# Patient Record
Sex: MALE | Race: WHITE | Employment: OTHER | URBAN - METROPOLITAN AREA
[De-identification: names, ages, dates, MRNs, and addresses within clinical notes are randomized per-mention and may not be internally consistent; named-entity substitution may affect disease eponyms.]

---

## 2017-06-21 ENCOUNTER — HOSPITAL ENCOUNTER (OUTPATIENT)
Age: 57
Discharge: HOME | End: 2017-06-21
Payer: COMMERCIAL

## 2017-06-21 ENCOUNTER — HOSPITAL ENCOUNTER (OUTPATIENT)
Dept: GENERAL RADIOLOGY | Age: 57
Discharge: HOME | End: 2017-06-21
Payer: COMMERCIAL

## 2017-06-21 DIAGNOSIS — R52 PAIN: ICD-10-CM

## 2017-06-21 PROCEDURE — 73562 X-RAY EXAM OF KNEE 3: CPT

## 2018-04-02 ENCOUNTER — HOSPITAL ENCOUNTER (EMERGENCY)
Age: 58
Discharge: HOME | End: 2018-04-02
Attending: EMERGENCY MEDICINE

## 2018-04-02 VITALS
OXYGEN SATURATION: 100 % | HEART RATE: 105 BPM | TEMPERATURE: 97.8 F | BODY MASS INDEX: 40.18 KG/M2 | RESPIRATION RATE: 16 BRPM | HEIGHT: 66 IN | DIASTOLIC BLOOD PRESSURE: 88 MMHG | WEIGHT: 250 LBS | SYSTOLIC BLOOD PRESSURE: 164 MMHG
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
## 2018-04-02 DIAGNOSIS — S39.012A STRAIN OF LUMBAR REGION, INITIAL ENCOUNTER: ICD-10-CM

## 2018-04-02 PROCEDURE — 6370000000 HC RX 637 (ALT 250 FOR IP): Performed by: EMERGENCY MEDICINE

## 2018-04-02 PROCEDURE — 99283 EMERGENCY DEPT VISIT LOW MDM: CPT

## 2018-04-02 RX ORDER — NAPROXEN 250 MG/1
500 TABLET ORAL ONCE
Status: COMPLETED | OUTPATIENT
Start: 2018-04-02 | End: 2018-04-02

## 2018-04-02 RX ORDER — CYCLOBENZAPRINE HCL 10 MG
10 TABLET ORAL 3 TIMES DAILY PRN
Qty: 15 TABLET | Refills: 0 | Status: SHIPPED | OUTPATIENT
Start: 2018-04-02 | End: 2018-04-12

## 2018-04-02 RX ORDER — NAPROXEN 500 MG/1
500 TABLET ORAL 2 TIMES DAILY WITH MEALS
Qty: 30 TABLET | Refills: 0 | Status: SHIPPED | OUTPATIENT
Start: 2018-04-02 | End: 2018-04-30 | Stop reason: SDUPTHER

## 2018-04-02 RX ADMIN — NAPROXEN 500 MG: 250 TABLET ORAL at 21:15

## 2018-04-02 ASSESSMENT — PAIN DESCRIPTION - LOCATION: LOCATION: BACK

## 2018-04-02 ASSESSMENT — ENCOUNTER SYMPTOMS
DIARRHEA: 0
COUGH: 0
EYE PAIN: 0
VOMITING: 0
ABDOMINAL PAIN: 1
BACK PAIN: 1
SHORTNESS OF BREATH: 0
SORE THROAT: 0
NAUSEA: 0

## 2018-04-02 ASSESSMENT — PAIN SCALES - GENERAL
PAINLEVEL_OUTOF10: 6
PAINLEVEL_OUTOF10: 6

## 2018-04-02 ASSESSMENT — PAIN DESCRIPTION - ORIENTATION: ORIENTATION: LOWER

## 2018-04-04 ENCOUNTER — OFFICE VISIT (OUTPATIENT)
Dept: FAMILY MEDICINE CLINIC | Age: 58
End: 2018-04-04
Payer: OTHER MISCELLANEOUS

## 2018-04-04 VITALS
TEMPERATURE: 99.1 F | HEIGHT: 66 IN | SYSTOLIC BLOOD PRESSURE: 177 MMHG | BODY MASS INDEX: 41.24 KG/M2 | HEART RATE: 101 BPM | WEIGHT: 256.6 LBS | DIASTOLIC BLOOD PRESSURE: 92 MMHG

## 2018-04-04 DIAGNOSIS — M54.50 ACUTE MIDLINE LOW BACK PAIN WITHOUT SCIATICA: Primary | ICD-10-CM

## 2018-04-04 DIAGNOSIS — I10 ESSENTIAL HYPERTENSION: ICD-10-CM

## 2018-04-04 PROCEDURE — 99213 OFFICE O/P EST LOW 20 MIN: CPT | Performed by: HOSPITALIST

## 2018-04-04 PROCEDURE — 99213 OFFICE O/P EST LOW 20 MIN: CPT

## 2018-04-04 RX ORDER — FUROSEMIDE 40 MG/1
40 TABLET ORAL DAILY
Qty: 60 TABLET | Refills: 1 | Status: ON HOLD | OUTPATIENT
Start: 2018-04-04 | End: 2018-09-06

## 2018-04-04 RX ORDER — POTASSIUM CHLORIDE 1.5 G/1.77G
20 POWDER, FOR SOLUTION ORAL 2 TIMES DAILY
Qty: 30 EACH | Refills: 1 | Status: ON HOLD | OUTPATIENT
Start: 2018-04-04 | End: 2018-09-06

## 2018-04-04 ASSESSMENT — ENCOUNTER SYMPTOMS
BLOOD IN STOOL: 0
WHEEZING: 0
SHORTNESS OF BREATH: 0
BACK PAIN: 1
CONSTIPATION: 0
VOMITING: 0
COUGH: 0
NAUSEA: 0
ANAL BLEEDING: 0
ABDOMINAL PAIN: 0

## 2018-04-30 ENCOUNTER — OFFICE VISIT (OUTPATIENT)
Dept: FAMILY MEDICINE CLINIC | Age: 58
End: 2018-04-30
Payer: OTHER MISCELLANEOUS

## 2018-04-30 VITALS
BODY MASS INDEX: 41.08 KG/M2 | SYSTOLIC BLOOD PRESSURE: 142 MMHG | TEMPERATURE: 98.5 F | HEIGHT: 66 IN | WEIGHT: 255.6 LBS | HEART RATE: 100 BPM | DIASTOLIC BLOOD PRESSURE: 90 MMHG

## 2018-04-30 DIAGNOSIS — M54.50 ACUTE MIDLINE LOW BACK PAIN WITHOUT SCIATICA: Primary | ICD-10-CM

## 2018-04-30 PROCEDURE — 99213 OFFICE O/P EST LOW 20 MIN: CPT

## 2018-04-30 PROCEDURE — 99213 OFFICE O/P EST LOW 20 MIN: CPT | Performed by: HOSPITALIST

## 2018-04-30 RX ORDER — NAPROXEN 500 MG/1
500 TABLET ORAL 2 TIMES DAILY WITH MEALS
Qty: 30 TABLET | Refills: 0 | Status: SHIPPED | OUTPATIENT
Start: 2018-04-30 | End: 2018-06-29 | Stop reason: SDUPTHER

## 2018-04-30 ASSESSMENT — ENCOUNTER SYMPTOMS: BACK PAIN: 1

## 2018-04-30 ASSESSMENT — PATIENT HEALTH QUESTIONNAIRE - PHQ9
SUM OF ALL RESPONSES TO PHQ QUESTIONS 1-9: 0
SUM OF ALL RESPONSES TO PHQ9 QUESTIONS 1 & 2: 0
2. FEELING DOWN, DEPRESSED OR HOPELESS: 0
1. LITTLE INTEREST OR PLEASURE IN DOING THINGS: 0

## 2018-05-07 ENCOUNTER — HOSPITAL ENCOUNTER (OUTPATIENT)
Dept: PHYSICAL THERAPY | Age: 58
Setting detail: THERAPIES SERIES
Discharge: HOME | End: 2018-05-07

## 2018-05-07 ENCOUNTER — HOSPITAL ENCOUNTER (OUTPATIENT)
Dept: MRI IMAGING | Age: 58
Discharge: HOME | End: 2018-05-09

## 2018-05-07 DIAGNOSIS — M54.50 ACUTE MIDLINE LOW BACK PAIN WITHOUT SCIATICA: ICD-10-CM

## 2018-05-07 PROCEDURE — 97162 PT EVAL MOD COMPLEX 30 MIN: CPT

## 2018-05-07 PROCEDURE — 97110 THERAPEUTIC EXERCISES: CPT

## 2018-05-07 PROCEDURE — 72148 MRI LUMBAR SPINE W/O DYE: CPT

## 2018-05-14 ENCOUNTER — HOSPITAL ENCOUNTER (OUTPATIENT)
Dept: PHYSICAL THERAPY | Age: 58
Setting detail: THERAPIES SERIES
Discharge: HOME | End: 2018-05-14

## 2018-05-14 PROCEDURE — 97110 THERAPEUTIC EXERCISES: CPT

## 2018-05-17 ENCOUNTER — HOSPITAL ENCOUNTER (OUTPATIENT)
Dept: PHYSICAL THERAPY | Age: 58
Setting detail: THERAPIES SERIES
Discharge: HOME | End: 2018-05-17

## 2018-05-21 ENCOUNTER — HOSPITAL ENCOUNTER (OUTPATIENT)
Dept: PHYSICAL THERAPY | Age: 58
Setting detail: THERAPIES SERIES
Discharge: HOME | End: 2018-05-21

## 2018-05-21 PROCEDURE — 97110 THERAPEUTIC EXERCISES: CPT

## 2018-05-24 ENCOUNTER — HOSPITAL ENCOUNTER (OUTPATIENT)
Dept: PHYSICAL THERAPY | Age: 58
Setting detail: THERAPIES SERIES
Discharge: HOME | End: 2018-05-24

## 2018-05-24 PROCEDURE — 97113 AQUATIC THERAPY/EXERCISES: CPT

## 2018-05-29 ENCOUNTER — HOSPITAL ENCOUNTER (OUTPATIENT)
Dept: PHYSICAL THERAPY | Age: 58
Setting detail: THERAPIES SERIES
Discharge: HOME | End: 2018-05-29

## 2018-05-29 PROCEDURE — 97110 THERAPEUTIC EXERCISES: CPT

## 2018-05-31 ENCOUNTER — HOSPITAL ENCOUNTER (OUTPATIENT)
Dept: PHYSICAL THERAPY | Age: 58
Setting detail: THERAPIES SERIES
Discharge: HOME | End: 2018-05-31

## 2018-05-31 PROCEDURE — 97113 AQUATIC THERAPY/EXERCISES: CPT

## 2018-06-01 ENCOUNTER — OFFICE VISIT (OUTPATIENT)
Dept: FAMILY MEDICINE CLINIC | Age: 58
End: 2018-06-01
Payer: OTHER MISCELLANEOUS

## 2018-06-01 VITALS
BODY MASS INDEX: 40.6 KG/M2 | WEIGHT: 252.6 LBS | HEIGHT: 66 IN | SYSTOLIC BLOOD PRESSURE: 145 MMHG | TEMPERATURE: 99.8 F | HEART RATE: 106 BPM | DIASTOLIC BLOOD PRESSURE: 80 MMHG

## 2018-06-01 DIAGNOSIS — G89.29 CHRONIC BILATERAL LOW BACK PAIN WITHOUT SCIATICA: ICD-10-CM

## 2018-06-01 DIAGNOSIS — M54.50 CHRONIC BILATERAL LOW BACK PAIN WITHOUT SCIATICA: ICD-10-CM

## 2018-06-01 DIAGNOSIS — Z11.4 ENCOUNTER FOR SCREENING FOR HIV: ICD-10-CM

## 2018-06-01 DIAGNOSIS — Z11.59 ENCOUNTER FOR HEPATITIS C SCREENING TEST FOR LOW RISK PATIENT: ICD-10-CM

## 2018-06-01 DIAGNOSIS — I10 ESSENTIAL HYPERTENSION: Primary | ICD-10-CM

## 2018-06-01 PROCEDURE — 99213 OFFICE O/P EST LOW 20 MIN: CPT | Performed by: HOSPITALIST

## 2018-06-01 ASSESSMENT — ENCOUNTER SYMPTOMS
WHEEZING: 0
ABDOMINAL PAIN: 0
BLOOD IN STOOL: 0
COUGH: 0
VOMITING: 0
SHORTNESS OF BREATH: 0
ANAL BLEEDING: 0
CONSTIPATION: 0
BACK PAIN: 1
NAUSEA: 0

## 2018-06-04 ENCOUNTER — HOSPITAL ENCOUNTER (OUTPATIENT)
Dept: PHYSICAL THERAPY | Age: 58
Setting detail: THERAPIES SERIES
Discharge: HOME | End: 2018-06-04

## 2018-06-04 PROCEDURE — 97110 THERAPEUTIC EXERCISES: CPT

## 2018-06-07 ENCOUNTER — HOSPITAL ENCOUNTER (OUTPATIENT)
Dept: PHYSICAL THERAPY | Age: 58
Setting detail: THERAPIES SERIES
Discharge: HOME | End: 2018-06-07

## 2018-06-07 PROCEDURE — 97113 AQUATIC THERAPY/EXERCISES: CPT

## 2018-06-11 ENCOUNTER — HOSPITAL ENCOUNTER (OUTPATIENT)
Dept: PHYSICAL THERAPY | Age: 58
Setting detail: THERAPIES SERIES
Discharge: HOME | End: 2018-06-11

## 2018-06-11 PROCEDURE — 97110 THERAPEUTIC EXERCISES: CPT

## 2018-06-14 ENCOUNTER — HOSPITAL ENCOUNTER (OUTPATIENT)
Dept: PHYSICAL THERAPY | Age: 58
Setting detail: THERAPIES SERIES
Discharge: HOME | End: 2018-06-14

## 2018-06-14 PROCEDURE — 97113 AQUATIC THERAPY/EXERCISES: CPT

## 2018-06-18 ENCOUNTER — HOSPITAL ENCOUNTER (OUTPATIENT)
Dept: PHYSICAL THERAPY | Age: 58
Setting detail: THERAPIES SERIES
Discharge: HOME | End: 2018-06-18

## 2018-06-18 PROCEDURE — 97110 THERAPEUTIC EXERCISES: CPT

## 2018-06-21 ENCOUNTER — HOSPITAL ENCOUNTER (OUTPATIENT)
Dept: PHYSICAL THERAPY | Age: 58
Setting detail: THERAPIES SERIES
Discharge: HOME | End: 2018-06-21

## 2018-06-21 PROCEDURE — 97113 AQUATIC THERAPY/EXERCISES: CPT

## 2018-06-25 ENCOUNTER — HOSPITAL ENCOUNTER (OUTPATIENT)
Dept: PHYSICAL THERAPY | Age: 58
Setting detail: THERAPIES SERIES
Discharge: HOME | End: 2018-06-25

## 2018-06-25 PROCEDURE — 97110 THERAPEUTIC EXERCISES: CPT

## 2018-06-29 ENCOUNTER — OFFICE VISIT (OUTPATIENT)
Dept: FAMILY MEDICINE CLINIC | Age: 58
End: 2018-06-29
Payer: OTHER MISCELLANEOUS

## 2018-06-29 VITALS
TEMPERATURE: 99 F | HEIGHT: 66 IN | BODY MASS INDEX: 40.85 KG/M2 | DIASTOLIC BLOOD PRESSURE: 86 MMHG | SYSTOLIC BLOOD PRESSURE: 137 MMHG | HEART RATE: 110 BPM | WEIGHT: 254.2 LBS

## 2018-06-29 DIAGNOSIS — G89.29 CHRONIC BILATERAL LOW BACK PAIN WITHOUT SCIATICA: ICD-10-CM

## 2018-06-29 DIAGNOSIS — M54.50 CHRONIC BILATERAL LOW BACK PAIN WITHOUT SCIATICA: ICD-10-CM

## 2018-06-29 DIAGNOSIS — Z11.59 NEED FOR HEPATITIS C SCREENING TEST: ICD-10-CM

## 2018-06-29 DIAGNOSIS — G89.29 CHRONIC PAIN OF RIGHT KNEE: ICD-10-CM

## 2018-06-29 DIAGNOSIS — I10 ESSENTIAL HYPERTENSION: Primary | ICD-10-CM

## 2018-06-29 DIAGNOSIS — M25.561 CHRONIC PAIN OF RIGHT KNEE: ICD-10-CM

## 2018-06-29 DIAGNOSIS — Z11.4 ENCOUNTER FOR SCREENING FOR HIV: ICD-10-CM

## 2018-06-29 PROCEDURE — 99214 OFFICE O/P EST MOD 30 MIN: CPT | Performed by: HOSPITALIST

## 2018-06-29 RX ORDER — NAPROXEN 500 MG/1
500 TABLET ORAL 2 TIMES DAILY WITH MEALS
Qty: 30 TABLET | Refills: 0 | Status: ON HOLD | OUTPATIENT
Start: 2018-06-29 | End: 2018-09-06 | Stop reason: HOSPADM

## 2018-06-29 ASSESSMENT — ENCOUNTER SYMPTOMS
SHORTNESS OF BREATH: 0
COUGH: 0
BACK PAIN: 1
WHEEZING: 0

## 2018-07-02 ENCOUNTER — HOSPITAL ENCOUNTER (OUTPATIENT)
Dept: PHYSICAL THERAPY | Age: 58
Setting detail: THERAPIES SERIES
Discharge: HOME | End: 2018-07-02

## 2018-07-02 PROCEDURE — 97110 THERAPEUTIC EXERCISES: CPT

## 2018-07-02 NOTE — FLOWSHEET NOTE
appointment, review HEP and DC if no flare ups. Back pain post tx:  0/10      [] No change. [] Other:    STG/LTG  STG: (to be met in 10 treatments)  1. ? Pain:  Back pain reduced 5/10  2. ? ROM:  Lumbar ROM 75% all planes  3. ? Strength:  LE and core strength 5-  4. ? Function:  Oswestry 15%  5. Independent with Home Exercise Programs  6. Demonstrate Knowledge of fall prevention  LTG: (to be met in 16 treatments)  1. Back pain 0-2/10  2. Oswetry - 10% or less        Patient goals: Move better without pain    Pt. Education:  [x] Yes  [] No  [x] Reviewed Prior HEP/Ed  Method of Education: [x] Verbal  [x] Demo  [] Written  Comprehension of Education:  [x] Verbalizes understanding. [x] Demonstrates understanding. [] Needs review. [] Demonstrates/verbalizes HEP/Ed previously given. Plan: [x] Continue per plan of care.    [x] Other:       Treatment Plan:  [x] Therapeutic Exercise             [x] Modalities:  [] Therapeutic Activity               [] Ultrasound                  [] Electrical Stimulation  [] Gait Training                          []Massage         [x] Lumbar/Cervical Traction  [] Neuromuscular Re-education            [x] Cold/hotpack  [] Iontophoresis: 4 mg/mL  [x] Instruction in HEP                                                                       Dexamethasone Sodium  [x] Manual Therapy                                                                          Phosphate 40-80 mAmin  [x] Aquatic Therapy                                 [x] Other:   DRY NEEDLING       Time In: 1600          Time Out: 6044    Electronically signed by:  Jyoti Johnson PT

## 2018-07-05 ENCOUNTER — HOSPITAL ENCOUNTER (OUTPATIENT)
Dept: PHYSICAL THERAPY | Age: 58
Setting detail: THERAPIES SERIES
Discharge: HOME | End: 2018-07-05

## 2018-07-05 PROCEDURE — 97113 AQUATIC THERAPY/EXERCISES: CPT

## 2018-07-05 NOTE — PROGRESS NOTES
Physical Therapy    [address] [place] [address] Outpatient Physical Therapy   [address] [facility] [address]   Phone: [phone]   Fax: [phone]    Physical Therapy Daily Treatment Note    Date:  2018  Patient Name:  Author Necessary    :  1960  MRN: [id number]  Physician: CHARY Leonard                                      Insurance: Generic [employer] Insurance   Eligibility Status: Fabian Lua   DOS: 18   # of visits allowed/remaining:    Medical Diagnosis: Acute midline low back pain without sciatica                 Rehab Codes: M54.5, M62.81,      Onset Date: 18                 Next 's appt. : tbd  Visit# / total visits: 15/16   (CORRECTED COUNT)                 Cancels/No Shows: 1/0    Subjective:  No LB/LE pain this date- Notes still with neck pain but minimal.  Notes just slept wrong on it. Pain:  [x] Yes  [] No Location: Posterior neck, (B) shoulders; HA  Pain Rating: (0-10 scale): 2/10  Pain altered Tx:  [x] No  [] Yes  Action:    Comments: Continue to focus on posture and technique with all exercises per log. Objective:  [address]  [employer] Exercise Log  Aquatic, Hip & DLS Program- Phase 1  Date of Eval:    18           Primary PT:  Rachel Lezama PT  Diagnosis:  Acute midline low back pain without sciatica         Date 18   Visit # 10/16 12/16 15/16    No Hands No Hands No Hands   Walk F/L/R 2 Laps 2 laps ea 2 Laps   Marching 2 Laps 2 laps 2 Laps   Squats 15x5\" 15x5\" 15x5\"   Step-Ups F/L Low 15x  Low 15x ea Low 15x   Heel-toe raises 15x 15x 15x   SLR F/L/R 15x 15x ea 15x   Knee/Flex/Ext 15x 15x 15x   F/L Lunges      Kickboard Ex. Med Medium Medium   Iso Abd. 10x5\" 10x5\" 15x5\"   Push-pull 15x 15x 15x   Paddling            UE Ex:      Horiz. Abd/Add  10x 10x   Alt.  Flex/Ext  10x 10x   Abd/Add  10x 10x   IR/ER  10x 10x         Deep water  1 Noodle 1 Noodle 1 Noodle   Cycling 3' 3 minutes 3'   Jacks  2 minutes 2'   X-Country 2 minutes 2'         Stretches      Achllies 2x20\" 2x20\" 2x20\"   Hamstring 2x20\" 2x20\" 2x20\"         Cool down 2 Laps 2 laps 2 Laps   Pain Rating 2-4 7  Neck/shlds 2 shoulder/neck     Specific Instructions for next treatment: Assess patient response and progress as able    Treatment Charges: Mins Units   []  Modalities     []  Ther Exercise     []  Manual Therapy     []  Ther Activities     [x]  Aquatics 40 3   []  Neuromuscular     []  Other     Total Treatment time 30 3       Assessment: [x] Progressing toward goals. [] No change. [] Other:    STG: (to be met in 10 treatments)  1. ? Pain:  Back pain reduced 5/10  2. ? ROM:  Lumbar ROM 75% all planes  3. ? Strength:  LE and core strength 5-  4. ? Function:  Oswestry 15%  5. Independent with Home Exercise Programs  6. Demonstrate Knowledge of fall prevention  LTG: (to be met in 16 treatments)  1. Back pain 0-2/10  2. Oswetry - 10% or less    Patient goals:  Move better without pain    Pt. Education:  [x] Yes  [] No  [] Reviewed Prior HEP/Ed  Method of Education: [x] Verbal  [x] Demo  [] Written  Comprehension of Education:  [x] Verbalizes understanding. [x] Demonstrates understanding. [x] Needs review. [] Demonstrates/verbalizes HEP/Ed previously given. Plan: [x] Continue per plan of care.    [] Other:      Time In: 1600        Time Out:  5998    Electronically signed by Shea Hamlin PTA on 7/5/2018 at 3:57 PM

## 2018-07-09 ENCOUNTER — HOSPITAL ENCOUNTER (OUTPATIENT)
Dept: PHYSICAL THERAPY | Age: 58
Setting detail: THERAPIES SERIES
Discharge: HOME | End: 2018-07-09

## 2018-07-09 PROCEDURE — 97110 THERAPEUTIC EXERCISES: CPT

## 2018-07-09 NOTE — DISCHARGE SUMMARY
Other:   DC summary:  Patient seen for initial PT evaluation 5/7/18. Patient seen for 16 visits to include aqua and land based PT. Patient has met all goals and is discharged to independent HEP. STG/LTG  STG: (to be met in 10 treatments)  1. ? Pain:  Back pain reduced 5/10. Goal met  2. ? ROM:  Lumbar ROM 75% all planes. Goal Met  3. ? Strength:  LE and core strength 5-. Goal Met  4. ? Function:  Oswestry 15%. Goal Met  5. Independent with Home Exercise Programs. Goal Met  6. Demonstrate Knowledge of fall prevention  LTG: (to be met in 16 treatments)  1. Back pain 0-2/10. Goal Met  2. Oswetry - 10% or less. Goal Met        Patient goals: Move better without pain    Pt. Education:  [x] Yes  [] No  [x] Reviewed Prior HEP/Ed. HEP reviewed. Issued Red and Purple theraband to progress resisted therex as tolerated. Patient has no questions re HEP or otherwise. Method of Education: [x] Verbal  [x] Demo  [] Written  Comprehension of Education:  [x] Verbalizes understanding. [x] Demonstrates understanding. [] Needs review. [] Demonstrates/verbalizes HEP/Ed previously given. Plan: [] Continue per plan of care.    [x] Other:  DC to independent [facility]     Treatment Plan:  [x] Therapeutic Exercise             [x] Modalities:  [] Therapeutic Activity               [] Ultrasound                  [] Electrical Stimulation  [] Gait Training                          []Massage         [x] Lumbar/Cervical Traction  [] Neuromuscular Re-education            [x] Cold/hotpack  [] Iontophoresis: 4 mg/mL  [x] Instruction in HEP                                                                       Dexamethasone Sodium  [x] Manual Therapy                                                                          Phosphate 40-80 mAmin  [x] Aquatic Therapy                                 [x] Other:   DRY NEEDLING       Time In: 1600          Time Out: 6561    Electronically signed by:  Layne Spine, PT

## 2018-07-20 ENCOUNTER — HOSPITAL ENCOUNTER (OUTPATIENT)
Age: 58
Discharge: HOME | End: 2018-07-20

## 2018-07-20 ENCOUNTER — HOSPITAL ENCOUNTER (OUTPATIENT)
Age: 58
Discharge: HOME | End: 2018-07-22

## 2018-07-20 ENCOUNTER — HOSPITAL ENCOUNTER (OUTPATIENT)
Dept: GENERAL RADIOLOGY | Age: 58
Discharge: HOME | End: 2018-07-22

## 2018-07-20 DIAGNOSIS — Z11.59 ENCOUNTER FOR HEPATITIS C SCREENING TEST FOR LOW RISK PATIENT: ICD-10-CM

## 2018-07-20 DIAGNOSIS — I10 ESSENTIAL HYPERTENSION: ICD-10-CM

## 2018-07-20 DIAGNOSIS — Z11.4 ENCOUNTER FOR SCREENING FOR HIV: ICD-10-CM

## 2018-07-20 DIAGNOSIS — M25.561 CHRONIC PAIN OF RIGHT KNEE: ICD-10-CM

## 2018-07-20 DIAGNOSIS — G89.29 CHRONIC PAIN OF RIGHT KNEE: ICD-10-CM

## 2018-07-20 LAB
ANION GAP SERPL CALCULATED.3IONS-SCNC: 13 MMOL/L (ref 9–17)
BUN BLDV-MCNC: 18 MG/DL (ref 6–20)
BUN/CREAT BLD: ABNORMAL (ref 9–20)
CALCIUM SERPL-MCNC: 9 MG/DL (ref 8.6–10.4)
CHLORIDE BLD-SCNC: 102 MMOL/L (ref 98–107)
CO2: 27 MMOL/L (ref 20–31)
CREAT SERPL-MCNC: 0.9 MG/DL (ref 0.7–1.2)
GFR AFRICAN AMERICAN: >60 ML/MIN
GFR NON-AFRICAN AMERICAN: >60 ML/MIN
GFR SERPL CREATININE-BSD FRML MDRD: ABNORMAL ML/MIN/{1.73_M2}
GFR SERPL CREATININE-BSD FRML MDRD: ABNORMAL ML/MIN/{1.73_M2}
GLUCOSE BLD-MCNC: 102 MG/DL (ref 70–99)
HEPATITIS C ANTIBODY: NONREACTIVE
HIV AG/AB: NONREACTIVE
POTASSIUM SERPL-SCNC: 4.5 MMOL/L (ref 3.7–5.3)
SODIUM BLD-SCNC: 142 MMOL/L (ref 135–144)

## 2018-07-20 PROCEDURE — 86803 HEPATITIS C AB TEST: CPT

## 2018-07-20 PROCEDURE — 87389 HIV-1 AG W/HIV-1&-2 AB AG IA: CPT

## 2018-07-20 PROCEDURE — 36415 COLL VENOUS BLD VENIPUNCTURE: CPT

## 2018-07-20 PROCEDURE — 73562 X-RAY EXAM OF KNEE 3: CPT

## 2018-07-20 PROCEDURE — 80048 BASIC METABOLIC PNL TOTAL CA: CPT

## 2018-09-05 ENCOUNTER — HOSPITAL ENCOUNTER (OUTPATIENT)
Age: 58
Setting detail: OBSERVATION
Discharge: HOME | End: 2018-09-07
Attending: EMERGENCY MEDICINE | Admitting: EMERGENCY MEDICINE

## 2018-09-05 ENCOUNTER — APPOINTMENT (OUTPATIENT)
Dept: CT IMAGING | Age: 58
End: 2018-09-05

## 2018-09-05 ENCOUNTER — APPOINTMENT (OUTPATIENT)
Dept: GENERAL RADIOLOGY | Age: 58
End: 2018-09-05

## 2018-09-05 DIAGNOSIS — J42 CHRONIC BRONCHITIS, UNSPECIFIED CHRONIC BRONCHITIS TYPE (HCC): ICD-10-CM

## 2018-09-05 DIAGNOSIS — R07.9 CHEST PAIN, UNSPECIFIED TYPE: Primary | ICD-10-CM

## 2018-09-05 DIAGNOSIS — M54.50 MIDLINE LOW BACK PAIN WITHOUT SCIATICA: ICD-10-CM

## 2018-09-05 LAB
-: NORMAL
-: NORMAL
ABSOLUTE EOS #: 0.18 K/UL (ref 0–0.44)
ABSOLUTE IMMATURE GRANULOCYTE: <0.03 K/UL (ref 0–0.3)
ABSOLUTE LYMPH #: 1.73 K/UL (ref 1.1–3.7)
ABSOLUTE MONO #: 0.86 K/UL (ref 0.1–1.2)
ANION GAP SERPL CALCULATED.3IONS-SCNC: 12 MMOL/L (ref 9–17)
BASOPHILS # BLD: 0 % (ref 0–2)
BASOPHILS ABSOLUTE: 0.03 K/UL (ref 0–0.2)
BUN BLDV-MCNC: 12 MG/DL (ref 6–20)
BUN/CREAT BLD: ABNORMAL (ref 9–20)
CALCIUM SERPL-MCNC: 9.2 MG/DL (ref 8.6–10.4)
CHLORIDE BLD-SCNC: 95 MMOL/L (ref 98–107)
CO2: 30 MMOL/L (ref 20–31)
CREAT SERPL-MCNC: 0.9 MG/DL (ref 0.7–1.2)
D-DIMER QUANTITATIVE: 1.85 MG/L FEU
DIFFERENTIAL TYPE: ABNORMAL
EOSINOPHILS RELATIVE PERCENT: 2 % (ref 1–4)
GFR AFRICAN AMERICAN: >60 ML/MIN
GFR NON-AFRICAN AMERICAN: >60 ML/MIN
GFR SERPL CREATININE-BSD FRML MDRD: ABNORMAL ML/MIN/{1.73_M2}
GFR SERPL CREATININE-BSD FRML MDRD: ABNORMAL ML/MIN/{1.73_M2}
GLUCOSE BLD-MCNC: 119 MG/DL (ref 70–99)
HCT VFR BLD CALC: 38.8 % (ref 40.7–50.3)
HEMOGLOBIN: 12.8 G/DL (ref 13–17)
IMMATURE GRANULOCYTES: 0 %
LYMPHOCYTES # BLD: 19 % (ref 24–43)
MCH RBC QN AUTO: 28.8 PG (ref 25.2–33.5)
MCHC RBC AUTO-ENTMCNC: 33 G/DL (ref 28.4–34.8)
MCV RBC AUTO: 87.4 FL (ref 82.6–102.9)
MONOCYTES # BLD: 9 % (ref 3–12)
NRBC AUTOMATED: 0 PER 100 WBC
PDW BLD-RTO: 12.8 % (ref 11.8–14.4)
PLATELET # BLD: 200 K/UL (ref 138–453)
PLATELET ESTIMATE: ABNORMAL
PMV BLD AUTO: 11.9 FL (ref 8.1–13.5)
POC TROPONIN I: 0 NG/ML (ref 0–0.1)
POC TROPONIN I: 0.01 NG/ML (ref 0–0.1)
POC TROPONIN INTERP: NORMAL
POC TROPONIN INTERP: NORMAL
POTASSIUM SERPL-SCNC: 3.8 MMOL/L (ref 3.7–5.3)
RBC # BLD: 4.44 M/UL (ref 4.21–5.77)
RBC # BLD: ABNORMAL 10*6/UL
REASON FOR REJECTION: NORMAL
REASON FOR REJECTION: NORMAL
SEG NEUTROPHILS: 70 % (ref 36–65)
SEGMENTED NEUTROPHILS ABSOLUTE COUNT: 6.31 K/UL (ref 1.5–8.1)
SODIUM BLD-SCNC: 137 MMOL/L (ref 135–144)
WBC # BLD: 9.1 K/UL (ref 3.5–11.3)
WBC # BLD: ABNORMAL 10*3/UL
ZZ NTE CLEAN UP: ORDERED TEST: NORMAL
ZZ NTE CLEAN UP: ORDERED TEST: NORMAL
ZZ NTE WITH NAME CLEAN UP: SPECIMEN SOURCE: NORMAL
ZZ NTE WITH NAME CLEAN UP: SPECIMEN SOURCE: NORMAL

## 2018-09-05 PROCEDURE — 85025 COMPLETE CBC W/AUTO DIFF WBC: CPT

## 2018-09-05 PROCEDURE — 84484 ASSAY OF TROPONIN QUANT: CPT

## 2018-09-05 PROCEDURE — 6360000004 HC RX CONTRAST MEDICATION: Performed by: EMERGENCY MEDICINE

## 2018-09-05 PROCEDURE — 71260 CT THORAX DX C+: CPT

## 2018-09-05 PROCEDURE — 99285 EMERGENCY DEPT VISIT HI MDM: CPT

## 2018-09-05 PROCEDURE — G0378 HOSPITAL OBSERVATION PER HR: HCPCS

## 2018-09-05 PROCEDURE — 71046 X-RAY EXAM CHEST 2 VIEWS: CPT

## 2018-09-05 PROCEDURE — 85379 FIBRIN DEGRADATION QUANT: CPT

## 2018-09-05 PROCEDURE — 80048 BASIC METABOLIC PNL TOTAL CA: CPT

## 2018-09-05 PROCEDURE — 93005 ELECTROCARDIOGRAM TRACING: CPT

## 2018-09-05 RX ADMIN — IOPAMIDOL 75 ML: 755 INJECTION, SOLUTION INTRAVENOUS at 23:50

## 2018-09-05 ASSESSMENT — PAIN SCALES - GENERAL: PAINLEVEL_OUTOF10: 10

## 2018-09-05 ASSESSMENT — PAIN DESCRIPTION - FREQUENCY: FREQUENCY: CONTINUOUS

## 2018-09-05 ASSESSMENT — HEART SCORE: ECG: 1

## 2018-09-05 ASSESSMENT — ENCOUNTER SYMPTOMS
COUGH: 0
VOMITING: 0
NAUSEA: 0
SHORTNESS OF BREATH: 1
BLOOD IN STOOL: 0
CHEST TIGHTNESS: 1
WHEEZING: 0
ABDOMINAL PAIN: 0
BACK PAIN: 0

## 2018-09-05 ASSESSMENT — PAIN DESCRIPTION - ONSET: ONSET: ON-GOING

## 2018-09-05 ASSESSMENT — PAIN DESCRIPTION - LOCATION: LOCATION: CHEST

## 2018-09-05 ASSESSMENT — PAIN DESCRIPTION - PROGRESSION: CLINICAL_PROGRESSION: NOT CHANGED

## 2018-09-05 ASSESSMENT — PAIN DESCRIPTION - PAIN TYPE: TYPE: ACUTE PAIN

## 2018-09-05 ASSESSMENT — PAIN DESCRIPTION - ORIENTATION: ORIENTATION: MID

## 2018-09-05 ASSESSMENT — PAIN DESCRIPTION - DESCRIPTORS: DESCRIPTORS: ACHING

## 2018-09-05 NOTE — ED PROVIDER NOTES
[address]  ED  Emergency Department Encounter  Emergency Medicine Resident     Pt Name: Leo Quiroz  MRN: [id number]  [place] 1960  Date of evaluation: 9/5/18  PCP:  Bee Lamar MD    CHIEF COMPLAINT       Chief Complaint   Patient presents with    Shortness of Breath     pt states he has continued CHF pt states he feels like \"acting up\" pt states took his meds at home. pt states his sister is here with him for the same thing. HISTORY OF PRESENT [email or web address]  (Location/Symptom, Timing/Onset, Context/Setting, Quality, Duration, Modifying Factors, Severity.)      Leo Quiroz is a 61 y.o. male With a history of hypertension, hyperlipidemia, heart valve disorder, CHF who presents with Chest pain and shortness of breath. 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 Patient denies any, recent surgery, history of blood clots, history of cancer, estrogen use, unilateral leg swelling.        has a past medical history of CHF (congestive heart failure) ([address] Chronic back pain; COPD (chronic obstructive pulmonary disease) ([facility] [address] [facility] Headache(784.0); Hypertension; Low back pain with sciatica; Osteoarthritis; and Sleep apnea. has a past surgical history that includes hernia repair and Tonsillectomy (1963   exact date unknown). Social History     Social History    Marital status: Single     Spouse name: N/A    Number of children: N/A    Years of education: N/A     Occupational History    Not on file.      Social History Main Topics    Smoking status: Former Smoker     Types: Cigarettes    Smokeless tobacco: Current User     Types: Chew    Alcohol use No    Drug use: No    Sexual activity: Not on file     Other Topics Concern    Not on file     Social History Narrative    No narrative on file Family History   Problem Relation Age of Onset    Kidney Disease Mother     Diabetes Mother     Heart Disease Mother     Arthritis Mother     Heart Disease Father     Arthritis Sister     Diabetes Sister     Heart Disease Sister         Allergies:  Penicillins; Sulfa antibiotics; Pseudoeph-doxylamine-dm-apap; and Rabbit protein    Home Medications:  Prior to Admission medications    Medication Sig Start Date End Date Taking? Authorizing Provider   Handicap Placard MISC by Does not apply route Duration : 5 years 6/29/18   Kip Nunez MD   naproxen (NAPROSYN) 500 MG tablet Take 1 tablet by mouth 2 times daily (with meals) for 30 doses 6/29/18 7/14/18  Kip Nunez MD   furosemide (LASIX) 40 MG tablet Take 1 tablet by mouth daily 4/4/18   Kip Nunez MD   potassium chloride (KLOR-CON) 20 MEQ packet Take 20 mEq by mouth 2 times daily 4/4/18   Mauricio Jesus MD   PROAIR  (90 BASE) MCG/ACT inhaler INHALE 2 PUFFS INTO THE LUNGS EVERY 6 HOURS AS NEEDED FOR WHEEZING 5/18/16   Efren Amador MD   albuterol-ipratropium (COMBIVENT RESPIMAT)  MCG/ACT AERS inhaler Inhale 1 puff into the lungs every 6 hours 4/15/16   Efren Amador MD   gabapentin (NEURONTIN) 300 MG capsule Take 1 capsule by mouth nightly as needed 4/15/16   Efren Amador MD   lisinopril (PRINIVIL;ZESTRIL) 30 MG tablet TAKE 1 TABLET BY MOUTH DAILY 2/19/16   Efren Amador MD   simvastatin (ZOCOR) 20 MG tablet Take 20 mg by mouth nightly    Historical Provider, MD   metoprolol (LOPRESSOR) 50 MG tablet Take 50 mg by mouth 2 times daily    Historical Provider, MD   ranolazine (RANEXA) 500 MG SR tablet Take 500 mg by mouth 2 times daily. Historical Provider, MD   nitroGLYCERIN (NITROSTAT) 0.4 MG SL tablet Place 0.4 mg under the tongue every 5 minutes as needed for Chest pain. Historical Provider, MD   testosterone cypionate (DEPOTESTOTERONE CYPIONATE) 200 MG/ML injection Inject 50 mg into the muscle once.     Historical Provider, MD   Blood Pressure KIT DX: HTN 8/1/14   Ali Schaumann, MD   aspirin 81 MG tablet Take 81 mg by mouth daily. Historical Provider, MD   spironolactone (ALDACTONE) 25 MG tablet Take 25 mg by mouth daily. Historical Provider, MD       REVIEW OF SYSTEMS    (2-9 systems for level 4, 10 or more for level 5)      Review of Systems   Constitutional: Negative for chills, diaphoresis and fever. Eyes: Negative for visual disturbance. Respiratory: Positive for chest tightness and shortness of breath. Negative for cough and wheezing. Cardiovascular: Positive for chest pain. Negative for palpitations and leg swelling. Gastrointestinal: Negative for abdominal pain, blood in stool, nausea and vomiting. Endocrine: Negative for polydipsia. Notes polyuria after taking diuretics. Musculoskeletal: Negative for back pain. Skin: Negative for rash and wound. Neurological: Negative for syncope, weakness, light-headedness and numbness. PHYSICAL EXAM   (up to 7 for level 4, 8 or more for level 5)      INITIAL VITALS:   ED Triage Vitals   BP Temp Temp src Pulse Resp SpO2 Height Weight   -- -- -- -- -- -- -- --       Physical Exam   Constitutional: He is oriented to person, place, and time. He appears well-developed and well-nourished. No distress. HENT:   Head: Normocephalic and atraumatic. Eyes: Pupils are equal, round, and reactive to light. EOM are normal.   Neck: No JVD present. Cardiovascular:   Tachycardic rate with normal rhythm. No murmurs, rubs, gallops. Pulmonary/Chest: Effort normal and breath sounds normal. No stridor. No respiratory distress. He has no wheezes. He has no rales. Saturating well on room air   Abdominal: Soft. He exhibits no distension and no mass. There is no tenderness. There is no guarding. Musculoskeletal: He exhibits no edema or tenderness. Neurological: He is alert and oriented to person, place, and time. Skin: No rash noted. He is not diaphoretic. No erythema. Nursing note and vitals reviewed. DIFFERENTIAL  DIAGNOSIS     PLAN (LABS / IMAGING / EKG):  Orders Placed This Encounter   Procedures    XR CHEST STANDARD (2 VW)    CT CHEST PULMONARY EMBOLISM W CONTRAST    CBC Auto Differential    D-DIMER, QUANTITATIVE    SPECIMEN REJECTION    PREVIOUS SPECIMEN    SPECIMEN REJECTION    Basic Metabolic Panel    PREVIOUS SPECIMEN    Troponin    LAB SCANNED REPORT    DIET CARDIAC;    Vital signs per unit routine    Notify physician    Notify physician    Up as tolerated    Vital signs per unit routine    Notify physician    Notify physician    Place intermittent pneumatic compression device    Telemetry Monitoring    Telemetry monitoring    Full Code    Inpatient consult to Cardiology    Inpatient consult to Cardiology    POCT troponin    POCT troponin    POCT troponin    EKG 12 Lead    EKG 12 Lead    EKG REPORT    EKG REPORT    PATIENT STATUS (FROM ED OR OR/PROCEDURAL) Observation       MEDICATIONS ORDERED:  Orders Placed This Encounter   Medications    spironolactone (ALDACTONE) tablet 25 mg    aspirin chewable tablet 81 mg    nitroGLYCERIN (NITROSTAT) SL tablet 0.4 mg    DISCONTD: testosterone cypionate (DEPOTESTOTERONE CYPIONATE) injection 50 mg    ranolazine (RANEXA) extended release tablet 500 mg    simvastatin (ZOCOR) tablet 20 mg    metoprolol tartrate (LOPRESSOR) tablet 50 mg    lisinopril (PRINIVIL;ZESTRIL) tablet 5 mg    DISCONTD: albuterol-ipratropium (COMBIVENT RESPIMAT)  MCG/ACT inhaler 1 puff     Order Specific Question:   Please select a reason the therapeutic interchange was not accepted:      Answer:   Sophie Steve for Pharmacy to Substitute    gabapentin (NEURONTIN) capsule 300 mg    furosemide (LASIX) tablet 40 mg    potassium chloride (KLOR-CON) packet 20 mEq    naproxen (NAPROSYN) tablet 500 mg    sodium chloride flush 0.9 % injection 10 mL    sodium chloride flush 0.9 % injection 10 mL    Suspicious  ECG: Non-Specifc repolarization disturbance/LBTB/PM  Patient Age: > 39 and < 65 years  *Risk factors for Atherosclerotic disease: Hypertension, Hypercholesterolemia, Positive family History  Risk Factors: > 3 Risk factors or history of atherosclerotic disease*  Troponin: < 1X normal limit  Heart Score Total: 5    Score 0  3 = 2.5%  MACE over next 6 wks = Discharge home  Score 4  6 = 20.3%  MACE over next 6 wks = Obs admit  Score 7 - 10 = 72.7%  MACE over next 6 wks = Early invasive Rx    Chest Pain in the Emergency Room: A Multicenter Validation of the [address] Carson BE, Six AJ, Amrik KAROLINA, Mast TP, [employer], Mast EG, Venkatesh SH, [employer] [facility]. Crit Pathw Cardiol. 2010 Sep; 9(3): 164-169. \"A prospective validation of the HEART score for chest pain patients at the emergency department. \" Int J Cardiol. 2013 Oct 3;168(3):2153-8. doi: 10.1016/j.ijcard. 2013.01.255. Epub 2013 Mar 7. Signed out to Dr. Boone Godinez discussed history, physical, work up and plan. Please see Dr. Bakari Raymond note for further information.        DIAGNOSTIC RESULTS / EMERGENCY DEPARTMENT COURSE / MDM     LABS:  Labs Reviewed   CBC WITH AUTO DIFFERENTIAL - Abnormal; Notable for the following:        Result Value    Hemoglobin 12.8 (*)     Hematocrit 38.8 (*)     Seg Neutrophils 70 (*)     Lymphocytes 19 (*)     All other components within normal limits   BASIC METABOLIC PANEL - Abnormal; Notable for the following:     Glucose 119 (*)     Chloride 95 (*)     All other components within normal limits   D-DIMER, QUANTITATIVE   SPECIMEN REJECTION   SPECIMEN REJECTION   TROPONIN   PREVIOUS SPECIMEN   PREVIOUS SPECIMEN   POCT TROPONIN   POCT TROPONIN   POCT TROPONIN   POCT TROPONIN         RADIOLOGY:  Xr Chest Standard (2 Vw)    Result Date: 9/5/2018  EXAMINATION: TWO VIEWS OF THE CHEST 9/5/2018 7:57 pm COMPARISON: 09/27/2015 HISTORY: ORDERING SYSTEM PROVIDED HISTORY: chest pain TECHNOLOGIST PROVIDED HISTORY:

## 2018-09-05 NOTE — Clinical Note
-stroke risk factor   Patient Class: Observation [104]   REQUIRED: Diagnosis: Chest pain, unspecified [786.50. ICD-9-CM]   Estimated Length of Stay: Estimated stay of less than 2 midnights   Future Attending Provider: Trev Grier [[id number]]   Admitting Provider: Trev Grier [[id number]]   Preferred Department: 3c   Telemetry Bed Required?: No

## 2018-09-06 PROBLEM — R07.9 CHEST PAIN: Status: ACTIVE | Noted: 2018-09-06

## 2018-09-06 LAB
EKG ATRIAL RATE: 106 BPM
EKG ATRIAL RATE: 95 BPM
EKG P AXIS: 45 DEGREES
EKG P AXIS: 54 DEGREES
EKG P-R INTERVAL: 158 MS
EKG P-R INTERVAL: 160 MS
EKG Q-T INTERVAL: 354 MS
EKG Q-T INTERVAL: 372 MS
EKG QRS DURATION: 100 MS
EKG QRS DURATION: 94 MS
EKG QTC CALCULATION (BAZETT): 467 MS
EKG QTC CALCULATION (BAZETT): 470 MS
EKG R AXIS: 21 DEGREES
EKG R AXIS: 33 DEGREES
EKG T AXIS: 61 DEGREES
EKG T AXIS: 69 DEGREES
EKG VENTRICULAR RATE: 106 BPM
EKG VENTRICULAR RATE: 95 BPM
LV EF: 18 %
LVEF MODALITY: NORMAL
TROPONIN INTERP: NORMAL
TROPONIN T: <0.03 NG/ML

## 2018-09-06 PROCEDURE — G0378 HOSPITAL OBSERVATION PER HR: HCPCS

## 2018-09-06 PROCEDURE — 84484 ASSAY OF TROPONIN QUANT: CPT

## 2018-09-06 PROCEDURE — 93306 TTE W/DOPPLER COMPLETE: CPT

## 2018-09-06 PROCEDURE — 2580000003 HC RX 258: Performed by: EMERGENCY MEDICINE

## 2018-09-06 PROCEDURE — 36415 COLL VENOUS BLD VENIPUNCTURE: CPT

## 2018-09-06 PROCEDURE — 93005 ELECTROCARDIOGRAM TRACING: CPT

## 2018-09-06 PROCEDURE — 6370000000 HC RX 637 (ALT 250 FOR IP): Performed by: STUDENT IN AN ORGANIZED HEALTH CARE EDUCATION/TRAINING PROGRAM

## 2018-09-06 RX ORDER — GABAPENTIN 300 MG/1
300 CAPSULE ORAL NIGHTLY PRN
Status: DISCONTINUED | OUTPATIENT
Start: 2018-09-06 | End: 2018-09-07 | Stop reason: HOSPADM

## 2018-09-06 RX ORDER — POTASSIUM CHLORIDE 1.5 G/1.77G
20 POWDER, FOR SOLUTION ORAL 2 TIMES DAILY
Status: DISCONTINUED | OUTPATIENT
Start: 2018-09-06 | End: 2018-09-07 | Stop reason: HOSPADM

## 2018-09-06 RX ORDER — NITROGLYCERIN 0.4 MG/1
0.4 TABLET SUBLINGUAL EVERY 5 MIN PRN
Qty: 25 TABLET | Refills: 0 | Status: SHIPPED | OUTPATIENT
Start: 2018-09-06 | End: 2019-08-13 | Stop reason: SDUPTHER

## 2018-09-06 RX ORDER — ALBUTEROL SULFATE 90 UG/1
2 AEROSOL, METERED RESPIRATORY (INHALATION) EVERY 6 HOURS PRN
Qty: 1 INHALER | Refills: 5 | Status: SHIPPED | OUTPATIENT
Start: 2018-09-06 | End: 2019-08-13 | Stop reason: SDUPTHER

## 2018-09-06 RX ORDER — POTASSIUM CHLORIDE 1.5 G/1.77G
20 POWDER, FOR SOLUTION ORAL 2 TIMES DAILY
Qty: 30 EACH | Refills: 1 | Status: SHIPPED | OUTPATIENT
Start: 2018-09-06 | End: 2019-04-21 | Stop reason: SDUPTHER

## 2018-09-06 RX ORDER — SPIRONOLACTONE 25 MG/1
25 TABLET ORAL DAILY
Status: DISCONTINUED | OUTPATIENT
Start: 2018-09-06 | End: 2018-09-07 | Stop reason: HOSPADM

## 2018-09-06 RX ORDER — LISINOPRIL 30 MG/1
30 TABLET ORAL DAILY
Qty: 30 TABLET | Refills: 2 | Status: SHIPPED | OUTPATIENT
Start: 2018-09-06 | End: 2019-08-13 | Stop reason: SDUPTHER

## 2018-09-06 RX ORDER — SODIUM CHLORIDE 0.9 % (FLUSH) 0.9 %
10 SYRINGE (ML) INJECTION PRN
Status: DISCONTINUED | OUTPATIENT
Start: 2018-09-06 | End: 2018-09-07 | Stop reason: HOSPADM

## 2018-09-06 RX ORDER — FUROSEMIDE 40 MG/1
40 TABLET ORAL DAILY
Status: DISCONTINUED | OUTPATIENT
Start: 2018-09-06 | End: 2018-09-07 | Stop reason: HOSPADM

## 2018-09-06 RX ORDER — SODIUM CHLORIDE 0.9 % (FLUSH) 0.9 %
10 SYRINGE (ML) INJECTION EVERY 12 HOURS SCHEDULED
Status: DISCONTINUED | OUTPATIENT
Start: 2018-09-06 | End: 2018-09-07 | Stop reason: HOSPADM

## 2018-09-06 RX ORDER — RANOLAZINE 500 MG/1
500 TABLET, EXTENDED RELEASE ORAL 2 TIMES DAILY
Status: DISCONTINUED | OUTPATIENT
Start: 2018-09-06 | End: 2018-09-07 | Stop reason: HOSPADM

## 2018-09-06 RX ORDER — ALBUTEROL SULFATE 90 UG/1
2 AEROSOL, METERED RESPIRATORY (INHALATION) EVERY 6 HOURS
Status: DISCONTINUED | OUTPATIENT
Start: 2018-09-06 | End: 2018-09-06

## 2018-09-06 RX ORDER — ALBUTEROL SULFATE 90 UG/1
2 AEROSOL, METERED RESPIRATORY (INHALATION) EVERY 6 HOURS
Status: DISCONTINUED | OUTPATIENT
Start: 2018-09-06 | End: 2018-09-07 | Stop reason: HOSPADM

## 2018-09-06 RX ORDER — LISINOPRIL 5 MG/1
5 TABLET ORAL DAILY
Status: DISCONTINUED | OUTPATIENT
Start: 2018-09-06 | End: 2018-09-07 | Stop reason: HOSPADM

## 2018-09-06 RX ORDER — RANOLAZINE 500 MG/1
500 TABLET, EXTENDED RELEASE ORAL 2 TIMES DAILY
Qty: 60 TABLET | Refills: 0 | Status: SHIPPED | OUTPATIENT
Start: 2018-09-06 | End: 2019-08-13 | Stop reason: SDUPTHER

## 2018-09-06 RX ORDER — METOPROLOL TARTRATE 50 MG/1
50 TABLET, FILM COATED ORAL 2 TIMES DAILY
Qty: 60 TABLET | Refills: 3 | Status: SHIPPED | OUTPATIENT
Start: 2018-09-06 | End: 2019-08-13 | Stop reason: SDUPTHER

## 2018-09-06 RX ORDER — SIMVASTATIN 20 MG
20 TABLET ORAL NIGHTLY
Status: DISCONTINUED | OUTPATIENT
Start: 2018-09-06 | End: 2018-09-07 | Stop reason: HOSPADM

## 2018-09-06 RX ORDER — ACETAMINOPHEN 325 MG/1
650 TABLET ORAL EVERY 4 HOURS PRN
Status: DISCONTINUED | OUTPATIENT
Start: 2018-09-06 | End: 2018-09-07 | Stop reason: HOSPADM

## 2018-09-06 RX ORDER — ASPIRIN 81 MG/1
81 TABLET, CHEWABLE ORAL DAILY
Status: DISCONTINUED | OUTPATIENT
Start: 2018-09-06 | End: 2018-09-07 | Stop reason: HOSPADM

## 2018-09-06 RX ORDER — FUROSEMIDE 40 MG/1
40 TABLET ORAL DAILY
Qty: 60 TABLET | Refills: 1 | Status: SHIPPED | OUTPATIENT
Start: 2018-09-06 | End: 2019-04-21 | Stop reason: SDUPTHER

## 2018-09-06 RX ORDER — GABAPENTIN 300 MG/1
300 CAPSULE ORAL NIGHTLY PRN
Qty: 30 CAPSULE | Refills: 0 | Status: SHIPPED | OUTPATIENT
Start: 2018-09-06 | End: 2019-08-13 | Stop reason: SDUPTHER

## 2018-09-06 RX ORDER — NAPROXEN 250 MG/1
500 TABLET ORAL 2 TIMES DAILY WITH MEALS
Status: DISCONTINUED | OUTPATIENT
Start: 2018-09-06 | End: 2018-09-07

## 2018-09-06 RX ORDER — METOPROLOL TARTRATE 50 MG/1
50 TABLET, FILM COATED ORAL 2 TIMES DAILY
Status: DISCONTINUED | OUTPATIENT
Start: 2018-09-06 | End: 2018-09-07 | Stop reason: HOSPADM

## 2018-09-06 RX ORDER — SIMVASTATIN 20 MG
20 TABLET ORAL NIGHTLY
Qty: 30 TABLET | Refills: 3 | Status: SHIPPED | OUTPATIENT
Start: 2018-09-06 | End: 2019-08-13 | Stop reason: SDUPTHER

## 2018-09-06 RX ORDER — SPIRONOLACTONE 25 MG/1
25 TABLET ORAL DAILY
Qty: 30 TABLET | Refills: 3 | Status: SHIPPED | OUTPATIENT
Start: 2018-09-06 | End: 2020-06-18 | Stop reason: SDUPTHER

## 2018-09-06 RX ORDER — NITROGLYCERIN 0.4 MG/1
0.4 TABLET SUBLINGUAL EVERY 5 MIN PRN
Status: DISCONTINUED | OUTPATIENT
Start: 2018-09-06 | End: 2018-09-07 | Stop reason: HOSPADM

## 2018-09-06 RX ADMIN — METOPROLOL TARTRATE 50 MG: 50 TABLET, FILM COATED ORAL at 02:05

## 2018-09-06 RX ADMIN — SIMVASTATIN 20 MG: 20 TABLET, FILM COATED ORAL at 21:49

## 2018-09-06 RX ADMIN — METOPROLOL TARTRATE 50 MG: 50 TABLET, FILM COATED ORAL at 15:06

## 2018-09-06 RX ADMIN — Medication 10 ML: at 21:50

## 2018-09-06 RX ADMIN — METOPROLOL TARTRATE 50 MG: 50 TABLET, FILM COATED ORAL at 21:49

## 2018-09-06 RX ADMIN — FUROSEMIDE 40 MG: 40 TABLET ORAL at 09:03

## 2018-09-06 RX ADMIN — LISINOPRIL 5 MG: 5 TABLET ORAL at 09:03

## 2018-09-06 RX ADMIN — POTASSIUM CHLORIDE 20 MEQ: 1.5 POWDER, FOR SOLUTION ORAL at 21:49

## 2018-09-06 RX ADMIN — ASPIRIN 81 MG: 81 TABLET, CHEWABLE ORAL at 09:03

## 2018-09-06 RX ADMIN — POTASSIUM CHLORIDE 20 MEQ: 1.5 POWDER, FOR SOLUTION ORAL at 08:31

## 2018-09-06 RX ADMIN — RANOLAZINE 500 MG: 500 TABLET, FILM COATED, EXTENDED RELEASE ORAL at 21:49

## 2018-09-06 RX ADMIN — SIMVASTATIN 20 MG: 20 TABLET, FILM COATED ORAL at 02:05

## 2018-09-06 ASSESSMENT — PAIN DESCRIPTION - PROGRESSION

## 2018-09-06 ASSESSMENT — PAIN DESCRIPTION - FREQUENCY: FREQUENCY: CONTINUOUS

## 2018-09-06 ASSESSMENT — PAIN DESCRIPTION - PAIN TYPE: TYPE: ACUTE PAIN

## 2018-09-06 ASSESSMENT — PAIN DESCRIPTION - LOCATION: LOCATION: CHEST

## 2018-09-06 ASSESSMENT — PAIN SCALES - GENERAL: PAINLEVEL_OUTOF10: 2

## 2018-09-06 ASSESSMENT — PAIN DESCRIPTION - ORIENTATION: ORIENTATION: MID

## 2018-09-06 ASSESSMENT — PAIN DESCRIPTION - ONSET: ONSET: ON-GOING

## 2018-09-06 ASSESSMENT — PAIN DESCRIPTION - DESCRIPTORS: DESCRIPTORS: ACHING

## 2018-09-06 NOTE — ED PROVIDER NOTES
[address] ED  Emergency Department  Emergency Medicine Resident Sign-out     Care of Camila Smith was assumed from Dr. Clarence Mari and is being seen for Shortness of Breath (pt states he has continued CHF pt states he feels like \"acting up\" pt states took his meds at home. pt states his sister is here with him for the same thing. )  . The patient's initial evaluation and plan have been discussed with the prior provider who initially evaluated the patient. EMERGENCY DEPARTMENT COURSE / MEDICAL DECISION MAKING:       MEDICATIONS GIVEN:  No orders of the defined types were placed in this encounter. LABS / RADIOLOGY:     Labs Reviewed   CBC WITH AUTO DIFFERENTIAL - Abnormal; Notable for the following:        Result Value    Hemoglobin 12.8 (*)     Hematocrit 38.8 (*)     Seg Neutrophils 70 (*)     Lymphocytes 19 (*)     All other components within normal limits   BASIC METABOLIC PANEL - Abnormal; Notable for the following:     Glucose 119 (*)     Chloride 95 (*)     All other components within normal limits   D-DIMER, QUANTITATIVE   SPECIMEN REJECTION   SPECIMEN REJECTION   PREVIOUS SPECIMEN   PREVIOUS SPECIMEN   POCT TROPONIN   POCT TROPONIN   POCT TROPONIN   POCT TROPONIN       Xr Chest Standard (2 Vw)    Result Date: 9/5/2018  EXAMINATION: TWO VIEWS OF THE CHEST 9/5/2018 7:57 pm COMPARISON: 09/27/2015 HISTORY: ORDERING SYSTEM PROVIDED HISTORY: chest pain TECHNOLOGIST PROVIDED HISTORY: chest pain Shortness of breath FINDINGS: Poor inspiration. Allowing for this the heart size is stable. Pulmonary vascular congestion. No focal airspace disease. No pleural effusion. Poor inspiration, otherwise, stable chest       RECENT VITALS:      ,   ,  ,  ,      This patient is a 61 y.o. Male with substernal CP pressure-like non radiating. Also with SOB. Also tachycardia. Well's score 0 but poor health follow up dt being out of insurance. Cardiac work up negative. D-dimer elevated.   Awaiting CT

## 2018-09-06 NOTE — H&P
[facility]  CDU / OBSERVATION eNCOUnter  Resident Note     Pt Name: Leticia Mejia  MRN: [id number]  Micheletgfmagdalena 1960  Date of evaluation: 9/6/18  Patient's PCP is :  Amee Forrester MD    CHIEF COMPLAINT       Chief Complaint   Patient presents with    Shortness of Breath     pt states he has continued CHF pt states he feels like \"acting up\" pt states took his meds at home. pt states his sister is here with him for the same thing. HISTORY OF PRESENT ILLNESS    Leticia Mejia is a 61 y.o. 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 He reports that he has a history of CHF and a valve disorder, and has chest pain regularly. He states that he has this chest pain when he has not had his Lasix in a while, and states that he has been spreading out his medications longer due to cost. His chest pain yesterday was typical for him, and lasted several hours, but resolved when he took his lasix. Increased shortness of breath. Denies any new activities or stressors at work. He follows with a cardiologist, Dr. Barby Vee, and last saw him about a year ago. Unsure of last Echo, but states last stress was 6-7 years ago, and cath done arrpoximately 4 years ago. No stents or open heart surgery. He describes his chest pain as a 4/10 pressure that is worse with exertion, and associated with SOB. He denies any recent fevers, chills, new cough, leg swelling. He last weighed himself around 255, but states that he was weighed at 242 last night. Location/Symptom: lower midsternal chest  Timing/Onset: 1400 yesterday. Provocation: if does not take water pill  Quality: pressure  Radiation: none  Severity: 4/10  Timing/Duration: couple hours. Modifying Factors: lasix helped.     REVIEW OF SYSTEMS     General ROS - No fevers, No chills  Ophthalmic ROS - No eye pain or redness  ENT ROS -  No sore throat, 10 mL PRN   acetaminophen (TYLENOL) tablet 650 mg Q4H PRN   sodium chloride flush 0.9 % injection 10 mL 2 times per day   sodium chloride flush 0.9 % injection 10 mL PRN   albuterol sulfate  (90 Base) MCG/ACT inhaler 2 puff Q6H   And    ipratropium (ATROVENT HFA) 17 MCG/ACT inhaler 2 puff Q6H     All medication charted and reviewed. ALLERGIES     is allergic to penicillins; sulfa antibiotics; pseudoeph-doxylamine-dm-apap; and rabbit protein. Nyquil. FAMILY HISTORY     indicated that the status of his mother is unknown. He indicated that the status of his father is unknown. He indicated that the status of his sister is unknown. Mother had heart attacks. Father  of heart attack at age 62s [de-identified]76s. family history includes Arthritis in his mother and sister; Diabetes in his mother and sister; Heart Disease in his father, mother, and sister; Kidney Disease in his mother. The patient denies any pertinent family history. I have reviewed and agree with the family history entered. I have reviewed the Family History and it is not significant to the case    SOCIAL HISTORY      reports that he has quit smoking. His smoking use included Cigarettes. His smokeless tobacco use includes Chew. 0.75 tins of chew per day. He reports that he does not drink alcohol or use drugs. I have reviewed and agree with all Social.  There are no concerns for substance abuse/use. PHYSICAL EXAM     INITIAL VITALS:  height is 5' 6\" (1.676 m) and weight is 242 lb (109.8 kg). His temperature is 98.4 °F (36.9 °C). His blood pressure is 127/89 and his pulse is 99. His respiration is 19 and oxygen saturation is 98%. CONSTITUTIONAL: AOx4, no apparent distress, appears stated age   HEAD: normocephalic, atraumatic   EYES: No conjunctival injection. No scleral icterus.    ENT: moist mucous membranes, uvula midline   NECK: supple, symmetric   BACK: symmetric   LUNGS: clear to auscultation bilaterally   CARDIOVASCULAR: regular rate and rhythm, no murmurs, rubs or gallops   ABDOMEN: soft, non-tender, non-distended with normal active bowel sounds   NEUROLOGIC:  MAEx4, no focal sensory or motor deficits   MUSCULOSKELETAL: Minimal lower extremity edema. No calf tenderness to palpation. SKIN: no rash or wounds over exposed skin. DIFFERENTIAL DIAGNOSIS/MDM:   Chest Pain:  DDX: Emergent: ACS/NSTEMI/STEMI/angina, arrhythmia, trauma, aortic dissection,  PE, PNA, pneumothroax, esophageal rupture, tamponade, Cocaine use  Nonemergent: pneumonia, pericarditis, GERD, MSK, Endocarditis, anxiety  Evaluated for: diaphoresis, present chest pain, tachypnea, BP both arms, heart sounds, JVD, tender chest wall, wheezing    DIAGNOSTIC RESULTS     EKG: All EKG's are interpreted by the Observation Physician who either signs or Co-signs this chart in the absence of a cardiologist.    EKG Interpretation    Interpreted by observation physician    Rhythm: normal sinus   Rate: normal  Axis: normal  Ectopy: premature ventricular contractions (multifocal)  Conduction: normal Prolonged QTc 467  ST Segments: no acute change  T Waves: no acute change  Q Waves: none    Clinical Impression: LVH. When compared to EKG dated 9/27/18, no acute changes    Khadra Jung DO    RADIOLOGY:   I directly visualized the following  images and reviewed the radiologist interpretations:    Xr Chest Standard (2 Vw)    Result Date: 9/5/2018  EXAMINATION: TWO VIEWS OF THE CHEST 9/5/2018 7:57 pm COMPARISON: 09/27/2015 HISTORY: ORDERING SYSTEM PROVIDED HISTORY: chest pain TECHNOLOGIST PROVIDED HISTORY: chest pain Shortness of breath FINDINGS: Poor inspiration. Allowing for this the heart size is stable. Pulmonary vascular congestion. No focal airspace disease. No pleural effusion.      Poor inspiration, otherwise, stable chest     Ct Chest Pulmonary Embolism W Contrast    Result Date: 9/6/2018  EXAMINATION: CTA OF THE CHEST 9/5/2018 11:53 pm TECHNIQUE: CTA of the chest was performed after the administration of intravenous contrast.  Multiplanar reformatted images are provided for review. MIP images are provided for review. Dose modulation, iterative reconstruction, and/or weight based adjustment of the mA/kV was utilized to reduce the radiation dose to as low as reasonably achievable. COMPARISON: None. HISTORY: ORDERING SYSTEM PROVIDED HISTORY: CHEST PAIN, ACUTE CORONARY SYNDROME SUSPECT FINDINGS: Pulmonary Arteries: Pulmonary arteries are adequately opacified for evaluation. No evidence of intraluminal filling defect to suggest pulmonary embolism. Main pulmonary artery is normal in caliber. Mediastinum: No evidence of mediastinal lymphadenopathy. The heart and pericardium demonstrate no acute abnormality. The heart is mildly enlarged with left ventricular prominence. There is no acute abnormality of the thoracic aorta. Lungs/pleura: The lungs are without acute process. No focal consolidation or pulmonary edema. No evidence of pleural effusion or pneumothorax. Upper Abdomen: Limited images of the upper abdomen are unremarkable. Soft Tissues/Bones: No acute bone or soft tissue abnormality. No evidence of pulmonary embolism or acute pulmonary abnormality. The heart is mildly enlarged with left ventricular prominence. LABS:  I have reviewed and interpreted all available lab results.   Labs Reviewed   CBC WITH AUTO DIFFERENTIAL - Abnormal; Notable for the following:        Result Value    Hemoglobin 12.8 (*)     Hematocrit 38.8 (*)     Seg Neutrophils 70 (*)     Lymphocytes 19 (*)     All other components within normal limits   BASIC METABOLIC PANEL - Abnormal; Notable for the following:     Glucose 119 (*)     Chloride 95 (*)     All other components within normal limits   D-DIMER, QUANTITATIVE   SPECIMEN REJECTION   SPECIMEN REJECTION   PREVIOUS SPECIMEN   PREVIOUS SPECIMEN   POCT TROPONIN   POCT TROPONIN   POCT TROPONIN   POCT TROPONIN       SCREENING TOOLS:    HEART Risk Score

## 2018-09-06 NOTE — CONSULTS
6 HOURS AS NEEDED FOR WHEEZING 5/18/16   Neal Shelley MD   albuterol-ipratropium (COMBIVENT RESPIMAT)  MCG/ACT AERS inhaler Inhale 1 puff into the lungs every 6 hours 4/15/16   Neal Shelley MD   gabapentin (NEURONTIN) 300 MG capsule Take 1 capsule by mouth nightly as needed 4/15/16   Neal Shelley MD   lisinopril (PRINIVIL;ZESTRIL) 30 MG tablet TAKE 1 TABLET BY MOUTH DAILY 2/19/16   Neal Shelley MD   simvastatin (ZOCOR) 20 MG tablet Take 20 mg by mouth nightly    Historical Provider, MD   metoprolol (LOPRESSOR) 50 MG tablet Take 50 mg by mouth 2 times daily    Historical Provider, MD   ranolazine (RANEXA) 500 MG SR tablet Take 500 mg by mouth 2 times daily. Historical Provider, MD   nitroGLYCERIN (NITROSTAT) 0.4 MG SL tablet Place 0.4 mg under the tongue every 5 minutes as needed for Chest pain. Historical Provider, MD   testosterone cypionate (DEPOTESTOTERONE CYPIONATE) 200 MG/ML injection Inject 50 mg into the muscle once. Historical Provider, MD   Blood Pressure KIT DX: HTN 8/1/14   Irina Hendricks MD   aspirin 81 MG tablet Take 81 mg by mouth daily. Historical Provider, MD   spironolactone (ALDACTONE) 25 MG tablet Take 25 mg by mouth daily.     Historical Provider, MD        Hospital Meds:    Current Facility-Administered Medications   Medication Dose Route Frequency Provider Last Rate Last Dose    spironolactone (ALDACTONE) tablet 25 mg  25 mg Oral Daily Harinder Janiefucci, DO        aspirin chewable tablet 81 mg  81 mg Oral Daily Harinder Maffucci, DO   81 mg at 09/06/18 0398    nitroGLYCERIN (NITROSTAT) SL tablet 0.4 mg  0.4 mg Sublingual Q5 Min PRN Harinder Maffucci, DO        ranolazine [facility]) extended release tablet 500 mg  500 mg Oral BID Harinder Maffucci, DO        simvastatin (ZOCOR) tablet 20 mg  20 mg Oral Nightly Harinder Maffucci, DO   20 mg at 09/06/18 0205    metoprolol tartrate (LOPRESSOR) tablet 50 mg  50 mg Oral BID Harinder Maffucci, DO   50 mg at 09/06/18 1506    lisinopril (PRINIVIL;ZESTRIL) tablet 5 mg Labs      09/05/18 2015   WBC  9.1   HGB  12.8*   HCT  38.8*   MCV  87.4   PLT  200     BMP:   Recent Labs      09/05/18   2234   NA  137   K  3.8   CL  95*   CO2  30   BUN  12   CREATININE  0.90     PT/INR: No results for input(s): PROTIME, INR in the last 72 hours. APTT: No results for input(s): APTT in the last 72 hours. MAG: No results for input(s): MG in the last 72 hours. D Dimer:   Recent Labs      09/05/18 2015   DDIMER  1.85     Troponin T   Recent Labs      09/06/18   0946   TROPONINT  <0.03     ProBNP Invalid input(s): PRO-BNP      12 Lead, LVH    Diagnosis:  Principal Problem:    Chronic diastolic CHF (congestive heart failure) (HCC)  Active Problems:    HTN (hypertension)    Chest pain, unspecified  Resolved Problems:    * No resolved hospital problems.  *          Plan:    Echocardiogram  Restart chronic meds      Electronically signed by Sal Agustin MD on 9/6/2018 at 3:44 PM

## 2018-09-06 NOTE — PROGRESS NOTES
[address] [facility]  CDU / OBSERVATION eNCOUnter  Attending NOte       I performed a history and physical examination of the patient and discussed management with the resident. I reviewed the residents note and agree with the documented findings and plan of care. Any areas of disagreement are noted on the chart. I was personally present for the key portions of any procedures. I have documented in the chart those procedures where I was not present during the key portions. I have reviewed the nurses notes. I agree with the chief complaint, past medical history, past surgical history, allergies, medications, social and family history as documented unless otherwise noted below. The Family history, social history, and ROS are effectively unchanged since admission unless noted elsewhere in the chart. 77-year-old male admitted for chest pain. Known history of congestive heart failure. There were some nonspecific changes on his EKG. He had workup for a pulmonary embolism in the emergency department, and A CT angiogram of the chest was negative. On physical exam, he is alert and afebrile. Breath sounds are clear and equal bilaterally. Cardiac exam normal rate, regular rhythm. Abdomen soft and nontender. Shoney's with no edema or calf tenderness. Cardiologist been consulted. We will follow up the recommendations.         Late note 9/6/18    Hunter Kohler M.D,  Attending Emergency  Physician

## 2018-09-06 NOTE — PROGRESS NOTES
Spoke with Dr. Didi Peters from [facility]. Discussed provisional ECHO results. Ejection fraction is estimated 15%-20%. Physician said no addition interventions for now. Will follow up with patient in a.m. For plan.

## 2018-09-06 NOTE — SIGNIFICANT EVENT
Progress Notes  Encounter Date: 8/29/2016  Austin Martin MD   Cardiology            Jaleesa Florian     Date of visit:  08/29/2016         YOB: 1960  Age:  63years old            MRN:  [id number]  ____________________________  CURRENT DIAGNOSES  1. Atherosclerotic heart disease of native coronary artery without angina pectoris  2. Chronic diastolic (congestive) heart failure  3. Nonrheumatic mitral (valve) insufficiency  4. Hyperlipidemia, unspecified  5. Essential (primary) hypertension  ____________________________  ALLERGIES   Dextromethorphan, Intolerance-unknown   Doxylamine, Intolerance-unknown   Penicillins, Intolerance-unknown   Pseudoephedrine, Intolerance-unknown   Sulfa (Sulfonamides), Intolerance-unknown  ____________________________  MEDICATIONS  1. Aspirin Low Dose 81 mg tablet,delayed release (DR/EC), 1 by mouth daily  2. atorvastatin 40 mg tablet, 1 by mouth daily  3. Combivent Respimat 20 mcg-100 mcg/actuation solution for inhalation, Take as Directed  4. Coricidin 2-325 mg tablet, as needed  5. furosemide 20 mg tablet, 1 by mouth daily  6. lisinopril 20 mg tablet, 1 by mouth daily LUNCH  7. metoprolol tartrate 50 mg tablet, 1 by mouth twice daily  8. nitroglycerin 0.4 mg sublingual tablet, 1 tab under tongue q 5 min. x 3 as directed CHEST PAIN AS NEEDED  9. potassium chloride ER 20 mEq tablet,extended release, 1 by mouth daily  10. ProAir HFA 90 mcg/actuation aerosol inhaler, Take as Directed  11. Ranexa 500 mg tablet,extended release, 1 every 12 hours  12.  spironolactone 25 mg tablet, 1 by mouth daily  ____________________________  CHIEF COMPLAINTS  Followup of Chronic diastolic (congestive) heart failure  ____________________________  HISTORY OF PRESENT ILLNESS  Helga Mondragon presented to our office on 8/29/16 for a follow-up visit regarding CAD and hypertension.       He is feeling better and denied any anginal chest pain, palpitations, pedal edema, PND, orthopnea,

## 2018-09-07 VITALS
WEIGHT: 242 LBS | TEMPERATURE: 98.2 F | OXYGEN SATURATION: 100 % | SYSTOLIC BLOOD PRESSURE: 112 MMHG | DIASTOLIC BLOOD PRESSURE: 77 MMHG | RESPIRATION RATE: 18 BRPM | HEART RATE: 80 BPM | BODY MASS INDEX: 38.89 KG/M2 | HEIGHT: 66 IN

## 2018-09-07 PROCEDURE — 6370000000 HC RX 637 (ALT 250 FOR IP): Performed by: STUDENT IN AN ORGANIZED HEALTH CARE EDUCATION/TRAINING PROGRAM

## 2018-09-07 PROCEDURE — 2580000003 HC RX 258: Performed by: EMERGENCY MEDICINE

## 2018-09-07 PROCEDURE — G0378 HOSPITAL OBSERVATION PER HR: HCPCS

## 2018-09-07 RX ADMIN — Medication 10 ML: at 10:12

## 2018-09-07 RX ADMIN — POTASSIUM CHLORIDE 20 MEQ: 1.5 POWDER, FOR SOLUTION ORAL at 10:11

## 2018-09-07 RX ADMIN — LISINOPRIL 5 MG: 5 TABLET ORAL at 10:11

## 2018-09-07 RX ADMIN — RANOLAZINE 500 MG: 500 TABLET, FILM COATED, EXTENDED RELEASE ORAL at 10:11

## 2018-09-07 RX ADMIN — FUROSEMIDE 40 MG: 40 TABLET ORAL at 10:11

## 2018-09-07 RX ADMIN — ASPIRIN 81 MG: 81 TABLET, CHEWABLE ORAL at 10:11

## 2018-09-07 NOTE — PLAN OF CARE
Problem: Cardiac:  Goal: Ability to maintain vital signs within normal range will improve  Ability to maintain vital signs within normal range will improve   Outcome: Ongoing    Goal: Cardiovascular alteration will improve  Cardiovascular alteration will improve   Outcome: Ongoing

## 2018-09-07 NOTE — PROGRESS NOTES
Discussed plan with patient. Dr. Jarred Noel ok with patient being discharged home. Obs physician ok with discharging patient. Paper prescriptions for medications given to patient. Patient discharged.

## 2018-09-07 NOTE — FLOWSHEET NOTE
· Facts: Brought sister into the hospital and was having pressure in the chest.  Was checked out and then admitted. Found out that he will have to have a pacemaker put in. · Feelings: Anxious, stressed, and somewhat depressed. 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 · Family: Sister is in the hospital as well for health issues and is quite concerned about her brother's condition. Patient is having his landlord and family member come up to visit today. · Marielle: Patient did not talk about marielle in relation to his condition. · Follow-up: Chaplains will continue to provide spiritual support and care as needed.

## 2018-09-07 NOTE — PROGRESS NOTES
Progress Note    Patient Name:  Miguel Angel Harrison    :  1960 3:50 PM      SUBJECTIVE       Mr. Dion Brady  has no chest pain, shortness of breath, palpitations, nausea or vomiting      OBJECTIVE     Vital signs:    /77   Pulse 80   Temp 98.2 °F (36.8 °C) (Oral)   Resp 18   Ht 5' 6\" (1.676 m)   Wt 242 lb (109.8 kg)   SpO2 100%   BMI 39.06 kg/m²         Admit Weight:  242 lb (109.8 kg)    Last 3 weights: Wt Readings from Last 3 Encounters:   18 242 lb (109.8 kg)   18 254 lb 3.2 oz (115.3 kg)   18 252 lb 9.6 oz (114.6 kg)       BMI: Body mass index is 39.06 kg/m². Input/Output:     No intake or output data in the 24 hours ending 18 1550       Exam:     General appearance: awake and alert moves all ext   Lungs: no rhonchi, no wheezes, no rales  Heart: S1 and S2 no murmur  Abdomen: positive bowel sounds, no bruits, no masses  Extremities: warm and dry, no cyanosis, no clubbing        Laboratory Studies:     CBC:   Recent Labs      18   WBC  9.1   HGB  12.8*   HCT  38.8*   MCV  87.4   PLT  200     BMP:   Recent Labs      18   2234   NA  137   K  3.8   CL  95*   CO2  30   BUN  12   CREATININE  0.90     PT/INR: No results for input(s): PROTIME, INR in the last 72 hours. APTT: No results for input(s): APTT in the last 72 hours. MAG: No results for input(s): MG in the last 72 hours. D Dimer:   Recent Labs      18   DDIMER  1.85     Troponin    Recent Labs      18   1932  18   2205   TROPONINI  0.00  0.01           Recent Labs      18   0946   TROPONINT  <0.03      BNP No results for input(s): BNP in the last 72 hours. No results for input(s): PROBNP in the last 72 hours. Pulse Ox:  SpO2  Av %  Min: 98 %  Max: 100 %  Supplemental O2:       Current Meds:    spironolactone  25 mg Oral Daily    aspirin  81 mg Oral Daily    ranolazine  500 mg Oral BID    simvastatin  20 mg Oral Nightly    metoprolol tartrate  50 mg Oral BID    lisinopril  5 mg Oral Daily    furosemide  40 mg Oral Daily    potassium chloride  20 mEq Oral BID    naproxen  500 mg Oral BID WC    sodium chloride flush  10 mL Intravenous 2 times per day    sodium chloride flush  10 mL Intravenous 2 times per day    albuterol sulfate HFA  2 puff Inhalation Q6H    And    ipratropium  2 puff Inhalation Q6H     Continuous Infusions:       Echo  Summary  Technically difficult echo. Left ventricle is normal in size with severely decreased systolic function. Estimated ejection fraction is 15-20%. Increased left ventricular wall thickness. Left atrium is moderately dilated. Mildly dilated right atrium. Thickening of mitral valve leaflets without stenosis. Moderate mitral regurgitation. Trace tricuspid regurgitation. Estimated right ventricular systolic pressure is 19 mmHg. Mildly dilated IVC with normal inspiratory collapse         ASSESSMENT     Principal Problem:    Chronic diastolic CHF (congestive heart failure) (HCC)  Active Problems:    HTN (hypertension)    Chest pain, unspecified    Chest pain  Resolved Problems:    * No resolved hospital problems.  *      PLAN     Maximize medical Rx reassess lv function in 3 months if compliant      Electronically signed by David Mohr MD on 9/7/2018 at 3:50 PM

## 2018-09-12 NOTE — DISCHARGE SUMMARY
CDU Discharge Summary        Patient:  Reesa Hatchet  YOB: 1960    MRN: [id number]   Acct: [de-identified]    Primary Care Physician: Abdelrahman Chang MD    Admit date:  9/5/2018  6:52 PM  Discharge date: 9/7/2018  5:08 PM     Discharge Diagnoses:     Acute sternal chest pressure due to uncertain etiology  Treated with PO lasix, PO ASA    Follow-up:  Call today/tomorrow for a follow up appointment with Abdelrahman Chang MD , or return to the Emergency Room with worsening symptoms    Stressed to patient the importance of following up with primary care doctor for further workup/management of symptoms. Pt verbalizes understanding and agrees with plan. Discharge Medications:  Changes to medications          [facility] Medication Instructions ERQ:[id number]    Printed on:09/11/18 2120   Medication Information                      albuterol sulfate HFA (PROAIR HFA) 108 (90 Base) MCG/ACT inhaler  Inhale 2 puffs into the lungs every 6 hours as needed for Wheezing             albuterol-ipratropium (COMBIVENT RESPIMAT)  MCG/ACT AERS inhaler  Inhale 1 puff into the lungs every 6 hours             aspirin 81 MG tablet  Take 1 tablet by mouth daily             Blood Pressure KIT  DX: HTN             furosemide (LASIX) 40 MG tablet  Take 1 tablet by mouth daily             gabapentin (NEURONTIN) 300 MG capsule  Take 1 capsule by mouth nightly as needed (pain) for up to 30 days. .             Handicap Placard MISC  by Does not apply route Duration : 5 years             lisinopril (PRINIVIL;ZESTRIL) 30 MG tablet  Take 1 tablet by mouth daily             metoprolol tartrate (LOPRESSOR) 50 MG tablet  Take 1 tablet by mouth 2 times daily             nitroGLYCERIN (NITROSTAT) 0.4 MG SL tablet  Place 1 tablet under the tongue every 5 minutes as needed for Chest pain             potassium chloride (KLOR-CON) 20 MEQ packet  Take 20 mEq by mouth 2 times daily             ranolazine (RANEXA) 500 MG CREATININE 0.90 0.70 - 1.20 mg/dL    Bun/Cre Ratio NOT REPORTED 9 - 20    Calcium 9.2 8.6 - 10.4 mg/dL    Sodium 137 135 - 144 mmol/L    Potassium 3.8 3.7 - 5.3 mmol/L    Chloride 95 (L) 98 - 107 mmol/L    CO2 30 20 - 31 mmol/L    Anion Gap 12 9 - 17 mmol/L    GFR Non-African American >60 >60 mL/min    GFR African American >60 >60 mL/min    GFR Comment          GFR Staging NOT REPORTED    Troponin   Result Value Ref Range    Troponin T <0.03 <0.03 ng/mL    Troponin Interp         POCT troponin   Result Value Ref Range    POC Troponin I 0.00 0.00 - 0.10 ng/mL    POC Troponin Interp       The Troponin-I (POC) results cannot be compared to the Troponin-T results. POCT troponin   Result Value Ref Range    POC Troponin I 0.01 0.00 - 0.10 ng/mL    POC Troponin Interp       The Troponin-I (POC) results cannot be compared to the Troponin-T results. EKG 12 Lead   Result Value Ref Range    Ventricular Rate 106 BPM    Atrial Rate 106 BPM    P-R Interval 160 ms    QRS Duration 94 ms    Q-T Interval 354 ms    QTc Calculation (Bazett) 470 ms    P Axis 45 degrees    R Axis 21 degrees    T Axis 69 degrees   EKG 12 Lead   Result Value Ref Range    Ventricular Rate 95 BPM    Atrial Rate 95 BPM    P-R Interval 158 ms    QRS Duration 100 ms    Q-T Interval 372 ms    QTc Calculation (Bazett) 467 ms    P Axis 54 degrees    R Axis 33 degrees    T Axis 61 degrees     Xr Chest Standard (2 Vw)    Result Date: 9/5/2018  EXAMINATION: TWO VIEWS OF THE CHEST 9/5/2018 7:57 pm COMPARISON: 09/27/2015 HISTORY: ORDERING SYSTEM PROVIDED HISTORY: chest pain TECHNOLOGIST PROVIDED HISTORY: chest pain Shortness of breath FINDINGS: Poor inspiration. Allowing for this the heart size is stable. Pulmonary vascular congestion. No focal airspace disease. No pleural effusion.      Poor inspiration, otherwise, stable chest     Ct Chest Pulmonary Embolism W Contrast    Result Date: 9/6/2018  EXAMINATION: CTA OF THE CHEST 9/5/2018 11:53 pm TECHNIQUE: CTA of the chest was performed after the administration of intravenous contrast.  Multiplanar reformatted images are provided for review. MIP images are provided for review. Dose modulation, iterative reconstruction, and/or weight based adjustment of the mA/kV was utilized to reduce the radiation dose to as low as reasonably achievable. COMPARISON: None. HISTORY: ORDERING SYSTEM PROVIDED HISTORY: CHEST PAIN, ACUTE CORONARY SYNDROME SUSPECT FINDINGS: Pulmonary Arteries: Pulmonary arteries are adequately opacified for evaluation. No evidence of intraluminal filling defect to suggest pulmonary embolism. Main pulmonary artery is normal in caliber. Mediastinum: No evidence of mediastinal lymphadenopathy. The heart and pericardium demonstrate no acute abnormality. The heart is mildly enlarged with left ventricular prominence. There is no acute abnormality of the thoracic aorta. Lungs/pleura: The lungs are without acute process. No focal consolidation or pulmonary edema. No evidence of pleural effusion or pneumothorax. Upper Abdomen: Limited images of the upper abdomen are unremarkable. Soft Tissues/Bones: No acute bone or soft tissue abnormality. No evidence of pulmonary embolism or acute pulmonary abnormality. The heart is mildly enlarged with left ventricular prominence. Physical Exam:    General appearance - NAD, AOx 3   Lungs -CTAB, no R/R/R  Heart - RRR, no M/R/G  Abdomen - Soft, NT/ND  Neurological:  MAEx4, No focal motor deficit, sensory loss  Extremities - No lower extremity edema or calf tenderness to palpation. Skin -warm, dry over exposed skin      Hospital Course:  Clinical course has improved, labs and imaging reviewed. Francesca Macario originally presented to the hospital on 9/5/2018  6:52 PM. with sternal chest pressure that began while cooking several days PTA. He had been noncompliant with his medication due to affordability issues, spacing his doses out longer than directed.  Cardiac aworkup in the ED was unremarkable, but given patient's risk factors and history of heart failure, it was determined that He required further observation and cardiology consultation. He was admitted and labs and imaging were followed daily. Imaging results as above. Cardiology evaluated patient and the echo ordered showed new decreased EF to 15-20%, significantly down from the prior done several years earlier at 55%. Cards recommended maximizing his medical therapy and reevaluating in 3 months. He is medically stable to be discharged. Disposition: Home    Patient stated that they will not drive themselves home from the hospital if they have gotten pain killers/ narcotics earlier that day and that they will arrange for transportation on their own or work with the  for a ride. Patient counseled NOT to drive while under the influence of narcotics/ pain killers. Condition: Good    Patient stable and ready for discharge home. I have discussed plan of care with patient and they are in understanding. They were instructed to read discharge paperwork. All of their questions and concerns were addressed. Time Spent: 0 day      --  DO Sophie Aguilar  Emergency Medicine Resident Physician    This dictation was generated by voice recognition computer software. Although all attempts are made to edit the dictation for accuracy, there may be errors in the transcription that are not intended.

## 2018-11-24 ENCOUNTER — HOSPITAL ENCOUNTER (EMERGENCY)
Age: 58
Discharge: HOME | End: 2018-11-24
Attending: EMERGENCY MEDICINE

## 2018-11-24 ENCOUNTER — APPOINTMENT (OUTPATIENT)
Dept: GENERAL RADIOLOGY | Age: 58
End: 2018-11-24

## 2018-11-24 VITALS
DIASTOLIC BLOOD PRESSURE: 79 MMHG | HEART RATE: 101 BPM | BODY MASS INDEX: 39.37 KG/M2 | OXYGEN SATURATION: 97 % | HEIGHT: 66 IN | TEMPERATURE: 98.9 F | RESPIRATION RATE: 19 BRPM | WEIGHT: 245 LBS | SYSTOLIC BLOOD PRESSURE: 147 MMHG

## 2018-11-24 DIAGNOSIS — J40 BRONCHITIS: Primary | ICD-10-CM

## 2018-11-24 LAB
ABSOLUTE EOS #: 0.21 K/UL (ref 0–0.4)
ABSOLUTE IMMATURE GRANULOCYTE: 0 K/UL (ref 0–0.3)
ABSOLUTE LYMPH #: 0.64 K/UL (ref 1–4.8)
ABSOLUTE MONO #: 1.28 K/UL (ref 0.1–0.8)
ANION GAP SERPL CALCULATED.3IONS-SCNC: 11 MMOL/L (ref 9–17)
BASOPHILS # BLD: 0 % (ref 0–2)
BASOPHILS ABSOLUTE: 0 K/UL (ref 0–0.2)
BNP INTERPRETATION: NORMAL
BUN BLDV-MCNC: 11 MG/DL (ref 6–20)
BUN/CREAT BLD: ABNORMAL (ref 9–20)
CALCIUM SERPL-MCNC: 9.2 MG/DL (ref 8.6–10.4)
CHLORIDE BLD-SCNC: 99 MMOL/L (ref 98–107)
CO2: 28 MMOL/L (ref 20–31)
CREAT SERPL-MCNC: 1.36 MG/DL (ref 0.7–1.2)
DIFFERENTIAL TYPE: ABNORMAL
EKG ATRIAL RATE: 118 BPM
EKG P AXIS: 61 DEGREES
EKG P-R INTERVAL: 144 MS
EKG Q-T INTERVAL: 318 MS
EKG QRS DURATION: 92 MS
EKG QTC CALCULATION (BAZETT): 445 MS
EKG R AXIS: 46 DEGREES
EKG T AXIS: 85 DEGREES
EKG VENTRICULAR RATE: 118 BPM
EOSINOPHILS RELATIVE PERCENT: 2 % (ref 1–4)
GFR AFRICAN AMERICAN: >60 ML/MIN
GFR NON-AFRICAN AMERICAN: 54 ML/MIN
GFR SERPL CREATININE-BSD FRML MDRD: ABNORMAL ML/MIN/{1.73_M2}
GFR SERPL CREATININE-BSD FRML MDRD: ABNORMAL ML/MIN/{1.73_M2}
GLUCOSE BLD-MCNC: 120 MG/DL (ref 70–99)
HCT VFR BLD CALC: 39.4 % (ref 40.7–50.3)
HEMOGLOBIN: 13 G/DL (ref 13–17)
IMMATURE GRANULOCYTES: 0 %
LYMPHOCYTES # BLD: 6 % (ref 24–44)
MCH RBC QN AUTO: 28.7 PG (ref 25.2–33.5)
MCHC RBC AUTO-ENTMCNC: 33 G/DL (ref 28.4–34.8)
MCV RBC AUTO: 87 FL (ref 82.6–102.9)
MONOCYTES # BLD: 12 % (ref 1–7)
MORPHOLOGY: NORMAL
NRBC AUTOMATED: 0 PER 100 WBC
PDW BLD-RTO: 13 % (ref 11.8–14.4)
PLATELET # BLD: 190 K/UL (ref 138–453)
PLATELET ESTIMATE: ABNORMAL
PMV BLD AUTO: 10.2 FL (ref 8.1–13.5)
POC TROPONIN I: 0 NG/ML (ref 0–0.1)
POC TROPONIN I: 0.01 NG/ML (ref 0–0.1)
POC TROPONIN INTERP: NORMAL
POC TROPONIN INTERP: NORMAL
POTASSIUM SERPL-SCNC: 4 MMOL/L (ref 3.7–5.3)
PRO-BNP: 239 PG/ML
RBC # BLD: 4.53 M/UL (ref 4.21–5.77)
RBC # BLD: ABNORMAL 10*6/UL
SEG NEUTROPHILS: 80 % (ref 36–66)
SEGMENTED NEUTROPHILS ABSOLUTE COUNT: 8.57 K/UL (ref 1.8–7.7)
SODIUM BLD-SCNC: 138 MMOL/L (ref 135–144)
WBC # BLD: 10.7 K/UL (ref 3.5–11.3)
WBC # BLD: ABNORMAL 10*3/UL

## 2018-11-24 PROCEDURE — 93005 ELECTROCARDIOGRAM TRACING: CPT

## 2018-11-24 PROCEDURE — 6370000000 HC RX 637 (ALT 250 FOR IP): Performed by: STUDENT IN AN ORGANIZED HEALTH CARE EDUCATION/TRAINING PROGRAM

## 2018-11-24 PROCEDURE — 80048 BASIC METABOLIC PNL TOTAL CA: CPT

## 2018-11-24 PROCEDURE — 94640 AIRWAY INHALATION TREATMENT: CPT

## 2018-11-24 PROCEDURE — 2580000003 HC RX 258: Performed by: STUDENT IN AN ORGANIZED HEALTH CARE EDUCATION/TRAINING PROGRAM

## 2018-11-24 PROCEDURE — 6360000002 HC RX W HCPCS: Performed by: STUDENT IN AN ORGANIZED HEALTH CARE EDUCATION/TRAINING PROGRAM

## 2018-11-24 PROCEDURE — 71046 X-RAY EXAM CHEST 2 VIEWS: CPT

## 2018-11-24 PROCEDURE — 99284 EMERGENCY DEPT VISIT MOD MDM: CPT

## 2018-11-24 PROCEDURE — 84484 ASSAY OF TROPONIN QUANT: CPT

## 2018-11-24 PROCEDURE — 83880 ASSAY OF NATRIURETIC PEPTIDE: CPT

## 2018-11-24 PROCEDURE — 85025 COMPLETE CBC W/AUTO DIFF WBC: CPT

## 2018-11-24 RX ORDER — DOXYCYCLINE HYCLATE 100 MG
100 TABLET ORAL ONCE
Status: COMPLETED | OUTPATIENT
Start: 2018-11-24 | End: 2018-11-24

## 2018-11-24 RX ORDER — ALBUTEROL SULFATE 2.5 MG/3ML
5 SOLUTION RESPIRATORY (INHALATION)
Status: DISCONTINUED | OUTPATIENT
Start: 2018-11-24 | End: 2018-11-25 | Stop reason: HOSPADM

## 2018-11-24 RX ORDER — BENZONATATE 100 MG/1
100 CAPSULE ORAL 3 TIMES DAILY PRN
Qty: 21 CAPSULE | Refills: 0 | Status: SHIPPED | OUTPATIENT
Start: 2018-11-24 | End: 2018-12-01

## 2018-11-24 RX ORDER — DOXYCYCLINE 100 MG/1
100 TABLET ORAL 2 TIMES DAILY
Qty: 14 TABLET | Refills: 0 | Status: SHIPPED | OUTPATIENT
Start: 2018-11-24 | End: 2018-12-01

## 2018-11-24 RX ORDER — ALBUTEROL SULFATE 90 UG/1
2 AEROSOL, METERED RESPIRATORY (INHALATION)
Status: DISCONTINUED | OUTPATIENT
Start: 2018-11-24 | End: 2018-11-25 | Stop reason: HOSPADM

## 2018-11-24 RX ORDER — IPRATROPIUM BROMIDE AND ALBUTEROL SULFATE 2.5; .5 MG/3ML; MG/3ML
1 SOLUTION RESPIRATORY (INHALATION)
Status: DISCONTINUED | OUTPATIENT
Start: 2018-11-24 | End: 2018-11-25 | Stop reason: HOSPADM

## 2018-11-24 RX ORDER — ACETAMINOPHEN 325 MG/1
650 TABLET ORAL ONCE
Status: COMPLETED | OUTPATIENT
Start: 2018-11-24 | End: 2018-11-24

## 2018-11-24 RX ORDER — 0.9 % SODIUM CHLORIDE 0.9 %
500 INTRAVENOUS SOLUTION INTRAVENOUS ONCE
Status: COMPLETED | OUTPATIENT
Start: 2018-11-24 | End: 2018-11-24

## 2018-11-24 RX ADMIN — ALBUTEROL SULFATE 5 MG: 2.5 SOLUTION RESPIRATORY (INHALATION) at 22:23

## 2018-11-24 RX ADMIN — ACETAMINOPHEN 650 MG: 325 TABLET ORAL at 20:57

## 2018-11-24 RX ADMIN — SODIUM CHLORIDE 500 ML: 9 INJECTION, SOLUTION INTRAVENOUS at 20:57

## 2018-11-24 RX ADMIN — DOXYCYCLINE HYCLATE 100 MG: 100 TABLET, COATED ORAL at 20:57

## 2018-11-24 RX ADMIN — IPRATROPIUM BROMIDE 0.5 MG: 0.5 SOLUTION RESPIRATORY (INHALATION) at 22:23

## 2018-11-24 RX ADMIN — BENZOCAINE AND MENTHOL 1 LOZENGE: 15; 3.6 LOZENGE ORAL at 21:37

## 2018-11-24 ASSESSMENT — ENCOUNTER SYMPTOMS
RHINORRHEA: 0
SORE THROAT: 1
DIARRHEA: 0
NAUSEA: 0
VOMITING: 0
COUGH: 1
SHORTNESS OF BREATH: 1
EYE REDNESS: 0

## 2018-11-24 ASSESSMENT — PAIN SCALES - GENERAL: PAINLEVEL_OUTOF10: 10

## 2018-11-25 NOTE — ED PROVIDER NOTES
13.0 11.8 - 14.4 %    Platelets 748 640 - 390 k/uL    MPV 10.2 8.1 - 13.5 fL    NRBC Automated 0.0 0.0 per 100 WBC    Differential Type NOT REPORTED     WBC Morphology NOT REPORTED     RBC Morphology NOT REPORTED     Platelet Estimate NOT REPORTED     Immature Granulocytes 0 0 %    Seg Neutrophils 80 (H) 36 - 66 %    Lymphocytes 6 (L) 24 - 44 %    Monocytes 12 (H) 1 - 7 %    Eosinophils % 2 1 - 4 %    Basophils 0 0 - 2 %    Absolute Immature Granulocyte 0.00 0.00 - 0.30 k/uL    Segs Absolute 8.57 (H) 1.8 - 7.7 k/uL    Absolute Lymph # 0.64 (L) 1.0 - 4.8 k/uL    Absolute Mono # 1.28 (H) 0.1 - 0.8 k/uL    Absolute Eos # 0.21 0.0 - 0.4 k/uL    Basophils # 0.00 0.0 - 0.2 k/uL    Morphology Normal    Basic Metabolic Panel   Result Value Ref Range    Glucose 120 (H) 70 - 99 mg/dL    BUN 11 6 - 20 mg/dL    CREATININE 1.36 (H) 0.70 - 1.20 mg/dL    Bun/Cre Ratio NOT REPORTED 9 - 20    Calcium 9.2 8.6 - 10.4 mg/dL    Sodium 138 135 - 144 mmol/L    Potassium 4.0 3.7 - 5.3 mmol/L    Chloride 99 98 - 107 mmol/L    CO2 28 20 - 31 mmol/L    Anion Gap 11 9 - 17 mmol/L    GFR Non-African American 54 (L) >60 mL/min    GFR African American >60 >60 mL/min    GFR Comment          GFR Staging NOT REPORTED    Brain Natriuretic Peptide   Result Value Ref Range    Pro- <300 pg/mL    BNP Interpretation         POCT troponin   Result Value Ref Range    POC Troponin I 0.01 0.00 - 0.10 ng/mL    POC Troponin Interp       The Troponin-I (POC) results cannot be compared to the Troponin-T results. POCT troponin   Result Value Ref Range    POC Troponin I 0.00 0.00 - 0.10 ng/mL    POC Troponin Interp       The Troponin-I (POC) results cannot be compared to the Troponin-T results.      RADIOLOGY:  Narrative   EXAMINATION:   TWO VIEWS OF THE CHEST       11/24/2018 7:36 pm       COMPARISON:   September 5, 2018       HISTORY:   ORDERING SYSTEM PROVIDED HISTORY: cough   TECHNOLOGIST PROVIDED HISTORY:   cough       FINDINGS:   The cardiomediastinal

## 2019-01-01 ENCOUNTER — HOSPITAL ENCOUNTER (OUTPATIENT)
Age: 59
Setting detail: OBSERVATION
Discharge: HOME | End: 2019-01-02
Attending: EMERGENCY MEDICINE | Admitting: EMERGENCY MEDICINE

## 2019-01-01 ENCOUNTER — APPOINTMENT (OUTPATIENT)
Dept: GENERAL RADIOLOGY | Age: 59
End: 2019-01-01

## 2019-01-01 DIAGNOSIS — R05.9 COUGH: Primary | ICD-10-CM

## 2019-01-01 DIAGNOSIS — J40 BRONCHITIS: ICD-10-CM

## 2019-01-01 DIAGNOSIS — R06.02 SHORTNESS OF BREATH: ICD-10-CM

## 2019-01-01 LAB
ABSOLUTE EOS #: 0.21 K/UL (ref 0–0.44)
ABSOLUTE IMMATURE GRANULOCYTE: 0.06 K/UL (ref 0–0.3)
ABSOLUTE LYMPH #: 2.31 K/UL (ref 1.1–3.7)
ABSOLUTE MONO #: 0.85 K/UL (ref 0.1–1.2)
ANION GAP SERPL CALCULATED.3IONS-SCNC: 16 MMOL/L (ref 9–17)
BASOPHILS # BLD: 0 % (ref 0–2)
BASOPHILS ABSOLUTE: 0.04 K/UL (ref 0–0.2)
BNP INTERPRETATION: NORMAL
BUN BLDV-MCNC: 13 MG/DL (ref 6–20)
BUN/CREAT BLD: ABNORMAL (ref 9–20)
CALCIUM SERPL-MCNC: 9.1 MG/DL (ref 8.6–10.4)
CHLORIDE BLD-SCNC: 100 MMOL/L (ref 98–107)
CO2: 26 MMOL/L (ref 20–31)
CREAT SERPL-MCNC: 1.11 MG/DL (ref 0.7–1.2)
DIFFERENTIAL TYPE: ABNORMAL
EKG ATRIAL RATE: 103 BPM
EKG P AXIS: 68 DEGREES
EKG P-R INTERVAL: 148 MS
EKG Q-T INTERVAL: 366 MS
EKG QRS DURATION: 96 MS
EKG QTC CALCULATION (BAZETT): 479 MS
EKG R AXIS: 58 DEGREES
EKG T AXIS: 68 DEGREES
EKG VENTRICULAR RATE: 103 BPM
EOSINOPHILS RELATIVE PERCENT: 1 % (ref 1–4)
GFR AFRICAN AMERICAN: >60 ML/MIN
GFR NON-AFRICAN AMERICAN: >60 ML/MIN
GFR SERPL CREATININE-BSD FRML MDRD: ABNORMAL ML/MIN/{1.73_M2}
GFR SERPL CREATININE-BSD FRML MDRD: ABNORMAL ML/MIN/{1.73_M2}
GLUCOSE BLD-MCNC: 115 MG/DL (ref 70–99)
HCT VFR BLD CALC: 36.8 % (ref 40.7–50.3)
HEMOGLOBIN: 12 G/DL (ref 13–17)
IMMATURE GRANULOCYTES: 0 %
LYMPHOCYTES # BLD: 16 % (ref 24–43)
MCH RBC QN AUTO: 29.1 PG (ref 25.2–33.5)
MCHC RBC AUTO-ENTMCNC: 32.6 G/DL (ref 28.4–34.8)
MCV RBC AUTO: 89.3 FL (ref 82.6–102.9)
MONOCYTES # BLD: 6 % (ref 3–12)
NRBC AUTOMATED: 0 PER 100 WBC
PDW BLD-RTO: 12.5 % (ref 11.8–14.4)
PLATELET # BLD: 248 K/UL (ref 138–453)
PLATELET ESTIMATE: ABNORMAL
PMV BLD AUTO: 10 FL (ref 8.1–13.5)
POTASSIUM SERPL-SCNC: 3.9 MMOL/L (ref 3.7–5.3)
PRO-BNP: 284 PG/ML
RBC # BLD: 4.12 M/UL (ref 4.21–5.77)
RBC # BLD: ABNORMAL 10*6/UL
SEG NEUTROPHILS: 77 % (ref 36–65)
SEGMENTED NEUTROPHILS ABSOLUTE COUNT: 11.2 K/UL (ref 1.5–8.1)
SODIUM BLD-SCNC: 142 MMOL/L (ref 135–144)
TROPONIN INTERP: NORMAL
TROPONIN T: NORMAL NG/ML
TROPONIN, HIGH SENSITIVITY: 10 NG/L (ref 0–22)
WBC # BLD: 14.7 K/UL (ref 3.5–11.3)
WBC # BLD: ABNORMAL 10*3/UL

## 2019-01-01 PROCEDURE — 6370000000 HC RX 637 (ALT 250 FOR IP): Performed by: STUDENT IN AN ORGANIZED HEALTH CARE EDUCATION/TRAINING PROGRAM

## 2019-01-01 PROCEDURE — 71046 X-RAY EXAM CHEST 2 VIEWS: CPT

## 2019-01-01 PROCEDURE — 94640 AIRWAY INHALATION TREATMENT: CPT

## 2019-01-01 PROCEDURE — 96365 THER/PROPH/DIAG IV INF INIT: CPT

## 2019-01-01 PROCEDURE — G0378 HOSPITAL OBSERVATION PER HR: HCPCS

## 2019-01-01 PROCEDURE — 83880 ASSAY OF NATRIURETIC PEPTIDE: CPT

## 2019-01-01 PROCEDURE — 6360000002 HC RX W HCPCS: Performed by: STUDENT IN AN ORGANIZED HEALTH CARE EDUCATION/TRAINING PROGRAM

## 2019-01-01 PROCEDURE — 87801 DETECT AGNT MULT DNA AMPLI: CPT

## 2019-01-01 PROCEDURE — 99285 EMERGENCY DEPT VISIT HI MDM: CPT

## 2019-01-01 PROCEDURE — 86615 BORDETELLA ANTIBODY: CPT

## 2019-01-01 PROCEDURE — 93005 ELECTROCARDIOGRAM TRACING: CPT

## 2019-01-01 PROCEDURE — 80048 BASIC METABOLIC PNL TOTAL CA: CPT

## 2019-01-01 PROCEDURE — 85025 COMPLETE CBC W/AUTO DIFF WBC: CPT

## 2019-01-01 PROCEDURE — 84484 ASSAY OF TROPONIN QUANT: CPT

## 2019-01-01 RX ORDER — POTASSIUM CHLORIDE 1.5 G/1.77G
20 POWDER, FOR SOLUTION ORAL 2 TIMES DAILY
Status: DISCONTINUED | OUTPATIENT
Start: 2019-01-01 | End: 2019-01-02 | Stop reason: HOSPADM

## 2019-01-01 RX ORDER — SIMVASTATIN 20 MG
20 TABLET ORAL NIGHTLY
Status: DISCONTINUED | OUTPATIENT
Start: 2019-01-01 | End: 2019-01-02 | Stop reason: HOSPADM

## 2019-01-01 RX ORDER — SODIUM CHLORIDE 0.9 % (FLUSH) 0.9 %
10 SYRINGE (ML) INJECTION EVERY 12 HOURS SCHEDULED
Status: DISCONTINUED | OUTPATIENT
Start: 2019-01-01 | End: 2019-01-02 | Stop reason: HOSPADM

## 2019-01-01 RX ORDER — SPIRONOLACTONE 25 MG/1
25 TABLET ORAL DAILY
Status: DISCONTINUED | OUTPATIENT
Start: 2019-01-01 | End: 2019-01-02 | Stop reason: HOSPADM

## 2019-01-01 RX ORDER — ALBUTEROL SULFATE 90 UG/1
2 AEROSOL, METERED RESPIRATORY (INHALATION) EVERY 6 HOURS PRN
Status: DISCONTINUED | OUTPATIENT
Start: 2019-01-01 | End: 2019-01-02 | Stop reason: HOSPADM

## 2019-01-01 RX ORDER — ASPIRIN 81 MG/1
81 TABLET, CHEWABLE ORAL DAILY
Status: DISCONTINUED | OUTPATIENT
Start: 2019-01-01 | End: 2019-01-02 | Stop reason: HOSPADM

## 2019-01-01 RX ORDER — METOPROLOL TARTRATE 50 MG/1
50 TABLET, FILM COATED ORAL 2 TIMES DAILY
Status: DISCONTINUED | OUTPATIENT
Start: 2019-01-01 | End: 2019-01-02 | Stop reason: HOSPADM

## 2019-01-01 RX ORDER — ALBUTEROL SULFATE 90 UG/1
2 AEROSOL, METERED RESPIRATORY (INHALATION)
Status: DISCONTINUED | OUTPATIENT
Start: 2019-01-01 | End: 2019-01-01

## 2019-01-01 RX ORDER — BENZONATATE 100 MG/1
100 CAPSULE ORAL ONCE
Status: COMPLETED | OUTPATIENT
Start: 2019-01-01 | End: 2019-01-01

## 2019-01-01 RX ORDER — ALBUTEROL SULFATE 2.5 MG/3ML
5 SOLUTION RESPIRATORY (INHALATION)
Status: DISCONTINUED | OUTPATIENT
Start: 2019-01-01 | End: 2019-01-02 | Stop reason: HOSPADM

## 2019-01-01 RX ORDER — LEVOFLOXACIN 5 MG/ML
500 INJECTION, SOLUTION INTRAVENOUS EVERY 24 HOURS
Status: DISCONTINUED | OUTPATIENT
Start: 2019-01-01 | End: 2019-01-02

## 2019-01-01 RX ORDER — CODEINE PHOSPHATE AND GUAIFENESIN 10; 100 MG/5ML; MG/5ML
5 SOLUTION ORAL EVERY 4 HOURS PRN
Status: DISCONTINUED | OUTPATIENT
Start: 2019-01-01 | End: 2019-01-02 | Stop reason: HOSPADM

## 2019-01-01 RX ORDER — IPRATROPIUM BROMIDE AND ALBUTEROL SULFATE 2.5; .5 MG/3ML; MG/3ML
1 SOLUTION RESPIRATORY (INHALATION)
Status: DISCONTINUED | OUTPATIENT
Start: 2019-01-01 | End: 2019-01-02 | Stop reason: HOSPADM

## 2019-01-01 RX ORDER — SODIUM CHLORIDE 0.9 % (FLUSH) 0.9 %
10 SYRINGE (ML) INJECTION PRN
Status: DISCONTINUED | OUTPATIENT
Start: 2019-01-01 | End: 2019-01-02 | Stop reason: HOSPADM

## 2019-01-01 RX ORDER — RANOLAZINE 500 MG/1
500 TABLET, EXTENDED RELEASE ORAL 2 TIMES DAILY
Status: DISCONTINUED | OUTPATIENT
Start: 2019-01-01 | End: 2019-01-02 | Stop reason: HOSPADM

## 2019-01-01 RX ORDER — ACETAMINOPHEN 325 MG/1
650 TABLET ORAL EVERY 4 HOURS PRN
Status: DISCONTINUED | OUTPATIENT
Start: 2019-01-01 | End: 2019-01-02 | Stop reason: HOSPADM

## 2019-01-01 RX ORDER — GABAPENTIN 300 MG/1
300 CAPSULE ORAL NIGHTLY PRN
Status: DISCONTINUED | OUTPATIENT
Start: 2019-01-01 | End: 2019-01-02 | Stop reason: HOSPADM

## 2019-01-01 RX ORDER — ALBUTEROL SULFATE 90 UG/1
1 AEROSOL, METERED RESPIRATORY (INHALATION) EVERY 6 HOURS
Status: DISCONTINUED | OUTPATIENT
Start: 2019-01-01 | End: 2019-01-02 | Stop reason: HOSPADM

## 2019-01-01 RX ORDER — FUROSEMIDE 40 MG/1
40 TABLET ORAL DAILY
Status: DISCONTINUED | OUTPATIENT
Start: 2019-01-02 | End: 2019-01-02 | Stop reason: HOSPADM

## 2019-01-01 RX ADMIN — IPRATROPIUM BROMIDE 1 PUFF: 17 AEROSOL, METERED RESPIRATORY (INHALATION) at 20:35

## 2019-01-01 RX ADMIN — BENZONATATE 100 MG: 100 CAPSULE ORAL at 12:41

## 2019-01-01 RX ADMIN — GUAIFENESIN AND CODEINE PHOSPHATE 5 ML: 100; 10 SOLUTION ORAL at 21:40

## 2019-01-01 RX ADMIN — LEVOFLOXACIN 500 MG: 5 INJECTION, SOLUTION INTRAVENOUS at 13:23

## 2019-01-01 RX ADMIN — RANOLAZINE 500 MG: 500 TABLET, FILM COATED, EXTENDED RELEASE ORAL at 21:29

## 2019-01-01 RX ADMIN — POTASSIUM CHLORIDE 20 MEQ: 1.5 POWDER, FOR SOLUTION ORAL at 21:30

## 2019-01-01 RX ADMIN — ALBUTEROL SULFATE 2.5 MG: 2.5 SOLUTION RESPIRATORY (INHALATION) at 20:34

## 2019-01-01 RX ADMIN — ALBUTEROL SULFATE 5 MG: 2.5 SOLUTION RESPIRATORY (INHALATION) at 11:30

## 2019-01-01 RX ADMIN — IPRATROPIUM BROMIDE 0.5 MG: 0.5 SOLUTION RESPIRATORY (INHALATION) at 11:19

## 2019-01-01 RX ADMIN — SPIRONOLACTONE 25 MG: 25 TABLET ORAL at 21:29

## 2019-01-01 RX ADMIN — ALBUTEROL SULFATE 5 MG: 5 SOLUTION RESPIRATORY (INHALATION) at 11:19

## 2019-01-01 RX ADMIN — METOPROLOL TARTRATE 50 MG: 50 TABLET, FILM COATED ORAL at 21:29

## 2019-01-01 RX ADMIN — GUAIFENESIN AND CODEINE PHOSPHATE 5 ML: 100; 10 SOLUTION ORAL at 15:07

## 2019-01-01 RX ADMIN — SIMVASTATIN 20 MG: 20 TABLET, FILM COATED ORAL at 21:29

## 2019-01-01 RX ADMIN — GABAPENTIN 300 MG: 300 CAPSULE ORAL at 21:29

## 2019-01-01 ASSESSMENT — ENCOUNTER SYMPTOMS
NAUSEA: 0
VOMITING: 0
ABDOMINAL PAIN: 0
RHINORRHEA: 0
COUGH: 1
DIARRHEA: 0
SHORTNESS OF BREATH: 1

## 2019-01-01 ASSESSMENT — PAIN SCALES - GENERAL: PAINLEVEL_OUTOF10: 0

## 2019-01-02 VITALS
DIASTOLIC BLOOD PRESSURE: 88 MMHG | RESPIRATION RATE: 16 BRPM | OXYGEN SATURATION: 98 % | HEART RATE: 89 BPM | BODY MASS INDEX: 38.89 KG/M2 | HEIGHT: 66 IN | WEIGHT: 242 LBS | TEMPERATURE: 98.9 F | SYSTOLIC BLOOD PRESSURE: 122 MMHG

## 2019-01-02 PROBLEM — I50.22 CHRONIC SYSTOLIC CONGESTIVE HEART FAILURE (HCC): Status: ACTIVE | Noted: 2019-01-02

## 2019-01-02 LAB
DIRECT EXAM: NORMAL
Lab: NORMAL
SPECIMEN DESCRIPTION: NORMAL
STATUS: NORMAL

## 2019-01-02 PROCEDURE — G0378 HOSPITAL OBSERVATION PER HR: HCPCS

## 2019-01-02 PROCEDURE — 6370000000 HC RX 637 (ALT 250 FOR IP): Performed by: STUDENT IN AN ORGANIZED HEALTH CARE EDUCATION/TRAINING PROGRAM

## 2019-01-02 PROCEDURE — 2580000003 HC RX 258: Performed by: STUDENT IN AN ORGANIZED HEALTH CARE EDUCATION/TRAINING PROGRAM

## 2019-01-02 PROCEDURE — 94640 AIRWAY INHALATION TREATMENT: CPT

## 2019-01-02 RX ORDER — FUROSEMIDE 40 MG/1
40 TABLET ORAL DAILY
Qty: 60 TABLET | Refills: 3 | Status: SHIPPED | OUTPATIENT
Start: 2019-01-03 | End: 2019-08-13 | Stop reason: SDUPTHER

## 2019-01-02 RX ORDER — BENZONATATE 100 MG/1
100 CAPSULE ORAL 3 TIMES DAILY PRN
Status: DISCONTINUED | OUTPATIENT
Start: 2019-01-02 | End: 2019-01-02 | Stop reason: HOSPADM

## 2019-01-02 RX ORDER — LEVOFLOXACIN 750 MG/1
750 TABLET ORAL DAILY
Qty: 10 TABLET | Refills: 0 | Status: SHIPPED | OUTPATIENT
Start: 2019-01-02 | End: 2019-01-02

## 2019-01-02 RX ORDER — LEVOFLOXACIN 750 MG/1
750 TABLET ORAL DAILY
Qty: 5 TABLET | Refills: 0 | Status: SHIPPED | OUTPATIENT
Start: 2019-01-02 | End: 2019-01-07

## 2019-01-02 RX ORDER — SPIRONOLACTONE 25 MG/1
25 TABLET ORAL DAILY
Qty: 30 TABLET | Refills: 3 | Status: ON HOLD | OUTPATIENT
Start: 2019-01-03 | End: 2019-10-28

## 2019-01-02 RX ORDER — GABAPENTIN 300 MG/1
300 CAPSULE ORAL NIGHTLY PRN
Qty: 30 CAPSULE | Refills: 0 | Status: SHIPPED | OUTPATIENT
Start: 2019-01-02 | End: 2020-06-18 | Stop reason: SDUPTHER

## 2019-01-02 RX ORDER — CODEINE PHOSPHATE AND GUAIFENESIN 10; 100 MG/5ML; MG/5ML
5 SOLUTION ORAL EVERY 4 HOURS PRN
Qty: 118 ML | Refills: 0 | Status: SHIPPED | OUTPATIENT
Start: 2019-01-02 | End: 2019-01-07

## 2019-01-02 RX ORDER — LEVOFLOXACIN 5 MG/ML
750 INJECTION, SOLUTION INTRAVENOUS EVERY 24 HOURS
Status: DISCONTINUED | OUTPATIENT
Start: 2019-01-03 | End: 2019-01-02 | Stop reason: HOSPADM

## 2019-01-02 RX ORDER — BENZONATATE 100 MG/1
100 CAPSULE ORAL 3 TIMES DAILY PRN
Qty: 30 CAPSULE | Refills: 0 | Status: SHIPPED | OUTPATIENT
Start: 2019-01-02 | End: 2019-01-09

## 2019-01-02 RX ADMIN — FUROSEMIDE 40 MG: 40 TABLET ORAL at 09:35

## 2019-01-02 RX ADMIN — ASPIRIN 81 MG: 81 TABLET, CHEWABLE ORAL at 09:34

## 2019-01-02 RX ADMIN — ALBUTEROL SULFATE 1 PUFF: 90 AEROSOL, METERED RESPIRATORY (INHALATION) at 08:40

## 2019-01-02 RX ADMIN — SPIRONOLACTONE 25 MG: 25 TABLET ORAL at 09:34

## 2019-01-02 RX ADMIN — IPRATROPIUM BROMIDE 1 PUFF: 17 AEROSOL, METERED RESPIRATORY (INHALATION) at 01:45

## 2019-01-02 RX ADMIN — IPRATROPIUM BROMIDE 1 PUFF: 17 AEROSOL, METERED RESPIRATORY (INHALATION) at 08:41

## 2019-01-02 RX ADMIN — IPRATROPIUM BROMIDE 1 PUFF: 17 AEROSOL, METERED RESPIRATORY (INHALATION) at 16:16

## 2019-01-02 RX ADMIN — POTASSIUM CHLORIDE 20 MEQ: 1.5 POWDER, FOR SOLUTION ORAL at 09:35

## 2019-01-02 RX ADMIN — IPRATROPIUM BROMIDE AND ALBUTEROL SULFATE 1 AMPULE: .5; 3 SOLUTION RESPIRATORY (INHALATION) at 10:29

## 2019-01-02 RX ADMIN — RANOLAZINE 500 MG: 500 TABLET, FILM COATED, EXTENDED RELEASE ORAL at 09:35

## 2019-01-02 RX ADMIN — SODIUM CHLORIDE, PRESERVATIVE FREE 10 ML: 5 INJECTION INTRAVENOUS at 09:36

## 2019-01-02 RX ADMIN — GUAIFENESIN AND CODEINE PHOSPHATE 5 ML: 100; 10 SOLUTION ORAL at 09:43

## 2019-01-02 RX ADMIN — ALBUTEROL SULFATE 1 PUFF: 90 AEROSOL, METERED RESPIRATORY (INHALATION) at 01:46

## 2019-01-02 RX ADMIN — METOPROLOL TARTRATE 50 MG: 50 TABLET, FILM COATED ORAL at 09:34

## 2019-01-02 RX ADMIN — LISINOPRIL 30 MG: 20 TABLET ORAL at 09:35

## 2019-01-02 RX ADMIN — ALBUTEROL SULFATE 1 PUFF: 90 AEROSOL, METERED RESPIRATORY (INHALATION) at 16:15

## 2019-01-02 RX ADMIN — BENZONATATE 100 MG: 100 CAPSULE ORAL at 09:43

## 2019-01-02 ASSESSMENT — PAIN SCALES - GENERAL: PAINLEVEL_OUTOF10: 0

## 2019-01-02 ASSESSMENT — ENCOUNTER SYMPTOMS
SINUS PAIN: 0
EYE REDNESS: 0
EYE PAIN: 0
VOMITING: 0
SHORTNESS OF BREATH: 1
DIARRHEA: 0
RHINORRHEA: 0
APNEA: 0
ABDOMINAL PAIN: 0
BLOOD IN STOOL: 0
NAUSEA: 0
COUGH: 1
WHEEZING: 1

## 2019-01-03 ENCOUNTER — TELEPHONE (OUTPATIENT)
Dept: FAMILY MEDICINE CLINIC | Age: 59
End: 2019-01-03

## 2019-01-03 LAB
BORDETELLA PERTUSSIS IGA: 1.4 IV
BORDETELLA PERTUSSIS IGG: 3.96 IV

## 2019-01-04 ENCOUNTER — TELEPHONE (OUTPATIENT)
Dept: FAMILY MEDICINE CLINIC | Age: 59
End: 2019-01-04

## 2019-01-04 LAB
B PERTUS IGG,PT100: NEGATIVE
B PERTUSSIS IGG AB, QUANT: POSITIVE
B. PERTUSSIS, IGG, IMMUNOBLOT: NORMAL
BORDETELLA PERTUSSIS IGG: POSITIVE

## 2019-01-05 LAB
B PERTUSSIS IGA AB, QUANT: NEGATIVE
B. PERTUSSIS, IGA, IMMUNOBLOT: NORMAL
BORDETELLA PERTUSSIS IGA: POSITIVE

## 2019-03-10 ENCOUNTER — HOSPITAL ENCOUNTER (EMERGENCY)
Age: 59
Discharge: HOME | End: 2019-03-10
Attending: EMERGENCY MEDICINE

## 2019-03-10 ENCOUNTER — APPOINTMENT (OUTPATIENT)
Dept: GENERAL RADIOLOGY | Age: 59
End: 2019-03-10

## 2019-03-10 VITALS
WEIGHT: 242 LBS | HEART RATE: 98 BPM | TEMPERATURE: 98.2 F | HEIGHT: 66 IN | SYSTOLIC BLOOD PRESSURE: 144 MMHG | BODY MASS INDEX: 38.89 KG/M2 | RESPIRATION RATE: 16 BRPM | DIASTOLIC BLOOD PRESSURE: 78 MMHG | OXYGEN SATURATION: 100 %

## 2019-03-10 DIAGNOSIS — R06.00 DYSPNEA, UNSPECIFIED TYPE: Primary | ICD-10-CM

## 2019-03-10 LAB
ABSOLUTE EOS #: 0.22 K/UL (ref 0–0.44)
ABSOLUTE IMMATURE GRANULOCYTE: 0.03 K/UL (ref 0–0.3)
ABSOLUTE LYMPH #: 1.63 K/UL (ref 1.1–3.7)
ABSOLUTE MONO #: 0.8 K/UL (ref 0.1–1.2)
ANION GAP SERPL CALCULATED.3IONS-SCNC: 12 MMOL/L (ref 9–17)
BASOPHILS # BLD: 0 % (ref 0–2)
BASOPHILS ABSOLUTE: <0.03 K/UL (ref 0–0.2)
BNP INTERPRETATION: NORMAL
BUN BLDV-MCNC: 16 MG/DL (ref 6–20)
BUN/CREAT BLD: NORMAL (ref 9–20)
CALCIUM SERPL-MCNC: 8.8 MG/DL (ref 8.6–10.4)
CHLORIDE BLD-SCNC: 105 MMOL/L (ref 98–107)
CO2: 25 MMOL/L (ref 20–31)
CREAT SERPL-MCNC: 0.81 MG/DL (ref 0.7–1.2)
DIFFERENTIAL TYPE: ABNORMAL
EOSINOPHILS RELATIVE PERCENT: 3 % (ref 1–4)
GFR AFRICAN AMERICAN: >60 ML/MIN
GFR NON-AFRICAN AMERICAN: >60 ML/MIN
GFR SERPL CREATININE-BSD FRML MDRD: NORMAL ML/MIN/{1.73_M2}
GFR SERPL CREATININE-BSD FRML MDRD: NORMAL ML/MIN/{1.73_M2}
GLUCOSE BLD-MCNC: 89 MG/DL (ref 70–99)
HCT VFR BLD CALC: 38.2 % (ref 40.7–50.3)
HEMOGLOBIN: 12.7 G/DL (ref 13–17)
IMMATURE GRANULOCYTES: 0 %
LYMPHOCYTES # BLD: 18 % (ref 24–43)
MCH RBC QN AUTO: 28.5 PG (ref 25.2–33.5)
MCHC RBC AUTO-ENTMCNC: 33.2 G/DL (ref 28.4–34.8)
MCV RBC AUTO: 85.8 FL (ref 82.6–102.9)
MONOCYTES # BLD: 9 % (ref 3–12)
NRBC AUTOMATED: 0 PER 100 WBC
PDW BLD-RTO: 13.1 % (ref 11.8–14.4)
PLATELET # BLD: 224 K/UL (ref 138–453)
PLATELET ESTIMATE: ABNORMAL
PMV BLD AUTO: 10.6 FL (ref 8.1–13.5)
POTASSIUM SERPL-SCNC: 3.8 MMOL/L (ref 3.7–5.3)
PRO-BNP: 137 PG/ML
RBC # BLD: 4.45 M/UL (ref 4.21–5.77)
RBC # BLD: ABNORMAL 10*6/UL
SEG NEUTROPHILS: 70 % (ref 36–65)
SEGMENTED NEUTROPHILS ABSOLUTE COUNT: 6.25 K/UL (ref 1.5–8.1)
SODIUM BLD-SCNC: 142 MMOL/L (ref 135–144)
TROPONIN INTERP: NORMAL
TROPONIN T: NORMAL NG/ML
TROPONIN, HIGH SENSITIVITY: 7 NG/L (ref 0–22)
WBC # BLD: 9 K/UL (ref 3.5–11.3)
WBC # BLD: ABNORMAL 10*3/UL

## 2019-03-10 PROCEDURE — 93005 ELECTROCARDIOGRAM TRACING: CPT

## 2019-03-10 PROCEDURE — 71046 X-RAY EXAM CHEST 2 VIEWS: CPT

## 2019-03-10 PROCEDURE — 96374 THER/PROPH/DIAG INJ IV PUSH: CPT

## 2019-03-10 PROCEDURE — 6360000002 HC RX W HCPCS: Performed by: STUDENT IN AN ORGANIZED HEALTH CARE EDUCATION/TRAINING PROGRAM

## 2019-03-10 PROCEDURE — 80048 BASIC METABOLIC PNL TOTAL CA: CPT

## 2019-03-10 PROCEDURE — 84484 ASSAY OF TROPONIN QUANT: CPT

## 2019-03-10 PROCEDURE — G0384 LEV 5 HOSP TYPE B ED VISIT: HCPCS

## 2019-03-10 PROCEDURE — 85025 COMPLETE CBC W/AUTO DIFF WBC: CPT

## 2019-03-10 PROCEDURE — 83880 ASSAY OF NATRIURETIC PEPTIDE: CPT

## 2019-03-10 RX ORDER — FUROSEMIDE 10 MG/ML
80 INJECTION INTRAMUSCULAR; INTRAVENOUS ONCE
Status: COMPLETED | OUTPATIENT
Start: 2019-03-10 | End: 2019-03-10

## 2019-03-10 RX ADMIN — FUROSEMIDE 80 MG: 10 INJECTION, SOLUTION INTRAMUSCULAR; INTRAVENOUS at 18:41

## 2019-03-10 ASSESSMENT — PAIN DESCRIPTION - PAIN TYPE: TYPE: ACUTE PAIN

## 2019-03-10 ASSESSMENT — PAIN DESCRIPTION - LOCATION: LOCATION: CHEST

## 2019-03-10 ASSESSMENT — PAIN DESCRIPTION - FREQUENCY: FREQUENCY: CONTINUOUS

## 2019-03-10 ASSESSMENT — PAIN SCALES - GENERAL: PAINLEVEL_OUTOF10: 4

## 2019-03-10 ASSESSMENT — PAIN DESCRIPTION - DESCRIPTORS: DESCRIPTORS: ACHING;DISCOMFORT

## 2019-03-10 ASSESSMENT — PAIN DESCRIPTION - ONSET: ONSET: SUDDEN

## 2019-03-10 ASSESSMENT — PAIN DESCRIPTION - ORIENTATION: ORIENTATION: RIGHT

## 2019-03-11 ENCOUNTER — TELEPHONE (OUTPATIENT)
Dept: FAMILY MEDICINE CLINIC | Age: 59
End: 2019-03-11

## 2019-03-11 LAB
EKG ATRIAL RATE: 102 BPM
EKG P AXIS: 42 DEGREES
EKG P-R INTERVAL: 152 MS
EKG Q-T INTERVAL: 352 MS
EKG QRS DURATION: 90 MS
EKG QTC CALCULATION (BAZETT): 458 MS
EKG R AXIS: 18 DEGREES
EKG T AXIS: 25 DEGREES
EKG VENTRICULAR RATE: 102 BPM

## 2019-04-08 ASSESSMENT — ENCOUNTER SYMPTOMS
COLOR CHANGE: 0
BLOOD IN STOOL: 0
DIARRHEA: 0
VOMITING: 0
NAUSEA: 0
RHINORRHEA: 0
CHEST TIGHTNESS: 0
ABDOMINAL PAIN: 0
SORE THROAT: 0
COUGH: 0
EYE PAIN: 0
CONSTIPATION: 0
SHORTNESS OF BREATH: 1
EYE DISCHARGE: 0
BACK PAIN: 0

## 2019-04-15 ENCOUNTER — HOSPITAL ENCOUNTER (OUTPATIENT)
Dept: NUCLEAR MEDICINE | Age: 59
Discharge: HOME | End: 2019-04-17

## 2019-04-15 DIAGNOSIS — I50.22 CHRONIC SYSTOLIC (CONGESTIVE) HEART FAILURE (HCC): ICD-10-CM

## 2019-04-15 DIAGNOSIS — I42.9 IDIOPATHIC CARDIOMYOPATHY (HCC): ICD-10-CM

## 2019-04-15 LAB
LV EF: 52 %
LVEF MODALITY: NORMAL

## 2019-04-15 PROCEDURE — 6360000002 HC RX W HCPCS: Performed by: INTERNAL MEDICINE

## 2019-04-15 PROCEDURE — 2580000003 HC RX 258: Performed by: INTERNAL MEDICINE

## 2019-04-15 PROCEDURE — 6360000002 HC RX W HCPCS

## 2019-04-15 PROCEDURE — A9560 TC99M LABELED RBC: HCPCS | Performed by: INTERNAL MEDICINE

## 2019-04-15 PROCEDURE — 78472 GATED HEART PLANAR SINGLE: CPT

## 2019-04-15 PROCEDURE — 3430000000 HC RX DIAGNOSTIC RADIOPHARMACEUTICAL: Performed by: INTERNAL MEDICINE

## 2019-04-15 RX ORDER — HEPARIN SODIUM (PORCINE) LOCK FLUSH IV SOLN 100 UNIT/ML 100 UNIT/ML
100 SOLUTION INTRAVENOUS PRN
Status: DISCONTINUED | OUTPATIENT
Start: 2019-04-15 | End: 2019-04-18 | Stop reason: HOSPADM

## 2019-04-15 RX ORDER — SODIUM CHLORIDE 0.9 % (FLUSH) 0.9 %
10 SYRINGE (ML) INJECTION PRN
Status: DISCONTINUED | OUTPATIENT
Start: 2019-04-15 | End: 2019-04-18 | Stop reason: HOSPADM

## 2019-04-15 RX ADMIN — Medication 17.8 MILLICURIE: at 11:10

## 2019-04-15 RX ADMIN — SODIUM CHLORIDE, PRESERVATIVE FREE 100 UNITS: 5 INJECTION INTRAVENOUS at 11:32

## 2019-04-15 RX ADMIN — SODIUM CHLORIDE, PRESERVATIVE FREE 10 ML: 5 INJECTION INTRAVENOUS at 11:10

## 2019-04-21 ENCOUNTER — HOSPITAL ENCOUNTER (EMERGENCY)
Age: 59
Discharge: HOME | End: 2019-04-21
Attending: EMERGENCY MEDICINE

## 2019-04-21 ENCOUNTER — APPOINTMENT (OUTPATIENT)
Dept: GENERAL RADIOLOGY | Age: 59
End: 2019-04-21

## 2019-04-21 VITALS
BODY MASS INDEX: 40.82 KG/M2 | SYSTOLIC BLOOD PRESSURE: 141 MMHG | RESPIRATION RATE: 16 BRPM | HEIGHT: 66 IN | WEIGHT: 254 LBS | DIASTOLIC BLOOD PRESSURE: 82 MMHG | TEMPERATURE: 98.3 F | HEART RATE: 93 BPM | OXYGEN SATURATION: 98 %

## 2019-04-21 DIAGNOSIS — I50.9 CONGESTIVE HEART FAILURE, UNSPECIFIED HF CHRONICITY, UNSPECIFIED HEART FAILURE TYPE (HCC): Primary | ICD-10-CM

## 2019-04-21 LAB
ABSOLUTE EOS #: 0.18 K/UL (ref 0–0.44)
ABSOLUTE IMMATURE GRANULOCYTE: 0.03 K/UL (ref 0–0.3)
ABSOLUTE LYMPH #: 1.33 K/UL (ref 1.1–3.7)
ABSOLUTE MONO #: 0.72 K/UL (ref 0.1–1.2)
ANION GAP SERPL CALCULATED.3IONS-SCNC: 12 MMOL/L (ref 9–17)
BASOPHILS # BLD: 0 % (ref 0–2)
BASOPHILS ABSOLUTE: 0.03 K/UL (ref 0–0.2)
BNP INTERPRETATION: NORMAL
BUN BLDV-MCNC: 11 MG/DL (ref 6–20)
BUN/CREAT BLD: ABNORMAL (ref 9–20)
CALCIUM SERPL-MCNC: 8.9 MG/DL (ref 8.6–10.4)
CHLORIDE BLD-SCNC: 105 MMOL/L (ref 98–107)
CO2: 25 MMOL/L (ref 20–31)
CREAT SERPL-MCNC: 0.93 MG/DL (ref 0.7–1.2)
D-DIMER QUANTITATIVE: 0.29 MG/L FEU
DIFFERENTIAL TYPE: ABNORMAL
EOSINOPHILS RELATIVE PERCENT: 2 % (ref 1–4)
GFR AFRICAN AMERICAN: >60 ML/MIN
GFR NON-AFRICAN AMERICAN: >60 ML/MIN
GFR SERPL CREATININE-BSD FRML MDRD: ABNORMAL ML/MIN/{1.73_M2}
GFR SERPL CREATININE-BSD FRML MDRD: ABNORMAL ML/MIN/{1.73_M2}
GLUCOSE BLD-MCNC: 114 MG/DL (ref 70–99)
HCT VFR BLD CALC: 39.7 % (ref 40.7–50.3)
HEMOGLOBIN: 13.2 G/DL (ref 13–17)
IMMATURE GRANULOCYTES: 0 %
LYMPHOCYTES # BLD: 16 % (ref 24–43)
MCH RBC QN AUTO: 28.4 PG (ref 25.2–33.5)
MCHC RBC AUTO-ENTMCNC: 33.2 G/DL (ref 28.4–34.8)
MCV RBC AUTO: 85.6 FL (ref 82.6–102.9)
MONOCYTES # BLD: 9 % (ref 3–12)
NRBC AUTOMATED: 0 PER 100 WBC
PDW BLD-RTO: 13.2 % (ref 11.8–14.4)
PLATELET # BLD: 222 K/UL (ref 138–453)
PLATELET ESTIMATE: ABNORMAL
PMV BLD AUTO: 10.4 FL (ref 8.1–13.5)
POTASSIUM SERPL-SCNC: 4 MMOL/L (ref 3.7–5.3)
PRO-BNP: 128 PG/ML
RBC # BLD: 4.64 M/UL (ref 4.21–5.77)
RBC # BLD: ABNORMAL 10*6/UL
SEG NEUTROPHILS: 73 % (ref 36–65)
SEGMENTED NEUTROPHILS ABSOLUTE COUNT: 5.97 K/UL (ref 1.5–8.1)
SODIUM BLD-SCNC: 142 MMOL/L (ref 135–144)
TROPONIN INTERP: NORMAL
TROPONIN INTERP: NORMAL
TROPONIN T: NORMAL NG/ML
TROPONIN T: NORMAL NG/ML
TROPONIN, HIGH SENSITIVITY: 8 NG/L (ref 0–22)
TROPONIN, HIGH SENSITIVITY: 9 NG/L (ref 0–22)
WBC # BLD: 8.3 K/UL (ref 3.5–11.3)
WBC # BLD: ABNORMAL 10*3/UL

## 2019-04-21 PROCEDURE — 93005 ELECTROCARDIOGRAM TRACING: CPT

## 2019-04-21 PROCEDURE — 6370000000 HC RX 637 (ALT 250 FOR IP): Performed by: STUDENT IN AN ORGANIZED HEALTH CARE EDUCATION/TRAINING PROGRAM

## 2019-04-21 PROCEDURE — 84484 ASSAY OF TROPONIN QUANT: CPT

## 2019-04-21 PROCEDURE — G0384 LEV 5 HOSP TYPE B ED VISIT: HCPCS

## 2019-04-21 PROCEDURE — 85025 COMPLETE CBC W/AUTO DIFF WBC: CPT

## 2019-04-21 PROCEDURE — 71046 X-RAY EXAM CHEST 2 VIEWS: CPT

## 2019-04-21 PROCEDURE — 80048 BASIC METABOLIC PNL TOTAL CA: CPT

## 2019-04-21 PROCEDURE — 83880 ASSAY OF NATRIURETIC PEPTIDE: CPT

## 2019-04-21 PROCEDURE — 85379 FIBRIN DEGRADATION QUANT: CPT

## 2019-04-21 RX ORDER — ASPIRIN 81 MG/1
324 TABLET, CHEWABLE ORAL ONCE
Status: COMPLETED | OUTPATIENT
Start: 2019-04-21 | End: 2019-04-21

## 2019-04-21 RX ORDER — POTASSIUM CHLORIDE 1.5 G/1.77G
20 POWDER, FOR SOLUTION ORAL 2 TIMES DAILY
Qty: 30 EACH | Refills: 0 | Status: SHIPPED | OUTPATIENT
Start: 2019-04-21 | End: 2019-08-13 | Stop reason: SDUPTHER

## 2019-04-21 RX ORDER — FUROSEMIDE 40 MG/1
40 TABLET ORAL DAILY
Qty: 30 TABLET | Refills: 0 | Status: ON HOLD | OUTPATIENT
Start: 2019-04-21 | End: 2019-10-28

## 2019-04-21 RX ADMIN — ASPIRIN 81 MG 324 MG: 81 TABLET ORAL at 09:56

## 2019-04-21 ASSESSMENT — ENCOUNTER SYMPTOMS
CONSTIPATION: 0
SHORTNESS OF BREATH: 1
ABDOMINAL PAIN: 0
NAUSEA: 0
STRIDOR: 0
VOMITING: 0
COUGH: 0
DIARRHEA: 0
WHEEZING: 0
BACK PAIN: 0

## 2019-04-21 ASSESSMENT — HEART SCORE: ECG: 0

## 2019-04-21 ASSESSMENT — PAIN DESCRIPTION - DESCRIPTORS: DESCRIPTORS: DISCOMFORT

## 2019-04-21 ASSESSMENT — PAIN DESCRIPTION - LOCATION: LOCATION: CHEST

## 2019-04-21 NOTE — ED PROVIDER NOTES
[facility]     Emergency Department     Faculty Attestation    I performed a history and physical examination of the patient and discussed management with the resident. I reviewed the residents note and agree with the documented findings and plan of care. Any areas of disagreement are noted on the chart. I was personally present for the key portions of any procedures. I have documented in the chart those procedures where I was not present during the key portions. I have reviewed the emergency nurses triage note. I agree with the chief complaint, past medical history, past surgical history, allergies, medications, social and family history as documented unless otherwise noted below. For Physician Assistant/ Nurse Practitioner cases/documentation I have personally evaluated this patient and have completed at least one if not all key elements of the E/M (history, physical exam, and MDM). Additional findings are as noted. I have personally seen and evaluated the patient. I find the patient's history and physical exam are consistent with the NP/PA documentation. I agree with the care provided, treatment rendered, disposition and follow-up plan. HPI:history of CHF and COPD, woke with SOB. Mid-sternal chest pressure. No history of PCI, DVT/PE. No wheezing. 1+ edema bilaterally    Exam:  Bilateral LE edema  No hypoxia, tachycardia or tachypnea  Lungs CTAB    MDM/ED course:  Cardiac workup  Patient took home lasix dose  D-dimer - negative  Troponin 9, repeat 8  Discharged home.  Continue lasix, follow up with PCP and cardiologist (cardiology appointment already scheduled for 4/25)          Tiago Lua MD   Attending Emergency  Physician              Chelsey Aguilar MD  04/21/19 2772

## 2019-04-21 NOTE — ED PROVIDER NOTES
[facility]  ED  Emergency Department Encounter  Emergency Medicine Resident     Pt Name: Donovan Díaz  MRN: [id number]  Radhatrongfmagdalena 1960  Date of evaluation: 4/21/19  PCP:  Camilo Nam MD    [address] [place] [address]       Chief Complaint   Patient presents with    Shortness of Breath     Pt  to ED room 34 with c/o chest discomfort, sob and bilateral leg swelling. HISTORY OFPRESENT ILLNESS  (Location/Symptom, Timing/Onset, Context/Setting, Quality, Duration, Modifying Factors,Severity.)      Donovan Díaz is a 60 y.o. male who presents with complaints of shortness of breath. Patient states that the shortness of breath woke him from his sleep this morning. Patient doesn't past medical history of CHF, COPD. Patient does take Lasix, states that he occasionally misses a few doses a week, didn't take his Lasix this morning. Patient notes that the shortness of breath is worse when laying flat Patient is also complaining of some midsternal chest discomfort, he describes it as nonradiating, worse with sitting up, no associated symptoms such as nausea, vomiting, diaphoresis. Patient does note that he has increased bilateral lower extremity swelling. Patient denies any erythema, calf tenderness, history of DVT/PE, recent surgery, recent immobilization, hemoptysis, hormone replacement. Patient denies cough, fever, headache, vision changes, numbness, weakness, tingling. PAST MEDICAL / SURGICAL / SOCIAL / FAMILY HISTORY      has a past medical history of CHF (congestive heart failure) (HCC), Chronic back pain, COPD (chronic obstructive pulmonary disease) ([address] Headache(784.0), Hypertension, Low back pain with sciatica, Osteoarthritis, and Sleep apnea. has a past surgical history that includes hernia repair and Tonsillectomy (1963   exact date unknown).      Social History     Socioeconomic History    Marital status: Single     Spouse name: Not on file    Number of children: Not on file    Years of education: Not on file    Highest education level: Not on file   Occupational History    Not on file   Social Needs    Financial resource strain: Not on file    Food insecurity:     Worry: Not on file     Inability: Not on file    Transportation needs:     Medical: Not on file     Non-medical: Not on file   Tobacco Use    Smoking status: Former Smoker     Types: Cigarettes    Smokeless tobacco: Current User     Types: Chew   Substance and Sexual Activity    Alcohol use: No    Drug use: No    Sexual activity: Not on file   Lifestyle    Physical activity:     Days per week: Not on file     Minutes per session: Not on file    Stress: Not on file   Relationships    Social connections:     Talks on phone: Not on file     Gets together: Not on file     Attends Hindu service: Not on file     Active member of club or organization: Not on file     Attends meetings of clubs or organizations: Not on file     Relationship status: Not on file    Intimate partner violence:     Fear of current or ex partner: Not on file     Emotionally abused: Not on file     Physically abused: Not on file     Forced sexual activity: Not on file   Other Topics Concern    Not on file   Social History Narrative    Not on file       Family History   Problem Relation Age of Onset    Kidney Disease Mother     Diabetes Mother     Heart Disease Mother     Arthritis Mother     Heart Disease Father     Arthritis Sister     Diabetes Sister     Heart Disease Sister         Allergies:  Penicillins; Sulfa antibiotics; Sulfasalazine; Pseudoeph-doxylamine-dm-apap; and Rabbit protein    Home Medications:  Prior to Admission medications    Medication Sig Start Date End Date Taking?  Authorizing Provider   furosemide (LASIX) 40 MG tablet Take 1 tablet by mouth daily 4/21/19  Yes Beryl Fitzgerald DO   potassium chloride (KLOR-CON) 20 MEQ packet Take 20 mEq by mouth 2 times daily 4/21/19  Yes Beryl Fitzgerald DO   gabapentin Positive for shortness of breath. Negative for cough, wheezing and stridor. Cardiovascular: Positive for chest pain and leg swelling. Negative for palpitations. Gastrointestinal: Negative for abdominal pain, constipation, diarrhea, nausea and vomiting. Genitourinary: Negative for dysuria, frequency, hematuria and urgency. Musculoskeletal: Negative for back pain, joint swelling, myalgias and neck pain. Skin: Negative for rash and wound. Neurological: Negative for dizziness, syncope, weakness, light-headedness, numbness and headaches. PHYSICAL EXAM   (up to 7 for level 4, 8 or more forlevel 5)      INITIAL VITALS:   ED Triage Vitals   BP Temp Temp src Pulse Resp SpO2 Height Weight   -- -- -- -- -- -- -- --     Vitals:    04/21/19 0947 04/21/19 0948 04/21/19 1045 04/21/19 1137   BP: (!) 156/84  (!) 141/82    Pulse: 107  100 93   Resp: 20  16    Temp: 98.3 °F (36.8 °C)      SpO2: 98%  98%    Weight:  254 lb (115.2 kg)     Height:  5' 6\" (1.676 m)           Physical Exam   Constitutional: He is oriented to person, place, and time. He appears well-developed and well-nourished. No distress. HENT:   Head: Normocephalic and atraumatic. Nose: Nose normal.   Eyes: Conjunctivae are normal.   Neck: Normal range of motion. Neck supple. No JVD present. No tracheal deviation present. Cardiovascular: Regular rhythm, normal heart sounds and intact distal pulses. Tachycardia present. Exam reveals no gallop and no friction rub. No murmur heard. Pulmonary/Chest: No stridor. Tachypnea noted. No respiratory distress. He has decreased breath sounds. He has no wheezes. He has no rales. Abdominal: Soft. Bowel sounds are normal. He exhibits no distension and no mass. There is no tenderness. There is no rebound and no guarding. Musculoskeletal: Normal range of motion. He exhibits edema (b/l 1+ pitting edema). He exhibits no tenderness or deformity.    Neurological: He is alert and oriented to person, place, and time. No sensory deficit. He exhibits normal muscle tone. Skin: Skin is warm and dry. Capillary refill takes less than 2 seconds. No rash noted. He is not diaphoretic. No erythema. No pallor. Psychiatric: He has a normal mood and affect. His behavior is normal. Thought content normal.   Nursing note and vitals reviewed. DIFFERENTIAL  DIAGNOSIS     PLAN (LABS / IMAGING / EKG):  Orders Placed This Encounter   Procedures    XR CHEST STANDARD (2 VW)    Basic Metabolic Panel    Brain Natriuretic Peptide    CBC Auto Differential    Troponin    D-Dimer, Quantitative    EKG 12 Lead       MEDICATIONS ORDERED:  Orders Placed This Encounter   Medications    aspirin chewable tablet 324 mg    furosemide (LASIX) 40 MG tablet     Sig: Take 1 tablet by mouth daily     Dispense:  30 tablet     Refill:  0    potassium chloride (KLOR-CON) 20 MEQ packet     Sig: Take 20 mEq by mouth 2 times daily     Dispense:  30 each     Refill:  0       DDX: COPD exacerbation, asthma, pneumothorax, anaphylaxis, anxiety, PE , pericardial effusion, CHF, ACS/MI, atelectasis, lower airway obstruction, aspiration      Initial MDM/Plan/ED COURSE:    60 y.o. male who presents with complaints of shortness of breath, chest discomfort, and bilateral lower extremity edema. On exam patient is well-appearing, talking in full sentences. Patient is slightly tachypnea can tachycardic. On physical exam abdomen is soft, nontender, nondistended. Lungs are diminished bilaterally, although with no crackles or wheezing is appreciated. Patient does have bilateral lower extremity 1+ pitting edema, no erythema, calf tenderness or unilateral swelling. Cardiac tachycardic but regular. Will plan for cardiac labs, BNP, aspirin, d-dimer. As patient did take his home dose of Lasix just prior to arrival Will hold off on repeating IV dose pending lab workup and reassessment of patient.      ED Course as of Apr 21 1143   Sun Apr 21, 2019   1024 BNP at patient's baseline. Initial troponin 9. We'll repeat at 2 hours. [LW]   1039 D-dimer 0.29. Rest of his labs unremarkable. Did review [facility] and the patient states cardiology wanted to see him as soon as possible. Per the note from cardiology they recommended patient keep his outpatient follow-up appointment on 4/25 in that his ejection fraction had improved from 40% to 52%. [LW]   1124 Delta troponin negative. [LW]      ED Course User Index  [LW] Lauren Aranda DO     Will plan for discharge home at this time is patient's labs are unremarkable, E and heart score is a 3. Patient struck that he is to continue to take his Lasix as prescribed and not miss any doses. Patient also given strict return precautions including any worsening or new symptoms such as worsening shortness of breath, chest pain, light headedness, dizziness, weakness, fevers. Patient provided with a prescription for Lasix and potassium as he states he is not sure he has any refills. Patient was instructed to call cardiologist's office as soon as possible to get in appointment or to keep his already scheduled appointment. Patient also started to call PCP and schedule a follow-up appointment in next 3-4 days for reevaluation. Patient agreeable for discharge at this time, all questions answered. Patient discharged home in stable condition.     DIAGNOSTIC RESULTS / EMERGENCYDEPARTMENT COURSE / MDM     LABS:  Labs Reviewed   BASIC METABOLIC PANEL - Abnormal; Notable for the following components:       Result Value    Glucose 114 (*)     All other components within normal limits   CBC WITH AUTO DIFFERENTIAL - Abnormal; Notable for the following components:    Hematocrit 39.7 (*)     Seg Neutrophils 73 (*)     Lymphocytes 16 (*)     All other components within normal limits   BRAIN NATRIURETIC PEPTIDE   TROPONIN   TROPONIN   D-DIMER, QUANTITATIVE         :  Xr Chest Standard (2 Vw)    Result Date: 4/21/2019  EXAMINATION: TWO VIEWS OF THE CHEST 4/21/2019 10:15 am COMPARISON: March 10, 2019 HISTORY: ORDERING SYSTEM PROVIDED HISTORY: SOB TECHNOLOGIST PROVIDED HISTORY: SOB FINDINGS: Lungs are clear. Cardiomegaly. No pulmonary edema. Mild thoracic spine degenerative changes. No acute findings. EKG  EKG Interpretation    Interpreted by me    Rhythm: normal sinus   Rate: tachycardia  Axis: normal  Ectopy: none  Conduction: normal  ST Segments: no acute change  T Waves: no acute change  Q Waves: none    Clinical Impression: sinus tachycardia, otherwise no acute changes    All EKG's are interpreted by the Emergency Department Physicianwho either signs or Co-signs this chart in the absence of a cardiologist.      PROCEDURES:  None    CONSULTS:  None    CRITICAL CARE:  Please see attending note    FINAL IMPRESSION      1.  Congestive heart failure, unspecified HF chronicity, unspecified heart failure type [facility])          DISPOSITION / PLAN     DISPOSITION : Decision to discharge       PATIENT REFERRED TO:  [facility]  [address] [address]  [phone]  Go to   If symptoms worsen    Xiomara Caruso MD  [address] [place] [id number]    In 3 days        DISCHARGE MEDICATIONS:  Current Discharge Medication List          Radha Hernandez DO  Emergency Medicine Resident    (Please note that portions of this note were completed with a voice recognition program.Efforts were made to edit the dictations but occasionally words are mis-transcribed.)        Radha Hernandez DO  Resident  04/21/19 [id number]

## 2019-04-21 NOTE — ED NOTES
Bed: 34  Expected date:   Expected time:   Means of arrival:   Comments:     Jolena Gitelman, SARA  04/21/19 [id number]

## 2019-04-22 LAB
EKG ATRIAL RATE: 105 BPM
EKG P AXIS: 55 DEGREES
EKG P-R INTERVAL: 152 MS
EKG Q-T INTERVAL: 342 MS
EKG QRS DURATION: 94 MS
EKG QTC CALCULATION (BAZETT): 452 MS
EKG R AXIS: 22 DEGREES
EKG T AXIS: 36 DEGREES
EKG VENTRICULAR RATE: 105 BPM

## 2019-08-13 ENCOUNTER — HOSPITAL ENCOUNTER (OUTPATIENT)
Age: 59
Setting detail: SPECIMEN
Discharge: HOME | End: 2019-08-13

## 2019-08-13 ENCOUNTER — OFFICE VISIT (OUTPATIENT)
Dept: FAMILY MEDICINE CLINIC | Age: 59
End: 2019-08-13

## 2019-08-13 VITALS
SYSTOLIC BLOOD PRESSURE: 148 MMHG | WEIGHT: 257 LBS | OXYGEN SATURATION: 98 % | BODY MASS INDEX: 41.3 KG/M2 | DIASTOLIC BLOOD PRESSURE: 86 MMHG | HEIGHT: 66 IN | HEART RATE: 113 BPM

## 2019-08-13 DIAGNOSIS — M54.50 CHRONIC MIDLINE LOW BACK PAIN WITHOUT SCIATICA: ICD-10-CM

## 2019-08-13 DIAGNOSIS — Z13.1 ENCOUNTER FOR SCREENING FOR DIABETES MELLITUS: ICD-10-CM

## 2019-08-13 DIAGNOSIS — Z13.220 SCREENING FOR HYPERLIPIDEMIA: ICD-10-CM

## 2019-08-13 DIAGNOSIS — I50.22 CHRONIC SYSTOLIC CONGESTIVE HEART FAILURE (HCC): ICD-10-CM

## 2019-08-13 DIAGNOSIS — I10 HYPERTENSION, UNSPECIFIED TYPE: Primary | ICD-10-CM

## 2019-08-13 DIAGNOSIS — G89.29 CHRONIC MIDLINE LOW BACK PAIN WITHOUT SCIATICA: ICD-10-CM

## 2019-08-13 DIAGNOSIS — J42 CHRONIC BRONCHITIS, UNSPECIFIED CHRONIC BRONCHITIS TYPE (HCC): ICD-10-CM

## 2019-08-13 LAB
CHOLESTEROL/HDL RATIO: 4.2
CHOLESTEROL: 143 MG/DL
HBA1C MFR BLD: 5.9 %
HDLC SERPL-MCNC: 34 MG/DL
LDL CHOLESTEROL: 70 MG/DL (ref 0–130)
TRIGL SERPL-MCNC: 197 MG/DL
VLDLC SERPL CALC-MCNC: ABNORMAL MG/DL (ref 1–30)

## 2019-08-13 PROCEDURE — 83036 HEMOGLOBIN GLYCOSYLATED A1C: CPT | Performed by: STUDENT IN AN ORGANIZED HEALTH CARE EDUCATION/TRAINING PROGRAM

## 2019-08-13 PROCEDURE — 99213 OFFICE O/P EST LOW 20 MIN: CPT | Performed by: STUDENT IN AN ORGANIZED HEALTH CARE EDUCATION/TRAINING PROGRAM

## 2019-08-13 PROCEDURE — 99211 OFF/OP EST MAY X REQ PHY/QHP: CPT | Performed by: FAMILY MEDICINE

## 2019-08-13 RX ORDER — FUROSEMIDE 40 MG/1
40 TABLET ORAL DAILY
Qty: 60 TABLET | Refills: 3 | Status: SHIPPED | OUTPATIENT
Start: 2019-08-13 | End: 2020-06-18 | Stop reason: SDUPTHER

## 2019-08-13 RX ORDER — GABAPENTIN 300 MG/1
300 CAPSULE ORAL NIGHTLY PRN
Qty: 30 CAPSULE | Refills: 0 | Status: SHIPPED | OUTPATIENT
Start: 2019-08-13 | End: 2019-09-11 | Stop reason: SDUPTHER

## 2019-08-13 RX ORDER — SIMVASTATIN 20 MG
20 TABLET ORAL NIGHTLY
Qty: 30 TABLET | Refills: 3 | Status: ON HOLD | OUTPATIENT
Start: 2019-08-13 | End: 2019-10-28

## 2019-08-13 RX ORDER — POTASSIUM CHLORIDE 1.5 G/1.77G
20 POWDER, FOR SOLUTION ORAL 2 TIMES DAILY
Qty: 30 EACH | Refills: 0 | Status: SHIPPED | OUTPATIENT
Start: 2019-08-13 | End: 2019-09-11 | Stop reason: SDUPTHER

## 2019-08-13 RX ORDER — NITROGLYCERIN 0.4 MG/1
0.4 TABLET SUBLINGUAL EVERY 5 MIN PRN
Qty: 25 TABLET | Refills: 0 | Status: SHIPPED | OUTPATIENT
Start: 2019-08-13 | End: 2019-09-11 | Stop reason: SDUPTHER

## 2019-08-13 RX ORDER — RANOLAZINE 500 MG/1
500 TABLET, EXTENDED RELEASE ORAL 2 TIMES DAILY
Qty: 60 TABLET | Refills: 0 | Status: SHIPPED | OUTPATIENT
Start: 2019-08-13 | End: 2019-09-11 | Stop reason: SDUPTHER

## 2019-08-13 RX ORDER — LISINOPRIL 30 MG/1
30 TABLET ORAL DAILY
Qty: 30 TABLET | Refills: 2 | Status: SHIPPED | OUTPATIENT
Start: 2019-08-13 | End: 2020-06-18 | Stop reason: SDUPTHER

## 2019-08-13 RX ORDER — METOPROLOL TARTRATE 50 MG/1
50 TABLET, FILM COATED ORAL 2 TIMES DAILY
Qty: 60 TABLET | Refills: 3 | Status: SHIPPED | OUTPATIENT
Start: 2019-08-13 | End: 2020-06-18 | Stop reason: SDUPTHER

## 2019-08-13 RX ORDER — ALBUTEROL SULFATE 90 UG/1
2 AEROSOL, METERED RESPIRATORY (INHALATION) EVERY 6 HOURS PRN
Qty: 1 INHALER | Refills: 5 | Status: SHIPPED | OUTPATIENT
Start: 2019-08-13 | End: 2020-06-18 | Stop reason: SDUPTHER

## 2019-08-13 ASSESSMENT — ENCOUNTER SYMPTOMS
CHEST TIGHTNESS: 1
SHORTNESS OF BREATH: 1
GASTROINTESTINAL NEGATIVE: 1
BACK PAIN: 1
COUGH: 1

## 2019-08-13 ASSESSMENT — PATIENT HEALTH QUESTIONNAIRE - PHQ9
SUM OF ALL RESPONSES TO PHQ QUESTIONS 1-9: 0
2. FEELING DOWN, DEPRESSED OR HOPELESS: 0
SUM OF ALL RESPONSES TO PHQ9 QUESTIONS 1 & 2: 0
1. LITTLE INTEREST OR PLEASURE IN DOING THINGS: 0
SUM OF ALL RESPONSES TO PHQ QUESTIONS 1-9: 0

## 2019-08-15 NOTE — PROGRESS NOTES
Attending Physician Statement    Wt Readings from Last 3 Encounters:   08/13/19 257 lb (116.6 kg)   04/21/19 254 lb (115.2 kg)   03/10/19 242 lb (109.8 kg)     Temp Readings from Last 3 Encounters:   04/21/19 98.3 °F (36.8 °C)   03/10/19 98.2 °F (36.8 °C)   01/02/19 98.9 °F (37.2 °C) (Oral)     BP Readings from Last 3 Encounters:   08/13/19 (!) 148/86   04/21/19 (!) 141/82   03/10/19 (!) 144/78     Pulse Readings from Last 3 Encounters:   08/13/19 113   04/21/19 93   03/10/19 98       I have discussed the care of Claudia Ham including pertinent history and exam findings,  with the resident. I have seen and examined the patient and the key elements of all parts of the encounter have been performed by me. I agree with the assessment, plan and orders as documented by the resident. (GC Modifier)  Pt seen. He describes an epigastric hernia but not apparent on physical exam. Discussed if reducible then not an emergency so he wishes to wait before making a surgery appointment. 2013 had an adenomatous cecal polyp removed during colonoscopy, no follow up since. Discussed with patient.
Mother     Heart Disease Mother     Arthritis Mother     Heart Disease Father     Arthritis Sister     Diabetes Sister     Heart Disease Sister        Objective:    BP (!) 148/86 (Site: Right Upper Arm, Position: Sitting, Cuff Size: Medium Adult)   Pulse 113   Ht 5' 6\" (1.676 m)   Wt 257 lb (116.6 kg)   SpO2 98%   BMI 41.48 kg/m²    BP Readings from Last 3 Encounters:   08/13/19 (!) 148/86   04/21/19 (!) 141/82   03/10/19 (!) 144/78     Physical Exam   Constitutional: He is oriented to person, place, and time. He appears well-developed and well-nourished. Cardiovascular: Normal rate, regular rhythm and normal heart sounds. 2+ pitting edema    Pulmonary/Chest: Effort normal and breath sounds normal.   Musculoskeletal: Normal range of motion. Neurological: He is alert and oriented to person, place, and time. Psychiatric: He has a normal mood and affect. His behavior is normal.     Lab Results   Component Value Date    WBC 8.3 04/21/2019    HGB 13.2 04/21/2019    HCT 39.7 (L) 04/21/2019     04/21/2019    CHOL 145 11/06/2013    TRIG 138 11/06/2013    HDL 42 11/06/2013    ALT 30 11/06/2013    AST 23 11/06/2013     04/21/2019    K 4.0 04/21/2019     04/21/2019    CREATININE 0.93 04/21/2019    BUN 11 04/21/2019    CO2 25 04/21/2019    TSH 1.69 05/26/2014    PSA 1.06 07/01/2014    INR 1.0 04/04/2013    LABA1C 5.9 08/13/2019     Lab Results   Component Value Date    CALCIUM 8.9 04/21/2019     Lab Results   Component Value Date    LDLCHOLESTEROL 75 11/06/2013       Assessment and Plan:    1. Screening for hyperlipidemia  -Last cholesterol check in 2013-cholesterol was 145. - Lipid Panel; Future    2. Encounter for screening for diabetes mellitus  - POCT glycosylated hemoglobin (Hb A1C)  - A1c today is 5.9.    3. Chronic bronchitis, unspecified chronic bronchitis type (HCC)  - albuterol-ipratropium (COMBIVENT RESPIMAT)  MCG/ACT AERS inhaler;  Inhale 1 puff into the lungs every 6

## 2019-08-30 ENCOUNTER — HOSPITAL ENCOUNTER (EMERGENCY)
Age: 59
Discharge: HOME | End: 2019-08-30
Attending: EMERGENCY MEDICINE

## 2019-08-30 ENCOUNTER — APPOINTMENT (OUTPATIENT)
Dept: GENERAL RADIOLOGY | Age: 59
End: 2019-08-30

## 2019-08-30 VITALS
WEIGHT: 257 LBS | BODY MASS INDEX: 41.3 KG/M2 | RESPIRATION RATE: 18 BRPM | HEIGHT: 66 IN | OXYGEN SATURATION: 100 % | SYSTOLIC BLOOD PRESSURE: 178 MMHG | HEART RATE: 122 BPM | DIASTOLIC BLOOD PRESSURE: 96 MMHG | TEMPERATURE: 99 F

## 2019-08-30 DIAGNOSIS — S42.255A CLOSED NONDISPLACED FRACTURE OF GREATER TUBEROSITY OF LEFT HUMERUS, INITIAL ENCOUNTER: Primary | ICD-10-CM

## 2019-08-30 PROCEDURE — 99283 EMERGENCY DEPT VISIT LOW MDM: CPT

## 2019-08-30 PROCEDURE — 6370000000 HC RX 637 (ALT 250 FOR IP): Performed by: STUDENT IN AN ORGANIZED HEALTH CARE EDUCATION/TRAINING PROGRAM

## 2019-08-30 PROCEDURE — 73030 X-RAY EXAM OF SHOULDER: CPT

## 2019-08-30 RX ORDER — ACETAMINOPHEN 325 MG/1
650 TABLET ORAL ONCE
Status: COMPLETED | OUTPATIENT
Start: 2019-08-30 | End: 2019-08-30

## 2019-08-30 RX ORDER — IBUPROFEN 600 MG/1
600 TABLET ORAL EVERY 6 HOURS PRN
Qty: 120 TABLET | Refills: 0 | Status: SHIPPED | OUTPATIENT
Start: 2019-08-30 | End: 2020-01-08 | Stop reason: ALTCHOICE

## 2019-08-30 RX ORDER — OXYCODONE HYDROCHLORIDE 5 MG/1
5 TABLET ORAL EVERY 6 HOURS PRN
Qty: 10 TABLET | Refills: 0 | Status: SHIPPED | OUTPATIENT
Start: 2019-08-30 | End: 2019-09-02

## 2019-08-30 RX ORDER — IBUPROFEN 800 MG/1
800 TABLET ORAL ONCE
Status: COMPLETED | OUTPATIENT
Start: 2019-08-30 | End: 2019-08-30

## 2019-08-30 RX ORDER — ACETAMINOPHEN 325 MG/1
325 TABLET ORAL EVERY 6 HOURS PRN
Qty: 60 TABLET | Refills: 0 | Status: SHIPPED | OUTPATIENT
Start: 2019-08-30 | End: 2020-01-08 | Stop reason: ALTCHOICE

## 2019-08-30 RX ADMIN — ACETAMINOPHEN 650 MG: 325 TABLET ORAL at 20:29

## 2019-08-30 RX ADMIN — IBUPROFEN 800 MG: 800 TABLET, FILM COATED ORAL at 20:29

## 2019-08-30 ASSESSMENT — PAIN DESCRIPTION - PAIN TYPE: TYPE: ACUTE PAIN

## 2019-08-30 ASSESSMENT — PAIN SCALES - GENERAL: PAINLEVEL_OUTOF10: 10

## 2019-08-30 ASSESSMENT — PAIN DESCRIPTION - LOCATION: LOCATION: SHOULDER

## 2019-08-30 ASSESSMENT — PAIN DESCRIPTION - ORIENTATION: ORIENTATION: LEFT

## 2019-08-30 ASSESSMENT — PAIN DESCRIPTION - FREQUENCY: FREQUENCY: INTERMITTENT

## 2019-08-31 NOTE — ED PROVIDER NOTES
[facility]  ED  Emergency Department Encounter  EmergencyMedicine Resident     Pt Alba Ahmadi  MRN: [id number]  [place] 1960  Date of evaluation: 8/30/19  PCP:  MD Luis Alberto Valenzuela       Chief Complaint   Patient presents with    Shoulder Pain     Pt tripped and fell onto left shoulder. \"Can't move it\"     HISTORY OF PRESENT ILLNESS  (Location/Symptom, Timing/Onset, Context/Setting, Quality, Duration, Modifying Factors, Severity.)      Oskar Corley is a 60 y.o. male who presents with severe left shoulder pain. Patient had a mechanical fall prior to arrival with arm outstretched, follow-up on left shoulder heard a snap. No previous injury to the shoulder. Patient is left-handed. Patient has full sensation in the hand but has severely limited passive and active range of motion. History of CHF, COPD. PAST MEDICAL / SURGICAL / SOCIAL / FAMILY HISTORY      has a past medical history of CHF (congestive heart failure) (HCC), Chronic back pain, COPD (chronic obstructive pulmonary disease) ([address] Headache(784.0), Hypertension, Low back pain with sciatica, Osteoarthritis, and Sleep apnea. has a past surgical history that includes hernia repair and Tonsillectomy (1963   exact date unknown).     Social History     Socioeconomic History    Marital status: Single     Spouse name: Not on file    Number of children: Not on file    Years of education: Not on file    Highest education level: Not on file   Occupational History    Not on file   Social Needs    Financial resource strain: Not on file    Food insecurity:     Worry: Not on file     Inability: Not on file    Transportation needs:     Medical: Not on file     Non-medical: Not on file   Tobacco Use    Smoking status: Former Smoker     Types: Cigarettes    Smokeless tobacco: Current User     Types: Chew   Substance and Sexual Activity    Alcohol use: No    Drug use: No    Sexual activity: Not on file Lifestyle    Physical activity:     Days per week: Not on file     Minutes per session: Not on file    Stress: Not on file   Relationships    Social connections:     Talks on phone: Not on file     Gets together: Not on file     Attends Alevism service: Not on file     Active member of club or organization: Not on file     Attends meetings of clubs or organizations: Not on file     Relationship status: Not on file    Intimate partner violence:     Fear of current or ex partner: Not on file     Emotionally abused: Not on file     Physically abused: Not on file     Forced sexual activity: Not on file   Other Topics Concern    Not on file   Social History Narrative    Not on file       Family History   Problem Relation Age of Onset    Kidney Disease Mother     Diabetes Mother     Heart Disease Mother     Arthritis Mother     Heart Disease Father     Arthritis Sister     Diabetes Sister     Heart Disease Sister        Allergies:  Penicillins; Sulfa antibiotics; Sulfasalazine; Pseudoeph-doxylamine-dm-apap; and Rabbit protein    Home Medications:  Prior to Admission medications    Medication Sig Start Date End Date Taking? Authorizing Provider   acetaminophen (TYLENOL) 325 MG tablet Take 1 tablet by mouth every 6 hours as needed for Pain 8/30/19  Yes Scarlet Duran MD   ibuprofen (IBU) 600 MG tablet Take 1 tablet by mouth every 6 hours as needed for Pain 8/30/19  Yes Scarlet Duran MD   oxyCODONE (ROXICODONE) 5 MG immediate release tablet Take 1 tablet by mouth every 6 hours as needed for Pain for up to 3 days.  8/30/19 9/2/19 Yes Scarlet Duran MD   potassium chloride (KLOR-CON) 20 MEQ packet Take 20 mEq by mouth 2 times daily 8/13/19   Chelsy Lang MD   furosemide (LASIX) 40 MG tablet Take 1 tablet by mouth daily 8/13/19   Chelsy Lang MD   aspirin 81 MG tablet Take 1 tablet by mouth daily 8/13/19   Chelsy Lang MD   nitroGLYCERIN (NITROSTAT) 0.4 MG SL tablet Place 1 tablet under the tongue every 5 Dispense:  60 tablet     Refill:  0    ibuprofen (IBU) 600 MG tablet     Sig: Take 1 tablet by mouth every 6 hours as needed for Pain     Dispense:  120 tablet     Refill:  0    oxyCODONE (ROXICODONE) 5 MG immediate release tablet     Sig: Take 1 tablet by mouth every 6 hours as needed for Pain for up to 3 days. Dispense:  10 tablet     Refill:  0     DIAGNOSTIC RESULTS / EMERGENCY DEPARTMENT COURSE / MDM     LABS:  No results found for this visit on 08/30/19. RADIOLOGY:  Xr Shoulder Left (min 2 Views)    Result Date: 8/30/2019  EXAMINATION: THREE XRAY VIEWS OF THE LEFT SHOULDER 8/30/2019 8:49 pm COMPARISON: None. HISTORY: ORDERING SYSTEM PROVIDED HISTORY: fall, shoulder pain TECHNOLOGIST PROVIDED HISTORY: fall, shoulder pain Reason for Exam: c/o left shoulder pain Mechanism of Injury: pt tripped and fell 26-year-old male with left shoulder pain after a fall FINDINGS: Mild degenerative changes of the left acromioclavicular and glenohumeral joints. Visualized left-sided ribs appear grossly intact. Slight lucency is seen at the lateral aspect of the surgical neck of the proximal left humerus with irregularity along the left greater tuberosity. 1. Findings which are suspicious for a possible nondisplaced fracture of the proximal left humerus surgical neck and left greater tuberosity. Further evaluation with CT recommended. 2. Mild degenerative changes of the left AC and glenohumeral joints. EKG  None    All EKG's are interpreted by the Emergency Department Physician who either signs or Co-signs this chart in the absence of a cardiologist.    EMERGENCY DEPARTMENT COURSE/IMPRESSION:   Follow-up on outstretched hand audible crack severe left shoulder pain. X-ray shows  Analgesia with Tylenol Motrin and ice initially. X-ray shows nondisplaced closed fracture proximal left humerus surgical neck and left greater tuberosity. Patient is neurovascularly intact.   Orthopedics was consulted and asked

## 2019-09-09 ENCOUNTER — OFFICE VISIT (OUTPATIENT)
Dept: ORTHOPEDIC SURGERY | Age: 59
End: 2019-09-09

## 2019-09-09 VITALS — BODY MASS INDEX: 41.78 KG/M2 | WEIGHT: 260 LBS | HEIGHT: 66 IN

## 2019-09-09 DIAGNOSIS — M25.512 LEFT SHOULDER PAIN, UNSPECIFIED CHRONICITY: Primary | ICD-10-CM

## 2019-09-09 PROCEDURE — 99214 OFFICE O/P EST MOD 30 MIN: CPT | Performed by: ORTHOPAEDIC SURGERY

## 2019-09-09 RX ORDER — TRAMADOL HYDROCHLORIDE 50 MG/1
50 TABLET ORAL EVERY 4 HOURS PRN
Qty: 42 TABLET | Refills: 0 | Status: SHIPPED | OUTPATIENT
Start: 2019-09-09 | End: 2019-09-16

## 2019-09-09 RX ORDER — CYCLOBENZAPRINE HCL 5 MG
10 TABLET ORAL NIGHTLY PRN
Qty: 10 TABLET | Refills: 0 | Status: SHIPPED | OUTPATIENT
Start: 2019-09-09 | End: 2019-09-19

## 2019-09-09 RX ORDER — OXYCODONE HYDROCHLORIDE 5 MG/1
5 CAPSULE ORAL EVERY 4 HOURS PRN
COMMUNITY
End: 2019-10-14 | Stop reason: ALTCHOICE

## 2019-09-09 NOTE — PROGRESS NOTES
Past Surgical History:    Past Surgical History:   Procedure Laterality Date   [address]   exact date unknown       Current Medications:   Current Outpatient Medications   Medication Sig Dispense Refill    oxyCODONE 5 MG capsule Take 5 mg by mouth every 4 hours as needed for Pain.  traMADol (ULTRAM) 50 MG tablet Take 1 tablet by mouth every 4 hours as needed for Pain for up to 7 days. Intended supply: 7 days. Take lowest dose possible to manage pain 42 tablet 0    cyclobenzaprine (FLEXERIL) 5 MG tablet Take 2 tablets by mouth nightly as needed for Muscle spasms 10 tablet 0    acetaminophen (TYLENOL) 325 MG tablet Take 1 tablet by mouth every 6 hours as needed for Pain 60 tablet 0    ibuprofen (IBU) 600 MG tablet Take 1 tablet by mouth every 6 hours as needed for Pain 120 tablet 0    potassium chloride (KLOR-CON) 20 MEQ packet Take 20 mEq by mouth 2 times daily 30 each 0    furosemide (LASIX) 40 MG tablet Take 1 tablet by mouth daily 60 tablet 3    aspirin 81 MG tablet Take 1 tablet by mouth daily 30 tablet 3    nitroGLYCERIN (NITROSTAT) 0.4 MG SL tablet Place 1 tablet under the tongue every 5 minutes as needed for Chest pain 25 tablet 0    ranolazine (RANEXA) 500 MG extended release tablet Take 1 tablet by mouth 2 times daily 60 tablet 0    albuterol-ipratropium (COMBIVENT RESPIMAT)  MCG/ACT AERS inhaler Inhale 1 puff into the lungs every 6 hours 1 Inhaler 0    albuterol sulfate HFA (PROAIR HFA) 108 (90 Base) MCG/ACT inhaler Inhale 2 puffs into the lungs every 6 hours as needed for Wheezing 1 Inhaler 5    gabapentin (NEURONTIN) 300 MG capsule Take 1 capsule by mouth nightly as needed (pain) for up to 341 days.  30 capsule 0    simvastatin (ZOCOR) 20 MG tablet Take 1 tablet by mouth nightly 30 tablet 3    lisinopril (PRINIVIL;ZESTRIL) 30 MG tablet Take 1 tablet by mouth daily 30 tablet 2    metoprolol tartrate (LOPRESSOR) 50 MG tablet Take 1 tablet by

## 2019-09-11 DIAGNOSIS — I10 HYPERTENSION, UNSPECIFIED TYPE: ICD-10-CM

## 2019-09-11 DIAGNOSIS — G89.29 CHRONIC MIDLINE LOW BACK PAIN WITHOUT SCIATICA: ICD-10-CM

## 2019-09-11 DIAGNOSIS — M54.50 CHRONIC MIDLINE LOW BACK PAIN WITHOUT SCIATICA: ICD-10-CM

## 2019-09-11 DIAGNOSIS — I50.22 CHRONIC SYSTOLIC CONGESTIVE HEART FAILURE (HCC): ICD-10-CM

## 2019-09-11 RX ORDER — NITROGLYCERIN 0.4 MG/1
0.4 TABLET SUBLINGUAL EVERY 5 MIN PRN
Qty: 25 TABLET | Refills: 0 | Status: SHIPPED | OUTPATIENT
Start: 2019-09-11 | End: 2020-06-18 | Stop reason: SDUPTHER

## 2019-09-11 RX ORDER — RANOLAZINE 500 MG/1
500 TABLET, EXTENDED RELEASE ORAL 2 TIMES DAILY
Qty: 60 TABLET | Refills: 0 | Status: SHIPPED | OUTPATIENT
Start: 2019-09-11 | End: 2020-06-18 | Stop reason: SDUPTHER

## 2019-09-11 RX ORDER — GABAPENTIN 300 MG/1
300 CAPSULE ORAL NIGHTLY PRN
Qty: 30 CAPSULE | Refills: 0 | Status: SHIPPED | OUTPATIENT
Start: 2019-09-11 | End: 2020-06-18 | Stop reason: SDUPTHER

## 2019-09-11 RX ORDER — POTASSIUM CHLORIDE 20 MEQ/1
TABLET, EXTENDED RELEASE ORAL
Qty: 60 TABLET | Refills: 3 | Status: SHIPPED | OUTPATIENT
Start: 2019-09-11 | End: 2020-06-18 | Stop reason: SDUPTHER

## 2019-10-14 ENCOUNTER — OFFICE VISIT (OUTPATIENT)
Dept: ORTHOPEDIC SURGERY | Age: 59
End: 2019-10-14

## 2019-10-14 VITALS — WEIGHT: 260 LBS | HEIGHT: 66 IN | BODY MASS INDEX: 41.78 KG/M2

## 2019-10-14 DIAGNOSIS — M25.512 LEFT SHOULDER PAIN, UNSPECIFIED CHRONICITY: Primary | ICD-10-CM

## 2019-10-14 PROCEDURE — 99213 OFFICE O/P EST LOW 20 MIN: CPT | Performed by: STUDENT IN AN ORGANIZED HEALTH CARE EDUCATION/TRAINING PROGRAM

## 2019-10-14 ASSESSMENT — ENCOUNTER SYMPTOMS
VOMITING: 0
BACK PAIN: 0
NAUSEA: 0
SHORTNESS OF BREATH: 0

## 2019-10-28 ENCOUNTER — HOSPITAL ENCOUNTER (OUTPATIENT)
Age: 59
Setting detail: OBSERVATION
Discharge: HOME | End: 2019-10-29
Attending: EMERGENCY MEDICINE | Admitting: FAMILY MEDICINE

## 2019-10-28 ENCOUNTER — APPOINTMENT (OUTPATIENT)
Dept: GENERAL RADIOLOGY | Age: 59
End: 2019-10-28

## 2019-10-28 DIAGNOSIS — R06.02 SHORTNESS OF BREATH: Primary | ICD-10-CM

## 2019-10-28 DIAGNOSIS — R07.9 CHEST PAIN, UNSPECIFIED TYPE: ICD-10-CM

## 2019-10-28 DIAGNOSIS — R10.13 EPIGASTRIC ABDOMINAL PAIN: ICD-10-CM

## 2019-10-28 PROBLEM — R79.89 ELEVATED LFTS: Status: ACTIVE | Noted: 2019-10-28

## 2019-10-28 PROBLEM — K43.9 VENTRAL HERNIA: Status: ACTIVE | Noted: 2019-10-28

## 2019-10-28 LAB
ABSOLUTE EOS #: 0.23 K/UL (ref 0–0.44)
ABSOLUTE IMMATURE GRANULOCYTE: <0.03 K/UL (ref 0–0.3)
ABSOLUTE LYMPH #: 1.51 K/UL (ref 1.1–3.7)
ABSOLUTE MONO #: 0.79 K/UL (ref 0.1–1.2)
ALBUMIN SERPL-MCNC: 4.1 G/DL (ref 3.5–5.2)
ALBUMIN/GLOBULIN RATIO: 1.3 (ref 1–2.5)
ALP BLD-CCNC: 73 U/L (ref 40–129)
ALT SERPL-CCNC: 79 U/L (ref 5–41)
ANION GAP SERPL CALCULATED.3IONS-SCNC: 13 MMOL/L (ref 9–17)
AST SERPL-CCNC: 61 U/L
BASOPHILS # BLD: 0 % (ref 0–2)
BASOPHILS ABSOLUTE: 0.03 K/UL (ref 0–0.2)
BILIRUB SERPL-MCNC: 0.21 MG/DL (ref 0.3–1.2)
BNP INTERPRETATION: ABNORMAL
BUN BLDV-MCNC: 16 MG/DL (ref 6–20)
BUN/CREAT BLD: ABNORMAL (ref 9–20)
CALCIUM SERPL-MCNC: 9.4 MG/DL (ref 8.6–10.4)
CHLORIDE BLD-SCNC: 103 MMOL/L (ref 98–107)
CO2: 26 MMOL/L (ref 20–31)
CREAT SERPL-MCNC: 0.94 MG/DL (ref 0.7–1.2)
DIFFERENTIAL TYPE: ABNORMAL
EOSINOPHILS RELATIVE PERCENT: 3 % (ref 1–4)
GFR AFRICAN AMERICAN: >60 ML/MIN
GFR NON-AFRICAN AMERICAN: >60 ML/MIN
GFR SERPL CREATININE-BSD FRML MDRD: ABNORMAL ML/MIN/{1.73_M2}
GFR SERPL CREATININE-BSD FRML MDRD: ABNORMAL ML/MIN/{1.73_M2}
GLUCOSE BLD-MCNC: 114 MG/DL (ref 70–99)
HAV IGM SER IA-ACNC: NONREACTIVE
HCT VFR BLD CALC: 37.2 % (ref 40.7–50.3)
HEMOGLOBIN: 12.4 G/DL (ref 13–17)
HEPATITIS B CORE IGM ANTIBODY: NONREACTIVE
HEPATITIS B SURFACE ANTIGEN: NONREACTIVE
HEPATITIS C ANTIBODY: NONREACTIVE
IMMATURE GRANULOCYTES: 0 %
LIPASE: 18 U/L (ref 13–60)
LYMPHOCYTES # BLD: 19 % (ref 24–43)
MCH RBC QN AUTO: 29 PG (ref 25.2–33.5)
MCHC RBC AUTO-ENTMCNC: 33.3 G/DL (ref 28.4–34.8)
MCV RBC AUTO: 86.9 FL (ref 82.6–102.9)
MONOCYTES # BLD: 10 % (ref 3–12)
NRBC AUTOMATED: 0 PER 100 WBC
PDW BLD-RTO: 13 % (ref 11.8–14.4)
PLATELET # BLD: 237 K/UL (ref 138–453)
PLATELET ESTIMATE: ABNORMAL
PMV BLD AUTO: 10.5 FL (ref 8.1–13.5)
POTASSIUM SERPL-SCNC: 4.4 MMOL/L (ref 3.7–5.3)
PRO-BNP: 345 PG/ML
RBC # BLD: 4.28 M/UL (ref 4.21–5.77)
RBC # BLD: ABNORMAL 10*6/UL
SEG NEUTROPHILS: 68 % (ref 36–65)
SEGMENTED NEUTROPHILS ABSOLUTE COUNT: 5.42 K/UL (ref 1.5–8.1)
SODIUM BLD-SCNC: 142 MMOL/L (ref 135–144)
TOTAL PROTEIN: 7.2 G/DL (ref 6.4–8.3)
TROPONIN INTERP: NORMAL
TROPONIN INTERP: NORMAL
TROPONIN T: NORMAL NG/ML
TROPONIN T: NORMAL NG/ML
TROPONIN, HIGH SENSITIVITY: 10 NG/L (ref 0–22)
TROPONIN, HIGH SENSITIVITY: 10 NG/L (ref 0–22)
WBC # BLD: 8 K/UL (ref 3.5–11.3)
WBC # BLD: ABNORMAL 10*3/UL

## 2019-10-28 PROCEDURE — 96374 THER/PROPH/DIAG INJ IV PUSH: CPT

## 2019-10-28 PROCEDURE — 6370000000 HC RX 637 (ALT 250 FOR IP): Performed by: STUDENT IN AN ORGANIZED HEALTH CARE EDUCATION/TRAINING PROGRAM

## 2019-10-28 PROCEDURE — 71046 X-RAY EXAM CHEST 2 VIEWS: CPT

## 2019-10-28 PROCEDURE — G0378 HOSPITAL OBSERVATION PER HR: HCPCS

## 2019-10-28 PROCEDURE — 2580000003 HC RX 258: Performed by: STUDENT IN AN ORGANIZED HEALTH CARE EDUCATION/TRAINING PROGRAM

## 2019-10-28 PROCEDURE — 93005 ELECTROCARDIOGRAM TRACING: CPT | Performed by: STUDENT IN AN ORGANIZED HEALTH CARE EDUCATION/TRAINING PROGRAM

## 2019-10-28 PROCEDURE — 99285 EMERGENCY DEPT VISIT HI MDM: CPT

## 2019-10-28 PROCEDURE — 83880 ASSAY OF NATRIURETIC PEPTIDE: CPT

## 2019-10-28 PROCEDURE — 83690 ASSAY OF LIPASE: CPT

## 2019-10-28 PROCEDURE — 80074 ACUTE HEPATITIS PANEL: CPT

## 2019-10-28 PROCEDURE — 84484 ASSAY OF TROPONIN QUANT: CPT

## 2019-10-28 PROCEDURE — 96372 THER/PROPH/DIAG INJ SC/IM: CPT

## 2019-10-28 PROCEDURE — 99222 1ST HOSP IP/OBS MODERATE 55: CPT | Performed by: FAMILY MEDICINE

## 2019-10-28 PROCEDURE — 36415 COLL VENOUS BLD VENIPUNCTURE: CPT

## 2019-10-28 PROCEDURE — 2500000003 HC RX 250 WO HCPCS: Performed by: STUDENT IN AN ORGANIZED HEALTH CARE EDUCATION/TRAINING PROGRAM

## 2019-10-28 PROCEDURE — 6360000002 HC RX W HCPCS: Performed by: STUDENT IN AN ORGANIZED HEALTH CARE EDUCATION/TRAINING PROGRAM

## 2019-10-28 PROCEDURE — 85025 COMPLETE CBC W/AUTO DIFF WBC: CPT

## 2019-10-28 PROCEDURE — 80053 COMPREHEN METABOLIC PANEL: CPT

## 2019-10-28 RX ORDER — SODIUM CHLORIDE 0.9 % (FLUSH) 0.9 %
10 SYRINGE (ML) INJECTION EVERY 12 HOURS SCHEDULED
Status: DISCONTINUED | OUTPATIENT
Start: 2019-10-28 | End: 2019-10-29 | Stop reason: HOSPADM

## 2019-10-28 RX ORDER — POTASSIUM CHLORIDE 20 MEQ/1
20 TABLET, EXTENDED RELEASE ORAL
Status: DISCONTINUED | OUTPATIENT
Start: 2019-10-29 | End: 2019-10-29 | Stop reason: HOSPADM

## 2019-10-28 RX ORDER — MAGNESIUM HYDROXIDE/ALUMINUM HYDROXICE/SIMETHICONE 120; 1200; 1200 MG/30ML; MG/30ML; MG/30ML
30 SUSPENSION ORAL ONCE
Status: COMPLETED | OUTPATIENT
Start: 2019-10-28 | End: 2019-10-28

## 2019-10-28 RX ORDER — SPIRONOLACTONE 25 MG/1
25 TABLET ORAL DAILY
Status: DISCONTINUED | OUTPATIENT
Start: 2019-10-28 | End: 2019-10-29 | Stop reason: HOSPADM

## 2019-10-28 RX ORDER — ACETAMINOPHEN 325 MG/1
650 TABLET ORAL EVERY 4 HOURS PRN
Status: DISCONTINUED | OUTPATIENT
Start: 2019-10-28 | End: 2019-10-29 | Stop reason: HOSPADM

## 2019-10-28 RX ORDER — SODIUM CHLORIDE 0.9 % (FLUSH) 0.9 %
10 SYRINGE (ML) INJECTION PRN
Status: DISCONTINUED | OUTPATIENT
Start: 2019-10-28 | End: 2019-10-29 | Stop reason: HOSPADM

## 2019-10-28 RX ORDER — METOPROLOL TARTRATE 50 MG/1
50 TABLET, FILM COATED ORAL 2 TIMES DAILY
Status: DISCONTINUED | OUTPATIENT
Start: 2019-10-28 | End: 2019-10-29 | Stop reason: HOSPADM

## 2019-10-28 RX ORDER — NITROGLYCERIN 0.4 MG/1
0.4 TABLET SUBLINGUAL EVERY 5 MIN PRN
Status: DISCONTINUED | OUTPATIENT
Start: 2019-10-28 | End: 2019-10-29 | Stop reason: HOSPADM

## 2019-10-28 RX ORDER — ASPIRIN 81 MG/1
81 TABLET ORAL DAILY
Status: DISCONTINUED | OUTPATIENT
Start: 2019-10-28 | End: 2019-10-29 | Stop reason: HOSPADM

## 2019-10-28 RX ORDER — RANOLAZINE 500 MG/1
500 TABLET, EXTENDED RELEASE ORAL 2 TIMES DAILY
Status: DISCONTINUED | OUTPATIENT
Start: 2019-10-28 | End: 2019-10-29 | Stop reason: HOSPADM

## 2019-10-28 RX ORDER — SIMVASTATIN 20 MG
20 TABLET ORAL NIGHTLY
Status: DISCONTINUED | OUTPATIENT
Start: 2019-10-28 | End: 2019-10-29 | Stop reason: HOSPADM

## 2019-10-28 RX ORDER — ALBUTEROL SULFATE 90 UG/1
2 AEROSOL, METERED RESPIRATORY (INHALATION) EVERY 6 HOURS PRN
Status: DISCONTINUED | OUTPATIENT
Start: 2019-10-28 | End: 2019-10-29 | Stop reason: HOSPADM

## 2019-10-28 RX ORDER — FUROSEMIDE 40 MG/1
40 TABLET ORAL DAILY
Status: DISCONTINUED | OUTPATIENT
Start: 2019-10-28 | End: 2019-10-29 | Stop reason: HOSPADM

## 2019-10-28 RX ORDER — GABAPENTIN 300 MG/1
300 CAPSULE ORAL NIGHTLY PRN
Status: DISCONTINUED | OUTPATIENT
Start: 2019-10-28 | End: 2019-10-29 | Stop reason: HOSPADM

## 2019-10-28 RX ADMIN — Medication 81 MG: at 14:27

## 2019-10-28 RX ADMIN — SIMVASTATIN 20 MG: 20 TABLET, FILM COATED ORAL at 21:16

## 2019-10-28 RX ADMIN — LISINOPRIL 30 MG: 20 TABLET ORAL at 14:26

## 2019-10-28 RX ADMIN — METOPROLOL TARTRATE 50 MG: 50 TABLET, FILM COATED ORAL at 14:27

## 2019-10-28 RX ADMIN — METOPROLOL TARTRATE 50 MG: 50 TABLET, FILM COATED ORAL at 21:16

## 2019-10-28 RX ADMIN — FAMOTIDINE 20 MG: 10 INJECTION, SOLUTION INTRAVENOUS at 09:10

## 2019-10-28 RX ADMIN — RANOLAZINE 500 MG: 500 TABLET, FILM COATED, EXTENDED RELEASE ORAL at 14:26

## 2019-10-28 RX ADMIN — Medication 10 ML: at 21:17

## 2019-10-28 RX ADMIN — SPIRONOLACTONE 25 MG: 25 TABLET ORAL at 21:16

## 2019-10-28 RX ADMIN — RANOLAZINE 500 MG: 500 TABLET, FILM COATED, EXTENDED RELEASE ORAL at 21:16

## 2019-10-28 RX ADMIN — FUROSEMIDE 40 MG: 40 TABLET ORAL at 14:27

## 2019-10-28 RX ADMIN — ENOXAPARIN SODIUM 40 MG: 40 INJECTION SUBCUTANEOUS at 14:27

## 2019-10-28 RX ADMIN — ALUMINUM HYDROXIDE, MAGNESIUM HYDROXIDE, AND SIMETHICONE 30 ML: 200; 200; 20 SUSPENSION ORAL at 09:09

## 2019-10-28 SDOH — HEALTH STABILITY: MENTAL HEALTH: HOW MANY STANDARD DRINKS CONTAINING ALCOHOL DO YOU HAVE ON A TYPICAL DAY?: 1 OR 2

## 2019-10-28 SDOH — HEALTH STABILITY: MENTAL HEALTH: HOW OFTEN DO YOU HAVE A DRINK CONTAINING ALCOHOL?: 2-3 TIMES A WEEK

## 2019-10-28 ASSESSMENT — ENCOUNTER SYMPTOMS
DIARRHEA: 0
BACK PAIN: 0
SORE THROAT: 0
ABDOMINAL PAIN: 1
CONSTIPATION: 0
NAUSEA: 0
COUGH: 1
SHORTNESS OF BREATH: 1
VOMITING: 0

## 2019-10-28 ASSESSMENT — PAIN DESCRIPTION - ORIENTATION: ORIENTATION: MID

## 2019-10-28 ASSESSMENT — PAIN SCALES - GENERAL
PAINLEVEL_OUTOF10: 4
PAINLEVEL_OUTOF10: 0

## 2019-10-28 ASSESSMENT — PAIN DESCRIPTION - LOCATION: LOCATION: CHEST

## 2019-10-28 ASSESSMENT — PAIN DESCRIPTION - PAIN TYPE: TYPE: ACUTE PAIN

## 2019-10-28 ASSESSMENT — PAIN DESCRIPTION - DESCRIPTORS: DESCRIPTORS: DISCOMFORT

## 2019-10-29 ENCOUNTER — APPOINTMENT (OUTPATIENT)
Dept: CT IMAGING | Age: 59
End: 2019-10-29

## 2019-10-29 VITALS
OXYGEN SATURATION: 100 % | DIASTOLIC BLOOD PRESSURE: 71 MMHG | HEART RATE: 76 BPM | RESPIRATION RATE: 17 BRPM | WEIGHT: 259.1 LBS | TEMPERATURE: 97 F | BODY MASS INDEX: 40.67 KG/M2 | HEIGHT: 67 IN | SYSTOLIC BLOOD PRESSURE: 104 MMHG

## 2019-10-29 LAB
ABSOLUTE EOS #: 0.28 K/UL (ref 0–0.44)
ABSOLUTE IMMATURE GRANULOCYTE: 0.03 K/UL (ref 0–0.3)
ABSOLUTE LYMPH #: 1.68 K/UL (ref 1.1–3.7)
ABSOLUTE MONO #: 0.93 K/UL (ref 0.1–1.2)
ALBUMIN SERPL-MCNC: 3.7 G/DL (ref 3.5–5.2)
ALBUMIN/GLOBULIN RATIO: 1.3 (ref 1–2.5)
ALP BLD-CCNC: 60 U/L (ref 40–129)
ALT SERPL-CCNC: 53 U/L (ref 5–41)
ANION GAP SERPL CALCULATED.3IONS-SCNC: 16 MMOL/L (ref 9–17)
AST SERPL-CCNC: 26 U/L
BASOPHILS # BLD: 0 % (ref 0–2)
BASOPHILS ABSOLUTE: 0.03 K/UL (ref 0–0.2)
BILIRUB SERPL-MCNC: 0.36 MG/DL (ref 0.3–1.2)
BUN BLDV-MCNC: 19 MG/DL (ref 6–20)
BUN/CREAT BLD: ABNORMAL (ref 9–20)
CALCIUM SERPL-MCNC: 8.8 MG/DL (ref 8.6–10.4)
CHLORIDE BLD-SCNC: 100 MMOL/L (ref 98–107)
CO2: 21 MMOL/L (ref 20–31)
CREAT SERPL-MCNC: 0.93 MG/DL (ref 0.7–1.2)
DIFFERENTIAL TYPE: ABNORMAL
EOSINOPHILS RELATIVE PERCENT: 3 % (ref 1–4)
GFR AFRICAN AMERICAN: >60 ML/MIN
GFR NON-AFRICAN AMERICAN: >60 ML/MIN
GFR SERPL CREATININE-BSD FRML MDRD: ABNORMAL ML/MIN/{1.73_M2}
GFR SERPL CREATININE-BSD FRML MDRD: ABNORMAL ML/MIN/{1.73_M2}
GLUCOSE BLD-MCNC: 109 MG/DL (ref 70–99)
HCT VFR BLD CALC: 36.9 % (ref 40.7–50.3)
HEMOGLOBIN: 11.6 G/DL (ref 13–17)
IMMATURE GRANULOCYTES: 0 %
LYMPHOCYTES # BLD: 19 % (ref 24–43)
MCH RBC QN AUTO: 28.8 PG (ref 25.2–33.5)
MCHC RBC AUTO-ENTMCNC: 31.4 G/DL (ref 28.4–34.8)
MCV RBC AUTO: 91.6 FL (ref 82.6–102.9)
MONOCYTES # BLD: 11 % (ref 3–12)
NRBC AUTOMATED: 0 PER 100 WBC
PDW BLD-RTO: 13 % (ref 11.8–14.4)
PLATELET # BLD: 220 K/UL (ref 138–453)
PLATELET ESTIMATE: ABNORMAL
PMV BLD AUTO: 10.8 FL (ref 8.1–13.5)
POTASSIUM SERPL-SCNC: 4.2 MMOL/L (ref 3.7–5.3)
RBC # BLD: 4.03 M/UL (ref 4.21–5.77)
RBC # BLD: ABNORMAL 10*6/UL
SEG NEUTROPHILS: 67 % (ref 36–65)
SEGMENTED NEUTROPHILS ABSOLUTE COUNT: 5.93 K/UL (ref 1.5–8.1)
SODIUM BLD-SCNC: 137 MMOL/L (ref 135–144)
TOTAL PROTEIN: 6.5 G/DL (ref 6.4–8.3)
TROPONIN INTERP: NORMAL
TROPONIN T: NORMAL NG/ML
TROPONIN, HIGH SENSITIVITY: 10 NG/L (ref 0–22)
WBC # BLD: 8.9 K/UL (ref 3.5–11.3)
WBC # BLD: ABNORMAL 10*3/UL

## 2019-10-29 PROCEDURE — 80053 COMPREHEN METABOLIC PANEL: CPT

## 2019-10-29 PROCEDURE — 99238 HOSP IP/OBS DSCHRG MGMT 30/<: CPT | Performed by: FAMILY MEDICINE

## 2019-10-29 PROCEDURE — 84484 ASSAY OF TROPONIN QUANT: CPT

## 2019-10-29 PROCEDURE — 85025 COMPLETE CBC W/AUTO DIFF WBC: CPT

## 2019-10-29 PROCEDURE — G0378 HOSPITAL OBSERVATION PER HR: HCPCS

## 2019-10-29 PROCEDURE — 6370000000 HC RX 637 (ALT 250 FOR IP): Performed by: STUDENT IN AN ORGANIZED HEALTH CARE EDUCATION/TRAINING PROGRAM

## 2019-10-29 PROCEDURE — 74176 CT ABD & PELVIS W/O CONTRAST: CPT

## 2019-10-29 PROCEDURE — 36415 COLL VENOUS BLD VENIPUNCTURE: CPT

## 2019-10-29 RX ADMIN — FUROSEMIDE 40 MG: 40 TABLET ORAL at 12:46

## 2019-10-29 RX ADMIN — POTASSIUM CHLORIDE 20 MEQ: 1500 TABLET, EXTENDED RELEASE ORAL at 12:45

## 2019-10-29 RX ADMIN — LISINOPRIL 30 MG: 20 TABLET ORAL at 12:44

## 2019-10-29 RX ADMIN — METOPROLOL TARTRATE 50 MG: 50 TABLET, FILM COATED ORAL at 12:45

## 2019-10-29 RX ADMIN — RANOLAZINE 500 MG: 500 TABLET, FILM COATED, EXTENDED RELEASE ORAL at 12:46

## 2019-10-29 RX ADMIN — Medication 81 MG: at 12:45

## 2019-10-30 LAB
EKG ATRIAL RATE: 74 BPM
EKG ATRIAL RATE: 97 BPM
EKG P AXIS: 48 DEGREES
EKG P AXIS: 57 DEGREES
EKG P-R INTERVAL: 150 MS
EKG P-R INTERVAL: 170 MS
EKG Q-T INTERVAL: 374 MS
EKG Q-T INTERVAL: 444 MS
EKG QRS DURATION: 108 MS
EKG QRS DURATION: 112 MS
EKG QTC CALCULATION (BAZETT): 474 MS
EKG QTC CALCULATION (BAZETT): 492 MS
EKG R AXIS: 19 DEGREES
EKG R AXIS: 30 DEGREES
EKG T AXIS: 31 DEGREES
EKG T AXIS: 31 DEGREES
EKG VENTRICULAR RATE: 74 BPM
EKG VENTRICULAR RATE: 97 BPM

## 2019-10-30 PROCEDURE — 93010 ELECTROCARDIOGRAM REPORT: CPT | Performed by: INTERNAL MEDICINE

## 2020-01-08 ENCOUNTER — APPOINTMENT (OUTPATIENT)
Dept: GENERAL RADIOLOGY | Age: 60
End: 2020-01-08

## 2020-01-08 ENCOUNTER — HOSPITAL ENCOUNTER (EMERGENCY)
Age: 60
Discharge: HOME | End: 2020-01-08
Attending: EMERGENCY MEDICINE

## 2020-01-08 VITALS
OXYGEN SATURATION: 100 % | BODY MASS INDEX: 33.86 KG/M2 | HEIGHT: 72 IN | WEIGHT: 250 LBS | TEMPERATURE: 98.1 F | SYSTOLIC BLOOD PRESSURE: 149 MMHG | RESPIRATION RATE: 16 BRPM | DIASTOLIC BLOOD PRESSURE: 91 MMHG | HEART RATE: 100 BPM

## 2020-01-08 PROCEDURE — 6360000002 HC RX W HCPCS: Performed by: NURSE PRACTITIONER

## 2020-01-08 PROCEDURE — 93005 ELECTROCARDIOGRAM TRACING: CPT | Performed by: NURSE PRACTITIONER

## 2020-01-08 PROCEDURE — 73030 X-RAY EXAM OF SHOULDER: CPT

## 2020-01-08 PROCEDURE — 71046 X-RAY EXAM CHEST 2 VIEWS: CPT

## 2020-01-08 PROCEDURE — 72100 X-RAY EXAM L-S SPINE 2/3 VWS: CPT

## 2020-01-08 PROCEDURE — 96372 THER/PROPH/DIAG INJ SC/IM: CPT

## 2020-01-08 PROCEDURE — 99285 EMERGENCY DEPT VISIT HI MDM: CPT

## 2020-01-08 RX ORDER — KETOROLAC TROMETHAMINE 30 MG/ML
30 INJECTION, SOLUTION INTRAMUSCULAR; INTRAVENOUS ONCE
Status: DISCONTINUED | OUTPATIENT
Start: 2020-01-08 | End: 2020-01-08

## 2020-01-08 RX ORDER — NAPROXEN 500 MG/1
500 TABLET ORAL 2 TIMES DAILY
Qty: 40 TABLET | Refills: 0 | Status: SHIPPED | OUTPATIENT
Start: 2020-01-08 | End: 2020-06-18 | Stop reason: SDUPTHER

## 2020-01-08 RX ORDER — KETOROLAC TROMETHAMINE 30 MG/ML
30 INJECTION, SOLUTION INTRAMUSCULAR; INTRAVENOUS ONCE
Status: COMPLETED | OUTPATIENT
Start: 2020-01-08 | End: 2020-01-08

## 2020-01-08 RX ORDER — ORPHENADRINE CITRATE 30 MG/ML
60 INJECTION INTRAMUSCULAR; INTRAVENOUS ONCE
Status: COMPLETED | OUTPATIENT
Start: 2020-01-08 | End: 2020-01-08

## 2020-01-08 RX ADMIN — ORPHENADRINE CITRATE 60 MG: 30 INJECTION INTRAMUSCULAR; INTRAVENOUS at 11:23

## 2020-01-08 RX ADMIN — KETOROLAC TROMETHAMINE 30 MG: 30 INJECTION, SOLUTION INTRAMUSCULAR at 09:49

## 2020-01-08 ASSESSMENT — ENCOUNTER SYMPTOMS
DIARRHEA: 0
ABDOMINAL DISTENTION: 0
TROUBLE SWALLOWING: 0
ABDOMINAL PAIN: 0
FACIAL SWELLING: 0
BACK PAIN: 1
PHOTOPHOBIA: 0
CHEST TIGHTNESS: 0
VOMITING: 0
BLOOD IN STOOL: 0
SHORTNESS OF BREATH: 0
SINUS PAIN: 0
NAUSEA: 0
CONSTIPATION: 0
EYE PAIN: 0
COUGH: 0

## 2020-01-08 ASSESSMENT — PAIN DESCRIPTION - PAIN TYPE: TYPE: ACUTE PAIN

## 2020-01-08 ASSESSMENT — PAIN DESCRIPTION - LOCATION: LOCATION: BACK

## 2020-01-08 ASSESSMENT — PAIN SCALES - GENERAL
PAINLEVEL_OUTOF10: 10
PAINLEVEL_OUTOF10: 10

## 2020-01-08 ASSESSMENT — PAIN DESCRIPTION - ORIENTATION: ORIENTATION: LOWER

## 2020-01-08 ASSESSMENT — PAIN DESCRIPTION - DESCRIPTORS: DESCRIPTORS: BURNING

## 2020-01-08 NOTE — ED NOTES
Pt medicated for pain as ordered, provided with warm blanket and updated on poc. Call light within reach, will continue to monitor.       Ann Wayne RN  01/08/20 [id number]

## 2020-01-08 NOTE — ED PROVIDER NOTES
History    Marital status: Single     Spouse name: Not on file    Number of children: Not on file    Years of education: Not on file    Highest education level: Not on file   Occupational History    Not on file   Social Needs    Financial resource strain: Not on file    Food insecurity:     Worry: Not on file     Inability: Not on file    Transportation needs:     Medical: Not on file     Non-medical: Not on file   Tobacco Use    Smoking status: Former Smoker     Types: Cigarettes    Smokeless tobacco: Current User     Types: Chew   Substance and Sexual Activity    Alcohol use:  Yes     Alcohol/week: 1.0 standard drinks     Types: 1 Glasses of wine per week     Frequency: 2-3 times a week     Drinks per session: 1 or 2     Binge frequency: Never    Drug use: No    Sexual activity: Not on file   Lifestyle    Physical activity:     Days per week: Not on file     Minutes per session: Not on file    Stress: Not on file   Relationships    Social connections:     Talks on phone: Not on file     Gets together: Not on file     Attends Spiritism service: Not on file     Active member of club or organization: Not on file     Attends meetings of clubs or organizations: Not on file     Relationship status: Not on file    Intimate partner violence:     Fear of current or ex partner: Not on file     Emotionally abused: Not on file     Physically abused: Not on file     Forced sexual activity: Not on file   Other Topics Concern    Not on file   Social History Narrative    Not on file       Family History   Problem Relation Age of Onset    Kidney Disease Mother     Diabetes Mother     Heart Disease Mother     Arthritis Mother     Heart Disease Father     Arthritis Sister     Diabetes Sister     Heart Disease Sister        Allergies:  Penicillins; Sulfa antibiotics; Sulfasalazine; Pseudoeph-doxylamine-dm-apap; and Rabbit protein    Home Medications:  Prior to Admission medications    Medication Sig Start disc space abnormality. Xr Shoulder Left (min 2 Views)    Result Date: 1/8/2020  EXAMINATION: THREE XRAY VIEWS OF THE LEFT SHOULDER 1/8/2020 10:13 am COMPARISON: Left shoulder radiographs performed 10/14/2019. HISTORY: ORDERING SYSTEM PROVIDED HISTORY: pain TECHNOLOGIST PROVIDED HISTORY: pain Reason for Exam: recent fracture 6 months ago FINDINGS: There is no acute osseous abnormality. There is remote greater tuberosity injury. There is degenerative change of the left AC joint. The surrounding soft tissues are unremarkable. Visualized left lung is without acute process. Remote left greater tuberosity fracture without acute abnormality. Degenerative change of the left AC joint. XR LUMBAR SPINE (2-3 VIEWS)   Final Result   Mild degenerative facet hypertrophy without acute vertebral body or disc   space abnormality. XR CHEST STANDARD (2 VW)   Final Result   No acute cardiopulmonary pathology. XR SHOULDER LEFT (MIN 2 VIEWS)   Final Result   Remote left greater tuberosity fracture without acute abnormality. Degenerative change of the left AC joint. LABS:  No results found for this visit on 01/08/20. CONSULTS:  None    PROCEDURES:  None    FINAL IMPRESSION      1. Chronic left shoulder pain    2. Lumbar pain          DISPOSITION / PLAN     DISPOSITION Decision To Discharge    PATIENT REFERRED TO:  Lv Jennings MD  [address]   [address]  [address]  [phone]    Schedule an appointment as soon as possible for a visit       [facility]  [address] [facility] [address] [phone]  Go to   As needed, If symptoms worsen      DISCHARGE MEDICATIONS:  Discharge Medication List as of 1/8/2020 11:29 AM      START taking these medications    Details   naproxen (NAPROSYN) 500 MG tablet Take 1 tablet by mouth 2 times daily for 20 days, Disp-40 tablet, R-0Print             Anthony Quijano, APRN - CNP   Emergency Medicine Physician Assistant    (Please note that portions of this note were completed with a voice recognition program.  Efforts were made to edit thedictations but occasionally words are mis-transcribed.)       PINKY Casey - DENITA  01/08/20 [id number]

## 2020-01-10 LAB
EKG ATRIAL RATE: 103 BPM
EKG P AXIS: 50 DEGREES
EKG P-R INTERVAL: 152 MS
EKG Q-T INTERVAL: 366 MS
EKG QRS DURATION: 98 MS
EKG QTC CALCULATION (BAZETT): 479 MS
EKG R AXIS: 19 DEGREES
EKG T AXIS: 43 DEGREES
EKG VENTRICULAR RATE: 103 BPM

## 2020-06-18 ENCOUNTER — OFFICE VISIT (OUTPATIENT)
Dept: FAMILY MEDICINE CLINIC | Age: 60
End: 2020-06-18
Payer: COMMERCIAL

## 2020-06-18 VITALS
SYSTOLIC BLOOD PRESSURE: 126 MMHG | DIASTOLIC BLOOD PRESSURE: 84 MMHG | HEIGHT: 65 IN | TEMPERATURE: 98.2 F | HEART RATE: 88 BPM | BODY MASS INDEX: 44.15 KG/M2 | WEIGHT: 265 LBS

## 2020-06-18 PROCEDURE — 3023F SPIROM DOC REV: CPT | Performed by: STUDENT IN AN ORGANIZED HEALTH CARE EDUCATION/TRAINING PROGRAM

## 2020-06-18 PROCEDURE — 4004F PT TOBACCO SCREEN RCVD TLK: CPT | Performed by: STUDENT IN AN ORGANIZED HEALTH CARE EDUCATION/TRAINING PROGRAM

## 2020-06-18 PROCEDURE — 3017F COLORECTAL CA SCREEN DOC REV: CPT | Performed by: STUDENT IN AN ORGANIZED HEALTH CARE EDUCATION/TRAINING PROGRAM

## 2020-06-18 PROCEDURE — 99213 OFFICE O/P EST LOW 20 MIN: CPT | Performed by: STUDENT IN AN ORGANIZED HEALTH CARE EDUCATION/TRAINING PROGRAM

## 2020-06-18 PROCEDURE — G8427 DOCREV CUR MEDS BY ELIG CLIN: HCPCS | Performed by: STUDENT IN AN ORGANIZED HEALTH CARE EDUCATION/TRAINING PROGRAM

## 2020-06-18 PROCEDURE — G8417 CALC BMI ABV UP PARAM F/U: HCPCS | Performed by: STUDENT IN AN ORGANIZED HEALTH CARE EDUCATION/TRAINING PROGRAM

## 2020-06-18 PROCEDURE — G8926 SPIRO NO PERF OR DOC: HCPCS | Performed by: STUDENT IN AN ORGANIZED HEALTH CARE EDUCATION/TRAINING PROGRAM

## 2020-06-18 RX ORDER — FUROSEMIDE 40 MG/1
40 TABLET ORAL DAILY
Qty: 60 TABLET | Refills: 3 | Status: SHIPPED | OUTPATIENT
Start: 2020-06-18 | End: 2020-12-08 | Stop reason: SDUPTHER

## 2020-06-18 RX ORDER — POTASSIUM CHLORIDE 20 MEQ/1
TABLET, EXTENDED RELEASE ORAL
Qty: 60 TABLET | Refills: 3 | Status: SHIPPED | OUTPATIENT
Start: 2020-06-18 | End: 2020-12-09

## 2020-06-18 RX ORDER — GABAPENTIN 300 MG/1
300 CAPSULE ORAL NIGHTLY PRN
Qty: 30 CAPSULE | Refills: 0 | Status: SHIPPED | OUTPATIENT
Start: 2020-06-18 | End: 2020-08-14

## 2020-06-18 RX ORDER — LISINOPRIL 30 MG/1
30 TABLET ORAL DAILY
Qty: 30 TABLET | Refills: 2 | Status: SHIPPED | OUTPATIENT
Start: 2020-06-18 | End: 2020-12-01

## 2020-06-18 RX ORDER — NAPROXEN 500 MG/1
500 TABLET ORAL 2 TIMES DAILY
Qty: 40 TABLET | Refills: 0 | Status: SHIPPED | OUTPATIENT
Start: 2020-06-18 | End: 2020-07-07

## 2020-06-18 RX ORDER — GABAPENTIN 300 MG/1
300 CAPSULE ORAL NIGHTLY PRN
Qty: 30 CAPSULE | Refills: 0 | Status: SHIPPED | OUTPATIENT
Start: 2020-06-18 | End: 2021-01-21 | Stop reason: SDUPTHER

## 2020-06-18 RX ORDER — ALBUTEROL SULFATE 90 UG/1
2 AEROSOL, METERED RESPIRATORY (INHALATION) EVERY 6 HOURS PRN
Qty: 1 INHALER | Refills: 5 | Status: SHIPPED | OUTPATIENT
Start: 2020-06-18 | End: 2020-12-01 | Stop reason: SDUPTHER

## 2020-06-18 RX ORDER — RANOLAZINE 500 MG/1
500 TABLET, EXTENDED RELEASE ORAL 2 TIMES DAILY
Qty: 60 TABLET | Refills: 0 | Status: SHIPPED | OUTPATIENT
Start: 2020-06-18 | End: 2020-09-07

## 2020-06-18 RX ORDER — METOPROLOL TARTRATE 50 MG/1
50 TABLET, FILM COATED ORAL 2 TIMES DAILY
Qty: 60 TABLET | Refills: 3 | Status: SHIPPED | OUTPATIENT
Start: 2020-06-18 | End: 2020-12-01

## 2020-06-18 RX ORDER — NITROGLYCERIN 0.4 MG/1
0.4 TABLET SUBLINGUAL EVERY 5 MIN PRN
Qty: 25 TABLET | Refills: 0 | Status: SHIPPED | OUTPATIENT
Start: 2020-06-18 | End: 2020-12-08 | Stop reason: SDUPTHER

## 2020-06-18 RX ORDER — SPIRONOLACTONE 25 MG/1
25 TABLET ORAL DAILY
Qty: 30 TABLET | Refills: 3 | Status: SHIPPED | OUTPATIENT
Start: 2020-06-18 | End: 2020-12-08 | Stop reason: SDUPTHER

## 2020-06-18 ASSESSMENT — ENCOUNTER SYMPTOMS
EYE PAIN: 0
NAUSEA: 0
SORE THROAT: 0
SHORTNESS OF BREATH: 0
WHEEZING: 0
VOMITING: 0
ABDOMINAL PAIN: 0
RHINORRHEA: 0
COUGH: 0
PHOTOPHOBIA: 0

## 2020-06-18 ASSESSMENT — PATIENT HEALTH QUESTIONNAIRE - PHQ9
SUM OF ALL RESPONSES TO PHQ QUESTIONS 1-9: 0
SUM OF ALL RESPONSES TO PHQ QUESTIONS 1-9: 0
1. LITTLE INTEREST OR PLEASURE IN DOING THINGS: 0

## 2020-06-18 NOTE — PROGRESS NOTES
Subjective:    Caitlyn Rolon is a 60 y.o. male with  has a past medical history of CHF (congestive heart failure) ([facility] [address] Chronic back pain, COPD (chronic obstructive pulmonary disease) ([facility] [address] Headache(784.0), Hypertension, Low back pain with sciatica, Osteoarthritis, and Sleep apnea. Family History   Problem Relation Age of Onset    Kidney Disease Mother     Diabetes Mother     Heart Disease Mother     Arthritis Mother     Heart Disease Father     Arthritis Sister     Diabetes Sister     Heart Disease Sister        Presented tot office today for:  Chief Complaint   Patient presents with    Medication Refill    Other     disability papers       HPI   Patient is a 60year old male here for medication refills and disability forms    Work at [employer] for the past 7 years  Working 4 days per week 5-8 hours per shift    PMHx:  CHF- LVEF 15-20%, follows with cardiology  HTN  Low back pain  Bilateral knee pain    Functional limitations- cant stand for long periods of time, catch breath  Blocks he can walk- 3  Hours can sit- 4-5  Hours can stand/walk- 1.5   unschedule breaks needed- yes  Pounds he can lift  - never- 50  - occasionall- 20  - freq- 10    Limited doing repetitive reaching or handling- no  Absent from work: Once a month    Review of Systems   Constitutional: Negative for chills and fever. HENT: Negative for congestion, rhinorrhea and sore throat. Eyes: Negative for photophobia and pain. Respiratory: Negative for cough, shortness of breath and wheezing. Cardiovascular: Negative for chest pain and palpitations. Gastrointestinal: Negative for abdominal pain, nausea and vomiting. Genitourinary: Negative for frequency and urgency. Musculoskeletal: Negative for arthralgias and myalgias. Neurological: Negative for dizziness, light-headedness and headaches.        Objective:    /84 (Site: Right Upper Arm, Position: Sitting, Cuff Size: Medium Adult)   Pulse 88   Temp 98.2 °F (36.8 out  - ranolazine (RANEXA) 500 MG extended release tablet; Take 1 tablet by mouth 2 times daily  Dispense: 60 tablet; Refill: 0    2. Hypertension, unspecified type  - potassium chloride (KLOR-CON M) 20 MEQ extended release tablet; USE 1 PACKET BY MOUTH 2 TIMES DAILY  Dispense: 60 tablet; Refill: 3  - nitroGLYCERIN (NITROSTAT) 0.4 MG SL tablet; Place 1 tablet under the tongue every 5 minutes as needed for Chest pain  Dispense: 25 tablet; Refill: 0  - metoprolol tartrate (LOPRESSOR) 50 MG tablet; Take 1 tablet by mouth 2 times daily  Dispense: 60 tablet; Refill: 3  - lisinopril (PRINIVIL;ZESTRIL) 30 MG tablet; Take 1 tablet by mouth daily  Dispense: 30 tablet; Refill: 2  - furosemide (LASIX) 40 MG tablet; Take 1 tablet by mouth daily  Dispense: 60 tablet; Refill: 3    3. Lumbar pain  - naproxen (NAPROSYN) 500 MG tablet; Take 1 tablet by mouth 2 times daily for 20 days  Dispense: 40 tablet; Refill: 0    4. Chronic left shoulder pain  - naproxen (NAPROSYN) 500 MG tablet; Take 1 tablet by mouth 2 times daily for 20 days  Dispense: 40 tablet; Refill: 0    5. Chronic midline low back pain without sciatica  - gabapentin (NEURONTIN) 300 MG capsule; Take 1 capsule by mouth nightly as needed (pain) for up to 341 days. Dispense: 30 capsule; Refill: 0    6. Chronic bronchitis, unspecified chronic bronchitis type (HCC)  - albuterol-ipratropium (COMBIVENT RESPIMAT)  MCG/ACT AERS inhaler; Inhale 1 puff into the lungs every 6 hours  Dispense: 1 Inhaler; Refill: 0  - albuterol sulfate HFA (PROAIR HFA) 108 (90 Base) MCG/ACT inhaler; Inhale 2 puffs into the lungs every 6 hours as needed for Wheezing  Dispense: 1 Inhaler;  Refill: 5      Requested Prescriptions     Signed Prescriptions Disp Refills    spironolactone (ALDACTONE) 25 MG tablet 30 tablet 3     Sig: Take 1 tablet by mouth daily    ranolazine (RANEXA) 500 MG extended release tablet 60 tablet 0     Sig: Take 1 tablet by mouth 2 times daily    potassium chloride

## 2020-06-20 ENCOUNTER — HOSPITAL ENCOUNTER (OUTPATIENT)
Age: 60
Discharge: HOME | End: 2020-06-20
Payer: COMMERCIAL

## 2020-06-20 LAB
ALBUMIN SERPL-MCNC: 4.2 G/DL (ref 3.5–5.2)
ALBUMIN/GLOBULIN RATIO: ABNORMAL (ref 1–2.5)
ALP BLD-CCNC: 65 U/L (ref 40–129)
ALT SERPL-CCNC: 50 U/L (ref 5–41)
ANION GAP SERPL CALCULATED.3IONS-SCNC: 11 MMOL/L (ref 9–17)
AST SERPL-CCNC: 20 U/L
BILIRUB SERPL-MCNC: 0.31 MG/DL (ref 0.3–1.2)
BUN BLDV-MCNC: 18 MG/DL (ref 8–23)
BUN/CREAT BLD: ABNORMAL (ref 9–20)
CALCIUM SERPL-MCNC: 9.2 MG/DL (ref 8.6–10.4)
CHLORIDE BLD-SCNC: 101 MMOL/L (ref 98–107)
CHOLESTEROL/HDL RATIO: 3.2
CHOLESTEROL: 125 MG/DL
CO2: 27 MMOL/L (ref 20–31)
CREAT SERPL-MCNC: 0.96 MG/DL (ref 0.7–1.2)
GFR AFRICAN AMERICAN: >60 ML/MIN
GFR NON-AFRICAN AMERICAN: >60 ML/MIN
GFR SERPL CREATININE-BSD FRML MDRD: ABNORMAL ML/MIN/{1.73_M2}
GFR SERPL CREATININE-BSD FRML MDRD: ABNORMAL ML/MIN/{1.73_M2}
GLUCOSE BLD-MCNC: 114 MG/DL (ref 70–99)
HCT VFR BLD CALC: 39.1 % (ref 41–53)
HDLC SERPL-MCNC: 39 MG/DL
HEMOGLOBIN: 13.1 G/DL (ref 13.5–17.5)
LDL CHOLESTEROL: 57 MG/DL (ref 0–130)
MAGNESIUM: 2.1 MG/DL (ref 1.6–2.6)
MCH RBC QN AUTO: 28.7 PG (ref 26–34)
MCHC RBC AUTO-ENTMCNC: 33.5 G/DL (ref 31–37)
MCV RBC AUTO: 85.6 FL (ref 80–100)
NRBC AUTOMATED: ABNORMAL PER 100 WBC
PDW BLD-RTO: 13.6 % (ref 11.5–14.9)
PLATELET # BLD: 232 K/UL (ref 150–450)
PMV BLD AUTO: 7.9 FL (ref 6–12)
POTASSIUM SERPL-SCNC: 4.6 MMOL/L (ref 3.7–5.3)
RBC # BLD: 4.57 M/UL (ref 4.5–5.9)
SODIUM BLD-SCNC: 139 MMOL/L (ref 135–144)
TOTAL PROTEIN: 7 G/DL (ref 6.4–8.3)
TRIGL SERPL-MCNC: 145 MG/DL
TSH SERPL DL<=0.05 MIU/L-ACNC: 1.58 MIU/L (ref 0.3–5)
VLDLC SERPL CALC-MCNC: ABNORMAL MG/DL (ref 1–30)
WBC # BLD: 8 K/UL (ref 3.5–11)

## 2020-06-20 PROCEDURE — 83735 ASSAY OF MAGNESIUM: CPT

## 2020-06-20 PROCEDURE — 84443 ASSAY THYROID STIM HORMONE: CPT

## 2020-06-20 PROCEDURE — 36415 COLL VENOUS BLD VENIPUNCTURE: CPT

## 2020-06-20 PROCEDURE — 80061 LIPID PANEL: CPT

## 2020-06-20 PROCEDURE — 85027 COMPLETE CBC AUTOMATED: CPT

## 2020-06-20 PROCEDURE — 80053 COMPREHEN METABOLIC PANEL: CPT

## 2020-07-01 ENCOUNTER — HOSPITAL ENCOUNTER (EMERGENCY)
Age: 60
Discharge: HOME | End: 2020-07-01
Attending: EMERGENCY MEDICINE
Payer: COMMERCIAL

## 2020-07-01 ENCOUNTER — APPOINTMENT (OUTPATIENT)
Dept: GENERAL RADIOLOGY | Age: 60
End: 2020-07-01
Payer: COMMERCIAL

## 2020-07-01 VITALS
OXYGEN SATURATION: 97 % | RESPIRATION RATE: 16 BRPM | TEMPERATURE: 97.8 F | SYSTOLIC BLOOD PRESSURE: 154 MMHG | HEIGHT: 66 IN | DIASTOLIC BLOOD PRESSURE: 66 MMHG | WEIGHT: 264 LBS | HEART RATE: 112 BPM | BODY MASS INDEX: 42.43 KG/M2

## 2020-07-01 PROCEDURE — 73110 X-RAY EXAM OF WRIST: CPT

## 2020-07-01 PROCEDURE — 99283 EMERGENCY DEPT VISIT LOW MDM: CPT

## 2020-07-01 PROCEDURE — 73090 X-RAY EXAM OF FOREARM: CPT

## 2020-07-01 RX ORDER — TRAMADOL HYDROCHLORIDE 50 MG/1
50 TABLET ORAL EVERY 4 HOURS PRN
Qty: 12 TABLET | Refills: 0 | Status: SHIPPED | OUTPATIENT
Start: 2020-07-01 | End: 2020-07-04

## 2020-07-01 ASSESSMENT — PAIN SCALES - GENERAL: PAINLEVEL_OUTOF10: 8

## 2020-07-01 NOTE — ED PROVIDER NOTES
[address] ED  eMERGENCY dEPARTMENT eNCOUnter      Pt Name: Celestina Felipe  MRN: [id number]  [place] 1960  Date of evaluation: 7/1/2020  Provider: Abisai Calvert PA-C    CHIEF COMPLAINT       Chief Complaint   Patient presents with    Hand Injury     Occured three days ago after pulling scrap metal out of the bed of his truck.  Wrist Injury    Swelling     Right hand           HISTORY OF PRESENT ILLNESS  (Location/Symptom, Timing/Onset, Context/Setting, Quality, Duration, Modifying Factors, Severity.)   Celestina Felipe is a 60 y.o. male who presents to the emergency department complaints of wrist injury. Patient states on Monday he was at work moving around [employer] and a refrigerator. States when he was trying to get the refrigerator out of his truck he injured his right wrist.  Patient states he had gradual increase of pain and swelling since the injury. States pain is 8/10 over the ulnar aspect of the wrist.  He does admit to some mild pain in his forearm. Patient denies any pain in his hand or elbow. He did not remember hearing a pop or click. Patient denies any numbness or tingling. He denies any other complaints      Nursing Notes were reviewed. REVIEW OF SYSTEMS    (2-9 systems for level 4, 10 or more for level 5)     Review of Systems   Right wrist pain   Swelling       Except as noted above the remainder of the review of systems was reviewed and negative.        PAST MEDICAL HISTORY     Past Medical History:   Diagnosis Date    [facility]est[facility]) [facility]     Chronic back pain     COPD (chronic obstructive pulmonary disease) ([facility])     Headache(784.0)     Hypertension     Low back pain with sciatica     Osteoarthritis     Sleep apnea     with cpap     None otherwise stated in nurses notes    SURGICAL HISTORY       Past Surgical History:   Procedure Laterality Date   [address]   exact date unknown     None otherwise aphasia  Psych: normal mood and affect, cooperative, normal thought content              DIAGNOSTIC RESULTS     EKG: All EKG's are interpreted by the Emergency Department Physician who either signs or Co-signs this chart in the absence of a cardiologist.        RADIOLOGY:   All plain film, CT, MRI, and formal ultrasound images (except ED bedside ultrasound) are read by the radiologist, see reports below, unless otherwise noted in MDM or here. XR WRIST RIGHT (MIN 3 VIEWS)   Preliminary Result   No acute osseous abnormality in the right wrist or right forearm. XR RADIUS ULNA RIGHT (2 VIEWS)   Preliminary Result   No acute osseous abnormality in the right wrist or right forearm. No results found. LABS:  Labs Reviewed - No data to display    All other labs were within normal range or not returned as of this dictation. EMERGENCY DEPARTMENT COURSE and DIFFERENTIAL DIAGNOSIS/MDM:   Vitals:    Vitals:    07/01/20 1621   BP: (!) 154/66   Pulse: 112   Resp: 16   Temp: 97.8 °F (36.6 °C)   TempSrc: Temporal   SpO2: 97%   Weight: 264 lb (119.7 kg)   Height: 5' 6\" (1.676 m)         Patient instructed to return to the emergency room if symptoms worsen, return, or any other concern right away which is agreed by the patient    ED MEDS:  Orders Placed This Encounter   Medications    traMADol (ULTRAM) 50 MG tablet     Sig: Take 1 tablet by mouth every 4 hours as needed for Pain for up to 3 days. Intended supply: 3 days. Take lowest dose possible to manage pain     Dispense:  12 tablet     Refill:  0         CONSULTS:  None    PROCEDURES:  None      FINAL IMPRESSION      1. Sprain of right wrist, initial encounter          DISPOSITION/PLAN   DISPOSITION Decision To Discharge    PATIENT REFERRED TO:  Mariusz Ledesma MD  [address]   [place] [address]  [phone]          DISCHARGE MEDICATIONS:  Discharge Medication

## 2020-07-02 ENCOUNTER — CARE COORDINATION (OUTPATIENT)
Dept: CARE COORDINATION | Age: 60
End: 2020-07-02

## 2020-07-02 NOTE — CARE COORDINATION
2nd and final outreach for ED follow up / COVID risk monitoring.  Unable to contact, LVM with return contact information

## 2020-07-02 NOTE — CARE COORDINATION
1st outreach for ED follow up / COVID risk monitoring.  Unable to contact, LVM with return contact information

## 2020-07-07 RX ORDER — NAPROXEN 500 MG/1
500 TABLET ORAL 2 TIMES DAILY
Qty: 40 TABLET | Refills: 0 | Status: SHIPPED | OUTPATIENT
Start: 2020-07-07 | End: 2020-07-29

## 2020-07-16 RX ORDER — IPRATROPIUM/ALBUTEROL SULFATE 20-100 MCG
MIST INHALER (GRAM) INHALATION
Qty: 4 G | Refills: 5 | Status: SHIPPED | OUTPATIENT
Start: 2020-07-16 | End: 2020-12-01

## 2020-07-16 NOTE — TELEPHONE ENCOUNTER
E-scribe request for Combivent. Please review and e-scribe if applicable. Next Visit Date:  Visit date not found    Health Maintenance   Topic Date Due    Colon cancer screen colonoscopy  11/21/2018    DTaP/Tdap/Td vaccine (1 - Tdap) 08/13/2020 (Originally 12/18/1979)    Shingles Vaccine (1 of 2) 08/13/2020 (Originally 12/18/2009)    A1C test (Diabetic or Prediabetic)  08/13/2020    Flu vaccine (1) 09/01/2020    Potassium monitoring  06/20/2021    Creatinine monitoring  06/20/2021    Lipid screen  06/20/2025    Pneumococcal 0-64 years Vaccine  Completed    Hepatitis C screen  Completed    HIV screen  Completed    Hepatitis A vaccine  Aged Out    Hepatitis B vaccine  Aged Out    Hib vaccine  Aged Out    Meningococcal (ACWY) vaccine  Aged Out             (applicable per patient's age: Cancer Screenings, Depression Screening, Fall Risk Screening, Immunizations)    Hemoglobin A1C (%)   Date Value   08/13/2019 5.9   04/15/2016 5.4   08/12/2013 5.6     LDL Cholesterol (mg/dL)   Date Value   06/20/2020 57     AST (U/L)   Date Value   06/20/2020 20     ALT (U/L)   Date Value   06/20/2020 50 (H)     BUN (mg/dL)   Date Value   06/20/2020 18      (goal A1C is < 7)   (goal LDL is <100) need 30-50% reduction from baseline     BP Readings from Last 3 Encounters:   07/01/20 (!) 154/66   06/18/20 126/84   01/08/20 (!) 149/91    (goal /80)      All Future Testing planned in [facility]:  Lab Frequency Next Occurrence       Next Visit Date:  No future appointments.          Patient Active Problem List:     HTN (hypertension)     Skin tag     Low back pain with sciatica     Hyperglycemia     Infected sebaceous cyst     Chronic diastolic CHF (congestive heart failure) (HCC)     Chronic bilateral low back pain without sciatica     Chronic pain of right knee     Chest pain, unspecified     Bronchitis     Chronic systolic congestive heart failure (HCC)     Ventral hernia     Epigastric pain     Elevated LFTs Shortness of breath     Rectus diastasis

## 2020-07-29 RX ORDER — NAPROXEN 500 MG/1
500 TABLET ORAL 2 TIMES DAILY
Qty: 40 TABLET | Refills: 0 | Status: SHIPPED | OUTPATIENT
Start: 2020-07-29 | End: 2020-08-20

## 2020-07-29 NOTE — TELEPHONE ENCOUNTER
E-scribe request for Naproxen 500 MG. Please review and e-scribe if applicable. Next Visit Date:  Visit date not found    Health Maintenance   Topic Date Due    Colon cancer screen colonoscopy  11/21/2018    DTaP/Tdap/Td vaccine (1 - Tdap) 08/13/2020 (Originally 12/18/1978)    Shingles Vaccine (1 of 2) 08/13/2020 (Originally 12/18/2009)    A1C test (Diabetic or Prediabetic)  08/13/2020    Flu vaccine (1) 09/01/2020    Potassium monitoring  06/20/2021    Creatinine monitoring  06/20/2021    Lipid screen  06/20/2025    Pneumococcal 0-64 years Vaccine  Completed    Hepatitis C screen  Completed    HIV screen  Completed    Hepatitis A vaccine  Aged Out    Hepatitis B vaccine  Aged Out    Hib vaccine  Aged Out    Meningococcal (ACWY) vaccine  Aged Out             (applicable per patient's age: Cancer Screenings, Depression Screening, Fall Risk Screening, Immunizations)    Hemoglobin A1C (%)   Date Value   08/13/2019 5.9   04/15/2016 5.4   08/12/2013 5.6     LDL Cholesterol (mg/dL)   Date Value   06/20/2020 57     AST (U/L)   Date Value   06/20/2020 20     ALT (U/L)   Date Value   06/20/2020 50 (H)     BUN (mg/dL)   Date Value   06/20/2020 18      (goal A1C is < 7)   (goal LDL is <100) need 30-50% reduction from baseline     BP Readings from Last 3 Encounters:   07/01/20 (!) 154/66   06/18/20 126/84   01/08/20 (!) 149/91    (goal /80)      All Future Testing planned in [facility]:  Lab Frequency Next Occurrence       Next Visit Date:  No future appointments.          Patient Active Problem List:     HTN (hypertension)     Skin tag     Low back pain with sciatica     Hyperglycemia     Infected sebaceous cyst     Chronic diastolic CHF (congestive heart failure) (HCC)     Chronic bilateral low back pain without sciatica     Chronic pain of right knee     Chest pain, unspecified     Bronchitis     Chronic systolic congestive heart failure (HCC)     Ventral hernia     Epigastric pain     Elevated LFTs Shortness of breath     Rectus diastasis

## 2020-08-14 RX ORDER — GABAPENTIN 300 MG/1
CAPSULE ORAL
Qty: 30 CAPSULE | Refills: 1 | Status: SHIPPED | OUTPATIENT
Start: 2020-08-14 | End: 2020-12-02 | Stop reason: SDUPTHER

## 2020-08-14 NOTE — TELEPHONE ENCOUNTER
Last visit: 06/18/2020  Last Med refill: 06/18/2020      Next Visit Date:  No future appointments.     Health Maintenance   Topic Date Due    DTaP/Tdap/Td vaccine (1 - Tdap) 12/18/1978    Shingles Vaccine (1 of 2) 12/18/2009    Colon cancer screen colonoscopy  11/21/2018    A1C test (Diabetic or Prediabetic)  08/13/2020    Flu vaccine (1) 09/01/2020    Potassium monitoring  06/20/2021    Creatinine monitoring  06/20/2021    Lipid screen  06/20/2025    Pneumococcal 0-64 years Vaccine  Completed    Hepatitis C screen  Completed    HIV screen  Completed    Hepatitis A vaccine  Aged Out    Hepatitis B vaccine  Aged Out    Hib vaccine  Aged Out    Meningococcal (ACWY) vaccine  Aged Out       Hemoglobin A1C (%)   Date Value   08/13/2019 5.9   04/15/2016 5.4   08/12/2013 5.6             ( goal A1C is < 7)   No results found for: LABMICR  LDL Cholesterol (mg/dL)   Date Value   06/20/2020 57   08/13/2019 70       (goal LDL is <100)   AST (U/L)   Date Value   06/20/2020 20     ALT (U/L)   Date Value   06/20/2020 50 (H)     BUN (mg/dL)   Date Value   06/20/2020 18     BP Readings from Last 3 Encounters:   07/01/20 (!) 154/66   06/18/20 126/84   01/08/20 (!) 149/91          (goal 120/80)    All Future Testing planned in [facility]  Lab Frequency Next Occurrence               Patient Active Problem List:     HTN (hypertension)     Skin tag     Low back pain with sciatica     Hyperglycemia     Infected sebaceous cyst     Chronic diastolic CHF (congestive heart failure) (HCC)     Chronic bilateral low back pain without sciatica     Chronic pain of right knee     Chest pain, unspecified     Bronchitis     Chronic systolic congestive heart failure (HCC)     Ventral hernia     Epigastric pain     Elevated LFTs     Shortness of breath     Rectus diastasis

## 2020-08-19 NOTE — TELEPHONE ENCOUNTER
Next Visit Date:  No future appointments.     Health Maintenance   Topic Date Due    DTaP/Tdap/Td vaccine (1 - Tdap) 12/18/1978    Shingles Vaccine (1 of 2) 12/18/2009    Colon cancer screen colonoscopy  11/21/2018    A1C test (Diabetic or Prediabetic)  08/13/2020    Flu vaccine (1) 09/01/2020    Potassium monitoring  06/20/2021    Creatinine monitoring  06/20/2021    Lipid screen  06/20/2025    Pneumococcal 0-64 years Vaccine  Completed    Hepatitis C screen  Completed    HIV screen  Completed    Hepatitis A vaccine  Aged Out    Hepatitis B vaccine  Aged Out    Hib vaccine  Aged Out    Meningococcal (ACWY) vaccine  Aged Out       Hemoglobin A1C (%)   Date Value   08/13/2019 5.9   04/15/2016 5.4   08/12/2013 5.6             ( goal A1C is < 7)   No results found for: LABMICR  LDL Cholesterol (mg/dL)   Date Value   06/20/2020 57   08/13/2019 70       (goal LDL is <100)   AST (U/L)   Date Value   06/20/2020 20     ALT (U/L)   Date Value   06/20/2020 50 (H)     BUN (mg/dL)   Date Value   06/20/2020 18     BP Readings from Last 3 Encounters:   07/01/20 (!) 154/66   06/18/20 126/84   01/08/20 (!) 149/91          (goal 120/80)    All Future Testing planned in [facility]  Lab Frequency Next Occurrence               Patient Active Problem List:     HTN (hypertension)     Skin tag     Low back pain with sciatica     Hyperglycemia     Infected sebaceous cyst     Chronic diastolic CHF (congestive heart failure) (HCC)     Chronic bilateral low back pain without sciatica     Chronic pain of right knee     Chest pain, unspecified     Bronchitis     Chronic systolic congestive heart failure (HCC)     Ventral hernia     Epigastric pain     Elevated LFTs     Shortness of breath     Rectus diastasis

## 2020-08-20 RX ORDER — NAPROXEN 500 MG/1
500 TABLET ORAL 2 TIMES DAILY
Qty: 40 TABLET | Refills: 0 | Status: SHIPPED | OUTPATIENT
Start: 2020-08-20 | End: 2021-08-30 | Stop reason: SDUPTHER

## 2020-09-01 NOTE — TELEPHONE ENCOUNTER
Last Visit Date:  6-18-20  Next Visit Date:  9/1/2020    Hemoglobin A1C (%)   Date Value   08/13/2019 5.9   04/15/2016 5.4   08/12/2013 5.6             ( goal A1C is < 7)   No results found for: LABMICR  LDL Cholesterol (mg/dL)   Date Value   06/20/2020 57       (goal LDL is <100)   AST (U/L)   Date Value   06/20/2020 20     ALT (U/L)   Date Value   06/20/2020 50 (H)     BUN (mg/dL)   Date Value   06/20/2020 18     BP Readings from Last 3 Encounters:   07/01/20 (!) 154/66   06/18/20 126/84   01/08/20 (!) 149/91          (goal 120/80)        Patient Active Problem List:     HTN (hypertension)     Skin tag     Low back pain with sciatica     Hyperglycemia     Infected sebaceous cyst     Chronic diastolic CHF (congestive heart failure) (HCC)     Chronic bilateral low back pain without sciatica     Chronic pain of right knee     Chest pain, unspecified     Bronchitis     Chronic systolic congestive heart failure (HCC)     Ventral hernia     Epigastric pain     Elevated LFTs     Shortness of breath     Rectus diastasis      ----Chantel Alvarez

## 2020-09-01 NOTE — TELEPHONE ENCOUNTER
Last Visit Date:  6-18-20  Next Visit Date:  Visit date not found    Hemoglobin A1C (%)   Date Value   08/13/2019 5.9   04/15/2016 5.4   08/12/2013 5.6             ( goal A1C is < 7)   No results found for: LABMICR  LDL Cholesterol (mg/dL)   Date Value   06/20/2020 57       (goal LDL is <100)   AST (U/L)   Date Value   06/20/2020 20     ALT (U/L)   Date Value   06/20/2020 50 (H)     BUN (mg/dL)   Date Value   06/20/2020 18     BP Readings from Last 3 Encounters:   07/01/20 (!) 154/66   06/18/20 126/84   01/08/20 (!) 149/91          (goal 120/80)        Patient Active Problem List:     HTN (hypertension)     Skin tag     Low back pain with sciatica     Hyperglycemia     Infected sebaceous cyst     Chronic diastolic CHF (congestive heart failure) (HCC)     Chronic bilateral low back pain without sciatica     Chronic pain of right knee     Chest pain, unspecified     Bronchitis     Chronic systolic congestive heart failure (HCC)     Ventral hernia     Epigastric pain     Elevated LFTs     Shortness of breath     Rectus diastasis      ----Levorn Marus

## 2020-09-07 RX ORDER — SIMVASTATIN 20 MG
20 TABLET ORAL NIGHTLY
Qty: 30 TABLET | Refills: 3 | Status: SHIPPED | OUTPATIENT
Start: 2020-09-07 | End: 2020-12-01

## 2020-09-07 RX ORDER — RANOLAZINE 500 MG/1
500 TABLET, EXTENDED RELEASE ORAL 2 TIMES DAILY
Qty: 60 TABLET | Refills: 0 | Status: SHIPPED | OUTPATIENT
Start: 2020-09-07 | End: 2020-12-08 | Stop reason: SDUPTHER

## 2020-09-22 ENCOUNTER — NURSE TRIAGE (OUTPATIENT)
Dept: OTHER | Facility: CLINIC | Age: 60
End: 2020-09-22

## 2020-09-22 ENCOUNTER — TELEPHONE (OUTPATIENT)
Dept: FAMILY MEDICINE CLINIC | Age: 60
End: 2020-09-22

## 2020-09-22 NOTE — TELEPHONE ENCOUNTER
Received call from [facility]. Reason for Disposition   MILD difficulty breathing (e.g., minimal/no SOB at rest, SOB with walking, pulse < 100) of new onset or worse than normal    Answer Assessment - Initial Assessment Questions  1. RESPIRATORY STATUS: \"Describe your breathing? \" (e.g., wheezing, shortness of breath, unable to speak, severe coughing)       Pt reports chest tightness, SOB with activity, productive cough    2. ONSET: \"When did this breathing problem begin? \"       Last night    3. PATTERN \"Does the difficult breathing come and go, or has it been constant since it started?\"         4. SEVERITY: \"How bad is your breathing? \" (e.g., mild, moderate, severe)     - MILD: No SOB at rest, mild SOB with walking, speaks normally in sentences, can lay down, no retractions, pulse < 100.     - MODERATE: SOB at rest, SOB with minimal exertion and prefers to sit, cannot lie down flat, speaks in phrases, mild retractions, audible wheezing, pulse 100-120.     - SEVERE: Very SOB at rest, speaks in single words, struggling to breathe, sitting hunched forward, retractions, pulse > 120       Mild     5. RECURRENT SYMPTOM: \"Have you had difficulty breathing before? \" If so, ask: \"When was the last time? \" and \"What happened that time? \"       Yes, pt states that he has COPD and CHF. Pt takes a water pill and uses an inhaler when he feels this way. 6. CARDIAC HISTORY: \"Do you have any history of heart disease? \" (e.g., heart attack, angina, bypass surgery, angioplasty)      High blood pressure, CHF    7. LUNG HISTORY: \"Do you have any history of lung disease? \"  (e.g., pulmonary embolus, asthma, emphysema)      COPD     8. CAUSE: \"What do you think is causing the breathing problem? \"       Related to his COPD and/or CHF    9. OTHER SYMPTOMS: \"Do you have any other symptoms? (e.g., dizziness, runny nose, cough, chest pain, fever)      Productive cough    10. PREGNANCY: \"Is there any chance you are pregnant? \" \"When was your last menstrual period? \"        N/a     11. TRAVEL: \"Have you traveled out of the country in the last month? \" (e.g., travel history, exposures)        Denies    Protocols used: BREATHING DIFFICULTY-ADULT-[place]    Pt is calling with c/o SOB with activity and a productive cough. Symptoms started last night. Pt has a h/o COPD and CHF. Recommended that pt be seen today. If an appointment is not available for today, recommended that pt be seen in an urgent care or ED. Provided pt with care advice. Call soft transferred to [facility] to schedule appointment. Please do not reply to the triage nurse through this encounter. Any subsequent communication should be directly with the patient.

## 2020-09-25 ENCOUNTER — OFFICE VISIT (OUTPATIENT)
Dept: FAMILY MEDICINE CLINIC | Age: 60
End: 2020-09-25
Payer: COMMERCIAL

## 2020-09-25 VITALS
HEART RATE: 71 BPM | DIASTOLIC BLOOD PRESSURE: 85 MMHG | HEIGHT: 66 IN | BODY MASS INDEX: 41.95 KG/M2 | SYSTOLIC BLOOD PRESSURE: 136 MMHG | WEIGHT: 261 LBS | TEMPERATURE: 97.3 F

## 2020-09-25 PROCEDURE — G8926 SPIRO NO PERF OR DOC: HCPCS | Performed by: STUDENT IN AN ORGANIZED HEALTH CARE EDUCATION/TRAINING PROGRAM

## 2020-09-25 PROCEDURE — 3023F SPIROM DOC REV: CPT | Performed by: STUDENT IN AN ORGANIZED HEALTH CARE EDUCATION/TRAINING PROGRAM

## 2020-09-25 PROCEDURE — 3017F COLORECTAL CA SCREEN DOC REV: CPT | Performed by: STUDENT IN AN ORGANIZED HEALTH CARE EDUCATION/TRAINING PROGRAM

## 2020-09-25 PROCEDURE — 4004F PT TOBACCO SCREEN RCVD TLK: CPT | Performed by: STUDENT IN AN ORGANIZED HEALTH CARE EDUCATION/TRAINING PROGRAM

## 2020-09-25 PROCEDURE — G8427 DOCREV CUR MEDS BY ELIG CLIN: HCPCS | Performed by: STUDENT IN AN ORGANIZED HEALTH CARE EDUCATION/TRAINING PROGRAM

## 2020-09-25 PROCEDURE — G8417 CALC BMI ABV UP PARAM F/U: HCPCS | Performed by: STUDENT IN AN ORGANIZED HEALTH CARE EDUCATION/TRAINING PROGRAM

## 2020-09-25 PROCEDURE — 99213 OFFICE O/P EST LOW 20 MIN: CPT | Performed by: STUDENT IN AN ORGANIZED HEALTH CARE EDUCATION/TRAINING PROGRAM

## 2020-09-25 ASSESSMENT — PATIENT HEALTH QUESTIONNAIRE - PHQ9
1. LITTLE INTEREST OR PLEASURE IN DOING THINGS: 0
SUM OF ALL RESPONSES TO PHQ QUESTIONS 1-9: 0
SUM OF ALL RESPONSES TO PHQ QUESTIONS 1-9: 0
2. FEELING DOWN, DEPRESSED OR HOPELESS: 0
SUM OF ALL RESPONSES TO PHQ9 QUESTIONS 1 & 2: 0

## 2020-09-25 ASSESSMENT — ENCOUNTER SYMPTOMS
DIARRHEA: 0
NAUSEA: 0
VOMITING: 0
ABDOMINAL PAIN: 0
CONSTIPATION: 0
SHORTNESS OF BREATH: 0
CHEST TIGHTNESS: 0

## 2020-09-25 NOTE — PROGRESS NOTES
I have reviewed and discussed key elements of [address] [place] [address] with the resident including plan of care and follow up and agree with the care galilea plan.
Gastrointestinal: Negative for abdominal pain, constipation, diarrhea, nausea and vomiting. Genitourinary: Negative for difficulty urinating. Neurological: Negative for headaches. The patient has a   Family History   Problem Relation Age of Onset    Kidney Disease Mother     Diabetes Mother     Heart Disease Mother     Arthritis Mother     Heart Disease Father     Arthritis Sister     Diabetes Sister     Heart Disease Sister        Objective:    /85 (Site: Right Upper Arm, Position: Sitting, Cuff Size: Medium Adult)   Pulse 71   Temp 97.3 °F (36.3 °C) (Temporal)   Ht 5' 6\" (1.676 m)   Wt 261 lb (118.4 kg)   BMI 42.13 kg/m²    BP Readings from Last 3 Encounters:   09/25/20 136/85   07/01/20 (!) 154/66   06/18/20 126/84       Physical Exam  Vitals signs reviewed. Constitutional:       Appearance: He is well-developed. HENT:      Head: Normocephalic and atraumatic. Eyes:      Pupils: Pupils are equal, round, and reactive to light. Cardiovascular:      Rate and Rhythm: Normal rate and regular rhythm. Heart sounds: Normal heart sounds. Pulmonary:      Effort: Pulmonary effort is normal. No respiratory distress. Breath sounds: Normal breath sounds. No wheezing or rales. Abdominal:      General: Bowel sounds are normal.      Palpations: Abdomen is soft. Tenderness: There is no abdominal tenderness. Musculoskeletal:      Right lower leg: Edema (2+ pitting edema) present. Left lower leg: Edema (2+ pitting edema) present. Skin:     General: Skin is warm and dry. Neurological:      Mental Status: He is alert and oriented to person, place, and time.          Lab Results   Component Value Date    WBC 8.0 06/20/2020    HGB 13.1 (L) 06/20/2020    HCT 39.1 (L) 06/20/2020     06/20/2020    CHOL 125 06/20/2020    TRIG 145 06/20/2020    HDL 39 (L) 06/20/2020    ALT 50 (H) 06/20/2020    AST 20 06/20/2020     06/20/2020    K 4.6 06/20/2020

## 2020-09-29 ENCOUNTER — TELEPHONE (OUTPATIENT)
Dept: PAIN MANAGEMENT | Age: 60
End: 2020-09-29

## 2020-10-02 ENCOUNTER — TELEPHONE (OUTPATIENT)
Dept: FAMILY MEDICINE CLINIC | Age: 60
End: 2020-10-02

## 2020-10-22 ENCOUNTER — HOSPITAL ENCOUNTER (OUTPATIENT)
Dept: PAIN MANAGEMENT | Age: 60
Discharge: HOME | End: 2020-10-22
Payer: COMMERCIAL

## 2020-10-22 PROCEDURE — 99203 OFFICE O/P NEW LOW 30 MIN: CPT

## 2020-10-22 PROCEDURE — 99244 OFF/OP CNSLTJ NEW/EST MOD 40: CPT | Performed by: PAIN MEDICINE

## 2020-10-22 ASSESSMENT — ENCOUNTER SYMPTOMS
SORE THROAT: 0
COUGH: 0
VOMITING: 0
ALLERGIC/IMMUNOLOGIC NEGATIVE: 1
ANAL BLEEDING: 0
SHORTNESS OF BREATH: 1
ABDOMINAL DISTENTION: 0
NAUSEA: 0
ABDOMINAL PAIN: 0
BACK PAIN: 1
PHOTOPHOBIA: 0
EYES NEGATIVE: 1
EYE PAIN: 0
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
## 2020-10-27 ENCOUNTER — HOSPITAL ENCOUNTER (OUTPATIENT)
Age: 60
Discharge: HOME | End: 2020-10-29
Payer: COMMERCIAL

## 2020-10-27 ENCOUNTER — HOSPITAL ENCOUNTER (OUTPATIENT)
Dept: GENERAL RADIOLOGY | Age: 60
Discharge: HOME | End: 2020-10-29
Payer: COMMERCIAL

## 2020-10-27 PROCEDURE — 71046 X-RAY EXAM CHEST 2 VIEWS: CPT

## 2020-11-19 ENCOUNTER — OFFICE VISIT (OUTPATIENT)
Dept: FAMILY MEDICINE CLINIC | Age: 60
End: 2020-11-19
Payer: COMMERCIAL

## 2020-11-19 ENCOUNTER — TELEPHONE (OUTPATIENT)
Dept: FAMILY MEDICINE CLINIC | Age: 60
End: 2020-11-19

## 2020-11-19 VITALS
HEART RATE: 102 BPM | SYSTOLIC BLOOD PRESSURE: 164 MMHG | WEIGHT: 268 LBS | TEMPERATURE: 97.9 F | DIASTOLIC BLOOD PRESSURE: 92 MMHG | HEIGHT: 66 IN | BODY MASS INDEX: 43.07 KG/M2

## 2020-11-19 PROCEDURE — 99213 OFFICE O/P EST LOW 20 MIN: CPT | Performed by: STUDENT IN AN ORGANIZED HEALTH CARE EDUCATION/TRAINING PROGRAM

## 2020-11-19 ASSESSMENT — ENCOUNTER SYMPTOMS
VOMITING: 0
BACK PAIN: 1
NAUSEA: 0
ABDOMINAL PAIN: 0
CHEST TIGHTNESS: 0
SHORTNESS OF BREATH: 0
CONSTIPATION: 0
DIARRHEA: 0

## 2020-11-19 NOTE — TELEPHONE ENCOUNTER
Medication Management Service    Patient's Name: Pavan Degroot YOB: 1960            ______________________________________________________________________    Date of patient's visit: 11/19/2020    Subjective/Objective: Pavan Degroot spoke with pharmacy regarding the targeted intervention(s) listed below as identified by [facility]. Targeted Interventions:  [x] Medication adherence (Lisinopril)   [] Patient education  [] Suboptimal drug   [] Medication assessment  [] Cost-effective alternative  [] Needs drug therapy  [] Needs laboratory monitoring  [] Drug interaction  [] Unnecessary therapy  [] Adverse drug reaction  [] Inappropriate administration    Along with medication adherence to Lisinopril, provide inhaler counseling.   ________________________________________________________    Assessment/Plan:  Patient is still taking Lisinopril 30 mg as prescribed and is adherent to medication. Inhaler Technique Education Face-To-Face  Patient is currently using the following inhaler(s)  1. Combivent Respimat   · Frequency of Use: every 6 Hours   · Patient indicated using it as needed. 2. Albuterol PRN   · Frequency of Use: Every 6 hours as needed     Spacer? NO   If yes, was a spacer used during education session? N/A     Recommendations/Interventions: Patient was instructed on the use of Comvibent Respimat amd Albuterol PRN . Patient  does appear to have a good understanding of proper inhaler technique prior to this visit. Patient verbalized understanding. All questions were answered. [facility]  Medication Management Service  [phone]     I have discussed the care of this patient with the student. I agree with the assessment and plan as documented by the student.     Daryl Samaniego, PharmD  PGY-2 Ambulatory Care Resident Pharmacist  Medication Management Service  [phone]  11/20/2020  3:41 PM    =======================================================  CLINICAL PHARMACY CONSULT: MED RECONCILIATION/REVIEW ADDENDUM    For Pharmacy Admin Tracking Only    PHSO: No  Total # of Interventions Recommended: 0  Patient education  Total Interventions Accepted: 0  Time Spent (min): Lucille 161, PharmD  [address]

## 2020-11-19 NOTE — PROGRESS NOTES
Visit Information    Have you changed or started any medications since your last visit including any over-the-counter medicines, vitamins, or herbal medicines? no   Have you stopped taking any of your medications? Is so, why? -  no  Are you having any side effects from any of your medications? - no    Have you seen any other physician or provider since your last visit?  no   Have you had any other diagnostic tests since your last visit?  no   Have you been seen in the emergency room and/or had an admission in a hospital since we last saw you?  no   Have you had your routine dental cleaning in the past 6 months?  no     Do you have an active MyChart account? If no, what is the barrier?   Yes    Patient Care Team:  Audrey Barney MD as PCP - General (Family Medicine)  Danis Kelsey MD as Consulting Physician (Cardiology)  Roz Zabala MD as Consulting Physician  Rhett Aparicio DO as Consulting Physician (General Surgery)    Medical History Review  Past Medical, Family, and Social History reviewed and does not contribute to the patient presenting condition    Health Maintenance   Topic Date Due    DTaP/Tdap/Td vaccine (1 - Tdap) 12/18/1978    Shingles Vaccine (1 of 2) 12/18/2009    Colon cancer screen colonoscopy  11/21/2018    A1C test (Diabetic or Prediabetic)  08/13/2020    Flu vaccine (1) 09/01/2020    Lipid screen  06/20/2021    Potassium monitoring  06/20/2021    Creatinine monitoring  06/20/2021    Pneumococcal 0-64 years Vaccine  Completed    Hepatitis C screen  Completed    HIV screen  Completed    Hepatitis A vaccine  Aged Out    Hepatitis B vaccine  Aged Out    Hib vaccine  Aged Out    Meningococcal (ACWY) vaccine  Aged Out

## 2020-11-19 NOTE — PROGRESS NOTES
Subjective:    Veena Caputo is a 60 y.o. male with  has a past medical history of CHF (congestive heart failure) ([facility] [address] Chronic back pain, COPD (chronic obstructive pulmonary disease) ([address] Headache(784.0), Hypertension, Low back pain with sciatica, Osteoarthritis, and Sleep apnea. Presented to the office today for:  Chief Complaint   Patient presents with    Hypertension     follow up   [facility] Maintenance     vaccines declined       HPI    60year old male here to follow up for HTN. No acute concerns today. HTN - did not take his medications this morning, At home BP is 140-150's/80's. Asymptomatic, denied headache, chest pain. Will recheck BP in a few weeks. Counseled on medication compliance    Back pain - follows with pain management. Referred to PT however has not been able to go to PT yet as he does not know where to go. Will give patient instructions on where to go. Continue naproxen PRN. Hx of sessile polyp on colonoscopy - patient was supposed to have a repeat colonoscopy in 2018 however did not have it done. Will provide referral to GI for colonoscopy. Review of Systems   Constitutional: Negative for chills and fever. Respiratory: Negative for chest tightness and shortness of breath. Cardiovascular: Negative for chest pain and leg swelling. Gastrointestinal: Negative for abdominal pain, constipation, diarrhea, nausea and vomiting. Musculoskeletal: Positive for back pain. Neurological: Negative for headaches.          The patient has a   Family History   Problem Relation Age of Onset    Kidney Disease Mother     Diabetes Mother     Heart Disease Mother     Arthritis Mother     Heart Disease Father     Arthritis Sister     Diabetes Sister     Heart Disease Sister        Objective:    BP (!) 164/92 (Site: Left Upper Arm, Position: Sitting, Cuff Size: Large Adult)   Pulse 102   Temp 97.9 °F (36.6 °C) (Temporal)   Ht 5' 6\" (1.676 m)   Wt 268 lb (121.6 kg)   BMI 43.26 kg/m²    BP Readings from Last 3 Encounters:   11/19/20 (!) 164/92   09/25/20 136/85   07/01/20 (!) 154/66       Physical Exam  Vitals signs reviewed. Constitutional:       Appearance: He is well-developed. He is obese. HENT:      Head: Normocephalic and atraumatic. Eyes:      Pupils: Pupils are equal, round, and reactive to light. Cardiovascular:      Rate and Rhythm: Normal rate and regular rhythm. Heart sounds: Normal heart sounds. Pulmonary:      Effort: Pulmonary effort is normal. No respiratory distress. Breath sounds: Normal breath sounds. No wheezing or rales. Abdominal:      General: Bowel sounds are normal.      Palpations: Abdomen is soft. Tenderness: There is no abdominal tenderness. Skin:     General: Skin is warm and dry. Neurological:      Mental Status: He is alert and oriented to person, place, and time. Lab Results   Component Value Date    WBC 8.0 06/20/2020    HGB 13.1 (L) 06/20/2020    HCT 39.1 (L) 06/20/2020     06/20/2020    CHOL 125 06/20/2020    TRIG 145 06/20/2020    HDL 39 (L) 06/20/2020    ALT 50 (H) 06/20/2020    AST 20 06/20/2020     06/20/2020    K 4.6 06/20/2020     06/20/2020    CREATININE 0.96 06/20/2020    BUN 18 06/20/2020    CO2 27 06/20/2020    TSH 1.58 06/20/2020    PSA 1.06 07/01/2014    INR 1.0 04/04/2013    LABA1C 5.9 08/13/2019     Lab Results   Component Value Date    CALCIUM 9.2 06/20/2020     Lab Results   Component Value Date    LDLCHOLESTEROL 57 06/20/2020       Assessment and Plan:    1. Essential hypertension  - BP elevated 164/92  - did not take his BP meds today  - will recheck at next visit  - counseled on complaince    2. Chronic bilateral low back pain without sciatica  - follows pain management  - continue PT  - continue naproxen    3. Morbid obesity ([address]  - counseled on weight loss  - dietary changes, lifestyle changes    4.  Screen for colon cancer  - previous hx of sessile polyp, missed colonoscopy

## 2020-11-19 NOTE — PROGRESS NOTES
I have reviewed and discussed key elements of [address] [place] [address] with the resident including plan of care and follow up and agree with the care galilea plan.

## 2020-11-19 NOTE — PATIENT INSTRUCTIONS
Thank you for letting us take care of you today. We hope all your questions were addressed. If a question was overlooked or something else comes to mind after you return home, please contact a member of your Care Team listed below. Your Care Team at [place] [address] is Team #5  Hanna Lomeli MD (Faculty)  Pawan Marcos MD (Resident)  Sweta Abdullahi MD (Resident)  Brooklynn Rojas MD (Resident)  CHAZ Baxter. ,DIXIE EDWARDS, DIXIE Read ([facility] office)  [facility] office)  Rigoberto Coffman, [address] (Clinical Practice Manager)  Neftali Sutton [facility] (Clinical Pharmacist)       Office phone number: [phone]    If you need to get in right away due to illness, please be advised we have \"Same Day\" appointments available Monday-Friday. Please call us at [phone] option #3 to schedule your \"Same Day\" appointment.

## 2020-11-20 ENCOUNTER — TELEPHONE (OUTPATIENT)
Dept: GASTROENTEROLOGY | Age: 60
End: 2020-11-20

## 2020-12-01 RX ORDER — LISINOPRIL 30 MG/1
30 TABLET ORAL DAILY
Qty: 30 TABLET | Refills: 2 | Status: SHIPPED | OUTPATIENT
Start: 2020-12-01 | End: 2021-02-17

## 2020-12-01 RX ORDER — IPRATROPIUM/ALBUTEROL SULFATE 20-100 MCG
MIST INHALER (GRAM) INHALATION
Qty: 4 G | Refills: 5 | Status: SHIPPED | OUTPATIENT
Start: 2020-12-01 | End: 2021-08-30 | Stop reason: SDUPTHER

## 2020-12-01 RX ORDER — ALBUTEROL SULFATE 90 UG/1
2 AEROSOL, METERED RESPIRATORY (INHALATION) EVERY 6 HOURS PRN
Qty: 1 INHALER | Refills: 5 | Status: SHIPPED | OUTPATIENT
Start: 2020-12-01 | End: 2021-06-11

## 2020-12-01 RX ORDER — METOPROLOL TARTRATE 50 MG/1
50 TABLET, FILM COATED ORAL 2 TIMES DAILY
Qty: 60 TABLET | Refills: 3 | Status: SHIPPED | OUTPATIENT
Start: 2020-12-01 | End: 2020-12-08 | Stop reason: SDUPTHER

## 2020-12-01 RX ORDER — SIMVASTATIN 20 MG
20 TABLET ORAL NIGHTLY
Qty: 30 TABLET | Refills: 3 | Status: SHIPPED | OUTPATIENT
Start: 2020-12-01 | End: 2020-12-08 | Stop reason: SDUPTHER

## 2020-12-01 NOTE — TELEPHONE ENCOUNTER
Kenney Request for pending medications.     Last Visit Date: 11/19/2020  Next Visit Date:  Future Appointments   Date Time Provider Sondra Hogue   12/10/2020  3:20 PM Francesca [facility] COVID19 PAT SCREENING TANIA Azar   12/14/2020  9:30 AM STV PFT RM 1 [facility]VZ PFT [facility]       Health Maintenance   Topic Date Due    DTaP/Tdap/Td vaccine (1 - Tdap) 12/18/1978    Shingles Vaccine (1 of 2) 12/18/2009    Colon cancer screen colonoscopy  11/21/2018    A1C test (Diabetic or Prediabetic)  08/13/2020    Flu vaccine (1) 09/01/2020    Lipid screen  06/20/2021    Potassium monitoring  06/20/2021    Creatinine monitoring  06/20/2021    Hepatitis C screen  Completed    HIV screen  Completed    Hepatitis A vaccine  Aged Out    Hepatitis B vaccine  Aged Out    Hib vaccine  Aged Out    Meningococcal (ACWY) vaccine  Aged Out    Pneumococcal 0-64 years Vaccine  Aged Out       Hemoglobin A1C (%)   Date Value   08/13/2019 5.9   04/15/2016 5.4   08/12/2013 5.6             ( goal A1C is < 7)   No results found for: LABMICR  LDL Cholesterol (mg/dL)   Date Value   06/20/2020 57       (goal LDL is <100)   AST (U/L)   Date Value   06/20/2020 20     ALT (U/L)   Date Value   06/20/2020 50 (H)     BUN (mg/dL)   Date Value   06/20/2020 18     BP Readings from Last 3 Encounters:   11/19/20 (!) 164/92   09/25/20 136/85   07/01/20 (!) 154/66          (goal 120/80)    All Future Testing planned in [facility]  Lab Frequency Next Occurrence   Full PFT Study With Bronchodilator Once 09/25/2020   PT eval and treat Once 10/22/2020   PT aquatic therapy Once 10/22/2020       Next Visit Date:  Future Appointments   Date Time Provider Sondra Hogue   12/10/2020  3:20 PM SCHEDULE, [facility] COVID19 PAT SCREENING TANIA Azar   12/14/2020  9:30 AM STV PFT RM 1 [facility]VZ PFT [facility]         Patient Active Problem List:     HTN (hypertension)     Skin tag     Low back pain with sciatica     Hyperglycemia     Infected sebaceous cyst Chronic diastolic CHF (congestive heart failure) (HCC)     Chronic bilateral low back pain without sciatica     Chronic pain of right knee     Chest pain, unspecified     Bronchitis     Chronic systolic congestive heart failure (HCC)     Ventral hernia     Epigastric pain     Elevated LFTs     Shortness of breath     Rectus diastasis     Lumbosacral spondylosis without myelopathy

## 2020-12-02 ENCOUNTER — TELEPHONE (OUTPATIENT)
Dept: FAMILY MEDICINE CLINIC | Age: 60
End: 2020-12-02

## 2020-12-02 NOTE — TELEPHONE ENCOUNTER
Patient called - went to [facility] on [address],wants-gabapentin (NEURONTIN) 300 MG capsule RX refilled. Medication on Med List, please review and advise.         Next Visit Date:  Future Appointments   Date Time Provider Sondra Hogue   12/8/2020  4:00 PM Mely Hernandez MD [facility]   12/10/2020  3:20 PM SCHEDULE, [facility] COVID19 PAT SCREENING [facility]CZ PAT [facility]   12/14/2020  9:30 AM STV PFT RM 1 [facility]VZ PFT [facility]       Health Maintenance   Topic Date Due    DTaP/Tdap/Td vaccine (1 - Tdap) 12/18/1978    Shingles Vaccine (1 of 2) 12/18/2009    Colon cancer screen colonoscopy  11/21/2018    A1C test (Diabetic or Prediabetic)  08/13/2020    Flu vaccine (1) 09/01/2020    Lipid screen  06/20/2021    Potassium monitoring  06/20/2021    Creatinine monitoring  06/20/2021    Hepatitis C screen  Completed    HIV screen  Completed    Hepatitis A vaccine  Aged Out    Hepatitis B vaccine  Aged Out    Hib vaccine  Aged Out    Meningococcal (ACWY) vaccine  Aged Out    Pneumococcal 0-64 years Vaccine  Aged Out       Hemoglobin A1C (%)   Date Value   08/13/2019 5.9   04/15/2016 5.4   08/12/2013 5.6             ( goal A1C is < 7)   No results found for: LABMICR  LDL Cholesterol (mg/dL)   Date Value   06/20/2020 57   08/13/2019 70       (goal LDL is <100)   AST (U/L)   Date Value   06/20/2020 20     ALT (U/L)   Date Value   06/20/2020 50 (H)     BUN (mg/dL)   Date Value   06/20/2020 18     BP Readings from Last 3 Encounters:   11/19/20 (!) 164/92   09/25/20 136/85   07/01/20 (!) 154/66          (goal 120/80)    All Future Testing planned in [facility]  Lab Frequency Next Occurrence   Full PFT Study With Bronchodilator Once 09/25/2020   PT eval and treat Once 10/22/2020   PT aquatic therapy Once 10/22/2020               Patient Active Problem List:     HTN (hypertension)     Skin tag     Low back pain with sciatica     Hyperglycemia     Infected sebaceous cyst     Chronic diastolic CHF (congestive heart failure) (HCC)     Chronic bilateral low back pain without sciatica     Chronic pain of right knee     Chest pain, unspecified     Bronchitis     Chronic systolic congestive heart failure (HCC)     Ventral hernia     Epigastric pain     Elevated LFTs     Shortness of breath     Rectus diastasis     Lumbosacral spondylosis without myelopathy

## 2020-12-02 NOTE — TELEPHONE ENCOUNTER
Last visit Oct 2020  Last Med refill: nov 2020  Does patient have enough medication for 72 hours: No     Next Visit Date:  Future Appointments   Date Time Provider Sondra Hogue   12/8/2020  4:00 PM Shamika Kaye MD [address]  [facility]   12/10/2020  3:20 PM SCHEDULE, [facility] COVID19 PAT SCREENING [facility]CZ PAT [facility]   12/14/2020  9:30 AM STV PFT RM 1 STVZ PFT [place] Maintenance   Topic Date Due    DTaP/Tdap/Td vaccine (1 - Tdap) 12/18/1978    Shingles Vaccine (1 of 2) 12/18/2009    Colon cancer screen colonoscopy  11/21/2018    A1C test (Diabetic or Prediabetic)  08/13/2020    Flu vaccine (1) 09/01/2020    Lipid screen  06/20/2021    Potassium monitoring  06/20/2021    Creatinine monitoring  06/20/2021    Hepatitis C screen  Completed    HIV screen  Completed    Hepatitis A vaccine  Aged Out    Hepatitis B vaccine  Aged Out    Hib vaccine  Aged Out    Meningococcal (ACWY) vaccine  Aged Out    Pneumococcal 0-64 years Vaccine  Aged Out       Hemoglobin A1C (%)   Date Value   08/13/2019 5.9   04/15/2016 5.4   08/12/2013 5.6             ( goal A1C is < 7)   No results found for: LABMICR  LDL Cholesterol (mg/dL)   Date Value   06/20/2020 57   08/13/2019 70       (goal LDL is <100)   AST (U/L)   Date Value   06/20/2020 20     ALT (U/L)   Date Value   06/20/2020 50 (H)     BUN (mg/dL)   Date Value   06/20/2020 18     BP Readings from Last 3 Encounters:   11/19/20 (!) 164/92   09/25/20 136/85   07/01/20 (!) 154/66          (goal 120/80)    All Future Testing planned in [facility]  Lab Frequency Next Occurrence   Full PFT Study With Bronchodilator Once 09/25/2020   PT eval and treat Once 10/22/2020   PT aquatic therapy Once 10/22/2020               Patient Active Problem List:     HTN (hypertension)     Skin tag     Low back pain with sciatica     Hyperglycemia     Infected sebaceous cyst     Chronic diastolic CHF (congestive heart failure) (HCC)     Chronic bilateral low back pain without

## 2020-12-03 NOTE — TELEPHONE ENCOUNTER
pain of right knee     Chest pain, unspecified     Bronchitis     Chronic systolic congestive heart failure (HCC)     Ventral hernia     Epigastric pain     Elevated LFTs     Shortness of breath     Rectus diastasis     Lumbosacral spondylosis without myelopathy

## 2020-12-04 ENCOUNTER — TELEPHONE (OUTPATIENT)
Dept: FAMILY MEDICINE CLINIC | Age: 60
End: 2020-12-04

## 2020-12-04 NOTE — TELEPHONE ENCOUNTER
PA request forCombivent Resp      PA processed and submitted to pt insurance, waiting for response in regards to coverage

## 2020-12-08 ENCOUNTER — OFFICE VISIT (OUTPATIENT)
Dept: FAMILY MEDICINE CLINIC | Age: 60
End: 2020-12-08
Payer: COMMERCIAL

## 2020-12-08 VITALS
TEMPERATURE: 97.5 F | HEART RATE: 86 BPM | SYSTOLIC BLOOD PRESSURE: 116 MMHG | WEIGHT: 272 LBS | HEIGHT: 66 IN | BODY MASS INDEX: 43.71 KG/M2 | DIASTOLIC BLOOD PRESSURE: 70 MMHG

## 2020-12-08 LAB — HBA1C MFR BLD: 5.9 %

## 2020-12-08 PROCEDURE — G8427 DOCREV CUR MEDS BY ELIG CLIN: HCPCS | Performed by: STUDENT IN AN ORGANIZED HEALTH CARE EDUCATION/TRAINING PROGRAM

## 2020-12-08 PROCEDURE — 99213 OFFICE O/P EST LOW 20 MIN: CPT | Performed by: STUDENT IN AN ORGANIZED HEALTH CARE EDUCATION/TRAINING PROGRAM

## 2020-12-08 PROCEDURE — G8417 CALC BMI ABV UP PARAM F/U: HCPCS | Performed by: STUDENT IN AN ORGANIZED HEALTH CARE EDUCATION/TRAINING PROGRAM

## 2020-12-08 PROCEDURE — 4004F PT TOBACCO SCREEN RCVD TLK: CPT | Performed by: STUDENT IN AN ORGANIZED HEALTH CARE EDUCATION/TRAINING PROGRAM

## 2020-12-08 PROCEDURE — 90715 TDAP VACCINE 7 YRS/> IM: CPT | Performed by: FAMILY MEDICINE

## 2020-12-08 PROCEDURE — 3017F COLORECTAL CA SCREEN DOC REV: CPT | Performed by: STUDENT IN AN ORGANIZED HEALTH CARE EDUCATION/TRAINING PROGRAM

## 2020-12-08 PROCEDURE — G8484 FLU IMMUNIZE NO ADMIN: HCPCS | Performed by: STUDENT IN AN ORGANIZED HEALTH CARE EDUCATION/TRAINING PROGRAM

## 2020-12-08 PROCEDURE — 99211 OFF/OP EST MAY X REQ PHY/QHP: CPT | Performed by: STUDENT IN AN ORGANIZED HEALTH CARE EDUCATION/TRAINING PROGRAM

## 2020-12-08 PROCEDURE — 83036 HEMOGLOBIN GLYCOSYLATED A1C: CPT | Performed by: STUDENT IN AN ORGANIZED HEALTH CARE EDUCATION/TRAINING PROGRAM

## 2020-12-08 RX ORDER — NITROGLYCERIN 0.4 MG/1
0.4 TABLET SUBLINGUAL EVERY 5 MIN PRN
Qty: 25 TABLET | Refills: 0 | Status: SHIPPED | OUTPATIENT
Start: 2020-12-08 | End: 2021-08-30 | Stop reason: SDUPTHER

## 2020-12-08 RX ORDER — SPIRONOLACTONE 25 MG/1
25 TABLET ORAL DAILY
Qty: 30 TABLET | Refills: 3 | Status: SHIPPED | OUTPATIENT
Start: 2020-12-08 | End: 2021-02-17

## 2020-12-08 RX ORDER — SIMVASTATIN 20 MG
20 TABLET ORAL NIGHTLY
Qty: 30 TABLET | Refills: 3 | Status: SHIPPED | OUTPATIENT
Start: 2020-12-08 | End: 2021-08-30 | Stop reason: SDUPTHER

## 2020-12-08 RX ORDER — FUROSEMIDE 40 MG/1
40 TABLET ORAL DAILY
Qty: 60 TABLET | Refills: 3 | Status: SHIPPED | OUTPATIENT
Start: 2020-12-08 | End: 2021-08-30

## 2020-12-08 RX ORDER — RANOLAZINE 500 MG/1
500 TABLET, EXTENDED RELEASE ORAL 2 TIMES DAILY
Qty: 60 TABLET | Refills: 0 | Status: SHIPPED | OUTPATIENT
Start: 2020-12-08 | End: 2021-02-02 | Stop reason: SDUPTHER

## 2020-12-08 RX ORDER — METOPROLOL TARTRATE 50 MG/1
50 TABLET, FILM COATED ORAL 2 TIMES DAILY
Qty: 60 TABLET | Refills: 3 | Status: SHIPPED | OUTPATIENT
Start: 2020-12-08 | End: 2021-03-22

## 2020-12-08 ASSESSMENT — ENCOUNTER SYMPTOMS
COUGH: 0
BACK PAIN: 1
SHORTNESS OF BREATH: 0
APNEA: 0
CHEST TIGHTNESS: 0
COLOR CHANGE: 0

## 2020-12-08 NOTE — PROGRESS NOTES
Visit Information    Have you changed or started any medications since your last visit including any over-the-counter medicines, vitamins, or herbal medicines? no   Have you stopped taking any of your medications? Is so, why? -  no  Are you having any side effects from any of your medications? - no    Have you seen any other physician or provider since your last visit?  no   Have you had any other diagnostic tests since your last visit?  no   Have you been seen in the emergency room and/or had an admission in a hospital since we last saw you?  no   Have you had your routine dental cleaning in the past 6 months?  no     Do you have an active MyChart account? If no, what is the barrier?   Yes    Patient Care Team:  Holly Farmer MD as PCP - General (Family Medicine)  Maude Nunez MD as Consulting Physician (Cardiology)  Jayla Wells MD as Consulting Physician  Nolan Ernandez DO as Consulting Physician (General Surgery)    Medical History Review  Past Medical, Family, and Social History reviewed and does not contribute to the patient presenting condition    Health Maintenance   Topic Date Due    DTaP/Tdap/Td vaccine (1 - Tdap) 12/18/1978    Shingles Vaccine (1 of 2) 12/18/2009    Colon cancer screen colonoscopy  11/21/2016    A1C test (Diabetic or Prediabetic)  08/13/2020    Flu vaccine (1) 09/01/2020    Lipid screen  06/20/2021    Potassium monitoring  06/20/2021    Creatinine monitoring  06/20/2021    Hepatitis C screen  Completed    HIV screen  Completed    Hepatitis A vaccine  Aged Out    Hepatitis B vaccine  Aged Out    Hib vaccine  Aged Out    Meningococcal (ACWY) vaccine  Aged Out    Pneumococcal 0-64 years Vaccine  Aged Out

## 2020-12-08 NOTE — PROGRESS NOTES
.  Subjective:    Lino Toro is a 60 y.o. male with  has a past medical history of CHF (congestive heart failure) ([address] Chronic back pain, COPD (chronic obstructive pulmonary disease) ([address] Headache(784.0), Hypertension, Low back pain with sciatica, Osteoarthritis, and Sleep apnea. Presented to the office today for:  Chief Complaint   Patient presents with    Follow-up     Back Pain - 7/10 , Knee Pain - 7/10       HPI  Patient is a 26-year-old  male with significant past medical history of chronic systolic CHF, hypertension, low back pain with sciatica. He states his back pain was getting worse during his last visit, was given a referral to orthopedic surgery who told her to do physical therapy which he will start on Friday. Otherwise states he is doing well, his blood pressure has dramatically improved with his current combination of medication. No acute complaints today. Hemoglobin A1c today performed. Does need refills for multiple medications. Review of Systems   Constitutional: Negative for activity change, appetite change, chills, fatigue and fever. Respiratory: Negative for apnea, cough, chest tightness and shortness of breath. Musculoskeletal: Positive for arthralgias, back pain and neck pain. Negative for joint swelling. Skin: Negative for color change, pallor, rash and wound. Neurological: Negative for dizziness, tremors, facial asymmetry, light-headedness and headaches. Psychiatric/Behavioral: Negative for agitation, behavioral problems and confusion. The patient is not nervous/anxious.       \  Family History   Problem Relation Age of Onset    Kidney Disease Mother     Diabetes Mother     Heart Disease Mother     Arthritis Mother     Heart Disease Father     Arthritis Sister     Diabetes Sister     Heart Disease Sister        Objective: /70 (Site: Left Upper Arm, Position: Sitting, Cuff Size: Medium Adult) Comment: machine  Pulse 86   Temp 97.5 °F (36.4 °C) (Temporal)   Ht 5' 5.98\" (1.676 m)   Wt 272 lb (123.4 kg)   BMI 43.92 kg/m²    BP Readings from Last 3 Encounters:   12/08/20 116/70   11/19/20 (!) 164/92   09/25/20 136/85       Physical Exam  Vitals signs and nursing note reviewed. Constitutional:       Appearance: Normal appearance. He is obese. Cardiovascular:      Rate and Rhythm: Normal rate and regular rhythm. Pulses: Normal pulses. Heart sounds: Normal heart sounds. Pulmonary:      Effort: Pulmonary effort is normal.      Breath sounds: Normal breath sounds. Abdominal:      General: Abdomen is flat. Bowel sounds are normal.      Palpations: Abdomen is soft. Neurological:      General: No focal deficit present. Mental Status: He is alert and oriented to person, place, and time. Mental status is at baseline. Psychiatric:         Mood and Affect: Mood normal.         Behavior: Behavior normal.         Thought Content: Thought content normal.         Judgment: Judgment normal.       Lab Results   Component Value Date    WBC 8.0 06/20/2020    HGB 13.1 (L) 06/20/2020    HCT 39.1 (L) 06/20/2020     06/20/2020    CHOL 125 06/20/2020    TRIG 145 06/20/2020    HDL 39 (L) 06/20/2020    ALT 50 (H) 06/20/2020    AST 20 06/20/2020     06/20/2020    K 4.6 06/20/2020     06/20/2020    CREATININE 0.96 06/20/2020    BUN 18 06/20/2020    CO2 27 06/20/2020    TSH 1.58 06/20/2020    PSA 1.06 07/01/2014    INR 1.0 04/04/2013    LABA1C 5.9 08/13/2019     Lab Results   Component Value Date    CALCIUM 9.2 06/20/2020     Lab Results   Component Value Date    LDLCHOLESTEROL 57 06/20/2020       Assessment and Plan:    1. Need for diphtheria-tetanus-pertussis (Tdap) vaccine  - Tdap (age 6y and older) IM (Boostrix)    2.  Diabetes mellitus screening  - POCT glycosylated hemoglobin (Hb A1C) 4. Hypertension, unspecified type  --Patient compliant with medication, blood pressure stable today  - metoprolol tartrate (LOPRESSOR) 50 MG tablet; Take 1 tablet by mouth 2 times daily  Dispense: 60 tablet; Refill: 3  - nitroGLYCERIN (NITROSTAT) 0.4 MG SL tablet; Place 1 tablet under the tongue every 5 minutes as needed for Chest pain  Dispense: 25 tablet; Refill: 0  - furosemide (LASIX) 40 MG tablet; Take 1 tablet by mouth daily  Dispense: 60 tablet; Refill: 3    5. Chronic systolic congestive heart failure (HCC)  - ranolazine (RANEXA) 500 MG extended release tablet; Take 1 tablet by mouth 2 times daily  Dispense: 60 tablet; Refill: 0  - simvastatin (ZOCOR) 20 MG tablet; Take 1 tablet by mouth nightly  Dispense: 30 tablet; Refill: 3  - spironolactone (ALDACTONE) 25 MG tablet; Take 1 tablet by mouth daily  Dispense: 30 tablet;  Refill: 3      Requested Prescriptions     Signed Prescriptions Disp Refills    metoprolol tartrate (LOPRESSOR) 50 MG tablet 60 tablet 3     Sig: Take 1 tablet by mouth 2 times daily    nitroGLYCERIN (NITROSTAT) 0.4 MG SL tablet 25 tablet 0     Sig: Place 1 tablet under the tongue every 5 minutes as needed for Chest pain    ranolazine (RANEXA) 500 MG extended release tablet 60 tablet 0     Sig: Take 1 tablet by mouth 2 times daily    simvastatin (ZOCOR) 20 MG tablet 30 tablet 3     Sig: Take 1 tablet by mouth nightly    spironolactone (ALDACTONE) 25 MG tablet 30 tablet 3     Sig: Take 1 tablet by mouth daily    furosemide (LASIX) 40 MG tablet 60 tablet 3     Sig: Take 1 tablet by mouth daily       Medications Discontinued During This Encounter   Medication Reason    metoprolol tartrate (LOPRESSOR) 50 MG tablet REORDER    nitroGLYCERIN (NITROSTAT) 0.4 MG SL tablet REORDER    ranolazine (RANEXA) 500 MG extended release tablet REORDER    simvastatin (ZOCOR) 20 MG tablet REORDER    spironolactone (ALDACTONE) 25 MG tablet REORDER    furosemide (LASIX) 40 MG tablet REORDER Sheng Rai received counseling on the following healthy behaviors: nutrition, exercise and medication adherence    Discussed use,benefit, and side effects of prescribed medications. Barriers to medication compliance addressed. All patient questions answered. Pt voiced understanding. Return in about 2 months (around 2/8/2021). Disclaimer: Some orall of this note was transcribed using voice-recognition software. This may cause typographical errors occasionally. Although all effort is made to fix these errors, please do not hesitate to contact our office if there Nabil Bueno concern with the understanding of this note.

## 2020-12-08 NOTE — PATIENT INSTRUCTIONS
Thank you for letting us take care of you today. We hope all your questions were addressed. If a question was overlooked or something else comes to mind after you return home, please contact a member of your Care Team listed below. Your Care Team at [place] [address] is Team #5  Crista Najera MD (Faculty)  Holly Farmer MD (Resident)  Harmeet Perdomo MD (Resident)  Patti Barfield MD (Resident)  CHAZ Aiken ,DIXIE EDWARDS, DIXIE Hawthorne ([address] office)  [facility] office)  Kanchan President, [address] [facility] (Clinical Practice Manager)  Jazmin Lopez, [address] [facility] (Clinical Pharmacist)       Office phone number: [phone]    If you need to get in right away due to illness, please be advised we have \"Same Day\" appointments available Monday-Friday. Please call us at [phone] option #3 to schedule your \"Same Day\" appointment. Patient Education        Tdap (Tetanus, Diphtheria, Pertussis) Vaccine: What You Need to Know  Why get vaccinated? Tdap vaccine can prevent tetanus, diphtheria, and pertussis. Diphtheria and pertussis spread from person to person. Tetanus enters the body through cuts or wounds. · TETANUS (T) causes painful stiffening of the muscles. Tetanus can lead to serious health problems, including being unable to open the mouth, having trouble swallowing and breathing, or death. · DIPHTHERIA (D) can lead to difficulty breathing, heart failure, paralysis, or death. · PERTUSSIS (aP), also known as \"whooping cough,\" can cause uncontrollable, violent coughing which makes it hard to breathe, eat, or drink. Pertussis can be extremely serious in babies and young children, causing pneumonia, convulsions, brain damage, or death. In teens and adults, it can cause weight loss, loss of bladder control, passing out, and rib fractures from severe coughing. Tdap vaccine  Tdap is only for children 7 years and older, adolescents, and adults.   Adolescents should receive a single dose of Tdap, preferably at age 6 or 15 years. Pregnant women should get a dose of Tdap during every pregnancy, to protect the  from pertussis. Infants are most at risk for severe, life threatening complications from pertussis. Adults who have never received Tdap should get a dose of Tdap. Also, adults should receive a booster dose every 10 years, or earlier in the case of a severe and dirty wound or burn. Booster doses can be either Tdap or Td (a different vaccine that protects against tetanus and diphtheria but not pertussis). Tdap may be given at the same time as other vaccines. Talk with your health care provider  Tell your vaccine provider if the person getting the vaccine:  · Has had an allergic reaction after a previous dose of any vaccine that protects against tetanus, diphtheria, or pertussis, or has any severe, life threatening allergies. · Has had a coma, decreased level of consciousness, or prolonged seizures within 7 days after a previous dose of any pertussis vaccine (DTP, DTaP, or Tdap). · Has seizures or another nervous system problem. · Has ever had Guillain-Barré Syndrome (also called GBS). · Has had severe pain or swelling after a previous dose of any vaccine that protects against tetanus or diphtheria. In some cases, your health care provider may decide to postpone Tdap vaccination to a future visit. People with minor illnesses, such as a cold, may be vaccinated. People who are moderately or severely ill should usually wait until they recover before getting Tdap vaccine. Your health care provider can give you more information. Risks of a vaccine reaction  · Pain, redness, or swelling where the shot was given, mild fever, headache, feeling tired, and nausea, vomiting, diarrhea, or stomachache sometimes happen after Tdap vaccine. People sometimes faint after medical procedures, including vaccination. Tell your provider if you feel dizzy or have vision changes or ringing in the ears.   As with any medicine, there is a very remote chance of a vaccine causing a severe allergic reaction, other serious injury, or death. What if there is a serious problem? An allergic reaction could occur after the vaccinated person leaves the clinic. If you see signs of a severe allergic reaction (hives, swelling of the face and throat, difficulty breathing, a fast heartbeat, dizziness, or weakness), call 9-1-1 and get the person to the nearest hospital.  For other signs that concern you, call your health care provider. Adverse reactions should be reported to the Vaccine Adverse Event Reporting System (VAERS). Your health care provider will usually file this report, or you can do it yourself. Visit the VAERS website at www.vaers. hhs.gov or call 4-962.506.5332. VAERS is only for reporting reactions, and VAERS staff do not give medical advice. The National Vaccine Injury Compensation Program  The National Vaccine Injury Compensation Program (VICP) is a federal program that was created to compensate people who may have been injured by certain vaccines. Visit the VICP website at www.hrsa.gov/vaccinecompensation or call 9-988.176.6920 to learn about the program and about filing a claim. There is a time limit to file a claim for compensation. How can I learn more? · Ask your health care provider. · Call your local or state health department. · Contact the Centers for Disease Control and Prevention (CDC):  ? Call 2-198.584.3349 (1-800-CDC-INFO) or  ? Visit CDC's website at www.cdc.gov/vaccines  Vaccine Information Statement (Interim)  Tdap (Tetanus, Diphtheria, Pertussis) Vaccine  04/01/2020  [address] 543FU-09  Department of Health and Human Services  Centers for Disease Control and Prevention  Many Vaccine Information Statements are available in [place] and other languages. See www.immunize.org/vis. Muchas hojas de información sobre vacunas están disponibles en español y en otros idiomas. Visite www.immunize.org/vis.   Care

## 2020-12-09 RX ORDER — FUROSEMIDE 40 MG/1
40 TABLET ORAL DAILY
Qty: 60 TABLET | Refills: 3 | Status: ON HOLD | OUTPATIENT
Start: 2020-12-09 | End: 2021-06-02 | Stop reason: HOSPADM

## 2020-12-09 RX ORDER — POTASSIUM CHLORIDE 20 MEQ/1
TABLET, EXTENDED RELEASE ORAL
Qty: 60 TABLET | Refills: 3 | Status: ON HOLD | OUTPATIENT
Start: 2020-12-09 | End: 2021-06-02 | Stop reason: HOSPADM

## 2020-12-10 ENCOUNTER — HOSPITAL ENCOUNTER (OUTPATIENT)
Dept: LAB | Age: 60
Setting detail: SPECIMEN
Discharge: HOME | End: 2020-12-10
Payer: COMMERCIAL

## 2020-12-10 PROCEDURE — U0003 INFECTIOUS AGENT DETECTION BY NUCLEIC ACID (DNA OR RNA); SEVERE ACUTE RESPIRATORY SYNDROME CORONAVIRUS 2 (SARS-COV-2) (CORONAVIRUS DISEASE [COVID-19]), AMPLIFIED PROBE TECHNIQUE, MAKING USE OF HIGH THROUGHPUT TECHNOLOGIES AS DESCRIBED BY CMS-2020-01-R: HCPCS

## 2020-12-10 RX ORDER — GABAPENTIN 300 MG/1
CAPSULE ORAL
Qty: 30 CAPSULE | Refills: 1 | Status: SHIPPED | OUTPATIENT
Start: 2020-12-10 | End: 2021-08-30 | Stop reason: SDUPTHER

## 2020-12-11 ENCOUNTER — HOSPITAL ENCOUNTER (OUTPATIENT)
Dept: PHYSICAL THERAPY | Age: 60
Setting detail: THERAPIES SERIES
Discharge: HOME | End: 2020-12-11
Payer: COMMERCIAL

## 2020-12-11 LAB
SARS-COV-2, RAPID: NORMAL
SARS-COV-2: NORMAL
SARS-COV-2: NOT DETECTED
SOURCE: NORMAL

## 2020-12-11 PROCEDURE — 97110 THERAPEUTIC EXERCISES: CPT

## 2020-12-11 PROCEDURE — G0283 ELEC STIM OTHER THAN WOUND: HCPCS

## 2020-12-11 PROCEDURE — 97161 PT EVAL LOW COMPLEX 20 MIN: CPT

## 2020-12-11 ASSESSMENT — PAIN DESCRIPTION - FREQUENCY: FREQUENCY: CONTINUOUS

## 2020-12-11 ASSESSMENT — PAIN SCALES - GENERAL: PAINLEVEL_OUTOF10: 7

## 2020-12-11 ASSESSMENT — PAIN DESCRIPTION - LOCATION: LOCATION: BACK;KNEE

## 2020-12-11 ASSESSMENT — PAIN DESCRIPTION - DESCRIPTORS: DESCRIPTORS: CONSTANT

## 2020-12-11 ASSESSMENT — PAIN DESCRIPTION - ORIENTATION: ORIENTATION: RIGHT;LEFT;LOWER

## 2020-12-11 NOTE — PROGRESS NOTES
Physical Therapy  Initial Assessment  Date: 2020  Patient Name: Ramiro Bernal  MRN: [id number]  : 1960     Treatment Diagnosis: difficulty walking R26.2 NEC    Subjective   General  Chart Reviewed: Yes  Patient assessed for rehabilitation services?: Yes  Additional Pertinent Hx: low back pain since he was 15years old  Family / Caregiver Present: No  Referring Practitioner: Anita Snow  Referral Date : 20  Diagnosis: lumbosacral spondylosis without myelopathy M47.817 DDD lumbar M51.36 arthropathy lumbar facet joints M47.816  Follows Commands: Within Functional Limits  PT Visit Information  Onset Date: 10/15/20  PT Insurance Information: amandeep  Total # of Visits Approved: 12  Total # of Visits to Date: 1  Subjective  Subjective: complains of pain on low back and both knees aggravated by standing and bending activities  Pain Screening  Patient Currently in Pain: Yes  Pain Assessment  Pain Assessment: 0-10  Pain Level: 7  Pain Location: Back;Knee  Pain Orientation: Right;Left;Lower  Pain Descriptors: Constant  Pain Frequency: Continuous  Vital Signs  Patient Currently in Pain: Yes    Vision/Hearing  Vision  Vision: Impaired(wear glasses)  Hearing  Hearing: Within functional limits    Orientation  Orientation  Overall Orientation Status: Within Normal Limits    Social/Functional History  Social/Functional History  Type of Home: House  Home Layout: Two level; Able to Live on Main level with bedroom/bathroom  Home Access: Stairs to enter without rails  Entrance Stairs - Number of Steps: 2  ADL Assistance: Independent  Homemaking Assistance: Independent  Ambulation Assistance: Independent  Transfer Assistance: Independent  Active : Yes  Mode of Transportation: Car  Occupation: Part time employment  Type of occupation: [employer]    Objective  Observation/Palpation  Observation: L sided scoliosis  AROM RLE (degrees)  RLE AROM: WFL  AROM LLE (degrees)  LLE AROM : WFL  Spine  Lumbar: AROM TRUNK FLEX 50 EXT 20 ROTB FREE SBB 30  Strength RLE  Strength RLE: WNL  Strength LLE  Strength LLE: WNL  Bed mobility  Supine to Sit: Independent  Sit to Supine: Independent  Transfers  Sit to Stand: Independent  Stand to sit: Independent  Bed to Chair: Independent  Stand Pivot Transfers: Independent  Ambulation  Ambulation?: Yes  Ambulation 1  Surface: level tile  Device: No Device  Assistance: Independent  Gait Deviations: None  Stairs/Curb  Stairs?: No     Exercises  Exercise 1: B SKTC 10X10\"  Exercise 2: LTR 10X10'  Exercise 3: B hamstring stretch 3x30\"  Exercise 4: seated FB/SB/Rot stretch 10x  Exercise 5: HP and EStim to lumbar are 20 min sitting    Assessment   Conditions Requiring Skilled Therapeutic Intervention  Body structures, Functions, Activity limitations: Decreased functional mobility ; Decreased ADL status; Decreased ROM; Increased pain  Assessment: low back/B knee pain limiting function  Treatment Diagnosis: difficulty walking R26.2 NEC  Prognosis: Good  Decision Making: Low Complexity  History: low back pain since he was 15years old,got worst 10/15/20 after his vacation  Exam: trunk ROM limited  Clinical Presentation: oswestry score 32%  Barriers to Learning: none  REQUIRES PT FOLLOW UP: Yes  Treatment Initiated : therapeutic ex HP and EStim to lumbar area  Discharge Recommendations: Home independently  Activity Tolerance  Activity Tolerance: Patient Tolerated treatment well     Plan   Plan  Times per week: 2x/week  Plan weeks: 6 weeks  Specific instructions for Next Treatment: Aquatic PT DLSP level 1  Current Treatment Recommendations: Home Exercise Program, Aquatics, ROM  Plan Comment: given written HEP    OutComes Score  Oswestry CMS Modifier: CJ (12/11/20 0900)  Oswestry Disability Scores %: 32 (12/11/20 0900)    Goals  Short term goals  Time Frame for Short term goals: 6 visits  Short term goal 1: decrease pain on low back and B knees by 50% so patient can tolerate standing better  Short term goal 2: increase AROM trunk to full  Short term goal 3: indep with HEP  Long term goals  Time Frame for Long term goals : 12 visits  Long term goal 1: improve oswestry score from 32 to 22% or better  Patient Goals   Patient goals : to stop the pain     Treatment Charges: Minutes Units   []  Ultrasound     [x]  Electrical-Stim 20 1   []  Iontophoresis     []  Traction     []  Massage       [x]  Eval 20 1   []  Gait     [x]  Ther Exercise 20   1   []  Manual Therapy       []  Ther Activities       []  Aquatics     []  Vasopneumatic Device     []  Neuro Re-Ed       []  Other       Total Treatment Time: 60 3        Therapy Time   Individual Concurrent Group Co-treatment   Time In 0900         Time Out 1000         Minutes 60         Timed Code Treatment Minutes: 20 Minutes    Patient Goals:to stop the pain    Comments/Assessment:    Rehab Potential:  [x] Good  [] Fair  [] Poor   Suggested Professional Referral:  [x] No  [] Yes:  Barriers to Goal Achievement:  [] No  [x] Yes: chronic  Domestic Concerns:  [x] No  [] Yes:    Treatment Plan:  [x] Therapeutic Exercise   36194  [] Iontophoresis: 4 mg/mL Dexamethasone Sodium Phosphate  mAmin  11250   [] Therapeutic Activity  56957 [] Vasopneumatic cold with compression  20264    [] Gait Training   91897 [] Ultrasound   [id number]   [] Neuromuscular Re-education  20774 [x] Electrical Stimulation Unattended  44590   [] Manual Therapy  55153 [] Electrical Stimulation Attended  22624   [x] Instruction in HEP  [] Lumbar/Cervical Traction  59567   [x] Aquatic Therapy   69759 [] Cold/hotpack    [] Massage   15886      [] Dry Needling, 1 or 2 muscles  06822   [] Biofeedback, first 15 minutes   73844  [] Biofeedback, additional 15 minutes   54977 [] Dry Needling, 3 or more muscles  60886      Frequency:          2 X/wk x         6 wk's      [x] Plans/Goals, Risk/Benefits discussed with pt/family  Comprehension of Education [x] yes  [] Needs Review  Pt/Family Education: [x] Verbal  [x] Demo  [x] Written    More objective information is available upon request.  Thank you for this referral.        Medicare/Regulatory Requirements:  I have reviewed this plan of care and certify a need for   Medically necessary rehabilitation services.   [] Physician Signature    Date:     Electronically signed by: Kaleigh Faye, [address] @ [address], [address] [facility]  Phone [phone]  Fax [phone]

## 2020-12-11 NOTE — PROGRESS NOTES
[facility] Exercise Log  Aquatic, Hip & DLS Program- Phase 1    Date of Eval:     12/11/20                           Primary PT:Sea  Diagnosis:lumbo sacral spondylosis,DDD lumbar,arthropathy lumbar facet joint  Things to Focus On (goals): to stop the pain  Surgical Precautions:  Medical Precautions:COPD-use an inhaler  [] C-9 dates  [] Occ Med   [] Medicare   Try hanging on deep end    Date        Visit #        Walk F/L/R        Marching        Squats        Step-Ups F/L        Heel-toe raises        SLR F/L/R        Knee/Flex/Ext        F/L Lunges        Kickboard Ex. Iso Abd. Push-pull        Paddling                Balance                        Stretches        Achllies        Hamstring                                .                 Pain Rating

## 2020-12-14 ENCOUNTER — HOSPITAL ENCOUNTER (OUTPATIENT)
Dept: PULMONOLOGY | Age: 60
Discharge: HOME | End: 2020-12-14
Payer: COMMERCIAL

## 2020-12-14 PROCEDURE — 94664 DEMO&/EVAL PT USE INHALER: CPT

## 2020-12-14 PROCEDURE — 94729 DIFFUSING CAPACITY: CPT

## 2020-12-14 PROCEDURE — 94640 AIRWAY INHALATION TREATMENT: CPT

## 2020-12-14 PROCEDURE — 94060 EVALUATION OF WHEEZING: CPT

## 2020-12-14 PROCEDURE — 94726 PLETHYSMOGRAPHY LUNG VOLUMES: CPT

## 2020-12-15 NOTE — TELEPHONE ENCOUNTER
Message from Covermymeds: This is a Preferred Drug List (PDL) medication and does not need a prior authorization. Additionally, there was a paid claim on 11/26/2020 and the next available fill is 12/20/2020. Thank you.

## 2020-12-15 NOTE — PROGRESS NOTES
Attending Physician Statement    Wt Readings from Last 3 Encounters:   12/08/20 272 lb (123.4 kg)   11/19/20 268 lb (121.6 kg)   09/25/20 261 lb (118.4 kg)     Temp Readings from Last 3 Encounters:   12/08/20 97.5 °F (36.4 °C) (Temporal)   11/19/20 97.9 °F (36.6 °C) (Temporal)   09/25/20 97.3 °F (36.3 °C) (Temporal)     BP Readings from Last 3 Encounters:   12/08/20 116/70   11/19/20 (!) 164/92   09/25/20 136/85     Pulse Readings from Last 3 Encounters:   12/08/20 86   11/19/20 102   09/25/20 71         I have discussed the care of Pavan Degroot, including pertinent history and exam findings with the resident. I have reviewed the key elements of all parts of the encounter with the resident. I agree with the assessment, plan and orders as documented by the resident.   (GE Modifier)

## 2020-12-16 ENCOUNTER — HOSPITAL ENCOUNTER (OUTPATIENT)
Dept: PHYSICAL THERAPY | Age: 60
Setting detail: THERAPIES SERIES
Discharge: HOME | End: 2020-12-16
Payer: COMMERCIAL

## 2020-12-16 PROCEDURE — 97113 AQUATIC THERAPY/EXERCISES: CPT

## 2020-12-16 ASSESSMENT — PAIN DESCRIPTION - DESCRIPTORS: DESCRIPTORS: CONSTANT;BURNING

## 2020-12-16 ASSESSMENT — PAIN DESCRIPTION - ORIENTATION: ORIENTATION: MID;LOWER

## 2020-12-16 ASSESSMENT — PAIN DESCRIPTION - PAIN TYPE: TYPE: CHRONIC PAIN

## 2020-12-16 ASSESSMENT — PAIN SCALES - GENERAL: PAINLEVEL_OUTOF10: 3

## 2020-12-16 ASSESSMENT — PAIN DESCRIPTION - LOCATION: LOCATION: BACK

## 2020-12-16 NOTE — PROGRESS NOTES
[address]   Outpatient Physical Therapy  [address] [facility]. [address]  Phone: [phone]  Fax: [phone]  Daily Progress Note    Date: 20    Patient Name: Clark Marcus        MRN: [id number]  Account: [de-identified] : 1960      General Information:  Additional Pertinent Hx: low back pain since he was 15years old  Referring Practitioner: Vibha Tamez  Referral Date : 20  Diagnosis: lumbosacral spondylosis without myelopathy M47.817 DDD lumbar M51.36 arthropathy lumbar facet joints M47.816  Onset Date: 10/15/20  PT Insurance Information: [employer]  Total # of Visits Approved: 12  Total # of Visits to Date: 2    Subjective:  Subjective: Reports pain is minimal today- states generally pain is a burning in back >10/10     Pain:  Patient Currently in Pain: Yes  Pain Assessment: 0-10  Pain Level: 3  Pain Type: Chronic pain  Pain Location: Back  Pain Orientation: Mid;Lower  Pain Descriptors: Constant;Burning       Objective:  [facility] Exercise Log  Aquatic, Hip & DLS Program- Phase 1    Date of Eval:     20                           Primary PT:Sea  Diagnosis:lumbo sacral spondylosis,DDD lumbar,arthropathy lumbar facet joint  Things to Focus On (goals): to stop the pain  Surgical Precautions:  Medical Precautions:COPD-use an inhaler  [] C-9 dates  [] Occ Med   [] Medicare   Try hanging on deep end    Date 20       Visit # 2/12       Walk F/L/R 2 Laps       Marching 10x       Squats 10x5\"       Step-Ups F/L        Heel-toe raises 10x       SLR F/L/R 10x       Knee/Flex/Ext 10x       F/L Lunges        Kickboard Ex.  Med       Iso Abd. 10x5\"       Push-pull 10x       Paddling                Deep Water 1 Noodle       Cycling 2'       Jacks        X-Country                Stretches  Add      Achllies        Hamstring                Cool Down  Add      Pain Rating 3         Comment:  Comments: Initiated aquatic therapy with emphasis on core stability and postural awarenss    Assessment: Body structures, Functions, Activity limitations: Decreased functional mobility ; Decreased ADL status; Decreased ROM; Increased pain  Treatment Diagnosis: difficulty walking R26.2 NEC  Prognosis: Good  REQUIRES PT FOLLOW UP: Yes  Activity Tolerance: Patient Tolerated treatment well  Comments: Emphasis on technique throughout program- encouraged avoidance of increased pain    Plan:  Plan: Continue with current plan(Assess response and progress as able)    Therapy Time:  Time In: 1443  Time Out: 1512  Minutes: 29  Timed Code Treatment Minutes: 27 Minutes    Treatment Charges: Minutes Units   []  Ultrasound     []  Electrical-Stim     []  Iontophoresis     []  Traction     []  Massage       []  Eval     []  Gait     []  Vasopneumatic Device     []  Ther Exercise       []  Manual Therapy       []  Ther Activities       [x]  Aquatics 27 2   []  Neuro Re-Ed       []  Other       Total Treatment Time: 32 2       Naseem Rather, PTA

## 2020-12-18 ENCOUNTER — HOSPITAL ENCOUNTER (OUTPATIENT)
Dept: PHYSICAL THERAPY | Age: 60
Setting detail: THERAPIES SERIES
Discharge: HOME | End: 2020-12-18
Payer: COMMERCIAL

## 2020-12-18 PROCEDURE — 97113 AQUATIC THERAPY/EXERCISES: CPT

## 2020-12-18 ASSESSMENT — PAIN DESCRIPTION - PAIN TYPE: TYPE: CHRONIC PAIN

## 2020-12-18 ASSESSMENT — PAIN SCALES - GENERAL: PAINLEVEL_OUTOF10: 6

## 2020-12-18 ASSESSMENT — PAIN DESCRIPTION - ORIENTATION: ORIENTATION: MID;LOWER

## 2020-12-18 ASSESSMENT — PAIN DESCRIPTION - FREQUENCY: FREQUENCY: CONTINUOUS

## 2020-12-18 ASSESSMENT — PAIN DESCRIPTION - DESCRIPTORS: DESCRIPTORS: CONSTANT;BURNING

## 2020-12-18 ASSESSMENT — PAIN DESCRIPTION - LOCATION: LOCATION: BACK

## 2020-12-18 NOTE — FLOWSHEET NOTE
[address]   Outpatient Physical Therapy  [address] [facility]. [address]  Phone: [phone]  Fax: [phone]  Daily Progress Note    Date: 20    Patient Name: Leonardo Donovan        MRN: [id number]  Account: [de-identified] : 1960      General Information:  Chart Reviewed: Yes  Patient assessed for rehabilitation services?: Yes  Additional Pertinent Hx: low back pain since he was 15years old  Referring Practitioner: Griffin Houston  Referral Date : 20  Diagnosis: lumbosacral spondylosis without myelopathy M47.817 DDD lumbar M51.36 arthropathy lumbar facet joints M47.816  Follows Commands: Within Functional Limits  Onset Date: 10/15/20  PT Insurance Information: amandeep  Total # of Visits Approved: 12  Total # of Visits to Date: 3  No Show: 0  Canceled Appointment: 0    Subjective:  Subjective: Pt reports being sore all over after last visit.  states pain lower then normal.  felt better but had to get groceries before therapy today. Pain:  Patient Currently in Pain: Yes  Pain Assessment: 0-10  Pain Level: 6  Pain Type: Chronic pain  Pain Location: Back  Pain Orientation: Mid;Lower  Pain Descriptors: Constant;Burning  Pain Frequency: Continuous       Objective:    [address] [facility] Exercise Log  Aquatic, Hip & DLS Program- Phase 1     Date of Eval:     20                           Primary PT:Sea  Diagnosis:lumbo sacral spondylosis,DDD lumbar,arthropathy lumbar facet joint  Things to Focus On (goals): to stop the pain  Surgical Precautions:  Medical Precautions:COPD-use an inhaler  []? C-9 dates  []? Occ Med   []?  Medicare   Try hanging on deep end     Date 20         Visit # 2/12  3/12         Walk F/L/R 2 Laps  2 Laps         Marching 10x 10x         Squats 10x5\" 10x5\"         Step-Ups F/L     Add        Heel-toe raises 10x 10x         SLR F/L/R 10x 10x         Knee/Flex/Ext 10x 10x         F/L Lunges             Kickboard Ex. Med Lg         Iso Abd. 10x5\" 10x5\"         Push-pull 10x  10x         Paddling    10x                 UE format   Add                   Deep Water 1 Noodle 1 Noodle         Cycling 2' 2'         Hernandez Prerna   1'         X-Country             Hang    5'         Stretches            Achllies    2x20\"         Hamstring    2x20\"                       Cool Down   2 Laps breast stroke         Pain Rating 3  6              Comment:  Comments: reviewed postural awareness, core stability and working in pain free ranges. Assessment: Body structures, Functions, Activity limitations: Decreased functional mobility ; Decreased ADL status; Decreased ROM; Increased pain  Treatment Diagnosis: difficulty walking R26.2 NEC  Prognosis: Good  REQUIRES PT FOLLOW UP: Yes  Activity Tolerance: Patient Tolerated treatment well  Comments: added jacks in joe water, cool down laps, and stretches today to patient's tolerance. continued emphasis of postural awareness and core stability.     Plan:  Plan: Continue with current plan    Therapy Time:  Time In: 1050  Time Out: 1125  Minutes: 35  Timed Code Treatment Minutes: 30 Minutes    Treatment Charges: Minutes Units   []  Ultrasound     []  Electrical-Stim     []  Iontophoresis     []  Traction     []  Massage       []  Eval     []  Gait     []  Vasopneumatic Device     []  Ther Exercise       []  Manual Therapy       []  Ther Activities       [x]  Aquatics 30 2   []  Neuro Re-Ed       []  Other       Total Treatment Time: 30 2     Jessie Leslie PTA

## 2020-12-18 NOTE — PROCEDURES
[address] [facility] [address]                               PULMONARY FUNCTION    PATIENT NAME: Giovanny Muñiz                    :        1960  MED REC NO:   [id number]                             ROOM:  ACCOUNT NO:   [de-identified]                           ADMIT DATE: 2020  PROVIDER:     Augusto Gavin    DATE OF PROCEDURE:  2020    Spirometry shows FVC is 2.63, 68% predicted; FEV1 is 1.60, 53%  predicted, consistent with moderate obstructive ventilatory impairment. FEV1/FVC ratio is consistent with obstruction. Post bronchodilator,  there is significant improvement in FEV1 and FVC suggestive of  bronchospasticity and airway reversibility. Post-bronchodilator FEV1  was 2.10, 69% predicted with 30% improvement in FEV1 post  bronchodilator. Lung volume shows residual volume is 3.08, 154%  predicted, consistent with airway trapping or hyperinflation. Total  lung capacity 6.37, 106% predicted. Diffusion capacity is 24.33, 95%  predicted, which is within normal limits. IMPRESSION:  This pulmonary function test is consistent with moderate  obstructive ventilatory impairment. Post-bronchodilator FEV1 improved  to 69% predicted, which is mild obstruction. This is consistent with  bronchospasticity and airway reversibility. Lung volume is suggestive  of airway trapping and hyperinflation. Diffusion capacity is normal.   Clinical correlation is recommended.         Abraham Dempsey    D: 2020 23:09:49       T: 2020 23:17:41     [email or web address]  Job#: [id number]     Doc#: [id number]    CC: normal...

## 2020-12-21 ENCOUNTER — HOSPITAL ENCOUNTER (OUTPATIENT)
Dept: PHYSICAL THERAPY | Age: 60
Setting detail: THERAPIES SERIES
Discharge: HOME | End: 2020-12-21
Payer: COMMERCIAL

## 2020-12-21 PROCEDURE — 97113 AQUATIC THERAPY/EXERCISES: CPT

## 2020-12-21 ASSESSMENT — PAIN DESCRIPTION - DESCRIPTORS: DESCRIPTORS: CONSTANT;BURNING

## 2020-12-21 ASSESSMENT — PAIN DESCRIPTION - ORIENTATION: ORIENTATION: MID;LOWER

## 2020-12-21 ASSESSMENT — PAIN DESCRIPTION - FREQUENCY: FREQUENCY: CONTINUOUS

## 2020-12-21 ASSESSMENT — PAIN DESCRIPTION - PAIN TYPE: TYPE: CHRONIC PAIN

## 2020-12-21 ASSESSMENT — PAIN DESCRIPTION - LOCATION: LOCATION: BACK

## 2020-12-21 ASSESSMENT — PAIN SCALES - GENERAL: PAINLEVEL_OUTOF10: 6

## 2020-12-21 NOTE — FLOWSHEET NOTE
[address]   Outpatient Physical Therapy  [address] [facility]. [address]  Phone: [phone]  Fax: [phone]  Daily Progress Note    Date: 20    Patient Name: Mattie Holliday        MRN: [id number]  Account: [de-identified] : 1960      General Information:  Chart Reviewed: Yes  Patient assessed for rehabilitation services?: Yes  Additional Pertinent Hx: low back pain since he was 15years old  Referring Practitioner: Ashlyn Escobedo  Referral Date : 20  Diagnosis: lumbosacral spondylosis without myelopathy M47.817 DDD lumbar M51.36 arthropathy lumbar facet joints M47.816  Follows Commands: Within Functional Limits  Onset Date: 10/15/20  PT Insurance Information: [email or web address]  Total # of Visits Approved: 12  Total # of Visits to Date: 4  No Show: 0  Canceled Appointment: 0    Subjective:  Subjective: Pt reports having a rough morning due to his COPD. States he worked last night and back pain was not as high as normal.       Pain:  Patient Currently in Pain: Yes  Pain Assessment: 0-10  Pain Level: 6  Pain Type: Chronic pain  Pain Location: Back  Pain Orientation: Mid;Lower  Pain Descriptors: Constant;Burning  Pain Frequency: Continuous       Objective:    [facility] J Exercise Log  Aquatic, Hip & DLS Program- Phase 1     Date of Eval:     20                           Primary PT:Sea  Diagnosis:lumbo sacral spondylosis,DDD lumbar,arthropathy lumbar facet joint  Things to Focus On (goals): to stop the pain  Surgical Precautions:  Medical Precautions:COPD-use an inhaler  []? ? C-9 dates  []? ? Occ Med   []? ? Medicare   Try hanging on deep end     Date 20       Visit # 2/12  3/12  4/12       Walk F/L/R 2 Laps  2 Laps 2 Laps       Marching 10x 10x 10x       Squats 10x5\" 10x5\" 10x5\"       Step-Ups F/L     Low 10x       Heel-toe raises 10x 10x  10x       SLR F/L/R 10x 10x  10x       Knee/Flex/Ext 10x 10x  10x       F/L Lunges             Kickboard Ex. Med Lg Lg       Iso Abd. 10x5\" 10x5\" 10x5\"       Push-pull 10x  10x 10x       Paddling    10x 10x                     UE format      Add                    Deep Water 1 Noodle 1 Noodle  1 Noodle       Cycling 2' 2' 2'       Víctor Alejandro   1' 2'       X-Country        Add      Hang    5' 5'       Stretches             Achllies    2x20\"  2x20\"       Hamstring    2x20\"  2x20\"                     Cool Down   2 Laps breast stroke  2 Laps breast stroke       Pain Rating 3  6 6           Assessment: Body structures, Functions, Activity limitations: Decreased functional mobility ; Decreased ADL status; Decreased ROM; Increased pain  Treatment Diagnosis: difficulty walking R26.2 NEC  Prognosis: Good  REQUIRES PT FOLLOW UP: Yes  Activity Tolerance: Patient Tolerated treatment well  Comments: added Step ups increased deep water aerobic exercises today to patient's tolerance. Did not add UE format due to patient feeling sort of breath this morning.     Plan:  Plan: Continue with current plan(added UE format & x-country next visit)    Therapy Time:  Time In: 0900  Time Out: 0945  Minutes: 45  Timed Code Treatment Minutes: 38 Minutes    Treatment Charges: Minutes Units   []  Ultrasound     []  Electrical-Stim     []  Iontophoresis     []  Traction     []  Massage       []  Eval     []  Gait     []  Vasopneumatic Device     []  Ther Exercise       []  Manual Therapy       []  Ther Activities       [x]  Aquatics 38 3   []  Neuro Re-Ed       []  Other       Total Treatment Time: 45 3     Maury Rachel, CYNTHIA

## 2020-12-23 ENCOUNTER — HOSPITAL ENCOUNTER (OUTPATIENT)
Dept: PHYSICAL THERAPY | Age: 60
Setting detail: THERAPIES SERIES
Discharge: HOME | End: 2020-12-23
Payer: COMMERCIAL

## 2020-12-23 PROCEDURE — 97113 AQUATIC THERAPY/EXERCISES: CPT

## 2020-12-23 ASSESSMENT — PAIN DESCRIPTION - LOCATION: LOCATION: BACK

## 2020-12-23 ASSESSMENT — PAIN DESCRIPTION - PAIN TYPE: TYPE: CHRONIC PAIN

## 2020-12-23 ASSESSMENT — PAIN DESCRIPTION - DESCRIPTORS: DESCRIPTORS: BURNING;CONSTANT

## 2020-12-23 ASSESSMENT — PAIN DESCRIPTION - ORIENTATION: ORIENTATION: MID;LOWER

## 2020-12-23 ASSESSMENT — PAIN SCALES - GENERAL: PAINLEVEL_OUTOF10: 7

## 2020-12-23 NOTE — FLOWSHEET NOTE
[address] [facility]   Outpatient Physical Therapy  [address] [place] [address]. [address]  Phone: [phone]  Fax: [phone]  Daily Progress Note    Date: 20    Patient Name: Tomas Magana        MRN: [id number]  Account: [de-identified] : 1960      General Information:  Additional Pertinent Hx: low back pain since he was 15years old  Referring Practitioner: Irving Downey  Referral Date : 20  Diagnosis: lumbosacral spondylosis without myelopathy M47.817 DDD lumbar M51.36 arthropathy lumbar facet joints M47.816  Onset Date: 10/15/20  PT Insurance Information: [email or web address]  Total # of Visits Approved: 12  Total # of Visits to Date: 5  No Show: 0  Canceled Appointment: 0    Subjective:  Subjective: States he is sore for about an hours after PT/Aqua but then feels like symptoms improve the next day     Pain:  Patient Currently in Pain: Yes  Pain Assessment: 0-10  Pain Level: 7  Pain Type: Chronic pain  Pain Location: Back  Pain Orientation: Mid;Lower  Pain Descriptors: Burning;Constant       Objective:    [facility] J Exercise Log  Aquatic, Hip & DLS Program- Phase 1     Date of Eval:     20                           Primary PT:Sea  Diagnosis:lumbo sacral spondylosis,DDD lumbar,arthropathy lumbar facet joint  Things to Focus On (goals): to stop the pain  Surgical Precautions:  Medical Precautions:COPD-use an inhaler  []? ? C-9 dates  []? ? Occ Med   []? ? Medicare   Try hanging on deep end     Date 20      Visit # 2/12  3/12  4/12  5/12     Walk F/L/R 2 Laps  2 Laps 2 Laps  2 Laps     Marching 10x 10x 10x  2 Laps     Squats 10x5\" 10x5\" 10x5\" 10x5\"      Step-Ups F/L     Low 10x Low 10x      Heel-toe raises 10x 10x  10x  10x     SLR F/L/R 10x 10x  10x  10x     Knee/Flex/Ext 10x 10x  10x  10x     F/L Lunges             Kickboard Ex.  Med Lg Lg Lg      Iso Abd. 10x5\" 10x5\" 10x5\"  10x5\"     Push-pull 10x  10x 10x  10x     Paddling    10x 10x  10x                   UE format        Add                  Deep Water 1 Noodle 1 Noodle  1 Noodle  1 Noodle     Cycling 2' 2' 2'  2'     Jorge Duff   1' 2'  2'     X-Country        1'      Hang    5' 5'  5'     Stretches             Achllies    2x20\"  2x20\"  2x20\"     Hamstring    2x20\"  2x20\"  2x20\"                   Cool Down   2 Laps breast stroke  2 Laps breast stroke  2 Laps Breast Stroke     Pain Rating 3  6 6  7         Assessment: Body structures, Functions, Activity limitations: Decreased functional mobility ; Decreased ADL status; Decreased ROM; Increased pain  Treatment Diagnosis: difficulty walking R26.2 NEC  Prognosis: Good  REQUIRES PT FOLLOW UP: Yes  Activity Tolerance: Patient Tolerated treatment well  Comments: Added marching lap and reviewed technique with all exercise- emphasis on posture. Patient continues with SOB/Chest heaviness due to COPD- held UE Format. Patient requires more than reasonable time to complete exercise.  Patient also complains of R knee pain today while performing LE exercise    Plan:  Plan: Continue with current plan(Add UE Format as time allows)    Therapy Time:  Time In: 1536  Time Out: 1630  Minutes: 54  Timed Code Treatment Minutes: 45 Minutes    Treatment Charges: Minutes Units   []  Ultrasound     []  Electrical-Stim     []  Iontophoresis     []  Traction     []  Massage       []  Eval     []  Gait     []  Vasopneumatic Device     []  Ther Exercise       []  Manual Therapy       []  Ther Activities       [x]  Aquatics 45 3   []  Neuro Re-Ed       []  Other       Total Treatment Time: 39 68[address] [facility]

## 2020-12-28 ENCOUNTER — HOSPITAL ENCOUNTER (OUTPATIENT)
Dept: PHYSICAL THERAPY | Age: 60
Setting detail: THERAPIES SERIES
Discharge: HOME | End: 2020-12-28
Payer: COMMERCIAL

## 2020-12-28 PROCEDURE — 97113 AQUATIC THERAPY/EXERCISES: CPT

## 2020-12-28 ASSESSMENT — PAIN DESCRIPTION - LOCATION: LOCATION: BACK

## 2020-12-28 ASSESSMENT — PAIN DESCRIPTION - ORIENTATION: ORIENTATION: MID;LOWER

## 2020-12-28 ASSESSMENT — PAIN SCALES - GENERAL: PAINLEVEL_OUTOF10: 7

## 2020-12-28 ASSESSMENT — PAIN DESCRIPTION - PAIN TYPE: TYPE: CHRONIC PAIN

## 2020-12-28 NOTE — FLOWSHEET NOTE
509 [facility]   Outpatient Physical Therapy  [address] [place] [address]. [address]  Phone: [phone]  Fax: [phone]  Daily Progress Note    Date: 20    Patient Name: Veena Caputo        MRN: [id number]  Account: [de-identified] : 1960      General Information:  Additional Pertinent Hx: low back pain since he was 15years old  Referring Practitioner: Baltazar Todd  Referral Date : 20  Diagnosis: lumbosacral spondylosis without myelopathy M47.817 DDD lumbar M51.36 arthropathy lumbar facet joints M47.816  Onset Date: 10/15/20  PT Insurance Information: adrieye  Total # of Visits Approved: 12  Total # of Visits to Date: 6  No Show: 0  Canceled Appointment: 0    Subjective:  Subjective: Notes relief while in the water- no long lasting relief. Notes most pain with bending at worked. Denies issues after last visit just stiffness after exercising     Pain:  Patient Currently in Pain: Yes  Pain Assessment: 0-10  Pain Level: 7  Pain Type: Chronic pain  Pain Location: Back  Pain Orientation: Mid;Lower(R Knee)       Objective:    [address] [facility] J Exercise Log  Aquatic, Hip & DLS Program- Phase 1     Date of Eval:     20                           Primary PT:Sea  Diagnosis:lumbo sacral spondylosis,DDD lumbar,arthropathy lumbar facet joint  Things to Focus On (goals): to stop the pain  Surgical Precautions:  Medical Precautions:COPD-use an inhaler  []? ? C-9 dates  []? ? Occ Med   []? ? Medicare   Try hanging on deep end     Date 20    Visit #      Walk F/L/R 2 Laps  2 Laps  2 Laps    Marching 10x  2 Laps  2 Laps    Squats 10x5\" 10x5\"   15x5\"    Step-Ups F/L Low 10x Low 10x   Low 15x    Heel-toe raises  10x  10x  15x    SLR F/L/R  10x  10x  15x    Knee/Flex/Ext  10x  10x  15x    F/L Lunges          Kickboard Ex.  Lg Lg  Lg     Iso Abd. 10x5\"  10x5\"  10x5\"    Push-pull 10x  10x  10x    Paddling 10x  10x  10x            UE format      Add              Deep Water  1 Noodle  1 Noodle 1 Noodle    Cycling 2'  2'  2'    Jacks 2'  2'  2'    X-Country    1'  2'     Hang 5'  5'  5'    Stretches          Achllies  2x20\"  2x20\"  2x20\"    Hamstring  2x20\"  2x20\"  2x20\"               Cool Down  2 Laps breast stroke  2 Laps Breast Stroke  2 Laps Breast Stroke    Pain Rating 6  7 7         Assessment: Body structures, Functions, Activity limitations: Decreased functional mobility ; Decreased ADL status; Decreased ROM; Increased pain  Treatment Diagnosis: difficulty walking R26.2 NEC  Prognosis: Good  REQUIRES PT FOLLOW UP: Yes  Activity Tolerance: Patient Tolerated treatment well  Comments: Increased reps to tolerance- emphasis on posture and core stability Requires extra time to complete program- unable to add UE format this date due to time    Plan:  Plan: Continue with current plan(Add UE Format as time allows)    Therapy Time:  Time In: 1448  Time Out: 1551  Minutes: 63  Timed Code Treatment Minutes: 52 Minutes    Treatment Charges: Minutes Units   []  Ultrasound     []  Electrical-Stim     []  Iontophoresis     []  Traction     []  Massage       []  Eval     []  Gait     []  Vasopneumatic Device     []  Ther Exercise       []  Manual Therapy       []  Ther Activities       [x]  Aquatics 52 3   []  Neuro Re-Ed       []  Other       Total Treatment Time: 46 3     Dottie Maradiaga PTA

## 2020-12-30 ENCOUNTER — HOSPITAL ENCOUNTER (OUTPATIENT)
Dept: PHYSICAL THERAPY | Age: 60
Setting detail: THERAPIES SERIES
Discharge: HOME | End: 2020-12-30
Payer: COMMERCIAL

## 2020-12-30 PROCEDURE — 97113 AQUATIC THERAPY/EXERCISES: CPT

## 2020-12-30 ASSESSMENT — PAIN DESCRIPTION - PAIN TYPE: TYPE: CHRONIC PAIN

## 2020-12-30 ASSESSMENT — PAIN SCALES - GENERAL: PAINLEVEL_OUTOF10: 5

## 2020-12-30 ASSESSMENT — PAIN DESCRIPTION - ORIENTATION: ORIENTATION: MID;LOWER

## 2020-12-30 ASSESSMENT — PAIN DESCRIPTION - LOCATION: LOCATION: BACK

## 2020-12-30 NOTE — FLOWSHEET NOTE
Lg  Lg    Iso Abd. 10x5\"  10x5\"  10x5\" 10x5\"    Push-pull 10x  10x  10x 15x    Paddling 10x  10x  10x 15x                UE format       Add               Deep Water  1 Noodle  1 Noodle 1 Noodle 1 Noodle    Cycling 2'  2'  2' 2'    Jacks 2'  2'  2' 2'    X-Country    1'  2' 2'     Hang 5'  5'  5' 5'    Stretches           Achllies  2x20\"  2x20\"  2x20\" 2x20\"    Hamstring  2x20\"  2x20\"  2x20\" 2x20\"                Cool Down  2 Laps breast stroke  2 Laps Breast Stroke  2 Laps Breast Stroke 2 Laps Breast Stroke    Pain Rating 6  7 7  7        Assessment: Body structures, Functions, Activity limitations: Decreased functional mobility ; Decreased ADL status; Decreased ROM; Increased pain  Treatment Diagnosis: difficulty walking R26.2 NEC  Prognosis: Good  REQUIRES PT FOLLOW UP: Yes  Activity Tolerance: Patient Tolerated treatment well  Comments: Completed program as tolerated encouraging increased speed. Progressed WB with LE exercise     Plan:  Plan: Continue with current plan    Therapy Time:  Time In: 3754  Time Out: 1542  Minutes: 60  Timed Code Treatment Minutes: 50 Minutes    Treatment Charges: Minutes Units   []  Ultrasound     []  Electrical-Stim     []  Iontophoresis     []  Traction     []  Massage       []  Eval     []  Gait     []  Vasopneumatic Device     []  Ther Exercise       []  Manual Therapy       []  Ther Activities       [x]  Aquatics 50 3   []  Neuro Re-Ed       []  Other       Total Treatment Time: 39 5687 [facility]

## 2021-01-04 ENCOUNTER — APPOINTMENT (OUTPATIENT)
Dept: PHYSICAL THERAPY | Age: 61
End: 2021-01-04
Payer: COMMERCIAL

## 2021-01-06 ENCOUNTER — APPOINTMENT (OUTPATIENT)
Dept: PHYSICAL THERAPY | Age: 61
End: 2021-01-06
Payer: COMMERCIAL

## 2021-01-07 ENCOUNTER — HOSPITAL ENCOUNTER (OUTPATIENT)
Dept: PHYSICAL THERAPY | Age: 61
Setting detail: THERAPIES SERIES
Discharge: HOME | End: 2021-01-07
Payer: COMMERCIAL

## 2021-01-07 PROCEDURE — 97113 AQUATIC THERAPY/EXERCISES: CPT

## 2021-01-07 ASSESSMENT — PAIN DESCRIPTION - PAIN TYPE: TYPE: CHRONIC PAIN

## 2021-01-07 ASSESSMENT — PAIN DESCRIPTION - LOCATION: LOCATION: BACK

## 2021-01-07 NOTE — PROGRESS NOTES
[address]   Outpatient Physical Therapy  [address] [facility]. [address]  Phone: [phone]  Fax: [phone]  Daily Progress Note    Date: 21    Patient Name: Brian Sneed        MRN: [id number]  Account: [de-identified] : 1960      General Information:  Additional Pertinent Hx: low back pain since he was 15years old  Referring Practitioner: Charlie Proctor  Referral Date : 20  Diagnosis: lumbosacral spondylosis without myelopathy M47.817 DDD lumbar M51.36 arthropathy lumbar facet joints M47.816  Onset Date: 10/15/20  PT Insurance Information: [email or web address]  Total # of Visits Approved: 12  Total # of Visits to Date: 8  No Show: 1  Canceled Appointment: 0    Subjective:  Subjective: Patient reports unable to make last tretament due to working overtime      Pain:  Patient Currently in Pain: Yes  Pain Assessment: 0-10  Pain Level: 6  Pain Type: Chronic pain  Pain Location: Back  Pain Orientation: Mid;Lower  Pain Radiating Towards: R knee   Pain Descriptors: Constant; Throbbing; Memory Melinda       Objective:    [facility] [employer] Exercise Log  Aquatic, Hip & DLS Program- Phase 1     Date of Eval:     20                           Primary PT:Sea  Diagnosis:lumbo sacral spondylosis,DDD lumbar,arthropathy lumbar facet joint  Things to Focus On (goals): to stop the pain  Surgical Precautions:  Medical Precautions:COPD-use an inhaler  []? ?? C-9 dates  []??? Occ Med   []? ?? Medicare   Try hanging on deep end     Date 20    Visit #       Walk F/L/R  2 Laps  2 Laps 2 Laps 2 Laps    Marching  2 Laps  2 Laps 2 Laps 2 Laps           Low Box Low Box     Squats 10x5\"   15x5\" 10x5\" 10x5\"     Step-Ups F/L Low 10x   Low 15x Tall 10x Tall 10x     Heel-toe raises  10x  15x 10x 10x     SLR F/L/R  10x  15x 10x 10x     Knee/Flex/Ext  10x  15x 10x 10x     F/L Lunges            Kickboard Ex.  Lg  Lg  Lg Lg     Iso Abd.  10x5\"  10x5\" 10x5\" 10x5\"     Push-pull  10x  10x 15x 15x     Paddling  10x  10x 15x 15x                  UE format        Add                   Deep Water  1 Noodle 1 Noodle 1 Noodle 1 Noodle     Cycling  2'  2' 2' 2'     Lynette Camron  2'  2' 2' 2'     X-Country  1'  2' 2' 2'      Hang  5'  5' 5' 5'     Stretches            Achllies  2x20\"  2x20\" 2x20\" 2x20\"     Hamstring  2x20\"  2x20\" 2x20\" 2x20\"                  Cool Down  2 Laps Breast Stroke  2 Laps Breast Stroke 2 Laps Breast Stroke 2 Laps Breast Stroke     Pain Rating  7 7  7 7          Assessment: Body structures, Functions, Activity limitations: Decreased functional mobility ; Decreased ADL status; Decreased ROM; Increased pain  Assessment: cueing required for postural awareness and to remain on task during session. Patient easily distracted by pain and describing pain areas/descriptors of pain.    Treatment Diagnosis: difficulty walking R26.2 NEC  Prognosis: Good  REQUIRES PT FOLLOW UP: Yes  Activity Tolerance: Patient Tolerated treatment well    Plan:  Plan: Continue with current plan    Therapy Time:  Time in: 300pm  Time Out:345m    Treatment Charges: Minutes Units   []  Ultrasound     []  Electrical-Stim     []  Iontophoresis     []  Traction     []  Massage       []  Eval     []  Gait     []  Vasopneumatic Device     []  Ther Exercise       []  Manual Therapy       []  Ther Activities       []  Aquatics 45 3   []  Neuro Re-Ed       []  Other       Total Treatment Time: 39 3       Eunice , CYNTHIA

## 2021-01-12 ENCOUNTER — APPOINTMENT (OUTPATIENT)
Dept: PHYSICAL THERAPY | Age: 61
End: 2021-01-12
Payer: COMMERCIAL

## 2021-01-14 ENCOUNTER — HOSPITAL ENCOUNTER (OUTPATIENT)
Dept: PHYSICAL THERAPY | Age: 61
Setting detail: THERAPIES SERIES
Discharge: HOME | End: 2021-01-14
Payer: COMMERCIAL

## 2021-01-14 ENCOUNTER — HOSPITAL ENCOUNTER (OUTPATIENT)
Dept: PHYSICAL THERAPY | Age: 61
Setting detail: THERAPIES SERIES
End: 2021-01-14
Payer: COMMERCIAL

## 2021-01-14 NOTE — FLOWSHEET NOTE
[] [facility]) [facility] &  Therapy  [address]    [phone]  F: [phone]   [] [address]   [address]  P: [phone]  F: [phone]  [] [place]  [address]  P: [phone]  F: [phone] [] [address]  P: [phone]  F: [phone]  [] [address]  [facility]   [address]   [place]: [phone]  F: [phone]   [] [facility]  [address] [facility] [address]  [place]: [phone]   F: [phone]     Physical Therapy Cancel/No Show note    Date: 2021  Patient: Jennifer Ly  : 1960  MRN: [id number]    Cancels/No Shows to date:     For today's appointment patient:    [x]  Cancelled    [] Rescheduled appointment    [] No-show     Reason given by patient:    [x]  Patient ill    []  Conflicting appointment    [] No transportation      [] Conflict with work    [] No reason given    [] Weather related    [] COVID-19    [x] Other:      Comments:  Pt needs to be scheduled for re-evaluation    [] Next appointment was confirmed    Electronically signed by: Maribel Art, PT

## 2021-01-14 NOTE — PROGRESS NOTES
[address] [facility]   Outpatient Physical Therapy  Cancel/No Show Note    Date: 21    Patient Name: Sharmila Villalta        MRN: [id number]  Account: [de-identified] : 1960      General Information:  Additional Pertinent Hx: low back pain since he was 15years old  Referring Practitioner: Waldemar Santiago  Referral Date : 20  Diagnosis: lumbosacral spondylosis without myelopathy M47.817 DDD lumbar M51.36 arthropathy lumbar facet joints M47.816  Onset Date: 10/15/20  PT Insurance Information: [email or web address]  Total # of Visits Approved: 12  Total # of Visits to Date: 8  No Show: 1  Canceled Appointment: 1        Comments: CANCELLED [address] Mehdi Harding, CYNTHIA

## 2021-01-18 ENCOUNTER — HOSPITAL ENCOUNTER (OUTPATIENT)
Dept: PHYSICAL THERAPY | Age: 61
Setting detail: THERAPIES SERIES
Discharge: HOME | End: 2021-01-18
Payer: COMMERCIAL

## 2021-01-18 PROCEDURE — 97113 AQUATIC THERAPY/EXERCISES: CPT

## 2021-01-18 NOTE — PROGRESS NOTES
[facility]   Outpatient Physical Therapy  [address] [facility]. 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 States he missed last week due to health issues. Also notes flare up of COPD with increased SOB and coughing     Pain:  Patient Currently in Pain: Yes  Pain Assessment: 0-10  Pain Level: 8  Pain Type: Chronic pain  Pain Location: Back; Abdomen  Pain Orientation: Mid       Objective:  [address]  [employer] Exercise Log  Aquatic, Hip & DLS Program- Phase 1     Date of Eval:     20                           Primary PT:Sea  Diagnosis:lumbo sacral spondylosis,DDD lumbar,arthropathy lumbar facet joint  Things to Focus On (goals): to stop the pain  Surgical Precautions:  Medical Precautions:COPD-use an inhaler  []? ?? C-9 dates  []??? Occ Med   []? ?? Medicare   Try hanging on deep end     Date 20    Visit #     Walk F/L/R 2 Laps 2 Laps 2 Laps    Marching 2 Laps 2 Laps  2 Laps      Low Box Low Box  Low Box    Squats 10x5\" 10x5\"  10x5\"    Step-Ups F/L Tall 10x Tall 10x  Low 10x    Heel-toe raises 10x 10x  10x    SLR F/L/R 10x 10x  10x    Knee/Flex/Ext 10x 10x  10x    F/L Lunges         Kickboard Ex.  Lg Lg  Lg    Iso Abd. 10x5\" 10x5\"  10x5\"    Push-pull 15x 15x  15x Paddling 15x 15x  15x              UE format    NT    Add             Deep Water 1 Noodle 1 Noodle  1 Noodle    Cycling 2' 2'  2'    Jacks 2' 2'  2'    X-Country 2' 2'  2'     Hang 5' 5'  5'    Stretches         Achllies 2x20\" 2x20\"  2x20\"    Hamstring 2x20\" 2x20\"  2x20\"              Cool Down 2 Laps Breast Stroke 2 Laps Breast Stroke  2 Laps Breast Stroke    Pain Rating 7 7  8         Assessment: Body structures, Functions, Activity limitations: Decreased functional mobility ; Decreased ADL status; Decreased ROM; Increased pain  Treatment Diagnosis: difficulty walking R26.2 NEC  Prognosis: Good  REQUIRES PT FOLLOW UP: Yes  Activity Tolerance: Patient limited by fatigue  Comments: Held adding UE format this date due to SOB- patient presented with inhaler to aquatic session.  Noted less congestion as he increased pool activity- completed program to tolerance advising patient to stop program if any increased SOB/chest pain     Plan:  Plan: Continue with current plan(UE format)    Therapy Time:  Time In: 1259  Time Out: 1355  Minutes: 56  Timed Code Treatment Minutes: 50 Minutes    Treatment Charges: Minutes Units   []  Ultrasound     []  Electrical-Stim     []  Iontophoresis     []  Traction     []  Massage       []  Eval     []  Gait     []  Vasopneumatic Device     []  Ther Exercise       []  Manual Therapy       []  Ther Activities       [x]  Aquatics 50 3   []  Neuro Re-Ed       []  Other       Total Treatment Time: 05 1110 [facility]

## 2021-01-21 ENCOUNTER — APPOINTMENT (OUTPATIENT)
Dept: CT IMAGING | Age: 61
DRG: 140 | End: 2021-01-21
Payer: COMMERCIAL

## 2021-01-21 ENCOUNTER — HOSPITAL ENCOUNTER (OUTPATIENT)
Dept: PHYSICAL THERAPY | Age: 61
Setting detail: THERAPIES SERIES
Discharge: HOME | End: 2021-01-21
Payer: COMMERCIAL

## 2021-01-21 ENCOUNTER — HOSPITAL ENCOUNTER (INPATIENT)
Age: 61
LOS: 4 days | Discharge: HOME | DRG: 140 | End: 2021-01-25
Attending: EMERGENCY MEDICINE | Admitting: INTERNAL MEDICINE
Payer: COMMERCIAL

## 2021-01-21 DIAGNOSIS — R07.9 CHEST PAIN, UNSPECIFIED TYPE: Primary | ICD-10-CM

## 2021-01-21 DIAGNOSIS — J44.1 COPD EXACERBATION (HCC): ICD-10-CM

## 2021-01-21 LAB
ABSOLUTE EOS #: 0.3 K/UL (ref 0–0.4)
ABSOLUTE IMMATURE GRANULOCYTE: ABNORMAL K/UL (ref 0–0.3)
ABSOLUTE LYMPH #: 1.6 K/UL (ref 1–4.8)
ABSOLUTE MONO #: 0.8 K/UL (ref 0.1–1.3)
ALBUMIN SERPL-MCNC: 3.9 G/DL (ref 3.5–5.2)
ALBUMIN/GLOBULIN RATIO: ABNORMAL (ref 1–2.5)
ALP BLD-CCNC: 66 U/L (ref 40–129)
ALT SERPL-CCNC: 27 U/L (ref 5–41)
ANION GAP SERPL CALCULATED.3IONS-SCNC: 13 MMOL/L (ref 9–17)
AST SERPL-CCNC: 19 U/L
BASOPHILS # BLD: 0 % (ref 0–2)
BASOPHILS ABSOLUTE: 0 K/UL (ref 0–0.2)
BILIRUB SERPL-MCNC: <0.15 MG/DL (ref 0.3–1.2)
BILIRUBIN DIRECT: <0.08 MG/DL
BILIRUBIN, INDIRECT: ABNORMAL MG/DL (ref 0–1)
BNP INTERPRETATION: NORMAL
BUN BLDV-MCNC: 21 MG/DL (ref 8–23)
BUN/CREAT BLD: ABNORMAL (ref 9–20)
CALCIUM SERPL-MCNC: 9.1 MG/DL (ref 8.6–10.4)
CHLORIDE BLD-SCNC: 102 MMOL/L (ref 98–107)
CO2: 24 MMOL/L (ref 20–31)
CREAT SERPL-MCNC: 0.87 MG/DL (ref 0.7–1.2)
CULTURE: NORMAL
DIFFERENTIAL TYPE: ABNORMAL
DIRECT EXAM: NORMAL
DIRECT EXAM: NORMAL
EKG ATRIAL RATE: 108 BPM
EKG P AXIS: 23 DEGREES
EKG P-R INTERVAL: 150 MS
EKG Q-T INTERVAL: 356 MS
EKG QRS DURATION: 100 MS
EKG QTC CALCULATION (BAZETT): 477 MS
EKG R AXIS: 29 DEGREES
EKG T AXIS: -56 DEGREES
EKG VENTRICULAR RATE: 108 BPM
EOSINOPHILS RELATIVE PERCENT: 4 % (ref 0–4)
GFR AFRICAN AMERICAN: >60 ML/MIN
GFR NON-AFRICAN AMERICAN: >60 ML/MIN
GFR SERPL CREATININE-BSD FRML MDRD: ABNORMAL ML/MIN/{1.73_M2}
GFR SERPL CREATININE-BSD FRML MDRD: ABNORMAL ML/MIN/{1.73_M2}
GLOBULIN: ABNORMAL G/DL (ref 1.5–3.8)
GLUCOSE BLD-MCNC: 198 MG/DL (ref 70–99)
HCT VFR BLD CALC: 37.5 % (ref 41–53)
HEMOGLOBIN: 12.7 G/DL (ref 13.5–17.5)
IMMATURE GRANULOCYTES: ABNORMAL %
LIPASE: 29 U/L (ref 13–60)
LYMPHOCYTES # BLD: 21 % (ref 24–44)
Lab: NORMAL
MAGNESIUM: 2.1 MG/DL (ref 1.6–2.6)
MCH RBC QN AUTO: 29.3 PG (ref 26–34)
MCHC RBC AUTO-ENTMCNC: 33.9 G/DL (ref 31–37)
MCV RBC AUTO: 86.5 FL (ref 80–100)
MONOCYTES # BLD: 10 % (ref 1–7)
NRBC AUTOMATED: ABNORMAL PER 100 WBC
PDW BLD-RTO: 13.8 % (ref 11.5–14.9)
PLATELET # BLD: 232 K/UL (ref 150–450)
PLATELET ESTIMATE: ABNORMAL
PMV BLD AUTO: 8.3 FL (ref 6–12)
POTASSIUM SERPL-SCNC: 4.1 MMOL/L (ref 3.7–5.3)
PRO-BNP: 228 PG/ML
RBC # BLD: 4.34 M/UL (ref 4.5–5.9)
RBC # BLD: ABNORMAL 10*6/UL
SEG NEUTROPHILS: 65 % (ref 36–66)
SEGMENTED NEUTROPHILS ABSOLUTE COUNT: 4.7 K/UL (ref 1.3–9.1)
SODIUM BLD-SCNC: 139 MMOL/L (ref 135–144)
SPECIMEN DESCRIPTION: NORMAL
TOTAL PROTEIN: 6.9 G/DL (ref 6.4–8.3)
TROPONIN INTERP: NORMAL
TROPONIN INTERP: NORMAL
TROPONIN T: NORMAL NG/ML
TROPONIN T: NORMAL NG/ML
TROPONIN, HIGH SENSITIVITY: 10 NG/L (ref 0–22)
TROPONIN, HIGH SENSITIVITY: 10 NG/L (ref 0–22)
WBC # BLD: 7.4 K/UL (ref 3.5–11)
WBC # BLD: ABNORMAL 10*3/UL

## 2021-01-21 PROCEDURE — 83735 ASSAY OF MAGNESIUM: CPT

## 2021-01-21 PROCEDURE — 85025 COMPLETE CBC W/AUTO DIFF WBC: CPT

## 2021-01-21 PROCEDURE — 87205 SMEAR GRAM STAIN: CPT

## 2021-01-21 PROCEDURE — 99285 EMERGENCY DEPT VISIT HI MDM: CPT

## 2021-01-21 PROCEDURE — 36415 COLL VENOUS BLD VENIPUNCTURE: CPT

## 2021-01-21 PROCEDURE — 93010 ELECTROCARDIOGRAM REPORT: CPT | Performed by: INTERNAL MEDICINE

## 2021-01-21 PROCEDURE — 6370000000 HC RX 637 (ALT 250 FOR IP): Performed by: EMERGENCY MEDICINE

## 2021-01-21 PROCEDURE — 2580000003 HC RX 258: Performed by: INTERNAL MEDICINE

## 2021-01-21 PROCEDURE — 87070 CULTURE OTHR SPECIMN AEROBIC: CPT

## 2021-01-21 PROCEDURE — 6360000002 HC RX W HCPCS: Performed by: INTERNAL MEDICINE

## 2021-01-21 PROCEDURE — 2060000000 HC ICU INTERMEDIATE R&B

## 2021-01-21 PROCEDURE — 80048 BASIC METABOLIC PNL TOTAL CA: CPT

## 2021-01-21 PROCEDURE — 74174 CTA ABD&PLVS W/CONTRAST: CPT

## 2021-01-21 PROCEDURE — 80076 HEPATIC FUNCTION PANEL: CPT

## 2021-01-21 PROCEDURE — 96374 THER/PROPH/DIAG INJ IV PUSH: CPT

## 2021-01-21 PROCEDURE — 96375 TX/PRO/DX INJ NEW DRUG ADDON: CPT

## 2021-01-21 PROCEDURE — 84484 ASSAY OF TROPONIN QUANT: CPT

## 2021-01-21 PROCEDURE — 6370000000 HC RX 637 (ALT 250 FOR IP): Performed by: INTERNAL MEDICINE

## 2021-01-21 PROCEDURE — 83880 ASSAY OF NATRIURETIC PEPTIDE: CPT

## 2021-01-21 PROCEDURE — 2580000003 HC RX 258: Performed by: EMERGENCY MEDICINE

## 2021-01-21 PROCEDURE — 93005 ELECTROCARDIOGRAM TRACING: CPT | Performed by: EMERGENCY MEDICINE

## 2021-01-21 PROCEDURE — 99223 1ST HOSP IP/OBS HIGH 75: CPT | Performed by: INTERNAL MEDICINE

## 2021-01-21 PROCEDURE — 94640 AIRWAY INHALATION TREATMENT: CPT

## 2021-01-21 PROCEDURE — 6360000002 HC RX W HCPCS: Performed by: EMERGENCY MEDICINE

## 2021-01-21 PROCEDURE — 83690 ASSAY OF LIPASE: CPT

## 2021-01-21 PROCEDURE — 6360000004 HC RX CONTRAST MEDICATION: Performed by: EMERGENCY MEDICINE

## 2021-01-21 RX ORDER — METHYLPREDNISOLONE SODIUM SUCCINATE 125 MG/2ML
40 INJECTION, POWDER, LYOPHILIZED, FOR SOLUTION INTRAMUSCULAR; INTRAVENOUS EVERY 6 HOURS
Status: COMPLETED | OUTPATIENT
Start: 2021-01-21 | End: 2021-01-23

## 2021-01-21 RX ORDER — IPRATROPIUM BROMIDE AND ALBUTEROL SULFATE 2.5; .5 MG/3ML; MG/3ML
1 SOLUTION RESPIRATORY (INHALATION)
Status: DISCONTINUED | OUTPATIENT
Start: 2021-01-21 | End: 2021-01-25 | Stop reason: HOSPADM

## 2021-01-21 RX ORDER — GABAPENTIN 300 MG/1
300 CAPSULE ORAL NIGHTLY PRN
Status: DISCONTINUED | OUTPATIENT
Start: 2021-01-21 | End: 2021-01-25 | Stop reason: HOSPADM

## 2021-01-21 RX ORDER — ASPIRIN 81 MG/1
81 TABLET ORAL DAILY
Status: DISCONTINUED | OUTPATIENT
Start: 2021-01-21 | End: 2021-01-25 | Stop reason: HOSPADM

## 2021-01-21 RX ORDER — SODIUM CHLORIDE 0.9 % (FLUSH) 0.9 %
10 SYRINGE (ML) INJECTION PRN
Status: CANCELLED | OUTPATIENT
Start: 2021-01-21

## 2021-01-21 RX ORDER — POTASSIUM CHLORIDE 20 MEQ/1
20 TABLET, EXTENDED RELEASE ORAL 2 TIMES DAILY
Status: DISCONTINUED | OUTPATIENT
Start: 2021-01-21 | End: 2021-01-22

## 2021-01-21 RX ORDER — SPIRONOLACTONE 25 MG/1
25 TABLET ORAL NIGHTLY
Status: DISCONTINUED | OUTPATIENT
Start: 2021-01-21 | End: 2021-01-25 | Stop reason: HOSPADM

## 2021-01-21 RX ORDER — PREDNISONE 20 MG/1
40 TABLET ORAL DAILY
Status: DISCONTINUED | OUTPATIENT
Start: 2021-01-23 | End: 2021-01-25 | Stop reason: HOSPADM

## 2021-01-21 RX ORDER — 0.9 % SODIUM CHLORIDE 0.9 %
80 INTRAVENOUS SOLUTION INTRAVENOUS ONCE
Status: COMPLETED | OUTPATIENT
Start: 2021-01-21 | End: 2021-01-21

## 2021-01-21 RX ORDER — POLYETHYLENE GLYCOL 3350 17 G/17G
17 POWDER, FOR SOLUTION ORAL DAILY PRN
Status: DISCONTINUED | OUTPATIENT
Start: 2021-01-21 | End: 2021-01-25 | Stop reason: HOSPADM

## 2021-01-21 RX ORDER — SODIUM CHLORIDE 9 MG/ML
INJECTION, SOLUTION INTRAVENOUS CONTINUOUS
Status: DISCONTINUED | OUTPATIENT
Start: 2021-01-21 | End: 2021-01-21

## 2021-01-21 RX ORDER — ACETAMINOPHEN 325 MG/1
650 TABLET ORAL EVERY 4 HOURS PRN
Status: CANCELLED | OUTPATIENT
Start: 2021-01-21

## 2021-01-21 RX ORDER — SODIUM CHLORIDE 0.9 % (FLUSH) 0.9 %
10 SYRINGE (ML) INJECTION EVERY 12 HOURS SCHEDULED
Status: CANCELLED | OUTPATIENT
Start: 2021-01-21

## 2021-01-21 RX ORDER — METHYLPREDNISOLONE SODIUM SUCCINATE 125 MG/2ML
125 INJECTION, POWDER, LYOPHILIZED, FOR SOLUTION INTRAMUSCULAR; INTRAVENOUS ONCE
Status: COMPLETED | OUTPATIENT
Start: 2021-01-21 | End: 2021-01-21

## 2021-01-21 RX ORDER — FUROSEMIDE 40 MG/1
40 TABLET ORAL DAILY
Status: DISCONTINUED | OUTPATIENT
Start: 2021-01-21 | End: 2021-01-25 | Stop reason: HOSPADM

## 2021-01-21 RX ORDER — SODIUM CHLORIDE 0.9 % (FLUSH) 0.9 %
10 SYRINGE (ML) INJECTION PRN
Status: DISCONTINUED | OUTPATIENT
Start: 2021-01-21 | End: 2021-01-25 | Stop reason: HOSPADM

## 2021-01-21 RX ORDER — FUROSEMIDE 10 MG/ML
80 INJECTION INTRAMUSCULAR; INTRAVENOUS ONCE
Status: COMPLETED | OUTPATIENT
Start: 2021-01-21 | End: 2021-01-21

## 2021-01-21 RX ORDER — SODIUM CHLORIDE 0.9 % (FLUSH) 0.9 %
10 SYRINGE (ML) INJECTION EVERY 12 HOURS SCHEDULED
Status: DISCONTINUED | OUTPATIENT
Start: 2021-01-21 | End: 2021-01-25 | Stop reason: HOSPADM

## 2021-01-21 RX ORDER — METOPROLOL TARTRATE 50 MG/1
50 TABLET, FILM COATED ORAL 2 TIMES DAILY
Status: DISCONTINUED | OUTPATIENT
Start: 2021-01-21 | End: 2021-01-25 | Stop reason: HOSPADM

## 2021-01-21 RX ORDER — ACETAMINOPHEN 650 MG/1
650 SUPPOSITORY RECTAL EVERY 6 HOURS PRN
Status: DISCONTINUED | OUTPATIENT
Start: 2021-01-21 | End: 2021-01-25 | Stop reason: HOSPADM

## 2021-01-21 RX ORDER — ASPIRIN 81 MG/1
324 TABLET, CHEWABLE ORAL ONCE
Status: COMPLETED | OUTPATIENT
Start: 2021-01-21 | End: 2021-01-21

## 2021-01-21 RX ORDER — NICOTINE 21 MG/24HR
1 PATCH, TRANSDERMAL 24 HOURS TRANSDERMAL DAILY PRN
Status: DISCONTINUED | OUTPATIENT
Start: 2021-01-21 | End: 2021-01-25 | Stop reason: HOSPADM

## 2021-01-21 RX ORDER — NITROGLYCERIN 0.4 MG/1
0.4 TABLET SUBLINGUAL EVERY 5 MIN PRN
Status: DISCONTINUED | OUTPATIENT
Start: 2021-01-21 | End: 2021-01-25 | Stop reason: HOSPADM

## 2021-01-21 RX ORDER — ACETAMINOPHEN 325 MG/1
650 TABLET ORAL EVERY 6 HOURS PRN
Status: DISCONTINUED | OUTPATIENT
Start: 2021-01-21 | End: 2021-01-25 | Stop reason: HOSPADM

## 2021-01-21 RX ORDER — FUROSEMIDE 40 MG/1
1 TABLET ORAL DAILY
Status: DISCONTINUED | OUTPATIENT
Start: 2021-01-21 | End: 2021-01-21 | Stop reason: SDUPTHER

## 2021-01-21 RX ORDER — ALBUTEROL SULFATE 2.5 MG/3ML
2.5 SOLUTION RESPIRATORY (INHALATION)
Status: DISCONTINUED | OUTPATIENT
Start: 2021-01-21 | End: 2021-01-25 | Stop reason: HOSPADM

## 2021-01-21 RX ORDER — MORPHINE SULFATE 4 MG/ML
4 INJECTION, SOLUTION INTRAMUSCULAR; INTRAVENOUS ONCE
Status: COMPLETED | OUTPATIENT
Start: 2021-01-21 | End: 2021-01-21

## 2021-01-21 RX ORDER — ONDANSETRON 2 MG/ML
4 INJECTION INTRAMUSCULAR; INTRAVENOUS EVERY 6 HOURS PRN
Status: DISCONTINUED | OUTPATIENT
Start: 2021-01-21 | End: 2021-01-25 | Stop reason: HOSPADM

## 2021-01-21 RX ORDER — ATORVASTATIN CALCIUM 20 MG/1
20 TABLET, FILM COATED ORAL NIGHTLY
Refills: 3 | Status: DISCONTINUED | OUTPATIENT
Start: 2021-01-21 | End: 2021-01-25 | Stop reason: HOSPADM

## 2021-01-21 RX ORDER — IPRATROPIUM BROMIDE AND ALBUTEROL SULFATE 2.5; .5 MG/3ML; MG/3ML
1 SOLUTION RESPIRATORY (INHALATION) ONCE
Status: COMPLETED | OUTPATIENT
Start: 2021-01-21 | End: 2021-01-21

## 2021-01-21 RX ORDER — PROMETHAZINE HYDROCHLORIDE 25 MG/1
12.5 TABLET ORAL EVERY 6 HOURS PRN
Status: DISCONTINUED | OUTPATIENT
Start: 2021-01-21 | End: 2021-01-25 | Stop reason: HOSPADM

## 2021-01-21 RX ORDER — FAMOTIDINE 20 MG/1
20 TABLET, FILM COATED ORAL 2 TIMES DAILY
Status: DISCONTINUED | OUTPATIENT
Start: 2021-01-21 | End: 2021-01-25 | Stop reason: HOSPADM

## 2021-01-21 RX ORDER — NAPROXEN 250 MG/1
500 TABLET ORAL 2 TIMES DAILY
Status: DISCONTINUED | OUTPATIENT
Start: 2021-01-21 | End: 2021-01-25 | Stop reason: HOSPADM

## 2021-01-21 RX ORDER — RANOLAZINE 500 MG/1
500 TABLET, EXTENDED RELEASE ORAL 2 TIMES DAILY
Status: DISCONTINUED | OUTPATIENT
Start: 2021-01-21 | End: 2021-01-25 | Stop reason: HOSPADM

## 2021-01-21 RX ORDER — LISINOPRIL 30 MG/1
1 TABLET ORAL DAILY
Status: DISCONTINUED | OUTPATIENT
Start: 2021-01-21 | End: 2021-01-21

## 2021-01-21 RX ADMIN — ENOXAPARIN SODIUM 30 MG: 30 INJECTION SUBCUTANEOUS at 09:42

## 2021-01-21 RX ADMIN — AZITHROMYCIN MONOHYDRATE 500 MG: 500 INJECTION, POWDER, LYOPHILIZED, FOR SOLUTION INTRAVENOUS at 09:48

## 2021-01-21 RX ADMIN — METOPROLOL TARTRATE 50 MG: 50 TABLET, FILM COATED ORAL at 09:39

## 2021-01-21 RX ADMIN — IPRATROPIUM BROMIDE AND ALBUTEROL SULFATE 1 AMPULE: .5; 3 SOLUTION RESPIRATORY (INHALATION) at 11:58

## 2021-01-21 RX ADMIN — FAMOTIDINE 20 MG: 20 TABLET ORAL at 09:41

## 2021-01-21 RX ADMIN — FUROSEMIDE 80 MG: 10 INJECTION, SOLUTION INTRAMUSCULAR; INTRAVENOUS at 21:56

## 2021-01-21 RX ADMIN — POTASSIUM CHLORIDE 20 MEQ: 1500 TABLET, EXTENDED RELEASE ORAL at 21:53

## 2021-01-21 RX ADMIN — IOPAMIDOL 100 ML: 755 INJECTION, SOLUTION INTRAVENOUS at 05:06

## 2021-01-21 RX ADMIN — METHYLPREDNISOLONE SODIUM SUCCINATE 40 MG: 125 INJECTION, POWDER, FOR SOLUTION INTRAMUSCULAR; INTRAVENOUS at 18:22

## 2021-01-21 RX ADMIN — SPIRONOLACTONE 25 MG: 25 TABLET, FILM COATED ORAL at 21:53

## 2021-01-21 RX ADMIN — LISINOPRIL 30 MG: 20 TABLET ORAL at 13:15

## 2021-01-21 RX ADMIN — METHYLPREDNISOLONE SODIUM SUCCINATE 40 MG: 125 INJECTION, POWDER, FOR SOLUTION INTRAMUSCULAR; INTRAVENOUS at 10:49

## 2021-01-21 RX ADMIN — NAPROXEN 500 MG: 250 TABLET ORAL at 09:55

## 2021-01-21 RX ADMIN — MORPHINE SULFATE 4 MG: 4 INJECTION, SOLUTION INTRAMUSCULAR; INTRAVENOUS at 04:17

## 2021-01-21 RX ADMIN — ASPIRIN 81 MG: 81 TABLET, COATED ORAL at 09:40

## 2021-01-21 RX ADMIN — SODIUM CHLORIDE: 9 INJECTION, SOLUTION INTRAVENOUS at 09:47

## 2021-01-21 RX ADMIN — METOPROLOL TARTRATE 50 MG: 50 TABLET, FILM COATED ORAL at 21:53

## 2021-01-21 RX ADMIN — METHYLPREDNISOLONE SODIUM SUCCINATE 40 MG: 125 INJECTION, POWDER, FOR SOLUTION INTRAMUSCULAR; INTRAVENOUS at 21:56

## 2021-01-21 RX ADMIN — NAPROXEN 500 MG: 250 TABLET ORAL at 21:54

## 2021-01-21 RX ADMIN — FUROSEMIDE 40 MG: 40 TABLET ORAL at 09:39

## 2021-01-21 RX ADMIN — ATORVASTATIN CALCIUM 20 MG: 20 TABLET, FILM COATED ORAL at 21:53

## 2021-01-21 RX ADMIN — ENOXAPARIN SODIUM 30 MG: 30 INJECTION SUBCUTANEOUS at 21:52

## 2021-01-21 RX ADMIN — RANOLAZINE 500 MG: 500 TABLET, FILM COATED, EXTENDED RELEASE ORAL at 21:54

## 2021-01-21 RX ADMIN — Medication 10 ML: at 05:06

## 2021-01-21 RX ADMIN — IPRATROPIUM BROMIDE AND ALBUTEROL SULFATE 1 AMPULE: .5; 3 SOLUTION RESPIRATORY (INHALATION) at 16:08

## 2021-01-21 RX ADMIN — ASPIRIN 324 MG: 81 TABLET, CHEWABLE ORAL at 04:17

## 2021-01-21 RX ADMIN — RANOLAZINE 500 MG: 500 TABLET, FILM COATED, EXTENDED RELEASE ORAL at 09:40

## 2021-01-21 RX ADMIN — POTASSIUM CHLORIDE 20 MEQ: 1500 TABLET, EXTENDED RELEASE ORAL at 09:41

## 2021-01-21 RX ADMIN — IPRATROPIUM BROMIDE AND ALBUTEROL SULFATE 1 AMPULE: .5; 3 SOLUTION RESPIRATORY (INHALATION) at 04:03

## 2021-01-21 RX ADMIN — SODIUM CHLORIDE 80 ML: 9 INJECTION, SOLUTION INTRAVENOUS at 05:06

## 2021-01-21 RX ADMIN — IPRATROPIUM BROMIDE AND ALBUTEROL SULFATE 1 AMPULE: .5; 3 SOLUTION RESPIRATORY (INHALATION) at 18:57

## 2021-01-21 RX ADMIN — FAMOTIDINE 20 MG: 20 TABLET ORAL at 21:53

## 2021-01-21 RX ADMIN — METHYLPREDNISOLONE SODIUM SUCCINATE 125 MG: 125 INJECTION, POWDER, FOR SOLUTION INTRAMUSCULAR; INTRAVENOUS at 04:17

## 2021-01-21 ASSESSMENT — ENCOUNTER SYMPTOMS
BACK PAIN: 1
SHORTNESS OF BREATH: 1
VOMITING: 0
COUGH: 1
DIARRHEA: 0
ABDOMINAL PAIN: 1

## 2021-01-21 ASSESSMENT — PAIN DESCRIPTION - LOCATION
LOCATION: BACK
LOCATION: CHEST;BACK

## 2021-01-21 ASSESSMENT — PAIN SCALES - GENERAL
PAINLEVEL_OUTOF10: 8
PAINLEVEL_OUTOF10: 0

## 2021-01-21 ASSESSMENT — PAIN DESCRIPTION - DESCRIPTORS: DESCRIPTORS: PRESSURE;SHOOTING

## 2021-01-21 ASSESSMENT — PAIN DESCRIPTION - PROGRESSION: CLINICAL_PROGRESSION: RESOLVED

## 2021-01-21 NOTE — ED PROVIDER NOTES
EMERGENCY DEPARTMENT ENCOUNTER    Pt Name: Adán Quiros  MRN: [id number]  [place] 1960  Date of evaluation: 1/21/21  CHIEF COMPLAINT       Chief Complaint   Patient presents with    Shortness of Breath    Chest Pain    Back Pain     HISTORY OF PRESENT ILLNESS   HPI     This is a 58-year-old male with a history of heart failure ejection fraction 15 to 20%, COPD, hypertension, chronic back pain who comes in today. The patient states that for the last few days he has had chest and back pain as well as epigastric abdominal pain. It is not associated with nausea or vomiting but he states that he has chronic his COPD but he feels more short of breath. He has been taking his inhalers which has improved his shortness of breath. He denies any fevers he states that he is a chronic cough secondary to his COPD but he does not have any new or worse sputum production. He states that he did not take his water pill today and noticed that his legs are swollen. REVIEW OF SYSTEMS     Review of Systems   Constitutional: Negative for fever. HENT: Negative for congestion. Respiratory: Positive for cough and shortness of breath. Cardiovascular: Positive for chest pain. Gastrointestinal: Positive for abdominal pain. Negative for diarrhea and vomiting. Genitourinary: Negative for dysuria. Musculoskeletal: Positive for back pain. Skin: Negative for rash. Neurological: Negative for headaches. All other systems reviewed and are negative.     PASTMEDICAL HISTORY     Past Medical History:   Diagnosis Date    [facility])     Chronic back pain     COPD [facility]ch[facility] obstructive pulmonary disease) ([facility])     Headache(784.0)     Hypertension     Low back pain with sciatica     Osteoarthritis     Sleep apnea     with cpap     SURGICAL HISTORY       Past Surgical History:   Procedure Laterality Date   [address]   exact date unknown     CURRENT MEDICATIONS Previous Medications    ALBUTEROL (PROVENTIL) (5 MG/ML) 0.5% NEBULIZER SOLUTION    Take 0.5 mLs by nebulization every 6 hours as needed for Wheezing or Shortness of Breath    ALBUTEROL SULFATE HFA (PROAIR HFA) 108 (90 BASE) MCG/ACT INHALER    Inhale 2 puffs into the lungs every 6 hours as needed for Wheezing    ASPIRIN 81 MG TABLET    Take 1 tablet by mouth daily    COMBIVENT RESPIMAT  MCG/ACT AERS INHALER    INHALE 1 PUFF INTO THE LUNGS EVERY 6 HOURS    FUROSEMIDE (LASIX) 40 MG TABLET    TAKE 1 TABLET BY MOUTH DAILY    FUROSEMIDE (LASIX) 40 MG TABLET    Take 1 tablet by mouth daily    GABAPENTIN (NEURONTIN) 300 MG CAPSULE    Take 1 capsule by mouth nightly as needed (pain) for up to 30 days. GABAPENTIN (NEURONTIN) 300 MG CAPSULE    TAKE 1 CAPSULE BY MOUTH NIGHTLY AS NEEDED (PAIN)    LISINOPRIL (PRINIVIL;ZESTRIL) 30 MG TABLET    TAKE 1 TABLET BY MOUTH DAILY    MASKS (FACE MASK) MISC    1 each by Does not apply route as needed (as needed)    METOPROLOL TARTRATE (LOPRESSOR) 50 MG TABLET    Take 1 tablet by mouth 2 times daily    NAPROXEN (NAPROSYN) 500 MG TABLET    TAKE 1 TABLET BY MOUTH 2 TIMES DAILY FOR 20 DAYS    NITROGLYCERIN (NITROSTAT) 0.4 MG SL TABLET    Place 1 tablet under the tongue every 5 minutes as needed for Chest pain    POTASSIUM CHLORIDE (KLOR-CON M) 20 MEQ EXTENDED RELEASE TABLET    TAKE ONE TABLET BY MOUTH TWICE A DAY    RANOLAZINE (RANEXA) 500 MG EXTENDED RELEASE TABLET    Take 1 tablet by mouth 2 times daily    SIMVASTATIN (ZOCOR) 20 MG TABLET    Take 1 tablet by mouth nightly    SPIRONOLACTONE (ALDACTONE) 25 MG TABLET    Take 1 tablet by mouth daily     ALLERGIES     is allergic to penicillins; sulfa antibiotics; sulfasalazine; pseudoeph-doxylamine-dm-apap; and rabbit protein. FAMILY HISTORY     He indicated that the status of his mother is unknown. He indicated that the status of his father is unknown. He indicated that the status of his sister is unknown.      SOCIAL HISTORY WITH AUTO DIFFERENTIAL - Abnormal; Notable for the following components:       Result Value    RBC 4.34 (*)     Hemoglobin 12.7 (*)     Hematocrit 37.5 (*)     Lymphocytes 21 (*)     Monocytes 10 (*)     All other components within normal limits   BASIC METABOLIC PANEL - Abnormal; Notable for the following components:    Glucose 198 (*)     All other components within normal limits   HEPATIC FUNCTION PANEL - Abnormal; Notable for the following components: Total Bilirubin <0.15 (*)     All other components within normal limits   MAGNESIUM   BRAIN NATRIURETIC PEPTIDE   TROPONIN   LIPASE   TROPONIN       EMERGENCY DEPARTMENTCOURSE:     Differential diagnosis includes ACS pneumonia reactive airway disease dissection heart failure exacerbation. The patient does have conversational dyspnea he is audibly wheezing we will give him steroids as well as breathing treatments. This could also be a cardiac wheeze will obtain a cardiac work-up.    5:52 AM EST  There is no elevation in creatinine no electrolyte abnormality BNP is not elevated troponin is 10 there is no elevation in LFTs or lipase there is no leukocytosis mild anemia with a hemoglobin of 12.7. CTA is negative for dissection suspect the patient symptoms are secondary to COPD exacerbation he will be admitted for COPD exacerbation and cardiac risk stratification. I did speak to Dr. Tamika Montoya who accepts admission. EKG: All EKG's are interpreted by the Emergency Department Physician who either signs or Co-signs this chart in the absence of a cardiologist.  Sinus tachycardia with a heart rate of 108 bpm prolonged QTC at 477 ms normal axis no acute ST or T wave changes    Vitals:    Vitals:    01/21/21 0344 01/21/21 0405   BP: (!) 160/80    Pulse: 108    Resp: 17    Temp: 97.8 °F (36.6 °C)    TempSrc: Oral    SpO2: 99% 98%   Weight: 275 lb (124.7 kg)    Height: 5' 5\" (1.651 m)        The patient was given the following medications while in the emergency department:  Orders Placed This Encounter   Medications    methylPREDNISolone sodium (SOLU-MEDROL) injection 125 mg    ipratropium-albuterol (DUONEB) nebulizer solution 1 ampule    morphine sulfate (PF) injection 4 mg    aspirin chewable tablet 324 mg    0.9 % sodium chloride bolus    iopamidol (ISOVUE-370) 76 % injection 100 mL    sodium chloride flush 0.9 % injection 10 mL         FINAL IMPRESSION      1. Chest pain, unspecified type    2.  COPD exacerbation [facility])         DISPOSITION/PLAN   DISPOSITION Admitted 01/21/2021 05:51:56 Cisco Raphael MD  Attending Emergency Physician    This charting supersedes any ED resident or staff charting and was written using speech recognition software        Gala Hawthorne MD  01/21/21 7935

## 2021-01-21 NOTE — H&P
History and Physical Service  [facility] Internal Medicine    HISTORY AND PHYSICAL EXAMINATION            Date:   1/21/2021  Patient name:  Yudelka Chaparro  MRN:   [id number]  Account:  [de-identified]  YOB: 1960  PCP:    Danis Oliver MD  Code Status:    Full Code    Chief Complaint:     Cough wheezing and dyspnea    History Obtained From:     Patient ,medical record ,nursing staff    History of Present Illnes       The patient is a 63 y.o. Non-/non  male who presents   COPD: Patient complains of dyspnea. Symptoms began 7 days ago. Symptoms acute dyspnea does worsen with exertion. Sputum is clear in small amounts. Fever has been low grade fevers. Patient uses 0 pillows at night. Patient can walk 10 feet before resting. Patient currently is not on home oxygen therapy. .       Past Medical History:   Diagnosis Date    [facility])     Chronic back pain     COPD [facility]ch[facility] obstructive pulmonary disease) ([facility])     Headache(784.0)     Hypertension     Low back pain with sciatica     Osteoarthritis     Sleep apnea     with cpap        Past Surgical History:     Past Surgical History:   Procedure Laterality Date   [address]   exact date unknown        Medications Prior to Admission:     Prior to Admission medications    Medication Sig Start Date End Date Taking?  Authorizing Provider   gabapentin (NEURONTIN) 300 MG capsule TAKE 1 CAPSULE BY MOUTH NIGHTLY AS NEEDED (PAIN) 12/10/20 12/10/21 Yes Danis Oliver MD   potassium chloride (KLOR-CON M) 20 MEQ extended release tablet TAKE ONE TABLET BY MOUTH TWICE A DAY 12/9/20  Yes Danis Oliver MD   metoprolol tartrate (LOPRESSOR) 50 MG tablet Take 1 tablet by mouth 2 times daily 12/8/20  Yes Danis Oliver MD   nitroGLYCERIN (NITROSTAT) 0.4 MG SL tablet Place 1 tablet under the tongue every 5 minutes as needed for Chest pain 12/8/20  Yes Danis Oliver MD   ranolazine (RANEXA) 500 MG extended release tablet Take 1 tablet by mouth 2 times daily 12/8/20  Yes Reyes Everett MD   simvastatin (ZOCOR) 20 MG tablet Take 1 tablet by mouth nightly 12/8/20  Yes Reyes Everett MD   spironolactone (ALDACTONE) 25 MG tablet Take 1 tablet by mouth daily  Patient taking differently: Take 25 mg by mouth nightly  12/8/20  Yes Reyes Everett MD   furosemide (LASIX) 40 MG tablet Take 1 tablet by mouth daily 12/8/20  Yes Reyes Everett MD   COMBIVENT RESPIMAT  MCG/ACT AERS inhaler INHALE 1 PUFF INTO THE LUNGS EVERY 6 HOURS 12/1/20  Yes Reyes Everett MD   lisinopril (PRINIVIL;ZESTRIL) 30 MG tablet TAKE 1 TABLET BY MOUTH DAILY 12/1/20  Yes Reyes Everett MD   albuterol sulfate HFA (PROAIR HFA) 108 (90 Base) MCG/ACT inhaler Inhale 2 puffs into the lungs every 6 hours as needed for Wheezing 12/1/20  Yes Reyes Everett MD   albuterol (PROVENTIL) (5 MG/ML) 0.5% nebulizer solution Take 0.5 mLs by nebulization every 6 hours as needed for Wheezing or Shortness of Breath 9/25/20  Yes Donato Patiño MD   naproxen (NAPROSYN) 500 MG tablet TAKE 1 TABLET BY MOUTH 2 TIMES DAILY FOR 20 DAYS 8/20/20 1/21/21 Yes Reyes Everett MD   aspirin 81 MG tablet Take 1 tablet by mouth daily 8/13/19  Yes Reyes Everett MD   furosemide (LASIX) 40 MG tablet TAKE 1 TABLET BY MOUTH DAILY 12/9/20   Reyes Everett MD   Masks ([facility] [address] 1 each by Does not apply route as needed (as needed) 10/5/20   Reyes Everett MD        Allergies:     Penicillins, Sulfa antibiotics, Sulfasalazine, Pseudoeph-doxylamine-dm-apap, and Rabbit protein    Social History:     Tobacco:    reports that he has quit smoking. He smoked 0.00 packs per day for 40.00 years. His smokeless tobacco use includes chew. Alcohol:      reports current alcohol use of about 1.0 standard drinks of alcohol per week. Drug Use:  reports no history of drug use.     Family History:     Family History   Problem Relation Age of Onset    Kidney Disease Mother     Diabetes Mother     Heart Disease Mother     Arthritis Mother     Heart Disease Father     Arthritis Sister     Diabetes Sister     Heart Disease Sister        Review of Systems:     Positive and Negative as described in HPI. CONSTITUTIONAL:  negative for fevers, chills, sweats, fatigue, weight loss  HEENT:  negative for vision, hearing changes, runny nose, throat pain  RESPIRATORY: Positive for cough wheezing and dyspnea CARDIOVASCULAR:  negative for chest pain, palpitations. GASTROINTESTINAL:  negative for nausea, vomiting, diarrhea, constipation, change in bowel habits, abdominal pain   GENITOURINARY:  negative for difficulty of urination, burning with urination, frequency   INTEGUMENT:  negative for rash, skin lesions, easy bruising   HEMATOLOGIC/LYMPHATIC:  negative for swelling/edema   ALLERGIC/IMMUNOLOGIC:  negative for urticaria , itching  ENDOCRINE:  negative increase in drinking, increase in urination, hot or cold intolerance  MUSCULOSKELETAL:  negative joint pains, muscle aches, swelling of joints  NEUROLOGICAL:  negative for headaches, dizziness, lightheadedness, numbness, pain, tingling extremities  BEHAVIOR/PSYCH:  negative for depression, anxiety    Physical Exam:   /85   Pulse 99   Temp 97.9 °F (36.6 °C) (Oral)   Resp 18   Ht 5' 5\" (1.651 m)   Wt 275 lb (124.7 kg)   SpO2 96%   BMI 45.76 kg/m²   No results for input(s): POCGLU in the last 72 hours. Morbid obesity BMI 45 weighs 275 pounds  General Appearance:  alert, well appearing, and in no acute distress  Mental status: oriented to person, place, and time with normal affect  Head:  normocephalic, atraumatic.   Eye: no icterus, redness, pupils equal and reactive, extraocular eye movements intact, conjunctiva clear  Ear: normal external ear, no discharge, hearing intact  Nose:  no drainage noted  Mouth: mucous membranes moist  Neck: supple, no carotid bruits, thyroid not palpable  Lungs: Good air entry mild wheezing t  Cardiovascular: normal rate, regular rhythm, no murmur, gallop, rub.   Abdomen: Soft, nontender, nondistended, normal bowel sounds, no hepatomegaly or splenomegaly  Neurologic: There are no new focal motor or sensory deficits, normal muscle tone and bulk, no abnormal sensation, normal speech, cranial nerves II through XII grossly intact  Skin: No gross lesions, rashes, bruising or bleeding on exposed skin area  Extremities:  peripheral pulses palpable, no pedal edema or calf pain with palpation  Psych: normal affect     Investigations:      Laboratory Testing:  Recent Results (from the past 24 hour(s))   CBC Auto Differential    Collection Time: 01/21/21  3:50 AM   Result Value Ref Range    WBC 7.4 3.5 - 11.0 k/uL    RBC 4.34 (L) 4.5 - 5.9 m/uL    Hemoglobin 12.7 (L) 13.5 - 17.5 g/dL    Hematocrit 37.5 (L) 41 - 53 %    MCV 86.5 80 - 100 fL    MCH 29.3 26 - 34 pg    MCHC 33.9 31 - 37 g/dL    RDW 13.8 11.5 - 14.9 %    Platelets 189 809 - 449 k/uL    MPV 8.3 6.0 - 12.0 fL    NRBC Automated NOT REPORTED per 100 WBC    Differential Type NOT REPORTED     Seg Neutrophils 65 36 - 66 %    Lymphocytes 21 (L) 24 - 44 %    Monocytes 10 (H) 1 - 7 %    Eosinophils % 4 0 - 4 %    Basophils 0 0 - 2 %    Immature Granulocytes NOT REPORTED 0 %    Segs Absolute 4.70 1.3 - 9.1 k/uL    Absolute Lymph # 1.60 1.0 - 4.8 k/uL    Absolute Mono # 0.80 0.1 - 1.3 k/uL    Absolute Eos # 0.30 0.0 - 0.4 k/uL    Basophils Absolute 0.00 0.0 - 0.2 k/uL    Absolute Immature Granulocyte NOT REPORTED 0.00 - 0.30 k/uL    WBC Morphology NOT REPORTED     RBC Morphology NOT REPORTED     Platelet Estimate NOT REPORTED    Basic Metabolic Panel    Collection Time: 01/21/21  3:50 AM   Result Value Ref Range    Glucose 198 (H) 70 - 99 mg/dL    BUN 21 8 - 23 mg/dL    CREATININE 0.87 0.70 - 1.20 mg/dL    Bun/Cre Ratio NOT REPORTED 9 - 20    Calcium 9.1 8.6 - 10.4 mg/dL    Sodium 139 135 - 144 mmol/L    Potassium 4.1 3.7 - 5.3 mmol/L    Chloride 102 98 - 107 mmol/L    CO2 24 20 - 31 mmol/L    Anion Gap 13 9 - 17 mmol/L    GFR Non-African American >60 >60 mL/min    GFR African American >60 >60 mL/min    GFR Comment          GFR Staging NOT REPORTED    Magnesium    Collection Time: 01/21/21  3:50 AM   Result Value Ref Range    Magnesium 2.1 1.6 - 2.6 mg/dL   Brain Natriuretic Peptide    Collection Time: 01/21/21  3:50 AM   Result Value Ref Range    Pro- <300 pg/mL    BNP Interpretation Pro-BNP Reference Range:    Troponin    Collection Time: 01/21/21  3:50 AM   Result Value Ref Range    Troponin, High Sensitivity 10 0 - 22 ng/L    Troponin T NOT REPORTED <0.03 ng/mL    Troponin Interp NOT REPORTED    Lipase    Collection Time: 01/21/21  3:50 AM   Result Value Ref Range    Lipase 29 13 - 60 U/L   Hepatic Function Panel    Collection Time: 01/21/21  3:50 AM   Result Value Ref Range    Alb 3.9 3.5 - 5.2 g/dL    Alkaline Phosphatase 66 40 - 129 U/L    ALT 27 5 - 41 U/L    AST 19 <40 U/L    Total Bilirubin <0.15 (L) 0.3 - 1.2 mg/dL    Bilirubin, Direct <0.08 <0.31 mg/dL    Bilirubin, Indirect CANNOT BE CALCULATED 0.00 - 1.00 mg/dL    Total Protein 6.9 6.4 - 8.3 g/dL    Globulin NOT REPORTED 1.5 - 3.8 g/dL    Albumin/Globulin Ratio NOT REPORTED 1.0 - 2.5   EKG 12 Lead    Collection Time: 01/21/21  3:50 AM   Result Value Ref Range    Ventricular Rate 108 BPM    Atrial Rate 108 BPM    P-R Interval 150 ms    QRS Duration 100 ms    Q-T Interval 356 ms    QTc Calculation (Bazett) 477 ms    P Axis 23 degrees    R Axis 29 degrees    T Axis -56 degrees   Troponin    Collection Time: 01/21/21  5:50 AM   Result Value Ref Range    Troponin, High Sensitivity 10 0 - 22 ng/L    Troponin T NOT REPORTED <0.03 ng/mL    Troponin Interp NOT REPORTED        Recent Labs     01/21/21  0350   HGB 12.7*   HCT 37.5*   WBC 7.4   MCV 86.5      K 4.1      CO2 24   BUN 21   CREATININE 0.87   GLUCOSE 198*   AST 19   ALT 27   LABALBU 3.9       Hematology:  Recent Labs     01/21/21  0350   WBC 7.4   RBC 4.34*   HGB 12.7*   HCT 37.5* MCV 86.5   MCH 29.3   MCHC 33.9   RDW 13.8      MPV 8.3     Chemistry:  Recent Labs     01/21/21  0350 01/21/21  0550     --    K 4.1  --      --    CO2 24  --    GLUCOSE 198*  --    BUN 21  --    CREATININE 0.87  --    MG 2.1  --    ANIONGAP 13  --    LABGLOM >60  --    GFRAA >60  --    CALCIUM 9.1  --    PROBNP 228  --    TROPONINT NOT REPORTED NOT REPORTED     Recent Labs     01/21/21  0350   PROT 6.9   LABALBU 3.9   AST 19   ALT 27   ALKPHOS 66   BILITOT <0.15*   BILIDIR <0.08   LIPASE 29       Imaging/Diagnostics:       Cta Chest Abdomen Pelvis W Contrast    Result Date: 1/21/2021  EXAMINATION: CTA OF THE CHEST, ABDOMEN AND PELVIS WITH CONTRAST 1/21/2021 5:14 am TECHNIQUE: CTA of the chest, abdomen and pelvis was performed after the administration of intravenous contrast.  Multiplanar reformatted images are provided for review. MIP images are provided for review. Dose modulation, iterative reconstruction, and/or weight based adjustment of the mA/kV was utilized to reduce the radiation dose to as low as reasonably achievable. COMPARISON: None HISTORY: ORDERING SYSTEM PROVIDED HISTORY: chest radiating to back pain TECHNOLOGIST PROVIDED HISTORY: chest radiating to back pain Decision Support Exception->Emergency Medical Condition (MA) Reason for Exam: chest pain, back pain, SOB, pt has a hx of hypertension Acuity: Acute Type of Exam: Initial Relevant Medical/Surgical History: no known surgeries to area of interest FINDINGS: Chest: Mediastinum: Pre and postcontrast evaluation with thoracic aorta demonstrates normal caliber, attenuation, and enhancement. No evidence of dissection no mediastinal lymphadenopathy. Heart size normal limits. No pericardial effusion. Trachea and esophagus are unremarkable. Visualized pulmonary vasculature is unremarkable. Lungs/pleura: No pleural effusion or pneumothorax. No focal consolidation or pulmonary edema. Airways are patent.  Soft Tissues/Bones: No acute findings. Abdomen/Pelvis: Organs: The visualized portions of the liver, gallbladder, spleen, pancreas and adrenal glands demonstrate no acute abnormality. No biliary ductal dilatation. The kidneys appear normal in size and demonstrate symmetric enhancement. No focal renal mass. No hydronephrosis. No perinephric stranding. GI/Bowel: No bowel dilatation to suggest obstruction. No evidence of acute appendicitis. Pelvis: The urinary bladder appears unremarkable. The pelvic organs demonstrate no acute abnormality. Peritoneum/Retroperitoneum: Pre and post-contrast evaluation of the abdominal aorta demonstrates normal caliber, attenuation, and enhancement pattern. No evidence of dissection. Celiac axis, SMA, renal arteries, iliac vessels and proximal femoral vessels are patent. Note is made of variant celiac axis anatomy with aortic origin of splenic and right hepatic arteries. Bones/Soft Tissues: No acute findings     No vascular abnormality. Otherwise, no acute findings in the chest, abdomen, or pelvis.           Current Facility-Administered Medications   Medication Dose Route Frequency Provider Last Rate Last Admin    sodium chloride flush 0.9 % injection 10 mL  10 mL Intravenous PRN Durga Jennings MD   10 mL at 01/21/21 0506    0.9 % sodium chloride infusion   Intravenous Continuous Rachelle Carvajal MD 75 mL/hr at 01/21/21 0947 New Bag at 01/21/21 0947    sodium chloride flush 0.9 % injection 10 mL  10 mL Intravenous 2 times per day Rachelle Carvajal MD        sodium chloride flush 0.9 % injection 10 mL  10 mL Intravenous PRN Rachelle Carvajal MD        famotidine (PEPCID) tablet 20 mg  20 mg Oral BID Rachelle Carvajal MD   20 mg at 01/21/21 0941    promethazine (PHENERGAN) tablet 12.5 mg  12.5 mg Oral Q6H PRN Rachelle Carvajal MD        Or    ondansetron (ZOFRAN) injection 4 mg  4 mg Intravenous Q6H PRN Arnaldo Donna Jack MD        nicotine (NICODERM CQ) 21 MG/24HR 1 patch  1 patch Transdermal Daily PRN Justin Gupta MD        polyethylene glycol (GLYCOLAX) packet 17 g  17 g Oral Daily PRN Justin Gupta MD        acetaminophen (TYLENOL) tablet 650 mg  650 mg Oral Q6H PRN Justin Gupta MD        Or    acetaminophen (TYLENOL) suppository 650 mg  650 mg Rectal Q6H PRN Arnaldo Aden MD        albuterol (PROVENTIL) nebulizer solution 2.5 mg  2.5 mg Nebulization Q2H PRN Justin Gupta MD        ipratropium-albuterol (DUONEB) nebulizer solution 1 ampule  1 ampule Inhalation Q4H WA Justni Gupta MD   1 ampule at 01/21/21 1608    methylPREDNISolone sodium (SOLU-MEDROL) injection 40 mg  40 mg Intravenous Q6H Arnaldo Aden MD   40 mg at 01/21/21 1049    Followed by   Michelle Tolentino ON 1/23/2021] predniSONE (DELTASONE) tablet 40 mg  40 mg Oral Daily Arnaldo Aden MD        azithromycin (ZITHROMAX) 500 mg in D5W 250ml addavial  500 mg Intravenous Q24H Justin Gupta MD   Stopped at 01/21/21 1048    aspirin EC tablet 81 mg  81 mg Oral Daily Justin Gupta MD   81 mg at 01/21/21 0940    naproxen (NAPROSYN) tablet 500 mg  500 mg Oral BID Justin Gupta MD   500 mg at 01/21/21 0955    potassium chloride (KLOR-CON M) extended release tablet 20 mEq  20 mEq Oral BID Justin Gupta MD   20 mEq at 01/21/21 0941    metoprolol tartrate (LOPRESSOR) tablet 50 mg  50 mg Oral BID Justin Gupta MD   50 mg at 01/21/21 0939    nitroGLYCERIN (NITROSTAT) SL tablet 0.4 mg  0.4 mg Sublingual Q5 Min PRN Justin Gupta MD        ranolazine (RANEXA) extended release tablet 500 mg  500 mg Oral BID Justin Gupta MD   500 mg at 01/21/21 0940    atorvastatin (LIPITOR) tablet 20 mg  20 mg Oral Nightly Justin Gupta MD        spironolactone (ALDACTONE) tablet 25 mg  25 mg Oral Nightly Arnaldo Aden MD        furosemide (LASIX) tablet 40 mg  40 mg Oral Daily Justin Gupta MD   40 mg at 01/21/21 0939    gabapentin (NEURONTIN) capsule 300 mg  300 mg Oral Nightly PRN Lazarus Rondon MD        lisinopril (PRINIVIL;ZESTRIL) tablet 30 mg  30 mg Oral Daily Lazarus Rondon MD   30 mg at 01/21/21 1315    enoxaparin (LOVENOX) injection 30 mg  30 mg Subcutaneous BID Lazarus Rondon MD   30 mg at 01/21/21 1509     Impressions :      1. Active Problems:    COPD exacerbation ([facility] [address]  Resolved Problems:    * No resolved hospital problems. *        2.  has a past medical history of CHF (congestive heart failure) (HCC), Chronic back pain, COPD (chronic obstructive pulmonary disease) ([address] Headache(784.0), Hypertension, Low back pain with sciatica, Osteoarthritis, and Sleep apnea.      Plans:     Acute exacerbation of her COPD  Long-term smoker smoked over 20 years  IV steroids DuoNeb  Antibiotics  Case discussed with pulmonologist          Lazarus Rondon MD  1/21/2021  4:41 PM

## 2021-01-21 NOTE — ED NOTES
Mode of arrival (squad #, walk in, police, etc) : walk in        Chief complaint(s): c/o chest pain, sob and back pain        Arrival Note (brief scenario, treatment PTA, etc). : Pt presents to ED from home c/o chest pain, sob and back pain. 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 Pt stated he has hx of CHF and COPD. Pt stated his sob improves w/ inhaler use. Pt has expiratory wheezing noted. Pt is A&Ox4, eupneic, PWD. GCS=15. Call light in reach. C= \"Have you ever felt that you should Cut down on your drinking? \"  No  A= \"Have people Annoyed you by criticizing your drinking? \"  No  G= \"Have you ever felt bad or Guilty about your drinking? \"  No  E= \"Have you ever had a drink as an Eye-opener first thing in the morning to steady your nerves or to help a hangover? \"  No      Deferred []      Reason for deferring: N/A    *If yes to two or more: probable alcohol abuse. Chapis Meadows, RN  01/21/21 Shirley Huitron 44., RN  01/21/21 [id number]

## 2021-01-21 NOTE — CONSULTS
Date:   1/21/2021  Patient name: Cody Shultz  Date of admission:  1/21/2021  3:39 AM  MRN:   [id number]  YOB: 1960  PCP: Yamel Marion MD    Reason for Admission: COPD exacerbation [facility]) [J44.1]    Cardiology consult: Chest pain     Impression     History of systolic and diastolic CHF  Hypertension  Moderate LVH  COPD   Morbid obesity  Obstructive sleep apnea  No previous history of coronary intervention      ECG 1/21/2021  Sinus rhythm heart rate 108, nonspecific ST-T change    2D echo 6/12/2020  Ejection fraction 45 to 50%, moderate LVH, moderate mitral regurgitation    MUGA scan 4/15/2019  Mild to moderate cardiomegaly, ejection fraction 52%    CTA chest abdomen and pelvis 1/21/2021  No vascular abnormality  No acute finding in the chest, abdomen or pelvis    Lab work 1/21/2021  Sodium 139, potassium 4.1, BUN 21, creatinine 0.87, glucose 198,  High-sensitivity troponin 10, proBNP 228  WBC 7.4, hemoglobin 12.7, platelets 682    History of present illness  76-year-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  Chest pain improves after rest.  Not associated with diaphoresis, palpitation. No fever no chills no productive cough. He has been having the symptoms for 1 week. Over the last few weeks he he has gained weight and a he is getting more shortness of breath.       Patient seen and examined  Morbidly obese male resting in upright position  Did not appear in any distress  ECG monitor sinus rhythm  Medications:   Scheduled Meds:   sodium chloride flush  10 mL Intravenous 2 times per day    famotidine  20 mg Oral BID    ipratropium-albuterol  1 ampule Inhalation Q4H WA    methylPREDNISolone  40 mg Intravenous Q6H    Followed by   Winston Louie ON 1/23/2021] predniSONE  40 mg Oral intervention  Systolic and diastolic CHF  Morbid obesity with a BMI 46  COPD secondary to smoking smokes 2 packs a day  Hypertension      Patient Active Problem List:     HTN (hypertension)     Skin tag     Low back pain with sciatica     Hyperglycemia     Infected sebaceous cyst     Chronic diastolic CHF (congestive heart failure) (HCC)     Chronic bilateral low back pain without sciatica     Chronic pain of right knee     Chest pain, unspecified     Bronchitis     Chronic systolic congestive heart failure (HCC)     Ventral hernia     Epigastric pain     Elevated LFTs     Shortness of breath     Rectus diastasis     Lumbosacral spondylosis without myelopathy     COPD exacerbation ([address]      Plan of Treatment:   Acacian checked  Change p.o. diuretics to IV Lasix 40 mg twice a day    Patient has seen [address] cardiology in the past  Please refer this patient to them    Electronically signed by Meagan Loja MD on 1/21/2021 at 4:18 PM

## 2021-01-21 NOTE — PROGRESS NOTES
[address]   Outpatient Physical Therapy  Cancel/No Show Note    Date: 21    Patient Name: Vini Amaya        MRN: [id number]  Account: [de-identified] : 1960      General Information:  Referring Practitioner: Anne Fajardo  Referral Date : 20  Diagnosis: lumbosacral spondylosis without myelopathy M47.817 DDD lumbar M51.36 arthropathy lumbar facet joints M47.816  Onset Date: 10/15/20  PT Insurance Information: amandeep  Total # of Visits Approved: 12  Total # of Visits to Date: 9  No Show: 1  Canceled Appointment: 1        Comments: PATIENT DID NOT SHOW FOR AQUATICS- PER EPIC CHART NOTES- PATIENT IN ER FOR CHEST PAIN/SOB.  HAS RE-ASSESS SCHEDULED NEXT CYNTHIA Sandoval

## 2021-01-21 NOTE — PROGRESS NOTES
Dr. Phillip Angel notified RN that the patient sees Dr. Josh Monk. RN notified Dr. Ranjith Bowen and the consult was changed.

## 2021-01-21 NOTE — ED NOTES
Report given to Aubrey Brito, [facility]. Report method by phone. The following was reviewed with receiving RN:   Current vital signs:  /67   Pulse 98   Temp 98.2 °F (36.8 °C) (Oral)   Resp 16   Ht 5' 5\" (1.651 m)   Wt 275 lb (124.7 kg)   SpO2 94%   BMI 45.76 kg/m²                MEWS Score: 1     Any medication or safety alerts were reviewed. Any pending diagnostics and notifications were also reviewed, as well as any safety concerns or issues, abnormal labs, abnormal imaging, and abnormal assessment findings. Questions were answered.           Davey Louie RN  01/21/21 [id number]

## 2021-01-22 LAB
ANION GAP SERPL CALCULATED.3IONS-SCNC: 12 MMOL/L (ref 9–17)
BUN BLDV-MCNC: 28 MG/DL (ref 8–23)
BUN/CREAT BLD: ABNORMAL (ref 9–20)
CALCIUM SERPL-MCNC: 9 MG/DL (ref 8.6–10.4)
CHLORIDE BLD-SCNC: 100 MMOL/L (ref 98–107)
CHOLESTEROL/HDL RATIO: 4.2
CHOLESTEROL: 191 MG/DL
CO2: 25 MMOL/L (ref 20–31)
CREAT SERPL-MCNC: 1.1 MG/DL (ref 0.7–1.2)
ESTIMATED AVERAGE GLUCOSE: 117 MG/DL
GFR AFRICAN AMERICAN: >60 ML/MIN
GFR NON-AFRICAN AMERICAN: >60 ML/MIN
GFR SERPL CREATININE-BSD FRML MDRD: ABNORMAL ML/MIN/{1.73_M2}
GFR SERPL CREATININE-BSD FRML MDRD: ABNORMAL ML/MIN/{1.73_M2}
GLUCOSE BLD-MCNC: 208 MG/DL (ref 70–99)
GLUCOSE BLD-MCNC: 257 MG/DL (ref 75–110)
GLUCOSE BLD-MCNC: 261 MG/DL (ref 75–110)
GLUCOSE BLD-MCNC: 345 MG/DL (ref 75–110)
HBA1C MFR BLD: 5.7 % (ref 4–6)
HCT VFR BLD CALC: 36.9 % (ref 41–53)
HDLC SERPL-MCNC: 46 MG/DL
HEMOGLOBIN: 12.4 G/DL (ref 13.5–17.5)
LDL CHOLESTEROL: 117 MG/DL (ref 0–130)
LV EF: 48 %
LVEF MODALITY: NORMAL
MCH RBC QN AUTO: 29.3 PG (ref 26–34)
MCHC RBC AUTO-ENTMCNC: 33.6 G/DL (ref 31–37)
MCV RBC AUTO: 87.2 FL (ref 80–100)
NRBC AUTOMATED: ABNORMAL PER 100 WBC
PDW BLD-RTO: 13.4 % (ref 11.5–14.9)
PLATELET # BLD: 251 K/UL (ref 150–450)
PMV BLD AUTO: 8.6 FL (ref 6–12)
POTASSIUM SERPL-SCNC: 4.5 MMOL/L (ref 3.7–5.3)
RBC # BLD: 4.23 M/UL (ref 4.5–5.9)
SODIUM BLD-SCNC: 137 MMOL/L (ref 135–144)
TRIGL SERPL-MCNC: 138 MG/DL
TROPONIN INTERP: NORMAL
TROPONIN INTERP: NORMAL
TROPONIN T: NORMAL NG/ML
TROPONIN T: NORMAL NG/ML
TROPONIN, HIGH SENSITIVITY: 7 NG/L (ref 0–22)
TROPONIN, HIGH SENSITIVITY: 8 NG/L (ref 0–22)
TSH SERPL DL<=0.05 MIU/L-ACNC: 0.41 MIU/L (ref 0.3–5)
VLDLC SERPL CALC-MCNC: NORMAL MG/DL (ref 1–30)
WBC # BLD: 12.6 K/UL (ref 3.5–11)

## 2021-01-22 PROCEDURE — 84443 ASSAY THYROID STIM HORMONE: CPT

## 2021-01-22 PROCEDURE — 6370000000 HC RX 637 (ALT 250 FOR IP): Performed by: INTERNAL MEDICINE

## 2021-01-22 PROCEDURE — C8929 TTE W OR WO FOL WCON,DOPPLER: HCPCS

## 2021-01-22 PROCEDURE — 80061 LIPID PANEL: CPT

## 2021-01-22 PROCEDURE — 36415 COLL VENOUS BLD VENIPUNCTURE: CPT

## 2021-01-22 PROCEDURE — 80048 BASIC METABOLIC PNL TOTAL CA: CPT

## 2021-01-22 PROCEDURE — 94761 N-INVAS EAR/PLS OXIMETRY MLT: CPT

## 2021-01-22 PROCEDURE — 83036 HEMOGLOBIN GLYCOSYLATED A1C: CPT

## 2021-01-22 PROCEDURE — 99232 SBSQ HOSP IP/OBS MODERATE 35: CPT | Performed by: INTERNAL MEDICINE

## 2021-01-22 PROCEDURE — 2060000000 HC ICU INTERMEDIATE R&B

## 2021-01-22 PROCEDURE — 6360000004 HC RX CONTRAST MEDICATION: Performed by: INTERNAL MEDICINE

## 2021-01-22 PROCEDURE — 84484 ASSAY OF TROPONIN QUANT: CPT

## 2021-01-22 PROCEDURE — 6360000002 HC RX W HCPCS: Performed by: INTERNAL MEDICINE

## 2021-01-22 PROCEDURE — 82947 ASSAY GLUCOSE BLOOD QUANT: CPT

## 2021-01-22 PROCEDURE — 85027 COMPLETE CBC AUTOMATED: CPT

## 2021-01-22 PROCEDURE — 94660 CPAP INITIATION&MGMT: CPT

## 2021-01-22 PROCEDURE — 94640 AIRWAY INHALATION TREATMENT: CPT

## 2021-01-22 PROCEDURE — 2580000003 HC RX 258: Performed by: INTERNAL MEDICINE

## 2021-01-22 RX ORDER — DEXTROSE MONOHYDRATE 50 MG/ML
100 INJECTION, SOLUTION INTRAVENOUS PRN
Status: DISCONTINUED | OUTPATIENT
Start: 2021-01-22 | End: 2021-01-25 | Stop reason: HOSPADM

## 2021-01-22 RX ORDER — NICOTINE POLACRILEX 4 MG
15 LOZENGE BUCCAL PRN
Status: DISCONTINUED | OUTPATIENT
Start: 2021-01-22 | End: 2021-01-25 | Stop reason: HOSPADM

## 2021-01-22 RX ORDER — POTASSIUM CHLORIDE 20 MEQ/1
20 TABLET, EXTENDED RELEASE ORAL
Status: DISCONTINUED | OUTPATIENT
Start: 2021-01-23 | End: 2021-01-25 | Stop reason: HOSPADM

## 2021-01-22 RX ORDER — DEXTROSE MONOHYDRATE 25 G/50ML
12.5 INJECTION, SOLUTION INTRAVENOUS PRN
Status: DISCONTINUED | OUTPATIENT
Start: 2021-01-22 | End: 2021-01-25 | Stop reason: HOSPADM

## 2021-01-22 RX ADMIN — NAPROXEN 500 MG: 250 TABLET ORAL at 20:42

## 2021-01-22 RX ADMIN — PERFLUTREN 2.2 MG: 6.52 INJECTION, SUSPENSION INTRAVENOUS at 10:16

## 2021-01-22 RX ADMIN — POTASSIUM CHLORIDE 20 MEQ: 1500 TABLET, EXTENDED RELEASE ORAL at 08:01

## 2021-01-22 RX ADMIN — NAPROXEN 500 MG: 250 TABLET ORAL at 08:01

## 2021-01-22 RX ADMIN — ATORVASTATIN CALCIUM 20 MG: 20 TABLET, FILM COATED ORAL at 20:43

## 2021-01-22 RX ADMIN — INSULIN LISPRO 3 UNITS: 100 INJECTION, SOLUTION INTRAVENOUS; SUBCUTANEOUS at 20:58

## 2021-01-22 RX ADMIN — FAMOTIDINE 20 MG: 20 TABLET ORAL at 20:43

## 2021-01-22 RX ADMIN — FUROSEMIDE 40 MG: 40 TABLET ORAL at 08:01

## 2021-01-22 RX ADMIN — ENOXAPARIN SODIUM 30 MG: 30 INJECTION SUBCUTANEOUS at 08:01

## 2021-01-22 RX ADMIN — METOPROLOL TARTRATE 50 MG: 50 TABLET, FILM COATED ORAL at 08:01

## 2021-01-22 RX ADMIN — FAMOTIDINE 20 MG: 20 TABLET ORAL at 08:01

## 2021-01-22 RX ADMIN — SPIRONOLACTONE 25 MG: 25 TABLET, FILM COATED ORAL at 20:43

## 2021-01-22 RX ADMIN — METHYLPREDNISOLONE SODIUM SUCCINATE 40 MG: 125 INJECTION, POWDER, FOR SOLUTION INTRAMUSCULAR; INTRAVENOUS at 04:29

## 2021-01-22 RX ADMIN — RANOLAZINE 500 MG: 500 TABLET, FILM COATED, EXTENDED RELEASE ORAL at 20:43

## 2021-01-22 RX ADMIN — METOPROLOL TARTRATE 50 MG: 50 TABLET, FILM COATED ORAL at 20:43

## 2021-01-22 RX ADMIN — IPRATROPIUM BROMIDE AND ALBUTEROL SULFATE 1 AMPULE: .5; 3 SOLUTION RESPIRATORY (INHALATION) at 19:17

## 2021-01-22 RX ADMIN — METHYLPREDNISOLONE SODIUM SUCCINATE 40 MG: 125 INJECTION, POWDER, FOR SOLUTION INTRAMUSCULAR; INTRAVENOUS at 22:03

## 2021-01-22 RX ADMIN — AZITHROMYCIN MONOHYDRATE 500 MG: 500 INJECTION, POWDER, LYOPHILIZED, FOR SOLUTION INTRAVENOUS at 08:01

## 2021-01-22 RX ADMIN — ENOXAPARIN SODIUM 30 MG: 30 INJECTION SUBCUTANEOUS at 20:41

## 2021-01-22 RX ADMIN — IPRATROPIUM BROMIDE AND ALBUTEROL SULFATE 1 AMPULE: .5; 3 SOLUTION RESPIRATORY (INHALATION) at 11:17

## 2021-01-22 RX ADMIN — METHYLPREDNISOLONE SODIUM SUCCINATE 40 MG: 125 INJECTION, POWDER, FOR SOLUTION INTRAMUSCULAR; INTRAVENOUS at 17:21

## 2021-01-22 RX ADMIN — INSULIN LISPRO 8 UNITS: 100 INJECTION, SOLUTION INTRAVENOUS; SUBCUTANEOUS at 11:57

## 2021-01-22 RX ADMIN — IPRATROPIUM BROMIDE AND ALBUTEROL SULFATE 1 AMPULE: .5; 3 SOLUTION RESPIRATORY (INHALATION) at 15:15

## 2021-01-22 RX ADMIN — ASPIRIN 81 MG: 81 TABLET, COATED ORAL at 08:01

## 2021-01-22 RX ADMIN — Medication 10 ML: at 08:02

## 2021-01-22 RX ADMIN — RANOLAZINE 500 MG: 500 TABLET, FILM COATED, EXTENDED RELEASE ORAL at 08:01

## 2021-01-22 RX ADMIN — LISINOPRIL 30 MG: 20 TABLET ORAL at 14:35

## 2021-01-22 RX ADMIN — INSULIN LISPRO 6 UNITS: 100 INJECTION, SOLUTION INTRAVENOUS; SUBCUTANEOUS at 17:21

## 2021-01-22 RX ADMIN — METHYLPREDNISOLONE SODIUM SUCCINATE 40 MG: 125 INJECTION, POWDER, FOR SOLUTION INTRAMUSCULAR; INTRAVENOUS at 11:51

## 2021-01-22 RX ADMIN — IPRATROPIUM BROMIDE AND ALBUTEROL SULFATE 1 AMPULE: .5; 3 SOLUTION RESPIRATORY (INHALATION) at 07:08

## 2021-01-22 RX ADMIN — Medication 10 ML: at 20:47

## 2021-01-22 ASSESSMENT — PAIN SCALES - GENERAL: PAINLEVEL_OUTOF10: 4

## 2021-01-22 NOTE — CONSULTS
Swelling    Sulfasalazine Swelling    Pseudoeph-Doxylamine-Dm-Apap Rash    Rabbit Protein Rash     Fur, gerbils       Home Meds:  Medications Prior to Admission: gabapentin (NEURONTIN) 300 MG capsule, TAKE 1 CAPSULE BY MOUTH NIGHTLY AS NEEDED (PAIN)  potassium chloride (KLOR-CON M) 20 MEQ extended release tablet, TAKE ONE TABLET BY MOUTH TWICE A DAY  metoprolol tartrate (LOPRESSOR) 50 MG tablet, Take 1 tablet by mouth 2 times daily  nitroGLYCERIN (NITROSTAT) 0.4 MG SL tablet, Place 1 tablet under the tongue every 5 minutes as needed for Chest pain  ranolazine (RANEXA) 500 MG extended release tablet, Take 1 tablet by mouth 2 times daily  simvastatin (ZOCOR) 20 MG tablet, Take 1 tablet by mouth nightly  spironolactone (ALDACTONE) 25 MG tablet, Take 1 tablet by mouth daily (Patient taking differently: Take 25 mg by mouth nightly )  furosemide (LASIX) 40 MG tablet, Take 1 tablet by mouth daily  COMBIVENT RESPIMAT  MCG/ACT AERS inhaler, INHALE 1 PUFF INTO THE LUNGS EVERY 6 HOURS  lisinopril (PRINIVIL;ZESTRIL) 30 MG tablet, TAKE 1 TABLET BY MOUTH DAILY  albuterol sulfate HFA (PROAIR HFA) 108 (90 Base) MCG/ACT inhaler, Inhale 2 puffs into the lungs every 6 hours as needed for Wheezing  albuterol (PROVENTIL) (5 MG/ML) 0.5% nebulizer solution, Take 0.5 mLs by nebulization every 6 hours as needed for Wheezing or Shortness of Breath  naproxen (NAPROSYN) 500 MG tablet, TAKE 1 TABLET BY MOUTH 2 TIMES DAILY FOR 20 DAYS  aspirin 81 MG tablet, Take 1 tablet by mouth daily  furosemide (LASIX) 40 MG tablet, TAKE 1 TABLET BY MOUTH DAILY  Masks (FACE MASK) MISC, 1 each by Does not apply route as needed (as needed)    Social History:   Social History     Socioeconomic History    Marital status: Single     Spouse name: Not on file    Number of children: Not on file    Years of education: Not on file    Highest education level: Not on file   Occupational History    Not on file   Social Needs    Financial resource strain: Not on file    Food insecurity     Worry: Not on file     Inability: Not on file    Transportation needs     Medical: Not on file     Non-medical: Not on file   Tobacco Use    Smoking status: Former Smoker     Packs/day: 0.00     Years: 40.00     Pack years: 0.00    Smokeless tobacco: Current User     Types: Chew    Tobacco comment: Chews / quit cigarettes   Substance and Sexual Activity    Alcohol use: Yes     Alcohol/week: 1.0 standard drinks     Types: 1 Glasses of wine per week     Frequency: 2-3 times a week     Drinks per session: 1 or 2     Binge frequency: Never    Drug use: No    Sexual activity: Not on file   Lifestyle    Physical activity     Days per week: Not on file     Minutes per session: Not on file    Stress: Not on file   Relationships    Social connections     Talks on phone: Not on file     Gets together: Not on file     Attends [employer] service: Not on file     Active member of club or organization: Not on file     Attends meetings of clubs or organizations: Not on file     Relationship status: Not on file    Intimate partner violence     Fear of current or ex partner: Not on file     Emotionally abused: Not on file     Physically abused: Not on file     Forced sexual activity: Not on file   Other Topics Concern    Not on file   Social History Narrative    Not on file       Family History:   Family History   Problem Relation Age of Onset    Kidney Disease Mother     Diabetes Mother     Heart Disease Mother     Arthritis Mother     Heart Disease Father     Arthritis Sister     Diabetes Sister     Heart Disease Sister        Review of Systems  Negative except that described above. Physical Exam  Vital Signs: Blood pressure 120/66, pulse 99, temperature 98.1 °F (36.7 °C), temperature source Oral, resp. rate 18, height 5' 5\" (1.651 m), weight 274 lb 14.6 oz (124.7 kg), SpO2 96 %. Admission Weight: Weight: 275 lb (124.7 kg)    General:obese, no distress.  More interested in playing game on cell than talking to me. Ears:neg  Nose: neg  Throat: poor dentition, missing several teeth  Neck: supple, thick, no nodes. Chest: nontender, no deformity  Lungs: clear, no wheeze. Cardiac: normal tones. No JVD or S3. Abdomen: obese, nontender. Back:neg  Extremities: 2+ leg edema below the knees. Neurologic:  A and O.   Skin:  Access:   Support Tubes:       Echo Complete 2d W Doppler W Color    Result Date: 1/22/2021  [address] [facility] Transthoracic Echocardiography Report (TTE)  Patient Name Constanza Kingsley      Date of Study                 01/22/2021               Jose Marin   Date of      1960  Gender                        Male  Birth   Age          63 year(s)  Race                             Room Number  2085        Height:                       64.96 inch, 165 cm   Corporate ID [id number]    Weight:                       273 pounds, 124 kg  #   Patient [place] [de-identified]   BSA:           2.26 m^2       BMI:      45.55  #                                                                kg/m^2   MR #         [id number]      Sonographer                   Zuleyma Calvillo   Accession #  [id number]  Interpreting Physician        Yisel Granado   Fellow                   Referring Nurse Practitioner   Interpreting             Referring Physician           Usman Sierra  Fellow  Type of Study   TTE procedure:2D Echocardiogram, M-Mode, Doppler, Color Doppler, Contrast  study. Procedure Date Date: 01/22/2021 Start: 09:57 AM Study Location: [address] [place] [address] Technical Quality: Limited visualization due to body habitus. Indications:Shortness of breath and Cardiomyopathy. History / Tech. Comments: idiopathic cardiomyopathy Patient Status: Inpatient Contrast Medium: Definity.  Amount - 2 ml Height: 64.96 inches Weight: 273.39 pounds BSA: 2.26 m^2 BMI: 45.55 kg/m^2 Rhythm: Sinus tachycardia HR: 100 bpm BP: 142/69 mmHg CONCLUSIONS Summary Echo contrast utilized on this technically difficult study. Left ventricle is normal in size. Estimated LV EF 45-50 %. Moderate left ventricular hypertrophy. Left atrium is at upper limits of normal. Normal right ventricular size and function. Aortic valve is trileaflet. Mild aortic stenosis. Peak instantaneous gradient 23 mmHg and mean gradient 13 mmHg. Thickened mitral valve leaflets. Mild to moderate mitral regurgitation. Normal tricuspid valve structure and function. Mild tricuspid regurgitation. Estimated right ventricular systolic pressure is 43 mmHg. Mildly elevated right ventricular systolic pressure. IVC Increased diameter, but still has inspiratory variation. No significant pericardial effusion is seen. Signature ----------------------------------------------------------------------------  Electronically signed by Zuleyma Calvillo(Sonographer) on 01/22/2021 10:45  AM ---------------------------------------------------------------------------- ----------------------------------------------------------------------------  Electronically signed by Lanny Horn(Interpreting physician) on  01/22/2021 11:56 AM ---------------------------------------------------------------------------- FINDINGS Left Atrium Left atrium is at upper limits of normal. Left Ventricle Left ventricle is normal in size. Estimated LV EF 45-50 %. Moderate left ventricular hypertrophy. Right Atrium Right atrium is normal in size. Right Ventricle Normal right ventricular size and function. Mitral Valve Thickened mitral valve leaflets. Mild to moderate mitral regurgitation. Aortic Valve Aortic valve is trileaflet. Mild aortic stenosis. Peak instantaneous gradient 23 mmHg and mean gradient 13 mmHg. No aortic insufficiency. Tricuspid Valve Normal tricuspid valve structure and function. Mild tricuspid regurgitation. Estimated right ventricular systolic pressure is 43 mmHg. Mildly elevated right ventricular systolic pressure.  Pulmonic Valve Pulmonic valve not well visualized. No pulmonic insufficiency. Pericardial Effusion No significant pericardial effusion is seen. Miscellaneous Normal aortic root dimension. E/e' average 12.8 IVC Increased diameter, but still has inspiratory variation. M-mode / 2D Measurements & Calculations:   LVIDd:4.36 cm(3.7 - 5.6 cm)      Diastolic ODMRFO:63.1 ml  SGGZB:1.61 cm(2.2 - 4.0 cm)      Systolic YHZCTH:92.8 ml  LIDS:5.44 cm(0.6 - 1.1 cm)       Aortic Root:2.8 cm(2.0 - 3.7 cm)  LVPWd:1.34 cm(0.6 - 1.1 cm)      LA Dimension: 4.3 cm(1.9 - 4.0 cm)  Fractional Shortenin.79 %    LA volume/Index: 63.8 ml /28m^2  Calculated LVEF (%): 55.24 %     LVOT:1.9 cm   Mitral:                                 Aortic   Valve Area (P1/2-Time): 5.24 cm^2       Peak Velocity: 2.40 m/s  Peak E-Wave: 1.08 m/s                   Mean Velocity: 1.63 m/s  Peak A-Wave: 1.35 m/s                   Peak Gradient: 23.04 mmHg  E/A Ratio: 0.8                          Mean Gradient: 13 mmHg  Peak Gradient: 4.67 mmHg  Mean Gradient: 5 mmHg  Deceleration Time: 127 msec             Area (continuity): 1.87 cm^2  P1/2t: 42 msec                          AV VTI: 46.1 cm   Area (continuity): 2.45 cm^2  Mean Velocity: 1.09 m/s   Tricuspid:                              Pulmonic:   Estimated RVSP: 43 mmHg  Peak TR Velocity: 2.94 m/s  Peak TR Gradient: 34.5744 mmHg  Estimated RA Pressure: 8 mmHg                                          Estimated PASP: 42.57 mmHg  Septal Wall E' velocity:0.10 m/s Lateral Wall E' velocity:0.10 m/s    Cta Chest Abdomen Pelvis W Contrast    Result Date: 2021  EXAMINATION: CTA OF THE CHEST, ABDOMEN AND PELVIS WITH CONTRAST 2021 5:14 am TECHNIQUE: CTA of the chest, abdomen and pelvis was performed after the administration of intravenous contrast.  Multiplanar reformatted images are provided for review. MIP images are provided for review.  Dose modulation, iterative reconstruction, and/or weight based adjustment of the mA/kV was utilized to reduce the radiation dose to as low as reasonably achievable. COMPARISON: None HISTORY: ORDERING SYSTEM PROVIDED HISTORY: chest radiating to back pain TECHNOLOGIST PROVIDED HISTORY: chest radiating to back pain Decision Support Exception->Emergency Medical Condition (MA) Reason for Exam: chest pain, back pain, SOB, pt has a hx of hypertension Acuity: Acute Type of Exam: Initial Relevant Medical/Surgical History: no known surgeries to area of interest FINDINGS: Chest: Mediastinum: Pre and postcontrast evaluation with thoracic aorta demonstrates normal caliber, attenuation, and enhancement. No evidence of dissection no mediastinal lymphadenopathy. Heart size normal limits. No pericardial effusion. Trachea and esophagus are unremarkable. Visualized pulmonary vasculature is unremarkable. Lungs/pleura: No pleural effusion or pneumothorax. No focal consolidation or pulmonary edema. Airways are patent. Soft Tissues/Bones: No acute findings. Abdomen/Pelvis: Organs: The visualized portions of the liver, gallbladder, spleen, pancreas and adrenal glands demonstrate no acute abnormality. No biliary ductal dilatation. The kidneys appear normal in size and demonstrate symmetric enhancement. No focal renal mass. No hydronephrosis. No perinephric stranding. GI/Bowel: No bowel dilatation to suggest obstruction. No evidence of acute appendicitis. Pelvis: The urinary bladder appears unremarkable. The pelvic organs demonstrate no acute abnormality. Peritoneum/Retroperitoneum: Pre and post-contrast evaluation of the abdominal aorta demonstrates normal caliber, attenuation, and enhancement pattern. No evidence of dissection. Celiac axis, SMA, renal arteries, iliac vessels and proximal femoral vessels are patent. Note is made of variant celiac axis anatomy with aortic origin of splenic and right hepatic arteries. Bones/Soft Tissues: No acute findings     No vascular abnormality.  Otherwise, no acute findings in the chest, abdomen, or pelvis. My Imaging Interpretation:  NO infiltrate or vascular congestion. Mild emphysema    Lab Review  [unfilled]      Assessment:    1. Atypical band like chest pain, resolved. Sounds musculoskeletal like spasm per pt. 2. Heavy smoking history quit 20 years ago. About 50 pack years. 3. COPD with chronic bronchitis  4. VANDAAN - study at [facility] 4 years ago. Does not see a sleep physician. Uses CPAP, equipment old   5. Systolic and diastolic CHF on ECHO  6. Mild PH on ECHO; WHO Group 2 and likely 3 as well. Plan:  1. COPD looks to be stable. OK to continue his albuterol and combivent. 2. Add Breo, but insurance may not cover. 3. Do not see need for antibiotic or steroid in terms of COPD which seems close to baseline.            Dottie VANCE [facility]    Electronically signed by Gavin Alonso on 01/22/21

## 2021-01-22 NOTE — CONSULTS
[facility]      Date of Admission:  1/21/2021    Date of Consultation:  1/22/2021    PCP:  Khushbu Pedraza MD      REASON FOR CONSULT:  CAD. HISTORY OF PRESENT ILLNESS:  Yudelka Chaparro is a 63 y.o. male  With prior history of idiopathic cardiomyopathy with severe left ventricular systolic dysfunction with LVEF 15-20% on 2D echocardiogram September 2018, subsequently improved LVEF to 45-50% on 2D echocardiogram June 3437, chronic systolic heart failure with NYHA class 2-3, chronic shortness of breath on exertion, noncritical coronary artery disease with only 30% lad disease on cardiac catheterization May 2007, recurrent chest pain improved with Ranexa therapy, history of sinus tachycardia, moderate pulmonary hypertension, mild tricuspid regurgitation, moderate mitral regurgitation, essential hypertension, hyperlipidemia, chronic tobacco chewing, suspected COPD, obesity with BMI 45 range who presents with severe muscle spasm involving mid low back protruding across to the friend and felt like somebody \"putting my back in a vice and could not move for 5 minutes\" which shortness of breath. Got better with IV morphine sulfate he received in the emergency room. Initially evaluated by Dr. Monica Vazquez during current hospitalization from cardiology standpoint and we are asked to see in consultation as I normally follow the patient from cardiac standpoint on an outpatient basis. At the time of my evaluation , complains of shortness of breath, heart rate of 100 beats per minute, patient denies any ongoing chest pain or palpitation or lightheadedness or dizziness or leg swelling or PND or orthopnea or bleeding or syncope. I reviewed my last office visit note in the EHR under Care everywhere section dated 12/18/2020 and please refer to that note for further details.       PMH:   has a past medical history of CHF (congestive heart failure) ([facility] [address] Chronic back pain, COPD (chronic obstructive pulmonary disease) ([facility] [address] Headache(784.0), Hypertension, Low back pain with sciatica, Osteoarthritis, and Sleep apnea. PSH:   has a past surgical history that includes hernia repair and Tonsillectomy (1963   exact date unknown). Allergies: Allergies   Allergen Reactions    Penicillins Swelling    Sulfa Antibiotics Swelling    Sulfasalazine Swelling    Pseudoeph-Doxylamine-Dm-Apap Rash    Rabbit Protein Rash     Fur, gerbils        Home Meds:    Prior to Admission medications    Medication Sig Start Date End Date Taking?  Authorizing Provider   gabapentin (NEURONTIN) 300 MG capsule TAKE 1 CAPSULE BY MOUTH NIGHTLY AS NEEDED (PAIN) 12/10/20 12/10/21 Yes Kasey Walsh MD   potassium chloride (KLOR-CON M) 20 MEQ extended release tablet TAKE ONE TABLET BY MOUTH TWICE A DAY 12/9/20  Yes Kasey Walsh MD   metoprolol tartrate (LOPRESSOR) 50 MG tablet Take 1 tablet by mouth 2 times daily 12/8/20  Yes Kasey Walsh MD   nitroGLYCERIN (NITROSTAT) 0.4 MG SL tablet Place 1 tablet under the tongue every 5 minutes as needed for Chest pain 12/8/20  Yes Kasey Walsh MD   ranolazine (RANEXA) 500 MG extended release tablet Take 1 tablet by mouth 2 times daily 12/8/20  Yes Kasey Walsh MD   simvastatin (ZOCOR) 20 MG tablet Take 1 tablet by mouth nightly 12/8/20  Yes Kasey Walsh MD   spironolactone (ALDACTONE) 25 MG tablet Take 1 tablet by mouth daily  Patient taking differently: Take 25 mg by mouth nightly  12/8/20  Yes Kasey Walsh MD   furosemide (LASIX) 40 MG tablet Take 1 tablet by mouth daily 12/8/20  Yes Kasey Walsh MD   COMBIVENT RESPIMAT  MCG/ACT AERS inhaler INHALE 1 PUFF INTO THE LUNGS EVERY 6 HOURS 12/1/20  Yes Kasey Walsh MD   lisinopril (PRINIVIL;ZESTRIL) 30 MG tablet TAKE 1 TABLET BY MOUTH DAILY 12/1/20  Yes Kasey Walsh MD   albuterol sulfate HFA (PROAIR HFA) 108 (90 Base) MCG/ACT inhaler Inhale 2 puffs into the lungs every 6 hours as needed for Wheezing 12/1/20  Yes Eloisa Costello MD   albuterol (PROVENTIL) (5 MG/ML) 0.5% nebulizer solution Take 0.5 mLs by nebulization every 6 hours as needed for Wheezing or Shortness of Breath 9/25/20  Yes Donato Patiño MD   naproxen (NAPROSYN) 500 MG tablet TAKE 1 TABLET BY MOUTH 2 TIMES DAILY FOR 20 DAYS 8/20/20 1/21/21 Yes Eloisa Costello MD   aspirin 81 MG tablet Take 1 tablet by mouth daily 8/13/19  Yes Eloisa Costello MD   furosemide (LASIX) 40 MG tablet TAKE 1 TABLET BY MOUTH DAILY 12/9/20   Eloisa Costello MD   Masks ([facility]) [address] 1 each by Does not apply route as needed (as needed) 10/5/20   Eloisa Costello MD        [facility] Meds:    Current Facility-Administered Medications   Medication Dose Route Frequency Provider Last Rate Last Admin    glucose (GLUTOSE) 40 % oral gel 15 g  15 g Oral PRN Arnaldo Arias MD        dextrose 50 % IV solution  12.5 g Intravenous PRN Arnaldo Arias MD        glucagon (rDNA) injection 1 mg  1 mg Intramuscular PRN Arnaldo Arias MD        dextrose 5 % solution  100 mL/hr Intravenous PRN Efraín Arias MD        insulin lispro (HUMALOG) injection vial 0-12 Units  0-12 Units Subcutaneous TID WC Alize Marcus MD   6 Units at 01/22/21 1721    insulin lispro (HUMALOG) injection vial 0-6 Units  0-6 Units Subcutaneous Nightly MD Fermin Barragann Nicola Greenberg [place] ON 1/23/2021] potassium chloride (KLOR-CON M) extended release tablet 20 mEq  20 mEq Oral Daily with breakfast José Miguel Sierra MD        regadenoson (LEXISCAN) injection 0.4 mg  0.4 mg Intravenous ONCE PRN José Miguel Sierra MD        sodium chloride flush 0.9 % injection 10 mL  10 mL Intravenous PRN Michell Berrios MD   10 mL at 01/21/21 0506    sodium chloride flush 0.9 % injection 10 mL  10 mL Intravenous 2 times per day Alize Marcus MD   10 mL at 01/22/21 0802    sodium chloride flush 0.9 % injection 10 mL  10 mL Intravenous PRN Alize Marcus MD        famotidine (PEPCID) tablet 20 mg  20 mg Oral BID José Miguel Gamboa MD   20 mg at 01/22/21 0801    promethazine (PHENERGAN) tablet 12.5 mg  12.5 mg Oral Q6H PRN José Miguel Gamboa MD        Or    ondansetron (ZOFRAN) injection 4 mg  4 mg Intravenous Q6H PRN José Miguel Gamboa MD        nicotine (NICODERM CQ) 21 MG/24HR 1 patch  1 patch Transdermal Daily PRN Arnaldo Martínez MD        polyethylene glycol (GLYCOLAX) packet 17 g  17 g Oral Daily PRN José Miguel Gamboa MD        acetaminophen (TYLENOL) tablet 650 mg  650 mg Oral Q6H PRN José Miguel Gamboa MD        Or    acetaminophen (TYLENOL) suppository 650 mg  650 mg Rectal Q6H PRN Arnaldo Martínez MD        albuterol (PROVENTIL) nebulizer solution 2.5 mg  2.5 mg Nebulization Q2H PRN José Miguel Gamboa MD        ipratropium-albuterol (DUONEB) nebulizer solution 1 ampule  1 ampule Inhalation Q4H WA José Miguel Gamboa MD   1 ampule at 01/22/21 1515    methylPREDNISolone sodium (SOLU-MEDROL) injection 40 mg  40 mg Intravenous Q6H Arnaldo Martínez MD   40 mg at 01/22/21 1721    Followed by   Sima Villalta ON 1/23/2021] predniSONE (DELTASONE) tablet 40 mg  40 mg Oral Daily rAnaldo Martínez MD        azithromycin (ZITHROMAX) 500 mg in D5W 250ml addavial  500 mg Intravenous Q24H José Miguel Gamboa MD   Stopped at 01/22/21 0945    aspirin EC tablet 81 mg  81 mg Oral Daily José Miguel Gamboa MD   81 mg at 01/22/21 0801    naproxen (NAPROSYN) tablet 500 mg  500 mg Oral BID José Miguel Gamboa MD   500 mg at 01/22/21 0801    metoprolol tartrate (LOPRESSOR) tablet 50 mg  50 mg Oral BID José Miguel Gamboa MD   50 mg at 01/22/21 0801    nitroGLYCERIN (NITROSTAT) SL tablet 0.4 mg  0.4 mg Sublingual Q5 Min PRN José Miguel Gamboa MD        ranolazine [facility]) extended release tablet 500 mg  500 mg Oral BID José Miguel Gamboa MD   500 mg at 01/22/21 0801    atorvastatin (LIPITOR) tablet 20 mg  20 mg Oral Nightly José Miguel Gamboa MD   20 mg at 01/21/21 2153    spironolactone (ALDACTONE) tablet 25 mg  25 mg Oral Nightly Arnaldo S MD Norman   25 mg at 01/21/21 2153    furosemide (LASIX) tablet 40 mg  40 mg Oral Daily Yuniel Capps MD   40 mg at 01/22/21 0801    gabapentin (NEURONTIN) capsule 300 mg  300 mg Oral Nightly PRN Yuniel Capps MD        lisinopril (PRINIVIL;ZESTRIL) tablet 30 mg  30 mg Oral Daily Yuniel Capps MD   30 mg at 01/22/21 1435    enoxaparin (LOVENOX) injection 30 mg  30 mg Subcutaneous BID Yuniel Capps MD   30 mg at 01/22/21 0801       Social History:    Social History     Socioeconomic History    Marital status: Single     Spouse name: None    Number of children: None    Years of education: None    Highest education level: None   Occupational History    None   Social Needs    Financial resource strain: None    Food insecurity     Worry: None     Inability: None    Transportation needs     Medical: None     Non-medical: None   Tobacco Use    Smoking status: Former Smoker     Packs/day: 0.00     Years: 40.00     Pack years: 0.00    Smokeless tobacco: Current User     Types: Chew    Tobacco comment: Chews / quit cigarettes   Substance and Sexual Activity    Alcohol use:  Yes     Alcohol/week: 1.0 standard drinks     Types: 1 Glasses of wine per week     Frequency: 2-3 times a week     Drinks per session: 1 or 2     Binge frequency: Never    Drug use: No    Sexual activity: None   Lifestyle    Physical activity     Days per week: None     Minutes per session: None    Stress: None   Relationships    Social connections     Talks on phone: None     Gets together: None     Attends Sabianism service: None     Active member of club or organization: None     Attends meetings of clubs or organizations: None     Relationship status: None    Intimate partner violence     Fear of current or ex partner: None     Emotionally abused: None     Physically abused: None     Forced sexual activity: None   Other Topics Concern    None   Social History Narrative    None       Family History:    Family History   Problem Relation Age of Onset    Kidney Disease Mother     Diabetes Mother     Heart Disease Mother     Arthritis Mother     Heart Disease Father     Arthritis Sister     Diabetes Sister     Heart Disease Sister        Review of Systems:      · Constitutional: no weight loss or gain, no fever or chills. · Eyes: No new visual changes. · ENT: No new hearing loss. · Cardiovascular: see HPI. · Respiratory: No cough. No hemoptysis. · Gastrointestinal: No abdominal pain, no blood in stools. · Genitourinary: No hematuria or increased frequency. · Musculoskeletal:  No new gait disturbance. · Integumentary: No rash or pruritis. · Neurological: No headache. No seizures or recent CVA/TIA. · Psychiatric: No anxiety or depression. · Hematologic/Lymphatic: No abnormal bruising or bleeding. · Allergic/Immunologic: No new allergies. Physical Exam   Vital Signs: /66   Pulse 99   Temp 98.1 °F (36.7 °C) (Oral)   Resp 18   Ht 5' 5\" (1.651 m)   Wt 274 lb 14.6 oz (124.7 kg)   SpO2 96%   BMI 45.75 kg/m²        Admission Weight: 275 lb (124.7 kg)     General appearance: Awake, Alert, well developed, well nourished, pleasant. Cooperative. Head: Normocephalic, atraumatic. Eyes: PERRL, EOM intact. Neck: no JVD, no carotid bruits, no thyromegaly. Chest: Clear bilaterally. No chest wall tenderness. No wheezing. Cardiac: Regular rate and rhythm, no S3 or S4 gallop, no significant audible murmur or rubs or clicks. Abdomen:  Obese. Soft, non-tender. Extremities: no cyanosis or clubbing or leg edema. No calf tenderness. Pulses:  Bilaterally symmetrical and intact radial pulses. Neurologic:  Able to move all 4 extremities. Skin:  No rashes. Warm and dry.       EKG 1/21/2021:     Sinus tachycardia   Nonspecific ST and T wave abnormality   Abnormal ECG   When compared with ECG of 27-SEP-2015 09:05,   No significant change was found      Labs:      CBC:   Recent Labs     01/21/21 0350 01/22/21  0529   WBC 7.4 12.6*   HGB 12.7* 12.4*   HCT 37.5* 36.9*   MCV 86.5 87.2    251     BMP:   Recent Labs     01/21/21  0350 01/22/21  0529    137   K 4.1 4.5    100   CO2 24 25   BUN 21 28*   CREATININE 0.87 1.10     MAG:   Recent Labs     01/21/21  0350   MG 2.1       Recent Results (from the past 24 hour(s))   Basic Metabolic Panel w/ Reflex to MG    Collection Time: 01/22/21  5:29 AM   Result Value Ref Range    Glucose 208 (H) 70 - 99 mg/dL    BUN 28 (H) 8 - 23 mg/dL    CREATININE 1.10 0.70 - 1.20 mg/dL    Bun/Cre Ratio NOT REPORTED 9 - 20    Calcium 9.0 8.6 - 10.4 mg/dL    Sodium 137 135 - 144 mmol/L    Potassium 4.5 3.7 - 5.3 mmol/L    Chloride 100 98 - 107 mmol/L    CO2 25 20 - 31 mmol/L    Anion Gap 12 9 - 17 mmol/L    GFR Non-African American >60 >60 mL/min    GFR African American >60 >60 mL/min    GFR Comment          GFR Staging NOT REPORTED    CBC    Collection Time: 01/22/21  5:29 AM   Result Value Ref Range    WBC 12.6 (H) 3.5 - 11.0 k/uL    RBC 4.23 (L) 4.5 - 5.9 m/uL    Hemoglobin 12.4 (L) 13.5 - 17.5 g/dL    Hematocrit 36.9 (L) 41 - 53 %    MCV 87.2 80 - 100 fL    MCH 29.3 26 - 34 pg    MCHC 33.6 31 - 37 g/dL    RDW 13.4 11.5 - 14.9 %    Platelets 048 842 - 359 k/uL    MPV 8.6 6.0 - 12.0 fL    NRBC Automated NOT REPORTED per 100 WBC   Lipid Panel    Collection Time: 01/22/21  5:29 AM   Result Value Ref Range    Cholesterol 191 <200 mg/dL    HDL 46 >40 mg/dL    LDL Cholesterol 117 0 - 130 mg/dL    Chol/HDL Ratio 4.2 <5    Triglycerides 138 <150 mg/dL    VLDL NOT REPORTED 1 - 30 mg/dL   TSH with Reflex    Collection Time: 01/22/21  5:29 AM   Result Value Ref Range    TSH 0.41 0.30 - 5.00 mIU/L   Troponin    Collection Time: 01/22/21 11:00 AM   Result Value Ref Range    Troponin, High Sensitivity 7 0 - 22 ng/L    Troponin T NOT REPORTED <0.03 ng/mL    Troponin Interp NOT REPORTED    Hemoglobin A1c    Collection Time: 01/22/21 11:00 AM   Result Value Ref Range    Hemoglobin A1C 5.7 4.0 - 6.0 %    Estimated Avg Glucose 117 mg/dL   POC Glucose Fingerstick    Collection Time: 01/22/21 11:49 AM   Result Value Ref Range    POC Glucose 345 (H) 75 - 110 mg/dL   POC Glucose Fingerstick    Collection Time: 01/22/21  3:58 PM   Result Value Ref Range    POC Glucose 261 (H) 75 - 110 mg/dL         2 D ECHOCARDIOGRAM:      Ordered. IMPRESSION:    Shortness of breath due to underlying lung disease. No fluid overload clinically. Ruled out for myocardial infarction. Noncritical coronary artery disease with only 30% LAD disease on cardiac catheterization in 2007. Serial high sensitivity troponin negative. Chronic systolic heart failure with mild left ventricular systolic dysfunction with LVEF 45-50%. History of idiopathic cardiomyopathy. Valvular heart disease including mild-to-moderate mitral and tricuspid regurgitation, mild to moderate pulmonary hypertension. Abnormal EKG. Severe muscle spasm with the mid low back protruding across to the friend discomfort. Workup as charted. No vascular abnormality with no acute findings on CT of the chest/abdomen /pelvis. Essential hypertension. Hyperlipidemia. Lipid profile revealed total cholesterol 191, , HDL 46, triglyceride 138. Chronic tobacco chewing. Suspected COPD. Morbid obesity with BMI 45 range. Other problems as charted. REC/PLAN:      As ordered. Continue on aspirin 81 mg daily, lisinopril, metoprolol 50 mg b.i.d., atorvastatin 20 mg daily, subcutaneous Lovenox, oral Lasix 40 mg daily, spironolactone 25 mg daily, potassium supplement, other supportive care as you are doing. Ordered a 2D echocardiogram to reassess LV systolic function and reassess valvular heart disease. Will order a Lexiscan nuclear stress test to rule out any stress-induced myocardial ischemia. Continued aggressive cardiac risk factor modification.       Other supportive care as you are doing. Discussed with the Charge nurse. Will follow. Thank you once again for your kind consultation. Electronically signed by Darin Wagner MD, [address]      PLEASE NOTE:  This progress note was completed using a voice transcription system. Every effort was made to ensure accuracy. However, inadvertent computerized transcription errors may be present.

## 2021-01-22 NOTE — CARE COORDINATION
CASE MANAGEMENT NOTE:    Admission Date:  1/21/2021 Eugenia Hagan is a 63 y.o.  male    Admitted for : COPD exacerbation ([facility] [address] [J44.1]    Met with:  Patient    PCP:  Steven Corrigan                                Insurance:  Disha Minangozistephane Mcnulty       Current Residence/ Living Arrangements:  independently at home           Current Services PTA:  No    Is patient agreeable to VNS: No    Freedom of choice provided:  Yes    List of [address] provided: NA    VNS chosen:  NA    DME:  Cane as needed, CPAP and nebulizer    Home Oxygen: No    Nebulizer: Yes    CPAP/BIPAP: Yes    Supplier: Unknown     Potential Assistance Needed: Will follow    SNF needed: No    Freedom of choice and list provided: NA    Pharmacy:  [facility]       Does Patient want to use MEDS to BEDS? No    Is patient currently receiving oral anticoagulation therapy? No    Is the Patient an [facility] with Readmission Risk Score greater than 14%? No  If yes, pt needs a follow up appointment made within 7 days. Family Members/Caregivers that pt would like involved in their care:    Yes    If yes, list name here:  Maria Bee     Transportation Provider:  Patient                 Discharge Plan:  1/22/2021 Fredimatthieu Enriquezngozistephane Pricila; From 2 story home with livable first alone- independent and drives; DME- cane as needed, CPAP and nebulizer; Declines VNS; IV zithromax and 40mg Q6 hours solu-medrol; Cardiology and Pulmonology consulted//AFSHAN                 Electronically signed by:  Tamra Wei RN on 1/22/2021 at 10:41 AM

## 2021-01-22 NOTE — PROGRESS NOTES
Echocardiogram noted small drop in ejection fraction since 2015 from 55 to 60% to 45 to 50% pending cardiology input  Alize Marcus  1/22/2021

## 2021-01-22 NOTE — PROGRESS NOTES
BRONCHOSPASM/BRONCHOCONSTRICTION     [x]         IMPROVE AERATION/BREATH SOUNDS  [x]   ADMINISTER BRONCHODILATOR THERAPY AS APPROPRIATE  [x]   ASSESS BREATH SOUNDS  []   IMPLEMENT AEROSOL/MDI PROTOCOL  [x]   PATIENT EDUCATION AS NEEDED    NONINVASIVE VENTILATION    PROVIDE OPTIMAL VENTILATION/ACCEPTABLE SPO2   IMPLEMENT NONINVASIVE VENTILATION PROTOCOL   MAINTAIN ACCEPTABLE SPO2   ASSESS SKIN INTEGRITY/BREAKDOWN SCORE   PATIENT EDUCATION AS NEEDED   BIPAP AS NEEDED        PROVIDE ADEQUATE OXYGENATION WITH ACCEPTABLE SP02/ABG'S    [x]  IDENTIFY APPROPRIATE OXYGEN THERAPY  [x]   MONITOR SP02/ABG'S AS NEEDED   [x]   PATIENT EDUCATION AS NEEDED    Pt currently on room air, pt did wear bipap overnight. SpO2 on room air 100% . Pt diminished throughout . Pt does have CPAP at home however claims he is unable to afford new tubing, can we help?

## 2021-01-22 NOTE — PLAN OF CARE
Problem: Falls - Risk of:  Goal: Will remain free from falls  Outcome: Met This Shift: Patient remained free from falls and injury per shift; call light within reach, bed kept at lowest position, pathway clear, hourly rounds implemented.

## 2021-01-22 NOTE — PROGRESS NOTES
[facility]. Testing will last about 45 minutes. Any questions, please call [phone].

## 2021-01-22 NOTE — PROGRESS NOTES
History and Physical Service  [facility] Internal Medicine    progres note              Date:   1/22/2021  Patient name:  Cody Shultz  MRN:   [id number]  Account:  [de-identified]  YOB: 1960  PCP:    Yamel Marion MD  Code Status:    Full Code    Chief Complaint:     Cough wheezing and dyspnea    History Obtained From:     Patient ,medical record ,nursing staff    History of Present Illnes       The patient is a 63 y.o. Non-/non  male who presents   COPD: Patient complains of dyspnea. Symptoms began 7 days ago. Symptoms acute dyspnea does worsen with exertion. Sputum is clear in small amounts. Fever has been low grade fevers. Patient uses 0 pillows at night. Patient can walk 10 feet before resting. Patient currently is not on home oxygen therapy. .   Breathing improved denies any chest pain    Past Medical History:   Diagnosis Date    CHF (congestive heart failure) (HCC)     Chronic back pain     COPD (chronic obstructive pulmonary disease) ([address]     Headache(784.0)     Hypertension     Low back pain with sciatica     Osteoarthritis     Sleep apnea     with cpap        Past Surgical History:     Past Surgical History:   Procedure Laterality Date   [address]   exact date unknown        Medications Prior to Admission:     Prior to Admission medications    Medication Sig Start Date End Date Taking?  Authorizing Provider   gabapentin (NEURONTIN) 300 MG capsule TAKE 1 CAPSULE BY MOUTH NIGHTLY AS NEEDED (PAIN) 12/10/20 12/10/21 Yes Yamel Marion MD   potassium chloride (KLOR-CON M) 20 MEQ extended release tablet TAKE ONE TABLET BY MOUTH TWICE A DAY 12/9/20  Yes Yamel Marion MD   metoprolol tartrate (LOPRESSOR) 50 MG tablet Take 1 tablet by mouth 2 times daily 12/8/20  Yes Yamel Marion MD   nitroGLYCERIN (NITROSTAT) 0.4 MG SL tablet Place 1 tablet under the tongue every 5 minutes as needed for Chest pain 12/8/20  Yes Yamel Marion MD ranolazine (RANEXA) 500 MG extended release tablet Take 1 tablet by mouth 2 times daily 12/8/20  Yes Fernandez Castaneda MD   simvastatin (ZOCOR) 20 MG tablet Take 1 tablet by mouth nightly 12/8/20  Yes Fernandez Castaneda MD   spironolactone (ALDACTONE) 25 MG tablet Take 1 tablet by mouth daily  Patient taking differently: Take 25 mg by mouth nightly  12/8/20  Yes Fernandez Castaneda MD   furosemide (LASIX) 40 MG tablet Take 1 tablet by mouth daily 12/8/20  Yes Fernandez Castaneda MD   COMBIVENT RESPIMAT  MCG/ACT AERS inhaler INHALE 1 PUFF INTO THE LUNGS EVERY 6 HOURS 12/1/20  Yes Fernandez Castaneda MD   lisinopril (PRINIVIL;ZESTRIL) 30 MG tablet TAKE 1 TABLET BY MOUTH DAILY 12/1/20  Yes Fernandez Castaneda MD   albuterol sulfate HFA (PROAIR HFA) 108 (90 Base) MCG/ACT inhaler Inhale 2 puffs into the lungs every 6 hours as needed for Wheezing 12/1/20  Yes Fernandez Castaneda MD   albuterol (PROVENTIL) (5 MG/ML) 0.5% nebulizer solution Take 0.5 mLs by nebulization every 6 hours as needed for Wheezing or Shortness of Breath 9/25/20  Yes Donato Powell MD   naproxen (NAPROSYN) 500 MG tablet TAKE 1 TABLET BY MOUTH 2 TIMES DAILY FOR 20 DAYS 8/20/20 1/21/21 Yes Fernandez Castaneda MD   aspirin 81 MG tablet Take 1 tablet by mouth daily 8/13/19  Yes Fernandez Castaneda MD   furosemide (LASIX) 40 MG tablet TAKE 1 TABLET BY MOUTH DAILY 12/9/20   Fernandez Castaneda MD   Masks ([facility]) [address] 1 each by Does not apply route as needed (as needed) 10/5/20   Fernandez Castaneda MD        Allergies:     Penicillins, Sulfa antibiotics, Sulfasalazine, Pseudoeph-doxylamine-dm-apap, and Rabbit protein    Social History:     Tobacco:    reports that he has quit smoking. He smoked 0.00 packs per day for 40.00 years. His smokeless tobacco use includes chew. Alcohol:      reports current alcohol use of about 1.0 standard drinks of alcohol per week. Drug Use:  reports no history of drug use.     Family History:     Family History   Problem Relation Age of Onset    Kidney Disease Mother    Bob Ledezmas Diabetes Mother     Heart Disease Mother     Arthritis Mother     Heart Disease Father     Arthritis Sister     Diabetes Sister     Heart Disease Sister        Review of Systems:     Positive and Negative as described in HPI. CONSTITUTIONAL:  negative for fevers, chills, sweats, fatigue, weight loss  HEENT:  negative for vision, hearing changes, runny nose, throat pain  RESPIRATORY: Positive for cough wheezing and dyspnea improved CARDIOVASCULAR:  negative for chest pain, palpitations. GASTROINTESTINAL:  negative for nausea, vomiting, diarrhea, constipation, change in bowel habits, abdominal pain   GENITOURINARY:  negative for difficulty of urination, burning with urination, frequency   INTEGUMENT:  negative for rash, skin lesions, easy bruising   HEMATOLOGIC/LYMPHATIC:  negative for swelling/edema   ALLERGIC/IMMUNOLOGIC:  negative for urticaria , itching  ENDOCRINE:  negative increase in drinking, increase in urination, hot or cold intolerance  MUSCULOSKELETAL:  negative joint pains, muscle aches, swelling of joints  NEUROLOGICAL:  negative for headaches, dizziness, lightheadedness, numbness, pain, tingling extremities  BEHAVIOR/PSYCH:  negative for depression, anxiety    Physical Exam:   /76   Pulse 102   Temp 97.7 °F (36.5 °C) (Oral)   Resp 20   Ht 5' 5\" (1.651 m)   Wt 274 lb 14.6 oz (124.7 kg)   SpO2 95%   BMI 45.75 kg/m²   No results for input(s): POCGLU in the last 72 hours. Morbid obesity BMI 45 weighs 275 pounds  General Appearance:  alert, well appearing, and in no acute distress  Mental status: oriented to person, place, and time with normal affect  Head:  normocephalic, atraumatic.   Eye: no icterus, redness, pupils equal and reactive, extraocular eye movements intact, conjunctiva clear  Ear: normal external ear, no discharge, hearing intact  Nose:  no drainage noted  Mouth: mucous membranes moist  Neck: supple, no carotid bruits, thyroid not palpable  Lungs: Good air entry no wheezing cardiovascular: normal rate, regular rhythm, no murmur, gallop, rub.   Abdomen: Soft, nontender, nondistended, normal bowel sounds, no hepatomegaly or splenomegaly  Neurologic: There are no new focal motor or sensory deficits, normal muscle tone and bulk, no abnormal sensation, normal speech, cranial nerves II through XII grossly intact  Skin: No gross lesions, rashes, bruising or bleeding on exposed skin area  Extremities:  peripheral pulses palpable, no pedal edema or calf pain with palpation  Psych: normal affect     Investigations:      Laboratory Testing:  Recent Results (from the past 24 hour(s))   Basic Metabolic Panel w/ Reflex to MG    Collection Time: 01/22/21  5:29 AM   Result Value Ref Range    Glucose 208 (H) 70 - 99 mg/dL    BUN 28 (H) 8 - 23 mg/dL    CREATININE 1.10 0.70 - 1.20 mg/dL    Bun/Cre Ratio NOT REPORTED 9 - 20    Calcium 9.0 8.6 - 10.4 mg/dL    Sodium 137 135 - 144 mmol/L    Potassium 4.5 3.7 - 5.3 mmol/L    Chloride 100 98 - 107 mmol/L    CO2 25 20 - 31 mmol/L    Anion Gap 12 9 - 17 mmol/L    GFR Non-African American >60 >60 mL/min    GFR African American >60 >60 mL/min    GFR Comment          GFR Staging NOT REPORTED    CBC    Collection Time: 01/22/21  5:29 AM   Result Value Ref Range    WBC 12.6 (H) 3.5 - 11.0 k/uL    RBC 4.23 (L) 4.5 - 5.9 m/uL    Hemoglobin 12.4 (L) 13.5 - 17.5 g/dL    Hematocrit 36.9 (L) 41 - 53 %    MCV 87.2 80 - 100 fL    MCH 29.3 26 - 34 pg    MCHC 33.6 31 - 37 g/dL    RDW 13.4 11.5 - 14.9 %    Platelets 895 125 - 426 k/uL    MPV 8.6 6.0 - 12.0 fL    NRBC Automated NOT REPORTED per 100 WBC   Lipid Panel    Collection Time: 01/22/21  5:29 AM   Result Value Ref Range    Cholesterol 191 <200 mg/dL    HDL 46 >40 mg/dL    LDL Cholesterol 117 0 - 130 mg/dL    Chol/HDL Ratio 4.2 <5    Triglycerides 138 <150 mg/dL    VLDL NOT REPORTED 1 - 30 mg/dL   TSH with Reflex    Collection Time: 01/22/21  5:29 AM   Result Value Ref Range    TSH 0.41 0.30 - 5.00 mIU/L Recent Labs     01/22/21  0529 01/21/21  0350   HGB 12.4* 12.7*   HCT 36.9* 37.5*   WBC 12.6* 7.4   MCV 87.2 86.5    139   K 4.5 4.1    102   CO2 25 24   BUN 28* 21   CREATININE 1.10 0.87   GLUCOSE 208* 198*   AST  --  19   ALT  --  27   LABALBU  --  3.9       Hematology:  Recent Labs     01/21/21 0350 01/22/21  0529   WBC 7.4 12.6*   RBC 4.34* 4.23*   HGB 12.7* 12.4*   HCT 37.5* 36.9*   MCV 86.5 87.2   MCH 29.3 29.3   MCHC 33.9 33.6   RDW 13.8 13.4    251   MPV 8.3 8.6     Chemistry:  Recent Labs     01/21/21 0350 01/21/21  0550 01/22/21  0529     --  137   K 4.1  --  4.5     --  100   CO2 24  --  25   GLUCOSE 198*  --  208*   BUN 21  --  28*   CREATININE 0.87  --  1.10   MG 2.1  --   --    ANIONGAP 13  --  12   LABGLOM >60  --  >60   GFRAA >60  --  >60   CALCIUM 9.1  --  9.0   PROBNP 228  --   --    TROPONINT NOT REPORTED NOT REPORTED  --      Recent Labs     01/21/21 0350 01/22/21  0529   PROT 6.9  --    LABALBU 3.9  --    TSH  --  0.41   AST 19  --    ALT 27  --    ALKPHOS 66  --    BILITOT <0.15*  --    BILIDIR <0.08  --    LIPASE 29  --    CHOL  --  191   HDL  --  46   LDLCHOLESTEROL  --  117   CHOLHDLRATIO  --  4.2   TRIG  --  138   VLDL  --  NOT REPORTED       Imaging/Diagnostics:       Cta Chest Abdomen Pelvis W Contrast    Result Date: 1/21/2021  EXAMINATION: CTA OF THE CHEST, ABDOMEN AND PELVIS WITH CONTRAST 1/21/2021 5:14 am TECHNIQUE: CTA of the chest, abdomen and pelvis was performed after the administration of intravenous contrast.  Multiplanar reformatted images are provided for review. MIP images are provided for review. Dose modulation, iterative reconstruction, and/or weight based adjustment of the mA/kV was utilized to reduce the radiation dose to as low as reasonably achievable.  COMPARISON: None HISTORY: ORDERING SYSTEM PROVIDED HISTORY: chest radiating to back pain TECHNOLOGIST PROVIDED HISTORY: chest radiating to back pain Decision Support Exception->Emergency Medical Condition (MA) Reason for Exam: chest pain, back pain, SOB, pt has a hx of hypertension Acuity: Acute Type of Exam: Initial Relevant Medical/Surgical History: no known surgeries to area of interest FINDINGS: Chest: Mediastinum: Pre and postcontrast evaluation with thoracic aorta demonstrates normal caliber, attenuation, and enhancement. No evidence of dissection no mediastinal lymphadenopathy. Heart size normal limits. No pericardial effusion. Trachea and esophagus are unremarkable. Visualized pulmonary vasculature is unremarkable. Lungs/pleura: No pleural effusion or pneumothorax. No focal consolidation or pulmonary edema. Airways are patent. Soft Tissues/Bones: No acute findings. Abdomen/Pelvis: Organs: The visualized portions of the liver, gallbladder, spleen, pancreas and adrenal glands demonstrate no acute abnormality. No biliary ductal dilatation. The kidneys appear normal in size and demonstrate symmetric enhancement. No focal renal mass. No hydronephrosis. No perinephric stranding. GI/Bowel: No bowel dilatation to suggest obstruction. No evidence of acute appendicitis. Pelvis: The urinary bladder appears unremarkable. The pelvic organs demonstrate no acute abnormality. Peritoneum/Retroperitoneum: Pre and post-contrast evaluation of the abdominal aorta demonstrates normal caliber, attenuation, and enhancement pattern. No evidence of dissection. Celiac axis, SMA, renal arteries, iliac vessels and proximal femoral vessels are patent. Note is made of variant celiac axis anatomy with aortic origin of splenic and right hepatic arteries. Bones/Soft Tissues: No acute findings     No vascular abnormality. Otherwise, no acute findings in the chest, abdomen, or pelvis.           Current Facility-Administered Medications   Medication Dose Route Frequency Provider Last Rate Last Admin    glucose (GLUTOSE) 40 % oral gel 15 g  15 g Oral PRN Gauri Storey MD        dextrose Daya Marin MD        azithromycin (ZITHROMAX) 500 mg in D5W 250ml addavial  500 mg Intravenous Q24H Rachelle Carvajal MD   Stopped at 01/22/21 0945    aspirin EC tablet 81 mg  81 mg Oral Daily Rachelle Carvajal MD   81 mg at 01/22/21 0801    naproxen (NAPROSYN) tablet 500 mg  500 mg Oral BID Rachelle Carvajal MD   500 mg at 01/22/21 0801    potassium chloride (KLOR-CON M) extended release tablet 20 mEq  20 mEq Oral BID Rachelle Carvajal MD   20 mEq at 01/22/21 0801    metoprolol tartrate (LOPRESSOR) tablet 50 mg  50 mg Oral BID Rachelle Carvajal MD   50 mg at 01/22/21 0801    nitroGLYCERIN (NITROSTAT) SL tablet 0.4 mg  0.4 mg Sublingual Q5 Min PRN Rachelle Carvajal MD        ranolazine [facility]) extended release tablet 500 mg  500 mg Oral BID Rachelle Carvajal MD   500 mg at 01/22/21 0801    atorvastatin (LIPITOR) tablet 20 mg  20 mg Oral Nightly Rachelle Carvajal MD   20 mg at 01/21/21 2153    spironolactone (ALDACTONE) tablet 25 mg  25 mg Oral Nightly Rcahelle Carvajal MD   25 mg at 01/21/21 2153    furosemide (LASIX) tablet 40 mg  40 mg Oral Daily Rachelle Carvajal MD   40 mg at 01/22/21 0801    gabapentin (NEURONTIN) capsule 300 mg  300 mg Oral Nightly PRN Rachelle Carvajal MD        lisinopril (PRINIVIL;ZESTRIL) tablet 30 mg  30 mg Oral Daily Rachelle Carvajal MD   30 mg at 01/21/21 1315    enoxaparin (LOVENOX) injection 30 mg  30 mg Subcutaneous BID Rachelle Carvajal MD   30 mg at 01/22/21 8061     Impressions :      1. Active Problems:    COPD exacerbation ([facility] [address]  Resolved Problems:    * No resolved hospital problems. *        2.  has a past medical history of CHF (congestive heart failure) (HCC), Chronic back pain, COPD (chronic obstructive pulmonary disease) ([address] Headache(784.0), Hypertension, Low back pain with sciatica, Osteoarthritis, and Sleep apnea.      Plans:     Acute exacerbation of her COPD  Long-term smoker smoked over 20 years  IV steroids DuoNeb  Antibiotics  Case discussed with pulmonologist  Jan 22  Breathing is improving with IV steroids and DuoNeb denies any chest pain  Echocardiogram done result is pending  Cardio input noted  Dc plan today if ok with cardio dr Jackie Baez MD  1/22/2021  10:41 AM

## 2021-01-23 ENCOUNTER — APPOINTMENT (OUTPATIENT)
Dept: NUCLEAR MEDICINE | Age: 61
DRG: 140 | End: 2021-01-23
Payer: COMMERCIAL

## 2021-01-23 LAB
GLUCOSE BLD-MCNC: 160 MG/DL (ref 75–110)
GLUCOSE BLD-MCNC: 163 MG/DL (ref 75–110)
GLUCOSE BLD-MCNC: 184 MG/DL (ref 75–110)
GLUCOSE BLD-MCNC: 270 MG/DL (ref 75–110)
LV EF: 39 %
LVEF MODALITY: NORMAL
TROPONIN INTERP: NORMAL
TROPONIN T: NORMAL NG/ML
TROPONIN, HIGH SENSITIVITY: 7 NG/L (ref 0–22)

## 2021-01-23 PROCEDURE — 94761 N-INVAS EAR/PLS OXIMETRY MLT: CPT

## 2021-01-23 PROCEDURE — 2580000003 HC RX 258: Performed by: INTERNAL MEDICINE

## 2021-01-23 PROCEDURE — 2060000000 HC ICU INTERMEDIATE R&B

## 2021-01-23 PROCEDURE — 6360000002 HC RX W HCPCS: Performed by: INTERNAL MEDICINE

## 2021-01-23 PROCEDURE — 93017 CV STRESS TEST TRACING ONLY: CPT

## 2021-01-23 PROCEDURE — 94660 CPAP INITIATION&MGMT: CPT

## 2021-01-23 PROCEDURE — 6370000000 HC RX 637 (ALT 250 FOR IP): Performed by: INTERNAL MEDICINE

## 2021-01-23 PROCEDURE — 36415 COLL VENOUS BLD VENIPUNCTURE: CPT

## 2021-01-23 PROCEDURE — 3430000000 HC RX DIAGNOSTIC RADIOPHARMACEUTICAL: Performed by: INTERNAL MEDICINE

## 2021-01-23 PROCEDURE — 82947 ASSAY GLUCOSE BLOOD QUANT: CPT

## 2021-01-23 PROCEDURE — 78452 HT MUSCLE IMAGE SPECT MULT: CPT

## 2021-01-23 PROCEDURE — 94640 AIRWAY INHALATION TREATMENT: CPT

## 2021-01-23 PROCEDURE — 84484 ASSAY OF TROPONIN QUANT: CPT

## 2021-01-23 PROCEDURE — 99232 SBSQ HOSP IP/OBS MODERATE 35: CPT | Performed by: INTERNAL MEDICINE

## 2021-01-23 PROCEDURE — A9500 TC99M SESTAMIBI: HCPCS | Performed by: INTERNAL MEDICINE

## 2021-01-23 RX ORDER — NITROGLYCERIN 0.4 MG/1
0.4 TABLET SUBLINGUAL EVERY 5 MIN PRN
Status: ACTIVE | OUTPATIENT
Start: 2021-01-23 | End: 2021-01-23

## 2021-01-23 RX ORDER — METOPROLOL TARTRATE 5 MG/5ML
5 INJECTION INTRAVENOUS EVERY 5 MIN PRN
Status: ACTIVE | OUTPATIENT
Start: 2021-01-23 | End: 2021-01-23

## 2021-01-23 RX ORDER — SODIUM CHLORIDE 0.9 % (FLUSH) 0.9 %
10 SYRINGE (ML) INJECTION PRN
Status: DISCONTINUED | OUTPATIENT
Start: 2021-01-23 | End: 2021-01-25 | Stop reason: HOSPADM

## 2021-01-23 RX ORDER — ATROPINE SULFATE 0.1 MG/ML
0.5 INJECTION INTRAVENOUS EVERY 5 MIN PRN
Status: ACTIVE | OUTPATIENT
Start: 2021-01-23 | End: 2021-01-23

## 2021-01-23 RX ORDER — SODIUM CHLORIDE 0.9 % (FLUSH) 0.9 %
10 SYRINGE (ML) INJECTION PRN
Status: ACTIVE | OUTPATIENT
Start: 2021-01-23 | End: 2021-01-23

## 2021-01-23 RX ORDER — SODIUM CHLORIDE 9 MG/ML
500 INJECTION, SOLUTION INTRAVENOUS CONTINUOUS PRN
Status: ACTIVE | OUTPATIENT
Start: 2021-01-23 | End: 2021-01-23

## 2021-01-23 RX ORDER — AMINOPHYLLINE DIHYDRATE 25 MG/ML
50 INJECTION, SOLUTION INTRAVENOUS PRN
Status: DISCONTINUED | OUTPATIENT
Start: 2021-01-23 | End: 2021-01-23 | Stop reason: CLARIF

## 2021-01-23 RX ADMIN — FAMOTIDINE 20 MG: 20 TABLET ORAL at 21:11

## 2021-01-23 RX ADMIN — TETRAKIS(2-METHOXYISOBUTYLISOCYANIDE)COPPER(I) TETRAFLUOROBORATE 38.3 MILLICURIE: 1 INJECTION, POWDER, LYOPHILIZED, FOR SOLUTION INTRAVENOUS at 09:09

## 2021-01-23 RX ADMIN — INSULIN LISPRO 3 UNITS: 100 INJECTION, SOLUTION INTRAVENOUS; SUBCUTANEOUS at 21:11

## 2021-01-23 RX ADMIN — NAPROXEN 500 MG: 250 TABLET ORAL at 21:11

## 2021-01-23 RX ADMIN — INSULIN LISPRO 2 UNITS: 100 INJECTION, SOLUTION INTRAVENOUS; SUBCUTANEOUS at 17:35

## 2021-01-23 RX ADMIN — FAMOTIDINE 20 MG: 20 TABLET ORAL at 10:44

## 2021-01-23 RX ADMIN — LISINOPRIL 30 MG: 20 TABLET ORAL at 13:19

## 2021-01-23 RX ADMIN — Medication 10 ML: at 21:09

## 2021-01-23 RX ADMIN — RANOLAZINE 500 MG: 500 TABLET, FILM COATED, EXTENDED RELEASE ORAL at 21:11

## 2021-01-23 RX ADMIN — METOPROLOL TARTRATE 50 MG: 50 TABLET, FILM COATED ORAL at 21:11

## 2021-01-23 RX ADMIN — ENOXAPARIN SODIUM 30 MG: 30 INJECTION SUBCUTANEOUS at 21:11

## 2021-01-23 RX ADMIN — REGADENOSON 0.4 MG: 0.08 INJECTION, SOLUTION INTRAVENOUS at 09:07

## 2021-01-23 RX ADMIN — Medication 10 ML: at 10:45

## 2021-01-23 RX ADMIN — NAPROXEN 500 MG: 250 TABLET ORAL at 10:44

## 2021-01-23 RX ADMIN — ATORVASTATIN CALCIUM 20 MG: 20 TABLET, FILM COATED ORAL at 21:11

## 2021-01-23 RX ADMIN — AZITHROMYCIN MONOHYDRATE 500 MG: 500 INJECTION, POWDER, LYOPHILIZED, FOR SOLUTION INTRAVENOUS at 10:43

## 2021-01-23 RX ADMIN — SPIRONOLACTONE 25 MG: 25 TABLET, FILM COATED ORAL at 21:11

## 2021-01-23 RX ADMIN — PREDNISONE 40 MG: 20 TABLET ORAL at 10:49

## 2021-01-23 RX ADMIN — ENOXAPARIN SODIUM 30 MG: 30 INJECTION SUBCUTANEOUS at 10:44

## 2021-01-23 RX ADMIN — IPRATROPIUM BROMIDE AND ALBUTEROL SULFATE 1 AMPULE: .5; 3 SOLUTION RESPIRATORY (INHALATION) at 10:30

## 2021-01-23 RX ADMIN — IPRATROPIUM BROMIDE AND ALBUTEROL SULFATE 1 AMPULE: .5; 3 SOLUTION RESPIRATORY (INHALATION) at 18:59

## 2021-01-23 RX ADMIN — FUROSEMIDE 40 MG: 40 TABLET ORAL at 10:43

## 2021-01-23 RX ADMIN — TETRAKIS(2-METHOXYISOBUTYLISOCYANIDE)COPPER(I) TETRAFLUOROBORATE 12.5 MILLICURIE: 1 INJECTION, POWDER, LYOPHILIZED, FOR SOLUTION INTRAVENOUS at 07:37

## 2021-01-23 RX ADMIN — Medication 5 ML: at 09:07

## 2021-01-23 RX ADMIN — RANOLAZINE 500 MG: 500 TABLET, FILM COATED, EXTENDED RELEASE ORAL at 10:44

## 2021-01-23 RX ADMIN — ASPIRIN 81 MG: 81 TABLET, COATED ORAL at 10:44

## 2021-01-23 RX ADMIN — Medication 10 ML: at 07:37

## 2021-01-23 RX ADMIN — INSULIN LISPRO 2 UNITS: 100 INJECTION, SOLUTION INTRAVENOUS; SUBCUTANEOUS at 11:23

## 2021-01-23 RX ADMIN — POTASSIUM CHLORIDE 20 MEQ: 20 TABLET, EXTENDED RELEASE ORAL at 10:44

## 2021-01-23 RX ADMIN — METHYLPREDNISOLONE SODIUM SUCCINATE 40 MG: 125 INJECTION, POWDER, FOR SOLUTION INTRAMUSCULAR; INTRAVENOUS at 04:22

## 2021-01-23 RX ADMIN — METOPROLOL TARTRATE 50 MG: 50 TABLET, FILM COATED ORAL at 10:43

## 2021-01-23 RX ADMIN — Medication 10 ML: at 08:42

## 2021-01-23 RX ADMIN — IPRATROPIUM BROMIDE AND ALBUTEROL SULFATE 1 AMPULE: .5; 3 SOLUTION RESPIRATORY (INHALATION) at 14:51

## 2021-01-23 ASSESSMENT — PAIN SCALES - GENERAL
PAINLEVEL_OUTOF10: 2
PAINLEVEL_OUTOF10: 1

## 2021-01-23 NOTE — PROGRESS NOTES
History and Physical Service  [facility] Internal Medicine    progres note              Date:   1/23/2021  Patient name:  Beronica Lovett  MRN:   [id number]  Account:  [de-identified]  YOB: 1960  PCP:    Judy Fry MD  Code Status:    Full Code    Chief Complaint:     Cough wheezing and dyspnea    History Obtained From:     Patient ,medical record ,nursing staff    History of Present Illnes       The patient is a 63 y.o. Non-/non  male who presents   COPD: Patient complains of dyspnea. Symptoms began 7 days ago. Symptoms acute dyspnea does worsen with exertion. Sputum is clear in small amounts. Fever has been low grade fevers. Patient uses 0 pillows at night. Patient can walk 10 feet before resting. Patient currently is not on home oxygen therapy. .   Breathing improved denies any chest pain  COPD at baseline no chest pain patient had a stress test done today  Past Medical History:   Diagnosis Date    [facility]congestive [facility] failure[facility])     Chronic back pain     COPD (chronic obstructive pulmonary disease) ([facility])     Headache(784.0)     Hypertension     Low back pain with sciatica     Osteoarthritis     Sleep apnea     with cpap        Past Surgical History:     Past Surgical History:   Procedure Laterality Date   [address]   exact date unknown        Medications Prior to Admission:     Prior to Admission medications    Medication Sig Start Date End Date Taking?  Authorizing Provider   gabapentin (NEURONTIN) 300 MG capsule TAKE 1 CAPSULE BY MOUTH NIGHTLY AS NEEDED (PAIN) 12/10/20 12/10/21 Yes Judy Fry MD   potassium chloride (KLOR-CON M) 20 MEQ extended release tablet TAKE ONE TABLET BY MOUTH TWICE A DAY 12/9/20  Yes Judy Fry MD   metoprolol tartrate (LOPRESSOR) 50 MG tablet Take 1 tablet by mouth 2 times daily 12/8/20  Yes Judy Fry MD   nitroGLYCERIN (NITROSTAT) 0.4 MG SL tablet Place 1 tablet under the tongue every 5 minutes as needed for Chest pain 12/8/20  Yes Miller Paz MD   ranolazine (RANEXA) 500 MG extended release tablet Take 1 tablet by mouth 2 times daily 12/8/20  Yes Miller Paz MD   simvastatin (ZOCOR) 20 MG tablet Take 1 tablet by mouth nightly 12/8/20  Yes Miller Paz MD   spironolactone (ALDACTONE) 25 MG tablet Take 1 tablet by mouth daily  Patient taking differently: Take 25 mg by mouth nightly  12/8/20  Yes Miller Paz MD   furosemide (LASIX) 40 MG tablet Take 1 tablet by mouth daily 12/8/20  Yes Miller Paz MD   COMBIVENT RESPIMAT  MCG/ACT AERS inhaler INHALE 1 PUFF INTO THE LUNGS EVERY 6 HOURS 12/1/20  Yes Miller Paz MD   lisinopril (PRINIVIL;ZESTRIL) 30 MG tablet TAKE 1 TABLET BY MOUTH DAILY 12/1/20  Yes Miller Paz MD   albuterol sulfate HFA (PROAIR HFA) 108 (90 Base) MCG/ACT inhaler Inhale 2 puffs into the lungs every 6 hours as needed for Wheezing 12/1/20  Yes Miller Paz MD   albuterol (PROVENTIL) (5 MG/ML) 0.5% nebulizer solution Take 0.5 mLs by nebulization every 6 hours as needed for Wheezing or Shortness of Breath 9/25/20  Yes Donato Gamino MD   naproxen (NAPROSYN) 500 MG tablet TAKE 1 TABLET BY MOUTH 2 TIMES DAILY FOR 20 DAYS 8/20/20 1/21/21 Yes Miller Paz MD   aspirin 81 MG tablet Take 1 tablet by mouth daily 8/13/19  Yes Miller Paz MD   furosemide (LASIX) 40 MG tablet TAKE 1 TABLET BY MOUTH DAILY 12/9/20   Miller Paz MD   Masks ([facility]) [address] 1 each by Does not apply route as needed (as needed) 10/5/20   Miller Paz MD        Allergies:     Penicillins, Sulfa antibiotics, Sulfasalazine, Pseudoeph-doxylamine-dm-apap, and Rabbit protein    Social History:     Tobacco:    reports that he has quit smoking. He smoked 0.00 packs per day for 40.00 years. His smokeless tobacco use includes chew. Alcohol:      reports current alcohol use of about 1.0 standard drinks of alcohol per week. Drug Use:  reports no history of drug use.     Family History:     Family History Problem Relation Age of Onset    Kidney Disease Mother     Diabetes Mother     Heart Disease Mother     Arthritis Mother     Heart Disease Father     Arthritis Sister     Diabetes Sister     Heart Disease Sister        Review of Systems:     Positive and Negative as described in HPI. CONSTITUTIONAL:  negative for fevers, chills, sweats, fatigue, weight loss  HEENT:  negative for vision, hearing changes, runny nose, throat pain  RESPIRATORY: Positive for cough wheezing and dyspnea improved CARDIOVASCULAR:  negative for chest pain, palpitations. GASTROINTESTINAL:  negative for nausea, vomiting, diarrhea, constipation, change in bowel habits, abdominal pain   GENITOURINARY:  negative for difficulty of urination, burning with urination, frequency   INTEGUMENT:  negative for rash, skin lesions, easy bruising   HEMATOLOGIC/LYMPHATIC:  negative for swelling/edema   ALLERGIC/IMMUNOLOGIC:  negative for urticaria , itching  ENDOCRINE:  negative increase in drinking, increase in urination, hot or cold intolerance  MUSCULOSKELETAL:  negative joint pains, muscle aches, swelling of joints  NEUROLOGICAL:  negative for headaches, dizziness, lightheadedness, numbness, pain, tingling extremities  BEHAVIOR/PSYCH:  negative for depression, anxiety    Physical Exam:   BP (!) 151/85   Pulse 92   Temp 98.2 °F (36.8 °C) (Oral)   Resp 16   Ht 5' 5\" (1.651 m)   Wt 261 lb 0.4 oz (118.4 kg)   SpO2 98%   BMI 43.44 kg/m²   Recent Labs     01/22/21  1149 01/22/21  1558 01/22/21  2032 01/23/21  0702   POCGLU 345* 261* 257* 160*     Morbid obesity BMI 45 weighs 275 pounds  General Appearance:  alert, well appearing, and in no acute distress  Mental status: oriented to person, place, and time with normal affect  Head:  normocephalic, atraumatic.   Eye: no icterus, redness, pupils equal and reactive, extraocular eye movements intact, conjunctiva clear  Ear: normal external ear, no discharge, hearing intact  Nose:  no drainage noted  Mouth: mucous membranes moist  Neck: supple, no carotid bruits, thyroid not palpable  Lungs: Good air entry no wheezing cardiovascular: normal rate, regular rhythm, no murmur, gallop, rub.   Abdomen: Soft, nontender, nondistended, normal bowel sounds, no hepatomegaly or splenomegaly  Neurologic: There are no new focal motor or sensory deficits, normal muscle tone and bulk, no abnormal sensation, normal speech, cranial nerves II through XII grossly intact  Skin: No gross lesions, rashes, bruising or bleeding on exposed skin area  Extremities:  peripheral pulses palpable, no pedal edema or calf pain with palpation  Psych: normal affect     Investigations:      Laboratory Testing:  Recent Results (from the past 24 hour(s))   Troponin    Collection Time: 01/22/21 11:00 AM   Result Value Ref Range    Troponin, High Sensitivity 7 0 - 22 ng/L    Troponin T NOT REPORTED <0.03 ng/mL    Troponin Interp NOT REPORTED    Hemoglobin A1c    Collection Time: 01/22/21 11:00 AM   Result Value Ref Range    Hemoglobin A1C 5.7 4.0 - 6.0 %    Estimated Avg Glucose 117 mg/dL   POC Glucose Fingerstick    Collection Time: 01/22/21 11:49 AM   Result Value Ref Range    POC Glucose 345 (H) 75 - 110 mg/dL   POC Glucose Fingerstick    Collection Time: 01/22/21  3:58 PM   Result Value Ref Range    POC Glucose 261 (H) 75 - 110 mg/dL   Troponin    Collection Time: 01/22/21  6:05 PM   Result Value Ref Range    Troponin, High Sensitivity 8 0 - 22 ng/L    Troponin T NOT REPORTED <0.03 ng/mL    Troponin Interp NOT REPORTED    POC Glucose Fingerstick    Collection Time: 01/22/21  8:32 PM   Result Value Ref Range    POC Glucose 257 (H) 75 - 110 mg/dL   POC Glucose Fingerstick    Collection Time: 01/23/21  7:02 AM   Result Value Ref Range    POC Glucose 160 (H) 75 - 110 mg/dL   Troponin    Collection Time: 01/23/21  7:03 AM   Result Value Ref Range    Troponin, High Sensitivity 7 0 - 22 ng/L    Troponin T NOT REPORTED <0.03 ng/mL    Troponin Interp NOT REPORTED        Recent Labs     01/22/21 0529 01/21/21 0350   HGB 12.4* 12.7*   HCT 36.9* 37.5*   WBC 12.6* 7.4   MCV 87.2 86.5    139   K 4.5 4.1    102   CO2 25 24   BUN 28* 21   CREATININE 1.10 0.87   GLUCOSE 208* 198*   AST  --  19   ALT  --  27   LABALBU  --  3.9       Hematology:  Recent Labs     01/21/21 0350 01/22/21 0529   WBC 7.4 12.6*   RBC 4.34* 4.23*   HGB 12.7* 12.4*   HCT 37.5* 36.9*   MCV 86.5 87.2   MCH 29.3 29.3   MCHC 33.9 33.6   RDW 13.8 13.4    251   MPV 8.3 8.6     Chemistry:  Recent Labs     01/21/21 0350 01/21/21 0350 01/22/21 0529 01/22/21 1100 01/22/21  1805 01/23/21  0703     --  137  --   --   --    K 4.1  --  4.5  --   --   --      --  100  --   --   --    CO2 24  --  25  --   --   --    GLUCOSE 198*  --  208*  --   --   --    BUN 21  --  28*  --   --   --    CREATININE 0.87  --  1.10  --   --   --    MG 2.1  --   --   --   --   --    ANIONGAP 13  --  12  --   --   --    LABGLOM >60  --  >60  --   --   --    GFRAA >60  --  >60  --   --   --    CALCIUM 9.1  --  9.0  --   --   --    PROBNP 228  --   --   --   --   --    TROPONINT NOT REPORTED   < >  --  NOT REPORTED NOT REPORTED NOT REPORTED    < > = values in this interval not displayed.      Recent Labs     01/21/21 0350 01/22/21 0529 01/22/21 1100 01/22/21  1149 01/22/21  1558 01/22/21 2032 01/23/21  0702   PROT 6.9  --   --   --   --   --   --    LABALBU 3.9  --   --   --   --   --   --    LABA1C  --   --  5.7  --   --   --   --    TSH  --  0.41  --   --   --   --   --    AST 19  --   --   --   --   --   --    ALT 27  --   --   --   --   --   --    ALKPHOS 66  --   --   --   --   --   --    BILITOT <0.15*  --   --   --   --   --   --    BILIDIR <0.08  --   --   --   --   --   --    LIPASE 29  --   --   --   --   --   --    CHOL  --  191  --   --   --   --   --    HDL  --  46  --   --   --   --   --    LDLCHOLESTEROL  --  117  --   --   --   --   --    CHOLHDLRATIO  --  4.2  --   --   -- --   --    TRIG  --  138  --   --   --   --   --    VLDL  --  NOT REPORTED  --   --   --   --   --    POCGLU  --   --   --  345* 261* 257* 160*       Imaging/Diagnostics:       Cta Chest Abdomen Pelvis W Contrast    Result Date: 1/21/2021  EXAMINATION: CTA OF THE CHEST, ABDOMEN AND PELVIS WITH CONTRAST 1/21/2021 5:14 am TECHNIQUE: CTA of the chest, abdomen and pelvis was performed after the administration of intravenous contrast.  Multiplanar reformatted images are provided for review. MIP images are provided for review. Dose modulation, iterative reconstruction, and/or weight based adjustment of the mA/kV was utilized to reduce the radiation dose to as low as reasonably achievable. COMPARISON: None HISTORY: ORDERING SYSTEM PROVIDED HISTORY: chest radiating to back pain TECHNOLOGIST PROVIDED HISTORY: chest radiating to back pain Decision Support Exception->Emergency Medical Condition (MA) Reason for Exam: chest pain, back pain, SOB, pt has a hx of hypertension Acuity: Acute Type of Exam: Initial Relevant Medical/Surgical History: no known surgeries to area of interest FINDINGS: Chest: Mediastinum: Pre and postcontrast evaluation with thoracic aorta demonstrates normal caliber, attenuation, and enhancement. No evidence of dissection no mediastinal lymphadenopathy. Heart size normal limits. No pericardial effusion. Trachea and esophagus are unremarkable. Visualized pulmonary vasculature is unremarkable. Lungs/pleura: No pleural effusion or pneumothorax. No focal consolidation or pulmonary edema. Airways are patent. Soft Tissues/Bones: No acute findings. Abdomen/Pelvis: Organs: The visualized portions of the liver, gallbladder, spleen, pancreas and adrenal glands demonstrate no acute abnormality. No biliary ductal dilatation. The kidneys appear normal in size and demonstrate symmetric enhancement. No focal renal mass. No hydronephrosis. No perinephric stranding.  GI/Bowel: No bowel dilatation to suggest obstruction. No evidence of acute appendicitis. Pelvis: The urinary bladder appears unremarkable. The pelvic organs demonstrate no acute abnormality. Peritoneum/Retroperitoneum: Pre and post-contrast evaluation of the abdominal aorta demonstrates normal caliber, attenuation, and enhancement pattern. No evidence of dissection. Celiac axis, SMA, renal arteries, iliac vessels and proximal femoral vessels are patent. Note is made of variant celiac axis anatomy with aortic origin of splenic and right hepatic arteries. Bones/Soft Tissues: No acute findings     No vascular abnormality. Otherwise, no acute findings in the chest, abdomen, or pelvis.           Current Facility-Administered Medications   Medication Dose Route Frequency Provider Last Rate Last Admin    sodium chloride flush 0.9 % injection 10 mL  10 mL Intravenous PRN Jonatan Sierra MD   5 mL at 01/23/21 0907    0.9 % sodium chloride infusion  500 mL Intravenous Continuous PRN Jonatan Sierra MD        atropine injection 0.5 mg  0.5 mg Intravenous Q5 Min PRN Jonatan Sierra MD        nitroGLYCERIN (NITROSTAT) SL tablet 0.4 mg  0.4 mg Sublingual Q5 Min PRN Jonatan Sierra MD        metoprolol (LOPRESSOR) injection 5 mg  5 mg Intravenous Q5 Min PRN Jonatan Sierra MD        sodium chloride flush 0.9 % injection 10 mL  10 mL Intravenous PRN Jonatan Sierra MD   10 mL at 01/23/21 0737    glucose (GLUTOSE) 40 % oral gel 15 g  15 g Oral PRN Arnaldo Matamoros MD        dextrose 50 % IV solution  12.5 g Intravenous PRN Arnaldo Matamoros MD        glucagon (rDNA) injection 1 mg  1 mg Intramuscular PRN Arnaldo Matamoros MD        dextrose 5 % solution  100 mL/hr Intravenous PRN Lorenso Dakins, MD        insulin lispro (HUMALOG) injection vial 0-12 Units  0-12 Units Subcutaneous TID  Lorenso Dakins, MD   6 Units at 01/22/21 1721    insulin lispro (HUMALOG) injection vial 0-6 Units  0-6 Units Subcutaneous Nightly Arnaldo Francisco Javier Matamoros MD   3 Units at 01/22/21 2058    potassium chloride (KLOR-CON M) extended release tablet 20 mEq  20 mEq Oral Daily with breakfast Alannah Sierra MD        sodium chloride flush 0.9 % injection 10 mL  10 mL Intravenous PRN Deja Alvarez MD   10 mL at 01/21/21 0506    sodium chloride flush 0.9 % injection 10 mL  10 mL Intravenous 2 times per day Topher Hawley MD   10 mL at 01/22/21 2047    sodium chloride flush 0.9 % injection 10 mL  10 mL Intravenous PRN Topher Hawley MD        famotidine (PEPCID) tablet 20 mg  20 mg Oral BID Topher Hawley MD   20 mg at 01/22/21 2043    promethazine (PHENERGAN) tablet 12.5 mg  12.5 mg Oral Q6H PRN Topher Hawley MD        Or    ondansetron (ZOFRAN) injection 4 mg  4 mg Intravenous Q6H PRN Topher Hawley MD        nicotine (NICODERM CQ) 21 MG/24HR 1 patch  1 patch Transdermal Daily PRN Arnaldo Reyes MD        polyethylene glycol (GLYCOLAX) packet 17 g  17 g Oral Daily PRN Topher Hawley MD        acetaminophen (TYLENOL) tablet 650 mg  650 mg Oral Q6H PRN Topher Hawley MD        Or    acetaminophen (TYLENOL) suppository 650 mg  650 mg Rectal Q6H PRN Arnaldo Reyes MD        albuterol (PROVENTIL) nebulizer solution 2.5 mg  2.5 mg Nebulization Q2H PRN Topher Hawley MD        ipratropium-albuterol (DUONEB) nebulizer solution 1 ampule  1 ampule Inhalation Q4H WA Topher Hawley MD   1 ampule at 01/22/21 1917    predniSONE (DELTASONE) tablet 40 mg  40 mg Oral Daily Arnaldo Reyes MD        azithromycin (ZITHROMAX) 500 mg in D5W 250ml addavial  500 mg Intravenous Q24H Topher Hawley MD   Stopped at 01/22/21 0945    aspirin EC tablet 81 mg  81 mg Oral Daily Topher Hawley MD   81 mg at 01/22/21 0801    naproxen (NAPROSYN) tablet 500 mg  500 mg Oral BID Topher Hawley MD   500 mg at 01/22/21 2042    metoprolol tartrate (LOPRESSOR) tablet 50 mg  50 mg Oral BID Topher Hawley MD   50 mg at 01/22/21 2043   

## 2021-01-23 NOTE — PROCEDURES
[address] [facility]                    [address] [facility]. [address]                              CARDIAC STRESS TEST    PATIENT NAME: Royal Riley                    :        1960  MED REC NO:   [id number]                              ROOM:       [id number]  ACCOUNT NO:   [de-identified]                           ADMIT DATE: 2021  PROVIDER:     Bimal Peraza    DATE OF STUDY:  2021    TEST TYPE: LEXISCAN CARDIOLYTE STRESS TEST  INDICATION: SHORTNESS OF BREATH  REFERRING PHYSICIAN: ZO LUND    RESTING HEART RATE: 90 BEATS PER MINUTE  RESTING BLOOD PRESSURE: 120/71    MEDICATION(S) GIVEN: 0.4MG IV LEXISCAN  REASON FOR TERMINATION: MEDICATION INFUSION COMPLETE    RESTING EKG: REPOLARIZATION CHANGES, LEFT VENTRICULAR HYPERTROPHY WITH  REPOLARIZATION ABNORMALITY  STRESS HEART RESPONSE: NORMAL RESPONSE  BLOOD PRESSURE RESPONSE: APPROPRIATE  STRESS EKGs: NO CHANGES SEEN  CHEST DISCOMFORT: PAIN DURING STRESS  ISCHEMIC EKG CHANGES: NONE    EKG IMPRESSION: ELECTROCARDIOGRAPHICALLY UNINTERPRETABLE LEXISCAN STRESS  TEST. RADIOISOTOPE RESULTS TO FOLLOW FROM THE DEPARTMENT OF NUCLEAR  MEDICINE.     Kevyn Brandt    D: 2021 15:24:57       T: 2021 11:06:19     MO/GERONIMO  Job#: [id number]     Doc#: Unknown    CC:    (Retain this field even if not dictated or not decipherable)

## 2021-01-23 NOTE — PROGRESS NOTES
BRONCHOSPASM/BRONCHOCONSTRICTION     [x]         IMPROVE AERATION/BREATH SOUNDS  [x]   ADMINISTER BRONCHODILATOR THERAPY AS APPROPRIATE  [x]   ASSESS BREATH SOUNDS  []   IMPLEMENT AEROSOL/MDI PROTOCOL  [x]   PATIENT EDUCATION AS NEEDED    NONINVASIVE VENTILATION    PROVIDE OPTIMAL VENTILATION/ACCEPTABLE SPO2   IMPLEMENT NONINVASIVE VENTILATION PROTOCOL   MAINTAIN ACCEPTABLE SPO2   ASSESS SKIN INTEGRITY/BREAKDOWN SCORE   PATIENT EDUCATION AS NEEDED   BIPAP AS NEEDED        PROVIDE ADEQUATE OXYGENATION WITH ACCEPTABLE SP02/ABG'S    [x]  IDENTIFY APPROPRIATE OXYGEN THERAPY  [x]   MONITOR SP02/ABG'S AS NEEDED   [x]   PATIENT EDUCATION AS NEEDED

## 2021-01-23 NOTE — PROGRESS NOTES
Patient here for Lexiscan stress test.  Permission to start granted by Dr. Florina Rebolledo after viewing EKG. Pt reports passing SOB, coughing, chest pressure and headache during stress test.  Pt given caffeine as reversal agent during recovery period. Pt reports feeling back to baseline with residual headache during recovery period. Permission to end granted by Dr. Jude Kennedy. Dr. Jude Kennedy marks negative on stress test flow sheet. Pt awaits nuclear medicine imaging and results.

## 2021-01-23 NOTE — PLAN OF CARE
Problem: Falls - Risk of:  Goal: Will remain free from falls  Description: Will remain free from falls  1/23/2021 1634 by Ahsna Sanchez RN  Outcome: Met This Shift  Note: Pt assessed as a fall risk this shift. Remains free from falls and accidental injury at this time. Fall precautions in place, including falling star sign and fall risk band on pt. Floor free from obstacles, and bed is locked and in lowest position. Adequate lighting provided. Pt encouraged to call before getting OOB for any need. Bed alarm activated. Will continue to monitor needs during hourly rounding, and reinforce education on use of call light.   1/23/2021 0313 by Baljit Castro RN  Note: Pt was free of falls during this shift. Call light and bedside table within reach, bed in lowest position, and nonskid socks on feet.   Goal: Absence of physical injury  Description: Absence of physical injury  1/23/2021 0313 by Baljit Castro RN  Note: Pt absent of any physical injury

## 2021-01-23 NOTE — DISCHARGE INSTR - COC
Continuity of Care Form    Patient Name: Christina Guevara   :  1960  MRN:  [id number]    Admit date:  2021  Discharge date:  ***    Code Status Order: Full Code   Advance Directives:   Advance Care Flowsheet Documentation     Date/Time Healthcare Directive Type of Healthcare Directive Copy in [address] Agent's Name Healthcare Agent's Phone Number    21 1503  No, patient does not have an advance directive for healthcare treatment -- -- -- -- --          Admitting Physician:  Eunice Cooley MD  PCP: Juyd Hairston MD    Discharging Nurse: [facility] Unit/Room#: 2085/2085-01  Discharging Unit Phone Number: ***    Emergency Contact:   Extended Emergency Contact Information  Primary Emergency Contact: Dione Iglesias   [place] [address] [address]  Phone: [phone]  Relation: Brother/Sister    Past Surgical History:  Past Surgical History:   Procedure Laterality Date   [address]   exact date unknown       Immunization History:   Immunization History   Administered Date(s) Administered    Influenza Virus Vaccine 2010    PPD Test 10/07/2010    Pneumococcal Polysaccharide (Tynhytdtv23) 2013    Tdap (Boostrix, Adacel) 2020       Active Problems:  Patient Active Problem List   Diagnosis Code    HTN (hypertension) I10    Skin tag L91.8    Low back pain with sciatica M54.40    Hyperglycemia R73.9    Infected sebaceous cyst L72.3, L08.9    Chronic diastolic CHF (congestive heart failure) (HCC) I50.32    Chronic bilateral low back pain without sciatica M54.5, G89.29    Chronic pain of right knee M25.561, G89.29    Chest pain, unspecified R07.9    Bronchitis J40    Chronic systolic congestive heart failure (HCC) I50.22    Ventral hernia K43.9    Epigastric pain R10.13    Elevated LFTs R79.89    Shortness of breath R06.02    Rectus diastasis M62.08    Lumbosacral spondylosis without myelopathy M47.817    COPD exacerbation ([facility]) J44.1       Isolation/Infection:   Isolation          No Isolation        Patient Infection Status     Infection Onset Added Last Indicated Last Indicated By Review Planned Expiration Resolved Resolved By    None active    Resolved    COVID-19 Rule Out 12/10/20 12/11/20 12/10/20 COVID-19 (Ordered)   12/11/20 Rule-Out Test Resulted          Nurse Assessment:  Last Vital Signs: /70   Pulse 95   Temp 98.4 °F (36.9 °C) (Oral)   Resp 16   Ht 5' 5\" (1.651 m)   Wt 261 lb 0.4 oz (118.4 kg)   SpO2 96%   BMI 43.44 kg/m²     Last documented pain score (0-10 scale): Pain Level: 0  Last Weight:   Wt Readings from Last 1 Encounters:   01/23/21 261 lb 0.4 oz (118.4 kg)     Mental Status:  {IP PT MENTAL STATUS:20030}    IV Access:  { LEIGH IV ACCESS:960939561}    Nursing Mobility/ADLs:  Walking   {[facility] DME HBYS:966130420}  Transfer  {P DME XSJP:543097079}  Bathing  {P DME MTBL:439970397}  Dressing  {[facility] DME HUHS:185237147}  Toileting  {P DME MLST:817480687}  Feeding  {[facility] DME SFUV:734642673}  Med Admin  {P DME XUWB:176017086}  Med Delivery   { LEIGH MED Delivery:841707299}    Wound Care Documentation and Therapy:        Elimination:  Continence:   · Bowel: {YES / FW:29719}  · Bladder: {YES / DT:51885}  Urinary Catheter: {Urinary Catheter:286652685}   Colostomy/Ileostomy/Ileal Conduit: {YES / LP:77669}       Date of Last BM: ***    Intake/Output Summary (Last 24 hours) at 1/23/2021 1410  Last data filed at 1/23/2021 1226  Gross per 24 hour   Intake 1341 ml   Output 2200 ml   Net -859 ml     I/O last 3 completed shifts: In: 1931 [P.O.:1670;  I.V.:261]  Out: 7757 [Urine:7945]    Safety Concerns:     [facility] LEIGH Safety Concerns:824523050}    Impairments/Disabilities:      [address] Impairments/Disabilities:437331058}    Nutrition Therapy:  Current Nutrition Therapy:   [address] Diet List:496739164}    Routes of Feeding: {CHP DME Other Feedings:069750459}  Liquids: {Slp liquid thickness:46993}  Daily Fluid Restriction: {CHP DME Yes amt example:465119923}  Last Modified Barium Swallow with Video (Video Swallowing Test): {Done Not Done ESRA:361412878}    Treatments at the Time of Hospital Discharge:   Respiratory Treatments: ***  Oxygen Therapy:  {Therapy; copd oxygen:16587}  Ventilator:    { CC Vent UQID:367923621}    Rehab Therapies: {THERAPEUTIC INTERVENTION:8927984075}  Weight Bearing Status/Restrictions: { CC Weight Bearin}  Other Medical Equipment (for information only, NOT a DME order):  {EQUIPMENT:818172257}  Other Treatments: ***    Patient's personal belongings (please select all that are sent with patient):  {CHP DME Belongings:733258196}    RN SIGNATURE:  {Esignature:007415046}    CASE MANAGEMENT/SOCIAL WORK SECTION    Inpatient Status Date: ***    Readmission Risk Assessment Score:  Readmission Risk              Risk of Unplanned Readmission:        12           Discharging to Facility/ Agency   · Name:   · Address:  · Phone:  · Fax:    Dialysis Facility (if applicable)   · Name:  · Address:  · Dialysis Schedule:  · Phone:  · Fax:    / signature: {Esignature:125345881}    PHYSICIAN SECTION    Prognosis: {Prognosis:5576454041}    Condition at Discharge: [facility] Patient Condition:722635838}    Rehab Potential (if transferring to Rehab): {Prognosis:6020661440}    Recommended Labs or Other Treatments After Discharge: ***    Physician Certification: I certify the above information and transfer of Yudelka Chaparro  is necessary for the continuing treatment of the diagnosis listed and that he requires {Admit to Appropriate Level of Care:81934} for {GREATER/LESS:254623833} 30 days.      Update Admission H&P: {CHP DME Changes in CGLXV:223979074}    PHYSICIAN SIGNATURE:  {Esignature:134135352}

## 2021-01-23 NOTE — PROGRESS NOTES
Pulmonary Progress Note  [facility]      Patient - Beronica Lovett,  Age - 63 y.o.    - 1960      Room Number - 5/5-01   N -  [id number]   [facility]  Date of Admission -  2021  3:39 AM        Consulting China Jamison MD  Primary Care Physician - Rodrigo Jordan MD     SUBJECTIVE   Feels fine, on room air  Chronic cough, no short of breath or wheezing  Denies any fever or chills    OBJECTIVE   VITALS    height is 5' 5\" (1.651 m) and weight is 261 lb 0.4 oz (118.4 kg). His oral temperature is 98.4 °F (36.9 °C). His blood pressure is 116/70 and his pulse is 95. His respiration is 16 and oxygen saturation is 95%. Body mass index is 43.44 kg/m². Temperature Range: Temp: 98.4 °F (36.9 °C) Temp  Av.1 °F (36.7 °C)  Min: 97.9 °F (36.6 °C)  Max: 98.4 °F (36.9 °C)  BP Range:  Systolic (79SWE), LDV:810 , Min:116 , KLA:645     Diastolic (49ZPF), KYO:14, Min:69, Max:85    Pulse Range: Pulse  Av.3  Min: 92  Max: 101  Respiration Range: Resp  Av.4  Min: 13  Max: 18  Current Pulse Ox[de-identified]  SpO2: 95 %  24HR Pulse Ox Range:  SpO2  Av.9 %  Min: 95 %  Max: 99 %  Oxygen Amount and Delivery: Wt Readings from Last 3 Encounters:   21 261 lb 0.4 oz (118.4 kg)   20 272 lb (123.4 kg)   20 268 lb (121.6 kg)       I/O (24 Hours)    Intake/Output Summary (Last 24 hours) at 2021 1857  Last data filed at 2021 1226  Gross per 24 hour   Intake 420 ml   Output 1900 ml   Net -1480 ml       EXAM     General Appearance  Awake, alert, oriented, in no acute distress  HEENT - normocephalic, atraumatic.    Neck -no JVD,  trachea midline   Lungs -coarse, no wheezing or distress  Cardiovascular - Heart sounds are normal.  Regular rate and rhythm   Abdomen - Soft, nontender, nondistended, no guarding  Neurologic -appropriate, following commands, looks comfortable  Extremities - No clubbing, cyanosis, + edema    MEDS      insulin lispro  0-12 Units Subcutaneous TID WC    insulin lispro  0-6 Units Subcutaneous Nightly    potassium chloride  20 mEq Oral Daily with breakfast    sodium chloride flush  10 mL Intravenous 2 times per day    famotidine  20 mg Oral BID    ipratropium-albuterol  1 ampule Inhalation Q4H WA    predniSONE  40 mg Oral Daily    azithromycin  500 mg Intravenous Q24H    aspirin  81 mg Oral Daily    naproxen  500 mg Oral BID    metoprolol tartrate  50 mg Oral BID    ranolazine  500 mg Oral BID    atorvastatin  20 mg Oral Nightly    spironolactone  25 mg Oral Nightly    furosemide  40 mg Oral Daily    lisinopril  30 mg Oral Daily    enoxaparin  30 mg Subcutaneous BID      dextrose       sodium chloride flush, glucose, dextrose, glucagon (rDNA), dextrose, sodium chloride flush, sodium chloride flush, promethazine **OR** ondansetron, nicotine, polyethylene glycol, acetaminophen **OR** acetaminophen, albuterol, nitroGLYCERIN, gabapentin    LABS   CBC   Recent Labs     01/22/21  0529   WBC 12.6*   HGB 12.4*   HCT 36.9*   MCV 87.2        BMP:   Lab Results   Component Value Date     01/22/2021    K 4.5 01/22/2021     01/22/2021    CO2 25 01/22/2021    BUN 28 01/22/2021    LABALBU 3.9 01/21/2021    LABALBU 4.7 02/17/2012    CREATININE 1.10 01/22/2021    CALCIUM 9.0 01/22/2021    GFRAA >60 01/22/2021    LABGLOM >60 01/22/2021     ABGs:  Lab Results   Component Value Date    PO2ART 86.3 04/05/2013      Lab Results   Component Value Date    MODE NOT REPORTED 04/05/2013     Ionized Calcium:  No results found for: IONCA  Magnesium:    Lab Results   Component Value Date    MG 2.1 01/21/2021      Phosphorus:  No results found for: PHOS     LIVER PROFILE   Recent Labs     01/21/21  0350   AST 19   ALT 27   LIPASE 29   BILIDIR <0.08   BILITOT <0.15*   ALKPHOS 66     INR No results for input(s): INR in the last 72 hours. PTT No results for input(s):  APTT in

## 2021-01-23 NOTE — CARE COORDINATION
DISCHARGE PLANNING NOTE:    Plan is for this patient to return to home. He declines any discharge needs. On IV zithromax and PO prednisone. Stress test + - Awaiting cardiology plan after speaking with patient tomorrow. Will continue to follow along.      Electronically signed by Miguel Angel Calix RN on 1/23/2021 at 4:14 PM

## 2021-01-24 LAB
GLUCOSE BLD-MCNC: 135 MG/DL (ref 75–110)
GLUCOSE BLD-MCNC: 152 MG/DL (ref 75–110)
GLUCOSE BLD-MCNC: 176 MG/DL (ref 75–110)
GLUCOSE BLD-MCNC: 213 MG/DL (ref 75–110)
TROPONIN INTERP: NORMAL
TROPONIN T: NORMAL NG/ML
TROPONIN, HIGH SENSITIVITY: 13 NG/L (ref 0–22)
TROPONIN, HIGH SENSITIVITY: 8 NG/L (ref 0–22)
TROPONIN, HIGH SENSITIVITY: 9 NG/L (ref 0–22)
TROPONIN, HIGH SENSITIVITY: 9 NG/L (ref 0–22)

## 2021-01-24 PROCEDURE — 84484 ASSAY OF TROPONIN QUANT: CPT

## 2021-01-24 PROCEDURE — 83036 HEMOGLOBIN GLYCOSYLATED A1C: CPT

## 2021-01-24 PROCEDURE — 36415 COLL VENOUS BLD VENIPUNCTURE: CPT

## 2021-01-24 PROCEDURE — 82947 ASSAY GLUCOSE BLOOD QUANT: CPT

## 2021-01-24 PROCEDURE — 6370000000 HC RX 637 (ALT 250 FOR IP): Performed by: INTERNAL MEDICINE

## 2021-01-24 PROCEDURE — 6360000002 HC RX W HCPCS: Performed by: INTERNAL MEDICINE

## 2021-01-24 PROCEDURE — 2580000003 HC RX 258: Performed by: INTERNAL MEDICINE

## 2021-01-24 PROCEDURE — 2060000000 HC ICU INTERMEDIATE R&B

## 2021-01-24 PROCEDURE — 94761 N-INVAS EAR/PLS OXIMETRY MLT: CPT

## 2021-01-24 PROCEDURE — 94640 AIRWAY INHALATION TREATMENT: CPT

## 2021-01-24 PROCEDURE — 99232 SBSQ HOSP IP/OBS MODERATE 35: CPT | Performed by: INTERNAL MEDICINE

## 2021-01-24 RX ORDER — CODEINE SULFATE 30 MG/1
30 TABLET ORAL EVERY 4 HOURS PRN
Status: DISCONTINUED | OUTPATIENT
Start: 2021-01-24 | End: 2021-01-24

## 2021-01-24 RX ORDER — AZITHROMYCIN 250 MG/1
500 TABLET, FILM COATED ORAL DAILY
Status: DISCONTINUED | OUTPATIENT
Start: 2021-01-25 | End: 2021-01-25 | Stop reason: HOSPADM

## 2021-01-24 RX ORDER — CODEINE PHOSPHATE AND GUAIFENESIN 10; 100 MG/5ML; MG/5ML
5 SOLUTION ORAL EVERY 4 HOURS PRN
Status: DISCONTINUED | OUTPATIENT
Start: 2021-01-24 | End: 2021-01-25 | Stop reason: HOSPADM

## 2021-01-24 RX ORDER — NICOTINE POLACRILEX 4 MG
15 LOZENGE BUCCAL PRN
Status: DISCONTINUED | OUTPATIENT
Start: 2021-01-24 | End: 2021-01-24 | Stop reason: SDUPTHER

## 2021-01-24 RX ORDER — DEXTROSE MONOHYDRATE 25 G/50ML
12.5 INJECTION, SOLUTION INTRAVENOUS PRN
Status: DISCONTINUED | OUTPATIENT
Start: 2021-01-24 | End: 2021-01-24 | Stop reason: SDUPTHER

## 2021-01-24 RX ORDER — DEXTROSE MONOHYDRATE 50 MG/ML
100 INJECTION, SOLUTION INTRAVENOUS PRN
Status: DISCONTINUED | OUTPATIENT
Start: 2021-01-24 | End: 2021-01-24 | Stop reason: SDUPTHER

## 2021-01-24 RX ADMIN — FUROSEMIDE 40 MG: 40 TABLET ORAL at 07:50

## 2021-01-24 RX ADMIN — SPIRONOLACTONE 25 MG: 25 TABLET, FILM COATED ORAL at 21:00

## 2021-01-24 RX ADMIN — Medication 10 ML: at 07:49

## 2021-01-24 RX ADMIN — IPRATROPIUM BROMIDE AND ALBUTEROL SULFATE 1 AMPULE: .5; 3 SOLUTION RESPIRATORY (INHALATION) at 15:29

## 2021-01-24 RX ADMIN — INSULIN LISPRO 1 UNITS: 100 INJECTION, SOLUTION INTRAVENOUS; SUBCUTANEOUS at 20:59

## 2021-01-24 RX ADMIN — ASPIRIN 81 MG: 81 TABLET, COATED ORAL at 07:50

## 2021-01-24 RX ADMIN — RANOLAZINE 500 MG: 500 TABLET, FILM COATED, EXTENDED RELEASE ORAL at 07:50

## 2021-01-24 RX ADMIN — IPRATROPIUM BROMIDE AND ALBUTEROL SULFATE 1 AMPULE: .5; 3 SOLUTION RESPIRATORY (INHALATION) at 19:00

## 2021-01-24 RX ADMIN — PREDNISONE 40 MG: 20 TABLET ORAL at 07:50

## 2021-01-24 RX ADMIN — FAMOTIDINE 20 MG: 20 TABLET ORAL at 21:00

## 2021-01-24 RX ADMIN — INSULIN LISPRO 2 UNITS: 100 INJECTION, SOLUTION INTRAVENOUS; SUBCUTANEOUS at 11:26

## 2021-01-24 RX ADMIN — METOPROLOL TARTRATE 50 MG: 50 TABLET, FILM COATED ORAL at 21:00

## 2021-01-24 RX ADMIN — ENOXAPARIN SODIUM 30 MG: 30 INJECTION SUBCUTANEOUS at 07:49

## 2021-01-24 RX ADMIN — ENOXAPARIN SODIUM 30 MG: 30 INJECTION SUBCUTANEOUS at 20:59

## 2021-01-24 RX ADMIN — INSULIN LISPRO 4 UNITS: 100 INJECTION, SOLUTION INTRAVENOUS; SUBCUTANEOUS at 16:50

## 2021-01-24 RX ADMIN — Medication 5 ML: at 15:21

## 2021-01-24 RX ADMIN — POTASSIUM CHLORIDE 20 MEQ: 20 TABLET, EXTENDED RELEASE ORAL at 07:50

## 2021-01-24 RX ADMIN — NAPROXEN 500 MG: 250 TABLET ORAL at 07:50

## 2021-01-24 RX ADMIN — IPRATROPIUM BROMIDE AND ALBUTEROL SULFATE 1 AMPULE: .5; 3 SOLUTION RESPIRATORY (INHALATION) at 11:09

## 2021-01-24 RX ADMIN — GABAPENTIN 300 MG: 300 CAPSULE ORAL at 21:22

## 2021-01-24 RX ADMIN — ATORVASTATIN CALCIUM 20 MG: 20 TABLET, FILM COATED ORAL at 21:00

## 2021-01-24 RX ADMIN — Medication 10 ML: at 21:23

## 2021-01-24 RX ADMIN — NAPROXEN 500 MG: 250 TABLET ORAL at 21:00

## 2021-01-24 RX ADMIN — AZITHROMYCIN MONOHYDRATE 500 MG: 500 INJECTION, POWDER, LYOPHILIZED, FOR SOLUTION INTRAVENOUS at 07:49

## 2021-01-24 RX ADMIN — RANOLAZINE 500 MG: 500 TABLET, FILM COATED, EXTENDED RELEASE ORAL at 21:00

## 2021-01-24 RX ADMIN — FAMOTIDINE 20 MG: 20 TABLET ORAL at 07:50

## 2021-01-24 RX ADMIN — METOPROLOL TARTRATE 50 MG: 50 TABLET, FILM COATED ORAL at 07:50

## 2021-01-24 RX ADMIN — IPRATROPIUM BROMIDE AND ALBUTEROL SULFATE 1 AMPULE: .5; 3 SOLUTION RESPIRATORY (INHALATION) at 08:18

## 2021-01-24 RX ADMIN — LISINOPRIL 30 MG: 20 TABLET ORAL at 13:27

## 2021-01-24 ASSESSMENT — PAIN SCALES - GENERAL: PAINLEVEL_OUTOF10: 2

## 2021-01-24 NOTE — PROGRESS NOTES
Called and Spoke with Dr. Nela Bowers and updated on pt agreeing to the cardiac cath.  Stated he is able to have sips with meds, NPO at midnight

## 2021-01-24 NOTE — PLAN OF CARE
Problem: Falls - Risk of:  Goal: Will remain free from falls  Description: Will remain free from falls  Outcome: Met This Shift  Note: No falls noted this shift. Patient ambulates with x1 staff assistance without difficulty. Bed kept in low position. Safe environment maintained. Bedside table & call light in reach. Uses call light appropriately when needing assistance.

## 2021-01-24 NOTE — PROGRESS NOTES
History and Physical Service  [facility]    progres note              Date:   1/24/2021  Patient name:  Christina Guevara  MRN:   [id number]  Account:  [de-identified]  YOB: 1960  PCP:    Judy Hairston MD  Code Status:    Full Code    Chief Complaint:     Cough wheezing and dyspnea    History Obtained From:     Patient ,medical record ,nursing staff    History of Present Illnes       The patient is a 63 y.o. Non-/non  male who presents   COPD: Patient complains of dyspnea. Symptoms began 7 days ago. Symptoms acute dyspnea does worsen with exertion. Sputum is clear in small amounts. Fever has been low grade fevers. Patient uses 0 pillows at night. Patient can walk 10 feet before resting. Patient currently is not on home oxygen therapy. .   Breathing improved denies any chest pain  COPD at baseline no chest pain patient had a stress test done today      Overnight no chest pain persistent cough breathing is at baseline abnormal stress test high risk  Past Medical History:   Diagnosis Date    [facility]congest[facility] failure) [facility]     Chronic back pain     COPD [facility]ch[facility] obstructive pulmonary disease) ([facility]     Headache(784.0)     Hypertension     Low back pain with sciatica     Osteoarthritis     Sleep apnea     with cpap        Past Surgical History:     Past Surgical History:   Procedure Laterality Date   [address]   exact date unknown        Medications Prior to Admission:     Prior to Admission medications    Medication Sig Start Date End Date Taking?  Authorizing Provider   gabapentin (NEURONTIN) 300 MG capsule TAKE 1 CAPSULE BY MOUTH NIGHTLY AS NEEDED (PAIN) 12/10/20 12/10/21 Yes Judy Hairston MD   potassium chloride (KLOR-CON M) 20 MEQ extended release tablet TAKE ONE TABLET BY MOUTH TWICE A DAY 12/9/20  Yes Judy Hairston MD   metoprolol tartrate (LOPRESSOR) 50 MG tablet Take 1 tablet by mouth 2 times daily 12/8/20  Yes Garrett Landin MD   nitroGLYCERIN (NITROSTAT) 0.4 MG SL tablet Place 1 tablet under the tongue every 5 minutes as needed for Chest pain 12/8/20  Yes Garrett Landin MD   ranolazine (RANEXA) 500 MG extended release tablet Take 1 tablet by mouth 2 times daily 12/8/20  Yes Garrett Ladnin MD   simvastatin (ZOCOR) 20 MG tablet Take 1 tablet by mouth nightly 12/8/20  Yes Garrett Landin MD   spironolactone (ALDACTONE) 25 MG tablet Take 1 tablet by mouth daily  Patient taking differently: Take 25 mg by mouth nightly  12/8/20  Yes Garrett Landin MD   furosemide (LASIX) 40 MG tablet Take 1 tablet by mouth daily 12/8/20  Yes Garrett Landin MD   COMBIVENT RESPIMAT  MCG/ACT AERS inhaler INHALE 1 PUFF INTO THE LUNGS EVERY 6 HOURS 12/1/20  Yes Garrett Landin MD   lisinopril (PRINIVIL;ZESTRIL) 30 MG tablet TAKE 1 TABLET BY MOUTH DAILY 12/1/20  Yes Garrett Landin MD   albuterol sulfate HFA (PROAIR HFA) 108 (90 Base) MCG/ACT inhaler Inhale 2 puffs into the lungs every 6 hours as needed for Wheezing 12/1/20  Yes Garrett Landin MD   albuterol (PROVENTIL) (5 MG/ML) 0.5% nebulizer solution Take 0.5 mLs by nebulization every 6 hours as needed for Wheezing or Shortness of Breath 9/25/20  Yes Donato Granado MD   naproxen (NAPROSYN) 500 MG tablet TAKE 1 TABLET BY MOUTH 2 TIMES DAILY FOR 20 DAYS 8/20/20 1/21/21 Yes Garrett Landin MD   aspirin 81 MG tablet Take 1 tablet by mouth daily 8/13/19  Yes Garrett Landin MD   furosemide (LASIX) 40 MG tablet TAKE 1 TABLET BY MOUTH DAILY 12/9/20   Garrett Landin MD   Masks ([facility]) [address] 1 each by Does not apply route as needed (as needed) 10/5/20   Garrett Landin MD        Allergies:     Penicillins, Sulfa antibiotics, Sulfasalazine, Pseudoeph-doxylamine-dm-apap, and Rabbit protein    Social History:     Tobacco:    reports that he has quit smoking. He smoked 0.00 packs per day for 40.00 years. His smokeless tobacco use includes chew.   Alcohol:      reports current alcohol use of about 1.0 standard drinks of alcohol per week. Drug Use:  reports no history of drug use. Family History:     Family History   Problem Relation Age of Onset    Kidney Disease Mother     Diabetes Mother     Heart Disease Mother     Arthritis Mother     Heart Disease Father     Arthritis Sister     Diabetes Sister     Heart Disease Sister        Review of Systems:     Positive and Negative as described in HPI. CONSTITUTIONAL:  negative for fevers, chills, sweats, fatigue, weight loss  HEENT:  negative for vision, hearing changes, runny nose, throat pain  RESPIRATORY: Positive for cough wheezing and dyspnea improved CARDIOVASCULAR:  negative for chest pain, palpitations. GASTROINTESTINAL:  negative for nausea, vomiting, diarrhea, constipation, change in bowel habits, abdominal pain   GENITOURINARY:  negative for difficulty of urination, burning with urination, frequency   INTEGUMENT:  negative for rash, skin lesions, easy bruising   HEMATOLOGIC/LYMPHATIC:  negative for swelling/edema   ALLERGIC/IMMUNOLOGIC:  negative for urticaria , itching  ENDOCRINE:  negative increase in drinking, increase in urination, hot or cold intolerance  MUSCULOSKELETAL:  negative joint pains, muscle aches, swelling of joints  NEUROLOGICAL:  negative for headaches, dizziness, lightheadedness, numbness, pain, tingling extremities  BEHAVIOR/PSYCH:  negative for depression, anxiety    Physical Exam:   BP (!) 146/89   Pulse 88   Temp 98.1 °F (36.7 °C) (Oral)   Resp 18   Ht 5' 5\" (1.651 m)   Wt 260 lb 9.3 oz (118.2 kg)   SpO2 96%   BMI 43.36 kg/m²   Recent Labs     01/23/21  1059 01/23/21  1659 01/23/21 2001 01/24/21  0642   POCGLU 184* 163* 270* 135*     Morbid obesity BMI 45 weighs 275 pounds  General Appearance:  alert, well appearing, and in no acute distress  Mental status: oriented to person, place, and time with normal affect  Head:  normocephalic, atraumatic.   Eye: no icterus, redness, pupils equal and reactive, extraocular eye movements 01/22/21  0529 01/21/21  0350   HGB 12.4* 12.7*   HCT 36.9* 37.5*   WBC 12.6* 7.4   MCV 87.2 86.5    139   K 4.5 4.1    102   CO2 25 24   BUN 28* 21   CREATININE 1.10 0.87   GLUCOSE 208* 198*   AST  --  19   ALT  --  27   LABALBU  --  3.9       Hematology:  Recent Labs     01/22/21 0529   WBC 12.6*   RBC 4.23*   HGB 12.4*   HCT 36.9*   MCV 87.2   MCH 29.3   MCHC 33.6   RDW 13.4      MPV 8.6     Chemistry:  Recent Labs     01/22/21  0529 01/22/21  0529 01/23/21  1024 01/23/21  1705 01/24/21  0432     --   --   --   --    K 4.5  --   --   --   --      --   --   --   --    CO2 25  --   --   --   --    GLUCOSE 208*  --   --   --   --    BUN 28*  --   --   --   --    CREATININE 1.10  --   --   --   --    ANIONGAP 12  --   --   --   --    LABGLOM >60  --   --   --   --    GFRAA >60  --   --   --   --    CALCIUM 9.0  --   --   --   --    TROPONINT  --    < > NOT REPORTED NOT REPORTED NOT REPORTED    < > = values in this interval not displayed. Recent Labs     01/22/21  0529 01/22/21  1100 01/22/21  1100 01/22/21 2032 01/23/21  0702 01/23/21  1059 01/23/21  1659 01/23/21 2001 01/24/21  0642   LABA1C  --  5.7  --   --   --   --   --   --   --    TSH 0.41  --   --   --   --   --   --   --   --    CHOL 191  --   --   --   --   --   --   --   --    HDL 46  --   --   --   --   --   --   --   --    LDLCHOLESTEROL 117  --   --   --   --   --   --   --   --    CHOLHDLRATIO 4.2  --   --   --   --   --   --   --   --    TRIG 138  --   --   --   --   --   --   --   --    VLDL NOT REPORTED  --   --   --   --   --   --   --   --    POCGLU  --   --    < > 257* 160* 184* 163* 270* 135*    < > = values in this interval not displayed.        Imaging/Diagnostics:       Cta Chest Abdomen Pelvis W Contrast    Result Date: 1/21/2021  EXAMINATION: CTA OF THE CHEST, ABDOMEN AND PELVIS WITH CONTRAST 1/21/2021 5:14 am TECHNIQUE: CTA of the chest, abdomen and pelvis was performed after the administration of intravenous contrast.  Multiplanar reformatted images are provided for review. MIP images are provided for review. Dose modulation, iterative reconstruction, and/or weight based adjustment of the mA/kV was utilized to reduce the radiation dose to as low as reasonably achievable. COMPARISON: None HISTORY: ORDERING SYSTEM PROVIDED HISTORY: chest radiating to back pain TECHNOLOGIST PROVIDED HISTORY: chest radiating to back pain Decision Support Exception->Emergency Medical Condition (MA) Reason for Exam: chest pain, back pain, SOB, pt has a hx of hypertension Acuity: Acute Type of Exam: Initial Relevant Medical/Surgical History: no known surgeries to area of interest FINDINGS: Chest: Mediastinum: Pre and postcontrast evaluation with thoracic aorta demonstrates normal caliber, attenuation, and enhancement. No evidence of dissection no mediastinal lymphadenopathy. Heart size normal limits. No pericardial effusion. Trachea and esophagus are unremarkable. Visualized pulmonary vasculature is unremarkable. Lungs/pleura: No pleural effusion or pneumothorax. No focal consolidation or pulmonary edema. Airways are patent. Soft Tissues/Bones: No acute findings. Abdomen/Pelvis: Organs: The visualized portions of the liver, gallbladder, spleen, pancreas and adrenal glands demonstrate no acute abnormality. No biliary ductal dilatation. The kidneys appear normal in size and demonstrate symmetric enhancement. No focal renal mass. No hydronephrosis. No perinephric stranding. GI/Bowel: No bowel dilatation to suggest obstruction. No evidence of acute appendicitis. Pelvis: The urinary bladder appears unremarkable. The pelvic organs demonstrate no acute abnormality. Peritoneum/Retroperitoneum: Pre and post-contrast evaluation of the abdominal aorta demonstrates normal caliber, attenuation, and enhancement pattern. No evidence of dissection.   Celiac axis, SMA, renal arteries, iliac vessels and proximal femoral vessels are patent. Note is made of variant celiac axis anatomy with aortic origin of splenic and right hepatic arteries. Bones/Soft Tissues: No acute findings     No vascular abnormality. Otherwise, no acute findings in the chest, abdomen, or pelvis.           Current Facility-Administered Medications   Medication Dose Route Frequency Provider Last Rate Last Admin    codeine tablet 30 mg  30 mg Oral Q4H PRN Arnaldo Whitney MD        glucose (GLUTOSE) 40 % oral gel 15 g  15 g Oral PRN Arnaldo Whitney MD        dextrose 50 % IV solution  12.5 g Intravenous PRN Lanny José MD        glucagon (rDNA) injection 1 mg  1 mg Intramuscular PRN Lanny José MD        dextrose 5 % solution  100 mL/hr Intravenous PRN Lanny José MD        insulin lispro (HUMALOG) injection vial 0-6 Units  0-6 Units Subcutaneous Q4H Arnaldo Whitney MD        sodium chloride flush 0.9 % injection 10 mL  10 mL Intravenous PRN Asuncion Sierra MD   10 mL at 01/23/21 0737    glucose (GLUTOSE) 40 % oral gel 15 g  15 g Oral PRN Arnaldo Whitney MD        dextrose 50 % IV solution  12.5 g Intravenous PRN Arnaldo Whitney MD        glucagon (rDNA) injection 1 mg  1 mg Intramuscular PRN Arnaldo Whitney MD        dextrose 5 % solution  100 mL/hr Intravenous PRN Lanny José MD        insulin lispro (HUMALOG) injection vial 0-12 Units  0-12 Units Subcutaneous TID WC Lanny José MD   2 Units at 01/23/21 1735    insulin lispro (HUMALOG) injection vial 0-6 Units  0-6 Units Subcutaneous Nightly Lanny José MD   3 Units at 01/23/21 2111    potassium chloride (KLOR-CON M) extended release tablet 20 mEq  20 mEq Oral Daily with breakfast Asuncion Sierra MD   20 mEq at 01/24/21 0750    sodium chloride flush 0.9 % injection 10 mL  10 mL Intravenous PRN Joanne Ngo MD   10 mL at 01/21/21 0506    sodium chloride flush 0.9 % injection 10 mL  10 mL Intravenous 2 times per day Lanny José MD   10 mL at 01/24/21 0749    sodium chloride flush 0.9 % injection 10 mL  10 mL Intravenous PRN Gauri Storey MD        famotidine (PEPCID) tablet 20 mg  20 mg Oral BID Gauri Storey MD   20 mg at 01/24/21 0750    promethazine (PHENERGAN) tablet 12.5 mg  12.5 mg Oral Q6H PRN Gauri Storey MD        Or    ondansetron (ZOFRAN) injection 4 mg  4 mg Intravenous Q6H PRN Gauri Storey MD        nicotine (NICODERM CQ) 21 MG/24HR 1 patch  1 patch Transdermal Daily PRN Arnaldo Whiting MD        polyethylene glycol (GLYCOLAX) packet 17 g  17 g Oral Daily PRN Gauri Storey MD        acetaminophen (TYLENOL) tablet 650 mg  650 mg Oral Q6H PRN Gauri Storey MD        Or    acetaminophen (TYLENOL) suppository 650 mg  650 mg Rectal Q6H PRN Arnaldo Whiting MD        albuterol (PROVENTIL) nebulizer solution 2.5 mg  2.5 mg Nebulization Q2H PRN Gauri Storey MD        ipratropium-albuterol (DUONEB) nebulizer solution 1 ampule  1 ampule Inhalation Q4H WA Gauri Storey MD   1 ampule at 01/24/21 0818    predniSONE (DELTASONE) tablet 40 mg  40 mg Oral Daily Gauri Storey MD   40 mg at 01/24/21 0750    azithromycin (ZITHROMAX) 500 mg in D5W 250ml addavial  500 mg Intravenous Q24H Gauri Storey MD   Stopped at 01/24/21 0924    aspirin EC tablet 81 mg  81 mg Oral Daily Gauri Storey MD   81 mg at 01/24/21 0750    naproxen (NAPROSYN) tablet 500 mg  500 mg Oral BID Gauri Storey MD   500 mg at 01/24/21 0750    metoprolol tartrate (LOPRESSOR) tablet 50 mg  50 mg Oral BID Gauri Storey MD   50 mg at 01/24/21 0750    nitroGLYCERIN (NITROSTAT) SL tablet 0.4 mg  0.4 mg Sublingual Q5 Min PRN Gauri Storey MD        ranolazine [facility]) extended release tablet 500 mg  500 mg Oral BID Gauri Storey MD   500 mg at 01/24/21 0750    atorvastatin (LIPITOR) tablet 20 mg  20 mg Oral Nightly Gauri Storey MD   20 mg at 01/23/21 2111    spironolactone (ALDACTONE) tablet 25 mg  25 mg Oral Nightly Rolando Moser MD   25 mg at 01/23/21 2111    furosemide (LASIX) tablet 40 mg  40 mg Oral Daily Rolando Moser MD   40 mg at 01/24/21 0750    gabapentin (NEURONTIN) capsule 300 mg  300 mg Oral Nightly PRN Rolando Moser MD        lisinopril (PRINIVIL;ZESTRIL) tablet 30 mg  30 mg Oral Daily Rolando Moser MD   30 mg at 01/23/21 1319    enoxaparin (LOVENOX) injection 30 mg  30 mg Subcutaneous BID Rolando Moser MD   30 mg at 01/24/21 0749     Impressions :      1. Active Problems:    COPD exacerbation ([facility] [address]  Resolved Problems:    * No resolved hospital problems. *        2.  has a past medical history of CHF (congestive heart failure) ([facility]), Chronic back pain, COPD (chronic obstructive pulmonary disease) ([facility] [address] Headache(784.0), Hypertension, Low back pain with sciatica, Osteoarthritis, and Sleep apnea.      Plans:     Acute exacerbation of her COPD  Long-term smoker smoked over 20 years  IV steroids DuoNeb  Antibiotics  Case discussed with pulmonologist  Jan 22  Breathing is improving with IV steroids and DuoNeb denies any chest pain  Echocardiogram done result is pending  Cardio input noted  Jan 23  COPD at baseline no chest pain stress test done result pending discharge planning post stress test results  January 24  Overnight no chest pain cough excessive  Breathing is at baseline add codeine for cough  Abnormal stress test high risk ejection fraction 39% inferior wall reversible perfusion defect  Needs cardiac input for possible cardiac cath      Rolando Moser MD  1/24/2021  9:26 AM

## 2021-01-24 NOTE — PROGRESS NOTES
[address], [address] [facility] [place]  [phone]          Progress Note    Patient Name:  Beronica Lovett    :  1960 11:35 AM      SUBJECTIVE       Mr. Arminda Richards  has no chest pain, shortness of breath, palpitations. His stress test revealed inferior ischemia as well as infarct. Echocardiogram revealed mild left ventricular dysfunction as previous. I had a discussion with him about most likely progression of his coronary disease, but the possibility of false positives with diaphragmatic attenuation as well. His echo did not show significant change in LV function. OBJECTIVE     Vital signs:    BP (!) 146/89   Pulse 88   Temp 98.1 °F (36.7 °C) (Oral)   Resp 18   Ht 5' 5\" (1.651 m)   Wt 260 lb 9.3 oz (118.2 kg)   SpO2 98%   BMI 43.36 kg/m²     . tro    Admit Weight:  275 lb (124.7 kg)    Last 3 weights: Wt Readings from Last 3 Encounters:   21 260 lb 9.3 oz (118.2 kg)   20 272 lb (123.4 kg)   20 268 lb (121.6 kg)       BMI: Body mass index is 43.36 kg/m².     Input/Output:       Intake/Output Summary (Last 24 hours) at 2021 1135  Last data filed at 2021 0924  Gross per 24 hour   Intake 666 ml   Output 1850 ml   Net -1184 ml       Date 21 0000 - 21 2359   Shift 1760-0831 5798-1256 0996-4292 24 Hour Total   INTAKE   P.O.(mL/kg/hr)  666  666   Shift Total(mL/kg)  666(5.6)  666(5.6)   OUTPUT   Urine(mL/kg/hr)  1150  1150   Shift Total(mL/kg)  1150(9.7)  1150(9.7)   Weight (kg) 118.2 118.2 118.2 118.2     Exam:     General appearance: awake and alert moves all ext   Lungs: no rhonchi, no wheezes, no rales  Heart: S1 and S2 no murmur  Abdomen: positive bowel sounds, no bruits, no masses  Extremities: warm and dry, no cyanosis, no clubbing        Laboratory Studies:     CBC:   Recent Labs     21   WBC 12.6*   HGB 12.4*   HCT 36.9*   MCV 87.2        BMP:   Recent Labs     21    K 4.5      CO2 25   BUN 28*   CREATININE 1.10     PT/INR: No results for input(s): PROTIME, INR in the last 72 hours. APTT: No results for input(s): APTT in the last 72 hours. MAG: No results for input(s): MG in the last 72 hours. D Dimer: No results for input(s): DDIMER in the last 72 hours. Troponin    Recent Labs     21  1024 21  1705 21  0432   TROPHS 7 7 8                BNP No results for input(s): BNP in the last 72 hours. No results for input(s): PROBNP in the last 72 hours. Pulse Ox: SpO2  Av.8 %  Min: 95 %  Max: 99 %  Supplemental O2:       Current Meds:    insulin lispro  0-12 Units Subcutaneous TID WC    insulin lispro  0-6 Units Subcutaneous Nightly    potassium chloride  20 mEq Oral Daily with breakfast    sodium chloride flush  10 mL Intravenous 2 times per day    famotidine  20 mg Oral BID    ipratropium-albuterol  1 ampule Inhalation Q4H WA    predniSONE  40 mg Oral Daily    azithromycin  500 mg Intravenous Q24H    aspirin  81 mg Oral Daily    naproxen  500 mg Oral BID    metoprolol tartrate  50 mg Oral BID    ranolazine  500 mg Oral BID    atorvastatin  20 mg Oral Nightly    spironolactone  25 mg Oral Nightly    furosemide  40 mg Oral Daily    lisinopril  30 mg Oral Daily    enoxaparin  30 mg Subcutaneous BID     Continuous Infusions:    dextrose         Echo:      ASSESSMENT     Active Problems:    COPD exacerbation (HCC)  Resolved Problems:    * No resolved hospital problems. *  Abnormal stress test:  Mild left ventricular dysfunction    PLAN   I I recommend diagnostic cardiac ecommend diagnostic cardiac catheterization. This is based upon his presentation of chest pain syndrome, as well as his abnormal stress test.    The risks, benefits, as well as alternatives were discussed with him. He will consider this.         Electronically signed by Demetria Craft DO on 2021 at 11:35 AM

## 2021-01-24 NOTE — PROGRESS NOTES
Pulmonary Progress Note  [facility]      Patient - Yuli Parsons,  Age - 63 y.o.    - 1960      Room Number - 2085/5-01   N -  [id number]   [facility]  Date of Admission -  2021  3:39 AM        Consulting Marino Zambrano MD  Primary Care Physician - Sukhdeep Ulloa MD     SUBJECTIVE   Feels fine, on room air  Not much cough,  short of breath or wheezing  Denies any fever or chills    OBJECTIVE   VITALS    height is 5' 5\" (1.651 m) and weight is 260 lb 9.3 oz (118.2 kg). His oral temperature is 98.4 °F (36.9 °C). His blood pressure is 131/79 and his pulse is 94. His respiration is 18 and oxygen saturation is 98%. Body mass index is 43.36 kg/m². Temperature Range: Temp: 98.4 °F (36.9 °C) Temp  Av.2 °F (36.8 °C)  Min: 98 °F (36.7 °C)  Max: 98.4 °F (36.9 °C)  BP Range:  Systolic (11KBQ), KYK:731 , Min:116 , NYM:126     Diastolic (67ZPL), CM, Min:70, Max:89    Pulse Range: Pulse  Av.5  Min: 87  Max: 94  Respiration Range: Resp  Av  Min: 17  Max: 19  Current Pulse Ox[de-identified]  SpO2: 98 %  24HR Pulse Ox Range:  SpO2  Av.1 %  Min: 96 %  Max: 99 %  Oxygen Amount and Delivery: Wt Readings from Last 3 Encounters:   21 260 lb 9.3 oz (118.2 kg)   20 272 lb (123.4 kg)   20 268 lb (121.6 kg)       I/O (24 Hours)    Intake/Output Summary (Last 24 hours) at 2021  Last data filed at 2021 1843  Gross per 24 hour   Intake 666 ml   Output 2050 ml   Net -1384 ml       EXAM     General Appearance  Awake, alert, oriented, in no acute distress  HEENT - normocephalic, atraumatic.    Neck -no JVD,  trachea midline   Lungs -coarse, no wheezing or distress  Cardiovascular - Heart sounds are normal.  Regular rate and rhythm   Abdomen - Soft, nontender, nondistended, no guarding  Neurologic -appropriate, following commands, looks comfortable  Extremities - No clubbing, cyanosis, + edema    MEDS      [START ON 1/25/2021] azithromycin  500 mg Oral Daily    insulin lispro  0-12 Units Subcutaneous TID WC    insulin lispro  0-6 Units Subcutaneous Nightly    potassium chloride  20 mEq Oral Daily with breakfast    sodium chloride flush  10 mL Intravenous 2 times per day    famotidine  20 mg Oral BID    ipratropium-albuterol  1 ampule Inhalation Q4H WA    predniSONE  40 mg Oral Daily    aspirin  81 mg Oral Daily    naproxen  500 mg Oral BID    metoprolol tartrate  50 mg Oral BID    ranolazine  500 mg Oral BID    atorvastatin  20 mg Oral Nightly    spironolactone  25 mg Oral Nightly    furosemide  40 mg Oral Daily    lisinopril  30 mg Oral Daily    enoxaparin  30 mg Subcutaneous BID      dextrose       guaiFENesin-codeine, sodium chloride flush, glucose, dextrose, glucagon (rDNA), dextrose, sodium chloride flush, sodium chloride flush, promethazine **OR** ondansetron, nicotine, polyethylene glycol, acetaminophen **OR** acetaminophen, albuterol, nitroGLYCERIN, gabapentin    LABS   CBC   Recent Labs     01/22/21  0529   WBC 12.6*   HGB 12.4*   HCT 36.9*   MCV 87.2        BMP:   Lab Results   Component Value Date     01/22/2021    K 4.5 01/22/2021     01/22/2021    CO2 25 01/22/2021    BUN 28 01/22/2021    LABALBU 3.9 01/21/2021    LABALBU 4.7 02/17/2012    CREATININE 1.10 01/22/2021    CALCIUM 9.0 01/22/2021    GFRAA >60 01/22/2021    LABGLOM >60 01/22/2021     ABGs:  Lab Results   Component Value Date    PO2ART 86.3 04/05/2013      Lab Results   Component Value Date    MODE NOT REPORTED 04/05/2013     Ionized Calcium:  No results found for: IONCA  Magnesium:    Lab Results   Component Value Date    MG 2.1 01/21/2021      Phosphorus:  No results found for: PHOS     LIVER PROFILE   No results for input(s): AST, ALT, LIPASE, BILIDIR, BILITOT, ALKPHOS in the last 72 hours. Invalid input(s):   AMYLASE,  ALB  INR No results for input(s): INR in the last 72 hours.  PTT No results for input(s): APTT in the last 72 hours. BNP No results for input(s): BNP in the last 72 hours.     RADIOLOGY     (See actual reports for details)    ASSESSMENT/PLAN   Active Problems:    COPD with chronic bronchitis  VANDANA  Hypervolemia with combined CHF  History of tobacco abuse quit 20 years ago    Bronchodilators  Diuresis  Lovenox for DVT prophylaxis  Abnormal stress test, for cardiac cath as per cardiology      Electronically signed by Maria Alejandra Walls MD on 1/24/2021 at 6:48 PM

## 2021-01-25 VITALS
SYSTOLIC BLOOD PRESSURE: 104 MMHG | TEMPERATURE: 98.3 F | WEIGHT: 260.8 LBS | HEART RATE: 89 BPM | DIASTOLIC BLOOD PRESSURE: 57 MMHG | RESPIRATION RATE: 18 BRPM | BODY MASS INDEX: 43.45 KG/M2 | HEIGHT: 65 IN | OXYGEN SATURATION: 98 %

## 2021-01-25 LAB
ANION GAP SERPL CALCULATED.3IONS-SCNC: 12 MMOL/L (ref 9–17)
BUN BLDV-MCNC: 29 MG/DL (ref 8–23)
BUN/CREAT BLD: ABNORMAL (ref 9–20)
CALCIUM SERPL-MCNC: 8.9 MG/DL (ref 8.6–10.4)
CHLORIDE BLD-SCNC: 98 MMOL/L (ref 98–107)
CO2: 26 MMOL/L (ref 20–31)
CREAT SERPL-MCNC: 1.23 MG/DL (ref 0.7–1.2)
ESTIMATED AVERAGE GLUCOSE: 128 MG/DL
GFR AFRICAN AMERICAN: >60 ML/MIN
GFR NON-AFRICAN AMERICAN: 60 ML/MIN
GFR SERPL CREATININE-BSD FRML MDRD: ABNORMAL ML/MIN/{1.73_M2}
GFR SERPL CREATININE-BSD FRML MDRD: ABNORMAL ML/MIN/{1.73_M2}
GLUCOSE BLD-MCNC: 103 MG/DL (ref 75–110)
GLUCOSE BLD-MCNC: 146 MG/DL (ref 70–99)
HBA1C MFR BLD: 6.1 % (ref 4–6)
HCT VFR BLD CALC: 40.1 % (ref 41–53)
HEMOGLOBIN: 13.4 G/DL (ref 13.5–17.5)
MCH RBC QN AUTO: 29.4 PG (ref 26–34)
MCHC RBC AUTO-ENTMCNC: 33.6 G/DL (ref 31–37)
MCV RBC AUTO: 87.6 FL (ref 80–100)
NRBC AUTOMATED: ABNORMAL PER 100 WBC
PDW BLD-RTO: 13.6 % (ref 11.5–14.9)
PLATELET # BLD: 225 K/UL (ref 150–450)
PMV BLD AUTO: 8.2 FL (ref 6–12)
POTASSIUM SERPL-SCNC: 4.7 MMOL/L (ref 3.7–5.3)
RBC # BLD: 4.57 M/UL (ref 4.5–5.9)
SODIUM BLD-SCNC: 136 MMOL/L (ref 135–144)
TROPONIN INTERP: NORMAL
TROPONIN T: NORMAL NG/ML
TROPONIN, HIGH SENSITIVITY: 11 NG/L (ref 0–22)
WBC # BLD: 9.9 K/UL (ref 3.5–11)

## 2021-01-25 PROCEDURE — 2580000003 HC RX 258: Performed by: INTERNAL MEDICINE

## 2021-01-25 PROCEDURE — 6370000000 HC RX 637 (ALT 250 FOR IP): Performed by: INTERNAL MEDICINE

## 2021-01-25 PROCEDURE — 80048 BASIC METABOLIC PNL TOTAL CA: CPT

## 2021-01-25 PROCEDURE — 94761 N-INVAS EAR/PLS OXIMETRY MLT: CPT

## 2021-01-25 PROCEDURE — 36415 COLL VENOUS BLD VENIPUNCTURE: CPT

## 2021-01-25 PROCEDURE — 82947 ASSAY GLUCOSE BLOOD QUANT: CPT

## 2021-01-25 PROCEDURE — 84484 ASSAY OF TROPONIN QUANT: CPT

## 2021-01-25 PROCEDURE — 85027 COMPLETE CBC AUTOMATED: CPT

## 2021-01-25 PROCEDURE — 99232 SBSQ HOSP IP/OBS MODERATE 35: CPT | Performed by: INTERNAL MEDICINE

## 2021-01-25 PROCEDURE — 94640 AIRWAY INHALATION TREATMENT: CPT

## 2021-01-25 RX ADMIN — POTASSIUM CHLORIDE 20 MEQ: 20 TABLET, EXTENDED RELEASE ORAL at 07:33

## 2021-01-25 RX ADMIN — Medication 5 ML: at 01:56

## 2021-01-25 RX ADMIN — NAPROXEN 500 MG: 250 TABLET ORAL at 07:31

## 2021-01-25 RX ADMIN — ASPIRIN 81 MG: 81 TABLET, COATED ORAL at 07:32

## 2021-01-25 RX ADMIN — IPRATROPIUM BROMIDE AND ALBUTEROL SULFATE 1 AMPULE: .5; 3 SOLUTION RESPIRATORY (INHALATION) at 10:42

## 2021-01-25 RX ADMIN — IPRATROPIUM BROMIDE AND ALBUTEROL SULFATE 1 AMPULE: .5; 3 SOLUTION RESPIRATORY (INHALATION) at 06:54

## 2021-01-25 RX ADMIN — Medication 10 ML: at 07:32

## 2021-01-25 RX ADMIN — PREDNISONE 40 MG: 20 TABLET ORAL at 07:32

## 2021-01-25 RX ADMIN — FAMOTIDINE 20 MG: 20 TABLET ORAL at 07:31

## 2021-01-25 RX ADMIN — RANOLAZINE 500 MG: 500 TABLET, FILM COATED, EXTENDED RELEASE ORAL at 07:32

## 2021-01-25 RX ADMIN — AZITHROMYCIN 500 MG: 250 TABLET, FILM COATED ORAL at 07:31

## 2021-01-25 RX ADMIN — FUROSEMIDE 40 MG: 40 TABLET ORAL at 07:31

## 2021-01-25 NOTE — PLAN OF CARE
Problem: Falls - Risk of:  Goal: Will remain free from falls  Description: Will remain free from falls  Outcome: Met This Shift  Goal: Absence of physical injury  Description: Absence of physical injury  Outcome: Met This Shift     Problem: Cardiac:  Goal: Ability to maintain an adequate cardiac output will improve  Description: Ability to maintain an adequate cardiac output will improve  Outcome: Ongoing  Goal: Hemodynamic stability will improve  Description: Hemodynamic stability will improve  Outcome: Ongoing     Problem: Fluid Volume:  Goal: Ability to achieve and maintain adequate urine output will improve  Description: Ability to achieve and maintain adequate urine output will improve  Outcome: Ongoing     Problem: Respiratory:  Goal: Respiratory status will improve  Description: Respiratory status will improve  Outcome: Ongoing

## 2021-01-25 NOTE — PROGRESS NOTES
History and Physical Service  [facility]    progres note              Date:   1/25/2021  Patient name:  Javon Navarrete  MRN:   [id number]  Account:  [de-identified]  YOB: 1960  PCP:    Tereso Casas MD  Code Status:    Full Code    Chief Complaint:     Cough wheezing and dyspnea    History Obtained From:     Patient ,medical record ,nursing staff    History of Present Illnes     Active Problems:    HTN (hypertension)    Chronic diastolic CHF (congestive heart failure) ([facility] [address]    Chest pain, unspecified    COPD exacerbation ([facility] [address]  Resolved Problems:    * No resolved hospital problems. *      1/25/21    · Patient is feeling better  · Is going to have cardiac cath at [facility] this afternoon  ·  1. Patient is known to have hypertension chronic diastolic heart failure and presented with chest pain  2. Was seen by Dr. Bree Delgado        The patient is a 63 y.o. Non-/non  male who presents   COPD: Patient complains of dyspnea. Symptoms began 7 days ago. Symptoms acute dyspnea does worsen with exertion. Sputum is clear in small amounts. Fever has been low grade fevers. Patient uses 0 pillows at night. Patient can walk 10 feet before resting. Patient currently is not on home oxygen therapy. .   Breathing improved denies any chest pain  COPD at baseline no chest pain patient had a stress test done today      Overnight no chest pain persistent cough breathing is at baseline abnormal stress test high risk  Past Medical History:   Diagnosis Date    CHF (congestive heart failure) (HCC)     Chronic back pain     COPD (chronic obstructive pulmonary disease) (HCC)     Headache(784.0)     Hypertension     Low back pain with sciatica     Osteoarthritis     Sleep apnea     with cpap        Past Surgical History:     Past Surgical History:   Procedure Laterality Date   [address]   exact date unknown        Medications Prior to Admission:     Prior to Admission medications    Medication Sig Start Date End Date Taking?  Authorizing Provider   gabapentin (NEURONTIN) 300 MG capsule TAKE 1 CAPSULE BY MOUTH NIGHTLY AS NEEDED (PAIN) 12/10/20 12/10/21 Yes Sukhdeep Ulloa MD   potassium chloride (KLOR-CON M) 20 MEQ extended release tablet TAKE ONE TABLET BY MOUTH TWICE A DAY 12/9/20  Yes Sukhdeep Ulloa MD   metoprolol tartrate (LOPRESSOR) 50 MG tablet Take 1 tablet by mouth 2 times daily 12/8/20  Trina Ulloa MD   nitroGLYCERIN (NITROSTAT) 0.4 MG SL tablet Place 1 tablet under the tongue every 5 minutes as needed for Chest pain 12/8/20  Yes Sukhdeep Ulloa MD   ranolazine (RANEXA) 500 MG extended release tablet Take 1 tablet by mouth 2 times daily 12/8/20  Yes Sukhdeep Ulloa MD   simvastatin (ZOCOR) 20 MG tablet Take 1 tablet by mouth nightly 12/8/20  Trina Ulloa MD   spironolactone (ALDACTONE) 25 MG tablet Take 1 tablet by mouth daily  Patient taking differently: Take 25 mg by mouth nightly  12/8/20  Yes Sukhdeep Ulloa MD   furosemide (LASIX) 40 MG tablet Take 1 tablet by mouth daily 12/8/20  Trina Ulloa MD   COMBIVENT RESPIMAT  MCG/ACT AERS inhaler INHALE 1 PUFF INTO THE LUNGS EVERY 6 HOURS 12/1/20  Trina Ulloa MD   lisinopril (PRINIVIL;ZESTRIL) 30 MG tablet TAKE 1 TABLET BY MOUTH DAILY 12/1/20  Trina Ulloa MD   albuterol sulfate HFA (PROAIR HFA) 108 (90 Base) MCG/ACT inhaler Inhale 2 puffs into the lungs every 6 hours as needed for Wheezing 12/1/20  Yes Sukhdeep Ulloa MD   albuterol (PROVENTIL) (5 MG/ML) 0.5% nebulizer solution Take 0.5 mLs by nebulization every 6 hours as needed for Wheezing or Shortness of Breath 9/25/20  Yes Donato Miller MD   naproxen (NAPROSYN) 500 MG tablet TAKE 1 TABLET BY MOUTH 2 TIMES DAILY FOR 20 DAYS 8/20/20 1/21/21 Trina Ulloa MD   aspirin 81 MG tablet Take 1 tablet by mouth daily 8/13/19  Yes Sukhdeep Ulloa MD   furosemide (LASIX) 40 MG tablet TAKE 1 TABLET BY MOUTH DAILY 12/9/20 Mima Kelsey MD   Masks [facility] MASK) MISC 1 each by Does not apply route as needed (as needed) 10/5/20   Mima Kelsey MD        Allergies:     Penicillins, Sulfa antibiotics, Sulfasalazine, Pseudoeph-doxylamine-dm-apap, and Rabbit protein    Social History:     Tobacco:    reports that he has quit smoking. He smoked 0.00 packs per day for 40.00 years. His smokeless tobacco use includes chew. Alcohol:      reports current alcohol use of about 1.0 standard drinks of alcohol per week. Drug Use:  reports no history of drug use. Family History:     Family History   Problem Relation Age of Onset    Kidney Disease Mother     Diabetes Mother     Heart Disease Mother     Arthritis Mother     Heart Disease Father     Arthritis Sister     Diabetes Sister     Heart Disease Sister        Review of Systems:     Positive and Negative as described in HPI. CONSTITUTIONAL:  negative for fevers, chills, sweats, fatigue, weight loss  HEENT:  negative for vision, hearing changes, runny nose, throat pain  RESPIRATORY: Positive for cough wheezing and dyspnea improved CARDIOVASCULAR:  negative for chest pain, palpitations.   GASTROINTESTINAL:  negative for nausea, vomiting, diarrhea, constipation, change in bowel habits, abdominal pain   GENITOURINARY:  negative for difficulty of urination, burning with urination, frequency   INTEGUMENT:  negative for rash, skin lesions, easy bruising   HEMATOLOGIC/LYMPHATIC:  negative for swelling/edema   ALLERGIC/IMMUNOLOGIC:  negative for urticaria , itching  ENDOCRINE:  negative increase in drinking, increase in urination, hot or cold intolerance  MUSCULOSKELETAL:  negative joint pains, muscle aches, swelling of joints  NEUROLOGICAL:  negative for headaches, dizziness, lightheadedness, numbness, pain, tingling extremities  BEHAVIOR/PSYCH:  negative for depression, anxiety    Physical Exam:   BP (!) 104/57   Pulse 89   Temp 98.3 °F (36.8 °C) (Oral)   Resp 18   Ht 5' 5\" (1.651 m) Wt 260 lb 12.9 oz (118.3 kg)   SpO2 98%   BMI 43.40 kg/m²   Recent Labs     01/24/21  1101 01/24/21  1603 01/24/21 2008 01/25/21  0618   POCGLU 152* 213* 176* 103     Morbid obesity BMI 45 weighs 275 pounds  General Appearance:  alert, well appearing, and in no acute distress  Mental status: oriented to person, place, and time with normal affect  Head:  normocephalic, atraumatic. Eye: no icterus, redness, pupils equal and reactive, extraocular eye movements intact, conjunctiva clear  Ear: normal external ear, no discharge, hearing intact  Nose:  no drainage noted  Mouth: mucous membranes moist  Neck: supple, no carotid bruits, thyroid not palpable  Lungs: Good air entry no wheezing cardiovascular: normal rate, regular rhythm, no murmur, gallop, rub.   Abdomen: Soft, nontender, nondistended, normal bowel sounds, no hepatomegaly or splenomegaly  Neurologic: There are no new focal motor or sensory deficits, normal muscle tone and bulk, no abnormal sensation, normal speech, cranial nerves II through XII grossly intact  Skin: No gross lesions, rashes, bruising or bleeding on exposed skin area  Extremities:  peripheral pulses palpable, no pedal edema or calf pain with palpation  Psych: normal affect     Investigations:      Laboratory Testing:  Recent Results (from the past 24 hour(s))   Troponin    Collection Time: 01/24/21  4:01 PM   Result Value Ref Range    Troponin, High Sensitivity 9 0 - 22 ng/L    Troponin T NOT REPORTED <0.03 ng/mL    Troponin Interp NOT REPORTED    POC Glucose Fingerstick    Collection Time: 01/24/21  4:03 PM   Result Value Ref Range    POC Glucose 213 (H) 75 - 110 mg/dL   POC Glucose Fingerstick    Collection Time: 01/24/21  8:08 PM   Result Value Ref Range    POC Glucose 176 (H) 75 - 110 mg/dL   Troponin    Collection Time: 01/24/21 10:38 PM   Result Value Ref Range    Troponin, High Sensitivity 9 0 - 22 ng/L    Troponin T NOT REPORTED <0.03 ng/mL    Troponin Interp NOT REPORTED    Troponin Collection Time: 01/25/21  5:12 AM   Result Value Ref Range    Troponin, High Sensitivity 11 0 - 22 ng/L    Troponin T NOT REPORTED <0.03 ng/mL    Troponin Interp NOT REPORTED    POC Glucose Fingerstick    Collection Time: 01/25/21  6:18 AM   Result Value Ref Range    POC Glucose 103 75 - 110 mg/dL   CBC    Collection Time: 01/25/21 11:14 AM   Result Value Ref Range    WBC 9.9 3.5 - 11.0 k/uL    RBC 4.57 4.5 - 5.9 m/uL    Hemoglobin 13.4 (L) 13.5 - 17.5 g/dL    Hematocrit 40.1 (L) 41 - 53 %    MCV 87.6 80 - 100 fL    MCH 29.4 26 - 34 pg    MCHC 33.6 31 - 37 g/dL    RDW 13.6 11.5 - 14.9 %    Platelets 576 594 - 738 k/uL    MPV 8.2 6.0 - 12.0 fL    NRBC Automated NOT REPORTED per 100 WBC       Recent Labs     01/25/21  1114 01/22/21  0529 01/21/21  0350   HGB 13.4* 12.4* 12.7*   HCT 40.1* 36.9* 37.5*   WBC 9.9 12.6* 7.4   MCV 87.6 87.2 86.5   NA  --  137 139   K  --  4.5 4.1   CL  --  100 102   CO2  --  25 24   BUN  --  28* 21   CREATININE  --  1.10 0.87   GLUCOSE  --  208* 198*   AST  --   --  19   ALT  --   --  27   LABALBU  --   --  3.9       Hematology:  Recent Labs     01/25/21  1114   WBC 9.9   RBC 4.57   HGB 13.4*   HCT 40.1*   MCV 87.6   MCH 29.4   MCHC 33.6   RDW 13.6      MPV 8.2     Chemistry:  Recent Labs     01/24/21  1601 01/24/21  2238 01/25/21  0512   TROPONINT NOT REPORTED NOT REPORTED NOT REPORTED     Recent Labs     01/23/21 2001 01/24/21  0642 01/24/21  1101 01/24/21  1603 01/24/21 2008 01/25/21  0618   POCGLU 270* 135* 152* 213* 176* 103       Imaging/Diagnostics:       Cta Chest Abdomen Pelvis W Contrast    Result Date: 1/21/2021  EXAMINATION: CTA OF THE CHEST, ABDOMEN AND PELVIS WITH CONTRAST 1/21/2021 5:14 am TECHNIQUE: CTA of the chest, abdomen and pelvis was performed after the administration of intravenous contrast.  Multiplanar reformatted images are provided for review. MIP images are provided for review.  Dose modulation, iterative reconstruction, and/or weight based adjustment of the mA/kV was utilized to reduce the radiation dose to as low as reasonably achievable. COMPARISON: None HISTORY: ORDERING SYSTEM PROVIDED HISTORY: chest radiating to back pain TECHNOLOGIST PROVIDED HISTORY: chest radiating to back pain Decision Support Exception->Emergency Medical Condition (MA) Reason for Exam: chest pain, back pain, SOB, pt has a hx of hypertension Acuity: Acute Type of Exam: Initial Relevant Medical/Surgical History: no known surgeries to area of interest FINDINGS: Chest: Mediastinum: Pre and postcontrast evaluation with thoracic aorta demonstrates normal caliber, attenuation, and enhancement. No evidence of dissection no mediastinal lymphadenopathy. Heart size normal limits. No pericardial effusion. Trachea and esophagus are unremarkable. Visualized pulmonary vasculature is unremarkable. Lungs/pleura: No pleural effusion or pneumothorax. No focal consolidation or pulmonary edema. Airways are patent. Soft Tissues/Bones: No acute findings. Abdomen/Pelvis: Organs: The visualized portions of the liver, gallbladder, spleen, pancreas and adrenal glands demonstrate no acute abnormality. No biliary ductal dilatation. The kidneys appear normal in size and demonstrate symmetric enhancement. No focal renal mass. No hydronephrosis. No perinephric stranding. GI/Bowel: No bowel dilatation to suggest obstruction. No evidence of acute appendicitis. Pelvis: The urinary bladder appears unremarkable. The pelvic organs demonstrate no acute abnormality. Peritoneum/Retroperitoneum: Pre and post-contrast evaluation of the abdominal aorta demonstrates normal caliber, attenuation, and enhancement pattern. No evidence of dissection. Celiac axis, SMA, renal arteries, iliac vessels and proximal femoral vessels are patent. Note is made of variant celiac axis anatomy with aortic origin of splenic and right hepatic arteries. Bones/Soft Tissues: No acute findings     No vascular abnormality. Otherwise, no acute findings in the chest, abdomen, or pelvis.           Current Facility-Administered Medications   Medication Dose Route Frequency Provider Last Rate Last Admin    guaiFENesin-codeine (GUAIFENESIN AC) 100-10 MG/5ML liquid 5 mL  5 mL Oral Q4H PRN Rolando Moser MD   5 mL at 01/25/21 0156    azithromycin (ZITHROMAX) tablet 500 mg  500 mg Oral Daily Rolando Moser MD   500 mg at 01/25/21 0731    sodium chloride flush 0.9 % injection 10 mL  10 mL Intravenous PRN Aj Bain MD   10 mL at 01/23/21 0737    glucose (GLUTOSE) 40 % oral gel 15 g  15 g Oral PRN Arnaldo Calvin MD        dextrose 50 % IV solution  12.5 g Intravenous PRN Arnaldo Calvin MD        glucagon (rDNA) injection 1 mg  1 mg Intramuscular PRN Rolando Moser MD        dextrose 5 % solution  100 mL/hr Intravenous PRN Rolando Moser MD        insulin lispro (HUMALOG) injection vial 0-12 Units  0-12 Units Subcutaneous TID WC Rolando Moser MD   4 Units at 01/24/21 1650    insulin lispro (HUMALOG) injection vial 0-6 Units  0-6 Units Subcutaneous Nightly Rolando Moser MD   1 Units at 01/24/21 2059    potassium chloride (KLOR-CON M) extended release tablet 20 mEq  20 mEq Oral Daily with breakfast Fink Fleeting MD Mariela   20 mEq at 01/25/21 0733    sodium chloride flush 0.9 % injection 10 mL  10 mL Intravenous PRN Leonela Tamayo MD   10 mL at 01/21/21 0506    sodium chloride flush 0.9 % injection 10 mL  10 mL Intravenous 2 times per day Rolando Moser MD   10 mL at 01/25/21 0732    sodium chloride flush 0.9 % injection 10 mL  10 mL Intravenous PRN Rolando Moser MD        famotidine (PEPCID) tablet 20 mg  20 mg Oral BID Rolando Moser MD   20 mg at 01/25/21 0731    promethazine (PHENERGAN) tablet 12.5 mg  12.5 mg Oral Q6H PRN Rolando Moser MD        Or    ondansetron (ZOFRAN) injection 4 mg  4 mg Intravenous Q6H PRN Arnaldo Calvin MD        nicotine (NICODERM CQ) 21 MG/24HR 1 patch  1 patch Transdermal Daily PRN Anamaria Alas MD        polyethylene glycol (GLYCOLAX) packet 17 g  17 g Oral Daily PRN Anamaria Alas MD        acetaminophen (TYLENOL) tablet 650 mg  650 mg Oral Q6H PRN Anamaria Alas MD        Or    acetaminophen (TYLENOL) suppository 650 mg  650 mg Rectal Q6H PRN Arnaldo Arnold Fuentes MD        albuterol (PROVENTIL) nebulizer solution 2.5 mg  2.5 mg Nebulization Q2H PRN Anamaria Alas MD        ipratropium-albuterol (DUONEB) nebulizer solution 1 ampule  1 ampule Inhalation Q4H WA Anamaria Alas MD   1 ampule at 01/25/21 1042    predniSONE (DELTASONE) tablet 40 mg  40 mg Oral Daily Anamaria Alas MD   40 mg at 01/25/21 0732    aspirin EC tablet 81 mg  81 mg Oral Daily Anamaria Alas MD   81 mg at 01/25/21 0732    naproxen (NAPROSYN) tablet 500 mg  500 mg Oral BID Anamaria Alas MD   500 mg at 01/25/21 0731    metoprolol tartrate (LOPRESSOR) tablet 50 mg  50 mg Oral BID Anamaria Alas MD   50 mg at 01/24/21 2100    nitroGLYCERIN (NITROSTAT) SL tablet 0.4 mg  0.4 mg Sublingual Q5 Min PRN Anamaria Alas MD        ranolazine (RANEXA) extended release tablet 500 mg  500 mg Oral BID Anamaria Alas MD   500 mg at 01/25/21 0732    atorvastatin (LIPITOR) tablet 20 mg  20 mg Oral Nightly Anamaria Alas MD   20 mg at 01/24/21 2100    spironolactone (ALDACTONE) tablet 25 mg  25 mg Oral Nightly Anamaria Alas MD   25 mg at 01/24/21 2100    furosemide (LASIX) tablet 40 mg  40 mg Oral Daily Anamaria Alas MD   40 mg at 01/25/21 0731    gabapentin (NEURONTIN) capsule 300 mg  300 mg Oral Nightly PRN Anamaria Alas MD   300 mg at 01/24/21 2122    lisinopril (PRINIVIL;ZESTRIL) tablet 30 mg  30 mg Oral Daily Anamaria Alas MD   30 mg at 01/24/21 1327    enoxaparin (LOVENOX) injection 30 mg  30 mg Subcutaneous BID Anamaria Alas MD   30 mg at 01/24/21 2059     Impressions :      1.  Active Problems:    HTN (hypertension)    Chronic diastolic CHF (congestive heart failure) (HCC)    Chest pain, unspecified    COPD exacerbation ([facility] [address]  Resolved Problems:    * No resolved hospital problems. *        2.  has a past medical history of CHF (congestive heart failure) (HCC), Chronic back pain, COPD (chronic obstructive pulmonary disease) ([facility] [address] Headache(784.0), Hypertension, Low back pain with sciatica, Osteoarthritis, and Sleep apnea. Plans:     Acute exacerbation of her COPD  Long-term smoker smoked over 20 years  IV steroids DuoNeb  Antibiotics  Case discussed with pulmonologist  Jan 22  Breathing is improving with IV steroids and DuoNeb denies any chest pain  Echocardiogram done result is pending  Cardio input noted  Jan 23  COPD at baseline no chest pain stress test done result pending discharge planning post stress test results  January 24  Overnight no chest pain cough excessive  Breathing is at baseline add codeine for cough  Abnormal stress test high risk ejection fraction 39% inferior wall reversible perfusion defect  Needs cardiac input for possible cardiac cath    1/25/21    · Going to have cardiac cath at [facility] 3. Stat BMP ordered  4. Stress test was abnormal  5.  COPD exacerbation is improved          Felipe Mccann MD  1/25/2021  11:56 AM

## 2021-01-25 NOTE — PROGRESS NOTES
[address] [place]  [phone]          Progress Note    Patient Name:  Javon Navarrete    :  1960 9:10 AM      SUBJECTIVE       Mr. Irvin Hutchinson  has no chest pain, he has shortness of breath from his COPD. The chest pain he had was different from his COPD and he has abnormal stress test he had discussion with Dr. Citlalli Curiel yesterday about cardiac catheterization. And this is scheduled for today already. OBJECTIVE     Vital signs:    BP (!) 104/57   Pulse 89   Temp 98.3 °F (36.8 °C) (Oral)   Resp 18   Ht 5' 5\" (1.651 m)   Wt 260 lb 12.9 oz (118.3 kg)   SpO2 93%   BMI 43.40 kg/m²     . tro    Admit Weight:  275 lb (124.7 kg)    Last 3 weights: Wt Readings from Last 3 Encounters:   21 260 lb 12.9 oz (118.3 kg)   20 272 lb (123.4 kg)   20 268 lb (121.6 kg)       BMI: Body mass index is 43.4 kg/m². Input/Output:       Intake/Output Summary (Last 24 hours) at 2021 0910  Last data filed at 2021 1843  Gross per 24 hour   Intake 666 ml   Output 2050 ml   Net -1384 ml         Exam:     General appearance: awake and alert moves all ext   Lungs: Scattered wheezing  Heart: S1 and S2 no murmur  Abdomen: positive bowel sounds, no bruits, no masses  Extremities: warm and dry, no cyanosis, no clubbing        Laboratory Studies:     CBC:   No results for input(s): WBC, HGB, HCT, MCV, PLT in the last 72 hours. BMP:   No results for input(s): NA, K, CL, CO2, PHOS, BUN, CREATININE in the last 72 hours. Invalid input(s): CA  PT/INR: No results for input(s): PROTIME, INR in the last 72 hours. APTT: No results for input(s): APTT in the last 72 hours. MAG: No results for input(s): MG in the last 72 hours. D Dimer: No results for input(s): DDIMER in the last 72 hours.   Troponin    Recent Labs     21  1601 21  2238 21  0512   TROPHS 9 9 11                BNP No results for input(s): BNP in the last

## 2021-01-25 NOTE — PROGRESS NOTES
BRONCHOSPASM/BRONCHOCONSTRICTION     [x]         IMPROVE AERATION/BREATH SOUNDS  [x]   ADMINISTER BRONCHODILATOR THERAPY AS APPROPRIATE  [x]   ASSESS BREATH SOUNDS  [x]   IMPLEMENT AEROSOL/MDI PROTOCOL  [x]   PATIENT EDUCATION AS NEEDED    NONINVASIVE VENTILATION    [x] PROVIDE OPTIMAL VENTILATION/ACCEPTABLE SPO2  [x] IMPLEMENT NONINVASIVE VENTILATION PROTOCOL  [x] MAINTAIN ACCEPTABLE SPO2  [x] ASSESS SKIN INTEGRITY/BREAKDOWN SCORE  [x] PATIENT EDUCATION AS NEEDED  [x] BIPAP AS NEEDED

## 2021-01-25 NOTE — PROGRESS NOTES
Called Marisol at [facility] to give report on pt coming over, stated \"can you call a little close to when the pt leaves\" RN stated that she may not be able to do that due to status in the hospital right now. No report given.

## 2021-01-25 NOTE — PROGRESS NOTES
Attempted to Call [facility] AND [facility] Cath lab.  Left Voicemail to have them return the call

## 2021-01-25 NOTE — PROGRESS NOTES
[address] [facility]    OCCUPATIONAL THERAPY TREATMENT NOTE   INPATIENT   Date: 21  Patient Name: Karin Quick       Room: 5611/0398-12  MRN: [id number]   Account #: [de-identified]    : 1960  (61 y.o.)  Gender: male      REASON FOR MISSED TREATMENT:   Patient is performing own care tasks independently. No Skilled OT Needs currently identified.    -   OT being discontinued at this time.  Patient functioning at Premorbid Level  No further needs          Raz Kimble OT

## 2021-01-25 NOTE — PROGRESS NOTES
Spoke with Kelly Carey from [facility], report given. Will send over H&P. Labs.  Possibly discharge from [facility]. They Will keep us updated

## 2021-01-26 ENCOUNTER — TELEPHONE (OUTPATIENT)
Dept: FAMILY MEDICINE CLINIC | Age: 61
End: 2021-01-26

## 2021-01-26 ENCOUNTER — APPOINTMENT (OUTPATIENT)
Dept: PHYSICAL THERAPY | Age: 61
End: 2021-01-26
Payer: COMMERCIAL

## 2021-01-26 NOTE — TELEPHONE ENCOUNTER
[facility] Initial Follow Up Call    Call within 2 business days of discharge: Yes     Patient: González Hester Patient : 1960 MRN: [id number]    [unfilled]    RARS: Readmission Risk Score: 14       Spoke with: First attempt to reach out to pt no answer will try again.     Discharge department/facility:  [facility]    Non-face-to-face services provided:  Obtained and reviewed discharge summary and/or continuity of care documents    Follow Up  Future Appointments   Date Time Provider Sondra Hogue   2021 10:00 AM Bishnu Nunes PTA [facility]   2021  9:30 AM Bishnu Nunes PTA [address] [place] [address]

## 2021-01-27 ENCOUNTER — TELEPHONE (OUTPATIENT)
Dept: FAMILY MEDICINE CLINIC | Age: 61
End: 2021-01-27

## 2021-01-28 ENCOUNTER — APPOINTMENT (OUTPATIENT)
Dept: PHYSICAL THERAPY | Age: 61
End: 2021-01-28
Payer: COMMERCIAL

## 2021-02-01 ENCOUNTER — HOSPITAL ENCOUNTER (OUTPATIENT)
Dept: PHYSICAL THERAPY | Age: 61
Setting detail: THERAPIES SERIES
End: 2021-02-01
Payer: COMMERCIAL

## 2021-02-01 ENCOUNTER — HOSPITAL ENCOUNTER (OUTPATIENT)
Dept: PHYSICAL THERAPY | Age: 61
Setting detail: THERAPIES SERIES
Discharge: HOME | End: 2021-02-01
Payer: COMMERCIAL

## 2021-02-01 ENCOUNTER — OFFICE VISIT (OUTPATIENT)
Dept: FAMILY MEDICINE CLINIC | Age: 61
End: 2021-02-01
Payer: COMMERCIAL

## 2021-02-01 VITALS
BODY MASS INDEX: 44.42 KG/M2 | WEIGHT: 266.6 LBS | SYSTOLIC BLOOD PRESSURE: 130 MMHG | DIASTOLIC BLOOD PRESSURE: 69 MMHG | TEMPERATURE: 97.2 F | HEIGHT: 65 IN | HEART RATE: 89 BPM

## 2021-02-01 DIAGNOSIS — I10 ESSENTIAL HYPERTENSION: ICD-10-CM

## 2021-02-01 DIAGNOSIS — J44.1 COPD EXACERBATION (HCC): ICD-10-CM

## 2021-02-01 DIAGNOSIS — E66.9 DIABETES MELLITUS TYPE 2 IN OBESE (HCC): ICD-10-CM

## 2021-02-01 DIAGNOSIS — E11.69 DIABETES MELLITUS TYPE 2 IN OBESE (HCC): ICD-10-CM

## 2021-02-01 DIAGNOSIS — I50.32 CHRONIC DIASTOLIC CHF (CONGESTIVE HEART FAILURE) (HCC): ICD-10-CM

## 2021-02-01 DIAGNOSIS — Z13.1 DIABETES MELLITUS SCREENING: ICD-10-CM

## 2021-02-01 DIAGNOSIS — J40 BRONCHITIS: ICD-10-CM

## 2021-02-01 DIAGNOSIS — I50.22 CHRONIC SYSTOLIC CONGESTIVE HEART FAILURE (HCC): Primary | ICD-10-CM

## 2021-02-01 LAB — HBA1C MFR BLD: 6.3 %

## 2021-02-01 PROCEDURE — G8926 SPIRO NO PERF OR DOC: HCPCS | Performed by: STUDENT IN AN ORGANIZED HEALTH CARE EDUCATION/TRAINING PROGRAM

## 2021-02-01 PROCEDURE — G8417 CALC BMI ABV UP PARAM F/U: HCPCS | Performed by: STUDENT IN AN ORGANIZED HEALTH CARE EDUCATION/TRAINING PROGRAM

## 2021-02-01 PROCEDURE — 2022F DILAT RTA XM EVC RTNOPTHY: CPT | Performed by: STUDENT IN AN ORGANIZED HEALTH CARE EDUCATION/TRAINING PROGRAM

## 2021-02-01 PROCEDURE — 1111F DSCHRG MED/CURRENT MED MERGE: CPT | Performed by: STUDENT IN AN ORGANIZED HEALTH CARE EDUCATION/TRAINING PROGRAM

## 2021-02-01 PROCEDURE — 3023F SPIROM DOC REV: CPT | Performed by: STUDENT IN AN ORGANIZED HEALTH CARE EDUCATION/TRAINING PROGRAM

## 2021-02-01 PROCEDURE — 3017F COLORECTAL CA SCREEN DOC REV: CPT | Performed by: STUDENT IN AN ORGANIZED HEALTH CARE EDUCATION/TRAINING PROGRAM

## 2021-02-01 PROCEDURE — G8484 FLU IMMUNIZE NO ADMIN: HCPCS | Performed by: STUDENT IN AN ORGANIZED HEALTH CARE EDUCATION/TRAINING PROGRAM

## 2021-02-01 PROCEDURE — 83036 HEMOGLOBIN GLYCOSYLATED A1C: CPT | Performed by: STUDENT IN AN ORGANIZED HEALTH CARE EDUCATION/TRAINING PROGRAM

## 2021-02-01 PROCEDURE — 99213 OFFICE O/P EST LOW 20 MIN: CPT | Performed by: STUDENT IN AN ORGANIZED HEALTH CARE EDUCATION/TRAINING PROGRAM

## 2021-02-01 PROCEDURE — 3044F HG A1C LEVEL LT 7.0%: CPT | Performed by: STUDENT IN AN ORGANIZED HEALTH CARE EDUCATION/TRAINING PROGRAM

## 2021-02-01 PROCEDURE — G8427 DOCREV CUR MEDS BY ELIG CLIN: HCPCS | Performed by: STUDENT IN AN ORGANIZED HEALTH CARE EDUCATION/TRAINING PROGRAM

## 2021-02-01 PROCEDURE — 4004F PT TOBACCO SCREEN RCVD TLK: CPT | Performed by: STUDENT IN AN ORGANIZED HEALTH CARE EDUCATION/TRAINING PROGRAM

## 2021-02-01 ASSESSMENT — ENCOUNTER SYMPTOMS
APNEA: 0
CHEST TIGHTNESS: 1
COLOR CHANGE: 0
COUGH: 0
SHORTNESS OF BREATH: 0

## 2021-02-01 ASSESSMENT — PATIENT HEALTH QUESTIONNAIRE - PHQ9
SUM OF ALL RESPONSES TO PHQ QUESTIONS 1-9: 0
SUM OF ALL RESPONSES TO PHQ9 QUESTIONS 1 & 2: 0

## 2021-02-01 NOTE — FLOWSHEET NOTE
[] [facility]) - [facility] &  Therapy  [address]    [phone]  F: [phone]   [] [address] [facility] [address]   [address]  P: [phone]  F: [phone]  [] [address] [place] &  Therapy  [address]  P: [phone]  F: [phone] [] [address]  P: [phone]  F: [phone]  [] [address]  [facility]   [address]   [place]: [phone]  F: [phone]   [x] [place] [id number] [facility] [address]  [place]: [phone]   F: [phone]     Physical Therapy Cancel/No Show note    Date: 2021  Patient: González Hester  : 1960  MRN: [id number]    Cancels/No Shows to date:  including today's re-eval and aquatic session     For today's appointment patient:    []  Cancelled    [x] Rescheduled appointment    [] No-show     Reason given by patient:    []  Patient ill    []  Conflicting appointment    [] No transportation      [x] Conflict with work    [] No reason given    [] Weather related    [] COVID-19    [] Other:      Comments:        [x] Next appointment was confirmed for 2/3 for re-eval and aquatics     Electronically signed by: Bishnu Nunes, PT

## 2021-02-01 NOTE — PROGRESS NOTES
Visit Information    Have you changed or started any medications since your last visit including any over-the-counter medicines, vitamins, or herbal medicines? no   Have you stopped taking any of your medications? Is so, why? -  no  Are you having any side effects from any of your medications? - no    Have you seen any other physician or provider since your last visit?  no   Have you had any other diagnostic tests since your last visit?  no   Have you been seen in the emergency room and/or had an admission in a hospital since we last saw you?  no   Have you had your routine dental cleaning in the past 6 months?  no     Do you have an active MyChart account? If no, what is the barrier?   Yes    Patient Care Team:  Kavya Dove MD as PCP - General (Family Medicine)  Moraima Manzano MD as Consulting Physician (Cardiology)  Donaldo Patel MD as Consulting Physician  Jenny Liu DO as Consulting Physician (General Surgery)    Medical History Review  Past Medical, Family, and Social History reviewed and does contribute to the patient presenting condition    Health Maintenance   Topic Date Due    Shingles Vaccine (1 of 2) 12/18/2009    Colon cancer screen colonoscopy  11/21/2016    Flu vaccine (1) 09/01/2020    Lipid screen  01/22/2022    A1C test (Diabetic or Prediabetic)  01/24/2022    Potassium monitoring  01/25/2022    Creatinine monitoring  01/25/2022    DTaP/Tdap/Td vaccine (2 - Td) 12/08/2030    Pneumococcal 0-64 years Vaccine  Completed    Hepatitis C screen  Completed    HIV screen  Completed    Hepatitis A vaccine  Aged Out    Hepatitis B vaccine  Aged Out    Hib vaccine  Aged Out    Meningococcal (ACWY) vaccine  Aged Out

## 2021-02-01 NOTE — PROGRESS NOTES
Attending Physician Statement  I have discussed the care of Génesis Tapia, 63 y.o. male,including pertinent history and exam findings,  with the resident Dr. Wellington Marshall MD.  History:  Chief Complaint   Patient presents with   [facility] ED Follow-up     ed followup from [facility] for back and chest pain    Health Maintenance     pt refuse flu shot     Patient is here for follow up on inpatient stay for chest pain. He was evaluated with echocardiogram and diagnostic cardiac cath. He was found to have mild systolic dysfunction with EF 45% and non obstructed CAD. I have reviewed the key elements of the encounter with the resident. Examination was done by resident as documented in residents note. BP Readings from Last 3 Encounters:   02/01/21 130/69   01/25/21 (!) 104/57   12/08/20 116/70     /69 (Site: Right Lower Arm, Position: Sitting, Cuff Size: Medium Adult)   Pulse 89   Temp 97.2 °F (36.2 °C) (Temporal)   Ht 5' 5\" (1.651 m)   Wt 266 lb 9.6 oz (120.9 kg)   BMI 44.36 kg/m²   Lab Results   Component Value Date    WBC 9.9 01/25/2021    HGB 13.4 (L) 01/25/2021    HCT 40.1 (L) 01/25/2021     01/25/2021    CHOL 191 01/22/2021    TRIG 138 01/22/2021    HDL 46 01/22/2021    ALT 27 01/21/2021    AST 19 01/21/2021     01/25/2021    K 4.7 01/25/2021    CL 98 01/25/2021    CREATININE 1.23 (H) 01/25/2021    BUN 29 (H) 01/25/2021    CO2 26 01/25/2021    TSH 0.41 01/22/2021    PSA 1.06 07/01/2014    INR 1.0 04/04/2013    LABA1C 6.3 02/01/2021     Lab Results   Component Value Date    CALCIUM 8.9 01/25/2021     Lab Results   Component Value Date    LDLCHOLESTEROL 117 01/22/2021     I agree with the assessment, plan and diagnosis of    Diagnosis Orders   1. Chronic systolic congestive heart failure ([address]  OR DISCHARGE MEDS RECONCILED W/ CURRENT OUTPATIENT MED LIST   2.  Essential hypertension  OR DISCHARGE MEDS RECONCILED W/ CURRENT OUTPATIENT MED LIST 3. Chronic diastolic CHF (congestive heart failure) ([facility]  AK DISCHARGE MEDS RECONCILED W/ CURRENT OUTPATIENT MED LIST   4. Bronchitis  AK DISCHARGE MEDS RECONCILED W/ CURRENT OUTPATIENT MED LIST   5. COPD exacerbation ([email or web address]  AK DISCHARGE MEDS RECONCILED W/ CURRENT OUTPATIENT MED LIST   6. Diabetes mellitus screening  POCT glycosylated hemoglobin (Hb A1C)   7. Diabetes mellitus type 2 in obese (HCC)  metFORMIN (GLUCOPHAGE) 500 MG tablet     I agree with  orders as documented by the resident. Recommendations:   Patient was counseled, regimen reviewed and Metformin started. Follow up scheduled for recheck. Return in 1 month (on 3/1/2021).    (Vadim Fountain ) Dr. Liane Gorman MD

## 2021-02-01 NOTE — PROGRESS NOTES
Post-Discharge Transitional Care Management Services or Hospital Follow Up      Dee Parker   YOB: 1960    Date of Office Visit:  2/1/2021  Date of Hospital Admission: 1/21/21  Date of Hospital Discharge: 1/25/21  Readmission Risk Score(high >=14%.  Medium >=10%):Readmission Risk Score: 14      Care management risk score Rising risk (score 2-5) and Complex Care (Scores >=6): 3     Non face to face  following discharge, date last encounter closed (first attempt may have been earlier): 1/27/2021 10:06 AM 1/27/2021 10:06 AM    Call initiated 2 business days of discharge: Yes     Patient Active Problem List   Diagnosis    HTN (hypertension)    Skin tag    Low back pain with sciatica    Hyperglycemia    Infected sebaceous cyst    Chronic diastolic CHF (congestive heart failure) (HCC)    Chronic bilateral low back pain without sciatica    Chronic pain of right knee    Chest pain, unspecified    Bronchitis    Chronic systolic congestive heart failure (HCC)    Ventral hernia    Epigastric pain    Elevated LFTs    Shortness of breath    Rectus diastasis    Lumbosacral spondylosis without myelopathy    COPD exacerbation (HCC)       Allergies   Allergen Reactions    Penicillins Swelling    Sulfa Antibiotics Swelling    Sulfasalazine Swelling    Pseudoeph-Doxylamine-Dm-Apap Rash    Rabbit Protein Rash     Fur, gerbils       Medications listed as ordered at the time of discharge from hospital   [facility] Medication Instructions [facility]:[id number]    Printed on:02/01/21 1114   Medication Information                      albuterol (PROVENTIL) (5 MG/ML) 0.5% nebulizer solution  Take 0.5 mLs by nebulization every 6 hours as needed for Wheezing or Shortness of Breath             albuterol sulfate HFA (PROAIR HFA) 108 (90 Base) MCG/ACT inhaler  Inhale 2 puffs into the lungs every 6 hours as needed for Wheezing             aspirin 81 MG tablet  Take 1 tablet by mouth daily COMBIVENT RESPIMAT  MCG/ACT AERS inhaler  INHALE 1 PUFF INTO THE LUNGS EVERY 6 HOURS             furosemide (LASIX) 40 MG tablet  TAKE 1 TABLET BY MOUTH DAILY             furosemide (LASIX) 40 MG tablet  Take 1 tablet by mouth daily             gabapentin (NEURONTIN) 300 MG capsule  TAKE 1 CAPSULE BY MOUTH NIGHTLY AS NEEDED (PAIN)             lisinopril (PRINIVIL;ZESTRIL) 30 MG tablet  TAKE 1 TABLET BY MOUTH DAILY             Masks (FACE MASK) MISC  1 each by Does not apply route as needed (as needed)             metoprolol tartrate (LOPRESSOR) 50 MG tablet  Take 1 tablet by mouth 2 times daily             naproxen (NAPROSYN) 500 MG tablet  TAKE 1 TABLET BY MOUTH 2 TIMES DAILY FOR 20 DAYS             nitroGLYCERIN (NITROSTAT) 0.4 MG SL tablet  Place 1 tablet under the tongue every 5 minutes as needed for Chest pain             potassium chloride (KLOR-CON M) 20 MEQ extended release tablet  TAKE ONE TABLET BY MOUTH TWICE A DAY             ranolazine (RANEXA) 500 MG extended release tablet  Take 1 tablet by mouth 2 times daily             simvastatin (ZOCOR) 20 MG tablet  Take 1 tablet by mouth nightly             spironolactone (ALDACTONE) 25 MG tablet  Take 1 tablet by mouth daily                   Medications marked \"taking\" at this time  No outpatient medications have been marked as taking for the 2/1/21 encounter (Office Visit) with Miller Paz MD.        Medications patient taking as of now reconciled against medications ordered at time of hospital discharge: Yes    Chief Complaint   Patient presents with   [facility] Follow[facility]     ed followup from [facility] for back and chest pain    Health Maintenance     pt refuse flu shot       HPI    Inpatient course: Discharge summary reviewed- see chart.     Interval history/Current status: Patient is a 62 yo M who was admitted to [facility] on January 27 due to chest pain and back pain and stay to the hospital for 5 days, went to [facility] for cardiac cath. No stents were placed and medical management optimized. However, patient stated he would rather see his own cardiologist first before changing his medication. Has an appointment on Wednesday. 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 He states after he sees his cardiologist, he will know whether he is going back to work or applying for disability. Patient states his sugar levels were high in the hospital, asking for A1c checked today as everyone in his family is diabetic. Review of Systems   Constitutional: Negative for activity change, appetite change, chills, fatigue and fever. Respiratory: Positive for chest tightness. Negative for apnea, cough and shortness of breath. Musculoskeletal: Negative for joint swelling. Skin: Negative for color change, pallor, rash and wound. Neurological: Negative for dizziness, tremors, facial asymmetry, light-headedness and headaches. Psychiatric/Behavioral: Negative for agitation, behavioral problems and confusion. The patient is not nervous/anxious. Vitals:    02/01/21 1031   BP: 130/69   Site: Right Lower Arm   Position: Sitting   Cuff Size: Medium Adult   Pulse: 89   Temp: 97.2 °F (36.2 °C)   TempSrc: Temporal   Weight: 266 lb 9.6 oz (120.9 kg)   Height: 5' 5\" (1.651 m)     Body mass index is 44.36 kg/m². Wt Readings from Last 3 Encounters:   02/01/21 266 lb 9.6 oz (120.9 kg)   01/25/21 260 lb 12.9 oz (118.3 kg)   12/08/20 272 lb (123.4 kg)     BP Readings from Last 3 Encounters:   02/01/21 130/69   01/25/21 (!) 104/57   12/08/20 116/70       Physical Exam  Vitals signs and nursing note reviewed.    Constitutional: Appearance: Normal appearance. He is normal weight. Cardiovascular:      Rate and Rhythm: Normal rate and regular rhythm. Pulses: Normal pulses. Heart sounds: Normal heart sounds. Pulmonary:      Effort: Pulmonary effort is normal.      Breath sounds: Normal breath sounds. Abdominal:      General: Abdomen is flat. Bowel sounds are normal.      Palpations: Abdomen is soft. Neurological:      General: No focal deficit present. Mental Status: He is alert and oriented to person, place, and time. Mental status is at baseline. Psychiatric:         Mood and Affect: Mood normal.         Behavior: Behavior normal.         Thought Content: Thought content normal.         Judgment: Judgment normal.         Assessment/Plan:  1. Chronic systolic congestive heart failure (HCC)  - ID DISCHARGE MEDS RECONCILED W/ CURRENT OUTPATIENT MED LIST    2. Essential hypertension  - ID DISCHARGE MEDS RECONCILED W/ CURRENT OUTPATIENT MED LIST    3. Chronic diastolic CHF (congestive heart failure) (HCC)  - ID DISCHARGE MEDS RECONCILED W/ CURRENT OUTPATIENT MED LIST    4. Bronchitis  - ID DISCHARGE MEDS RECONCILED W/ CURRENT OUTPATIENT MED LIST    5. COPD exacerbation ([facility]  - ID DISCHARGE MEDS RECONCILED W/ CURRENT OUTPATIENT MED LIST      6.  Diabetes Mellitus Type 2  -HbA1c 6.3  -Metformin 500 mg once daily with breakfast     Medical Decision Making: low complexity

## 2021-02-02 DIAGNOSIS — I50.22 CHRONIC SYSTOLIC CONGESTIVE HEART FAILURE (HCC): ICD-10-CM

## 2021-02-02 RX ORDER — RANOLAZINE 500 MG/1
500 TABLET, EXTENDED RELEASE ORAL 2 TIMES DAILY
Qty: 60 TABLET | Refills: 0 | Status: SHIPPED | OUTPATIENT
Start: 2021-02-02 | End: 2021-08-30 | Stop reason: SDUPTHER

## 2021-02-02 NOTE — TELEPHONE ENCOUNTER
Last visit:   Last Med refill:   Does patient have enough medication for 72 hours: No:     Next Visit Date:  Future Appointments   Date Time Provider Sondra Lennie   2/3/2021  9:30 AM Leon Willard, PTA [facility] [place]   2/3/2021 10:30 AM Josedinora Suresh, PT [facility] [place]   3/1/2021 10:30 AM Tish Jett MD [address] [place] [address] Maintenance   Topic Date Due    Shingles Vaccine (1 of 2) 12/18/2009    Colon cancer screen colonoscopy  11/21/2016    Flu vaccine (1) 09/01/2020    Lipid screen  01/22/2022    Potassium monitoring  01/25/2022    Creatinine monitoring  01/25/2022    A1C test (Diabetic or Prediabetic)  02/01/2022    DTaP/Tdap/Td vaccine (2 - Td) 12/08/2030    Pneumococcal 0-64 years Vaccine  Completed    Hepatitis C screen  Completed    HIV screen  Completed    Hepatitis A vaccine  Aged Out    Hepatitis B vaccine  Aged Out    Hib vaccine  Aged Out    Meningococcal (ACWY) vaccine  Aged Out       Hemoglobin A1C (%)   Date Value   02/01/2021 6.3   01/24/2021 6.1 (H)   01/22/2021 5.7             ( goal A1C is < 7)   No results found for: LABMICR  LDL Cholesterol (mg/dL)   Date Value   01/22/2021 117   06/20/2020 57       (goal LDL is <100)   AST (U/L)   Date Value   01/21/2021 19     ALT (U/L)   Date Value   01/21/2021 27     BUN (mg/dL)   Date Value   01/25/2021 29 (H)     BP Readings from Last 3 Encounters:   02/01/21 130/69   01/25/21 (!) 104/57   12/08/20 116/70          (goal 120/80)    All Future Testing planned in [facility]  Lab Frequency Next Occurrence   PT eval and treat Once 10/22/2020   PT aquatic therapy Once 10/22/2020               Patient Active Problem List:     HTN (hypertension)     Skin tag     Low back pain with sciatica     Hyperglycemia     Infected sebaceous cyst     Chronic diastolic CHF (congestive heart failure) (HCC)     Chronic bilateral low back pain without sciatica     Chronic pain of right knee     Chest pain, unspecified Bronchitis     Chronic systolic congestive heart failure (HCC)     Ventral hernia     Epigastric pain     Elevated LFTs     Shortness of breath     Rectus diastasis     Lumbosacral spondylosis without myelopathy     COPD exacerbation (HCC)           Please address the medication refill and close the encounter. If I can be of assistance, please route to the applicable pool. Thank you.

## 2021-02-03 ENCOUNTER — HOSPITAL ENCOUNTER (OUTPATIENT)
Dept: PHYSICAL THERAPY | Age: 61
Setting detail: THERAPIES SERIES
Discharge: HOME | End: 2021-02-03
Payer: COMMERCIAL

## 2021-02-03 PROCEDURE — 97110 THERAPEUTIC EXERCISES: CPT

## 2021-02-03 PROCEDURE — 97113 AQUATIC THERAPY/EXERCISES: CPT

## 2021-02-03 ASSESSMENT — PAIN DESCRIPTION - FREQUENCY: FREQUENCY: CONTINUOUS

## 2021-02-03 ASSESSMENT — PAIN DESCRIPTION - PAIN TYPE: TYPE: CHRONIC PAIN

## 2021-02-03 ASSESSMENT — PAIN DESCRIPTION - DESCRIPTORS: DESCRIPTORS: CONSTANT;ACHING

## 2021-02-03 ASSESSMENT — PAIN DESCRIPTION - LOCATION: LOCATION: BACK;ABDOMEN

## 2021-02-03 NOTE — FLOWSHEET NOTE
[facility]   Outpatient Physical Therapy  [address] [place] [address]. [address]  Phone: [phone]  Fax: [phone]  Daily Progress Note    Date: 2/3/21    Patient Name: Chelsi Haque        MRN: [id number]  Account: [de-identified] : 1960      General Information:  Additional Pertinent Hx: low back pain since he was 15years old  Referring Practitioner: Parminder hTompson  Referral Date : 20  Diagnosis: lumbosacral spondylosis without myelopathy M47.817 DDD lumbar M51.36 arthropathy lumbar facet joints M47.816  Onset Date: 10/15/20  PT Insurance Information: [email or web address]  Total # of Visits Approved: 12  Total # of Visits to Date: 10  No Show: 2  Canceled Appointment: 1    Subjective:  Subjective: Pt reports some pain in lumbar back today. notes his back has been feeling good since getting out of the hospital.     Pain:  Patient Currently in Pain: Yes  Pain Assessment: 0-10  Pain Level: 3  Pain Type: Chronic pain  Pain Location: Back; Abdomen  Pain Orientation: Mid  Pain Descriptors: Constant; Aching  Pain Frequency: Continuous       Objective:    [address]  Services Exercise Log  Aquatic, Hip & DLS Program- Phase 1     Date of Eval:     20                           Primary PT:Sea  Diagnosis:lumbo sacral spondylosis,DDD lumbar,arthropathy lumbar facet joint  Things to Focus On (goals): to stop the pain  Surgical Precautions:  Medical Precautions:COPD-use an inhaler  []???? C-9 dates  []???? Occ Med   []???? Medicare   Try hanging on deep end     Date 12/30/20  1/7/21 1/18/21  2/3/21   Visit # 7/12 8/12  9/12  10/12   Walk F/L/R 2 Laps 2 Laps 2 Laps  2 Laps   Marching 2 Laps 2 Laps  2 Laps  2 Laps     Low Box Low Box  Low Box  Low box   Squats 10x5\" 10x5\"  10x5\"  15x5\"   Step-Ups F/L Tall 10x Tall 10x  Low 10x Low 10x   Heel-toe raises 10x 10x  10x  15x   SLR F/L/R 10x 10x  10x  15x   Knee/Flex/Ext 10x 10x  10x  15x   F/L Lunges           Kickboard Ex.  Lg Lg  Lg Lg Iso Abd. 10x5\" 10x5\"  10x5\"  10x5\"   Push-pull 15x 15x  15x  15x   Paddling 15x 15x  15x  15x               UE format     NT                   Deep Water 1 Noodle 1 Noodle  1 Noodle  1 Noodle   Cycling 2' 2'  2' 2'    Melindadonnie Bradleytz 2' 2'  2'  2'   X-Country 2' 2'  2'  2'    Hang 5' 5'  5'  5'   Stretches           Achllies 2x20\" 2x20\"  2x20\"  2x20\"   Hamstring 2x20\" 2x20\"  2x20\"  2x20\"               Cool Down 2 Laps Breast Stroke 2 Laps Breast Stroke  2 Laps Breast Stroke  2 Laps Breast stroke   Pain Rating 7 7  8 3      Assessment: Body structures, Functions, Activity limitations: Decreased functional mobility ; Decreased ADL status; Decreased ROM; Increased pain  Treatment Diagnosis: difficulty walking R26.2 NEC  Prognosis: Good  REQUIRES PT FOLLOW UP: Yes  Activity Tolerance: Patient Tolerated treatment well  Comments: did not add any exercises today due to first visit in 2 weeks. tolerated all exercises well today without increased pain. Plan:  Plan: Continue with current plan Add UE format and increased step height next visit. Therapy Time:  Time In: 0930  Time Out: 1015  Minutes: 45  Timed Code Treatment Minutes: 40 Minutes   Time in for re-evaluation: 10:35  Time out for re-evaluation: 11:00  Minutes: 25  Time code treatment minutes: 15 there ex  Unbilled time: 10 minutes for re-evaluation    Physical therapy treatment completed today in part by physical therapist assistant (PTA). Treatment times reflect total treatment time combined by both PT, Silvia Sánchez 15 min of There ex,  and PTA, Herman Rachel, for today's service.       Treatment Charges: Minutes Units   []  Ultrasound     []  Electrical-Stim     []  Iontophoresis     []  Traction     []  Massage       []  Eval     []  Gait     []  Vasopneumatic Device     [x]  Ther Exercise 15  1   []  Manual Therapy       []  Ther Activities       [x]  Aquatics 40 3   []  Neuro Re-Ed       []  Other       Total Treatment Time: 54 4     Maury Rachel PTA

## 2021-02-03 NOTE — PROGRESS NOTES
[address] Outpatient [facility]  [address] [facility]. [address]         Phone: [phone]       Fax: [phone]    Physical Therapy Re-Evaluation    Date:  2/3/2021  Patient: Jaycee Martin  : 1960  MRN: [id number]  Physician: Suze Haynes     Insurance: Roetta Ashu  Medical Diagnosis: lumbosacral spondylosis without myelopathy M47.817 DDD lumbar M51.36 arthropathy lumbar facet joints M47.816    Rehab Codes: difficulty walking R26.2 NEC  Onset Date:  10/15/20                                 Next 's appt:   Visits: 10/12     Subjective:   CC: Pt comes to clinic reporting low back pain that began when he was 16, falling out of a tree. Pt states the pain intermittently radiates to either hip. Pt reports intermittent N/T in posterior B thighs. Pt reports no pain with sitting or laying down. Pain worsens to 3-4/10 with walking or long durations of standing, limiting walking to 10-15 minutes. 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 Pt comes to clinic after a short break of therapy d/t cardiac issues, requiring 4 night stay in the hospital. Pt reports decreased blood flow to the lower chambers of the heart, the cause has yet to be determined. Due to this issues, pt often experiences dizziness and light headedness with increased activity. Pt reports difficulty remaining asleep, waking 3x/night d/t back discomfort. Pt states he greatly likes aquatic therapy and wishes to continue sessions. Pt reports decreased intensity of pain, improved endurance, and improved lumbar mobility since beginning therapy. Pt reports no change in sleep, and continued difficulty with standing and walking tolerance.       HPI: (onset date) 10/15/2020    Pain:  [x] Yes  [] No Location: low back across to both hips  Pain Rating: (0-10 scale) 0/10 at rest sitting, 3/10 walking     Objective:     objective   Spine  Lumbar: AROM TRUNK FLEX 50 EXT 20 [] Dry Needling, 1 or 2 muscles  15954   [] Biofeedback, first 15 minutes   31520  [] Biofeedback, additional 15 minutes   10749 [] Dry Needling, 3 or more muscles  20561     Frequency:  2 x/week for 20 TOTAL visits (ADDITION OF 8 AQUATIC THERAPY SESSIONS)     Todays Treatment:  Modalities:   Exercises:  Exercise Reps/ Time Weight/ Level Comments   LTR 10x10\"     SKTC 3x30\"  W/ use of towel   Piriformis stretch 3x30\"  W/ use of towel   Figure 4 stretch 3x30\"           Sitting hamstring stretch 3x30\"     Sitting lumbar SB 20x     Sitting rotation  20x     Other: pt received written HEP with the above exercises 2/3     Specific Instructions for next treatment: CONTINUE AQUATIC PT DLSP     Treatment Charges: Mins Units   [] Evaluation     []  Modalities     [x]  Ther Exercise 15 1   []  Manual Therapy     []  Ther Activities     []  Aquatics     []  Neuromuscular     []  Gait Training     []  Dry Needling           1-2 muscles     []  Dry Needling           3 or more muscles     [] Vasocompression     []  Other       TOTAL TREATMENT TIME: 15  10 minutes unbilled time for progress note with PT     Time in: 10:35     Time out: 11:00    Electronically signed by: Karen Whiting PT      By signing above or cosigning this note, I have reviewed this plan of care and certify a need for medically necessary rehabilitation services.

## 2021-02-10 ENCOUNTER — HOSPITAL ENCOUNTER (OUTPATIENT)
Dept: PHYSICAL THERAPY | Age: 61
Setting detail: THERAPIES SERIES
Discharge: HOME | End: 2021-02-10
Payer: COMMERCIAL

## 2021-02-10 PROCEDURE — 97113 AQUATIC THERAPY/EXERCISES: CPT

## 2021-02-10 ASSESSMENT — PAIN DESCRIPTION - LOCATION: LOCATION: BACK

## 2021-02-10 ASSESSMENT — PAIN SCALES - GENERAL: PAINLEVEL_OUTOF10: 1

## 2021-02-10 ASSESSMENT — PAIN DESCRIPTION - DESCRIPTORS: DESCRIPTORS: CONSTANT;ACHING

## 2021-02-10 NOTE — FLOWSHEET NOTE
10x5\"  10x5\" 15x5\"   Push-pull 15x  15x  15x 15x   Paddling 15x  15x  15x 15x              UE format   NT                    Deep Water 1 Noodle  1 Noodle  1 Noodle 1 Noodle   Cycling 2'  2' 2'  2'   Jacks 2'  2'  2' 2'   X-Country 2'  2'  2' 2'    Hang 5'  5'  5' 5'   Stretches          Achllies 2x20\"  2x20\"  2x20\" 2x20\"   Hamstring 2x20\"  2x20\"  2x20\" 2x20\"              Cool Down 2 Laps Breast Stroke  2 Laps Breast Stroke  2 Laps Breast stroke 2 Laps Breast stroke   Pain Rating 7  8 3 1      Assessment: Body structures, Functions, Activity limitations: Decreased functional mobility ; Decreased ADL status; Decreased ROM; Increased pain  Treatment Diagnosis: difficulty walking R26.2 NEC  Prognosis: Good  REQUIRES PT FOLLOW UP: Yes  Activity Tolerance: Patient Tolerated treatment well Increased step height today to patient's tolerance. Turner any increased pain throughout treatment today. Plan:  Plan: Continue with current plan Add UE format and encourage little to no UE support for LE exercises.     Therapy Time:  Time In: 4307  Time Out: 1030  Minutes: 43  Timed Code Treatment Minutes: 40 Minutes    Treatment Charges: Minutes Units   []  Ultrasound     []  Electrical-Stim     []  Iontophoresis     []  Traction     []  Massage       []  Eval     []  Gait     []  Vasopneumatic Device     []  Ther Exercise       []  Manual Therapy       []  Ther Activities       [x]  Aquatics 40 3   []  Neuro Re-Ed       []  Other       Total Treatment Time: 36 3     Maury Rachel, PTA

## 2021-02-16 ENCOUNTER — HOSPITAL ENCOUNTER (OUTPATIENT)
Dept: PHYSICAL THERAPY | Age: 61
Setting detail: THERAPIES SERIES
End: 2021-02-16
Payer: COMMERCIAL

## 2021-02-16 DIAGNOSIS — I50.22 CHRONIC SYSTOLIC CONGESTIVE HEART FAILURE (HCC): ICD-10-CM

## 2021-02-16 DIAGNOSIS — I10 HYPERTENSION, UNSPECIFIED TYPE: ICD-10-CM

## 2021-02-17 RX ORDER — LISINOPRIL 30 MG/1
30 TABLET ORAL DAILY
Qty: 30 TABLET | Refills: 2 | Status: SHIPPED | OUTPATIENT
Start: 2021-02-17 | End: 2021-05-13

## 2021-02-17 RX ORDER — SPIRONOLACTONE 25 MG/1
25 TABLET ORAL NIGHTLY
Qty: 30 TABLET | Refills: 3 | Status: SHIPPED | OUTPATIENT
Start: 2021-02-17 | End: 2021-07-19 | Stop reason: SDUPTHER

## 2021-02-17 NOTE — TELEPHONE ENCOUNTER
Last visit: 12/08/20  Last Med refill:   Does patient have enough medication for 72 hours:     Next Visit Date:  Future Appointments   Date Time Provider Sondra Hogue   2/18/2021 10:30 AM Mariane Fabry, PTA [facility] [place]   2/22/2021 10:30 AM Mariane Fabry, PTA [facility] [place]   2/25/2021 10:30 AM Mariane Fabry, PTA [facility] [place]   3/1/2021 10:30 AM Susan Fermin MD [address] [place] [address] Maintenance   Topic Date Due    Shingles Vaccine (1 of 2) 12/18/2009    Colon cancer screen colonoscopy  11/21/2016    Flu vaccine (1) 09/01/2020    Lipid screen  01/22/2022    Potassium monitoring  01/25/2022    Creatinine monitoring  01/25/2022    A1C test (Diabetic or Prediabetic)  02/01/2022    DTaP/Tdap/Td vaccine (2 - Td) 12/08/2030    Pneumococcal 0-64 years Vaccine  Completed    Hepatitis C screen  Completed    HIV screen  Completed    Hepatitis A vaccine  Aged Out    Hepatitis B vaccine  Aged Out    Hib vaccine  Aged Out    Meningococcal (ACWY) vaccine  Aged Out       Hemoglobin A1C (%)   Date Value   02/01/2021 6.3   01/24/2021 6.1 (H)   01/22/2021 5.7             ( goal A1C is < 7)   No results found for: LABMICR  LDL Cholesterol (mg/dL)   Date Value   01/22/2021 117   06/20/2020 57       (goal LDL is <100)   AST (U/L)   Date Value   01/21/2021 19     ALT (U/L)   Date Value   01/21/2021 27     BUN (mg/dL)   Date Value   01/25/2021 29 (H)     BP Readings from Last 3 Encounters:   02/01/21 130/69   01/25/21 (!) 104/57   12/08/20 116/70          (goal 120/80)    All Future Testing planned in [facility]  Lab Frequency Next Occurrence   PT eval and treat Once 10/22/2020   PT aquatic therapy Once 10/22/2020               Patient Active Problem List:     HTN (hypertension)     Skin tag     Low back pain with sciatica     Hyperglycemia     Infected sebaceous cyst     Chronic diastolic CHF (congestive heart failure) (HCC)     Chronic bilateral low back pain without sciatica Chronic pain of right knee     Chest pain, unspecified     Bronchitis     Chronic systolic congestive heart failure (HCC)     Ventral hernia     Epigastric pain     Elevated LFTs     Shortness of breath     Rectus diastasis     Lumbosacral spondylosis without myelopathy     COPD exacerbation ([address]

## 2021-02-17 NOTE — DISCHARGE SUMMARY
[place] [id number] Internal Medicine    Discharge Summary     Patient ID: Karin Quick  :  1960   MRN: [id number]     ACCOUNT:  [de-identified]   Patient's PCP: Karina Pizarro MD  Admit Date: 2021   Discharge Date: 2021   Length of Stay: 4  Code Status:  Prior  Admitting Physician: Matthew Chandler MD  Discharge Physician: Jenny Mitchell MD     Active Discharge Diagnoses:     Primary Problem  <principal problem not specified>      [place] Problems    Diagnosis Date Noted    COPD exacerbation ([facility] [address] [J44.1] 2021    Chest pain, unspecified [R07.9] 2018    Chronic diastolic CHF (congestive heart failure) ([facility] [address] [I50.32] 2014    HTN (hypertension) [I10] 2013       Admission Condition:  fair     Discharged Condition: fair    Hospital Stay:     Hospital Course:  Karin Quick is a 63 y.o. male who was admitted for the management of   .     , presented with Shortness of Breath, Chest Pain, and Back Pain      ,                         <principal problem not specified>;                               Active Problems:    HTN (hypertension)    Chronic diastolic CHF (congestive heart failure) (HCC)    Chest pain, unspecified    COPD exacerbation ([facility] [address]  Resolved Problems:    * No resolved hospital problems. *       Significant therapeutic interventions:        21     · Patient is feeling better  · Is going to have cardiac cath at [facility] this afternoon  ·   1. Patient is known to have hypertension chronic diastolic heart failure and presented with chest pain  2. Was seen by Dr. Adrien Treviño         The patient is a 63 y.o. Non-/non  male who presents   COPD: Patient complains of dyspnea. Symptoms began 7 days ago. Symptoms acute dyspnea does worsen with exertion. Sputum is clear in small amounts. Fever has been low grade fevers. Patient uses 0 pillows at night.  Patient can walk 10 feet before resting. Patient currently is not on home oxygen therapy. .   Breathing improved denies any chest pain  COPD at baseline no chest pain patient had a stress test done today       Significant Diagnostic Studies:   Labs / Micro:       Results for orders placed or performed during the hospital encounter of 01/21/21   Respiratory Culture    Specimen: Sputum Expectorated   Result Value Ref Range    Specimen Description . EXPECTORATED SPUTUM     Special Requests NOT REPORTED     Direct Exam       >10 EPITHELIAL CELLS/LPF: SPECIMEN IS CONTAMINATED WITH ORAL PHARYNGEAL ROSENDA AND IS UNACCEPTABLE FOR BACTERIAL CULTURE. PLEASE SUBMIT ANOTHER SPECIMEN. Direct Exam INFORMED JAVIER TEJEDA  RN AT 1824 ON 1.21.2021     Culture NOT REPORTED    NM Cardiac Stress Test Nuclear Imaging   Result Value Ref Range    Left Ventricular Ejection Fraction 39     LVEF MODALITY Nuclear    CBC Auto Differential   Result Value Ref Range    WBC 7.4 3.5 - 11.0 k/uL    RBC 4.34 (L) 4.5 - 5.9 m/uL    Hemoglobin 12.7 (L) 13.5 - 17.5 g/dL    Hematocrit 37.5 (L) 41 - 53 %    MCV 86.5 80 - 100 fL    MCH 29.3 26 - 34 pg    MCHC 33.9 31 - 37 g/dL    RDW 13.8 11.5 - 14.9 %    Platelets 460 868 - 547 k/uL    MPV 8.3 6.0 - 12.0 fL    NRBC Automated NOT REPORTED per 100 WBC    Differential Type NOT REPORTED     Seg Neutrophils 65 36 - 66 %    Lymphocytes 21 (L) 24 - 44 %    Monocytes 10 (H) 1 - 7 %    Eosinophils % 4 0 - 4 %    Basophils 0 0 - 2 %    Immature Granulocytes NOT REPORTED 0 %    Segs Absolute 4.70 1.3 - 9.1 k/uL    Absolute Lymph # 1.60 1.0 - 4.8 k/uL    Absolute Mono # 0.80 0.1 - 1.3 k/uL    Absolute Eos # 0.30 0.0 - 0.4 k/uL    Basophils Absolute 0.00 0.0 - 0.2 k/uL    Absolute Immature Granulocyte NOT REPORTED 0.00 - 0.30 k/uL    WBC Morphology NOT REPORTED     RBC Morphology NOT REPORTED     Platelet Estimate NOT REPORTED    Basic Metabolic Panel   Result Value Ref Range    Glucose 198 (H) 70 - 99 mg/dL    BUN 21 8 - 23 mg/dL    CREATININE 0.87 0.70 - 4.5 - 5.9 m/uL    Hemoglobin 12.4 (L) 13.5 - 17.5 g/dL    Hematocrit 36.9 (L) 41 - 53 %    MCV 87.2 80 - 100 fL    MCH 29.3 26 - 34 pg    MCHC 33.6 31 - 37 g/dL    RDW 13.4 11.5 - 14.9 %    Platelets 341 723 - 187 k/uL    MPV 8.6 6.0 - 12.0 fL    NRBC Automated NOT REPORTED per 100 WBC   Lipid Panel   Result Value Ref Range    Cholesterol 191 <200 mg/dL    HDL 46 >40 mg/dL    LDL Cholesterol 117 0 - 130 mg/dL    Chol/HDL Ratio 4.2 <5    Triglycerides 138 <150 mg/dL    VLDL NOT REPORTED 1 - 30 mg/dL   TSH with Reflex   Result Value Ref Range    TSH 0.41 0.30 - 5.00 mIU/L   Troponin   Result Value Ref Range    Troponin, High Sensitivity 7 0 - 22 ng/L    Troponin T NOT REPORTED <0.03 ng/mL    Troponin Interp NOT REPORTED    Troponin   Result Value Ref Range    Troponin, High Sensitivity 8 0 - 22 ng/L    Troponin T NOT REPORTED <0.03 ng/mL    Troponin Interp NOT REPORTED    Hemoglobin A1c   Result Value Ref Range    Hemoglobin A1C 5.7 4.0 - 6.0 %    Estimated Avg Glucose 117 mg/dL   Troponin   Result Value Ref Range    Troponin, High Sensitivity 7 0 - 22 ng/L    Troponin T NOT REPORTED <0.03 ng/mL    Troponin Interp NOT REPORTED    Troponin   Result Value Ref Range    Troponin, High Sensitivity 7 0 - 22 ng/L    Troponin T NOT REPORTED <0.03 ng/mL    Troponin Interp NOT REPORTED    Troponin   Result Value Ref Range    Troponin, High Sensitivity 7 0 - 22 ng/L    Troponin T NOT REPORTED <0.03 ng/mL    Troponin Interp NOT REPORTED    Troponin   Result Value Ref Range    Troponin, High Sensitivity 8 0 - 22 ng/L    Troponin T NOT REPORTED <0.03 ng/mL    Troponin Interp NOT REPORTED    Troponin   Result Value Ref Range    Troponin, High Sensitivity 13 0 - 22 ng/L    Troponin T NOT REPORTED <0.03 ng/mL    Troponin Interp NOT REPORTED    Troponin   Result Value Ref Range    Troponin, High Sensitivity 9 0 - 22 ng/L    Troponin T NOT REPORTED <0.03 ng/mL    Troponin Interp NOT REPORTED    Hemoglobin A1C   Result Value Ref Range Hemoglobin A1C 6.1 (H) 4.0 - 6.0 %    Estimated Avg Glucose 128 mg/dL   Troponin   Result Value Ref Range    Troponin, High Sensitivity 9 0 - 22 ng/L    Troponin T NOT REPORTED <0.03 ng/mL    Troponin Interp NOT REPORTED    Troponin   Result Value Ref Range    Troponin, High Sensitivity 11 0 - 22 ng/L    Troponin T NOT REPORTED <0.03 ng/mL    Troponin Interp NOT REPORTED    CBC   Result Value Ref Range    WBC 9.9 3.5 - 11.0 k/uL    RBC 4.57 4.5 - 5.9 m/uL    Hemoglobin 13.4 (L) 13.5 - 17.5 g/dL    Hematocrit 40.1 (L) 41 - 53 %    MCV 87.6 80 - 100 fL    MCH 29.4 26 - 34 pg    MCHC 33.6 31 - 37 g/dL    RDW 13.6 11.5 - 14.9 %    Platelets 525 844 - 907 k/uL    MPV 8.2 6.0 - 12.0 fL    NRBC Automated NOT REPORTED per 100 WBC   Basic Metabolic Panel w/ Reflex to MG   Result Value Ref Range    Glucose 146 (H) 70 - 99 mg/dL    BUN 29 (H) 8 - 23 mg/dL    CREATININE 1.23 (H) 0.70 - 1.20 mg/dL    Bun/Cre Ratio NOT REPORTED 9 - 20    Calcium 8.9 8.6 - 10.4 mg/dL    Sodium 136 135 - 144 mmol/L    Potassium 4.7 3.7 - 5.3 mmol/L    Chloride 98 98 - 107 mmol/L    CO2 26 20 - 31 mmol/L    Anion Gap 12 9 - 17 mmol/L    GFR Non-African American 60 (L) >60 mL/min    GFR African American >60 >60 mL/min    GFR Comment          GFR Staging NOT REPORTED    POC Glucose Fingerstick   Result Value Ref Range    POC Glucose 345 (H) 75 - 110 mg/dL   POC Glucose Fingerstick   Result Value Ref Range    POC Glucose 261 (H) 75 - 110 mg/dL   POC Glucose Fingerstick   Result Value Ref Range    POC Glucose 257 (H) 75 - 110 mg/dL   POC Glucose Fingerstick   Result Value Ref Range    POC Glucose 160 (H) 75 - 110 mg/dL   POC Glucose Fingerstick   Result Value Ref Range    POC Glucose 184 (H) 75 - 110 mg/dL   POC Glucose Fingerstick   Result Value Ref Range    POC Glucose 163 (H) 75 - 110 mg/dL   POC Glucose Fingerstick   Result Value Ref Range    POC Glucose 270 (H) 75 - 110 mg/dL   POC Glucose Fingerstick   Result Value Ref Range    POC Glucose 135 (H) 75 - 110 mg/dL   POC Glucose Fingerstick   Result Value Ref Range    POC Glucose 152 (H) 75 - 110 mg/dL   POC Glucose Fingerstick   Result Value Ref Range    POC Glucose 213 (H) 75 - 110 mg/dL   POC Glucose Fingerstick   Result Value Ref Range    POC Glucose 176 (H) 75 - 110 mg/dL   POC Glucose Fingerstick   Result Value Ref Range    POC Glucose 103 75 - 110 mg/dL   EKG 12 Lead   Result Value Ref Range    Ventricular Rate 108 BPM    Atrial Rate 108 BPM    P-R Interval 150 ms    QRS Duration 100 ms    Q-T Interval 356 ms    QTc Calculation (Bazett) 477 ms    P Axis 23 degrees    R Axis 29 degrees    T Axis -56 degrees   ECHO Complete 2D W Doppler W Color   Result Value Ref Range    Left Ventricular Ejection Fraction 48     LVEF MODALITY ECHO         {Radiology:    Echo Complete 2d W Doppler W Color    Result Date: 1/22/2021  [facility] [facility] Transthoracic Echocardiography Report (TTE)  Patient Name [facility]      Date of Study                 01/22/2021               Javier Primer   Date of      1960  Gender                        Male  Birth   Age          63 year(s)  Race                             Room Number  2085        Height:                       64.96 inch, 165 cm   Corporate ID [id number]    Weight:                       273 pounds, 124 kg  #   Patient Acct [de-identified]   BSA:           2.26 m^2       BMI:      45.55  #                                                                kg/m^2   MR #         [id number]      Sonographer                   Zuleyma Calvillo   Accession #  [id number]  Interpreting Physician        Sara Linares   Fellow                   Referring Nurse Practitioner   Interpreting             Referring Physician           Usman Sierra  Fellow  Type of Study   TTE procedure:2D Echocardiogram, M-Mode, Doppler, Color Doppler, Contrast  study.   Procedure Date Date: 01/22/2021 Start: 09:57 AM Study Location: [address] [place] [address] Technical Quality: Limited visualization due to body habitus. Indications:Shortness of breath and Cardiomyopathy. History / Tech. Comments: idiopathic cardiomyopathy Patient Status: Inpatient Contrast Medium: Definity. Amount - 2 ml Height: 64.96 inches Weight: 273.39 pounds BSA: 2.26 m^2 BMI: 45.55 kg/m^2 Rhythm: Sinus tachycardia HR: 100 bpm BP: 142/69 mmHg CONCLUSIONS Summary Echo contrast utilized on this technically difficult study. Left ventricle is normal in size. Estimated LV EF 45-50 %. Moderate left ventricular hypertrophy. Left atrium is at upper limits of normal. Normal right ventricular size and function. Aortic valve is trileaflet. Mild aortic stenosis. Peak instantaneous gradient 23 mmHg and mean gradient 13 mmHg. Thickened mitral valve leaflets. Mild to moderate mitral regurgitation. Normal tricuspid valve structure and function. Mild tricuspid regurgitation. Estimated right ventricular systolic pressure is 43 mmHg. Mildly elevated right ventricular systolic pressure. IVC Increased diameter, but still has inspiratory variation. No significant pericardial effusion is seen. Signature ----------------------------------------------------------------------------  Electronically signed by Zuleyma Calvillo(Sonographer) on 01/22/2021 10:45  AM ---------------------------------------------------------------------------- ----------------------------------------------------------------------------  Electronically signed by Lanny Horn(Interpreting physician) on  01/22/2021 11:56 AM ---------------------------------------------------------------------------- FINDINGS Left Atrium Left atrium is at upper limits of normal. Left Ventricle Left ventricle is normal in size. Estimated LV EF 45-50 %. Moderate left ventricular hypertrophy. Right Atrium Right atrium is normal in size. Right Ventricle Normal right ventricular size and function. Mitral Valve Thickened mitral valve leaflets. Mild to moderate mitral regurgitation.  Aortic Valve Aortic valve is trileaflet. Mild aortic stenosis. Peak instantaneous gradient 23 mmHg and mean gradient 13 mmHg. No aortic insufficiency. Tricuspid Valve Normal tricuspid valve structure and function. Mild tricuspid regurgitation. Estimated right ventricular systolic pressure is 43 mmHg. Mildly elevated right ventricular systolic pressure. Pulmonic Valve Pulmonic valve not well visualized. No pulmonic insufficiency. Pericardial Effusion No significant pericardial effusion is seen. Miscellaneous Normal aortic root dimension. E/e' average 12.8 IVC Increased diameter, but still has inspiratory variation.  M-mode / 2D Measurements & Calculations:   LVIDd:4.36 cm(3.7 - 5.6 cm)      Diastolic RIKLBY:73.7 ml  RFMIF:1.17 cm(2.2 - 4.0 cm)      Systolic AKRLBA:44.2 ml  PVEN:2.55 cm(0.6 - 1.1 cm)       Aortic Root:2.8 cm(2.0 - 3.7 cm)  LVPWd:1.34 cm(0.6 - 1.1 cm)      LA Dimension: 4.3 cm(1.9 - 4.0 cm)  Fractional Shortenin.79 %    LA volume/Index: 63.8 ml /28m^2  Calculated LVEF (%): 55.24 %     LVOT:1.9 cm   Mitral:                                 Aortic   Valve Area (P1/2-Time): 5.24 cm^2       Peak Velocity: 2.40 m/s  Peak E-Wave: 1.08 m/s                   Mean Velocity: 1.63 m/s  Peak A-Wave: 1.35 m/s                   Peak Gradient: 23.04 mmHg  E/A Ratio: 0.8                          Mean Gradient: 13 mmHg  Peak Gradient: 4.67 mmHg  Mean Gradient: 5 mmHg  Deceleration Time: 127 msec             Area (continuity): 1.87 cm^2  P1/2t: 42 msec                          AV VTI: 46.1 cm   Area (continuity): 2.45 cm^2  Mean Velocity: 1.09 m/s   Tricuspid:                              Pulmonic:   Estimated RVSP: 43 mmHg  Peak TR Velocity: 2.94 m/s  Peak TR Gradient: 34.5744 mmHg  Estimated RA Pressure: 8 mmHg                                          Estimated PASP: 42.57 mmHg  Septal Wall E' velocity:0.10 m/s Lateral Wall E' velocity:0.10 m/s    Cta Chest Abdomen Pelvis W Contrast    Result Date: 2021  EXAMINATION: CTA OF THE CHEST, compensated triple vessel disease. End diastolic volume:  895NC Scores are visually adjusted to account for potential artifact. Summed stress score:  9 Summed rest score:  6 Summed reversibility score:  3     1. Findings concerning for reversible ischemia of the inferior wall near the apex. 2. Underlying infarct of the mid and basal inferior wall. 3. Left ventricular ejection fraction of 39%. 4. Enlarged left ventricular size with hypokinesis. 5.  Please see report for EKG portion of the examination which will be performed separately by physician from cardiology. Risk stratification:  High risk Note:  Risk stratification incorporates both clinical history and test results. Final risk determination is the responsibility of the ordering provider as history and other test results may increase or decrease the risk stratification reported for this examination. Risk stratification criteria are adapted from \"Noninvasive Risk Stratification\" criteria from [employer]. Al, ACC/AATS/AHA/ASE/ASNC/SCAI/SCCT/STS 2017 Appropriate Use Criteria For Coronary Revascularization in Patients With Stable Ischemic Heart Disease [facility] Volume 69, Issue 17, May 2017 High risk (>3% annual death or MI) 1. Severe resting LV dysfunction (LVEF >35%) not readily explained by non coronary causes 2. Resting perfusion abnormalities greater than 10% of the myocardium in patients without prior history or evidence of MI 3. Stress-induced perfusion abnormalities encumbering greater than or equal to 10% myocardium or stress segmental scores indicating multiple vascular territories with abnormalities 4. Stress-induced LV dilatation (TID ratio greater than 1.19 for exercise and greater than 1.39 for regadenoson) Intermediate risk (1% to 3% annual death or MI) 1. Mild/moderate resting LV dysfunction (LVEF 35% to 49%) not readily explained by non coronary causes.  2.  Resting perfusion abnormalities in 5%-9.9% of the myocardium in patients without a history or prior evidence of MI 3. Stress-induced perfusion abnormality encumbering 5%-9.9% of the myocardium or stress segmental scores indicating 1 vascular territory with abnormalities but without LV dilation 4. Small wall motion abnormality involving 1-2 segments and only 1 coronary bed. Low Risk (Less than 1% annual death or MI) 1. Normal or small myocardial perfusion defect at rest or with stress encumbering less than 5% of the myocardium. The findings were sent to the Radiology Results [address] at 1:13 pm on 1/23/2021to be communicated to a licensed caregiver. Consultations:    Consults:     Final Specialist Recommendations/Findings:   IP CONSULT TO PULMONOLOGY  IP CONSULT TO CARDIOLOGY      The patient was seen and examined on day of discharge and this discharge summary is in conjunction with any daily progress note from day of discharge. Discharge plan:     Disposition: Home    Physician Follow Up:   With PCP and as specified     Requiring Further Evaluation/Follow Up POST HOSPITALIZATION/Incidental Findings:    Diet: regular     Activity: As tolerated    I  Discharge Medications:      Medication List      ASK your doctor about these medications    * albuterol (5 MG/ML) 0.5% nebulizer solution  Commonly known as: PROVENTIL  Take 0.5 mLs by nebulization every 6 hours as needed for Wheezing or Shortness of Breath     * albuterol sulfate  (90 Base) MCG/ACT inhaler  Commonly known as: ProAir HFA  Inhale 2 puffs into the lungs every 6 hours as needed for Wheezing     aspirin 81 MG tablet  Take 1 tablet by mouth daily     Combivent Respimat  MCG/ACT Aers inhaler  Generic drug: albuterol-ipratropium  INHALE 1 PUFF INTO THE LUNGS EVERY 6 HOURS     Face Mask Misc  1 each by Does not apply route as needed (as needed)     * furosemide 40 MG tablet  Commonly known as: LASIX  Take 1 tablet by mouth daily     * furosemide 40 MG tablet  Commonly known as: LASIX  TAKE 1 TABLET BY MOUTH DAILY gabapentin 300 MG capsule  Commonly known as: NEURONTIN  TAKE 1 CAPSULE BY MOUTH NIGHTLY AS NEEDED (PAIN)  Ask about: Which instructions should I use?     lisinopril 30 MG tablet  Commonly known as: PRINIVIL;ZESTRIL  TAKE 1 TABLET BY MOUTH DAILY     metoprolol tartrate 50 MG tablet  Commonly known as: LOPRESSOR  Take 1 tablet by mouth 2 times daily     naproxen 500 MG tablet  Commonly known as: NAPROSYN  TAKE 1 TABLET BY MOUTH 2 TIMES DAILY FOR 20 DAYS     nitroGLYCERIN 0.4 MG SL tablet  Commonly known as: NITROSTAT  Place 1 tablet under the tongue every 5 minutes as needed for Chest pain     potassium chloride 20 MEQ extended release tablet  Commonly known as: KLOR-CON M  TAKE ONE TABLET BY MOUTH TWICE A DAY     simvastatin 20 MG tablet  Commonly known as: ZOCOR  Take 1 tablet by mouth nightly     spironolactone 25 MG tablet  Commonly known as: ALDACTONE  Take 1 tablet by mouth daily         * This list has 4 medication(s) that are the same as other medications prescribed for you. Read the directions carefully, and ask your doctor or other care provider to review them with you. Time spent on discharge planning ;          [] less than 30 minutes . [x]   more  than 30 minutes . Ellectronically signed by   Felipe Mccann MD      Thank you Dr. Jesusita Pond MD for the opportunity to be involved in this patient's care. Please note that this chart was generated using voice recognition Dragon dictation software. Although every effort was made to ensure the accuracy of this automated transcription, some errors in transcription may have occurred.

## 2021-02-18 ENCOUNTER — HOSPITAL ENCOUNTER (OUTPATIENT)
Dept: PHYSICAL THERAPY | Age: 61
Setting detail: THERAPIES SERIES
Discharge: HOME | End: 2021-02-18
Payer: COMMERCIAL

## 2021-02-18 PROCEDURE — 97113 AQUATIC THERAPY/EXERCISES: CPT

## 2021-02-18 ASSESSMENT — PAIN SCALES - GENERAL: PAINLEVEL_OUTOF10: 2

## 2021-02-18 ASSESSMENT — PAIN DESCRIPTION - PAIN TYPE: TYPE: CHRONIC PAIN

## 2021-02-18 ASSESSMENT — PAIN DESCRIPTION - DESCRIPTORS: DESCRIPTORS: ACHING;CONSTANT

## 2021-02-18 ASSESSMENT — PAIN DESCRIPTION - LOCATION: LOCATION: BACK

## 2021-02-18 NOTE — FLOWSHEET NOTE
[address] [facility]   Outpatient Physical Therapy  [facility]. [address]  Phone: [phone]  Fax: [phone]  Daily Progress Note    Date: 21    Patient Name: Jayla Gage        MRN: [id number]  Account: [de-identified] : 1960      General Information:  Chart Reviewed: Yes  Patient assessed for rehabilitation services?: Yes  Additional Pertinent Hx: low back pain since he was 15years old  Referring Practitioner: Alicia Silveira  Referral Date : 20  Diagnosis: lumbosacral spondylosis without myelopathy M47.817 DDD lumbar M51.36 arthropathy lumbar facet joints M47.816  Follows Commands: Within Functional Limits  Onset Date: 10/15/20  PT Insurance Information: [email or web address]  Total # of Visits Approved: 20  Total # of Visits to Date:   No Show: 3  Canceled Appointment: 1    Subjective:  Subjective: Pt reports feeling good this morning. denies any issues after last visit. notes still some discomfort in lumbar back but much improved lately.      Pain:  Patient Currently in Pain: Yes  Pain Assessment: 0-10  Pain Level: 2  Pain Type: Chronic pain  Pain Location: Back  Pain Orientation: Mid  Pain Descriptors: Aching;Constant  Pain Frequency: Continuous       Objective:      [facility]   Rehabilitation Services Exercise Log  Aquatic, Hip & DLS Program- Phase 1     Date of Eval:     20                           Primary PT:Sea  Diagnosis:lumbo sacral spondylosis,DDD lumbar,arthropathy lumbar facet joint  Things to Focus On (goals): to stop the pain  Surgical Precautions:  Medical Precautions:COPD-use an inhaler  []?????? C-9 dates  []?????? Occ Med   []?????? Medicare   Try hanging on deep end     Date 1/18/21  2/3/21 2/10/21 2/17/21   Visit # 9/12  10/12 11/20 12/20   Walk F/L/R 2 Laps  2 Laps 2 Laps 2 Laps   Marching 2 Laps  2 Laps 2 Laps 2 Laps     Low Box  Low box Low box Low box   Squats 10x5\"  15x5\" 15x5\" 15x5\"   Step-Ups F/L Low 10x Low 10x Low 10x Tall  10x Heel-toe raises 10x  15x 15x 15x   SLR F/L/R 10x  15x 15x 15x   Knee/Flex/Ext 10x  15x 15x 15x   F/L Lunges          Kickboard Ex. Lg Lg Lg Lg   Iso Abd. 10x5\"  10x5\" 15x5\" 15x5\"   Push-pull 15x  15x 15x 15x   Paddling 15x  15x 15x 15x              UE format NT                      Deep Water 1 Noodle  1 Noodle 1 Noodle 1 Noodle   Cycling 2' 2'  2' 2'   Jacks 2'  2' 2' 2'   X-Country 2'  2' 2' 2'    Hang 5'  5' 5' 5'   Stretches          Achllies 2x20\"  2x20\" 2x20\" 2x20\"   Hamstring 2x20\"  2x20\" 2x20\" 2x20\"              Cool Down 2 Laps Breast Stroke  2 Laps Breast stroke 2 Laps Breast stroke 2 Laps Breast stroke   Pain Rating 8 3 1 2      Assessment: Body structures, Functions, Activity limitations: Decreased functional mobility ; Decreased ADL status; Decreased ROM; Increased pain  Treatment Diagnosis: difficulty walking R26.2 NEC  Prognosis: Good  REQUIRES PT FOLLOW UP: Yes  Activity Tolerance: Patient Tolerated treatment well  Comments: Increased height of box for step ups and squats on tall box. to patient's tolerance. denies any issues at end of treatment. .    Plan:  Plan: Continue with current plan Add UE format    Therapy Time:  Time In: 1020  Time Out: 1115  Minutes: 55  Timed Code Treatment Minutes: 40 Minutes    Treatment Charges: Minutes Units   []  Ultrasound     []  Electrical-Stim     []  Iontophoresis     []  Traction     []  Massage       []  Eval     []  Gait     []  Vasopneumatic Device     []  Ther Exercise       []  Manual Therapy       []  Ther Activities       [x]  Aquatics 40 3   []  Neuro Re-Ed       []  Other       Total Treatment Time: 40 3     Maury A Revill, PTA

## 2021-02-22 ENCOUNTER — HOSPITAL ENCOUNTER (OUTPATIENT)
Dept: PHYSICAL THERAPY | Age: 61
Setting detail: THERAPIES SERIES
Discharge: HOME | End: 2021-02-22
Payer: COMMERCIAL

## 2021-02-22 PROCEDURE — 97113 AQUATIC THERAPY/EXERCISES: CPT

## 2021-02-22 ASSESSMENT — PAIN DESCRIPTION - PAIN TYPE: TYPE: CHRONIC PAIN

## 2021-02-22 ASSESSMENT — PAIN DESCRIPTION - FREQUENCY: FREQUENCY: CONTINUOUS

## 2021-02-22 ASSESSMENT — PAIN SCALES - GENERAL: PAINLEVEL_OUTOF10: 2

## 2021-02-22 ASSESSMENT — PAIN DESCRIPTION - LOCATION: LOCATION: BACK

## 2021-02-22 NOTE — FLOWSHEET NOTE
[address]   Outpatient Physical Therapy  [address] [facility]. [address]  Phone: [phone]  Fax: [phone]  Daily Progress Note    Date: 21    Patient Name: Beronica Lovett        MRN: [id number]  Account: [de-identified] : 1960      General Information:  Chart Reviewed: Yes  Patient assessed for rehabilitation services?: Yes  Additional Pertinent Hx: low back pain since he was 15years old  Referring Practitioner: Red Freeman  Referral Date : 20  Diagnosis: lumbosacral spondylosis without myelopathy M47.817 DDD lumbar M51.36 arthropathy lumbar facet joints M47.816  Follows Commands: Within Functional Limits  Onset Date: 10/15/20  PT Insurance Information: [email or web address]  Total # of Visits Approved: 20  Total # of Visits to Date:   No Show: 3  Canceled Appointment: 1    Subjective:  Subjective: Pt denies any issues after last visit. Notes some pain arcoss lumbar back upon arrival.     Pain:  Patient Currently in Pain: Yes  Pain Assessment: 0-10  Pain Level: 2  Pain Type: Chronic pain  Pain Location: Back  Pain Descriptors: Constant; Aching  Pain Frequency: Continuous       Objective:      [facility]   Rehabilitation Services Exercise Log  Aquatic, Hip & DLS Program- Phase 1     Date of Eval:     20                           Primary PT:Sea  Diagnosis:lumbo sacral spondylosis,DDD lumbar,arthropathy lumbar facet joint  Things to Focus On (goals): to stop the pain  Surgical Precautions:  Medical Precautions:COPD-use an inhaler  []??????? C-9 dates  []??????? Occ Med   []??????? Medicare   Try hanging on deep end     Date 1/18/21  2/3/21 2/10/21 2/17/21 2/22/21   Visit # 9/12  10/12 11/20 12/20 13/20   Walk F/L/R 2 Laps  2 Laps 2 Laps 2 Laps 2 Laps   Marching 2 Laps  2 Laps 2 Laps 2 Laps 2 Laps     Low Box  Low box Low box Low box Low box   Squats 10x5\"  15x5\" 15x5\" 15x5\" 15x5\"   Step-Ups F/L Low 10x Low 10x Low 10x Tall  10x Tall 15x   Heel-toe raises 10x  15x 15x 15x 15x   SLR F/L/R 10x  15x 15x 15x 15x   Knee/Flex/Ext 10x  15x 15x 15x 15x   F/L Lunges            Kickboard Ex. Lg Lg Lg Lg Lg   Iso Abd. 10x5\"  10x5\" 15x5\" 15x5\" 15x5\"   Push-pull 15x  15x 15x 15x 15x   Paddling 15x  15x 15x 15x 15x                UE format NT          3-way   10x                Deep Water 1 Noodle  1 Noodle 1 Noodle 1 Noodle 1 Noodle   Cycling 2' 2'  2' 2' 2'   Jacks 2'  2' 2' 2' 2'   X-Country 2'  2' 2' 2' 2'    Hang 5'  5' 5' 5' 5'   Stretches            Achllies 2x20\"  2x20\" 2x20\" 2x20\" 2x20\"   Hamstring 2x20\"  2x20\" 2x20\" 2x20\" 2x20\"                Cool Down 2 Laps Breast Stroke  2 Laps Breast stroke 2 Laps Breast stroke 2 Laps Breast stroke 2 Laps Breast stroke   Pain Rating 8 3 1 2 2     Assessment: Body structures, Functions, Activity limitations: Decreased functional mobility ; Decreased ADL status; Decreased ROM; Increased pain  Treatment Diagnosis: difficulty walking R26.2 NEC  Prognosis: Good  REQUIRES PT FOLLOW UP: Yes  Activity Tolerance: Patient Tolerated treatment well  Comments: Added in UE format to challenge core and dorsal stability. notes dificulty with alt flex/ext UE exercise today. denies any increased pain throughout.      Plan:  Plan: Continue with current plan    Therapy Time:  Time In: 1025  Time Out: 1116  Minutes: 51  Timed Code Treatment Minutes: 40 Minutes    Treatment Charges: Minutes Units   []  Ultrasound     []  Electrical-Stim     []  Iontophoresis     []  Traction     []  Massage       []  Eval     []  Gait     []  Vasopneumatic Device     []  Ther Exercise       []  Manual Therapy       []  Ther Activities       [x]  Aquatics 40 3   []  Neuro Re-Ed       []  Other       Total Treatment Time: 40 3      Grant Rachel, PTA

## 2021-02-23 ENCOUNTER — TELEPHONE (OUTPATIENT)
Dept: FAMILY MEDICINE CLINIC | Age: 61
End: 2021-02-23

## 2021-02-23 NOTE — TELEPHONE ENCOUNTER
Pt calling been using his sisters [employer] Blood sugar machine. He states he has used up all her strips. I explained that insurance will not pay for strips that he has been testing his sugars with her supplies. Does the pt need to do this he has no machine of his own Please advise Thank you.

## 2021-02-25 ENCOUNTER — HOSPITAL ENCOUNTER (OUTPATIENT)
Dept: PHYSICAL THERAPY | Age: 61
Setting detail: THERAPIES SERIES
Discharge: HOME | End: 2021-02-25
Payer: COMMERCIAL

## 2021-02-25 PROCEDURE — 97113 AQUATIC THERAPY/EXERCISES: CPT

## 2021-03-02 DIAGNOSIS — E66.9 DIABETES MELLITUS TYPE 2 IN OBESE (HCC): ICD-10-CM

## 2021-03-02 DIAGNOSIS — E11.69 DIABETES MELLITUS TYPE 2 IN OBESE (HCC): ICD-10-CM

## 2021-03-02 NOTE — TELEPHONE ENCOUNTER
Please address the medication refill and close the encounter. If I can be of assistance, please route to the applicable pool. Thank you.         Last visit: 2-1-21  Last Med refill: 2-1-21  Does patient have enough medication for 72 hours: No:     Next Visit Date:  Future Appointments   Date Time Provider Sondra Hogue   3/2/2021  3:00 PM Maddie Boo [facility] [place]   3/4/2021 10:30 AM Arvella Grist, PTA [facility] [place]   3/8/2021  1:30 PM Corin Ortiz MD [email or web address]   3/9/2021 10:30 AM Arvella Grist, PTA [facility] [place]   3/11/2021 10:30 AM Arvella Grist, PTA [facility] Maintenance   Topic Date Due    Shingles Vaccine (1 of 2) Never done    Colon cancer screen colonoscopy  11/21/2016    Flu vaccine (1) 09/01/2020    Lipid screen  01/22/2022    Potassium monitoring  01/25/2022    Creatinine monitoring  01/25/2022    A1C test (Diabetic or Prediabetic)  02/01/2022    DTaP/Tdap/Td vaccine (2 - Td) 12/08/2030    Pneumococcal 0-64 years Vaccine  Completed    Hepatitis C screen  Completed    HIV screen  Completed    Hepatitis A vaccine  Aged Out    Hepatitis B vaccine  Aged Out    Hib vaccine  Aged Out    Meningococcal (ACWY) vaccine  Aged Out       Hemoglobin A1C (%)   Date Value   02/01/2021 6.3   01/24/2021 6.1 (H)   01/22/2021 5.7             ( goal A1C is < 7)   No results found for: LABMICR  LDL Cholesterol (mg/dL)   Date Value   01/22/2021 117   06/20/2020 57       (goal LDL is <100)   AST (U/L)   Date Value   01/21/2021 19     ALT (U/L)   Date Value   01/21/2021 27     BUN (mg/dL)   Date Value   01/25/2021 29 (H)     BP Readings from Last 3 Encounters:   02/01/21 130/69   01/25/21 (!) 104/57   12/08/20 116/70          (goal 120/80)    All Future Testing planned in [facility]  Lab Frequency Next Occurrence   PT eval and treat Once 10/22/2020   PT aquatic therapy Once 10/22/2020               Patient Active Problem List:     HTN (hypertension)     Skin tag     Low back pain with sciatica     Hyperglycemia     Infected sebaceous cyst     Chronic diastolic CHF (congestive heart failure) (HCC)     Chronic bilateral low back pain without sciatica     Chronic pain of right knee     Chest pain, unspecified     Bronchitis     Chronic systolic congestive heart failure (HCC)     Ventral hernia     Epigastric pain     Elevated LFTs     Shortness of breath     Rectus diastasis     Lumbosacral spondylosis without myelopathy     COPD exacerbation ([address]

## 2021-03-04 ENCOUNTER — HOSPITAL ENCOUNTER (OUTPATIENT)
Dept: PHYSICAL THERAPY | Age: 61
Setting detail: THERAPIES SERIES
Discharge: HOME | End: 2021-03-04
Payer: COMMERCIAL

## 2021-03-04 PROCEDURE — 97113 AQUATIC THERAPY/EXERCISES: CPT

## 2021-03-04 ASSESSMENT — PAIN DESCRIPTION - FREQUENCY: FREQUENCY: CONTINUOUS

## 2021-03-04 ASSESSMENT — PAIN DESCRIPTION - DIRECTION: RADIATING_TOWARDS: (B) HIPS

## 2021-03-04 ASSESSMENT — PAIN SCALES - GENERAL: PAINLEVEL_OUTOF10: 1

## 2021-03-04 ASSESSMENT — PAIN DESCRIPTION - DESCRIPTORS: DESCRIPTORS: CONSTANT;ACHING

## 2021-03-04 ASSESSMENT — PAIN DESCRIPTION - LOCATION: LOCATION: BACK

## 2021-03-04 ASSESSMENT — PAIN DESCRIPTION - PAIN TYPE: TYPE: CHRONIC PAIN

## 2021-03-04 NOTE — FLOWSHEET NOTE
[address] [facility]   Outpatient Physical Therapy  [address] [place] [address]. [address]  Phone: [phone]  Fax: [phone]  Daily Progress Note    Date: 3/4/21    Patient Name: Jaycee Martin        MRN: [id number]  Account: [de-identified] : 1960      General Information:  Additional Pertinent Hx: low back pain since he was 15years old  Referring Practitioner: Suze Haynes  Referral Date : 20  Diagnosis: lumbosacral spondylosis without myelopathy M47.817 DDD lumbar M51.36 arthropathy lumbar facet joints M47.816  Follows Commands: Within Functional Limits  Onset Date: 10/15/20  PT Insurance Information: [email or web address]  Total # of Visits Approved: 20  Total # of Visits to Date: 15  No Show: 4  Canceled Appointment: 1    Subjective:  Subjective: Pt reports minimal pain at this time but was higher when working this morning. Notes more breathing issues lately. Pain:  Patient Currently in Pain: Yes  Pain Assessment: 0-10  Pain Level: 1  Pain Type: Chronic pain  Pain Location: Back  Pain Orientation: Right;Left  Pain Radiating Towards: (B) hips  Pain Descriptors: Constant; Aching  Pain Frequency: Continuous       Objective:     [facility]   Rehabilitation Services Exercise Log  Aquatic, Hip & DLS Program- Phase 1     Date of Eval:     20                           Primary PT:Sea  Diagnosis:lumbo sacral spondylosis,DDD lumbar,arthropathy lumbar facet joint  Things to Focus On (goals): to stop the pain  Surgical Precautions:  Medical Precautions:COPD-use an inhaler  []????????? C-9 dates  []????????? Occ Med   []????????? Medicare   Try hanging on deep end     Date 2/17/21 2/22/21 2/25/21 3/4/21   Visit # 12/20 13/20 14/20 15/20   Walk F/L/R 2 Laps 2 Laps 2 Laps 2 Laps    Marching 2 Laps 2 Laps 2 Laps 2 Laps     Low box Low box Tall box Low box   Squats 15x5\" 15x5\" 15x5\" 15x5\"   Step-Ups F/L Tall  10x Tall 15x Tall 15x Sm 15x   Heel-toe raises 15x 15x 15x 15x   SLR F/L/R 15x 15x 15x 15x Knee/Flex/Ext 15x 15x 15x 15x   F/L Lunges          Kickboard Ex. Lg Lg Lg Lg   Iso Abd. 15x5\" 15x5\" 15x5\" 15x5\"   Push-pull 15x 15x 15x 15x   Paddling 15x 15x 15x 15x              UE format   3-way   10x 3-way  15x 3-ways  15x              Deep Water 1 Noodle 1 Noodle 1 Noodle 1 Noodle   Cycling 2' 2' 2' 2'   Jacks 2' 2' 2' 2'   X-Country 2' 2' 2' 2'    Hang 5' 5' 5' 5'   Stretches          Achllies 2x20\" 2x20\" 2x20\" 2x20\"   Hamstring 2x20\" 2x20\" 2x20\" 2x20\"              Cool Down 2 Laps Breast stroke 2 Laps Breast stroke 2 Laps Breast stroke 2 Laps Breast stroke   Pain Rating 2 2 2 1       Assessment: Body structures, Functions, Activity limitations: Decreased functional mobility ; Decreased ADL status; Decreased ROM; Increased pain  Assessment: No new changes due to difficulty   Treatment Diagnosis: difficulty walking R26.2 NEC  Prognosis: Good  REQUIRES PT FOLLOW UP: Yes  Activity Tolerance: Patient Tolerated treatment well    Plan:  Plan: Continue with current plan    Therapy Time:  Time In: 1050  Time Out: 1140  Minutes: 50  Timed Code Treatment Minutes: 40 Minutes    Treatment Charges: Minutes Units   []  Ultrasound     []  Electrical-Stim     []  Iontophoresis     []  Traction     []  Massage       []  Eval     []  Gait     []  Vasopneumatic Device     []  Ther Exercise       []  Manual Therapy       []  Ther Activities       [x]  Aquatics 40 3   []  Neuro Re-Ed       []  Other       Total Treatment Time: 40  3     aMury Rachel, PTA

## 2021-03-08 ENCOUNTER — OFFICE VISIT (OUTPATIENT)
Dept: FAMILY MEDICINE CLINIC | Age: 61
End: 2021-03-08
Payer: COMMERCIAL

## 2021-03-08 VITALS
SYSTOLIC BLOOD PRESSURE: 131 MMHG | HEIGHT: 65 IN | WEIGHT: 271.4 LBS | TEMPERATURE: 97.3 F | BODY MASS INDEX: 45.22 KG/M2 | DIASTOLIC BLOOD PRESSURE: 75 MMHG | HEART RATE: 83 BPM

## 2021-03-08 DIAGNOSIS — Z12.11 COLON CANCER SCREENING: ICD-10-CM

## 2021-03-08 DIAGNOSIS — R73.03 PREDIABETES: Primary | ICD-10-CM

## 2021-03-08 LAB — HBA1C MFR BLD: 5.9 %

## 2021-03-08 PROCEDURE — 3017F COLORECTAL CA SCREEN DOC REV: CPT | Performed by: STUDENT IN AN ORGANIZED HEALTH CARE EDUCATION/TRAINING PROGRAM

## 2021-03-08 PROCEDURE — 99211 OFF/OP EST MAY X REQ PHY/QHP: CPT | Performed by: FAMILY MEDICINE

## 2021-03-08 PROCEDURE — 99213 OFFICE O/P EST LOW 20 MIN: CPT | Performed by: STUDENT IN AN ORGANIZED HEALTH CARE EDUCATION/TRAINING PROGRAM

## 2021-03-08 PROCEDURE — 4004F PT TOBACCO SCREEN RCVD TLK: CPT | Performed by: STUDENT IN AN ORGANIZED HEALTH CARE EDUCATION/TRAINING PROGRAM

## 2021-03-08 PROCEDURE — G8417 CALC BMI ABV UP PARAM F/U: HCPCS | Performed by: STUDENT IN AN ORGANIZED HEALTH CARE EDUCATION/TRAINING PROGRAM

## 2021-03-08 PROCEDURE — G8484 FLU IMMUNIZE NO ADMIN: HCPCS | Performed by: STUDENT IN AN ORGANIZED HEALTH CARE EDUCATION/TRAINING PROGRAM

## 2021-03-08 PROCEDURE — 83036 HEMOGLOBIN GLYCOSYLATED A1C: CPT | Performed by: STUDENT IN AN ORGANIZED HEALTH CARE EDUCATION/TRAINING PROGRAM

## 2021-03-08 PROCEDURE — G8427 DOCREV CUR MEDS BY ELIG CLIN: HCPCS | Performed by: STUDENT IN AN ORGANIZED HEALTH CARE EDUCATION/TRAINING PROGRAM

## 2021-03-08 ASSESSMENT — ENCOUNTER SYMPTOMS
SORE THROAT: 0
SHORTNESS OF BREATH: 0
CHEST TIGHTNESS: 0
NAUSEA: 0
ABDOMINAL PAIN: 0
CONSTIPATION: 0
VOMITING: 0
DIARRHEA: 0
COUGH: 0

## 2021-03-08 ASSESSMENT — PATIENT HEALTH QUESTIONNAIRE - PHQ9
SUM OF ALL RESPONSES TO PHQ QUESTIONS 1-9: 0
SUM OF ALL RESPONSES TO PHQ QUESTIONS 1-9: 0
SUM OF ALL RESPONSES TO PHQ9 QUESTIONS 1 & 2: 0
2. FEELING DOWN, DEPRESSED OR HOPELESS: 0

## 2021-03-08 NOTE — PROGRESS NOTES
Attending Physician Statement  I have discussed the care of DuncanMosherincluding pertinent history and exam findings,  with the resident. I have reviewed the key elements of all parts of the encounter with the resident. I agree with the assessment, plan and orders as documented by the resident.   (GE Modifier)    Prediabetes- A1c 5.8- Continue with metformin  HM- Offered  Colon Polyp per hx- Referral for Colonoscopy

## 2021-03-08 NOTE — PROGRESS NOTES
01/25/21 (!) 104/57     Physical Exam  Vitals signs and nursing note reviewed. Constitutional:       Appearance: He is well-developed. Comments: BMI 45.16   Cardiovascular:      Rate and Rhythm: Normal rate and regular rhythm. Heart sounds: Normal heart sounds. No murmur. No friction rub. No gallop. Pulmonary:      Effort: Pulmonary effort is normal. No respiratory distress. Breath sounds: Normal breath sounds. No wheezing or rales. Chest:      Chest wall: No tenderness. Abdominal:      General: Bowel sounds are normal. There is no distension. Palpations: Abdomen is soft. There is no mass. Tenderness: There is no abdominal tenderness. There is no guarding. Skin:     Capillary Refill: Capillary refill takes less than 2 seconds. Neurological:      Mental Status: He is alert and oriented to person, place, and time. Lab Results   Component Value Date    WBC 9.9 01/25/2021    HGB 13.4 (L) 01/25/2021    HCT 40.1 (L) 01/25/2021     01/25/2021    CHOL 191 01/22/2021    TRIG 138 01/22/2021    HDL 46 01/22/2021    ALT 27 01/21/2021    AST 19 01/21/2021     01/25/2021    K 4.7 01/25/2021    CL 98 01/25/2021    CREATININE 1.23 (H) 01/25/2021    BUN 29 (H) 01/25/2021    CO2 26 01/25/2021    TSH 0.41 01/22/2021    PSA 1.06 07/01/2014    INR 1.0 04/04/2013    LABA1C 5.9 03/08/2021     Lab Results   Component Value Date    CALCIUM 8.9 01/25/2021     Lab Results   Component Value Date    LDLCHOLESTEROL 117 01/22/2021       Assessment and Plan:    1. Prediabetes  -Cont with metformin 500 Daily  - Education on diet and exercise    2. Colon cancer screening  - HX of Polyps 10 years ago  - Will refer for further evaluation over here at [facility] for colonoscopy          Requested Prescriptions      No prescriptions requested or ordered in this encounter       There are no discontinued medications. Return in about 3 months (around 6/8/2021) for hba1c.       Bashir Melvin received counseling on the following healthy behaviors: nutrition and exercise  Reviewed prior labs and health maintenance  Continue current medications, diet and exercise. Discussed use, benefit, and side effects of prescribed medications. Barriers to medication compliance addressed. Patient given educational materials - see patient instructions  Was a self-tracking handout given in paper form or via [employer]? No:     Requested Prescriptions      No prescriptions requested or ordered in this encounter       All patient questions answered. Patient voiced understanding. Quality Measures    Body mass index is 45.16 kg/m². Elevated. Weight control planned discussed Healthy diet and regular exercise. BP: 131/75 Blood pressure is normal. Treatment plan consists of Weight Reduction, DASH Eating Plan, Dietary Sodium Restriction and Increased Physical Activity.     Lab Results   Component Value Date    LDLCHOLESTEROL 117 01/22/2021    (goal LDL reduction with dx if diabetes is 50% LDL reduction)      PHQ Scores 3/8/2021 2/1/2021 9/25/2020 6/18/2020 8/13/2019 4/30/2018   PHQ2 Score 0 0 0 - 0 0   PHQ9 Score 0 0 0 0 0 0     Interpretation of Total Score Depression Severity: 1-4 = Minimal depression, 5-9 = Mild depression, 10-14 = Moderate depression, 15-19 = Moderately severe depression, 20-27 = Severe depression

## 2021-03-08 NOTE — PROGRESS NOTES
Visit Information    Have you changed or started any medications since your last visit including any over-the-counter medicines, vitamins, or herbal medicines? no   Have you stopped taking any of your medications? Is so, why? -  no  Are you having any side effects from any of your medications? - no    Have you seen any other physician or provider since your last visit?  no   Have you had any other diagnostic tests since your last visit?  no   Have you been seen in the emergency room and/or had an admission in a hospital since we last saw you?  no   Have you had your routine dental cleaning in the past 6 months?  no     Do you have an active MyChart account? If no, what is the barrier?   Yes    Patient Care Team:  Kaden Louis MD as PCP - General (Family Medicine)  Mahin Peña MD as Consulting Physician (Cardiology)  Lynne Bueno MD as Consulting Physician  Luiz Goncalves DO as Consulting Physician (General Surgery)    Medical History Review  Past Medical, Family, and Social History reviewed and does contribute to the patient presenting condition    Health Maintenance   Topic Date Due    COVID-19 Vaccine (1 of 2) Never done    Shingles Vaccine (1 of 2) Never done    Colon cancer screen colonoscopy  11/21/2016    Flu vaccine (1) 09/01/2020    Lipid screen  01/22/2022    Potassium monitoring  01/25/2022    Creatinine monitoring  01/25/2022    A1C test (Diabetic or Prediabetic)  02/01/2022    DTaP/Tdap/Td vaccine (2 - Td) 12/08/2030    Pneumococcal 0-64 years Vaccine  Completed    Hepatitis C screen  Completed    HIV screen  Completed    Hepatitis A vaccine  Aged Out    Hepatitis B vaccine  Aged Out    Hib vaccine  Aged Out    Meningococcal (ACWY) vaccine  Aged Out

## 2021-03-08 NOTE — PATIENT INSTRUCTIONS
Thank you for letting us take care of you today. We hope all your questions were addressed. If a question was overlooked or something else comes to mind after you return home, please contact a member of your Care Team listed below. Your Care Team at [place] [address] is Team #5  Jordan Beckett MD (Faculty)  Mima Kelsey MD (Resident)  Nelson Owens MD (Resident)  Richy Blake MD (Resident)  CHAZ Davila ,DIXIE EDWARDS, DIXIE Melton ([address] [facility] office)  [facility] office)  Denver Shu, [address] (Clinical Practice Manager)  Handy Ellison [facility] (Clinical Pharmacist)       Office phone number: [phone]    If you need to get in right away due to illness, please be advised we have \"Same Day\" appointments available Monday-Friday. Please call us at [phone] option #3 to schedule your \"Same Day\" appointment.

## 2021-03-09 ENCOUNTER — HOSPITAL ENCOUNTER (OUTPATIENT)
Dept: PHYSICAL THERAPY | Age: 61
Setting detail: THERAPIES SERIES
Discharge: HOME | End: 2021-03-09
Payer: COMMERCIAL

## 2021-03-09 PROCEDURE — 97113 AQUATIC THERAPY/EXERCISES: CPT

## 2021-03-09 ASSESSMENT — PAIN DESCRIPTION - PAIN TYPE: TYPE: CHRONIC PAIN

## 2021-03-09 ASSESSMENT — PAIN DESCRIPTION - ORIENTATION: ORIENTATION: RIGHT;LEFT

## 2021-03-09 NOTE — FLOWSHEET NOTE
[address]   Outpatient Physical Therapy  [address] [facility]. [address]  Phone: [phone]  Fax: [phone]  Daily Progress Note    Date: 3/9/21    Patient Name: Jero Osuna        MRN: [id number]  Account: [de-identified] : 1960      General Information:  Chart Reviewed: Yes  Patient assessed for rehabilitation services?: Yes  Additional Pertinent Hx: low back pain since he was 15years old  Referring Practitioner: Alessia Rojas  Referral Date : 20  Diagnosis: lumbosacral spondylosis without myelopathy M47.817 DDD lumbar M51.36 arthropathy lumbar facet joints M47.816  Follows Commands: Within Functional Limits  Onset Date: 10/15/20  PT Insurance Information: [email or web address]  Total # of Visits Approved: 20  Total # of Visits to Date:   No Show: 4  Canceled Appointment: 1    Subjective:  Subjective: Pt reports minimal pain upon arrival.  Denies issues after last therapy session. Pain:  Patient Currently in Pain: Yes  Pain Assessment: 0-10  Pain Level: 2  Pain Type: Chronic pain  Pain Location: Back  Pain Orientation: Right;Left  Pain Radiating Towards: (B) hips       Objective:    [facility] Exercise Log  Aquatic, Hip & DLS Program- Phase 1     Date of Eval:     20                           Primary PT:Sea  Diagnosis:lumbo sacral spondylosis,DDD lumbar,arthropathy lumbar facet joint  Things to Focus On (goals): to stop the pain  Surgical Precautions:  Medical Precautions:COPD-use an inhaler  []?????????? C-9 dates  []?????????? Occ Med   []?????????? Medicare   Try hanging on deep end     Date 2/25/21 3/4/21 3/9/21   Visit # 14/20 15/20 16/20   Walk F/L/R 2 Laps 2 Laps  2 Laps   Marching 2 Laps 2 Laps 2 Laps     Tall box Low box Tall box   Squats 15x5\" 15x5\" Low 15x5\"   Step-Ups F/L Tall 15x Sm 15x Tall 15x   Heel-toe raises 15x 15x 15x   SLR F/L/R 15x 15x 15x   Knee/Flex/Ext 15x 15x 15x   F/L Lunges        Kickboard Ex.  Lg Lg Lg   Iso Abd. 15x5\" 15x5\" 15x5\"   Push-pull 15x 15x 15x   Paddling 15x 15x 15x            UE format 3-way  15x 3-ways  15x 3-ways  15x            Deep Water 1 Noodle 1 Noodle 1 Noodle   Cycling 2' 2' 2'   Jacks 2' 2' 2'   X-Country 2' 2' 2'    Hang 5' 5' 5'   Stretches        Achllies 2x20\" 2x20\" 2x20\"   Hamstring 2x20\" 2x20\" 2x20\"            Cool Down 2 Laps Breast stroke 2 Laps Breast stroke 2 Laps breast stroke   Pain Rating 2 1 2        Assessment: Body structures, Functions, Activity limitations: Decreased functional mobility ; Decreased ADL status; Decreased ROM; Increased pain  Treatment Diagnosis: difficulty walking R26.2 NEC  Prognosis: Good  REQUIRES PT FOLLOW UP: Yes  Activity Tolerance: Patient Tolerated treatment well  Comments: Progressed exercises to tall step for all LE exercises except squats today. denies any increased pain or soreness throguhout program today.     Plan:  Plan: Continue with current plan    Therapy Time:  Time In: 1025  Time Out: 1110  Minutes: 45  Timed Code Treatment Minutes: 38 Minutes    Treatment Charges: Minutes Units   []  Ultrasound     []  Electrical-Stim     []  Iontophoresis     []  Traction     []  Massage       []  Eval     []  Gait     []  Vasopneumatic Device     []  Ther Exercise       []  Manual Therapy       []  Ther Activities       [x]  Aquatics 38 3   []  Neuro Re-Ed       []  Other       Total Treatment Time: 38 3      Maury Rcahel PTA

## 2021-03-11 ENCOUNTER — HOSPITAL ENCOUNTER (OUTPATIENT)
Dept: PHYSICAL THERAPY | Age: 61
Setting detail: THERAPIES SERIES
Discharge: HOME | End: 2021-03-11
Payer: COMMERCIAL

## 2021-03-11 PROCEDURE — 97113 AQUATIC THERAPY/EXERCISES: CPT

## 2021-03-11 ASSESSMENT — PAIN DESCRIPTION - ORIENTATION: ORIENTATION: RIGHT;LEFT

## 2021-03-11 ASSESSMENT — PAIN SCALES - GENERAL: PAINLEVEL_OUTOF10: 2

## 2021-03-11 NOTE — FLOWSHEET NOTE
[address]   Outpatient Physical Therapy  [address] [facility]. [address]  Phone: [phone]  Fax: [phone]  Daily Progress Note    Date: 3/11/21    Patient Name: Jann Tamayo        MRN: [id number]  Account: [de-identified] : 1960      General Information:  Chart Reviewed: Yes  Patient assessed for rehabilitation services?: Yes  Additional Pertinent Hx: low back pain since he was 15years old  Referring Practitioner: Ozzie Savagechester  Referral Date : 20  Diagnosis: lumbosacral spondylosis without myelopathy M47.817 DDD lumbar M51.36 arthropathy lumbar facet joints M47.816  Follows Commands: Within Functional Limits  Onset Date: 10/15/20  PT Insurance Information: amandeep  Total # of Visits Approved: 20  Total # of Visits to Date:   No Show: 4  Canceled Appointment: 1    Subjective:  Subjective: Pt reports less pain lately. denies any issues after last visit. Pain:  Patient Currently in Pain: Yes  Pain Assessment: 0-10  Pain Level: 2  Pain Type: Chronic pain  Pain Location: Back  Pain Orientation: Right;Left  Pain Radiating Towards: (B) hips  Pain Descriptors: Constant; Aching  Pain Frequency: Continuous       Objective:    [address] Services Exercise Log  Aquatic, Hip & DLS Program- Phase 1     Date of Eval:     20                           Primary PT:Sea  Diagnosis:lumbo sacral spondylosis,DDD lumbar,arthropathy lumbar facet joint  Things to Focus On (goals): to stop the pain  Surgical Precautions:  Medical Precautions:COPD-use an inhaler  []?????????? C-9 dates  []?????????? Occ Med   []?????????? Medicare   Try hanging on deep end     Date 2/25/21 3/4/21 3/9/21 3/11/21   Visit # 14/20 15/20 16/20 17/20   Walk F/L/R 2 Laps 2 Laps  2 Laps 2 Laps   Marching 2 Laps 2 Laps 2 Laps 2 Laps      Tall box Low box Tall box Tall box   Squats 15x5\" 15x5\" Low 15x5\" Tall 15x5\"   Step-Ups F/L Tall 15x Sm 15x Tall 15x Tall 15x   Heel-toe raises 15x 15x 15x 15x   SLR F/L/R 15x 15x 15x 15x   Knee/Flex/Ext 15x 15x 15x 15x   F/L Lunges         Kickboard Ex. Lg Lg Lg Lg   Iso Abd. 15x5\" 15x5\" 15x5\" 15x5\"   Push-pull 15x 15x 15x 15x   Paddling 15x 15x 15x 15x             UE format 3-way  15x 3-ways  15x 3-ways  15x 3-ways  15x             Deep [place] 1 Noodle 1 Noodle 1 Noodle 1 Noodle   Cycling 2' 2' 2' 2'   Jacks 2' 2' 2' 2'   X-Country 2' 2' 2' 2'    Hang 5' 5' 5' 5'   Stretches         Achllies 2x20\" 2x20\" 2x20\" 2x20\"   Hamstring 2x20\" 2x20\" 2x20\" 2x20\"             Cool Down 2 Laps Breast stroke 2 Laps Breast stroke 2 Laps breast stroke 2 Laps Breast stroke   Pain Rating 2 1 2 2        Assessment: Body structures, Functions, Activity limitations: Decreased functional mobility ; Decreased ADL status; Decreased ROM; Increased pain  Treatment Diagnosis: difficulty walking R26.2 NEC  Prognosis: Good  REQUIRES PT FOLLOW UP: Yes  Activity Tolerance: Patient Tolerated treatment well  Comments: progressed squats to tall box today.   denies increased pain throughout treatment    Plan:  Plan: Continue with current plan(add small buoyancy cuffs.)    Therapy Time:  Time In: 1020  Time Out: 1105  Minutes: 45  Timed Code Treatment Minutes: 38 Minutes    Treatment Charges: Minutes Units   []  Ultrasound     []  Electrical-Stim     []  Iontophoresis     []  Traction     []  Massage       []  Eval     []  Gait     []  Vasopneumatic Device     []  Ther Exercise       []  Manual Therapy       []  Ther Activities       [x]  Aquatics 38 3   []  Neuro Re-Ed       []  Other       Total Treatment Time: 38 3      Edmar Moment A Revill, PTA

## 2021-03-18 DIAGNOSIS — I10 HYPERTENSION, UNSPECIFIED TYPE: ICD-10-CM

## 2021-03-18 NOTE — TELEPHONE ENCOUNTER
Last visit: 03/08/21  Last Med refill:   Does patient have enough medication for 72 hours:     Next Visit Date:  No future appointments.     Health Maintenance   Topic Date Due    COVID-19 Vaccine (1) Never done    Shingles Vaccine (1 of 2) Never done    Colon cancer screen colonoscopy  11/21/2016    Flu vaccine (1) 09/01/2020    Lipid screen  01/22/2022    Potassium monitoring  01/25/2022    Creatinine monitoring  01/25/2022    A1C test (Diabetic or Prediabetic)  03/08/2022    DTaP/Tdap/Td vaccine (2 - Td) 12/08/2030    Pneumococcal 0-64 years Vaccine  Completed    Hepatitis C screen  Completed    HIV screen  Completed    Hepatitis A vaccine  Aged Out    Hepatitis B vaccine  Aged Out    Hib vaccine  Aged Out    Meningococcal (ACWY) vaccine  Aged Out       Hemoglobin A1C (%)   Date Value   03/08/2021 5.9   02/01/2021 6.3   01/24/2021 6.1 (H)             ( goal A1C is < 7)   No results found for: LABMICR  LDL Cholesterol (mg/dL)   Date Value   01/22/2021 117   06/20/2020 57       (goal LDL is <100)   AST (U/L)   Date Value   01/21/2021 19     ALT (U/L)   Date Value   01/21/2021 27     BUN (mg/dL)   Date Value   01/25/2021 29 (H)     BP Readings from Last 3 Encounters:   03/08/21 131/75   02/01/21 130/69   01/25/21 (!) 104/57          (goal 120/80)    All Future Testing planned in [facility]  Lab Frequency Next Occurrence   PT eval and treat Once 10/22/2020   PT aquatic therapy Once 10/22/2020               Patient Active Problem List:     HTN (hypertension)     Skin tag     Low back pain with sciatica     Hyperglycemia     Infected sebaceous cyst     Chronic diastolic CHF (congestive heart failure) (HCC)     Chronic bilateral low back pain without sciatica     Chronic pain of right knee     Chest pain, unspecified     Bronchitis     Chronic systolic congestive heart failure (HCC)     Ventral hernia     Epigastric pain     Elevated LFTs     Shortness of breath     Rectus diastasis     Lumbosacral spondylosis without myelopathy     [facility] ([facility]

## 2021-03-22 RX ORDER — METOPROLOL TARTRATE 50 MG/1
50 TABLET, FILM COATED ORAL 2 TIMES DAILY
Qty: 60 TABLET | Refills: 3 | Status: ON HOLD | OUTPATIENT
Start: 2021-03-22 | End: 2021-06-02 | Stop reason: HOSPADM

## 2021-04-14 ENCOUNTER — OFFICE VISIT (OUTPATIENT)
Dept: SURGERY | Age: 61
End: 2021-04-14
Payer: COMMERCIAL

## 2021-04-14 VITALS
HEART RATE: 94 BPM | TEMPERATURE: 97.2 F | BODY MASS INDEX: 45.08 KG/M2 | HEIGHT: 65 IN | WEIGHT: 270.6 LBS | DIASTOLIC BLOOD PRESSURE: 74 MMHG | SYSTOLIC BLOOD PRESSURE: 130 MMHG

## 2021-04-14 DIAGNOSIS — Z12.11 ENCOUNTER FOR SCREENING COLONOSCOPY: Primary | ICD-10-CM

## 2021-04-14 PROCEDURE — 99999 PR OFFICE/OUTPT VISIT,PROCEDURE ONLY: CPT | Performed by: SURGERY

## 2021-04-14 RX ORDER — POLYETHYLENE GLYCOL 3350 17 G/17G
238 POWDER, FOR SOLUTION ORAL ONCE
Qty: 238 G | Refills: 0 | Status: SHIPPED | OUTPATIENT
Start: 2021-04-14 | End: 2021-04-14

## 2021-04-14 NOTE — PROGRESS NOTES
Visit Information    Have you changed or started any medications since your last visit including any over-the-counter medicines, vitamins, or herbal medicines? no   Have you stopped taking any of your medications? Is so, why? -  no  Are you having any side effects from any of your medications? - no    Have you seen any other physician or provider since your last visit?  cardio  Have you had any other diagnostic tests since your last visit?  no   Have you been seen in the emergency room and/or had an admission in a hospital since we last saw you?  no   Have you had your routine dental cleaning in the past 6 months?  no     Do you have an active MyChart account? If no, what is the barrier?   Yes    Patient Care Team:  Yolette Low MD as PCP - General (Family Medicine)  Steven Zhang MD as Consulting Physician (Cardiology)  Sivan Mcleod MD as Consulting Physician  Mike Palomino DO as Consulting Physician (General Surgery)    Medical History Review  Past Medical, Family, and Social History reviewed and does not contribute to the patient presenting condition    Health Maintenance   Topic Date Due    COVID-19 Vaccine (1) Never done    Shingles Vaccine (1 of 2) Never done    Colon cancer screen colonoscopy  11/21/2016    Flu vaccine (Season Ended) 09/01/2021    Lipid screen  01/22/2022    Potassium monitoring  01/25/2022    Creatinine monitoring  01/25/2022    A1C test (Diabetic or Prediabetic)  03/08/2022    DTaP/Tdap/Td vaccine (2 - Td) 12/08/2030    Pneumococcal 0-64 years Vaccine  Completed    Hepatitis C screen  Completed    HIV screen  Completed    Hepatitis A vaccine  Aged Out    Hepatitis B vaccine  Aged Out    Hib vaccine  Aged Out    Meningococcal (ACWY) vaccine  Aged Out

## 2021-04-14 NOTE — PROGRESS NOTES
[facility]   History and Physical      PATIENT NAME: Romel Rosa OF BIRTH: 1960     TODAY'S DATE: 4/14/2021    CHIEF COMPLAINT:  Colon surveillance       HISTORY OF PRESENT ILLNESS:  This is a 63 y.o. male w/ hx of CAD recent cath no stents 1/2021 presenting to be evaluated for continued colon survelliance. Pt has had h/o colonoscopy in 2013 w/ removal 1 polyp in cecum path adenomatous polyp. Endorses intermitted diarrhea but no hematochezia or melena. No weight loss. Patient denies any abdominal pain, constipation, change in color of stool, or blood in stool. Patient has no family history of colorectal cancer. Denies taking any anticoagulants. No other complaints noted at this time. Pt w/ COPD, cardiac history. Had cardiac cath 01/2021 w/out placement of stents. Noted to have CHF NYHA 3, LVEDP 28mmhg, EF 45-50%. Past Medical History:        Diagnosis Date    CHF (congestive heart failure) (HCC)     Chronic back pain     COPD (chronic obstructive pulmonary disease) (HCC)     Headache(784.0)     Hypertension     Low back pain with sciatica     Osteoarthritis     Sleep apnea     with cpap       Past Surgical History:        Procedure Laterality Date   [address]   exact date unknown       Medications:  Scheduled Meds:  Continuous Infusions:  PRN Meds:.     Allergies:  Penicillins, Sulfa antibiotics, Sulfasalazine, Pseudoeph-doxylamine-dm-apap, and Rabbit protein    Social History:   Social History     Socioeconomic History    Marital status: Single     Spouse name: Not on file    Number of children: Not on file    Years of education: Not on file    Highest education level: Not on file   Occupational History    Not on file   Social Needs    Financial resource strain: Not on file    Food insecurity     Worry: Not on file     Inability: Not on file    Transportation needs     Medical: Not on file     Non-medical: Not on file   Tobacco Use    Smoking status: Former Smoker     Packs/day: 0.00     Years: 40.00     Pack years: 0.00    Smokeless tobacco: Current User     Types: Chew    Tobacco comment: Chews / quit cigarettes   Substance and Sexual Activity    Alcohol use: Yes     Alcohol/week: 1.0 standard drinks     Types: 1 Glasses of wine per week     Frequency: 2-3 times a week     Drinks per session: 1 or 2     Binge frequency: Never    Drug use: No    Sexual activity: Not on file   Lifestyle    Physical activity     Days per week: Not on file     Minutes per session: Not on file    Stress: Not on file   Relationships    Social connections     Talks on phone: Not on file     Gets together: Not on file     Attends Anabaptism service: Not on file     Active member of club or organization: Not on file     Attends meetings of clubs or organizations: Not on file     Relationship status: Not on file    Intimate partner violence     Fear of current or ex partner: Not on file     Emotionally abused: Not on file     Physically abused: Not on file     Forced sexual activity: Not on file   Other Topics Concern    Not on file   Social History Narrative    Not on file       Family History:       Problem Relation Age of Onset    Kidney Disease Mother     Diabetes Mother     Heart Disease Mother     Arthritis Mother     Heart Disease Father     Arthritis Sister     Diabetes Sister     Heart Disease Sister        REVIEW OF SYSTEMS:    CONSTITUTIONAL:  No recent weight gain/loss. Energy level normal for pt. HEENT:  negative  CARDIOVASCULAR:  No chest pain  GASTROINTESTINAL:  See HPI  GENITOURINARY:  No dysuria  HEMATOLOGIC/LYMPHATIC:  No easy bruising. No history of cancer  ENDOCRINE:  negative   Review of systems negative unless above.     PHYSICAL EXAM:    VITALS:  /74 (Site: Left Upper Arm, Position: Sitting, Cuff Size: Medium Adult) Comment: machine  Pulse 94   Temp 97.2 °F (36.2 °C) (Oral)   Ht 5' 5\" (1.651 m)   Wt 270 lb 9.6 oz (122.7 kg)   BMI 45.03 kg/m²     CONSTITUTIONAL:  awake, alert, not distressed  LUNGS:  CTA bilaterally  CARDIOVASCULAR: S1S2  ABDOMEN: Soft, nondistended, No TTP, diastasis recti  NEUROLOGIC:  Mental Status Exam:  Level of Alertness:   alert  Orientation:   oriented to person, place, and time      ASSESSMENT   3.  64 yo M h/o adenomatous polyp of cecum 2013 presents for continued colon surveillance. PLAN    1. Patient will be scheduled for screening colonoscopy. Patient was given a thorough bowel prep instruction. The risks, benefits, suspected outcome and alternatives to the procedure were explained. The risk included but are not limited to bleeding, infection, respiratory distress, hypertension and perforation of the colon. The patient understands and is in agreement. All questions were answered appropriately. Patient's case was discussed with Dr. Chelsey Wilcox. Electronically signed by Magnolia Fothergill, DO  on 4/14/2021 at 11:11 AM   Attending Physician Statement  I have discussed the case with Dr Angela Engel, including pertinent history and exam findings with the resident. I have seen and examined the patient and the key elements of the encounter have been performed by me. I agree with the assessment, plan and orders as documented by the resident.       Electronically signed by Lilly Cervantes IV, DO  on 4/19/2021 at 3:21 PM

## 2021-04-14 NOTE — PATIENT INSTRUCTIONS
Thank you letting us take care of you today. We hope that all your questions were addressed. If a question was overlooked or something else comes to mind after you return home, please call our office at [phone]. If you need to cancel or change an appointment, surgery or procedure, please contact the office at [phone].

## 2021-05-13 DIAGNOSIS — I10 HYPERTENSION, UNSPECIFIED TYPE: ICD-10-CM

## 2021-05-13 RX ORDER — LISINOPRIL 30 MG/1
30 TABLET ORAL DAILY
Qty: 30 TABLET | Refills: 2 | Status: SHIPPED | OUTPATIENT
Start: 2021-05-13 | End: 2021-08-30 | Stop reason: SDUPTHER

## 2021-05-13 NOTE — TELEPHONE ENCOUNTER
Lisinopril pending for refill     Health Maintenance   Topic Date Due    COVID-19 Vaccine (1) Never done    Shingles Vaccine (1 of 2) Never done    Colon cancer screen colonoscopy  11/21/2016    Flu vaccine (Season Ended) 09/01/2021    Lipid screen  01/22/2022    Potassium monitoring  01/25/2022    Creatinine monitoring  01/25/2022    A1C test (Diabetic or Prediabetic)  03/08/2022    DTaP/Tdap/Td vaccine (2 - Td) 12/08/2030    Pneumococcal 0-64 years Vaccine  Completed    Hepatitis C screen  Completed    HIV screen  Completed    Hepatitis A vaccine  Aged Out    Hepatitis B vaccine  Aged Out    Hib vaccine  Aged Out    Meningococcal (ACWY) vaccine  Aged Out             (applicable per patient's age: Cancer Screenings, Depression Screening, Fall Risk Screening, Immunizations)    Hemoglobin A1C (%)   Date Value   03/08/2021 5.9   02/01/2021 6.3   01/24/2021 6.1 (H)     LDL Cholesterol (mg/dL)   Date Value   01/22/2021 117     AST (U/L)   Date Value   01/21/2021 19     ALT (U/L)   Date Value   01/21/2021 27     BUN (mg/dL)   Date Value   01/25/2021 29 (H)      (goal A1C is < 7)   (goal LDL is <100) need 30-50% reduction from baseline     BP Readings from Last 3 Encounters:   04/14/21 130/74   03/08/21 131/75   02/01/21 130/69    (goal /80)      All Future Testing planned in [facility]:  Lab Frequency Next Occurrence   PT eval and treat Once 10/22/2020   PT aquatic therapy Once 10/22/2020       Next Visit Date:  No future appointments.          Patient Active Problem List:     HTN (hypertension)     Skin tag     Low back pain with sciatica     Hyperglycemia     Infected sebaceous cyst     Chronic diastolic CHF (congestive heart failure) (HCC)     Chronic bilateral low back pain without sciatica     Chronic pain of right knee     Chest pain, unspecified     Bronchitis     Chronic systolic congestive heart failure (HCC)     Ventral hernia     Epigastric pain     Elevated LFTs     Shortness of breath

## 2021-05-18 ENCOUNTER — TELEPHONE (OUTPATIENT)
Dept: PREADMISSION TESTING | Age: 61
End: 2021-05-18

## 2021-05-20 ENCOUNTER — HOSPITAL ENCOUNTER (OUTPATIENT)
Dept: PREADMISSION TESTING | Age: 61
Discharge: HOME | End: 2021-05-24
Payer: COMMERCIAL

## 2021-05-20 VITALS
HEART RATE: 112 BPM | WEIGHT: 280 LBS | SYSTOLIC BLOOD PRESSURE: 171 MMHG | BODY MASS INDEX: 46.65 KG/M2 | HEIGHT: 65 IN | TEMPERATURE: 99 F | OXYGEN SATURATION: 96 % | RESPIRATION RATE: 18 BRPM | DIASTOLIC BLOOD PRESSURE: 90 MMHG

## 2021-05-20 LAB
-: NORMAL
REASON FOR REJECTION: NORMAL
ZZ NTE CLEAN UP: ORDERED TEST: NORMAL
ZZ NTE WITH NAME CLEAN UP: SPECIMEN SOURCE: NORMAL

## 2021-05-20 PROCEDURE — 36415 COLL VENOUS BLD VENIPUNCTURE: CPT

## 2021-05-20 PROCEDURE — 93005 ELECTROCARDIOGRAM TRACING: CPT | Performed by: ANESTHESIOLOGY

## 2021-05-20 RX ORDER — SODIUM CHLORIDE, SODIUM LACTATE, POTASSIUM CHLORIDE, CALCIUM CHLORIDE 600; 310; 30; 20 MG/100ML; MG/100ML; MG/100ML; MG/100ML
1000 INJECTION, SOLUTION INTRAVENOUS CONTINUOUS
Status: CANCELLED | OUTPATIENT
Start: 2021-05-20

## 2021-05-20 ASSESSMENT — PAIN DESCRIPTION - DESCRIPTORS: DESCRIPTORS: ACHING

## 2021-05-20 ASSESSMENT — PAIN DESCRIPTION - FREQUENCY: FREQUENCY: CONTINUOUS

## 2021-05-20 ASSESSMENT — PAIN DESCRIPTION - PAIN TYPE: TYPE: CHRONIC PAIN;ACUTE PAIN

## 2021-05-20 NOTE — PROGRESS NOTES
Anesthesia Focused Assessment    STOP-BANG Sleep Apnea Questionnaire    SNORE loudly (heard through closed doors)? No  TIRED, fatigued, sleepy during daytime? No  OBSERVED stopping breathing during sleep? No  High blood PRESSURE being treated? Yes    BMI over 35? Yes  AGE over 48? Yes  NECK circumference over 16\"? No  GENDER (male)? Yes             Total 4  High risk 5-8  Intermediate risk 3-4  Low risk 0-2    Obstructive Sleep Apnea: yes  If YES, machine used: yes     Type 1 DM:   no  T2DM:  yes    Coronary Artery Disease:  yes  Hypertension:  yes    Active smoker:  Quit 20 years ago  Drinks Alcohol:  3 glasses of red wine per week    Dentition: several broken teeth    Defib / AICD / Pacemaker: no      Renal Failure/dialysis:  no    Patient was evaluated in PAT & anesthesia guidelines were applied. NPO guidelines, medication instructions and scheduled arrival time were reviewed with patient.     Hx of anesthesia complications:  no  Family hx of anesthesia complications:  no                                                                                                                     Anesthesia contacted:   no  Medical or cardiac clearance ordered: no    PINKY Bird - CNP FNP-BC, AE-C  5/20/21  1:23 PM

## 2021-05-21 LAB
EKG ATRIAL RATE: 105 BPM
EKG P AXIS: 66 DEGREES
EKG P-R INTERVAL: 150 MS
EKG Q-T INTERVAL: 354 MS
EKG QRS DURATION: 96 MS
EKG QTC CALCULATION (BAZETT): 467 MS
EKG R AXIS: 20 DEGREES
EKG T AXIS: 76 DEGREES
EKG VENTRICULAR RATE: 105 BPM

## 2021-05-24 ENCOUNTER — HOSPITAL ENCOUNTER (OUTPATIENT)
Dept: LAB | Age: 61
Setting detail: SPECIMEN
Discharge: HOME | End: 2021-05-24
Payer: COMMERCIAL

## 2021-05-24 DIAGNOSIS — Z20.822 COVID-19 RULED OUT BY LABORATORY TESTING: Primary | ICD-10-CM

## 2021-05-24 PROCEDURE — U0005 INFEC AGEN DETEC AMPLI PROBE: HCPCS

## 2021-05-24 PROCEDURE — U0003 INFECTIOUS AGENT DETECTION BY NUCLEIC ACID (DNA OR RNA); SEVERE ACUTE RESPIRATORY SYNDROME CORONAVIRUS 2 (SARS-COV-2) (CORONAVIRUS DISEASE [COVID-19]), AMPLIFIED PROBE TECHNIQUE, MAKING USE OF HIGH THROUGHPUT TECHNOLOGIES AS DESCRIBED BY CMS-2020-01-R: HCPCS

## 2021-05-25 LAB
SARS-COV-2: NORMAL
SARS-COV-2: NOT DETECTED
SOURCE: NORMAL

## 2021-05-26 ENCOUNTER — TELEPHONE (OUTPATIENT)
Dept: PRIMARY CARE CLINIC | Age: 61
End: 2021-05-26

## 2021-05-27 ENCOUNTER — ANESTHESIA EVENT (OUTPATIENT)
Dept: OPERATING ROOM | Age: 61
End: 2021-05-27
Payer: COMMERCIAL

## 2021-05-28 ENCOUNTER — HOSPITAL ENCOUNTER (OUTPATIENT)
Age: 61
Setting detail: OUTPATIENT SURGERY
Discharge: HOME | End: 2021-05-28
Attending: SURGERY | Admitting: SURGERY
Payer: COMMERCIAL

## 2021-05-28 ENCOUNTER — ANESTHESIA (OUTPATIENT)
Dept: OPERATING ROOM | Age: 61
End: 2021-05-28
Payer: COMMERCIAL

## 2021-05-28 VITALS
HEART RATE: 103 BPM | DIASTOLIC BLOOD PRESSURE: 74 MMHG | TEMPERATURE: 96.8 F | RESPIRATION RATE: 18 BRPM | BODY MASS INDEX: 46.65 KG/M2 | WEIGHT: 280 LBS | SYSTOLIC BLOOD PRESSURE: 135 MMHG | HEIGHT: 65 IN | OXYGEN SATURATION: 96 %

## 2021-05-28 VITALS
RESPIRATION RATE: 13 BRPM | OXYGEN SATURATION: 98 % | DIASTOLIC BLOOD PRESSURE: 79 MMHG | SYSTOLIC BLOOD PRESSURE: 141 MMHG

## 2021-05-28 LAB
ALLEN TEST: ABNORMAL
ANION GAP: 8 MMOL/L (ref 7–16)
FIO2: ABNORMAL
GFR NON-AFRICAN AMERICAN: >60 ML/MIN
GFR SERPL CREATININE-BSD FRML MDRD: >60 ML/MIN
GFR SERPL CREATININE-BSD FRML MDRD: NORMAL ML/MIN/{1.73_M2}
GLUCOSE BLD-MCNC: 106 MG/DL (ref 75–110)
GLUCOSE BLD-MCNC: 121 MG/DL (ref 74–100)
HCO3 VENOUS: 30.8 MMOL/L (ref 22–29)
MODE: ABNORMAL
NEGATIVE BASE EXCESS, VEN: ABNORMAL (ref 0–2)
O2 DEVICE/FLOW/%: ABNORMAL
O2 SAT, VEN: 49 % (ref 60–85)
PATIENT TEMP: ABNORMAL
PCO2, VEN: 53.4 MM HG (ref 41–51)
PH VENOUS: 7.37 (ref 7.32–7.43)
PO2, VEN: 27.8 MM HG (ref 30–50)
POC BUN: 12 MG/DL (ref 8–26)
POC CHLORIDE: 104 MMOL/L (ref 98–107)
POC CREATININE: 0.87 MG/DL (ref 0.51–1.19)
POC HEMATOCRIT: 39 % (ref 41–53)
POC HEMOGLOBIN: 13.4 G/DL (ref 13.5–17.5)
POC IONIZED CALCIUM: 1.13 MMOL/L (ref 1.15–1.33)
POC LACTIC ACID: 1.25 MMOL/L (ref 0.56–1.39)
POC PCO2 TEMP: ABNORMAL MM HG
POC PH TEMP: ABNORMAL
POC PO2 TEMP: ABNORMAL MM HG
POC POTASSIUM: 4.9 MMOL/L (ref 3.5–4.5)
POC SODIUM: 141 MMOL/L (ref 138–146)
POC TCO2: 31 MMOL/L (ref 22–30)
POSITIVE BASE EXCESS, VEN: 4 (ref 0–3)
SAMPLE SITE: ABNORMAL
TOTAL CO2, VENOUS: ABNORMAL MMOL/L (ref 23–30)

## 2021-05-28 PROCEDURE — 82565 ASSAY OF CREATININE: CPT

## 2021-05-28 PROCEDURE — 2709999900 HC NON-CHARGEABLE SUPPLY: Performed by: SURGERY

## 2021-05-28 PROCEDURE — 83605 ASSAY OF LACTIC ACID: CPT

## 2021-05-28 PROCEDURE — 84520 ASSAY OF UREA NITROGEN: CPT

## 2021-05-28 PROCEDURE — 3700000001 HC ADD 15 MINUTES (ANESTHESIA): Performed by: SURGERY

## 2021-05-28 PROCEDURE — 80051 ELECTROLYTE PANEL: CPT

## 2021-05-28 PROCEDURE — 2580000003 HC RX 258: Performed by: ANESTHESIOLOGY

## 2021-05-28 PROCEDURE — 2500000003 HC RX 250 WO HCPCS: Performed by: NURSE ANESTHETIST, CERTIFIED REGISTERED

## 2021-05-28 PROCEDURE — 3609010400 HC COLONOSCOPY POLYPECTOMY HOT BIOPSY: Performed by: SURGERY

## 2021-05-28 PROCEDURE — 82947 ASSAY GLUCOSE BLOOD QUANT: CPT

## 2021-05-28 PROCEDURE — 88305 TISSUE EXAM BY PATHOLOGIST: CPT

## 2021-05-28 PROCEDURE — 82330 ASSAY OF CALCIUM: CPT

## 2021-05-28 PROCEDURE — 6360000002 HC RX W HCPCS: Performed by: NURSE ANESTHETIST, CERTIFIED REGISTERED

## 2021-05-28 PROCEDURE — 85014 HEMATOCRIT: CPT

## 2021-05-28 PROCEDURE — 3700000000 HC ANESTHESIA ATTENDED CARE: Performed by: SURGERY

## 2021-05-28 PROCEDURE — 6370000000 HC RX 637 (ALT 250 FOR IP)

## 2021-05-28 PROCEDURE — 82803 BLOOD GASES ANY COMBINATION: CPT

## 2021-05-28 PROCEDURE — 7100000041 HC SPAR PHASE II RECOVERY - ADDTL 15 MIN: Performed by: SURGERY

## 2021-05-28 PROCEDURE — 7100000040 HC SPAR PHASE II RECOVERY - FIRST 15 MIN: Performed by: SURGERY

## 2021-05-28 RX ORDER — IPRATROPIUM BROMIDE AND ALBUTEROL SULFATE 2.5; .5 MG/3ML; MG/3ML
1 SOLUTION RESPIRATORY (INHALATION)
Status: DISCONTINUED | OUTPATIENT
Start: 2021-05-28 | End: 2021-05-28 | Stop reason: HOSPADM

## 2021-05-28 RX ORDER — LIDOCAINE HYDROCHLORIDE 10 MG/ML
INJECTION, SOLUTION EPIDURAL; INFILTRATION; INTRACAUDAL; PERINEURAL PRN
Status: DISCONTINUED | OUTPATIENT
Start: 2021-05-28 | End: 2021-05-28 | Stop reason: SDUPTHER

## 2021-05-28 RX ORDER — ACETAMINOPHEN 500 MG
1000 TABLET ORAL EVERY 4 HOURS PRN
Status: DISCONTINUED | OUTPATIENT
Start: 2021-05-28 | End: 2021-05-28 | Stop reason: HOSPADM

## 2021-05-28 RX ORDER — PROPOFOL 10 MG/ML
INJECTION, EMULSION INTRAVENOUS PRN
Status: DISCONTINUED | OUTPATIENT
Start: 2021-05-28 | End: 2021-05-28 | Stop reason: SDUPTHER

## 2021-05-28 RX ORDER — IPRATROPIUM BROMIDE AND ALBUTEROL SULFATE 2.5; .5 MG/3ML; MG/3ML
SOLUTION RESPIRATORY (INHALATION)
Status: COMPLETED
Start: 2021-05-28 | End: 2021-05-28

## 2021-05-28 RX ORDER — ACETAMINOPHEN 500 MG
TABLET ORAL
Status: COMPLETED
Start: 2021-05-28 | End: 2021-05-28

## 2021-05-28 RX ORDER — SODIUM CHLORIDE, SODIUM LACTATE, POTASSIUM CHLORIDE, CALCIUM CHLORIDE 600; 310; 30; 20 MG/100ML; MG/100ML; MG/100ML; MG/100ML
INJECTION, SOLUTION INTRAVENOUS CONTINUOUS
Status: CANCELLED | OUTPATIENT
Start: 2021-05-28

## 2021-05-28 RX ORDER — SODIUM CHLORIDE, SODIUM LACTATE, POTASSIUM CHLORIDE, CALCIUM CHLORIDE 600; 310; 30; 20 MG/100ML; MG/100ML; MG/100ML; MG/100ML
1000 INJECTION, SOLUTION INTRAVENOUS CONTINUOUS
Status: DISCONTINUED | OUTPATIENT
Start: 2021-05-28 | End: 2021-05-28 | Stop reason: HOSPADM

## 2021-05-28 RX ADMIN — IPRATROPIUM BROMIDE AND ALBUTEROL SULFATE 3 ML: .5; 3 SOLUTION RESPIRATORY (INHALATION) at 13:31

## 2021-05-28 RX ADMIN — LIDOCAINE HYDROCHLORIDE 50 MG: 10 INJECTION, SOLUTION EPIDURAL; INFILTRATION; INTRACAUDAL; PERINEURAL at 12:31

## 2021-05-28 RX ADMIN — Medication 1000 MG: at 11:41

## 2021-05-28 RX ADMIN — PROPOFOL 80 MG: 10 INJECTION, EMULSION INTRAVENOUS at 12:34

## 2021-05-28 RX ADMIN — PROPOFOL 150 MCG/KG/MIN: 10 INJECTION, EMULSION INTRAVENOUS at 12:34

## 2021-05-28 RX ADMIN — SODIUM CHLORIDE, POTASSIUM CHLORIDE, SODIUM LACTATE AND CALCIUM CHLORIDE 1000 ML: 600; 310; 30; 20 INJECTION, SOLUTION INTRAVENOUS at 11:42

## 2021-05-28 RX ADMIN — SODIUM CHLORIDE, POTASSIUM CHLORIDE, SODIUM LACTATE AND CALCIUM CHLORIDE: 600; 310; 30; 20 INJECTION, SOLUTION INTRAVENOUS at 13:07

## 2021-05-28 RX ADMIN — ACETAMINOPHEN 1000 MG: 500 TABLET ORAL at 11:41

## 2021-05-28 ASSESSMENT — PULMONARY FUNCTION TESTS
PIF_VALUE: 1
PIF_VALUE: 0
PIF_VALUE: 0
PIF_VALUE: 1
PIF_VALUE: 0
PIF_VALUE: 1

## 2021-05-28 ASSESSMENT — PAIN SCALES - GENERAL: PAINLEVEL_OUTOF10: 10

## 2021-05-28 ASSESSMENT — ENCOUNTER SYMPTOMS
SHORTNESS OF BREATH: 1
DYSPNEA ACTIVITY LEVEL: NO INTERVAL CHANGE

## 2021-05-28 ASSESSMENT — PAIN - FUNCTIONAL ASSESSMENT: PAIN_FUNCTIONAL_ASSESSMENT: 0-10

## 2021-05-28 NOTE — OP NOTE
Operative Note      Patient: Nish Branham  YOB: 1960  MRN: [id number]    Date of Procedure: 5/28/2021    Pre-Op Diagnosis: SCREENING    Post-Op Diagnosis: cecal polyop, screening colonoscopy       Procedure(s):  COLORECTAL CANCER SCREENING, NOT HIGH RISK, POLYPECTOMY - GI Scheduled    Surgeon(s):  Sulma Brantley IV, DO    Assistant:   Resident: Eddie Castellanos DO; Bryn Govea DO    Anesthesia: Monitor Anesthesia Care    Estimated Blood Loss (mL): 1 mL    Complications: None    Specimens:   ID Type Source Tests Collected by Time Destination   A : CECAL POLYP Tissue Cecum SURGICAL PATHOLOGY Turner Sang Canos IV, DO 5/28/2021 1252        Implants:  * No implants in log *      Drains: * No LDAs found *    Findings: cecal polypectomy performed, repeat scope in 3 years, poor colon prep, Type I wound classification    Detailed Description of Procedure:    HISTORY: The patient is a 63y.o. year old male with history of above preop diagnosis. I recommended colonoscopy with possible biopsy or polypectomy and I explained the risk, benefits, expected outcome, and alternatives to the procedure. Risks included but are not limited to bleeding, infection, respiratory distress, hypotension, and perforation of the colon. The patient understands and is in agreement. PROCEDURE: The patient was given IV conscious sedation per anesthesia. The patient was given 4 L of O2 /minute by nasal cannula. The patient's SPO2 remained above 90% throughout the procedure. The colonoscope was inserted per rectum and advanced under direct vision to the cecum without difficulty. Poor colon prep noted. Findings:  Cecum/Ascending colon: cecal polyp noted, cecal polyp biopsy performed using the biopsy forceps, specimen passed off the table for pathology.      Transverse colon: normal    Descending/Sigmoid colon: normal    Rectum/Anus: examined in normal and retroflexed positions and was normal    The colon was decompressed and the scope was removed. The patient tolerated the procedure well. IMPRESSION/PLAN:   1. Complete colonoscopy performed  2. Cecal biopsy taken  3. Repeat scope in 3 years, poor prep noted  4. Recommend double bowel prep at next scope.      Electronically signed by Ritchie Mercado DO on 5/28/2021 at 1:08 PM

## 2021-05-28 NOTE — PROGRESS NOTES
Patient does not have family with him at the hospital so there is no one to update. Informed surgeon.

## 2021-05-28 NOTE — H&P
General Surgery:  H&P        PATIENT NAME: Les Estevez OF BIRTH: 1960    ADMISSION DATE: 5/28/2021 10:13 AM     TODAY'S DATE: 5/28/2021    HISTORY OF PRESENT ILLNESS:  The patient is a 63 y.o. male who presents today to undergo colonoscopy. Patient had colonoscopy completed in 2013 with removal of 1 polyp. Patient reports he has had intermittent diarrhea but no bleeding noted. No family history of colon cancer.     Past Medical History:        Diagnosis Date    Bronchitis     CAD (coronary artery disease)     Cardiomyopathy ([address]     Chest pain     [facility]est[facility] failure[facility]     Chronic back pain     COPD (chronic obstructive pulmonary disease) ([facility]     Headache(784.0)     Hyperlipidemia     Hypertension     Inguinal hernia     Low back pain with sciatica     Migraine     Mitral valve regurgitation     Osteoarthritis     Pneumonia     Sleep apnea     with cpap    SOB (shortness of breath) on exertion     Tricuspid regurgitation     Type 2 diabetes mellitus without complication, without long-term current use of insulin ([facility] [address]     Under care of team 05/20/2021    cardiology-Dr Ty Ngo visit jan 2021    Wellness examination 05/20/2021    [facility]       Past Surgical History:        Procedure Laterality Date    ADENOIDECTOMY      COLONOSCOPY     [address]   exact date unknown       Medications Prior to Admission:   Medications Prior to Admission: lisinopril (PRINIVIL;ZESTRIL) 30 MG tablet, TAKE 1 TABLET BY MOUTH DAILY  metoprolol tartrate (LOPRESSOR) 50 MG tablet, TAKE 1 TABLET BY MOUTH 2 TIMES DAILY  metFORMIN (GLUCOPHAGE) 500 MG tablet, Take 1 tablet by mouth daily (with breakfast)  spironolactone (ALDACTONE) 25 MG tablet, Take 1 tablet by mouth nightly  gabapentin (NEURONTIN) 300 MG capsule, TAKE 1 CAPSULE BY MOUTH NIGHTLY AS NEEDED (PAIN)  potassium chloride (KLOR-CON M) 20 MEQ Social History Narrative    Not on file     Social Determinants of Health     Financial Resource Strain:     Difficulty of Paying Living Expenses:    Food Insecurity:     Worried About Running Out of Food in the Last Year:     [address] in the Last Year:    Transportation Needs:     Lack of Transportation (Medical):  Lack of Transportation (Non-Medical):    Physical Activity:     Days of Exercise per Week:     Minutes of Exercise per Session:    Stress:     Feeling of Stress :    Social Connections:     Frequency of Communication with Friends and Family:     Frequency of Social Gatherings with Friends and Family:     Attends Buddhist Services:     Active Member of Clubs or Organizations:     Attends Club or Organization Meetings:     Marital Status:    Intimate Partner Violence:     Fear of Current or Ex-Partner:     Emotionally Abused:     Physically Abused:     Sexually Abused:        Family History:       Problem Relation Age of Onset    Kidney Disease Mother     Diabetes Mother     Heart Disease Mother     Arthritis Mother     Heart Disease Father     Heart Attack Father     Arthritis Sister     Diabetes Sister     Heart Disease Sister     Diabetes Sister        REVIEW OF SYSTEMS:    CONSTITUTIONAL:  negative for  fevers, chills and fatigue  No recent weight gain/loss. Energy level normal for pt. HEENT:  No nasal congestion or drainage. CARDIOVASCULAR:  No chest pain  GASTROINTESTINAL:  No abdominal pain, nausea, or vomiting. No constipation/diarrhea. No rectal bleeding  GENITOURINARY:  No dysuria  HEMATOLOGIC/LYMPHATIC:  No easy bruising. No history of cancer  ENDOCRINE: + DM  Review of systems negative unless listed above.     PHYSICAL EXAM:    VITALS:  BP (!) 189/96   Pulse 114   Temp 98.4 °F (36.9 °C) (Temporal)   Resp 16   Ht 5' 5\" (1.651 m)   Wt 280 lb (127 kg)   SpO2 98%   BMI 46.59 kg/m²   INTAKE/OUTPUT:   No intake or output data in the 24 hours ending 05/28/21 1214    CONSTITUTIONAL:  awake, alert, not distressed and normal weight  ENT:  normocephalic/atraumatic, without obvious abnormality  NECK:  supple, symmetrical, trachea midline   LUNGS:  CTA bilaterally  CARDIOVASCULAR:  regular rate and rhythm   ABDOMEN: Soft, nontender, nondistended  SKIN: No rashes or skin lesions noted  MUSCULOSKELETAL:  No joint swelling, deformity, or tenderness. , there is not obvious somatic dysfunction  NEUROLOGIC:  Mental Status Exam:  Level of Alertness:   alert  Orientation:   oriented to person, place, and time        CBC with Differential:    Lab Results   Component Value Date    WBC 9.9 01/25/2021    RBC 4.57 01/25/2021    RBC 4.56 02/17/2012    HGB 13.4 01/25/2021    HCT 40.1 01/25/2021     01/25/2021    PLT Platelet clumps present, count appears adequate. 02/17/2012    MCV 87.6 01/25/2021    MCH 29.4 01/25/2021    MCHC 33.6 01/25/2021    RDW 13.6 01/25/2021    LYMPHOPCT 21 01/21/2021    MONOPCT 10 01/21/2021    BASOPCT 0 01/21/2021    MONOSABS 0.80 01/21/2021    LYMPHSABS 1.60 01/21/2021    EOSABS 0.30 01/21/2021    BASOSABS 0.00 01/21/2021    DIFFTYPE NOT REPORTED 01/21/2021     BMP:    Lab Results   Component Value Date     01/25/2021    K 4.7 01/25/2021    CL 98 01/25/2021    CO2 26 01/25/2021    BUN 29 01/25/2021    LABALBU 3.9 01/21/2021    LABALBU 4.7 02/17/2012    CREATININE 0.87 05/28/2021    CREATININE 1.23 01/25/2021    CALCIUM 8.9 01/25/2021    GFRAA >60 01/25/2021    LABGLOM >60 05/28/2021    GLUCOSE 146 01/25/2021    GLUCOSE 90 02/17/2012       Pertinent Radiology:   No results found. ASSESSMENT:    3. 63 y.o. male here today for screening colonoscopy    Plan:  1. Patient seen and examined. 2. Consent in chart. 3. Bowel prep completed. 4. We will proceed with colonoscopy.     Electronically signed by Bryn Govea DO  on 5/28/2021 at 12:14 PM

## 2021-05-28 NOTE — ANESTHESIA PRE PROCEDURE
(chronic obstructive pulmonary disease) ([facility] [facility]     Headache(784.0)     Hyperlipidemia     Hypertension     Inguinal hernia     Low back pain with sciatica     Migraine     Mitral valve regurgitation     Osteoarthritis     Pneumonia     Sleep apnea     with cpap    SOB (shortness of breath) on exertion     Tricuspid regurgitation     Type 2 diabetes mellitus without complication, without long-term current use of insulin ([address]     Under care of team 2021    cardiology-Dr Ty Ngo visit 2021    Wellness examination 2021    [facility]       Past Surgical History:        Procedure Laterality Date    ADENOIDECTOMY      COLONOSCOPY     [address]   exact date unknown       Social History:    Social History     Tobacco Use    Smoking status: Former Smoker     Packs/day: 0.00     Years: 40.00     Pack years: 0.00     Quit date:      Years since quittin.4    Smokeless tobacco: Current User     Types: Chew    Tobacco comment: Chews / quit cigarettes   Substance Use Topics    Alcohol use: Yes     Alcohol/week: 1.0 standard drinks     Types: 1 Glasses of wine per week                                Ready to quit: Not Answered  Counseling given: Not Answered  Comment: Chews / quit cigarettes      Vital Signs (Current): There were no vitals filed for this visit.                                            BP Readings from Last 3 Encounters:   21 (!) 171/90   21 130/74   21 131/75       NPO Status: Time of last liquid consumption:                         Time of last solid consumption:                         Date of last liquid consumption: 21                        Date of last solid food consumption: 21    BMI:   Wt Readings from Last 3 Encounters:   21 280 lb (127 kg)   21 270 lb 9.6 oz (122.7 kg)   21 271 lb 6.4 oz (123.1 kg)     There is no height or weight on file to calculate BMI.    CBC:   Lab Results   Component Value Date    WBC 9.9 01/25/2021    RBC 4.57 01/25/2021    RBC 4.56 02/17/2012    HGB 13.4 01/25/2021    HCT 40.1 01/25/2021    MCV 87.6 01/25/2021    RDW 13.6 01/25/2021     01/25/2021    PLT Platelet clumps present, count appears adequate. 02/17/2012       CMP:   Lab Results   Component Value Date     01/25/2021    K 4.7 01/25/2021    CL 98 01/25/2021    CO2 26 01/25/2021    BUN 29 01/25/2021    CREATININE 1.23 01/25/2021    GFRAA >60 01/25/2021    LABGLOM 60 01/25/2021    GLUCOSE 146 01/25/2021    GLUCOSE 90 02/17/2012    PROT 6.9 01/21/2021    CALCIUM 8.9 01/25/2021    BILITOT <0.15 01/21/2021    ALKPHOS 66 01/21/2021    AST 19 01/21/2021    ALT 27 01/21/2021       POC Tests: No results for input(s): POCGLU, POCNA, POCK, POCCL, POCBUN, POCHEMO, POCHCT in the last 72 hours.     Coags:   Lab Results   Component Value Date    PROTIME 10.6 04/04/2013    INR 1.0 04/04/2013    APTT 25.2 04/04/2013       HCG (If Applicable): No results found for: PREGTESTUR, PREGSERUM, HCG, HCGQUANT     ABGs:   Lab Results   Component Value Date    PO2ART 86.3 04/05/2013    CNC3TOA 27.8 04/05/2013    P6WPSPLY 95.5 04/05/2013        Type & Screen (If Applicable):  No results found for: LABABO, [address]    Drug/Infectious Status (If Applicable):  Lab Results   Component Value Date    HEPCAB NONREACTIVE 10/28/2019       COVID-19 Screening (If Applicable):   Lab Results   Component Value Date    COVID19 Not Detected 05/24/2021           Anesthesia Evaluation  Patient summary reviewed and Nursing notes reviewed no history of anesthetic complications:   Airway: Mallampati: III  TM distance: >3 FB   Neck ROM: full  Mouth opening: > = 3 FB Dental:          Pulmonary:normal exam    (+) COPD:  shortness of breath: chronic and no interval change,  sleep apnea:                             Cardiovascular:    (+) hypertension:, CAD:, CHF:, GUTIERREZ: no interval change, hyperlipidemia        Rhythm: regular  Rate: normal                    Neuro/Psych:   (+) neuromuscular disease:, headaches:,             GI/Hepatic/Renal:   (+) morbid obesity          Endo/Other:    (+) DiabetesType II DM, , .                 Abdominal:           Vascular:                                      Anesthesia Plan      MAC and TIVA     ASA 3       Induction: intravenous. MIPS: Postoperative opioids intended and Prophylactic antiemetics administered. Anesthetic plan and risks discussed with patient.       Plan discussed with CRNA and surgical team.                  Mandy Gifford MD   5/28/2021

## 2021-05-28 NOTE — ANESTHESIA POSTPROCEDURE EVALUATION
Department of Anesthesiology  Postprocedure Note    Patient: Andres Dc  MRN: [id number]  YOB: 1960  Date of evaluation: 5/28/2021  Time:  1:51 PM     Procedure Summary     Date: 05/28/21 Room / Location: [address]    Anesthesia Start: 7890 Anesthesia Stop: 2140    Procedure: COLORECTAL CANCER SCREENING, NOT HIGH RISK, POLYPECTOMY - GI Scheduled (N/A ) Diagnosis: (SCREENING)    Surgeons: Chapin Cervantes IV, DO Responsible Provider: Mitchel Bautista MD    Anesthesia Type: MAC, TIVA ASA Status: 3          Anesthesia Type: MAC, TIVA    Danielito Phase I:      Danielito Phase II: Danielito Score: 5    Last vitals: Reviewed and per EMR flowsheets.        Anesthesia Post Evaluation    Patient location during evaluation: PACU  Patient participation: complete - patient participated  Level of consciousness: awake and alert  Pain score: 0  Airway patency: patent  Nausea & Vomiting: no nausea and no vomiting  Complications: no  Cardiovascular status: hemodynamically stable  Respiratory status: room air  Hydration status: euvolemic

## 2021-05-30 ENCOUNTER — HOSPITAL ENCOUNTER (INPATIENT)
Age: 61
LOS: 3 days | Discharge: HOME | DRG: 194 | End: 2021-06-02
Attending: EMERGENCY MEDICINE | Admitting: INTERNAL MEDICINE
Payer: COMMERCIAL

## 2021-05-30 ENCOUNTER — APPOINTMENT (OUTPATIENT)
Dept: GENERAL RADIOLOGY | Age: 61
DRG: 194 | End: 2021-05-30
Payer: COMMERCIAL

## 2021-05-30 DIAGNOSIS — I50.9 ACUTE CONGESTIVE HEART FAILURE, UNSPECIFIED HEART FAILURE TYPE (HCC): Primary | ICD-10-CM

## 2021-05-30 PROBLEM — I50.33 CHF (CONGESTIVE HEART FAILURE), NYHA CLASS I, ACUTE ON CHRONIC, DIASTOLIC (HCC): Status: ACTIVE | Noted: 2021-05-30

## 2021-05-30 LAB
-: NORMAL
-: NORMAL
ABSOLUTE EOS #: 0.5 K/UL (ref 0–0.4)
ABSOLUTE IMMATURE GRANULOCYTE: ABNORMAL K/UL (ref 0–0.3)
ABSOLUTE LYMPH #: 1 K/UL (ref 1–4.8)
ABSOLUTE MONO #: 0.9 K/UL (ref 0.1–1.3)
ANION GAP SERPL CALCULATED.3IONS-SCNC: 9 MMOL/L (ref 9–17)
ANION GAP SERPL CALCULATED.3IONS-SCNC: NORMAL MMOL/L (ref 9–17)
BASOPHILS # BLD: 1 % (ref 0–2)
BASOPHILS ABSOLUTE: 0.1 K/UL (ref 0–0.2)
BNP INTERPRETATION: ABNORMAL
BNP INTERPRETATION: NORMAL
BUN BLDV-MCNC: 12 MG/DL (ref 8–23)
BUN BLDV-MCNC: NORMAL MG/DL (ref 8–23)
BUN/CREAT BLD: ABNORMAL (ref 9–20)
BUN/CREAT BLD: NORMAL (ref 9–20)
CALCIUM SERPL-MCNC: 9.4 MG/DL (ref 8.6–10.4)
CALCIUM SERPL-MCNC: NORMAL MG/DL (ref 8.6–10.4)
CHLORIDE BLD-SCNC: 101 MMOL/L (ref 98–107)
CHLORIDE BLD-SCNC: NORMAL MMOL/L (ref 98–107)
CO2: 31 MMOL/L (ref 20–31)
CO2: NORMAL MMOL/L (ref 20–31)
CREAT SERPL-MCNC: 1.05 MG/DL (ref 0.7–1.2)
CREAT SERPL-MCNC: NORMAL MG/DL (ref 0.7–1.2)
DIFFERENTIAL TYPE: ABNORMAL
EOSINOPHILS RELATIVE PERCENT: 5 % (ref 0–4)
GFR AFRICAN AMERICAN: >60 ML/MIN
GFR AFRICAN AMERICAN: NORMAL ML/MIN
GFR NON-AFRICAN AMERICAN: >60 ML/MIN
GFR NON-AFRICAN AMERICAN: NORMAL ML/MIN
GFR SERPL CREATININE-BSD FRML MDRD: ABNORMAL ML/MIN/{1.73_M2}
GFR SERPL CREATININE-BSD FRML MDRD: ABNORMAL ML/MIN/{1.73_M2}
GFR SERPL CREATININE-BSD FRML MDRD: NORMAL ML/MIN/{1.73_M2}
GFR SERPL CREATININE-BSD FRML MDRD: NORMAL ML/MIN/{1.73_M2}
GLUCOSE BLD-MCNC: 142 MG/DL (ref 70–99)
GLUCOSE BLD-MCNC: NORMAL MG/DL (ref 70–99)
HCT VFR BLD CALC: 33.3 % (ref 41–53)
HEMOGLOBIN: 11.2 G/DL (ref 13.5–17.5)
IMMATURE GRANULOCYTES: ABNORMAL %
INR BLD: 0.9
LYMPHOCYTES # BLD: 9 % (ref 24–44)
MCH RBC QN AUTO: 29.2 PG (ref 26–34)
MCHC RBC AUTO-ENTMCNC: 33.5 G/DL (ref 31–37)
MCV RBC AUTO: 87.2 FL (ref 80–100)
MONOCYTES # BLD: 8 % (ref 1–7)
NRBC AUTOMATED: ABNORMAL PER 100 WBC
PARTIAL THROMBOPLASTIN TIME: 26.4 SEC (ref 24–36)
PDW BLD-RTO: 13.5 % (ref 11.5–14.9)
PLATELET # BLD: 217 K/UL (ref 150–450)
PLATELET ESTIMATE: ABNORMAL
PMV BLD AUTO: 8.4 FL (ref 6–12)
POTASSIUM SERPL-SCNC: 4 MMOL/L (ref 3.7–5.3)
POTASSIUM SERPL-SCNC: NORMAL MMOL/L (ref 3.7–5.3)
PRO-BNP: 442 PG/ML
PRO-BNP: NORMAL PG/ML
PROTHROMBIN TIME: 12.2 SEC (ref 11.8–14.6)
RBC # BLD: 3.82 M/UL (ref 4.5–5.9)
RBC # BLD: ABNORMAL 10*6/UL
REASON FOR REJECTION: NORMAL
REASON FOR REJECTION: NORMAL
SEG NEUTROPHILS: 77 % (ref 36–66)
SEGMENTED NEUTROPHILS ABSOLUTE COUNT: 8.7 K/UL (ref 1.3–9.1)
SODIUM BLD-SCNC: 141 MMOL/L (ref 135–144)
SODIUM BLD-SCNC: NORMAL MMOL/L (ref 135–144)
TROPONIN INTERP: NORMAL
TROPONIN T: NORMAL NG/ML
TROPONIN, HIGH SENSITIVITY: 14 NG/L (ref 0–22)
TROPONIN, HIGH SENSITIVITY: 15 NG/L (ref 0–22)
TROPONIN, HIGH SENSITIVITY: NORMAL NG/L (ref 0–22)
WBC # BLD: 11.3 K/UL (ref 3.5–11)
WBC # BLD: ABNORMAL 10*3/UL
ZZ NTE CLEAN UP: ORDERED TEST: NORMAL
ZZ NTE CLEAN UP: ORDERED TEST: NORMAL
ZZ NTE WITH NAME CLEAN UP: SPECIMEN SOURCE: NORMAL
ZZ NTE WITH NAME CLEAN UP: SPECIMEN SOURCE: NORMAL

## 2021-05-30 PROCEDURE — 99285 EMERGENCY DEPT VISIT HI MDM: CPT

## 2021-05-30 PROCEDURE — 2060000000 HC ICU INTERMEDIATE R&B

## 2021-05-30 PROCEDURE — 96374 THER/PROPH/DIAG INJ IV PUSH: CPT

## 2021-05-30 PROCEDURE — 83880 ASSAY OF NATRIURETIC PEPTIDE: CPT

## 2021-05-30 PROCEDURE — 80048 BASIC METABOLIC PNL TOTAL CA: CPT

## 2021-05-30 PROCEDURE — 36415 COLL VENOUS BLD VENIPUNCTURE: CPT

## 2021-05-30 PROCEDURE — 85730 THROMBOPLASTIN TIME PARTIAL: CPT

## 2021-05-30 PROCEDURE — 84484 ASSAY OF TROPONIN QUANT: CPT

## 2021-05-30 PROCEDURE — 85025 COMPLETE CBC W/AUTO DIFF WBC: CPT

## 2021-05-30 PROCEDURE — 94640 AIRWAY INHALATION TREATMENT: CPT

## 2021-05-30 PROCEDURE — 6370000000 HC RX 637 (ALT 250 FOR IP)

## 2021-05-30 PROCEDURE — 71045 X-RAY EXAM CHEST 1 VIEW: CPT

## 2021-05-30 PROCEDURE — 94664 DEMO&/EVAL PT USE INHALER: CPT

## 2021-05-30 PROCEDURE — 85610 PROTHROMBIN TIME: CPT

## 2021-05-30 PROCEDURE — 6360000002 HC RX W HCPCS: Performed by: EMERGENCY MEDICINE

## 2021-05-30 RX ORDER — IPRATROPIUM BROMIDE AND ALBUTEROL SULFATE 2.5; .5 MG/3ML; MG/3ML
SOLUTION RESPIRATORY (INHALATION)
Status: COMPLETED
Start: 2021-05-30 | End: 2021-05-30

## 2021-05-30 RX ORDER — FUROSEMIDE 10 MG/ML
40 INJECTION INTRAMUSCULAR; INTRAVENOUS ONCE
Status: COMPLETED | OUTPATIENT
Start: 2021-05-30 | End: 2021-05-30

## 2021-05-30 RX ADMIN — FUROSEMIDE 40 MG: 10 INJECTION, SOLUTION INTRAMUSCULAR; INTRAVENOUS at 17:03

## 2021-05-30 RX ADMIN — IPRATROPIUM BROMIDE AND ALBUTEROL SULFATE 3 ML: .5; 2.5 SOLUTION RESPIRATORY (INHALATION) at 22:09

## 2021-05-30 ASSESSMENT — ENCOUNTER SYMPTOMS
COLOR CHANGE: 0
EYE PAIN: 0
SHORTNESS OF BREATH: 0
ABDOMINAL PAIN: 0
COUGH: 1
BACK PAIN: 0

## 2021-05-30 NOTE — ED NOTES
Lab paged [id number], RN  05/30/21 0394 negative  Hematologic/lymphatic: negative  Musculoskeletal:negative  Neurological: negative  Behavioral/Psych: negative  Endocrine: negative  Allergic/Immunologic: negative    PHYSICAL EXAM:    General appearance:  awake, alert, cooperative, no apparent distress, and appears stated age  Neurologic:  Awake, alert, oriented to name, place and time. Lungs:  No increased work of breathing, good air exchange, clear to auscultation bilaterally, no crackles or wheezing  Heart:  Normal apical impulse, regular rate and rhythm, normal S1 and S2, no S3 or S4, and no murmur noted  Abdomen:  No scars, normal bowel sounds, soft, non-distended, non-tender, no masses palpated, no hepatosplenomegaly. Pregnant uterus at term, cephalic presentation. No contractions palpated. Fetal heart rate:  Baseline Heart Rate 140, accelerations:  present  Pelvis:  External Genitalia: General appearance; normal, Hair distribution; normal, Lesions absent  Cervix: see admitting nurse's exam, VE not repeated. BP (!) 144/86   Pulse 85   Temp 98.4 °F (36.9 °C) (Oral)   Resp 16   Ht 5' 1\" (1.549 m)   Wt 160 lb (72.6 kg)   SpO2 96%   Breastfeeding? Unknown   BMI 30.23 kg/m²     General Labs:      CMP:  No results found for: NA, K, CL, CO2, BUN, PROT, ALB  U/A:  No components found for: Ofea Ran, USPGRAV, UPH, UPROTEIN, UGLUCOSE, UKETONE, UBILI, UBLOOD, Jarrell, UUROBIL, [place], [facility], [place], [facility], [place], [facility], [place]    ASSESSMENT AND PLAN:  Induction of labor at 40 1/7 weeks GA.   Start standard protocol for induction of labor  Epidural on request

## 2021-05-30 NOTE — ED NOTES
Lab called stating patient needs recollect green due to possible contamination. [id number] RN notified.       Andrew Merida, SARA  05/30/21 8747

## 2021-05-30 NOTE — ED TRIAGE NOTES
Mode of arrival (squad #, walk in, police, etc) : walk in        Chief complaint(s): Chest pain with SOB        Arrival Note (brief scenario, treatment PTA, etc). : Pt states he developed some SOB last night and some mid chest pain that occasionally radiates to his shoulders. Pt states he has a hx of COPD and CHF. C= \"Have you ever felt that you should Cut down on your drinking? \"  No  A= \"Have people Annoyed you by criticizing your drinking? \"  No  G= \"Have you ever felt bad or Guilty about your drinking? \"  No  E= \"Have you ever had a drink as an Eye-opener first thing in the morning to steady your nerves or to help a hangover? \"  No      Deferred []      Reason for deferring: N/A    *If yes to two or more: probable alcohol abuse. *

## 2021-05-30 NOTE — ED NOTES
Report given to SARA angel from [address] [place] [address],9D in person   The following was reviewed with receiving RN:   Current vital signs:  BP (!) 159/96   Pulse 102   Temp 98.3 °F (36.8 °C) (Oral)   Resp 19   Ht 5' 5\" (1.651 m)   Wt 280 lb (127 kg)   SpO2 96%   BMI 46.59 kg/m²                MEWS Score: 3     Any medication or safety alerts were reviewed. Any pending diagnostics and notifications were also reviewed, as well as any safety concerns or issues, abnormal labs, abnormal imaging, and abnormal assessment findings. Questions were answered.           Larisa Humphrey RN  05/30/21 5404

## 2021-05-30 NOTE — ED PROVIDER NOTES
EMERGENCY DEPARTMENT ENCOUNTER    Pt Name: Nela Gatica  MRN: [id number]  [place] 1960  Date of evaluation: 5/30/21  CHIEF COMPLAINT       Chief Complaint   Patient presents with    Chest Pain    Shortness of Breath     HISTORY OF PRESENT ILLNESS   56-year-old male presents for complaint of chest pain and shortness of breath. Patient reports that the shortness of breath started yesterday while he was at work, states he has continued this morning, patient states chest pain is worse when he is lying flat and when he is up walking around, states that he was also having some associated chest pain with exertion, describes the chest pain as burning in nature, states it is located in the middle of his chest, denies radiation of pain, states that it improves with rest.  Patient also reports that he has had increased swelling in his legs over the last few days as well, does report that he does take Lasix at home, states he has been urinating normally at home. Denies any fevers, chills, nausea or vomiting, does report has had some increased and a nonproductive cough as well. The history is provided by the patient. REVIEW OF SYSTEMS     Review of Systems   Constitutional: Negative for chills and fever. HENT: Negative for congestion and ear pain. Eyes: Negative for pain. Respiratory: Positive for cough. Negative for shortness of breath. Cardiovascular: Positive for chest pain and leg swelling. Negative for palpitations. Gastrointestinal: Negative for abdominal pain. Genitourinary: Negative for dysuria and flank pain. Musculoskeletal: Negative for back pain. Skin: Negative for color change. Neurological: Negative for numbness and headaches. Psychiatric/Behavioral: Negative for confusion. All other systems reviewed and are negative.     PASTMEDICAL HISTORY     Past Medical History:   Diagnosis Date    Bronchitis     CAD (coronary artery disease)     [facility]     Chest pain     [facility]est[facility] failure) [facility]     Chronic back pain     COPD (chronic obstructive pulmonary disease) ([facility])     Headache(784.0)     Hyperlipidemia     Hypertension     Inguinal hernia     Low back pain with sciatica     Migraine     Mitral valve regurgitation     Osteoarthritis     Pneumonia     Sleep apnea     with cpap    SOB (shortness of breath) on exertion     Tricuspid regurgitation     Type 2 diabetes mellitus without complication, without long-term current use of insulin [facility]     Under care of team 05/20/2021    cardiology-Dr Issac Cleary visit jan 2021    Wellness examination 05/20/2021    [facility]last visit march 2021     Past Problem List  Patient Active Problem List   Diagnosis Code    HTN (hypertension) I10    Skin tag L91.8    Low back pain with sciatica M54.40    Hyperglycemia R73.9    Infected sebaceous cyst L72.3, L08.9    Chronic diastolic CHF (congestive heart failure) ([facility]) I50.32    Chronic bilateral low back pain without sciatica M54.5, G89.29    Chronic pain of right knee M25.561, G89.29    Chest pain, unspecified R07.9    Bronchitis J40    Chronic systolic congestive heart failure ([facility]) I50.22    Ventral hernia K43.9    Epigastric pain R10.13    Elevated LFTs R79.89    Shortness of breath R06.02    Rectus diastasis M62.08    Lumbosacral spondylosis without myelopathy M47.817    COPD exacerbation ([facility]) J44.1    CHF (congestive heart failure), NYHA class I, acute on chronic, diastolic ([facility]) S93.42     SURGICAL HISTORY       Past Surgical History:   Procedure Laterality Date    ADENOIDECTOMY      COLONOSCOPY      COLONOSCOPY  05/28/2021    COLORECTAL CANCER SCREENING, NOT HIGH RISK, POLYPECTOMY    HERNIA REPAIR      TONSILLECTOMY  1963   exact date unknown     CURRENT MEDICATIONS       Previous Medications    ALBUTEROL (PROVENTIL) (5 MG/ML) 0.5% NEBULIZER SOLUTION    Take 0.5 mLs by nebulization every 6 hours as needed for Wheezing or Shortness of Breath    ALBUTEROL SULFATE HFA (PROAIR HFA) 108 (90 BASE) MCG/ACT INHALER    Inhale 2 puffs into the lungs every 6 hours as needed for Wheezing    ASPIRIN 81 MG TABLET    Take 1 tablet by mouth daily    COMBIVENT RESPIMAT  MCG/ACT AERS INHALER    INHALE 1 PUFF INTO THE LUNGS EVERY 6 HOURS    FUROSEMIDE (LASIX) 40 MG TABLET    TAKE 1 TABLET BY MOUTH DAILY    FUROSEMIDE (LASIX) 40 MG TABLET    Take 1 tablet by mouth daily    GABAPENTIN (NEURONTIN) 300 MG CAPSULE    TAKE 1 CAPSULE BY MOUTH NIGHTLY AS NEEDED (PAIN)    LISINOPRIL (PRINIVIL;ZESTRIL) 30 MG TABLET    TAKE 1 TABLET BY MOUTH DAILY    MASKS (FACE MASK) MISC    1 each by Does not apply route as needed (as needed)    METFORMIN (GLUCOPHAGE) 500 MG TABLET    Take 1 tablet by mouth daily (with breakfast)    METOPROLOL TARTRATE (LOPRESSOR) 50 MG TABLET    TAKE 1 TABLET BY MOUTH 2 TIMES DAILY    NAPROXEN (NAPROSYN) 500 MG TABLET    TAKE 1 TABLET BY MOUTH 2 TIMES DAILY FOR 20 DAYS    NITROGLYCERIN (NITROSTAT) 0.4 MG SL TABLET    Place 1 tablet under the tongue every 5 minutes as needed for Chest pain    POTASSIUM CHLORIDE (KLOR-CON M) 20 MEQ EXTENDED RELEASE TABLET    TAKE ONE TABLET BY MOUTH TWICE A DAY    RANOLAZINE (RANEXA) 500 MG EXTENDED RELEASE TABLET    Take 1 tablet by mouth 2 times daily    SIMVASTATIN (ZOCOR) 20 MG TABLET    Take 1 tablet by mouth nightly    SPIRONOLACTONE (ALDACTONE) 25 MG TABLET    Take 1 tablet by mouth nightly     ALLERGIES     is allergic to penicillins, sulfa antibiotics, sulfasalazine, pseudoeph-doxylamine-dm-apap, and rabbit protein. FAMILY HISTORY     He indicated that his mother is . He indicated that his father is . He indicated that only one of his two sisters is alive.      SOCIAL HISTORY       Social History     Tobacco Use    Smoking status: Former Smoker     Packs/day: 0.00     Years: 40.00     Pack years: 0.00     Quit date:      Years since quittin.4    Smokeless tobacco: Current User     Types: Chew    Tobacco comment: Chews / quit cigarettes   Vaping Use    Vaping Use: Never used   Substance Use Topics    Alcohol use: Yes     Alcohol/week: 1.0 standard drinks     Types: 1 Glasses of wine per week    Drug use: No     PHYSICAL EXAM     INITIAL VITALS: BP (!) 159/96   Pulse 102   Temp 98.3 °F (36.8 °C) (Oral)   Resp 19   Ht 5' 5\" (1.651 m)   Wt 280 lb (127 kg)   SpO2 96%   BMI 46.59 kg/m²    Physical Exam  Vitals and nursing note reviewed. Constitutional:       Appearance: Normal appearance. HENT:      Head: Normocephalic and atraumatic. Right Ear: External ear normal.      Left Ear: External ear normal.      Nose: Nose normal.      Mouth/Throat:      Mouth: Mucous membranes are moist.   Eyes:      Pupils: Pupils are equal, round, and reactive to light. Cardiovascular:      Rate and Rhythm: Normal rate and regular rhythm. Pulses: Normal pulses. Heart sounds: Normal heart sounds. Pulmonary:      Effort: Pulmonary effort is normal.      Breath sounds: Rales present. Abdominal:      General: Abdomen is flat. Palpations: Abdomen is soft. Tenderness: There is no abdominal tenderness. Musculoskeletal:         General: No tenderness. Normal range of motion. Cervical back: Neck supple. Right lower leg: 3+ Pitting Edema present. Left lower leg: 3+ Pitting Edema present. Skin:     General: Skin is warm and dry. Capillary Refill: Capillary refill takes less than 2 seconds. Neurological:      General: No focal deficit present. Mental Status: He is alert and oriented to person, place, and time. Psychiatric:         Behavior: Behavior normal.         MEDICAL DECISION MAKIN-year-old male presents for complaint of chest pain and shortness of breath.   Patient is noted to have a history of CHF, did have rales and +3 lower extremity edema bilaterally, suspect likely CHF exacerbation, will check cardiac labs provide dose of IV Lasix    Labs reviewed BNP was notably up at 442, remaining other labs unremarkable, chest x-ray showing bilateral pulmonary opacities, concerning for pulmonary edema given patient's history of CHF    Patient was able to urinate while in the department, given rales and pulmonary edema on chest x-ray will admit for further diuresis    Discussed results with patient, discussed need for admission, patient is agreeable    Spoke with Dr. Ann-Marie Charles who accepts admission. Patient demonstrates understanding and agreement with the plan, was given the opportunity to ask questions, and these questions were answered to the best of the provided information at this time. VS stable for transfer. This dictation was prepared using [employer] voice recognition software. CRITICAL CARE:       PROCEDURES:    Procedures    DIAGNOSTIC RESULTS   EKG:All EKG's are interpreted by the Emergency Department Physician who either signs or Co-signs this chart in the absence of a cardiologist.    Sinus tach rate of 104, normal axis, normal intervals, no significant ST segment elevation or depression, nonspecific T wave changes, when compared to prior relatively unchanged    RADIOLOGY:All plain film, CT, MRI, and formal ultrasound images (except ED bedside ultrasound) are read by the radiologist, see reports below, unless otherwisenoted in MDM or here. XR CHEST PORTABLE   Final Result   Bilateral linear pulmonary opacities right greater than left could represent   atypical pneumonia or pulmonary edema           LABS: All lab results were reviewed by myself, and all abnormals are listed below.   Labs Reviewed   CBC WITH AUTO DIFFERENTIAL - Abnormal; Notable for the following components:       Result Value    WBC 11.3 (*)     RBC 3.82 (*)     Hemoglobin 11.2 (*)     Hematocrit 33.3 (*)     Seg Neutrophils 77 (*)     Lymphocytes 9 (*)     Monocytes 8 (*)     Eosinophils % 5 (*)     Absolute Eos # 0.50 (*) All other components within normal limits   BASIC METABOLIC PANEL - Abnormal; Notable for the following components:    Glucose 142 (*)     All other components within normal limits   BRAIN NATRIURETIC PEPTIDE - Abnormal; Notable for the following components:    Pro- (*)     All other components within normal limits   COVID-19, RAPID   PROTIME-INR   APTT   SPECIMEN REJECTION   BASIC METABOLIC PANEL W/ REFLEX TO MG FOR LOW K   BRAIN NATRIURETIC PEPTIDE   TROPONIN   SPECIMEN REJECTION   TROPONIN   TROPONIN       EMERGENCY DEPARTMENTCOURSE:         Vitals:    Vitals:    05/30/21 1546 05/30/21 1600   BP: (!) 152/99 (!) 159/96   Pulse: 108 102   Resp: 22 19   Temp: 98.3 °F (36.8 °C)    TempSrc: Oral    SpO2: 98% 96%   Weight: 280 lb (127 kg)    Height: 5' 5\" (1.651 m)        The patient was given the following medications while in the emergency department:  Orders Placed This Encounter   Medications    furosemide (LASIX) injection 40 mg     CONSULTS:  IP CONSULT TO INTERNAL MEDICINE    FINAL IMPRESSION      1. Acute congestive heart failure, unspecified heart failure type [facility])          DISPOSITION/PLAN   DISPOSITION Admitted 05/30/2021 08:20:42 PM      PATIENT REFERRED TO:  No follow-up provider specified.   DISCHARGE MEDICATIONS:  New Prescriptions    No medications on file     Celestina Mcpherson DO  Attending Emergency Physician                  Celestina Mcpherson DO  05/30/21 2047

## 2021-05-31 PROBLEM — J41.8 MIXED SIMPLE AND MUCOPURULENT CHRONIC BRONCHITIS (HCC): Status: ACTIVE | Noted: 2021-05-31

## 2021-05-31 PROBLEM — G47.33 OSA (OBSTRUCTIVE SLEEP APNEA): Status: ACTIVE | Noted: 2021-05-31

## 2021-05-31 LAB
ANION GAP SERPL CALCULATED.3IONS-SCNC: 8 MMOL/L (ref 9–17)
BUN BLDV-MCNC: 17 MG/DL (ref 8–23)
BUN/CREAT BLD: ABNORMAL (ref 9–20)
CALCIUM SERPL-MCNC: 8.9 MG/DL (ref 8.6–10.4)
CHLORIDE BLD-SCNC: 103 MMOL/L (ref 98–107)
CO2: 30 MMOL/L (ref 20–31)
CREAT SERPL-MCNC: 1.01 MG/DL (ref 0.7–1.2)
GFR AFRICAN AMERICAN: >60 ML/MIN
GFR NON-AFRICAN AMERICAN: >60 ML/MIN
GFR SERPL CREATININE-BSD FRML MDRD: ABNORMAL ML/MIN/{1.73_M2}
GFR SERPL CREATININE-BSD FRML MDRD: ABNORMAL ML/MIN/{1.73_M2}
GLUCOSE BLD-MCNC: 129 MG/DL (ref 75–110)
GLUCOSE BLD-MCNC: 134 MG/DL (ref 75–110)
GLUCOSE BLD-MCNC: 138 MG/DL (ref 70–99)
GLUCOSE BLD-MCNC: 139 MG/DL (ref 75–110)
HCT VFR BLD CALC: 36.3 % (ref 41–53)
HEMOGLOBIN: 12.1 G/DL (ref 13.5–17.5)
MCH RBC QN AUTO: 28.6 PG (ref 26–34)
MCHC RBC AUTO-ENTMCNC: 33.3 G/DL (ref 31–37)
MCV RBC AUTO: 85.9 FL (ref 80–100)
NRBC AUTOMATED: ABNORMAL PER 100 WBC
PDW BLD-RTO: 13.5 % (ref 11.5–14.9)
PLATELET # BLD: 231 K/UL (ref 150–450)
PMV BLD AUTO: 8.1 FL (ref 6–12)
POTASSIUM SERPL-SCNC: 3.8 MMOL/L (ref 3.7–5.3)
RBC # BLD: 4.22 M/UL (ref 4.5–5.9)
SODIUM BLD-SCNC: 141 MMOL/L (ref 135–144)
WBC # BLD: 9.7 K/UL (ref 3.5–11)

## 2021-05-31 PROCEDURE — 6370000000 HC RX 637 (ALT 250 FOR IP): Performed by: INTERNAL MEDICINE

## 2021-05-31 PROCEDURE — 85027 COMPLETE CBC AUTOMATED: CPT

## 2021-05-31 PROCEDURE — 2580000003 HC RX 258: Performed by: INTERNAL MEDICINE

## 2021-05-31 PROCEDURE — 99223 1ST HOSP IP/OBS HIGH 75: CPT | Performed by: INTERNAL MEDICINE

## 2021-05-31 PROCEDURE — 94660 CPAP INITIATION&MGMT: CPT

## 2021-05-31 PROCEDURE — 2060000000 HC ICU INTERMEDIATE R&B

## 2021-05-31 PROCEDURE — 94640 AIRWAY INHALATION TREATMENT: CPT

## 2021-05-31 PROCEDURE — 36415 COLL VENOUS BLD VENIPUNCTURE: CPT

## 2021-05-31 PROCEDURE — 6360000002 HC RX W HCPCS: Performed by: INTERNAL MEDICINE

## 2021-05-31 PROCEDURE — 80048 BASIC METABOLIC PNL TOTAL CA: CPT

## 2021-05-31 PROCEDURE — 94761 N-INVAS EAR/PLS OXIMETRY MLT: CPT

## 2021-05-31 PROCEDURE — 94664 DEMO&/EVAL PT USE INHALER: CPT

## 2021-05-31 PROCEDURE — 82947 ASSAY GLUCOSE BLOOD QUANT: CPT

## 2021-05-31 RX ORDER — ASPIRIN 81 MG/1
81 TABLET ORAL DAILY
Status: DISCONTINUED | OUTPATIENT
Start: 2021-05-31 | End: 2021-06-02 | Stop reason: HOSPADM

## 2021-05-31 RX ORDER — FUROSEMIDE 10 MG/ML
40 INJECTION INTRAMUSCULAR; INTRAVENOUS DAILY
Status: DISCONTINUED | OUTPATIENT
Start: 2021-05-31 | End: 2021-05-31

## 2021-05-31 RX ORDER — LISINOPRIL 10 MG/1
30 TABLET ORAL DAILY
Status: DISCONTINUED | OUTPATIENT
Start: 2021-05-31 | End: 2021-06-02 | Stop reason: HOSPADM

## 2021-05-31 RX ORDER — POLYETHYLENE GLYCOL 3350 17 G/17G
17 POWDER, FOR SOLUTION ORAL DAILY PRN
Status: DISCONTINUED | OUTPATIENT
Start: 2021-05-31 | End: 2021-06-02 | Stop reason: HOSPADM

## 2021-05-31 RX ORDER — DEXTROSE MONOHYDRATE 50 MG/ML
100 INJECTION, SOLUTION INTRAVENOUS PRN
Status: DISCONTINUED | OUTPATIENT
Start: 2021-05-31 | End: 2021-06-02 | Stop reason: HOSPADM

## 2021-05-31 RX ORDER — SODIUM CHLORIDE 0.9 % (FLUSH) 0.9 %
5-40 SYRINGE (ML) INJECTION PRN
Status: DISCONTINUED | OUTPATIENT
Start: 2021-05-31 | End: 2021-06-02 | Stop reason: HOSPADM

## 2021-05-31 RX ORDER — FUROSEMIDE 10 MG/ML
40 INJECTION INTRAMUSCULAR; INTRAVENOUS 2 TIMES DAILY
Status: DISCONTINUED | OUTPATIENT
Start: 2021-05-31 | End: 2021-06-01

## 2021-05-31 RX ORDER — RANOLAZINE 500 MG/1
500 TABLET, EXTENDED RELEASE ORAL 2 TIMES DAILY
Status: DISCONTINUED | OUTPATIENT
Start: 2021-05-31 | End: 2021-06-02 | Stop reason: HOSPADM

## 2021-05-31 RX ORDER — PROMETHAZINE HYDROCHLORIDE 25 MG/1
12.5 TABLET ORAL EVERY 6 HOURS PRN
Status: DISCONTINUED | OUTPATIENT
Start: 2021-05-31 | End: 2021-06-02 | Stop reason: HOSPADM

## 2021-05-31 RX ORDER — POTASSIUM CHLORIDE 20 MEQ/1
20 TABLET, EXTENDED RELEASE ORAL 2 TIMES DAILY
Status: DISCONTINUED | OUTPATIENT
Start: 2021-05-31 | End: 2021-06-02

## 2021-05-31 RX ORDER — SPIRONOLACTONE 25 MG/1
25 TABLET ORAL NIGHTLY
Status: DISCONTINUED | OUTPATIENT
Start: 2021-05-31 | End: 2021-06-02 | Stop reason: HOSPADM

## 2021-05-31 RX ORDER — NICOTINE POLACRILEX 4 MG
15 LOZENGE BUCCAL PRN
Status: DISCONTINUED | OUTPATIENT
Start: 2021-05-31 | End: 2021-06-02 | Stop reason: HOSPADM

## 2021-05-31 RX ORDER — IPRATROPIUM BROMIDE AND ALBUTEROL SULFATE 2.5; .5 MG/3ML; MG/3ML
1 SOLUTION RESPIRATORY (INHALATION)
Status: DISCONTINUED | OUTPATIENT
Start: 2021-05-31 | End: 2021-06-02 | Stop reason: HOSPADM

## 2021-05-31 RX ORDER — ATORVASTATIN CALCIUM 10 MG/1
10 TABLET, FILM COATED ORAL DAILY
Status: DISCONTINUED | OUTPATIENT
Start: 2021-05-31 | End: 2021-06-02 | Stop reason: HOSPADM

## 2021-05-31 RX ORDER — ALBUTEROL SULFATE 2.5 MG/3ML
2.5 SOLUTION RESPIRATORY (INHALATION) EVERY 6 HOURS PRN
Status: DISCONTINUED | OUTPATIENT
Start: 2021-05-31 | End: 2021-06-02 | Stop reason: HOSPADM

## 2021-05-31 RX ORDER — SODIUM CHLORIDE 0.9 % (FLUSH) 0.9 %
5-40 SYRINGE (ML) INJECTION EVERY 12 HOURS SCHEDULED
Status: DISCONTINUED | OUTPATIENT
Start: 2021-05-31 | End: 2021-06-02 | Stop reason: HOSPADM

## 2021-05-31 RX ORDER — METOPROLOL TARTRATE 50 MG/1
50 TABLET, FILM COATED ORAL 2 TIMES DAILY
Status: DISCONTINUED | OUTPATIENT
Start: 2021-05-31 | End: 2021-06-01

## 2021-05-31 RX ORDER — DEXTROSE MONOHYDRATE 25 G/50ML
12.5 INJECTION, SOLUTION INTRAVENOUS PRN
Status: DISCONTINUED | OUTPATIENT
Start: 2021-05-31 | End: 2021-06-02 | Stop reason: HOSPADM

## 2021-05-31 RX ORDER — ONDANSETRON 2 MG/ML
4 INJECTION INTRAMUSCULAR; INTRAVENOUS EVERY 6 HOURS PRN
Status: DISCONTINUED | OUTPATIENT
Start: 2021-05-31 | End: 2021-06-02 | Stop reason: HOSPADM

## 2021-05-31 RX ORDER — ALBUTEROL SULFATE 2.5 MG/3ML
2.5 SOLUTION RESPIRATORY (INHALATION) EVERY 4 HOURS PRN
Status: DISCONTINUED | OUTPATIENT
Start: 2021-05-31 | End: 2021-05-31

## 2021-05-31 RX ORDER — ACETAMINOPHEN 325 MG/1
650 TABLET ORAL EVERY 4 HOURS PRN
Status: DISCONTINUED | OUTPATIENT
Start: 2021-05-31 | End: 2021-06-02 | Stop reason: HOSPADM

## 2021-05-31 RX ORDER — GABAPENTIN 300 MG/1
300 CAPSULE ORAL NIGHTLY
Status: DISCONTINUED | OUTPATIENT
Start: 2021-05-31 | End: 2021-06-02 | Stop reason: HOSPADM

## 2021-05-31 RX ORDER — SODIUM CHLORIDE 9 MG/ML
25 INJECTION, SOLUTION INTRAVENOUS PRN
Status: DISCONTINUED | OUTPATIENT
Start: 2021-05-31 | End: 2021-06-02 | Stop reason: HOSPADM

## 2021-05-31 RX ORDER — IPRATROPIUM BROMIDE AND ALBUTEROL SULFATE 2.5; .5 MG/3ML; MG/3ML
1 SOLUTION RESPIRATORY (INHALATION) EVERY 4 HOURS
Status: DISCONTINUED | OUTPATIENT
Start: 2021-05-31 | End: 2021-05-31

## 2021-05-31 RX ADMIN — METOPROLOL TARTRATE 50 MG: 50 TABLET, FILM COATED ORAL at 02:11

## 2021-05-31 RX ADMIN — ALBUTEROL SULFATE 2.5 MG: 2.5 SOLUTION RESPIRATORY (INHALATION) at 04:44

## 2021-05-31 RX ADMIN — ACETAMINOPHEN 650 MG: 325 TABLET ORAL at 11:58

## 2021-05-31 RX ADMIN — FUROSEMIDE 40 MG: 10 INJECTION, SOLUTION INTRAMUSCULAR; INTRAVENOUS at 17:25

## 2021-05-31 RX ADMIN — SODIUM CHLORIDE, PRESERVATIVE FREE 10 ML: 5 INJECTION INTRAVENOUS at 08:39

## 2021-05-31 RX ADMIN — FUROSEMIDE 40 MG: 10 INJECTION, SOLUTION INTRAMUSCULAR; INTRAVENOUS at 08:39

## 2021-05-31 RX ADMIN — POTASSIUM CHLORIDE 20 MEQ: 1500 TABLET, EXTENDED RELEASE ORAL at 08:40

## 2021-05-31 RX ADMIN — ENOXAPARIN SODIUM 30 MG: 30 INJECTION SUBCUTANEOUS at 02:12

## 2021-05-31 RX ADMIN — LISINOPRIL 30 MG: 10 TABLET ORAL at 11:32

## 2021-05-31 RX ADMIN — POTASSIUM CHLORIDE 20 MEQ: 1500 TABLET, EXTENDED RELEASE ORAL at 21:05

## 2021-05-31 RX ADMIN — ASPIRIN 81 MG: 81 TABLET, COATED ORAL at 08:40

## 2021-05-31 RX ADMIN — IPRATROPIUM BROMIDE AND ALBUTEROL SULFATE 1 AMPULE: .5; 3 SOLUTION RESPIRATORY (INHALATION) at 15:54

## 2021-05-31 RX ADMIN — POTASSIUM CHLORIDE 20 MEQ: 1500 TABLET, EXTENDED RELEASE ORAL at 02:11

## 2021-05-31 RX ADMIN — METOPROLOL TARTRATE 50 MG: 50 TABLET, FILM COATED ORAL at 21:05

## 2021-05-31 RX ADMIN — ATORVASTATIN CALCIUM 10 MG: 10 TABLET, FILM COATED ORAL at 08:40

## 2021-05-31 RX ADMIN — GABAPENTIN 300 MG: 300 CAPSULE ORAL at 21:05

## 2021-05-31 RX ADMIN — SODIUM CHLORIDE, PRESERVATIVE FREE 10 ML: 5 INJECTION INTRAVENOUS at 21:07

## 2021-05-31 RX ADMIN — ENOXAPARIN SODIUM 30 MG: 30 INJECTION SUBCUTANEOUS at 21:05

## 2021-05-31 RX ADMIN — METOPROLOL TARTRATE 50 MG: 50 TABLET, FILM COATED ORAL at 08:40

## 2021-05-31 RX ADMIN — SPIRONOLACTONE 25 MG: 25 TABLET, FILM COATED ORAL at 21:05

## 2021-05-31 RX ADMIN — ENOXAPARIN SODIUM 30 MG: 30 INJECTION SUBCUTANEOUS at 08:40

## 2021-05-31 RX ADMIN — IPRATROPIUM BROMIDE AND ALBUTEROL SULFATE 1 AMPULE: .5; 3 SOLUTION RESPIRATORY (INHALATION) at 12:08

## 2021-05-31 RX ADMIN — IPRATROPIUM BROMIDE AND ALBUTEROL SULFATE 1 AMPULE: .5; 3 SOLUTION RESPIRATORY (INHALATION) at 20:22

## 2021-05-31 ASSESSMENT — PAIN SCALES - GENERAL
PAINLEVEL_OUTOF10: 3
PAINLEVEL_OUTOF10: 0
PAINLEVEL_OUTOF10: 0

## 2021-05-31 NOTE — PLAN OF CARE
Problem: Falls - Risk of:  Goal: Will remain free from falls  Description: Will remain free from falls  5/31/2021 1648 by Sarahi Casper RN  Outcome: Ongoing     Problem: Falls - Risk of:  Goal: Absence of physical injury  Description: Absence of physical injury  5/31/2021 1648 by Sarahi Casper RN  Outcome: Ongoing  Note: Pt resting comfortable in bed at this time. No distress noted. Assessment remains as charted. Pt remains free from falls this shift. Bed in low position, call light in reach, side rails up x 2, Cont to monior closely      Problem: Infection:  Goal: Will remain free from infection  Description: Will remain free from infection  5/31/2021 1648 by Sarahi Casper RN  Outcome: Ongoing  Note: Cont to monitor vital signs and lab values     Problem: Safety:  Goal: Free from accidental physical injury  Description: Free from accidental physical injury  5/31/2021 1648 by Sarahi Casper RN  Outcome: Ongoing     Problem: Safety:  Goal: Free from intentional harm  Description: Free from intentional harm  5/31/2021 1648 by Sarahi Casper RN  Outcome: Ongoing  Note: Pt remains free from falls this shift. Bed in low position, call light in reach, side rails up x 2, Cont to monior closely      Problem: Daily Care:  Goal: Daily care needs are met  Description: Daily care needs are met  5/31/2021 1648 by Sarahi Casper RN  Outcome: Ongoing  Note: Daily care met this shift     Problem: Pain:  Goal: Patient's pain/discomfort is manageable  Description: Patient's pain/discomfort is manageable  5/31/2021 1648 by Sarahi Casper RN  Outcome: Ongoing  Note: See MAR, Give meds as needed  Assist pt with repositioning for comfort. Cont to monitor closely      Problem: Skin Integrity:  Goal: Skin integrity will stabilize  Description: Skin integrity will stabilize  5/31/2021 1648 by Sarahi Casper RN  Outcome: Ongoing  Note: Keep skin clean and dry.   Assist pt with repositioning q2h and prn      Problem: Discharge Planning:  Goal: Patients continuum of care needs are met  Description: Patients continuum of care needs are met  5/31/2021 1648 by Fay Tripp RN  Outcome: Ongoing

## 2021-05-31 NOTE — CARE COORDINATION
CASE MANAGEMENT NOTE:    Admission Date:  5/30/2021 Aravind Montez is a 63 y.o.  male    Admitted for : CHF (congestive heart failure), NYHA class I, acute on chronic, diastolic [facility] [G97.35]    Met with:  Patient    PCP:  Dr Kathy Garcia:  Manuel Barkley       Is patient alert and oriented at time of discussion:  Yes    Current Residence/ Living Arrangements:  independently at home           Current Services PTA:  No    Does patient go to outpatient dialysis: No  If yes, location and chair time: NA    Is patient agreeable to VNS: No    Freedom of choice provided:  Yes    List of [address] provided: NA    VNS chosen:  NA    DME:  Cane as needed, CPAP, and Nebulizer     Home Oxygen: No    Nebulizer: Yes    CPAP/BIPAP: Yes    Supplier: N/A    Potential Assistance Needed: Will follow     SNF needed: No    Freedom of choice and list provided: NA    Pharmacy:  [facility]        Does Patient want to use MEDS to BEDS? No    Is patient currently receiving oral anticoagulation therapy? No    Is the Patient an [facility]UTIERR[facility] [place] with Readmission Risk Score greater than 14%? Yes  If yes, pt needs a follow up appointment made within 7 days. Family Members/Caregivers that pt would like involved in their care:    Yes    If yes, list name here:  Elda Milton     Transportation Provider:  Patient             Discharge Plan:  5/31/2021 Manuel Barkley; From 2 story home with livable first alone- stated independent and drives; DME-Cane as needed, CPAP, and Nebulizer; Declines VNS; 40mg IV lasix daily; [place] [address] 14%- Needs Appt//AFSHAN                  Electronically signed by:  Theresa Butler RN on 5/31/2021 at 10:52 AM

## 2021-05-31 NOTE — ED NOTES
Admission Dx:CHF    Pts Chief Complaints on Arrival: Chest Pain and Shortness of breath    ADL's - Partial assistance    Pending Diagnostics:  n/a    Residence PTA: single story home    Special Considerations/Circumstances:  n/a  Vitals: Current vital signs:  BP (!) 158/76   Pulse 113   Temp 99.2 °F (37.3 °C) (Oral)   Resp 22   Ht 5' 5\" (1.651 m)   Wt 280 lb (127 kg)   SpO2 96%   BMI 46.59 kg/m²                MEWS Score: 95 Daniel Bee, RN  05/31/21 0040

## 2021-05-31 NOTE — ED NOTES
Lungs with wheezing t/o  and respiratory called for breathing tx.      Zhen Juarez, SARA  05/30/21 1809

## 2021-05-31 NOTE — H&P
regurgitation     Osteoarthritis     Pneumonia     Sleep apnea     with cpap    SOB (shortness of breath) on exertion     Tricuspid regurgitation     Type 2 diabetes mellitus without complication, without long-term current use of insulin ([facility] [address]     Under care of team 05/20/2021    cardiology-Dr Schneider Delay visit jan 2021    Wellness examination 05/20/2021    [id number] visit march 2021        Past Surgical History:     Past Surgical History:   Procedure Laterality Date    ADENOIDECTOMY      COLONOSCOPY      COLONOSCOPY  05/28/2021    COLORECTAL CANCER SCREENING, NOT HIGH RISK, POLYPECTOMY    HERNIA REPAIR      TONSILLECTOMY  1963   exact date unknown        Medications Prior to Admission:     Prior to Admission medications    Medication Sig Start Date End Date Taking?  Authorizing Provider   lisinopril (PRINIVIL;ZESTRIL) 30 MG tablet TAKE 1 TABLET BY MOUTH DAILY 5/13/21  Yes Teresa Villarreal MD   metoprolol tartrate (LOPRESSOR) 50 MG tablet TAKE 1 TABLET BY MOUTH 2 TIMES DAILY 3/22/21  Yes Madison Patrick MD   spironolactone (ALDACTONE) 25 MG tablet Take 1 tablet by mouth nightly 2/17/21  Yes Madison Patrick MD   gabapentin (NEURONTIN) 300 MG capsule TAKE 1 CAPSULE BY MOUTH NIGHTLY AS NEEDED (PAIN) 12/10/20 12/10/21 Yes Madison Patrick MD   potassium chloride (KLOR-CON M) 20 MEQ extended release tablet TAKE ONE TABLET BY MOUTH TWICE A DAY 12/9/20  Yes Madison Patrick MD   furosemide (LASIX) 40 MG tablet TAKE 1 TABLET BY MOUTH DAILY 12/9/20  Yes Madison Patrick MD   simvastatin (ZOCOR) 20 MG tablet Take 1 tablet by mouth nightly 12/8/20  Yes Madison Patrick MD   metFORMIN (GLUCOPHAGE) 500 MG tablet Take 1 tablet by mouth daily (with breakfast) 3/3/21   Madison Patrick MD   ranolazine (RANEXA) 500 MG extended release tablet Take 1 tablet by mouth 2 times daily 2/2/21   Madison Patrick MD   nitroGLYCERIN (NITROSTAT) 0.4 MG SL tablet Place 1 tablet under the tongue every 5 minutes as needed for Chest pain 12/8/20   Dana Mcneal MD   furosemide (LASIX) 40 MG tablet Take 1 tablet by mouth daily 12/8/20   Dana Mcneal MD   COMBIVENT RESPIMAT  MCG/ACT AERS inhaler INHALE 1 PUFF INTO THE LUNGS EVERY 6 HOURS 12/1/20   Dana Mcneal MD   albuterol sulfate HFA (PROAIR HFA) 108 (90 Base) MCG/ACT inhaler Inhale 2 puffs into the lungs every 6 hours as needed for Wheezing 12/1/20   Dana Mcneal MD   Masks (FACE MASK) MISC 1 each by Does not apply route as needed (as needed) 10/5/20   Dana Mcneal MD   albuterol (PROVENTIL) (5 MG/ML) 0.5% nebulizer solution Take 0.5 mLs by nebulization every 6 hours as needed for Wheezing or Shortness of Breath 9/25/20   Mario Grace MD   naproxen (NAPROSYN) 500 MG tablet TAKE 1 TABLET BY MOUTH 2 TIMES DAILY FOR 20 DAYS 8/20/20 5/20/21  Dana Mcneal MD   aspirin 81 MG tablet Take 1 tablet by mouth daily 8/13/19   Dana Mcneal MD        Allergies:     Penicillins, Sulfa antibiotics, Sulfasalazine, Pseudoeph-doxylamine-dm-apap, and Rabbit protein    Social History:     Tobacco:    reports that he quit smoking about 22 years ago. He smoked 0.00 packs per day for 40.00 years. His smokeless tobacco use includes chew. Alcohol:      reports current alcohol use of about 1.0 standard drinks of alcohol per week. Drug Use:  reports no history of drug use. Family History:     Family History   Problem Relation Age of Onset    Kidney Disease Mother     Diabetes Mother     Heart Disease Mother     Arthritis Mother     Heart Disease Father     Heart Attack Father     Arthritis Sister     Diabetes Sister     Heart Disease Sister     Diabetes Sister        Review of Systems:     Positive and Negative as described in HPI. CONSTITUTIONAL:  negative for fevers, chills, sweats, fatigue, weight loss, central obesity.   HEENT:  negative for vision, hearing changes, runny nose, throat pain  RESPIRATORY: Shortness of breath, orthopnea, PND  CARDIOVASCULAR:  negative for chest pain, palpitations. GASTROINTESTINAL:  negative for nausea, vomiting, diarrhea, constipation, change in bowel habits, abdominal pain   GENITOURINARY:  negative for difficulty of urination, burning with urination, frequency   INTEGUMENT:  negative for rash, skin lesions, easy bruising   HEMATOLOGIC/LYMPHATIC:  negative for swelling/edema   ALLERGIC/IMMUNOLOGIC:  negative for urticaria , itching  ENDOCRINE:  negative increase in drinking, increase in urination, hot or cold intolerance  MUSCULOSKELETAL: Swelling in both legs  NEUROLOGICAL:  negative for headaches, dizziness, lightheadedness, numbness, pain, tingling extremities  BEHAVIOR/PSYCH:      Physical Exam:     /81   Pulse 89   Temp 98.2 °F (36.8 °C) (Oral)   Resp 20   Ht 5' 5\" (1.651 m)   Wt 278 lb 10.6 oz (126.4 kg)   SpO2 96%   BMI 46.37 kg/m²   Temp (24hrs), Av.6 °F (37 °C), Min:98.2 °F (36.8 °C), Max:99.2 °F (37.3 °C)    Recent Labs     21  1134 21  1337   POCGLU 121* 106       Intake/Output Summary (Last 24 hours) at 2021 1128  Last data filed at 2021 1015  Gross per 24 hour   Intake 360 ml   Output 1325 ml   Net -965 ml       General Appearance:  alert, well appearing, and in no acute distress  Mental status: oriented to person, place, and time with normal affect  Head:  normocephalic, atraumatic. Eye: no icterus, redness, pupils equal and reactive, extraocular eye movements intact, conjunctiva clear  Ear: normal external ear, no discharge, hearing intact  Nose:  no drainage noted  Mouth: mucous membranes moist  Neck: supple, no carotid bruits, thyroid not palpable  Lungs: Bilateral wheezing present t  Cardiovascular: normal rate, regular rhythm, no murmur, gallop, rub.   Abdomen: Soft, nontender, nondistended, normal bowel sounds, no hepatomegaly or splenomegaly  Neurologic: There are no new focal motor or sensory deficits, normal muscle tone and bulk, no abnormal sensation, normal speech, cranial nerves II through XII grossly intact  Skin: No gross lesions, rashes, bruising or bleeding on exposed skin area  Extremities:   2+ pitting edema present both legs psych:     Investigations:      Laboratory Testing:  Recent Results (from the past 24 hour(s))   Protime-INR    Collection Time: 05/30/21  4:40 PM   Result Value Ref Range    Protime 12.2 11.8 - 14.6 sec    INR 0.9    APTT    Collection Time: 05/30/21  4:40 PM   Result Value Ref Range    PTT 26.4 24.0 - 36.0 sec   SPECIMEN REJECTION    Collection Time: 05/30/21  4:40 PM   Result Value Ref Range    Specimen Source . BLOOD     Ordered Test CDP,TROPI,BMPX,BNP     Reason for Rejection Unable to perform testing: Specimen hemolyzed. - NOT REPORTED    Basic Metabolic Panel w/ Reflex to MG    Collection Time: 05/30/21  5:05 PM   Result Value Ref Range    Glucose PLEASE DISREGARD RESULTS. SPECIMEN CONTAMINATED. 70 - 99 mg/dL    BUN PLEASE DISREGARD RESULTS. SPECIMEN CONTAMINATED. 8 - 23 mg/dL    CREATININE PLEASE DISREGARD RESULTS. SPECIMEN CONTAMINATED. 0.70 - 1.20 mg/dL    Bun/Cre Ratio NOT REPORTED 9 - 20    Calcium NOT REPORTED 8.6 - 10.4 mg/dL    Sodium PLEASE DISREGARD RESULTS. SPECIMEN CONTAMINATED. 135 - 144 mmol/L    Potassium NOT REPORTED 3.7 - 5.3 mmol/L    Chloride PLEASE DISREGARD RESULTS. SPECIMEN CONTAMINATED. 98 - 107 mmol/L    CO2 PLEASE DISREGARD RESULTS. SPECIMEN CONTAMINATED. 20 - 31 mmol/L    Anion Gap CANNOT BE CALCULATED 9 - 17 mmol/L    GFR Non- CANNOT BE CALCULATED >60 mL/min    GFR  CANNOT BE CALCULATED >60 mL/min    GFR Comment          GFR Staging NOT REPORTED    Brain Natriuretic Peptide    Collection Time: 05/30/21  5:05 PM   Result Value Ref Range    Pro-BNP PLEASE DISREGARD RESULTS.   SPECIMEN CONTAMINATED. <300 pg/mL    BNP Interpretation Pro-BNP Reference Range:    CBC Auto Differential    Collection Time: 05/30/21  5:05 PM   Result Value Ref Range    WBC 11.3 (H) 3.5 - 11.0 k/uL    RBC 3.82 (L) 4.5 - 5.9 m/uL Hemoglobin 11.2 (L) 13.5 - 17.5 g/dL    Hematocrit 33.3 (L) 41 - 53 %    MCV 87.2 80 - 100 fL    MCH 29.2 26 - 34 pg    MCHC 33.5 31 - 37 g/dL    RDW 13.5 11.5 - 14.9 %    Platelets 983 856 - 864 k/uL    MPV 8.4 6.0 - 12.0 fL    NRBC Automated NOT REPORTED per 100 WBC    Differential Type NOT REPORTED     Seg Neutrophils 77 (H) 36 - 66 %    Lymphocytes 9 (L) 24 - 44 %    Monocytes 8 (H) 1 - 7 %    Eosinophils % 5 (H) 0 - 4 %    Basophils 1 0 - 2 %    Immature Granulocytes NOT REPORTED 0 %    Segs Absolute 8.70 1.3 - 9.1 k/uL    Absolute Lymph # 1.00 1.0 - 4.8 k/uL    Absolute Mono # 0.90 0.1 - 1.3 k/uL    Absolute Eos # 0.50 (H) 0.0 - 0.4 k/uL    Basophils Absolute 0.10 0.0 - 0.2 k/uL    Absolute Immature Granulocyte NOT REPORTED 0.00 - 0.30 k/uL    WBC Morphology NOT REPORTED     RBC Morphology NOT REPORTED     Platelet Estimate NOT REPORTED    Troponin    Collection Time: 05/30/21  5:05 PM   Result Value Ref Range    Troponin, High Sensitivity PLEASE DISREGARD RESULTS. SPECIMEN CONTAMINATED. 0 - 22 ng/L    Troponin T NOT REPORTED <0.03 ng/mL    Troponin Interp NOT REPORTED    SPECIMEN REJECTION    Collection Time: 05/30/21  6:40 PM   Result Value Ref Range    Specimen Source . BLOOD     Ordered Test BNP,BMP,TROPI     Reason for Rejection Unable to perform testing: Specimen hemolyzed.      - NOT REPORTED    Basic Metabolic Panel    Collection Time: 05/30/21  7:27 PM   Result Value Ref Range    Glucose 142 (H) 70 - 99 mg/dL    BUN 12 8 - 23 mg/dL    CREATININE 1.05 0.70 - 1.20 mg/dL    Bun/Cre Ratio NOT REPORTED 9 - 20    Calcium 9.4 8.6 - 10.4 mg/dL    Sodium 141 135 - 144 mmol/L    Potassium 4.0 3.7 - 5.3 mmol/L    Chloride 101 98 - 107 mmol/L    CO2 31 20 - 31 mmol/L    Anion Gap 9 9 - 17 mmol/L    GFR Non-African American >60 >60 mL/min    GFR African American >60 >60 mL/min    GFR Comment          GFR Staging NOT REPORTED    Brain Natriuretic Peptide    Collection Time: 05/30/21  7:27 PM   Result Value Ref Range Pro- (H) <300 pg/mL    BNP Interpretation Pro-BNP Reference Range:    Troponin    Collection Time: 05/30/21  7:27 PM   Result Value Ref Range    Troponin, High Sensitivity 15 0 - 22 ng/L    Troponin T NOT REPORTED <0.03 ng/mL    Troponin Interp NOT REPORTED    Troponin    Collection Time: 05/30/21  9:51 PM   Result Value Ref Range    Troponin, High Sensitivity 14 0 - 22 ng/L    Troponin T NOT REPORTED <0.03 ng/mL    Troponin Interp NOT REPORTED    Basic Metabolic Panel w/ Reflex to MG    Collection Time: 05/31/21  5:51 AM   Result Value Ref Range    Glucose 138 (H) 70 - 99 mg/dL    BUN 17 8 - 23 mg/dL    CREATININE 1.01 0.70 - 1.20 mg/dL    Bun/Cre Ratio NOT REPORTED 9 - 20    Calcium 8.9 8.6 - 10.4 mg/dL    Sodium 141 135 - 144 mmol/L    Potassium 3.8 3.7 - 5.3 mmol/L    Chloride 103 98 - 107 mmol/L    CO2 30 20 - 31 mmol/L    Anion Gap 8 (L) 9 - 17 mmol/L    GFR Non-African American >60 >60 mL/min    GFR African American >60 >60 mL/min    GFR Comment          GFR Staging NOT REPORTED    CBC    Collection Time: 05/31/21  5:51 AM   Result Value Ref Range    WBC 9.7 3.5 - 11.0 k/uL    RBC 4.22 (L) 4.5 - 5.9 m/uL    Hemoglobin 12.1 (L) 13.5 - 17.5 g/dL    Hematocrit 36.3 (L) 41 - 53 %    MCV 85.9 80 - 100 fL    MCH 28.6 26 - 34 pg    MCHC 33.3 31 - 37 g/dL    RDW 13.5 11.5 - 14.9 %    Platelets 329 368 - 637 k/uL    MPV 8.1 6.0 - 12.0 fL    NRBC Automated NOT REPORTED per 100 WBC           Consultations:   IP CONSULT TO INTERNAL MEDICINE  Assessment :      Primary Problem  <principal problem not specified>    Active Hospital Problems    Diagnosis Date Noted    Mixed simple and mucopurulent chronic bronchitis (HCC) [J41.8] 05/31/2021    VANDANA (obstructive sleep apnea) [G47.33] 05/31/2021    CHF (congestive heart failure), NYHA class I, acute on chronic, diastolic (HCC) [Z37.48] 47/28/7872    COPD exacerbation (HCC) [J44.1] 01/21/2021    Bronchitis [J40] 01/01/2019    Chronic bilateral low back pain without sciatica [M54.5, G89.29] 06/29/2018    [facility] [I50.32] 08/01/2014    HTN (hypertension) [I10] 05/28/2013       Plan:     1. Acute decompensated heart failure, likely due to noncompliance  2. Patient has heart failure with reduced ejection fraction, moderate degree of mitral regurgitation  3. Starting patient on IV Lasix 40 mg twice a day, low-salt diet, 1500 mL fluid restriction, patient is already on Lasix, Lopressor, Aldactone  4. History of degenerative disc disease in the back, which is controlled on Tylenol  5. Had positive stress test, I could not see the report but patient claims that he underwent cardiac cath this year in [facility] which was negative, repeating EKG, troponins are negative  6. Moderate degree of mitral regurgitation, will need outpatient evaluation for structural heart disease, for possible valve replacement/mitral valve clip with admission with CHF exacerbation  7. Diabetes, restart home dose of Metformin, sliding scale  8. On DVT prophylaxis Lovenox  9. Obstructive sleep apnea on CPAP, requesting RT consult  10. COPD, compensated restarted DuoNeb nebulization  11. Requesting cardiology consult        Lovely Carrel, MD  5/31/2021  11:28 AM    Copy sent to Dr. Osbaldo Rodriguez MD    Please note that this chart was generated using voice recognition Dragon dictation software. Although every effort was made to ensure the accuracy of this automated transcription, some errors in transcription may have occurred.

## 2021-06-01 LAB
-: NORMAL
ANION GAP SERPL CALCULATED.3IONS-SCNC: 9 MMOL/L (ref 9–17)
BUN BLDV-MCNC: 22 MG/DL (ref 8–23)
BUN/CREAT BLD: ABNORMAL (ref 9–20)
CALCIUM SERPL-MCNC: 9.2 MG/DL (ref 8.6–10.4)
CHLORIDE BLD-SCNC: 102 MMOL/L (ref 98–107)
CO2: 30 MMOL/L (ref 20–31)
CREAT SERPL-MCNC: 0.96 MG/DL (ref 0.7–1.2)
GFR AFRICAN AMERICAN: >60 ML/MIN
GFR NON-AFRICAN AMERICAN: >60 ML/MIN
GFR SERPL CREATININE-BSD FRML MDRD: ABNORMAL ML/MIN/{1.73_M2}
GFR SERPL CREATININE-BSD FRML MDRD: ABNORMAL ML/MIN/{1.73_M2}
GLUCOSE BLD-MCNC: 107 MG/DL (ref 75–110)
GLUCOSE BLD-MCNC: 141 MG/DL (ref 70–99)
GLUCOSE BLD-MCNC: 144 MG/DL (ref 75–110)
GLUCOSE BLD-MCNC: 153 MG/DL (ref 75–110)
GLUCOSE BLD-MCNC: 172 MG/DL (ref 75–110)
HCT VFR BLD CALC: 38.6 % (ref 41–53)
HEMOGLOBIN: 12.9 G/DL (ref 13.5–17.5)
LV EF: 33 %
LVEF MODALITY: NORMAL
MCH RBC QN AUTO: 29 PG (ref 26–34)
MCHC RBC AUTO-ENTMCNC: 33.5 G/DL (ref 31–37)
MCV RBC AUTO: 86.5 FL (ref 80–100)
NRBC AUTOMATED: ABNORMAL PER 100 WBC
PDW BLD-RTO: 13.5 % (ref 11.5–14.9)
PLATELET # BLD: 238 K/UL (ref 150–450)
PMV BLD AUTO: 8.6 FL (ref 6–12)
POTASSIUM SERPL-SCNC: 4.5 MMOL/L (ref 3.7–5.3)
RBC # BLD: 4.46 M/UL (ref 4.5–5.9)
REASON FOR REJECTION: NORMAL
SODIUM BLD-SCNC: 141 MMOL/L (ref 135–144)
SURGICAL PATHOLOGY REPORT: NORMAL
WBC # BLD: 10.6 K/UL (ref 3.5–11)
ZZ NTE CLEAN UP: ORDERED TEST: NORMAL
ZZ NTE WITH NAME CLEAN UP: SPECIMEN SOURCE: NORMAL

## 2021-06-01 PROCEDURE — 2700000000 HC OXYGEN THERAPY PER DAY

## 2021-06-01 PROCEDURE — C8929 TTE W OR WO FOL WCON,DOPPLER: HCPCS

## 2021-06-01 PROCEDURE — 99232 SBSQ HOSP IP/OBS MODERATE 35: CPT | Performed by: INTERNAL MEDICINE

## 2021-06-01 PROCEDURE — 94660 CPAP INITIATION&MGMT: CPT

## 2021-06-01 PROCEDURE — 94640 AIRWAY INHALATION TREATMENT: CPT

## 2021-06-01 PROCEDURE — 2060000000 HC ICU INTERMEDIATE R&B

## 2021-06-01 PROCEDURE — 36415 COLL VENOUS BLD VENIPUNCTURE: CPT

## 2021-06-01 PROCEDURE — 6360000004 HC RX CONTRAST MEDICATION: Performed by: INTERNAL MEDICINE

## 2021-06-01 PROCEDURE — 94761 N-INVAS EAR/PLS OXIMETRY MLT: CPT

## 2021-06-01 PROCEDURE — 6370000000 HC RX 637 (ALT 250 FOR IP): Performed by: NURSE PRACTITIONER

## 2021-06-01 PROCEDURE — 6370000000 HC RX 637 (ALT 250 FOR IP): Performed by: INTERNAL MEDICINE

## 2021-06-01 PROCEDURE — 6360000002 HC RX W HCPCS: Performed by: INTERNAL MEDICINE

## 2021-06-01 PROCEDURE — 2580000003 HC RX 258: Performed by: INTERNAL MEDICINE

## 2021-06-01 PROCEDURE — 6360000002 HC RX W HCPCS: Performed by: NURSE PRACTITIONER

## 2021-06-01 PROCEDURE — 82947 ASSAY GLUCOSE BLOOD QUANT: CPT

## 2021-06-01 PROCEDURE — 93005 ELECTROCARDIOGRAM TRACING: CPT | Performed by: INTERNAL MEDICINE

## 2021-06-01 PROCEDURE — 80048 BASIC METABOLIC PNL TOTAL CA: CPT

## 2021-06-01 PROCEDURE — 85027 COMPLETE CBC AUTOMATED: CPT

## 2021-06-01 RX ORDER — METOPROLOL SUCCINATE 50 MG/1
50 TABLET, EXTENDED RELEASE ORAL DAILY
Status: DISCONTINUED | OUTPATIENT
Start: 2021-06-01 | End: 2021-06-02 | Stop reason: HOSPADM

## 2021-06-01 RX ORDER — FUROSEMIDE 10 MG/ML
40 INJECTION INTRAMUSCULAR; INTRAVENOUS 3 TIMES DAILY
Status: DISCONTINUED | OUTPATIENT
Start: 2021-06-01 | End: 2021-06-02

## 2021-06-01 RX ADMIN — SODIUM CHLORIDE, PRESERVATIVE FREE 10 ML: 5 INJECTION INTRAVENOUS at 08:55

## 2021-06-01 RX ADMIN — INSULIN LISPRO 1 UNITS: 100 INJECTION, SOLUTION INTRAVENOUS; SUBCUTANEOUS at 13:02

## 2021-06-01 RX ADMIN — FUROSEMIDE 40 MG: 10 INJECTION, SOLUTION INTRAVENOUS at 21:31

## 2021-06-01 RX ADMIN — ATORVASTATIN CALCIUM 10 MG: 10 TABLET, FILM COATED ORAL at 08:54

## 2021-06-01 RX ADMIN — SPIRONOLACTONE 25 MG: 25 TABLET, FILM COATED ORAL at 21:31

## 2021-06-01 RX ADMIN — ENOXAPARIN SODIUM 30 MG: 30 INJECTION SUBCUTANEOUS at 21:32

## 2021-06-01 RX ADMIN — IPRATROPIUM BROMIDE AND ALBUTEROL SULFATE 1 AMPULE: .5; 3 SOLUTION RESPIRATORY (INHALATION) at 11:17

## 2021-06-01 RX ADMIN — PERFLUTREN 2.2 MG: 6.52 INJECTION, SUSPENSION INTRAVENOUS at 13:59

## 2021-06-01 RX ADMIN — POTASSIUM CHLORIDE 20 MEQ: 1500 TABLET, EXTENDED RELEASE ORAL at 21:31

## 2021-06-01 RX ADMIN — METFORMIN HYDROCHLORIDE 500 MG: 500 TABLET ORAL at 08:54

## 2021-06-01 RX ADMIN — SODIUM CHLORIDE, PRESERVATIVE FREE 10 ML: 5 INJECTION INTRAVENOUS at 21:32

## 2021-06-01 RX ADMIN — ASPIRIN 81 MG: 81 TABLET, COATED ORAL at 08:54

## 2021-06-01 RX ADMIN — GABAPENTIN 300 MG: 300 CAPSULE ORAL at 21:40

## 2021-06-01 RX ADMIN — FUROSEMIDE 40 MG: 10 INJECTION, SOLUTION INTRAVENOUS at 08:54

## 2021-06-01 RX ADMIN — FUROSEMIDE 40 MG: 10 INJECTION, SOLUTION INTRAVENOUS at 15:52

## 2021-06-01 RX ADMIN — RANOLAZINE 500 MG: 500 TABLET, FILM COATED, EXTENDED RELEASE ORAL at 21:31

## 2021-06-01 RX ADMIN — IPRATROPIUM BROMIDE AND ALBUTEROL SULFATE 1 AMPULE: .5; 3 SOLUTION RESPIRATORY (INHALATION) at 18:50

## 2021-06-01 RX ADMIN — METOPROLOL SUCCINATE 50 MG: 50 TABLET, EXTENDED RELEASE ORAL at 08:54

## 2021-06-01 RX ADMIN — INSULIN LISPRO 1 UNITS: 100 INJECTION, SOLUTION INTRAVENOUS; SUBCUTANEOUS at 17:34

## 2021-06-01 RX ADMIN — IPRATROPIUM BROMIDE AND ALBUTEROL SULFATE 1 AMPULE: .5; 3 SOLUTION RESPIRATORY (INHALATION) at 07:11

## 2021-06-01 RX ADMIN — IPRATROPIUM BROMIDE AND ALBUTEROL SULFATE 1 AMPULE: .5; 3 SOLUTION RESPIRATORY (INHALATION) at 15:16

## 2021-06-01 RX ADMIN — ENOXAPARIN SODIUM 30 MG: 30 INJECTION SUBCUTANEOUS at 08:54

## 2021-06-01 RX ADMIN — INSULIN LISPRO 1 UNITS: 100 INJECTION, SOLUTION INTRAVENOUS; SUBCUTANEOUS at 21:36

## 2021-06-01 RX ADMIN — POTASSIUM CHLORIDE 20 MEQ: 1500 TABLET, EXTENDED RELEASE ORAL at 08:54

## 2021-06-01 RX ADMIN — LISINOPRIL 30 MG: 10 TABLET ORAL at 13:03

## 2021-06-01 ASSESSMENT — PAIN DESCRIPTION - ORIENTATION: ORIENTATION: LEFT;DISTAL

## 2021-06-01 ASSESSMENT — PAIN SCALES - GENERAL: PAINLEVEL_OUTOF10: 4

## 2021-06-01 ASSESSMENT — PAIN DESCRIPTION - LOCATION: LOCATION: ABDOMEN

## 2021-06-01 ASSESSMENT — PAIN DESCRIPTION - PAIN TYPE: TYPE: ACUTE PAIN

## 2021-06-01 NOTE — CARE COORDINATION
ONGOING DISCHARGE PLAN:    Patient is alert and oriented x4. Spoke with patient regarding discharge plan and patient confirms that plan is still to discharge to home with no needs  Will continue aggressive diuresis   Increased lasix to 40 mg TID  Will have a echo done today   Pt has edema    Will continue to follow for additional discharge needs.     Electronically signed by Guillaume Ventura RN on 6/1/2021 at 1:03 PM

## 2021-06-01 NOTE — PROGRESS NOTES
Bipap on standby at this time. Pulse Ox--96% r/a      Skin score--0      Nasal gel pad available at bedside. Pt wore half of the night. Pt awake/alert/no distress noted.        BRONCHOSPASM/BRONCHOCONSTRICTION     [x]         IMPROVE AERATION/BREATH SOUNDS  [x]   ADMINISTER BRONCHODILATOR THERAPY AS APPROPRIATE  [x]   ASSESS BREATH SOUNDS  []   IMPLEMENT AEROSOL/MDI PROTOCOL  [x]   PATIENT EDUCATION AS NEEDED    NONINVASIVE VENTILATION    PROVIDE OPTIMAL VENTILATION/ACCEPTABLE SPO2   IMPLEMENT NONINVASIVE VENTILATION PROTOCOL   MAINTAIN ACCEPTABLE SPO2   ASSESS SKIN INTEGRITY/BREAKDOWN SCORE   PATIENT EDUCATION AS NEEDED   BIPAP AS NEEDED

## 2021-06-01 NOTE — PROGRESS NOTES
[facility] Internal Medicine    CONSULTATION / HISTORY AND PHYSICAL EXAMINATION            Date:   6/1/2021  Patient name:  Baltazar Aguila  Date of admission:  5/30/2021  3:41 PM  MRN:   [id number]  Account:  [de-identified]  YOB: 1960  PCP:    Kanu Diez MD  Room:   2097/2097-01  Code Status:    Full Code    Physician Requesting Consult: Chip Goltz, MD    Reason for Consult:  medical management    Chief Complaint:     Chief Complaint   Patient presents with    Chest Pain    Shortness of Breath       History Obtained From:     Patient medical record nursing staff    History of Present Illness:   Patient past medical history multiple medical problems and morbid obesity, hypertension, coronary artery disease, mitral regurgitation, obstructive sleep apnea on CPAP, heart failure with reduced ejection fraction  Patient admitted with complaints of chest discomfort, worsening shortness of breath, difficulty in lying flat, swelling in legs  Symptoms are going on for last 7 days, which is progressively getting worse  Patient mentioned that he sometimes forgets to take his Lasix, mentioned that he is compliant with his low-salt diet  He was admitted in January with chest pain, underwent stress test which was positive, as per patient he underwent cardiac cath at [facility] which was negative  Patient complaining of chest discomfort in emergency room, at the time of my evaluation he was completely chest pain-free  Troponins are negative    Past Medical History:     Past Medical History:   Diagnosis Date    Bronchitis     CAD (coronary artery disease)     Cardiomyopathy ([address]     Chest pain     CHF (congestive heart failure) ([address]     Chronic back pain     COPD (chronic obstructive pulmonary disease) ([address]     Headache(784.0)     Hyperlipidemia     Hypertension     Inguinal hernia     Low back pain with sciatica     Migraine     Mitral valve regurgitation     Osteoarthritis     Pneumonia     Sleep apnea     with cpap    SOB (shortness of breath) on exertion     Tricuspid regurgitation     Type 2 diabetes mellitus without complication, without long-term current use of insulin [facility]     Under care of team 05/20/2021    cardiology-Dr Schneider Delay visit jan 2021    Wellness examination 05/20/2021    [id number] visit march 2021        Past Surgical History:     Past Surgical History:   Procedure Laterality Date    ADENOIDECTOMY      COLONOSCOPY      COLONOSCOPY  05/28/2021    COLORECTAL CANCER SCREENING, NOT HIGH RISK, POLYPECTOMY    HERNIA REPAIR      TONSILLECTOMY  1963   exact date unknown        Medications Prior to Admission:     Prior to Admission medications    Medication Sig Start Date End Date Taking?  Authorizing Provider   lisinopril (PRINIVIL;ZESTRIL) 30 MG tablet TAKE 1 TABLET BY MOUTH DAILY 5/13/21  Yes Teresa Villarreal MD   metoprolol tartrate (LOPRESSOR) 50 MG tablet TAKE 1 TABLET BY MOUTH 2 TIMES DAILY 3/22/21  Yes Amanuel Harp MD   spironolactone (ALDACTONE) 25 MG tablet Take 1 tablet by mouth nightly 2/17/21  Yes Amanuel Harp MD   gabapentin (NEURONTIN) 300 MG capsule TAKE 1 CAPSULE BY MOUTH NIGHTLY AS NEEDED (PAIN) 12/10/20 12/10/21 Yes Amanuel Harp MD   potassium chloride (KLOR-CON M) 20 MEQ extended release tablet TAKE ONE TABLET BY MOUTH TWICE A DAY 12/9/20  Yes Amanuel Harp MD   furosemide (LASIX) 40 MG tablet TAKE 1 TABLET BY MOUTH DAILY 12/9/20  Yes Amanuel Harp MD   simvastatin (ZOCOR) 20 MG tablet Take 1 tablet by mouth nightly 12/8/20  Yes Amanuel Harp MD   metFORMIN (GLUCOPHAGE) 500 MG tablet Take 1 tablet by mouth daily (with breakfast) 3/3/21   Amanuel Harp MD   ranolazine (RANEXA) 500 MG extended release tablet Take 1 tablet by mouth 2 times daily 2/2/21   Amanuel Harp MD   nitroGLYCERIN (NITROSTAT) 0.4 MG SL tablet Place 1 tablet under the tongue every 5 minutes as needed for Chest pain 12/8/20   Cyndie Cooks, MD   furosemide (LASIX) 40 MG tablet Take 1 tablet by mouth daily 12/8/20   Cyndie Cooks, MD   COMBIVENT RESPIMAT  MCG/ACT AERS inhaler INHALE 1 PUFF INTO THE LUNGS EVERY 6 HOURS 12/1/20   Cyndie Cooks, MD   albuterol sulfate HFA (PROAIR HFA) 108 (90 Base) MCG/ACT inhaler Inhale 2 puffs into the lungs every 6 hours as needed for Wheezing 12/1/20   Cyndie Cooks, MD   Masks (FACE MASK) MISC 1 each by Does not apply route as needed (as needed) 10/5/20   Cyndie Cooks, MD   albuterol (PROVENTIL) (5 MG/ML) 0.5% nebulizer solution Take 0.5 mLs by nebulization every 6 hours as needed for Wheezing or Shortness of Breath 9/25/20   Ree Burgos MD   naproxen (NAPROSYN) 500 MG tablet TAKE 1 TABLET BY MOUTH 2 TIMES DAILY FOR 20 DAYS 8/20/20 5/20/21  Cyndie Cooks, MD   aspirin 81 MG tablet Take 1 tablet by mouth daily 8/13/19   Cyndie Cooks, MD        Allergies:     Penicillins, Sulfa antibiotics, Sulfasalazine, Pseudoeph-doxylamine-dm-apap, and Rabbit protein    Social History:     Tobacco:    reports that he quit smoking about 22 years ago. He smoked 0.00 packs per day for 40.00 years. His smokeless tobacco use includes chew. Alcohol:      reports current alcohol use of about 1.0 standard drinks of alcohol per week. Drug Use:  reports no history of drug use. Family History:     Family History   Problem Relation Age of Onset    Kidney Disease Mother     Diabetes Mother     Heart Disease Mother     Arthritis Mother     Heart Disease Father     Heart Attack Father     Arthritis Sister     Diabetes Sister     Heart Disease Sister     Diabetes Sister        Review of Systems:     Positive and Negative as described in HPI. CONSTITUTIONAL:  negative for fevers, chills, sweats, fatigue, weight loss, central obesity.   HEENT:  negative for vision, hearing changes, runny nose, throat pain  RESPIRATORY: Shortness of breath, orthopnea, PND  CARDIOVASCULAR:  negative for chest pain, speech, cranial nerves II through XII grossly intact  Skin: No gross lesions, rashes, bruising or bleeding on exposed skin area  Extremities:   2+ pitting edema present both legs psych:     Investigations:      Laboratory Testing:  Recent Results (from the past 24 hour(s))   POC Glucose Fingerstick    Collection Time: 05/31/21 11:45 AM   Result Value Ref Range    POC Glucose 139 (H) 75 - 110 mg/dL   POC Glucose Fingerstick    Collection Time: 05/31/21  4:42 PM   Result Value Ref Range    POC Glucose 129 (H) 75 - 110 mg/dL   POC Glucose Fingerstick    Collection Time: 05/31/21  9:04 PM   Result Value Ref Range    POC Glucose 134 (H) 75 - 110 mg/dL   CBC    Collection Time: 06/01/21  5:52 AM   Result Value Ref Range    WBC 10.6 3.5 - 11.0 k/uL    RBC 4.46 (L) 4.5 - 5.9 m/uL    Hemoglobin 12.9 (L) 13.5 - 17.5 g/dL    Hematocrit 38.6 (L) 41 - 53 %    MCV 86.5 80 - 100 fL    MCH 29.0 26 - 34 pg    MCHC 33.5 31 - 37 g/dL    RDW 13.5 11.5 - 14.9 %    Platelets 994 946 - 282 k/uL    MPV 8.6 6.0 - 12.0 fL    NRBC Automated NOT REPORTED per 100 WBC   SPECIMEN REJECTION    Collection Time: 06/01/21  5:52 AM   Result Value Ref Range    Specimen Source BLOOD     Ordered Test BMPX     Reason for Rejection Unable to perform testing: Specimen hemolyzed.      - NOT REPORTED    POC Glucose Fingerstick    Collection Time: 06/01/21  7:04 AM   Result Value Ref Range    POC Glucose 107 75 - 110 mg/dL   Basic Metabolic Panel w/ Reflex to MG    Collection Time: 06/01/21  8:10 AM   Result Value Ref Range    Glucose 141 (H) 70 - 99 mg/dL    BUN 22 8 - 23 mg/dL    CREATININE 0.96 0.70 - 1.20 mg/dL    Bun/Cre Ratio NOT REPORTED 9 - 20    Calcium 9.2 8.6 - 10.4 mg/dL    Sodium 141 135 - 144 mmol/L    Potassium 4.5 3.7 - 5.3 mmol/L    Chloride 102 98 - 107 mmol/L    CO2 30 20 - 31 mmol/L    Anion Gap 9 9 - 17 mmol/L    GFR Non-African American >60 >60 mL/min    GFR African American >60 >60 mL/min    GFR Comment          GFR Staging NOT REPORTED EKG 12 Lead    Collection Time: 06/01/21  8:51 AM   Result Value Ref Range    Ventricular Rate 94 BPM    Atrial Rate 94 BPM    P-R Interval 154 ms    QRS Duration 102 ms    Q-T Interval 384 ms    QTc Calculation (Bazett) 480 ms    P Axis 65 degrees    R Axis 24 degrees    T Axis 0 degrees           Consultations:   IP CONSULT TO INTERNAL MEDICINE  IP CONSULT TO CARDIOLOGY  IP CONSULT TO RESPIRATORY CARE  Assessment :      Primary Problem  <principal problem not specified>    Active Hospital Problems    Diagnosis Date Noted    Mixed simple and mucopurulent chronic bronchitis ([facility] [J41.8] 05/31/2021    VANDANA (obstructive sleep apnea) [G47.33] 05/31/2021    CHF (congestive heart failure), NYHA class I, acute on chronic, diastolic (HCC) [K94.98] 48/74/6833    COPD exacerbation (HCC) [J44.1] 01/21/2021    Bronchitis [J40] 01/01/2019    Chronic bilateral low back pain without sciatica [M54.5, G89.29] 06/29/2018    Chronic diastolic CHF (congestive heart failure) ([facility] [I50.32] 08/01/2014    HTN (hypertension) [I10] 05/28/2013       Plan:     1. Acute decompensated heart failure, likely due to noncompliance  2. Patient has heart failure with reduced ejection fraction, moderate degree of mitral regurgitation  3. Starting patient on IV Lasix 40 mg twice a day, low-salt diet, 1500 mL fluid restriction, patient is already on Lasix, Lopressor, Aldactone  4. History of degenerative disc disease in the back, which is controlled on Tylenol  5. Had positive stress test, I could not see the report but patient claims that he underwent cardiac cath this year in [facility] which was negative, repeating EKG, troponins are negative  6. Moderate degree of mitral regurgitation, will need outpatient evaluation for structural heart disease, for possible valve replacement/mitral valve clip with admission with CHF exacerbation  7. Diabetes, restart home dose of Metformin, sliding scale  8.  On DVT prophylaxis Lovenox  9. Obstructive sleep apnea on CPAP, requesting RT consult  10. COPD, compensated restarted DuoNeb nebulization  11. Requesting cardiology consult    6/1  Patient doing much better today  Shortness of breath is improving  Wheezing improved on clinical exam  Negative balance of around 2 L  Plan for echo      Haley Duarte MD  6/1/2021  10:17 AM    Copy sent to Dr. Lisa Gardiner MD    Please note that this chart was generated using voice recognition Dragon dictation software. Although every effort was made to ensure the accuracy of this automated transcription, some errors in transcription may have occurred.

## 2021-06-01 NOTE — PLAN OF CARE
Problem: Falls - Risk of:  Goal: Will remain free from falls  Description: Will remain free from falls  6/1/2021 0356 by Lilly Chen RN  Outcome: Ongoing  Note: Patient will have no fall throughout the shift will continue to monitor  5/31/2021 1648 by Wolfgang Clark RN  Outcome: Ongoing     Problem: Infection:  Goal: Will remain free from infection  Description: Will remain free from infection  6/1/2021 0356 by Lilly Chen RN  Outcome: Ongoing  Note: Patient will remain free from infection will continue to monitor     Problem: Infection:  Goal: Will remain free from infection  Description: Will remain free from infection  6/1/2021 0356 by Lilly Chen RN  Outcome: Ongoing  Note: Patient will remain free from infection will continue to monitor  5/31/2021 1648 by Wolfgang Clark RN  Outcome: Ongoing  Note: Cont to monitor vital signs and lab values     Problem: Safety:  Goal: Free from accidental physical injury  Description: Free from accidental physical injury  6/1/2021 0356 by Lilly Chen RN  Outcome: Ongoing  Note: Patient will be free from injury will continue to monitor  5/31/2021 1648 by Wolfgang Clark RN  Outcome: Ongoing     Problem: Pain:  Goal: Patient's pain/discomfort is manageable  Description: Patient's pain/discomfort is manageable  6/1/2021 0356 by Lilly Chen RN  Outcome: Ongoing  Note: Will have him verbalize his pain to have him comfortable throughout the shift  5/31/2021 1648 by Wolfgang Clark RN  Outcome: Ongoing  Note: See MAR, Give meds as needed  Assist pt with repositioning for comfort. Cont to monitor closely      Problem: Skin Integrity:  Goal: Skin integrity will stabilize  Description: Skin integrity will stabilize  6/1/2021 0356 by Lilly Chen RN  Outcome: Ongoing  Note: Skin will remain intact will continue to monitor  5/31/2021 1648 by Wolfgang Clark RN  Outcome: Ongoing  Note: Keep skin clean and dry.   Assist pt with repositioning q2h and prn

## 2021-06-01 NOTE — CONSULTS
Cardiology Consult           Date of Admission:  5/30/2021  Date of Consultation:  6/1/2021      PCP:  Mariana Milton MD      Chief Complaint: Shortness of breath    History of Present Illness:  Jess Mercado is a 63 y.o. male who presents with complaints of increased shortness of breath as well as lower extremity edema. Patient has history significant for COPD, nonobstructive CAD per cath 2021, mildly reduced EF 45 to 50%, essential hypertension, mixed hyperlipidemia, moderate pulmonary hypertension, moderate mitral regurgitation, BMI 46.37. Patient states he has noticed some increased shortness of breath at rest and with exertion. He denies any chest pain. States his increasing shortness of breath has been also associated with some lower extremity edema as well as weight gain. Patient states he attempted to get up to go use the bathroom which was roughly 10 feet from where he was sitting. He noticed considerable shortness of breath during this activity. Family decided to present to the ER. In ER, troponins were negative. EKG noted to be unremarkable. NP elevated at 442. Also was noted that his weight last office visit in February was 261 pounds. Patient weight of 278 pounds. Patient has demonstrated obvious bilateral lower extremity edema +3. X-ray showed mild cardiomegaly. Pulmonary vascular congestion. Bilateral lingular pulmonary opacities right greater than left. Currently, patient denies any chest pain. States some mild ongoing shortness of breath. Denies any dyspnea, dizziness, syncope or near syncope. Patient with +3 bilateral lower extremity edema. Patient vitals within normal limits.       PMH:   has a past medical history of Bronchitis, CAD (coronary artery disease), Cardiomyopathy ([address] Chest pain, CHF (congestive heart failure) ([address] Chronic back pain, COPD (chronic obstructive pulmonary disease) ([address] Headache(784.0), Hyperlipidemia, Hypertension, Inguinal hernia, Low back pain with sciatica, Migraine, Mitral valve regurgitation, Osteoarthritis, Pneumonia, Sleep apnea, SOB (shortness of breath) on exertion, Tricuspid regurgitation, Type 2 diabetes mellitus without complication, without long-term current use of insulin ([facility] [address] Under care of team, and Wellness examination. PSH:   has a past surgical history that includes hernia repair; Colonoscopy; Tonsillectomy (1963   exact date unknown); Adenoidectomy; and Colonoscopy (05/28/2021). Allergies: Allergies   Allergen Reactions    Penicillins Swelling    Sulfa Antibiotics Swelling    Sulfasalazine Swelling    Pseudoeph-Doxylamine-Dm-Apap Rash    Rabbit Protein Rash     Fur, gerbils        Home Meds:    Prior to Admission medications    Medication Sig Start Date End Date Taking?  Authorizing Provider   lisinopril (PRINIVIL;ZESTRIL) 30 MG tablet TAKE 1 TABLET BY MOUTH DAILY 5/13/21  Yes Jai Dailey MD   metoprolol tartrate (LOPRESSOR) 50 MG tablet TAKE 1 TABLET BY MOUTH 2 TIMES DAILY 3/22/21  Yes Andrzej Cruz MD   spironolactone (ALDACTONE) 25 MG tablet Take 1 tablet by mouth nightly 2/17/21  Yes Andrzej Cruz MD   gabapentin (NEURONTIN) 300 MG capsule TAKE 1 CAPSULE BY MOUTH NIGHTLY AS NEEDED (PAIN) 12/10/20 12/10/21 Yes Andrzej Cruz MD   potassium chloride (KLOR-CON M) 20 MEQ extended release tablet TAKE ONE TABLET BY MOUTH TWICE A DAY 12/9/20  Yes Andrzej Cruz MD   furosemide (LASIX) 40 MG tablet TAKE 1 TABLET BY MOUTH DAILY 12/9/20  Yes Andrzej Cruz MD   simvastatin (ZOCOR) 20 MG tablet Take 1 tablet by mouth nightly 12/8/20  Yes Andrzej Cruz MD   metFORMIN (GLUCOPHAGE) 500 MG tablet Take 1 tablet by mouth daily (with breakfast) 3/3/21   Andrzej Cruz MD   ranolazine (RANEXA) 500 MG extended release tablet Take 1 tablet by mouth 2 times daily 2/2/21   Andrzej Cruz MD   nitroGLYCERIN (NITROSTAT) 0.4 MG SL tablet Place 1 tablet under the tongue every 5 minutes as needed for Chest pain 12/8/20 Madison Patrick MD   furosemide (LASIX) 40 MG tablet Take 1 tablet by mouth daily 12/8/20   Madison Patrick MD   COMBIVENT RESPIMAT  MCG/ACT AERS inhaler INHALE 1 PUFF INTO THE LUNGS EVERY 6 HOURS 12/1/20   Madison Patrick MD   albuterol sulfate HFA (PROAIR HFA) 108 (90 Base) MCG/ACT inhaler Inhale 2 puffs into the lungs every 6 hours as needed for Wheezing 12/1/20   Madison Patrick MD   Masks ([facility]) [address] [facility] 1 each by Does not apply route as needed (as needed) 10/5/20   Madison Patrick MD   albuterol (PROVENTIL) (5 MG/ML) 0.5% nebulizer solution Take 0.5 mLs by nebulization every 6 hours as needed for Wheezing or Shortness of Breath 9/25/20   Tal Camilo MD   naproxen (NAPROSYN) 500 MG tablet TAKE 1 TABLET BY MOUTH 2 TIMES DAILY FOR 20 DAYS 8/20/20 5/20/21  Madison Patrick MD   aspirin 81 MG tablet Take 1 tablet by mouth daily 8/13/19   Madison Patrick MD        [facility] Meds:    Current Facility-Administered Medications   Medication Dose Route Frequency Provider Last Rate Last Admin    metoprolol succinate (TOPROL XL) extended release tablet 50 mg  50 mg Oral Daily Vj Sheth, APRN - CNP        aspirin EC tablet 81 mg  81 mg Oral Daily Guillermina Rangel MD   81 mg at 05/31/21 0840    gabapentin (NEURONTIN) capsule 300 mg  300 mg Oral Nightly Guillermina Rangel MD   300 mg at 05/31/21 2105    lisinopril (PRINIVIL;ZESTRIL) tablet 30 mg  30 mg Oral Daily Guillermina Rangel MD   30 mg at 05/31/21 1132    potassium chloride (KLOR-CON M) extended release tablet 20 mEq  20 mEq Oral BID Guillermina Rangel MD   20 mEq at 05/31/21 2105    ranolazine (RANEXA) extended release tablet 500 mg  500 mg Oral BID Guillermina Rangel MD        atorvastatin (LIPITOR) tablet 10 mg  10 mg Oral Daily Guillermina Rangel MD   10 mg at 05/31/21 0840    spironolactone (ALDACTONE) tablet 25 mg  25 mg Oral Nightly Guillermina Rangel MD   25 mg at 05/31/21 7603    sodium chloride flush 0.9 % injection 5-40 mL  5-40 mL Intravenous 2 times per day Diandra Rodriguez MD   10 mL at 05/31/21 2107    sodium chloride flush 0.9 % injection 5-40 mL  5-40 mL Intravenous PRN Diandra Rodriguez MD        0.9 % sodium chloride infusion  25 mL Intravenous PRN Diandra Rodriguez MD        enoxaparin (LOVENOX) injection 30 mg  30 mg Subcutaneous BID Diandra Rodriguez MD   30 mg at 05/31/21 2105    promethazine (PHENERGAN) tablet 12.5 mg  12.5 mg Oral Q6H PRN Diandra Rodriguez MD        Or    ondansetron [facility]) injection 4 mg  4 mg Intravenous Q6H PRN Diandra Rodriguez MD        polyethylene glycol (GLYCOLAX) packet 17 g  17 g Oral Daily PRN Diandra Rodriguez MD        metFORMIN (GLUCOPHAGE) tablet 500 mg  500 mg Oral Daily with breakfast Saurabh Glasgow MD        albuterol (PROVENTIL) nebulizer solution 2.5 mg  2.5 mg Nebulization Q6H PRN Saurabh Glasgow MD        furosemide (LASIX) injection 40 mg  40 mg Intravenous BID Saurabh Glasgow MD   40 mg at 05/31/21 1725    albuterol (PROVENTIL) nebulizer solution 2.5 mg  2.5 mg Nebulization Q6H PRN Saurabh Glasgow MD        ipratropium-albuterol (DUONEB) nebulizer solution 1 ampule  1 ampule Inhalation Q4H WA Saurabh Glasgow MD   1 ampule at 06/01/21 0711    acetaminophen (TYLENOL) tablet 650 mg  650 mg Oral Q4H PRN Saurabh Glasgow MD   650 mg at 05/31/21 1158    insulin lispro (HUMALOG) injection vial 0-6 Units  0-6 Units Subcutaneous TID WC Saurabh Glasgow MD        insulin lispro (HUMALOG) injection vial 0-3 Units  0-3 Units Subcutaneous Nightly Saurabh Glasgow MD        glucose (GLUTOSE) 40 % oral gel 15 g  15 g Oral PRN Saurabh Glasgow MD        dextrose 50 % IV solution  12.5 g Intravenous PRN Saurabh Glasgow MD        glucagon (rDNA) injection 1 mg  1 mg Intramuscular PRN Saurabh Glasgow MD        dextrose 5 % solution  100 mL/hr Intravenous PRN Saurabh Glasgow MD           Social History:       TOBACCO:   reports that he quit smoking about 22 years ago. He smoked 0.00 packs per day for 40.00 years.  His smokeless tobacco use includes chew. ETOH:   reports current alcohol use of about 1.0 standard drinks of alcohol per week. DRUGS:  reports no history of drug use. OCCUPATION:          Family Histroy:         Problem Relation Age of Onset    Kidney Disease Mother     Diabetes Mother     Heart Disease Mother     Arthritis Mother     Heart Disease Father     Heart Attack Father     Arthritis Sister     Diabetes Sister     Heart Disease Sister     Diabetes Sister            Review of Systems:   · Constitutional: there has been no unanticipated weight loss. There's been no change in energy level, sleep pattern, or activity level. · Eyes: No visual changes or diplopia. No scleral icterus. · ENT: No Headaches, hearing loss or vertigo. No mouth sores or sore throat. · Cardiovascular: No chest pain, positive dyspnea on exertion, palpitations or loss of consciousness. No cough, hemoptysis, pleuritic pain, or phlebitis. · Respiratory: Positive cough or wheezing, no sputum production. No hematemesis. · Gastrointestinal: No abdominal pain, appetite loss, blood in stools. No change in bowel or bladder habits. · Genitourinary: No dysuria, trouble voiding, or hematuria. · Musculoskeletal:  No gait disturbance, weakness or joint complaints. · Integumentary: No rash or pruritis. · Neurological: No headache, diplopia, change in muscle strength, numbness or tingling. No change in gait, balance, coordination, mood, affect, memory, mentation, behavior. · Psychiatric: No anxiety, or depression. · Endocrine: No temperature intolerance. No excessive thirst, fluid intake, or urination. No tremor. · Hematologic/Lymphatic: No abnormal bruising or bleeding, blood clots or swollen lymph nodes. · Allergic/Immunologic: No nasal congestion or hives.        Physical Exam    Vital Signs: /63   Pulse 81   Temp 97.7 °F (36.5 °C) (Axillary)   Resp 18   Ht 5' 5\" (1.651 m)   Wt 278 lb 10.6 oz (126.4 kg)   SpO2 96% Comment: room air BMI 46.37 kg/m²        Admission Weight: 280 lb (127 kg)     General appearance: Awake, Alert Cooperative    Head: Normocephalic, without obvious abnormality, atraumatic    Eyes: Conjunctivae/corneas clear. PERRL, EOM's intact. Fundi benign    Neck: no adenopathy, no carotid bruit, no JVD, supple, symmetrical, trachea midline and thyroid: not enlarged, symmetric, no tenderness/mass/nodules    Lungs: Coarse diminished to auscultation bilaterally    Heart: regular rate and rhythm, S1, S2 normal, no murmur, click, rub or gallop    Abdomen: Soft, non-tender. Bowel sounds normal. No masses,  no organomegaly    Extremities: extremities normal, atraumatic, no cyanosis, +3 bilateral lower extremity edema    Skin: Skin color, texture, turgor normal. No rashes or lesions    Neurologic: Grossly normal        MEDICAL DECISION MAKING/TESTING    Cardiac Cath:  1/21/2021  Procedures    CARDIAC CATHETERIZATION   Coronary angiogram and left ventricular gram/pressure   Indications    Abnormal stress test [R94.39 (ICD-10-CM)]   Conclusion    Recommendations:  Minimal luminal disease, and elevated filling pressures. Will optimize medical therapy. Pre Procedure Diagnosis    abnormal stress test , chest pain syndrome   Wall Motion    LVEDP 28 mmHg            Coronary Vessel Findings    Diagnostic  Dominance: Right  Left Main   The vessel was visualized by angiography, is moderate in size and is angiographically normal.   Left Anterior Descending   The vessel was visualized by angiography, is moderate in size and is angiographically normal.   Left Circumflex   The vessel was visualized by angiography, is moderate in size and is angiographically normal.   Right Coronary Artery   There is mild diffuse disease throughout the vessel. Intervention    No interventions have been documented. Echo/Stress:  1/21/2021  CONCLUSIONS     Summary  Echo contrast utilized on this technically difficult study.   Left ventricle is normal in size.  Estimated LV EF 45-50 %. Moderate left ventricular hypertrophy. Left atrium is at upper limits of normal.  Normal right ventricular size and function. Aortic valve is trileaflet. Mild aortic stenosis. Peak instantaneous gradient 23 mmHg and mean gradient 13 mmHg. Thickened mitral valve leaflets. Mild to moderate mitral regurgitation. Normal tricuspid valve structure and function. Mild tricuspid regurgitation. Estimated right ventricular systolic pressure is 43 mmHg. Mildly elevated  right ventricular systolic pressure. IVC Increased diameter, but still has inspiratory variation. No significant pericardial effusion is seen. EKG:        CXR:   HISTORY:   ORDERING SYSTEM PROVIDED HISTORY: sob   TECHNOLOGIST PROVIDED HISTORY:   sob   Reason for Exam: Increased sob x 3 days   Acuity: Acute   Type of Exam: Initial   Additional signs and symptoms: Increased sob x 3 days       FINDINGS:   Mild cardiomegaly.  Pulmonary vascular congestion.  Bilateral linear   pulmonary opacities right greater than left.           Impression   Bilateral linear pulmonary opacities right greater than left could represent   atypical pneumonia or pulmonary edema           Labs:      CBC:   Recent Labs     05/30/21  1705 05/31/21  0551 06/01/21  0552   WBC 11.3* 9.7 10.6   HGB 11.2* 12.1* 12.9*   HCT 33.3* 36.3* 38.6*   MCV 87.2 85.9 86.5    231 238     BMP:   Recent Labs     05/30/21  1705 05/30/21  1927 05/31/21  0551   NA PLEASE DISREGARD RESULTS. SPECIMEN CONTAMINATED. 141 141   K NOT REPORTED 4.0 3.8   CL PLEASE DISREGARD RESULTS. SPECIMEN CONTAMINATED. [address] SPECIMEN CONTAMINATED. 31 30   BUN PLEASE DISREGARD RESULTS. SPECIMEN CONTAMINATED. 12 17   CREATININE PLEASE DISREGARD RESULTS. SPECIMEN CONTAMINATED.  1.05 1.01     PT/INR:   Recent Labs     05/30/21  1640   PROTIME 12.2   INR 0.9     APTT:   Recent Labs     05/30/21  1640   APTT 26.4     MAG: No results for input(s): MG in the last 72 hours. D Dimer: No results for input(s): DDIMER in the last 72 hours. Troponin T   Recent Labs     05/30/21  1705 05/30/21  1927 05/30/21  2151   TROPONINT NOT REPORTED NOT REPORTED NOT REPORTED     ProBNP Invalid input(s): PRO-BNP          Diagnosis:  Active Problems:    HTN (hypertension)    Chronic diastolic CHF (congestive heart failure) (HCC)    Chronic bilateral low back pain without sciatica    Bronchitis    COPD exacerbation (HCC)    CHF (congestive heart failure), NYHA class I, acute on chronic, diastolic (HCC)    Mixed simple and mucopurulent chronic bronchitis (HCC)    VANDANA (obstructive sleep apnea)  Resolved Problems:    * No resolved hospital problems. *    1. Acute on chronic systolic heart failure exacerbation with EF 45 to 50%  -  -Weight 278 pounds, baseline 261  -Lateral lower extremity edema +3  -Increased shortness of breath with exertion  -will increase Lasix to 40 mg IV 3 times daily  -Metoprolol tartrate to metoprolol succinate 50 mg daily  -will monitor EILEEN's follows daily weights accurately  -1500 cc fluid restriction  -Repeat echocardiogram    2. nonobstructive CAD per cardiac cath 1/21/2021  -Mild diffuse multivessel disease  -Continued on aspirin, statin, beta-blocker  -Denies chest pain  -Troponins negative    3. COPD    4. Moderate pulmonary hypertension    5. Moderate mitral regurgitation    6. Essential hypertension  -Currently good control    7. Mixed hyperlipidemia  -Maintained on statin therapy    8. BMI 46.37    Plan:    Patient will need continued aggressive diuresis over the next couple days. Will increase Lasix to 40 mg 3 times daily. Monitor EILEEN's as well as daily weights accurately. Continue monitoring electrolytes and creatinine. Repeat echocardiogram to further assess EF. We will also transition from metoprolol tartrate to metoprolol succinate considering history of severe LV dysfunction.     Educated on salt avoidance as well as fluid restrictions. Patient verbalized understanding. We will continue to follow.

## 2021-06-01 NOTE — PLAN OF CARE
Problem: Falls - Risk of:  Goal: Will remain free from falls  Description: Will remain free from falls  6/1/2021 1601 by April Perea RN  Outcome: Met This Shift     Problem: Falls - Risk of:  Goal: Absence of physical injury  Description: Absence of physical injury  Outcome: Met This Shift     Problem: Infection:  Goal: Will remain free from infection  Description: Will remain free from infection  6/1/2021 1601 by Savannah Herrera RN  Outcome: Met This Shift     Problem: Safety:  Goal: Free from accidental physical injury  Description: Free from accidental physical injury  6/1/2021 1601 by April Perea RN  Outcome: Met This Shift     Problem: Safety:  Goal: Free from intentional harm  Description: Free from intentional harm  Outcome: Met This Shift     Problem: Daily Care:  Goal: Daily care needs are met  Description: Daily care needs are met  Outcome: Met This Shift     Problem: Pain:  Goal: Patient's pain/discomfort is manageable  Description: Patient's pain/discomfort is manageable  6/1/2021 1601 by April Perea RN  Outcome: Met This Shift     Problem: Skin Integrity:  Goal: Skin integrity will stabilize  Description: Skin integrity will stabilize  6/1/2021 1601 by April Perea RN  Outcome: Met This Shift     Problem: Discharge Planning:  Goal: Patients continuum of care needs are met  Description: Patients continuum of care needs are met  Outcome: Ongoing

## 2021-06-01 NOTE — FLOWSHEET NOTE
Writer attempted 3x to call [facility] but there was a fax tone instead of an answering machine.       06/01/21 1438   Encounter Summary   Services provided to: Patient   [facility] [employer] [place] [facility] Completed   Spiritual/Buddhist   Type Spiritual support

## 2021-06-02 ENCOUNTER — APPOINTMENT (OUTPATIENT)
Dept: GENERAL RADIOLOGY | Age: 61
DRG: 194 | End: 2021-06-02
Payer: COMMERCIAL

## 2021-06-02 VITALS
TEMPERATURE: 98.3 F | WEIGHT: 270.73 LBS | SYSTOLIC BLOOD PRESSURE: 114 MMHG | OXYGEN SATURATION: 95 % | RESPIRATION RATE: 16 BRPM | DIASTOLIC BLOOD PRESSURE: 67 MMHG | BODY MASS INDEX: 45.11 KG/M2 | HEIGHT: 65 IN | HEART RATE: 102 BPM

## 2021-06-02 LAB
ANION GAP SERPL CALCULATED.3IONS-SCNC: 13 MMOL/L (ref 9–17)
BUN BLDV-MCNC: 29 MG/DL (ref 8–23)
BUN/CREAT BLD: ABNORMAL (ref 9–20)
CALCIUM SERPL-MCNC: 9.1 MG/DL (ref 8.6–10.4)
CHLORIDE BLD-SCNC: 99 MMOL/L (ref 98–107)
CO2: 27 MMOL/L (ref 20–31)
CREAT SERPL-MCNC: 1.26 MG/DL (ref 0.7–1.2)
EKG ATRIAL RATE: 94 BPM
EKG P AXIS: 65 DEGREES
EKG P-R INTERVAL: 154 MS
EKG Q-T INTERVAL: 384 MS
EKG QRS DURATION: 102 MS
EKG QTC CALCULATION (BAZETT): 480 MS
EKG R AXIS: 24 DEGREES
EKG T AXIS: 0 DEGREES
EKG VENTRICULAR RATE: 94 BPM
GFR AFRICAN AMERICAN: >60 ML/MIN
GFR NON-AFRICAN AMERICAN: 58 ML/MIN
GFR SERPL CREATININE-BSD FRML MDRD: ABNORMAL ML/MIN/{1.73_M2}
GFR SERPL CREATININE-BSD FRML MDRD: ABNORMAL ML/MIN/{1.73_M2}
GLUCOSE BLD-MCNC: 105 MG/DL (ref 75–110)
GLUCOSE BLD-MCNC: 118 MG/DL (ref 75–110)
GLUCOSE BLD-MCNC: 135 MG/DL (ref 70–99)
GLUCOSE BLD-MCNC: 135 MG/DL (ref 75–110)
HCT VFR BLD CALC: 39.8 % (ref 41–53)
HEMOGLOBIN: 13.3 G/DL (ref 13.5–17.5)
MCH RBC QN AUTO: 28.8 PG (ref 26–34)
MCHC RBC AUTO-ENTMCNC: 33.4 G/DL (ref 31–37)
MCV RBC AUTO: 86.2 FL (ref 80–100)
NRBC AUTOMATED: ABNORMAL PER 100 WBC
PDW BLD-RTO: 13.8 % (ref 11.5–14.9)
PLATELET # BLD: 217 K/UL (ref 150–450)
PMV BLD AUTO: 8.4 FL (ref 6–12)
POTASSIUM SERPL-SCNC: 4.5 MMOL/L (ref 3.7–5.3)
RBC # BLD: 4.62 M/UL (ref 4.5–5.9)
SODIUM BLD-SCNC: 139 MMOL/L (ref 135–144)
WBC # BLD: 10.4 K/UL (ref 3.5–11)

## 2021-06-02 PROCEDURE — 6360000002 HC RX W HCPCS: Performed by: INTERNAL MEDICINE

## 2021-06-02 PROCEDURE — 36415 COLL VENOUS BLD VENIPUNCTURE: CPT

## 2021-06-02 PROCEDURE — 93010 ELECTROCARDIOGRAM REPORT: CPT | Performed by: INTERNAL MEDICINE

## 2021-06-02 PROCEDURE — 2580000003 HC RX 258: Performed by: INTERNAL MEDICINE

## 2021-06-02 PROCEDURE — 94761 N-INVAS EAR/PLS OXIMETRY MLT: CPT

## 2021-06-02 PROCEDURE — 6370000000 HC RX 637 (ALT 250 FOR IP): Performed by: INTERNAL MEDICINE

## 2021-06-02 PROCEDURE — 99239 HOSP IP/OBS DSCHRG MGMT >30: CPT | Performed by: INTERNAL MEDICINE

## 2021-06-02 PROCEDURE — 85027 COMPLETE CBC AUTOMATED: CPT

## 2021-06-02 PROCEDURE — 80048 BASIC METABOLIC PNL TOTAL CA: CPT

## 2021-06-02 PROCEDURE — 82947 ASSAY GLUCOSE BLOOD QUANT: CPT

## 2021-06-02 PROCEDURE — 94640 AIRWAY INHALATION TREATMENT: CPT

## 2021-06-02 PROCEDURE — 6360000002 HC RX W HCPCS: Performed by: NURSE PRACTITIONER

## 2021-06-02 PROCEDURE — 71046 X-RAY EXAM CHEST 2 VIEWS: CPT

## 2021-06-02 RX ORDER — POTASSIUM CHLORIDE 20 MEQ/1
20 TABLET, EXTENDED RELEASE ORAL
Status: DISCONTINUED | OUTPATIENT
Start: 2021-06-03 | End: 2021-06-02 | Stop reason: HOSPADM

## 2021-06-02 RX ORDER — METOPROLOL SUCCINATE 50 MG/1
50 TABLET, EXTENDED RELEASE ORAL DAILY
Qty: 30 TABLET | Refills: 3 | Status: SHIPPED | OUTPATIENT
Start: 2021-06-03 | End: 2021-08-30 | Stop reason: SDUPTHER

## 2021-06-02 RX ORDER — FUROSEMIDE 40 MG/1
40 TABLET ORAL DAILY
Status: DISCONTINUED | OUTPATIENT
Start: 2021-06-02 | End: 2021-06-02 | Stop reason: HOSPADM

## 2021-06-02 RX ORDER — FUROSEMIDE 10 MG/ML
40 INJECTION INTRAMUSCULAR; INTRAVENOUS 2 TIMES DAILY
Status: DISCONTINUED | OUTPATIENT
Start: 2021-06-02 | End: 2021-06-02

## 2021-06-02 RX ORDER — POTASSIUM CHLORIDE 20 MEQ/1
20 TABLET, EXTENDED RELEASE ORAL
Qty: 60 TABLET | Refills: 3 | Status: SHIPPED | OUTPATIENT
Start: 2021-06-03 | End: 2021-08-30 | Stop reason: SDUPTHER

## 2021-06-02 RX ADMIN — IPRATROPIUM BROMIDE AND ALBUTEROL SULFATE 1 AMPULE: .5; 3 SOLUTION RESPIRATORY (INHALATION) at 07:08

## 2021-06-02 RX ADMIN — SODIUM CHLORIDE, PRESERVATIVE FREE 10 ML: 5 INJECTION INTRAVENOUS at 09:38

## 2021-06-02 RX ADMIN — METFORMIN HYDROCHLORIDE 500 MG: 500 TABLET ORAL at 09:37

## 2021-06-02 RX ADMIN — IPRATROPIUM BROMIDE AND ALBUTEROL SULFATE 1 AMPULE: .5; 3 SOLUTION RESPIRATORY (INHALATION) at 14:43

## 2021-06-02 RX ADMIN — ATORVASTATIN CALCIUM 10 MG: 10 TABLET, FILM COATED ORAL at 09:37

## 2021-06-02 RX ADMIN — ENOXAPARIN SODIUM 30 MG: 30 INJECTION SUBCUTANEOUS at 09:38

## 2021-06-02 RX ADMIN — LISINOPRIL 30 MG: 10 TABLET ORAL at 14:07

## 2021-06-02 RX ADMIN — METOPROLOL SUCCINATE 50 MG: 50 TABLET, EXTENDED RELEASE ORAL at 09:38

## 2021-06-02 RX ADMIN — FUROSEMIDE 40 MG: 10 INJECTION, SOLUTION INTRAVENOUS at 09:37

## 2021-06-02 RX ADMIN — POTASSIUM CHLORIDE 20 MEQ: 1500 TABLET, EXTENDED RELEASE ORAL at 09:37

## 2021-06-02 RX ADMIN — IPRATROPIUM BROMIDE AND ALBUTEROL SULFATE 1 AMPULE: .5; 3 SOLUTION RESPIRATORY (INHALATION) at 10:49

## 2021-06-02 RX ADMIN — RANOLAZINE 500 MG: 500 TABLET, FILM COATED, EXTENDED RELEASE ORAL at 10:36

## 2021-06-02 RX ADMIN — ASPIRIN 81 MG: 81 TABLET, COATED ORAL at 09:37

## 2021-06-02 ASSESSMENT — PAIN SCALES - GENERAL
PAINLEVEL_OUTOF10: 0
PAINLEVEL_OUTOF10: 0

## 2021-06-02 NOTE — DISCHARGE INSTR - DIET

## 2021-06-02 NOTE — PROGRESS NOTES
Carla Garibay NP cardiology returns call; he is aware of runs of ectopy,informed that 2D echo is resulted and EG 33%; asked writer to scan monitor strips into system

## 2021-06-02 NOTE — PROGRESS NOTES
[facility] Internal Medicine    CONSULTATION / HISTORY AND PHYSICAL EXAMINATION            Date:   6/2/2021  Patient name:  Aravind Montez  Date of admission:  5/30/2021  3:41 PM  MRN:   [id number]  Account:  [de-identified]  YOB: 1960  PCP:    Arjun Begum MD  Room:   2097/2097-01  Code Status:    Full Code    Physician Requesting Consult: Fatmata Angelo MD    Reason for Consult:  medical management    Chief Complaint:     Chief Complaint   Patient presents with    Chest Pain    Shortness of Breath       History Obtained From:     Patient medical record nursing staff    History of Present Illness:   Patient past medical history multiple medical problems and morbid obesity, hypertension, coronary artery disease, mitral regurgitation, obstructive sleep apnea on CPAP, heart failure with reduced ejection fraction  Patient admitted with complaints of chest discomfort, worsening shortness of breath, difficulty in lying flat, swelling in legs  Symptoms are going on for last 7 days, which is progressively getting worse  Patient mentioned that he sometimes forgets to take his Lasix, mentioned that he is compliant with his low-salt diet  He was admitted in January with chest pain, underwent stress test which was positive, as per patient he underwent cardiac cath at [facility] which was negative  Patient complaining of chest discomfort in emergency room, at the time of my evaluation he was completely chest pain-free  Troponins are negative    Past Medical History:     Past Medical History:   Diagnosis Date    Bronchitis     CAD (coronary artery disease)     Cardiomyopathy ([address]     Chest pain     CHF (congestive heart failure) ([address]     Chronic back pain     COPD (chronic obstructive pulmonary disease) ([address]     Headache(784.0)     Hyperlipidemia     Hypertension     Inguinal hernia     Low back pain with sciatica     Migraine     Mitral valve regurgitation     Osteoarthritis     Pneumonia     Sleep apnea     with cpap    SOB (shortness of breath) on exertion     Tricuspid regurgitation     Type 2 diabetes mellitus without complication, without long-term current use of insulin ([address]     Under care of team 05/20/2021    cardiology-Dr Espinoza Matute visit jan 2021    Wellness examination 05/20/2021    [facility]        Past Surgical History:     Past Surgical History:   Procedure Laterality Date    ADENOIDECTOMY      COLONOSCOPY      COLONOSCOPY  05/28/2021    COLORECTAL CANCER SCREENING, NOT HIGH RISK, POLYPECTOMY    COLONOSCOPY N/A 5/28/2021    COLONOS[facility]CTOM[facility] H[facility] [address]   exact date unknown        Medications Prior to Admission:     Prior to Admission medications    Medication Sig Start Date End Date Taking?  Authorizing Provider   lisinopril (PRINIVIL;ZESTRIL) 30 MG tablet TAKE 1 TABLET BY MOUTH DAILY 5/13/21  Yes Debora Regan MD   metoprolol tartrate (LOPRESSOR) 50 MG tablet TAKE 1 TABLET BY MOUTH 2 TIMES DAILY 3/22/21  Yes Annel Valle MD   spironolactone (ALDACTONE) 25 MG tablet Take 1 tablet by mouth nightly 2/17/21  Yes Annel Valle MD   gabapentin (NEURONTIN) 300 MG capsule TAKE 1 CAPSULE BY MOUTH NIGHTLY AS NEEDED (PAIN) 12/10/20 12/10/21 Yes Annel Valle MD   potassium chloride (KLOR-CON M) 20 MEQ extended release tablet TAKE ONE TABLET BY MOUTH TWICE A DAY 12/9/20  Yes Annel Valle MD   furosemide (LASIX) 40 MG tablet TAKE 1 TABLET BY MOUTH DAILY 12/9/20  Yes Annel Valle MD   simvastatin (ZOCOR) 20 MG tablet Take 1 tablet by mouth nightly 12/8/20  Yes Annel Valle MD   metFORMIN (GLUCOPHAGE) 500 MG tablet Take 1 tablet by mouth daily (with breakfast) 3/3/21   Annel Valle MD   ranolazine (RANEXA) 500 MG extended release tablet Take 1 tablet by mouth 2 times daily 2/2/21 Anshu Franco MD   nitroGLYCERIN (NITROSTAT) 0.4 MG SL tablet Place 1 tablet under the tongue every 5 minutes as needed for Chest pain 12/8/20   Anshu Franco MD   furosemide (LASIX) 40 MG tablet Take 1 tablet by mouth daily 12/8/20   Anshu Franco MD   COMBIVENT RESPIMAT  MCG/ACT AERS inhaler INHALE 1 PUFF INTO THE LUNGS EVERY 6 HOURS 12/1/20   Anshu Franco MD   albuterol sulfate HFA (PROAIR HFA) 108 (90 Base) MCG/ACT inhaler Inhale 2 puffs into the lungs every 6 hours as needed for Wheezing 12/1/20   Anshu Franco MD   Masks (FACE MASK) MISC 1 each by Does not apply route as needed (as needed) 10/5/20   Anshu Franco MD   albuterol (PROVENTIL) (5 MG/ML) 0.5% nebulizer solution Take 0.5 mLs by nebulization every 6 hours as needed for Wheezing or Shortness of Breath 9/25/20   Madison Vail MD   naproxen (NAPROSYN) 500 MG tablet TAKE 1 TABLET BY MOUTH 2 TIMES DAILY FOR 20 DAYS 8/20/20 5/20/21  Anshu Franco MD   aspirin 81 MG tablet Take 1 tablet by mouth daily 8/13/19   Anshu Franco MD        Allergies:     Penicillins, Sulfa antibiotics, Sulfasalazine, Pseudoeph-doxylamine-dm-apap, and Rabbit protein    Social History:     Tobacco:    reports that he quit smoking about 22 years ago. He smoked 0.00 packs per day for 40.00 years. His smokeless tobacco use includes chew. Alcohol:      reports current alcohol use of about 1.0 standard drinks of alcohol per week. Drug Use:  reports no history of drug use. Family History:     Family History   Problem Relation Age of Onset    Kidney Disease Mother     Diabetes Mother     Heart Disease Mother     Arthritis Mother     Heart Disease Father     Heart Attack Father     Arthritis Sister     Diabetes Sister     Heart Disease Sister     Diabetes Sister        Review of Systems:     Positive and Negative as described in HPI. CONSTITUTIONAL:  negative for fevers, chills, sweats, fatigue, weight loss, central obesity.   HEENT:  negative for vision, hearing changes, runny nose, throat pain  RESPIRATORY: Shortness of breath, orthopnea, PND  CARDIOVASCULAR:  negative for chest pain, palpitations. GASTROINTESTINAL:  negative for nausea, vomiting, diarrhea, constipation, change in bowel habits, abdominal pain   GENITOURINARY:  negative for difficulty of urination, burning with urination, frequency   INTEGUMENT:  negative for rash, skin lesions, easy bruising   HEMATOLOGIC/LYMPHATIC:  negative for swelling/edema   ALLERGIC/IMMUNOLOGIC:  negative for urticaria , itching  ENDOCRINE:  negative increase in drinking, increase in urination, hot or cold intolerance  MUSCULOSKELETAL: Swelling in both legs  NEUROLOGICAL:  negative for headaches, dizziness, lightheadedness, numbness, pain, tingling extremities  BEHAVIOR/PSYCH:      Physical Exam:     BP (!) 148/81   Pulse 100   Temp 97.5 °F (36.4 °C) (Oral)   Resp 18   Ht 5' 5\" (1.651 m)   Wt 270 lb 11.6 oz (122.8 kg)   SpO2 96%   BMI 45.05 kg/m²   Temp (24hrs), Av.4 °F (36.9 °C), Min:97.5 °F (36.4 °C), Max:99 °F (37.2 °C)    Recent Labs     21  1104 21  1608 21  2135 21  0654   POCGLU 144* 172* 153* 118*       Intake/Output Summary (Last 24 hours) at 2021 1036  Last data filed at 2021 1028  Gross per 24 hour   Intake 1253 ml   Output 2570 ml   Net -1317 ml       General Appearance:  alert, well appearing, and in no acute distress  Mental status: oriented to person, place, and time with normal affect  Head:  normocephalic, atraumatic. Eye: no icterus, redness, pupils equal and reactive, extraocular eye movements intact, conjunctiva clear  Ear: normal external ear, no discharge, hearing intact  Nose:  no drainage noted  Mouth: mucous membranes moist  Neck: supple, no carotid bruits, thyroid not palpable  Lungs: Bilateral wheezing present t  Cardiovascular: normal rate, regular rhythm, no murmur, gallop, rub.   Abdomen: Soft, nontender, nondistended, normal bowel sounds, no hepatomegaly or splenomegaly  Neurologic: There are no new focal motor or sensory deficits, normal muscle tone and bulk, no abnormal sensation, normal speech, cranial nerves II through XII grossly intact  Skin: No gross lesions, rashes, bruising or bleeding on exposed skin area  Extremities:   2+ pitting edema present both legs psych:     Investigations:      Laboratory Testing:  Recent Results (from the past 24 hour(s))   POC Glucose Fingerstick    Collection Time: 06/01/21 11:04 AM   Result Value Ref Range    POC Glucose 144 (H) 75 - 110 mg/dL   POC Glucose Fingerstick    Collection Time: 06/01/21  4:08 PM   Result Value Ref Range    POC Glucose 172 (H) 75 - 110 mg/dL   POC Glucose Fingerstick    Collection Time: 06/01/21  9:35 PM   Result Value Ref Range    POC Glucose 153 (H) 75 - 110 mg/dL   Basic Metabolic Panel w/ Reflex to MG    Collection Time: 06/02/21  4:54 AM   Result Value Ref Range    Glucose 135 (H) 70 - 99 mg/dL    BUN 29 (H) 8 - 23 mg/dL    CREATININE 1.26 (H) 0.70 - 1.20 mg/dL    Bun/Cre Ratio NOT REPORTED 9 - 20    Calcium 9.1 8.6 - 10.4 mg/dL    Sodium 139 135 - 144 mmol/L    Potassium 4.5 3.7 - 5.3 mmol/L    Chloride 99 98 - 107 mmol/L    CO2 27 20 - 31 mmol/L    Anion Gap 13 9 - 17 mmol/L    GFR Non-African American 58 (L) >60 mL/min    GFR African American >60 >60 mL/min    GFR Comment          GFR Staging NOT REPORTED    CBC    Collection Time: 06/02/21  4:54 AM   Result Value Ref Range    WBC 10.4 3.5 - 11.0 k/uL    RBC 4.62 4.5 - 5.9 m/uL    Hemoglobin 13.3 (L) 13.5 - 17.5 g/dL    Hematocrit 39.8 (L) 41 - 53 %    MCV 86.2 80 - 100 fL    MCH 28.8 26 - 34 pg    MCHC 33.4 31 - 37 g/dL    RDW 13.8 11.5 - 14.9 %    Platelets 193 261 - 421 k/uL    MPV 8.4 6.0 - 12.0 fL    NRBC Automated NOT REPORTED per 100 WBC   POC Glucose Fingerstick    Collection Time: 06/02/21  6:54 AM   Result Value Ref Range    POC Glucose 118 (H) 75 - 110 mg/dL           Consultations:   IP CONSULT TO INTERNAL MEDICINE  IP CONSULT TO CARDIOLOGY  IP CONSULT TO RESPIRATORY CARE  Assessment :      Primary Problem  <principal problem not specified>    Active Hospital Problems    Diagnosis Date Noted    Mixed simple and mucopurulent chronic bronchitis ([facility] [J41.8] 05/31/2021    VANDANA (obstructive sleep apnea) [G47.33] 05/31/2021    CHF (congestive heart failure), NYHA class I, acute on chronic, diastolic (HCC) [O64.27] 38/34/6579    [facility] [J44.1] 01/21/2021    Bronchitis [J40] 01/01/2019    Chronic bilateral low back pain without sciatica [M54.5, G89.29] 06/29/2018    Chronic diastolic CHF (congestive heart failure) [facility] [I50.32] 08/01/2014    HTN (hypertension) [I10] 05/28/2013       Plan:     1. Acute decompensated heart failure, likely due to noncompliance  2. Patient has heart failure with reduced ejection fraction, moderate degree of mitral regurgitation  3. Starting patient on IV Lasix 40 mg twice a day, low-salt diet, 1500 mL fluid restriction, patient is already on Lasix, Lopressor, Aldactone  4. History of degenerative disc disease in the back, which is controlled on Tylenol  5. Had positive stress test, I could not see the report but patient claims that he underwent cardiac cath this year in [facility] which was negative, repeating EKG, troponins are negative  6. Moderate degree of mitral regurgitation, will need outpatient evaluation for structural heart disease, for possible valve replacement/mitral valve clip with admission with CHF exacerbation  7. Diabetes, restart home dose of Metformin, sliding scale  8. On DVT prophylaxis Lovenox  9. Obstructive sleep apnea on CPAP, requesting RT consult  10. COPD, compensated restarted DuoNeb nebulization  11.  Requesting cardiology consult    6/1  Patient doing much better today  Shortness of breath is improving  Wheezing improved on clinical exam  Negative balance of around 2 L  Plan for echo  6/2  Patient feeling much better today  Almost close to

## 2021-06-02 NOTE — PLAN OF CARE
Problem: Fluid Volume - Imbalance:  Goal: Absence of imbalanced fluid volume signs and symptoms  Description: Absence of imbalanced fluid volume signs and symptoms  Outcome: Ongoing  Note: Patient on lasix and a fluid restriction regimen willcontinue to have strict input and output throughout the shift

## 2021-06-02 NOTE — CARE COORDINATION
ONGOING DISCHARGE PLAN:    Patient is alert and oriented x4. Spoke with patient regarding discharge plan and patient confirms that plan is still to discharge to home with no needs  Lasix 40 mg bid  Repeat cxr today   Order for CHF clinic ordered   EF 33%  4-5 run of ectopy     Will continue to follow for additional discharge needs.     Electronically signed by Shaun Jansen RN on 6/2/2021 at 12:03 PM

## 2021-06-02 NOTE — PLAN OF CARE
Problem: Falls - Risk of:  Goal: Will remain free from falls  Description: Will remain free from falls  6/2/2021 0402 by Vicente Aguirre RN  Outcome: Ongoing  Note: Patient will have no fall throughout the shift will continue to monitor  6/1/2021 1601 by Kourtney Ching RN  Outcome: Met This Shift     Problem: Infection:  Goal: Will remain free from infection  Description: Will remain free from infection  6/2/2021 0402 by Vicente Aguirre RN  Outcome: Ongoing  Note: Patient will remain free from infection will continue to monitor  6/1/2021 1601 by Kourtney Ching RN  Outcome: Met This Shift     Problem: Daily Care:  Goal: Daily care needs are met  Description: Daily care needs are met  6/2/2021 0402 by Vicente Aguirre RN  Outcome: Ongoing  Note: We will continnue to attend to patients needs throughout the shift will continue to monitor  6/1/2021 1601 by Kourtney Ching RN  Outcome: Met This Shift     Problem: Pain:  Goal: Patient's pain/discomfort is manageable  Description: Patient's pain/discomfort is manageable  6/2/2021 0402 by Vicente Aguirre RN  Outcome: Ongoing  Note: We will continue to encourage patient to verbal ise his pain to have him comfortable throughout the shift  6/1/2021 1601 by Kourtney Ching RN  Outcome: Met This Shift

## 2021-06-02 NOTE — PROGRESS NOTES
Dr Chichi Garcia was shown pt episode 4 and 5 beat run ectopy at 1910 on 6/1/21,strip posted; ordered to notify cardiology of this and decreased EF 33%

## 2021-06-02 NOTE — PROGRESS NOTES
Dr Jerica Page visits ;examines , speaks with pt re: Ranexa and pt told cardiology does want pt to take it

## 2021-06-02 NOTE — PROGRESS NOTES
Dr Saritha Riley notified re: possible discharge home  And notified of runs ectopy;strips faxed to Dr Saritha Riley

## 2021-06-02 NOTE — PROGRESS NOTES
[facility] CARDIOLOGY Progress Note    6/2/2021 8:19 AM      Subjective:  Mr. Lori Borrego S feeling better and diuresing well. Patient  denies any chest pain or shortness of breath or palpitations or lightheadedness or dizziness. Review of systems:  No fever or chills. No cough. No diarrhea. No headaches.              LABS:     Recent Results (from the past 24 hour(s))   EKG 12 Lead    Collection Time: 06/01/21  8:51 AM   Result Value Ref Range    Ventricular Rate 94 BPM    Atrial Rate 94 BPM    P-R Interval 154 ms    QRS Duration 102 ms    Q-T Interval 384 ms    QTc Calculation (Bazett) 480 ms    P Axis 65 degrees    R Axis 24 degrees    T Axis 0 degrees   POC Glucose Fingerstick    Collection Time: 06/01/21 11:04 AM   Result Value Ref Range    POC Glucose 144 (H) 75 - 110 mg/dL   POC Glucose Fingerstick    Collection Time: 06/01/21  4:08 PM   Result Value Ref Range    POC Glucose 172 (H) 75 - 110 mg/dL   POC Glucose Fingerstick    Collection Time: 06/01/21  9:35 PM   Result Value Ref Range    POC Glucose 153 (H) 75 - 110 mg/dL   Basic Metabolic Panel w/ Reflex to MG    Collection Time: 06/02/21  4:54 AM   Result Value Ref Range    Glucose 135 (H) 70 - 99 mg/dL    BUN 29 (H) 8 - 23 mg/dL    CREATININE 1.26 (H) 0.70 - 1.20 mg/dL    Bun/Cre Ratio NOT REPORTED 9 - 20    Calcium 9.1 8.6 - 10.4 mg/dL    Sodium 139 135 - 144 mmol/L    Potassium 4.5 3.7 - 5.3 mmol/L    Chloride 99 98 - 107 mmol/L    CO2 27 20 - 31 mmol/L    Anion Gap 13 9 - 17 mmol/L    GFR Non-African American 58 (L) >60 mL/min    GFR African American >60 >60 mL/min    GFR Comment          GFR Staging NOT REPORTED    CBC    Collection Time: 06/02/21  4:54 AM   Result Value Ref Range    WBC 10.4 3.5 - 11.0 k/uL    RBC 4.62 4.5 - 5.9 m/uL    Hemoglobin 13.3 (L) 13.5 - 17.5 g/dL    Hematocrit 39.8 (L) 41 - 53 %    MCV 86.2 80 - 100 fL    MCH 28.8 26 - 34 pg    MCHC 33.4 31 - 37 g/dL    RDW 13.8 11.5 - 14.9 %    Platelets 262 180 - 845 k/uL significant murmur or gallop or rubs. Abdomen: soft, non-tender. Extremities: no cyanosis, no clubbing, no calf tenderness. Improving bilateral leg edema. Pulses: intact bilateral radial pulses. Skin:  warm and dry. Neuro:  Able to move all 4 extremities. 2D echocardiogram-pending. ASSESSMENT:    Acute on chronic systolic heart failure with reduced LV ejection fraction. LVEF 39% on MUGA scan January 2021. LVEF 45 to 50% on 2D echocardiogram January 2021. PVCs, nonsustained ventricular tachycardia-asymptomatic. Nonobstructive coronary artery disease with no active angina pectoris. Moderate mitral regurgitation. Moderate pulmonary hypertension. Essential hypertension. Hyperlipidemia. Diabetes mellitus. VANDANA, uses CPAP. Obesity with BMI 45 range. Other problems as charted. REC/PLAN:      As ordered. Reviewed chest x-ray findings and 2D echocardiogram findings at the time of this dictation this afternoon. Will switch over from IV to oral Lasix 40 mg daily, switch over to metoprolol XL 50 mg daily, decrease KCl from b.i.d. to once daily, continue on other current cardiac medications including lisinopril, potassium decrease dose to 20 mg daily, Ranexa 500 mg b.i.d.,, cardiac diet, no added salt diet, regular exercise as tolerated. Overall guarded prognosis. Will plan to refer to electrophysiologist as an outpatient basis for consideration of prophylactic AICD. Okay to discharge patient to home from cardiac standpoint and will see him back in office in the next 2-4 weeks and further management accordingly. Discussed with the nurse over the phone at the time of this dictation. Thank you. Electronically signed by Rocio Spencer MD, [facility] [place]       Addendum:      Chest x-ray two views 06/02/2021:     Clear lungs. Cardiomegaly. No acute cardiopulmonary abnormality on official report.        2D echocardiogram 06/02/2021:    Summary   Technically difficult study   Left ventricle is normal in size and wall thickness. Severe anterior wall and apical hypokinesis   Left ventricular systolic function is moderately reduced. Calculated EF via   De La Paz's method is 33 %. Both atria are normal in size. Normal right ventricular size and function. Mild to moderate mitral regurgitation. No significant pericardial effusion is seen. Normal aortic root dimension. IVC not well visualized     PLEASE NOTE:  This progress note was completed using a voice transcription system. Every effort was made to ensure accuracy. However, inadvertent computerized transcription errors may be present.

## 2021-06-03 ENCOUNTER — CARE COORDINATION (OUTPATIENT)
Dept: CASE MANAGEMENT | Age: 61
End: 2021-06-03

## 2021-06-03 NOTE — CARE COORDINATION
[facility] Transitions Initial Follow Up Call    Call within 2 business days of discharge: Yes    Patient: Blank Angel Patient : 1960   MRN: [id number]  Reason for Admission: chest pain  Discharge Date: 21 RARS: Readmission Risk Score: 18      Last Discharge [facility]       Complaint Diagnosis Description Type Department Provider    21 Chest Pain; Shortness of Breath Acute congestive heart failure, unspecified heart failure type [facility]) ED to Hosp-Admission (Discharged) (ADMITTED) Jackson Russell MD; [facility] [place]. ..            # 1 attempt- unable to reach patient, left vm message with name and call back information, requested call back//JU  Automated message, # using screening tool from [address]: [facility]    Non-face-to-face services provided:      Care Transitions 24 Hour Call    Care Transitions Interventions         Follow Up  Future Appointments   Date Time Provider Sondra Hogue   2021  4:00 PM Alphonso Cabezas MD [facility], RN

## 2021-06-04 ENCOUNTER — CARE COORDINATION (OUTPATIENT)
Dept: CASE MANAGEMENT | Age: 61
End: 2021-06-04

## 2021-06-04 DIAGNOSIS — I50.32 CHRONIC DIASTOLIC CHF (CONGESTIVE HEART FAILURE) (HCC): Primary | ICD-10-CM

## 2021-06-04 NOTE — DISCHARGE SUMMARY
positive, as per patient he underwent cardiac cath at [facility] which was negative    Patient was eval by cardiologist, getting discharged home. After adjustment of medication    Significant therapeutic interventions:     Significant Diagnostic Studies:   Labs / Micro:        ,     Radiology:    ECHO Complete 2D W Doppler W Color    Result Date: 6/2/2021  [address] Transthoracic Echocardiography Report (TTE)  Patient Name Gio Plummer      Date of Study         06/01/2021               Lobo Piper   Date of      1960  Gender                Male  Birth   Age          63 year(s)  Race                     Room Number  2097        Height:               65 inch, 165.1 cm   Corporate ID [id number]    Weight:               278 pounds, 126.1 kg  #   Patient [place] [de-identified]   BSA:       2.28 m^2   BMI:         46.26 kg/m^2  #   MR #         [id number]      Sonographer           Noy Leblanc   Accession #  [id number]  Interpreting          Jenae Pineda                           Physician   Fellow                   Referring Nurse                           Practitioner   Interpreting             Referring Physician   Alisha Tolentino,  Fellow                                         PINKY-CNP  Type of Study   TTE procedure:2D Echocardiogram, M-Mode, Doppler, Color Doppler. Procedure Date Date: 06/01/2021 Start: 01:29 PM Study Location: [facility] Technical Quality: Fair visualization Indications:Shortness of breath. History / Tech. Comments: HTN,, CHF, COPD, VANDANA Patient Status: Inpatient Height: 65 inches Weight: 278.01 pounds BSA: 2.28 m^2 BMI: 46.26 kg/m^2 Rhythm: Within normal limits HR: 95 bpm BP: 125/83 mmHg CONCLUSIONS Summary Technically difficult study Left ventricle is normal in size and wall thickness. Severe anterior wall and apical hypokinesis Left ventricular systolic function is moderately reduced. Calculated EF via De La Paz's method is 33 %.  Both atria are normal in size. Normal right ventricular size and function. Mild to moderate mitral regurgitation. No significant pericardial effusion is seen. Normal aortic root dimension. IVC not well visualized Signature ----------------------------------------------------------------------------  Electronically signed by Andre Carranza(Interpreting physician) on  2021 10:05 AM ---------------------------------------------------------------------------- ----------------------------------------------------------------------------  Electronically signed by Noy Leblanc(Sonographer) on 2021 02:50  PM ---------------------------------------------------------------------------- FINDINGS Left Atrium Left atrium is normal in size. Left Ventricle Left ventricle is normal in size and wall thickness. Left ventricular systolic function is moderately reduced. Calculated EF via De La Paz's method is 33 %. Right Atrium Right atrium is normal in size. Right Ventricle Normal right ventricular size and function. Mitral Valve Thickened mitral valve leaflets. Mild mitral regurgitation. Aortic Valve No obvious valvular abnormality seen. No evidence of aortic insufficiency or stenosis. Tricuspid Valve Tricuspid valve was not well visualized. Insignificant tricuspid regurgitation, unable to estimate RVSP. Pulmonic Valve Pulmonic valve was not well visualized. No evidence of pulmonic insufficiency or stenosis. Pericardial Effusion No significant pericardial effusion is seen. Pleural Effusion No pleural effusion seen. Miscellaneous Normal aortic root dimension.  M-mode / 2D Measurements & Calculations:   LVIDd:5.38 cm(3.7 - 5.6 cm)      Diastolic AQVOEE:70 ml  MBFAP:7.79 cm(2.2 - 4.0 cm)      Systolic ZIJCST:85 ml  IVSd:1 cm(0.6 - 1.1 cm)          Aortic Root:3.1 cm(2.0 - 3.7 cm)  LVPWd:1.02 cm(0.6 - 1.1 cm)      LA Dimension: 3.7 cm(1.9 - 4.0 cm)  Fractional Shortenin.91 %    LA volume/Index: 57 ml /25m^2  Calculated LVEF (%): 25 %        LVOT:2 cm   Mitral:                              Aortic   Peak E-Wave: 1.21 m/s                Peak Velocity: 1.85 m/s  Peak A-Wave: 1.10 m/s                Mean Velocity: 1.42 m/s  E/A Ratio: 1.1                       Peak Gradient: 13.69 mmHg  Peak Gradient: 5.86 mmHg             Mean Gradient: 9 mmHg  Deceleration Time: 211 msec                                        Area (continuity): 1.71 cm^2                                       AV VTI: 34.4 cm  Septal Wall E' velocity:0.05 m/s Lateral Wall E' velocity:0.11 m/s    XR CHEST (2 VW)    Result Date: 6/2/2021  EXAMINATION: TWO XRAY VIEWS OF THE CHEST 6/2/2021 10:14 am COMPARISON: May 30, 2021. HISTORY: ORDERING SYSTEM PROVIDED HISTORY: Acute on chronic heart failure with reduced LVEF, shortness of breath, leg edema TECHNOLOGIST PROVIDED HISTORY: Acute on chronic heart failure with reduced LVEF, shortness of breath, leg edema Reason for Exam: Acute on chronic heart failure with reduced LVEF, shortness of breath, leg edema Acuity: Unknown Type of Exam: Unknown FINDINGS: Upright frontal and lateral view chest radiographs were obtained. The heart is enlarged. The mediastinal contour and pleural spaces are otherwise within normal limits. The lungs are grossly clear. There is no focal consolidation or pneumothorax. The pulmonary vascular pattern is within normal limits. No acute thoracic osseous abnormality. Clear lungs. Cardiomegaly. No acute cardiopulmonary abnormality. XR CHEST PORTABLE    Result Date: 5/30/2021  EXAMINATION: ONE XRAY VIEW OF THE CHEST 5/30/2021 4:14 pm COMPARISON: 10/27/2020 HISTORY: ORDERING SYSTEM PROVIDED HISTORY: sob TECHNOLOGIST PROVIDED HISTORY: sob Reason for Exam: Increased sob x 3 days Acuity: Acute Type of Exam: Initial Additional signs and symptoms: Increased sob x 3 days FINDINGS: Mild cardiomegaly. Pulmonary vascular congestion. Bilateral linear pulmonary opacities right greater than left.      Bilateral linear pulmonary opacities right greater than left could represent atypical pneumonia or pulmonary edema         Consultations:    Consults:     Final Specialist Recommendations/Findings:   IP CONSULT TO INTERNAL MEDICINE  IP CONSULT TO CARDIOLOGY  IP CONSULT TO RESPIRATORY CARE  IP CONSULT TO HEART FAILURE NURSE/COORDINATOR      The patient was seen and examined on day of discharge and this discharge summary is in conjunction with any daily progress note from day of discharge. Discharge plan:     Disposition: Home    Physician Follow Up:     Suleman Hagan MD  [address]  [address]  [phone]    On 6/8/2021  Hospital follow up appt time is at 4:15 pm pleae arrive 15 mins early     Yasmany Moon MD  [address] [place] [address], [place] [address]  [phone]    Schedule an appointment as soon as possible for a visit in 3 weeks         Requiring Further Evaluation/Follow Up POST HOSPITALIZATION/Incidental Findings:    Diet: cardiac diet    Activity: As tolerated    Instructions to Patient:     Discharge Medications:      Medication List      START taking these medications    metoprolol succinate 50 MG extended release tablet  Commonly known as: TOPROL XL  Take 1 tablet by mouth daily        CHANGE how you take these medications    furosemide 40 MG tablet  Commonly known as: LASIX  Take 1 tablet by mouth daily  What changed: Another medication with the same name was removed. Continue taking this medication, and follow the directions you see here.      potassium chloride 20 MEQ extended release tablet  Commonly known as: KLOR-CON M  Take 1 tablet by mouth daily (with breakfast)  What changed:   · how much to take  · how to take this  · when to take this  · additional instructions        CONTINUE taking these medications    * albuterol (5 MG/ML) 0.5% nebulizer solution  Commonly known as: PROVENTIL  Take 0.5 mLs by nebulization every 6 hours as needed for Wheezing or Shortness of Breath     * albuterol sulfate HFA 108 (90 Base) MCG/ACT inhaler  Commonly known as: ProAir HFA  Inhale 2 puffs into the lungs every 6 hours as needed for Wheezing     aspirin 81 MG tablet  Take 1 tablet by mouth daily     Combivent Respimat  MCG/ACT Aers inhaler  Generic drug: albuterol-ipratropium  INHALE 1 PUFF INTO THE LUNGS EVERY 6 HOURS     Face Mask Misc  1 each by Does not apply route as needed (as needed)     gabapentin 300 MG capsule  Commonly known as: NEURONTIN  TAKE 1 CAPSULE BY MOUTH NIGHTLY AS NEEDED (PAIN)     lisinopril 30 MG tablet  Commonly known as: PRINIVIL;ZESTRIL  TAKE 1 TABLET BY MOUTH DAILY     metFORMIN 500 MG tablet  Commonly known as: GLUCOPHAGE  Take 1 tablet by mouth daily (with breakfast)     naproxen 500 MG tablet  Commonly known as: NAPROSYN  TAKE 1 TABLET BY MOUTH 2 TIMES DAILY FOR 20 DAYS     nitroGLYCERIN 0.4 MG SL tablet  Commonly known as: NITROSTAT  Place 1 tablet under the tongue every 5 minutes as needed for Chest pain     ranolazine 500 MG extended release tablet  Commonly known as: RANEXA  Take 1 tablet by mouth 2 times daily     simvastatin 20 MG tablet  Commonly known as: ZOCOR  Take 1 tablet by mouth nightly     spironolactone 25 MG tablet  Commonly known as: ALDACTONE  Take 1 tablet by mouth nightly         * This list has 2 medication(s) that are the same as other medications prescribed for you. Read the directions carefully, and ask your doctor or other care provider to review them with you.             STOP taking these medications    metoprolol tartrate 50 MG tablet  Commonly known as: LOPRESSOR           Where to Get Your Medications      These medications were sent to [place] [id number]    Phone: [phone]   · metoprolol succinate 50 MG extended release tablet  · potassium chloride 20 MEQ extended release tablet         Time Spent on discharge is  35 mins in patient examination, evaluation, counseling as well as medication reconciliation, prescriptions for required medications, discharge plan and follow up. Electronically signed by   Alvin Wasserman MD  6/4/2021  5:36 PM      Thank you Dr. Mariana Milton MD for the opportunity to be involved in this patient's care.

## 2021-06-04 NOTE — CARE COORDINATION
[facility] Transitions Initial Follow Up Call    Call within 2 business days of discharge: Yes    Patient: Hayden Carrizales Patient : 1960   MRN: [id number]  Reason for Admission: Acute CHF  Discharge Date: 21 RARS: Readmission Risk Score: 18      Last Discharge [address]       Complaint Diagnosis Description Type Department Provider    21 Chest Pain; Shortness of Breath Acute congestive heart failure, unspecified heart failure type [facility]) ED to Hosp-Admission (Discharged) (ADMITTED) Doris Alexis MD; Mila Jett. .. Spoke with:Jesus      Facility: [facility]    Transitions of Care Initial Call      Challenges to be reviewed by the provider   Additional needs identified to be addressed with provider: No  none             Method of communication with provider : none    Was this a readmission? No  Patient stated reason for admission: acute CHF  Patients top risk factors for readmission: medical condition-CHF, COPD    Care Transition Nurse (CTN) contacted the patient by telephone to perform post hospital discharge assessment. Verified name and  with patient as identifiers. Provided introduction to self, and explanation of the CTN role. CTN reviewed discharge instructions, medical action plan and red flags with patient who verbalized understanding. Patient given an opportunity to ask questions and does not have any further questions or concerns at this time. Were discharge instructions available to patient? Yes. Reviewed appropriate site of care based on symptoms and resources available to patient including: PCP and Specialist. The patient agrees to contact the PCP office for questions related to their healthcare. Medication reconciliation was performed with patient, who verbalizes understanding of administration of home medications. Advised obtaining a 90-day supply of all daily and as-needed medications. CTN provided contact information.  Plan for follow-up call in 5-7 days based on severity of symptoms and risk factors. Spoke to [place] who stated he just got home from work and is about to do a breathing treatment. Stated he still has occ cough, sl BLE, occ SOB. Stated he has not picked up new medication metoprolol succinate but will do so later today. Stated he is scheduled to get heart monitor on 6/17/21 and has PCP appt next week on 6/8/21. Pt using nebulizer and inhalers prn and elevating feet when able to. Stated he occ checks weight, encouraged to do so every day and call Dr for gain of 2-3 lbs/day or 5 or more per week. Stated he is taking lasix 20 MG daily with 4 MEQ k+cl, informed him that K+ cl was reduced at discharge to 20 MEQ, pt v/u. Denies CP/pressure, palpitations, worsening SOB. Stated he has Nitro at home and knows how to use if needed. Pt stated he sees his Cardiologist on 7/6/21.       Care Transitions 24 Hour Call    Do you have any ongoing symptoms?: Yes  Patient-reported symptoms: Cough, Shortness of Breath, Other (Comment: sl BLE, occ sob)  Interventions for patient-reported symptoms: Other (Comment: has f/u scheduled, using inhaler, nebulizer prn)  Do you have a copy of your discharge instructions?: Yes  Do you have all of your prescriptions and are they filled?: Yes  Have you been contacted by a [facility] Pharmacist?: No  Have you scheduled your follow up appointment?: Yes  How are you going to get to your appointment?: Car - drive self  Were you discharged with any Home Care or Post Acute Services: No  Do you feel like you have everything you need to keep you well at home?: Yes  Care Transitions Interventions         Follow Up  Future Appointments   Date Time Provider Sondra Hogue   6/8/2021  4:00 PM Alphonso Garcia MD [address] [facility]   6/17/2021  1:30 PM [facility] [place] [address] [facility], RN

## 2021-06-08 ENCOUNTER — CARE COORDINATION (OUTPATIENT)
Dept: CASE MANAGEMENT | Age: 61
End: 2021-06-08

## 2021-06-11 DIAGNOSIS — J42 CHRONIC BRONCHITIS, UNSPECIFIED CHRONIC BRONCHITIS TYPE (HCC): ICD-10-CM

## 2021-06-11 RX ORDER — ALBUTEROL SULFATE 90 UG/1
2 AEROSOL, METERED RESPIRATORY (INHALATION) EVERY 6 HOURS PRN
Qty: 8.5 G | Refills: 1 | Status: SHIPPED | OUTPATIENT
Start: 2021-06-11 | End: 2021-08-30 | Stop reason: SDUPTHER

## 2021-06-11 NOTE — TELEPHONE ENCOUNTER
E-scribe request for albuterol sulfate. Please review and e-scribe if applicable.      Last Visit Date:  03/08/2021  Next Visit Date:  Visit date not found    Hemoglobin A1C (%)   Date Value   03/08/2021 5.9   02/01/2021 6.3   01/24/2021 6.1 (H)             ( goal A1C is < 7)   No results found for: LABMICR  LDL Cholesterol (mg/dL)   Date Value   01/22/2021 117       (goal LDL is <100)   AST (U/L)   Date Value   01/21/2021 19     ALT (U/L)   Date Value   01/21/2021 27     BUN (mg/dL)   Date Value   06/02/2021 29 (H)     BP Readings from Last 3 Encounters:   06/02/21 114/67   05/28/21 (!) 141/79   05/28/21 135/74          (goal 120/80)        Patient Active Problem List:     HTN (hypertension)     Skin tag     Low back pain with sciatica     Hyperglycemia     Infected sebaceous cyst     Chronic diastolic CHF (congestive heart failure) (HCC)     Chronic bilateral low back pain without sciatica     Chronic pain of right knee     Chest pain, unspecified     Bronchitis     Chronic systolic congestive heart failure (HCC)     Ventral hernia     Epigastric pain     Elevated LFTs     Shortness of breath     Rectus diastasis     Lumbosacral spondylosis without myelopathy     COPD exacerbation (HCC)     CHF (congestive heart failure), NYHA class I, acute on chronic, diastolic (HCC)     Mixed simple and mucopurulent chronic bronchitis (HCC)     VANDANA (obstructive sleep apnea)      ----Kanu Rodriguez

## 2021-06-17 ENCOUNTER — HOSPITAL ENCOUNTER (OUTPATIENT)
Dept: NON INVASIVE DIAGNOSTICS | Age: 61
Discharge: HOME | End: 2021-06-17
Payer: COMMERCIAL

## 2021-06-17 ENCOUNTER — CARE COORDINATION (OUTPATIENT)
Dept: CASE MANAGEMENT | Age: 61
End: 2021-06-17

## 2021-06-17 DIAGNOSIS — I47.29 NSVT (NONSUSTAINED VENTRICULAR TACHYCARDIA): ICD-10-CM

## 2021-06-17 PROCEDURE — 93226 XTRNL ECG REC<48 HR SCAN A/R: CPT

## 2021-06-17 PROCEDURE — 93225 XTRNL ECG REC<48 HRS REC: CPT

## 2021-06-17 NOTE — CARE COORDINATION
[facility] Transitions Follow Up Call    2021    Patient: Linda Brooks  Patient : 1960   MRN: [id number]  Reason for Admission:   Discharge Date: 21 RARS: Readmission Risk Score: 18         # 2 attempt- unable to reach patient, left vm message, requested call back, care transitions completed//JU    Care Transitions Subsequent and Final Call    Subsequent and Final Calls  Care Transitions Interventions  Other Interventions: Follow Up  No future appointments.     Suad Fernández RN

## 2021-07-03 LAB
ACQUISITION DURATION: NORMAL S
AVERAGE HEART RATE: 101 BPM
HOOKUP DATE: NORMAL
HOOKUP TIME: NORMAL
MAX HEART RATE TIME/DATE: NORMAL
MAX HEART RATE: 120 BPM
MIN HEART RATE TIME/DATE: NORMAL
MIN HEART RATE: 77 BPM
NUMBER OF QRS COMPLEXES: NORMAL
NUMBER OF SUPRAVENTRICULAR COUPLETS: 0
NUMBER OF SUPRAVENTRICULAR ECTOPICS: 5
NUMBER OF SUPRAVENTRICULAR ISOLATED BEATS: 5
NUMBER OF VENTRICULAR BIGEMINAL CYCLES: 0
NUMBER OF VENTRICULAR COUPLETS: 15
NUMBER OF VENTRICULAR ECTOPICS: 417

## 2021-07-19 DIAGNOSIS — I50.22 CHRONIC SYSTOLIC CONGESTIVE HEART FAILURE (HCC): ICD-10-CM

## 2021-07-19 RX ORDER — SPIRONOLACTONE 25 MG/1
25 TABLET ORAL NIGHTLY
Qty: 30 TABLET | Refills: 1 | Status: SHIPPED | OUTPATIENT
Start: 2021-07-19 | End: 2021-08-30 | Stop reason: SDUPTHER

## 2021-07-19 NOTE — TELEPHONE ENCOUNTER
Please address the medication refill and close the encounter. If I can be of assistance, please route to the applicable pool. Thank you. Last visit:3-8-2021  Last Med refill: 2-  Does patient have enough medication for 72 hours: No:     Next Visit Date:  No future appointments.     Health Maintenance   Topic Date Due    COVID-19 Vaccine (1) Never done    Shingles Vaccine (1 of 2) Never done    Flu vaccine (1) 09/01/2021    Lipid screen  01/22/2022    A1C test (Diabetic or Prediabetic)  03/08/2022    Potassium monitoring  06/02/2022    Creatinine monitoring  06/02/2022    Colon cancer screen colonoscopy  05/28/2024    Pneumococcal 0-64 years Vaccine (2 of 2 - PPSV23) 12/18/2024    DTaP/Tdap/Td vaccine (2 - Td or Tdap) 12/08/2030    Hepatitis C screen  Completed    HIV screen  Completed    Hepatitis A vaccine  Aged Out    Hepatitis B vaccine  Aged Out    Hib vaccine  Aged Out    Meningococcal (ACWY) vaccine  Aged Out       Hemoglobin A1C (%)   Date Value   03/08/2021 5.9   02/01/2021 6.3   01/24/2021 6.1 (H)             ( goal A1C is < 7)   No results found for: LABMICR  LDL Cholesterol (mg/dL)   Date Value   01/22/2021 117   06/20/2020 57       (goal LDL is <100)   AST (U/L)   Date Value   01/21/2021 19     ALT (U/L)   Date Value   01/21/2021 27     BUN (mg/dL)   Date Value   06/02/2021 29 (H)     BP Readings from Last 3 Encounters:   06/02/21 114/67   05/28/21 (!) 141/79   05/28/21 135/74          (goal 120/80)    All Future Testing planned in [facility]  Lab Frequency Next Occurrence   PT eval and treat Once 10/22/2020   PT aquatic therapy Once 10/22/2020   COVID-19 Once 05/21/2021               Patient Active Problem List:     HTN (hypertension)     Skin tag     Low back pain with sciatica     Hyperglycemia     Infected sebaceous cyst     Chronic diastolic CHF (congestive heart failure) (HCC)     Chronic bilateral low back pain without sciatica     Chronic pain of right knee

## 2021-08-04 ENCOUNTER — HOSPITAL ENCOUNTER (EMERGENCY)
Age: 61
Discharge: HOME | End: 2021-08-04
Attending: EMERGENCY MEDICINE
Payer: COMMERCIAL

## 2021-08-04 VITALS
DIASTOLIC BLOOD PRESSURE: 79 MMHG | BODY MASS INDEX: 44.48 KG/M2 | SYSTOLIC BLOOD PRESSURE: 146 MMHG | RESPIRATION RATE: 18 BRPM | HEIGHT: 65 IN | TEMPERATURE: 98.5 F | WEIGHT: 267 LBS | OXYGEN SATURATION: 97 % | HEART RATE: 104 BPM

## 2021-08-04 DIAGNOSIS — M54.6 ACUTE MIDLINE THORACIC BACK PAIN: Primary | ICD-10-CM

## 2021-08-04 PROCEDURE — 96372 THER/PROPH/DIAG INJ SC/IM: CPT

## 2021-08-04 PROCEDURE — 6360000002 HC RX W HCPCS: Performed by: EMERGENCY MEDICINE

## 2021-08-04 PROCEDURE — 99283 EMERGENCY DEPT VISIT LOW MDM: CPT

## 2021-08-04 RX ORDER — TIZANIDINE 4 MG/1
4 TABLET ORAL EVERY 8 HOURS PRN
Qty: 15 TABLET | Refills: 0 | Status: SHIPPED | OUTPATIENT
Start: 2021-08-04 | End: 2021-09-27 | Stop reason: ALTCHOICE

## 2021-08-04 RX ORDER — ORPHENADRINE CITRATE 30 MG/ML
60 INJECTION INTRAMUSCULAR; INTRAVENOUS ONCE
Status: COMPLETED | OUTPATIENT
Start: 2021-08-04 | End: 2021-08-04

## 2021-08-04 RX ADMIN — ORPHENADRINE CITRATE 60 MG: 30 INJECTION INTRAMUSCULAR; INTRAVENOUS at 20:17

## 2021-08-04 ASSESSMENT — ENCOUNTER SYMPTOMS
COUGH: 0
COLOR CHANGE: 0
EYE PAIN: 0
CHEST TIGHTNESS: 0
TROUBLE SWALLOWING: 0
SHORTNESS OF BREATH: 0
ABDOMINAL PAIN: 0
SINUS PRESSURE: 0
RHINORRHEA: 0
DIARRHEA: 0
NAUSEA: 0
SORE THROAT: 0
FACIAL SWELLING: 0
CONSTIPATION: 0
BACK PAIN: 1
BLOOD IN STOOL: 0
VOMITING: 0
EYE REDNESS: 0
EYE DISCHARGE: 0
WHEEZING: 0

## 2021-08-04 ASSESSMENT — PAIN SCALES - GENERAL: PAINLEVEL_OUTOF10: 5

## 2021-08-05 NOTE — ED PROVIDER NOTES
[address] ED  eMERGENCY dEPARTMENT eNCOUnter      Pt Name: Travis Marie  MRN: [id number]  [place] 1960  Date of evaluation: 8/4/21      CHIEF COMPLAINT       Chief Complaint   Patient presents with    Mass     Between shoulder blades; ongoing for four days; denies injury. HISTORY OF PRESENT ILLNESS    Travis Marie is a 63 y.o. male who presents complaining of back pain. Patient states that he feels that there is a knot in the middle of his upper back between his shoulder blades going up at the base of the neck. Patient states when he is to make certain movements the pain radiates around his clavicle area. Patient states is been going on for 4 days and the pain can be severe but usually is moderate. Patient states that he has tried his anti-inflammatories and gabapentin at home which are not helping at all. Patient denies any numbness tingling or weakness. Patient denies any trauma. REVIEW OF SYSTEMS       Review of Systems   Constitutional: Negative for activity change, appetite change, chills, diaphoresis and fever. HENT: Negative for congestion, ear pain, facial swelling, nosebleeds, rhinorrhea, sinus pressure, sore throat and trouble swallowing. Eyes: Negative for pain, discharge and redness. Respiratory: Negative for cough, chest tightness, shortness of breath and wheezing. Cardiovascular: Negative for chest pain, palpitations and leg swelling. Gastrointestinal: Negative for abdominal pain, blood in stool, constipation, diarrhea, nausea and vomiting. Genitourinary: Negative for difficulty urinating, dysuria, flank pain, frequency, genital sores and hematuria. Musculoskeletal: Positive for back pain. Negative for arthralgias, gait problem, joint swelling, myalgias and neck pain. Skin: Negative for color change, pallor, rash and wound. Neurological: Negative for dizziness, tremors, seizures, syncope, speech difficulty, weakness, numbness and headaches. (NEURONTIN) 300 MG CAPSULE    TAKE 1 CAPSULE BY MOUTH NIGHTLY AS NEEDED (PAIN)    LISINOPRIL (PRINIVIL;ZESTRIL) 30 MG TABLET    TAKE 1 TABLET BY MOUTH DAILY    MASKS (FACE MASK) MISC    1 each by Does not apply route as needed (as needed)    METFORMIN (GLUCOPHAGE) 500 MG TABLET    Take 1 tablet by mouth daily (with breakfast)    METOPROLOL SUCCINATE (TOPROL XL) 50 MG EXTENDED RELEASE TABLET    Take 1 tablet by mouth daily    NAPROXEN (NAPROSYN) 500 MG TABLET    TAKE 1 TABLET BY MOUTH 2 TIMES DAILY FOR 20 DAYS    NITROGLYCERIN (NITROSTAT) 0.4 MG SL TABLET    Place 1 tablet under the tongue every 5 minutes as needed for Chest pain    POTASSIUM CHLORIDE (KLOR-CON M) 20 MEQ EXTENDED RELEASE TABLET    Take 1 tablet by mouth daily (with breakfast)    RANOLAZINE (RANEXA) 500 MG EXTENDED RELEASE TABLET    Take 1 tablet by mouth 2 times daily    SIMVASTATIN (ZOCOR) 20 MG TABLET    Take 1 tablet by mouth nightly    SPIRONOLACTONE (ALDACTONE) 25 MG TABLET    Take 1 tablet by mouth nightly       ALLERGIES     is allergic to penicillins, sulfa antibiotics, sulfasalazine, pseudoeph-doxylamine-dm-apap, and rabbit protein. SOCIAL HISTORY      reports that he quit smoking about 22 years ago. He smoked 0.00 packs per day for 40.00 years. His smokeless tobacco use includes chew. He reports current alcohol use of about 1.0 standard drinks of alcohol per week. He reports that he does not use drugs. PHYSICAL EXAM     INITIAL VITALS: BP (!) 146/79   Pulse 104   Temp 98.5 °F (36.9 °C)   Resp 18   Ht 5' 5\" (1.651 m)   Wt 267 lb (121.1 kg)   SpO2 97%   BMI 44.43 kg/m²      Physical Exam  Vitals and nursing note reviewed. Constitutional:       General: He is not in acute distress. Appearance: He is well-developed. He is not diaphoretic. HENT:      Head: Normocephalic and atraumatic. Eyes:      General: No scleral icterus. Right eye: No discharge. Left eye: No discharge.       Conjunctiva/sclera: Conjunctivae normal.      Pupils: Pupils are equal, round, and reactive to light. Pulmonary:      Effort: Pulmonary effort is normal. No respiratory distress. Musculoskeletal:      Cervical back: Normal.      Thoracic back: Tenderness present. No swelling, edema, deformity, signs of trauma, lacerations, spasms or bony tenderness. Decreased range of motion. No scoliosis. Skin:     General: Skin is warm and dry. Coloration: Skin is not pale. Findings: No erythema or rash. Neurological:      Mental Status: He is alert and oriented to person, place, and time. Psychiatric:         Behavior: Behavior normal.         Thought Content: Thought content normal.         Judgment: Judgment normal.         DIAGNOSTIC RESULTS     RADIOLOGY:All plain film, CT,MRI, and formal ultrasound images (except ED bedside ultrasound) are read by the radiologist and the interpretations are directly viewed by the emergency physician. LABS: All lab results were reviewed by myself, and all abnormals are listed below. Labs Reviewed - No data to display      MEDICAL DECISION MAKING:     I think the patient most likely has a muscle spasm that is deep and extremely tender. Patient has no mass that I am able to noticed to suggest either a painful lipoma or an abscess. We will give him a muscle relaxer and see if we get his pain under controlled and having follow-up with his PCP.       EMERGENCY DEPARTMENT COURSE:   Vitals:    Vitals:    08/04/21 1920   BP: (!) 146/79   Pulse: 104   Resp: 18   Temp: 98.5 °F (36.9 °C)   SpO2: 97%   Weight: 267 lb (121.1 kg)   Height: 5' 5\" (1.651 m)       The patient was given the following medications while in the emergency department:  Orders Placed This Encounter   Medications    orphenadrine (NORFLEX) injection 60 mg    tiZANidine (ZANAFLEX) 4 MG tablet     Sig: Take 1 tablet by mouth every 8 hours as needed (Back pain)     Dispense:  15 tablet     Refill:  0

## 2021-08-12 RX ORDER — POTASSIUM CHLORIDE 20 MEQ/1
TABLET, EXTENDED RELEASE ORAL
Qty: 60 TABLET | Refills: 3 | OUTPATIENT
Start: 2021-08-12

## 2021-08-22 ENCOUNTER — HOSPITAL ENCOUNTER (EMERGENCY)
Age: 61
Discharge: HOME | End: 2021-08-23
Attending: EMERGENCY MEDICINE
Payer: COMMERCIAL

## 2021-08-22 DIAGNOSIS — R22.43 LOCALIZED SWELLING OF BOTH LOWER LEGS: Primary | ICD-10-CM

## 2021-08-22 PROCEDURE — 93005 ELECTROCARDIOGRAM TRACING: CPT | Performed by: STUDENT IN AN ORGANIZED HEALTH CARE EDUCATION/TRAINING PROGRAM

## 2021-08-22 PROCEDURE — 96374 THER/PROPH/DIAG INJ IV PUSH: CPT

## 2021-08-22 ASSESSMENT — PAIN DESCRIPTION - FREQUENCY: FREQUENCY: INTERMITTENT

## 2021-08-22 ASSESSMENT — PAIN DESCRIPTION - ONSET: ONSET: ON-GOING

## 2021-08-22 ASSESSMENT — PAIN DESCRIPTION - LOCATION: LOCATION: CHEST

## 2021-08-22 ASSESSMENT — PAIN DESCRIPTION - DESCRIPTORS: DESCRIPTORS: BURNING

## 2021-08-22 ASSESSMENT — PAIN SCALES - GENERAL: PAINLEVEL_OUTOF10: 5

## 2021-08-22 ASSESSMENT — PAIN DESCRIPTION - ORIENTATION: ORIENTATION: LEFT;RIGHT

## 2021-08-22 ASSESSMENT — PAIN DESCRIPTION - PAIN TYPE: TYPE: ACUTE PAIN

## 2021-08-23 ENCOUNTER — APPOINTMENT (OUTPATIENT)
Dept: GENERAL RADIOLOGY | Age: 61
End: 2021-08-23
Payer: COMMERCIAL

## 2021-08-23 VITALS
TEMPERATURE: 98.8 F | RESPIRATION RATE: 13 BRPM | HEIGHT: 65 IN | SYSTOLIC BLOOD PRESSURE: 139 MMHG | OXYGEN SATURATION: 100 % | WEIGHT: 265 LBS | BODY MASS INDEX: 44.15 KG/M2 | DIASTOLIC BLOOD PRESSURE: 82 MMHG | HEART RATE: 80 BPM

## 2021-08-23 VITALS
TEMPERATURE: 99 F | WEIGHT: 265 LBS | OXYGEN SATURATION: 98 % | SYSTOLIC BLOOD PRESSURE: 150 MMHG | DIASTOLIC BLOOD PRESSURE: 90 MMHG | BODY MASS INDEX: 44.1 KG/M2 | RESPIRATION RATE: 20 BRPM | HEART RATE: 80 BPM

## 2021-08-23 DIAGNOSIS — R60.0 LEG EDEMA: ICD-10-CM

## 2021-08-23 DIAGNOSIS — R60.9 PERIPHERAL EDEMA: Primary | ICD-10-CM

## 2021-08-23 LAB
ABSOLUTE EOS #: 0.22 K/UL (ref 0–0.44)
ABSOLUTE IMMATURE GRANULOCYTE: <0.03 K/UL (ref 0–0.3)
ABSOLUTE LYMPH #: 1.5 K/UL (ref 1.1–3.7)
ABSOLUTE MONO #: 0.95 K/UL (ref 0.1–1.2)
ANION GAP SERPL CALCULATED.3IONS-SCNC: 12 MMOL/L (ref 9–17)
BASOPHILS # BLD: 0 % (ref 0–2)
BASOPHILS ABSOLUTE: 0.03 K/UL (ref 0–0.2)
BNP INTERPRETATION: NORMAL
BUN BLDV-MCNC: 16 MG/DL (ref 8–23)
BUN/CREAT BLD: ABNORMAL (ref 9–20)
CALCIUM SERPL-MCNC: 8.7 MG/DL (ref 8.6–10.4)
CHLORIDE BLD-SCNC: 100 MMOL/L (ref 98–107)
CO2: 26 MMOL/L (ref 20–31)
CREAT SERPL-MCNC: 1.03 MG/DL (ref 0.7–1.2)
DIFFERENTIAL TYPE: ABNORMAL
EKG ATRIAL RATE: 114 BPM
EKG P AXIS: 67 DEGREES
EKG P-R INTERVAL: 150 MS
EKG Q-T INTERVAL: 332 MS
EKG QRS DURATION: 98 MS
EKG QTC CALCULATION (BAZETT): 457 MS
EKG R AXIS: 30 DEGREES
EKG T AXIS: -93 DEGREES
EKG VENTRICULAR RATE: 114 BPM
EOSINOPHILS RELATIVE PERCENT: 2 % (ref 1–4)
GFR AFRICAN AMERICAN: >60 ML/MIN
GFR NON-AFRICAN AMERICAN: >60 ML/MIN
GFR SERPL CREATININE-BSD FRML MDRD: ABNORMAL ML/MIN/{1.73_M2}
GFR SERPL CREATININE-BSD FRML MDRD: ABNORMAL ML/MIN/{1.73_M2}
GLUCOSE BLD-MCNC: 140 MG/DL (ref 70–99)
HCT VFR BLD CALC: 38.5 % (ref 40.7–50.3)
HEMOGLOBIN: 12.2 G/DL (ref 13–17)
IMMATURE GRANULOCYTES: 0 %
LYMPHOCYTES # BLD: 16 % (ref 24–43)
MCH RBC QN AUTO: 28.3 PG (ref 25.2–33.5)
MCHC RBC AUTO-ENTMCNC: 31.7 G/DL (ref 28.4–34.8)
MCV RBC AUTO: 89.3 FL (ref 82.6–102.9)
MONOCYTES # BLD: 10 % (ref 3–12)
NRBC AUTOMATED: 0 PER 100 WBC
PDW BLD-RTO: 13 % (ref 11.8–14.4)
PLATELET # BLD: 224 K/UL (ref 138–453)
PLATELET ESTIMATE: ABNORMAL
PMV BLD AUTO: 10.7 FL (ref 8.1–13.5)
POTASSIUM SERPL-SCNC: 3.8 MMOL/L (ref 3.7–5.3)
PRO-BNP: 252 PG/ML
RBC # BLD: 4.31 M/UL (ref 4.21–5.77)
RBC # BLD: ABNORMAL 10*6/UL
SEG NEUTROPHILS: 72 % (ref 36–65)
SEGMENTED NEUTROPHILS ABSOLUTE COUNT: 6.94 K/UL (ref 1.5–8.1)
SODIUM BLD-SCNC: 138 MMOL/L (ref 135–144)
TROPONIN INTERP: NORMAL
TROPONIN T: NORMAL NG/ML
TROPONIN, HIGH SENSITIVITY: 11 NG/L (ref 0–22)
WBC # BLD: 9.7 K/UL (ref 3.5–11.3)
WBC # BLD: ABNORMAL 10*3/UL

## 2021-08-23 PROCEDURE — 99283 EMERGENCY DEPT VISIT LOW MDM: CPT

## 2021-08-23 PROCEDURE — 93970 EXTREMITY STUDY: CPT

## 2021-08-23 PROCEDURE — 80048 BASIC METABOLIC PNL TOTAL CA: CPT

## 2021-08-23 PROCEDURE — 96372 THER/PROPH/DIAG INJ SC/IM: CPT

## 2021-08-23 PROCEDURE — 84484 ASSAY OF TROPONIN QUANT: CPT

## 2021-08-23 PROCEDURE — 71045 X-RAY EXAM CHEST 1 VIEW: CPT

## 2021-08-23 PROCEDURE — 83880 ASSAY OF NATRIURETIC PEPTIDE: CPT

## 2021-08-23 PROCEDURE — 6360000002 HC RX W HCPCS: Performed by: STUDENT IN AN ORGANIZED HEALTH CARE EDUCATION/TRAINING PROGRAM

## 2021-08-23 PROCEDURE — 93010 ELECTROCARDIOGRAM REPORT: CPT | Performed by: INTERNAL MEDICINE

## 2021-08-23 PROCEDURE — 85025 COMPLETE CBC W/AUTO DIFF WBC: CPT

## 2021-08-23 RX ORDER — FUROSEMIDE 10 MG/ML
20 INJECTION INTRAMUSCULAR; INTRAVENOUS ONCE
Status: COMPLETED | OUTPATIENT
Start: 2021-08-23 | End: 2021-08-23

## 2021-08-23 RX ADMIN — ENOXAPARIN SODIUM 120 MG: 120 INJECTION SUBCUTANEOUS at 04:45

## 2021-08-23 RX ADMIN — FUROSEMIDE 20 MG: 10 INJECTION, SOLUTION INTRAMUSCULAR; INTRAVENOUS at 00:42

## 2021-08-23 ASSESSMENT — ENCOUNTER SYMPTOMS
VOMITING: 0
DIARRHEA: 0
COUGH: 1
ABDOMINAL PAIN: 0
NAUSEA: 0
COLOR CHANGE: 0
SHORTNESS OF BREATH: 1

## 2021-08-23 NOTE — ED PROVIDER NOTES
[facility] ED  Emergency Department Encounter  Emergency Medicine Resident     Pt Name: Pavan Degroot  MRN: [id number]  Micheletgfmagdalena 1960  Date of evaluation: 8/23/21  PCP:  Maria Isabel Ley MD    CHIEF COMPLAINT       No chief complaint on file. HISTORY OFPRESENT ILLNESS  (Location/Symptom, Timing/Onset, Context/Setting, Quality, Duration, Modifying Factors,Severity.)      Pavan Degroot is a 63 y.o. male with past medical history CAD, cardiomyopathy, CHF, COPD, hyperlipidemia, hypertension who presents emergency department complains of 2 days of increased swelling in his legs. She does have history of CHF for which she has been taking Lasix. Patient states he did miss his dose of Lasix yesterday. Patient also states he has been going over his recommended 1500 cc of daily fluid allotment as it has been hot recently and he has been drinking more fluids in an effort to stay hydrated and cool. Patient states he has been becoming more dyspneic with shorter distances. Patient does also endorse chest pain ongoing over the last day when walking as well cough productive of clear sputum. No fevers, chills. No COVID-19 contacts, he did not receive his COVID-19 vaccine. PAST MEDICAL / SURGICAL / SOCIAL / FAMILY HISTORY      has a past medical history of Bronchitis, CAD (coronary artery disease), Cardiomyopathy ([address] Chest pain, CHF (congestive heart failure) ([address] Chronic back pain, COPD (chronic obstructive pulmonary disease) ([address] Headache(784.0), Hyperlipidemia, Hypertension, Inguinal hernia, Low back pain with sciatica, Migraine, Mitral valve regurgitation, Osteoarthritis, Pneumonia, Sleep apnea, SOB (shortness of breath) on exertion, Tricuspid regurgitation, Type 2 diabetes mellitus without complication, without long-term current use of insulin ([address] Under care of team, and Wellness examination. has a past surgical history that includes hernia repair; Colonoscopy;  Tonsillectomy (1360   exact date unknown); Adenoidectomy; Colonoscopy (2021); and Colonoscopy (N/A, 2021). Social History     Socioeconomic History    Marital status: Single     Spouse name: Not on file    Number of children: Not on file    Years of education: Not on file    Highest education level: Not on file   Occupational History    Not on file   Tobacco Use    Smoking status: Former Smoker     Packs/day: 0.00     Years: 40.00     Pack years: 0.00     Quit date:      Years since quittin.6    Smokeless tobacco: Current User     Types: Chew    Tobacco comment: Chews / quit cigarettes   Vaping Use    Vaping Use: Never used   Substance and Sexual Activity    Alcohol use: Yes     Alcohol/week: 1.0 standard drinks     Types: 1 Glasses of wine per week    Drug use: No    Sexual activity: Not on file   Other Topics Concern    Not on file   Social History Narrative    Not on file     Social Determinants of Health     Financial Resource Strain:     Difficulty of Paying Living Expenses:    Food Insecurity:     Worried About Running Out of Food in the Last Year:     [address] in the Last Year:    Transportation Needs:     Lack of Transportation (Medical):      Lack of Transportation (Non-Medical):    Physical Activity:     Days of Exercise per Week:     Minutes of Exercise per Session:    Stress:     Feeling of Stress :    Social Connections:     Frequency of Communication with Friends and Family:     Frequency of Social Gatherings with Friends and Family:     Attends Confucianist Services:     Active Member of Clubs or Organizations:     Attends Club or Organization Meetings:     Marital Status:    Intimate Partner Violence:     Fear of Current or Ex-Partner:     Emotionally Abused:     Physically Abused:     Sexually Abused:        Family History   Problem Relation Age of Onset    Kidney Disease Mother     Diabetes Mother     Heart Disease Mother     Arthritis Mother    Lyndsey Me Heart Disease Father     Heart Attack Father     Arthritis Sister     Diabetes Sister     Heart Disease Sister     Diabetes Sister        Allergies:  Penicillins, Sulfa antibiotics, Sulfasalazine, Pseudoeph-doxylamine-dm-apap, and Rabbit protein    Home Medications:  Prior to Admission medications    Medication Sig Start Date End Date Taking?  Authorizing Provider   tiZANidine (ZANAFLEX) 4 MG tablet Take 1 tablet by mouth every 8 hours as needed (Back pain) 8/4/21   Michelle Dumas MD   spironolactone (ALDACTONE) 25 MG tablet Take 1 tablet by mouth nightly 7/19/21   Arlene Huffman MD   albuterol sulfate  (90 Base) MCG/ACT inhaler INHALE 2 PUFFS INTO THE LUNGS EVERY 6 HOURS AS NEEDED FOR WHEEZING 6/11/21   Rich Sharpe MD   metoprolol succinate (TOPROL XL) 50 MG extended release tablet Take 1 tablet by mouth daily 6/3/21   Johnny Ingram MD   potassium chloride (KLOR-CON M) 20 MEQ extended release tablet Take 1 tablet by mouth daily (with breakfast) 6/3/21   Johnny Ingram MD   lisinopril (PRINIVIL;ZESTRIL) 30 MG tablet TAKE 1 TABLET BY MOUTH DAILY 5/13/21   Jenna Cardona MD   metFORMIN (GLUCOPHAGE) 500 MG tablet Take 1 tablet by mouth daily (with breakfast) 3/3/21   Nicky Scruggs MD   ranolazine (RANEXA) 500 MG extended release tablet Take 1 tablet by mouth 2 times daily  Patient not taking: Reported on 6/4/2021 2/2/21   Nicky Scruggs MD   gabapentin (NEURONTIN) 300 MG capsule TAKE 1 CAPSULE BY MOUTH NIGHTLY AS NEEDED (PAIN) 12/10/20 12/10/21  Nicky Scruggs MD   nitroGLYCERIN (NITROSTAT) 0.4 MG SL tablet Place 1 tablet under the tongue every 5 minutes as needed for Chest pain 12/8/20   Nicky Scruggs MD   simvastatin (ZOCOR) 20 MG tablet Take 1 tablet by mouth nightly 12/8/20   Nicky Scruggs MD   furosemide (LASIX) 40 MG tablet Take 1 tablet by mouth daily 12/8/20   Nicky Scruggs MD   COMBIVENT RESPIMAT  MCG/ACT AERS inhaler INHALE 1 PUFF INTO THE LUNGS EVERY 6 HOURS 12/1/20   Nicky Scruggs MD Masks (FACE MASK) MISC 1 each by Does not apply route as needed (as needed) 10/5/20   Willow García MD   albuterol (PROVENTIL) (5 MG/ML) 0.5% nebulizer solution Take 0.5 mLs by nebulization every 6 hours as needed for Wheezing or Shortness of Breath 9/25/20   Leila Gallardo MD   naproxen (NAPROSYN) 500 MG tablet TAKE 1 TABLET BY MOUTH 2 TIMES DAILY FOR 20 DAYS 8/20/20 5/20/21  Willow García MD   aspirin 81 MG tablet Take 1 tablet by mouth daily 8/13/19   Willow García MD       REVIEW OF SYSTEMS    (2-9 systems for level 4, 10 or more for level 5)      Review of Systems   Constitutional: Negative for chills, fatigue and fever. HENT: Negative for congestion. Eyes: Negative for visual disturbance. Respiratory: Positive for cough and shortness of breath. Cardiovascular: Positive for chest pain. Gastrointestinal: Negative for abdominal pain, diarrhea, nausea and vomiting. Musculoskeletal: Negative for myalgias. Positive for bilateral lower extremity swelling   Skin: Negative for color change and rash. Neurological: Negative for weakness, numbness and headaches. PHYSICAL EXAM   (up to 7 for level 4, 8 or more for level 5)     INITIAL VITALS:    height is 5' 5\" (1.651 m) and weight is 265 lb (120.2 kg). His oral temperature is 98.8 °F (37.1 °C). His blood pressure is 154/87 (abnormal) and his pulse is 96. His respiration is 18 and oxygen saturation is 100%. Physical Exam  Constitutional:       General: He is not in acute distress. Appearance: Normal appearance. He is not ill-appearing or toxic-appearing. Cardiovascular:      Rate and Rhythm: Regular rhythm. Tachycardia present. Pulses: Normal pulses. Heart sounds: No murmur heard. No gallop. Pulmonary:      Effort: Pulmonary effort is normal. No respiratory distress. Breath sounds: Normal breath sounds. No wheezing or rales. Abdominal:      General: Abdomen is flat.  Bowel sounds are normal.      Palpations: Abdomen is soft. Tenderness: There is no abdominal tenderness. Musculoskeletal:         General: No tenderness. Comments: Bilateral lower extremities have 3+ pitting edema   Skin:     General: Skin is warm and dry. Capillary Refill: Capillary refill takes less than 2 seconds. Findings: No rash. Neurological:      General: No focal deficit present. Mental Status: He is alert and oriented to person, place, and time. Gait: Gait normal.         DIFFERENTIAL  DIAGNOSIS     PLAN (LABS / IMAGING / EKG):  Orders Placed This Encounter   Procedures    XR CHEST PORTABLE    CBC WITH AUTO DIFFERENTIAL    BASIC METABOLIC PANEL    Brain Natriuretic Peptide    Troponin    EKG 12 Lead       MEDICATIONS ORDERED:  Orders Placed This Encounter   Medications    furosemide (LASIX) injection 20 mg    enoxaparin (LOVENOX) injection 120 mg       DDX: CHF exacerbation, COPD, ACS, angina, DVT    Initial MDM/Plan: 63 y.o. male who presents with 2 days of increased lower extremity swelling, noncompliant with Lasix yesterday, patient has been drinking more than is recommended daily fluid allotment. Patient seen and examined. No acute distress. Speaking full senses. Patient is tachycardic in the 110s however vitals are otherwise unremarkable. Bilateral lower extremities 3+ pitting edema. Lungs are clear to auscultation without crackles. Heart exam shows tachycardia but regular rhythm. Will obtain cardiac evaluation, give dose of IV Lasix, reassess.     DIAGNOSTIC RESULTS / EMERGENCY DEPARTMENT COURSE / MDM     LABS:  Labs Reviewed   CBC WITH AUTO DIFFERENTIAL - Abnormal; Notable for the following components:       Result Value    Hemoglobin 12.2 (*)     Hematocrit 38.5 (*)     Seg Neutrophils 72 (*)     Lymphocytes 16 (*)     All other components within normal limits   BASIC METABOLIC PANEL - Abnormal; Notable for the following components:    Glucose 140 (*)     All other components within normal limits care and instructed to return to emergency department later today for ultrasound. Patient verbalized understanding and was discharged home in good condition. [DS]      ED Course User Index  [DS] Sheldon Ryder DO         PROCEDURES:  None    CONSULTS:  None    CRITICAL CARE:  Please see attending note    FINAL IMPRESSION      1. Localized swelling of both lower legs         DISPOSITION / PLAN     DISPOSITION Decision To Discharge 08/23/2021 01:34:42 AM        PATIENTREFERRED TO:  Loc Ceja MD  [address]   [address]  [address]  [phone]    Schedule an appointment as soon as possible for a visit   As needed      DISCHARGE MEDICATIONS:  New Prescriptions    No medications on file       Sheldon Ryder DO  EmergencyMedicine Resident    (Please note that portions of this note were completed with a voice recognition program.  Efforts were made to edit the dictations but occasionally words are mis-transcribed.)        Sheldon Ryder DO  Resident  08/23/21 1543

## 2021-08-23 NOTE — ED PROVIDER NOTES
[address]   Emergency Department  Emergency Medicine Attending Sign-out     Care of Gerardo Hansen was assumed from previous attending Dr. Acosta Sessions and is being seen for No chief complaint on file. .  The patient's initial evaluation and plan have been discussed with the prior provider who initially evaluated the patient. Attestation  I was available and discussed any additional care issues that arose and coordinated the management plans with the resident(s) caring for the patient during my duty period. Any areas of disagreement with resident's documentation of care or procedures are noted on the chart. I was personally present for the key portions of any/all procedures, during my duty period. I have documented in the chart those procedures where I was not present during the key portions. BRIEF PATIENT SUMMARY/MDM COURSE PER INITIAL PROVIDER:   RECENT VITALS:     Temp: 98.8 °F (37.1 °C),  Pulse: 113, Resp: 20, BP: (!) 188/103, SpO2: 100 %    This patient is a 63 y.o. Male with bilateral lower extremity edema. R>L but good pulses. Noncompliant with Lasxi, giving IV lasix.   If workup unremarkable, ok for d/c     DIAGNOSTICS/MEDICATIONS:     MEDICATIONS GIVEN:  ED Medication Orders (From admission, onward)    Start Ordered     Status Ordering Provider    08/23/21 0215 08/23/21 0212  enoxaparin (LOVENOX) injection 120 mg  ONCE      Ordered [place]    08/23/21 0015 08/23/21 0010  furosemide (LASIX) injection 20 mg  ONCE      Last MAR action: Given - by [facility] on 08/23/21 at [address] [facility] Chiang          LABS    Labs Reviewed   CBC WITH AUTO DIFFERENTIAL - Abnormal; Notable for the following components:       Result Value    Hemoglobin 12.2 (*)     Hematocrit 38.5 (*)     Seg Neutrophils 72 (*)     Lymphocytes 16 (*)     All other components within normal limits   BASIC METABOLIC PANEL - Abnormal; Notable for the following components:    Glucose 140 (*)     All other components within normal limits   BRAIN NATRIURETIC PEPTIDE   TROPONIN       RADIOLOGY  XR CHEST PORTABLE    Result Date: 8/23/2021  EXAMINATION: ONE XRAY VIEW OF THE CHEST 8/23/2021 12:06 am COMPARISON: 06/02/2021 HISTORY: ORDERING SYSTEM PROVIDED HISTORY: sob, hx /chf TECHNOLOGIST PROVIDED HISTORY: sob, hx /chf Reason for Exam: Upright port, SOB, COPD, CHF FINDINGS: The heart is borderline enlarged and more prominent. The pulmonary vessels are normal.  The lungs are mildly hyperinflated. No consolidation or effusion is seen. The bones are intact. Stable chronic changes with no acute abnormality seen. OUTSTANDING TASKS / ADDITIONAL COMMENTS   1. Labs    Patient given Lovenox, return for DVT study.     Caesar Severance, MD  Emergency Medicine Attending  [facility]        Citlalli German MD  08/23/21 1843

## 2021-08-23 NOTE — ED NOTES
Report given to [facility]  No change in patient status  Continues to rest quietly       [place]  08/23/21 [id number]

## 2021-08-23 NOTE — ED NOTES
Patient to room 4, ambulatory. Patient is a 63year old male   Patient complains of increased swelling and weeping to bilateral lower extremities  Patient verbalizes he has not taken his diuretics lately  Noted BLE edema  Patient verbalizes dyspnea with exertion  Patient placed on cardiac monitor, BP cuff and pulse ox. Alarms set.   NAD  Will continue to assess       Rodney Navas RN  08/23/21 1279

## 2021-08-23 NOTE — ED PROVIDER NOTES
[facility]     Emergency Department     Faculty Note/ Attestation      Pt Name: Caleb Medarno                                       MRN: [id number]  Micheletgfmagdalena 1960  Date of evaluation: 8/23/2021  Patients PCP:    Megan Bergeron MD    Attestation  I performed a history and physical examination of the patient/ or directly observed  and discussed management with the resident. I reviewed the residents note and agree with the documented findings and plan of care. Any areas of disagreement are noted on the chart. I was personally present for the key portions of any procedures. I have documented in the chart those procedures where I was not present during the key portions. I have reviewed the emergency nurses triage note. I agree with the chief complaint, past medical history, past surgical history, allergies, medications, social and family history as documented unless otherwise noted below. For Physician Assistant/ Nurse Practitioner cases/documentation I have personally evaluated this patient and have completed at least one if not all key elements of the E/M (history, physical exam, and MDM). Additional findings are as noted. This patient was evaluated in the Emergency Department for symptoms described in the history of present illness. The patient was evaluated in the context of the global COVID-19 pandemic, which necessitated consideration that the patient might be at risk for infection with the SARS-CoV-2 virus that causes COVID-19. Institutional protocols and algorithms that pertain to the evaluation of patients at risk for COVID-19 are in a state of rapid change based on information released by regulatory bodies including the CDC and federal and state organizations. These policies and algorithms were followed during the patient's care in the ED.      Initial Screens:        [place] Coma Scale  Eye Opening: Spontaneous  Best Verbal Response: Oriented  Best Motor Response: Obeys commands  Edi Coma Scale Score: 15    Vitals:    Vitals:    08/23/21 1204   BP: (!) 150/90   Pulse: 115   Resp: 20   Temp: 99 °F (37.2 °C)   SpO2: 98%   Weight: 265 lb (120.2 kg)       Chief Complaint      Chief Complaint   Patient presents with    Leg Swelling    Shoulder Pain     hx of arthritis per patient          weight is 265 lb (120.2 kg). His temperature is 99 °F (37.2 °C). His blood pressure is 150/90 (abnormal) and his pulse is 115. His respiration is 20 and oxygen saturation is 98%. DIAGNOSTIC RESULTS       RADIOLOGY:   VL DUP LOWER EXTREMITY VENOUS BILATERAL    (Results Pending)         LABS:  Labs Reviewed - No data to display      EMERGENCY DEPARTMENT COURSE:     -------------------------      BP: (!) 150/90, Temp: 99 °F (37.2 °C), Pulse: 115, Resp: 20    System Problem List     Patient Active Problem List   Diagnosis    HTN (hypertension)    Skin tag    Low back pain with sciatica    Hyperglycemia    Infected sebaceous cyst    Chronic diastolic CHF (congestive heart failure) (HCC)    Chronic bilateral low back pain without sciatica    Chronic pain of right knee    Chest pain, unspecified    Bronchitis    Chronic systolic congestive heart failure (HCC)    Ventral hernia    Epigastric pain    Elevated LFTs    Shortness of breath    Rectus diastasis    Lumbosacral spondylosis without myelopathy    COPD exacerbation (HCC)    CHF (congestive heart failure), NYHA class I, acute on chronic, diastolic (HCC)    Mixed simple and mucopurulent chronic bronchitis (HCC)    VANDANA (obstructive sleep apnea)         Comments  Chronic Prob List noted          Kristyn Humphrey MD,, MD, F.A.C.E.P.   Attending Emergency Physician         Kristyn Humphrey MD  08/23/21 7760

## 2021-08-23 NOTE — ED PROVIDER NOTES
[address] ED  Emergency Department Encounter  EmergencyMedicine Resident     Pt [facility]  MRN: [id number]  Micheletgfmagdalena 1960  Date of evaluation: 8/24/21  PCP:  Cinthia Barber MD    [address] [place] [address]       Chief Complaint   Patient presents with    Leg Swelling    Shoulder Pain     hx of arthritis per patient       HISTORY OF PRESENT ILLNESS  (Location/Symptom, Timing/Onset, Context/Setting, Quality, Duration, Modifying Factors, Severity.)      Tito Clark is a 63 y.o. male who presents with bilateral leg swelling right greater than left. Patient was seen here late last night for lower extremity edema, heart failure work-up was unremarkable but given his right leg was greater than left he was started on Lovenox and told to return in the morning. Patient is on Lasix for peripheral edema however he reports he has been noncompliant to the medication. He denies any previous history of DVT. Does report some right calf tenderness. No shortness of breath or chest pain. PAST MEDICAL / SURGICAL / SOCIAL / FAMILY HISTORY      has a past medical history of Bronchitis, CAD (coronary artery disease), Cardiomyopathy ([address] Chest pain, CHF (congestive heart failure) ([address] Chronic back pain, COPD (chronic obstructive pulmonary disease) ([address] Headache(784.0), Hyperlipidemia, Hypertension, Inguinal hernia, Low back pain with sciatica, Migraine, Mitral valve regurgitation, Osteoarthritis, Pneumonia, Sleep apnea, SOB (shortness of breath) on exertion, Tricuspid regurgitation, Type 2 diabetes mellitus without complication, without long-term current use of insulin ([address] Under care of team, and Wellness examination. has a past surgical history that includes hernia repair; Colonoscopy; Tonsillectomy (1963   exact date unknown); Adenoidectomy; Colonoscopy (05/28/2021); and Colonoscopy (N/A, 5/28/2021).       Social History     Socioeconomic History    Marital status: Single     Spouse name: Not on file    Number of children: Not on file    Years of education: Not on file    Highest education level: Not on file   Occupational History    Not on file   Tobacco Use    Smoking status: Former Smoker     Packs/day: 0.00     Years: 40.00     Pack years: 0.00     Quit date:      Years since quittin.6    Smokeless tobacco: Current User     Types: Chew    Tobacco comment: Chews / quit cigarettes   Vaping Use    Vaping Use: Never used   Substance and Sexual Activity    Alcohol use: Yes     Alcohol/week: 1.0 standard drinks     Types: 1 Glasses of wine per week    Drug use: No    Sexual activity: Not on file   Other Topics Concern    Not on file   Social History Narrative    Not on file     Social Determinants of Health     Financial Resource Strain:     Difficulty of Paying Living Expenses:    Food Insecurity:     Worried About Running Out of Food in the Last Year:     [address] in the Last Year:    Transportation Needs:     Lack of Transportation (Medical):      Lack of Transportation (Non-Medical):    Physical Activity:     Days of Exercise per Week:     Minutes of Exercise per Session:    Stress:     Feeling of Stress :    Social Connections:     Frequency of Communication with Friends and Family:     Frequency of Social Gatherings with Friends and Family:     Attends [employer] Services:     Active Member of Clubs or Organizations:     Attends Club or Organization Meetings:     Marital Status:    Intimate Partner Violence:     Fear of Current or Ex-Partner:     Emotionally Abused:     Physically Abused:     Sexually Abused:        Family History   Problem Relation Age of Onset    Kidney Disease Mother     Diabetes Mother     Heart Disease Mother     Arthritis Mother     Heart Disease Father     Heart Attack Father     Arthritis Sister     Diabetes Sister     Heart Disease Sister     Diabetes Sister        Allergies:  Penicillins, Sulfa antibiotics, Sulfasalazine, Pseudoeph-doxylamine-dm-apap, and Rabbit protein    Home Medications:  Prior to Admission medications    Medication Sig Start Date End Date Taking?  Authorizing Provider   tiZANidine (ZANAFLEX) 4 MG tablet Take 1 tablet by mouth every 8 hours as needed (Back pain) 8/4/21   Val Mcgregor MD   spironolactone (ALDACTONE) 25 MG tablet Take 1 tablet by mouth nightly 7/19/21   Blair Beckett MD   albuterol sulfate  (90 Base) MCG/ACT inhaler INHALE 2 PUFFS INTO THE LUNGS EVERY 6 HOURS AS NEEDED FOR WHEEZING 6/11/21   Rich Sharpe MD   metoprolol succinate (TOPROL XL) 50 MG extended release tablet Take 1 tablet by mouth daily 6/3/21   Ruthann Wagner MD   potassium chloride (KLOR-CON M) 20 MEQ extended release tablet Take 1 tablet by mouth daily (with breakfast) 6/3/21   Ruthann Wagner MD   lisinopril (PRINIVIL;ZESTRIL) 30 MG tablet TAKE 1 TABLET BY MOUTH DAILY 5/13/21   Darline Jimenez MD   metFORMIN (GLUCOPHAGE) 500 MG tablet Take 1 tablet by mouth daily (with breakfast) 3/3/21   Alhaji Gill MD   ranolazine (RANEXA) 500 MG extended release tablet Take 1 tablet by mouth 2 times daily  Patient not taking: Reported on 6/4/2021 2/2/21   Alhaji Gill MD   gabapentin (NEURONTIN) 300 MG capsule TAKE 1 CAPSULE BY MOUTH NIGHTLY AS NEEDED (PAIN) 12/10/20 12/10/21  Alhaji Gill MD   nitroGLYCERIN (NITROSTAT) 0.4 MG SL tablet Place 1 tablet under the tongue every 5 minutes as needed for Chest pain 12/8/20   Alhaji Gill MD   simvastatin (ZOCOR) 20 MG tablet Take 1 tablet by mouth nightly 12/8/20   Alhaji Gill MD   furosemide (LASIX) 40 MG tablet Take 1 tablet by mouth daily 12/8/20   Alhaji Gill MD   COMBIVENT RESPIMAT  MCG/ACT AERS inhaler INHALE 1 PUFF INTO THE LUNGS EVERY 6 HOURS 12/1/20   Alhaji Gill MD   Masks (FACE MASK) MISC 1 each by Does not apply route as needed (as needed) 10/5/20   Alhaji Gill MD   albuterol (PROVENTIL) (5 MG/ML) 0.5% nebulizer solution Take 0.5 mLs by nebulization every 6 hours as needed for Wheezing or Shortness of Breath 9/25/20   Leila Gallardo MD   naproxen (NAPROSYN) 500 MG tablet TAKE 1 TABLET BY MOUTH 2 TIMES DAILY FOR 20 DAYS 8/20/20 5/20/21  Ashlyn Kilpatrick MD   aspirin 81 MG tablet Take 1 tablet by mouth daily 8/13/19   Ashlyn Kilpatrick MD       REVIEW OF SYSTEMS    (2-9 systems for level 4, 10 or more for level 5)      Review of Systems   Constitutional: Negative for chills and fever. Respiratory: Negative for cough and shortness of breath. Cardiovascular: Negative for chest pain. Gastrointestinal: Negative for abdominal pain, nausea and vomiting. Genitourinary: Negative for dysuria and frequency. Skin: Negative for rash. Neurological: Negative for weakness and headaches. PHYSICAL EXAM   (up to 7 for level 4, 8 or more for level 5)      INITIAL VITALS:   BP (!) 150/90   Pulse 80   Temp 99 °F (37.2 °C)   Resp 20   Wt 265 lb (120.2 kg)   SpO2 98%   BMI 44.10 kg/m²      Vitals:    08/23/21 1204 08/23/21 1533   BP: (!) 150/90    Pulse: 115 80   Resp: 20    Temp: 99 °F (37.2 °C)    SpO2: 98%    Weight: 265 lb (120.2 kg)         Physical Exam  Vitals reviewed. Constitutional:       General: He is not in acute distress. Appearance: He is well-developed. He is not ill-appearing, toxic-appearing or diaphoretic. HENT:      Head: Normocephalic and atraumatic. Eyes:      Extraocular Movements: Extraocular movements intact. Pupils: Pupils are equal, round, and reactive to light. Cardiovascular:      Rate and Rhythm: Normal rate and regular rhythm. Pulmonary:      Effort: Pulmonary effort is normal.      Breath sounds: Normal breath sounds. Abdominal:      General: There is no distension. Palpations: Abdomen is soft. Tenderness: There is no abdominal tenderness. Musculoskeletal:         General: Tenderness present. Normal range of motion. Cervical back: Normal range of motion and neck supple.       Right lower leg: Edema present. Left lower leg: Edema present. Skin:     General: Skin is warm and dry. Neurological:      General: No focal deficit present. Mental Status: He is alert and oriented to person, place, and time. DIFFERENTIAL  DIAGNOSIS     PLAN (LABS / IMAGING / EKG):  Orders Placed This Encounter   Procedures    VL DUP LOWER EXTREMITY VENOUS BILATERAL    VASCULAR REPORT       MEDICATIONS ORDERED:  Orders Placed This Encounter   Medications    DISCONTD: enoxaparin (LOVENOX) injection 120 mg       DIAGNOSTIC RESULTS / EMERGENCY DEPARTMENT COURSE / MDM   LAB RESULTS:  No results found for this visit on 08/23/21. RADIOLOGY:  VL DUP LOWER EXTREMITY VENOUS BILATERAL   Final Result      VASCULAR REPORT    (Results Pending)        EKG  EKG Interpretation    Interpreted by me    Rhythm: normal sinus   Rate: normal  Axis: normal  Ectopy: none  Conduction: Normal  ST Segments: Nonspecific changes  T Waves: Inferior  Q Waves: none    Clinical Impression: Abnormal EKG, inferior T wave inversions nonspecific ST changes when compared to previous no acute findings. All EKG's are interpreted by the Emergency Department Physician who either signs or Co-signs this chart in the absence of a cardiologist.      INITIAL IMPRESSION:     LE edema    EMERGENCY DEPARTMENT COURSE & MDM:    Patient here with concerns for DVT. Dopplers ordered. Does not require next dose of Lovenox until later this afternoon. Patient seen in bed in no acute distress, no shortness of breath. Do not feel need to repeat normal work-up that was performed yesterday. ED Course as of Aug 24 1703   Mon Aug 23, 2021   1523 DVT study shows enlarged lymph nodes in the right groin area but no signs of DVT. Bilaterally. Will not require Lovenox in the future, will recommend compliance to Lasix and compression stockings.     [CS]      ED Course User Index  [CS] Kenny Ellison DO PROCEDURES:      CONSULTS:  None    CRITICAL CARE:  Please see attending note    FINAL IMPRESSION      1. Peripheral edema    2. Leg edema          DISPOSITION / PLAN     DISPOSITION Decision To Discharge 08/23/2021 03:33:32 PM      PATIENT REFERRED TO:  Loc Ceja MD  [address]   [address]  [address]  [phone]            DISCHARGE MEDICATIONS:  Discharge Medication List as of 8/23/2021  3:34 PM          Jillian Alonzo DO  Emergency Medicine Resident    (Please note that portions of thisnote were completed with a voice recognition program.  Efforts were made to edit the dictations but occasionally words are mis-transcribed.)        Jillian Alonzo DO  Resident  08/24/21 5922

## 2021-08-23 NOTE — ED PROVIDER NOTES
[facility]     Emergency Department     Faculty Attestation    I performed a history and physical examination of the patient and discussed management with the resident. I reviewed the residents note and agree with the documented findings and plan of care. Any areas of disagreement are noted on the chart. I was personally present for the key portions of any procedures. I have documented in the chart those procedures where I was not present during the key portions. I have reviewed the emergency nurses triage note. I agree with the chief complaint, past medical history, past surgical history, allergies, medications, social and family history as documented unless otherwise noted below. For Physician Assistant/ Nurse Practitioner cases/documentation I have personally evaluated this patient and have completed at least one if not all key elements of the E/M (history, physical exam, and MDM). Additional findings are as noted. I have personally seen and evaluated the patient. I find the patient's history and physical exam are consistent with the NP/PA documentation. I agree with the care provided, treatment rendered, disposition and follow-up plan. Patient is reporting primarily bilateral lower extremity swelling for the last several days with mild discomfort and weeping history of similar complaints in the past noncompliant with his current diuretic and drinking a lot of fluid there is some slight asymmetry with right leg being greater than left by however both legs are noted to be markedly edematous at the present time he describes orthopnea currently his lungs are clear with saturations of 100% and resting comfortably in no respiratory distress      Critical Care     Brissa Childers M.D.   Attending Emergency  Physician              Princess Favre, MD  08/23/21 5349

## 2021-08-23 NOTE — ED NOTES
Patient will be discharged after receiving medication that needs to come from pharmacy  Patient resting quietly with eyes closed  No dyspnea noted while resting     Tiffany Juarez RN  08/23/21 5828

## 2021-08-24 ASSESSMENT — ENCOUNTER SYMPTOMS
COUGH: 0
NAUSEA: 0
ABDOMINAL PAIN: 0
SHORTNESS OF BREATH: 0
VOMITING: 0

## 2021-08-30 ENCOUNTER — OFFICE VISIT (OUTPATIENT)
Dept: FAMILY MEDICINE CLINIC | Age: 61
End: 2021-08-30
Payer: COMMERCIAL

## 2021-08-30 VITALS
HEIGHT: 65 IN | HEART RATE: 102 BPM | BODY MASS INDEX: 46.12 KG/M2 | WEIGHT: 276.8 LBS | SYSTOLIC BLOOD PRESSURE: 122 MMHG | TEMPERATURE: 98.8 F | DIASTOLIC BLOOD PRESSURE: 66 MMHG

## 2021-08-30 DIAGNOSIS — E11.69 DIABETES MELLITUS TYPE 2 IN OBESE (HCC): ICD-10-CM

## 2021-08-30 DIAGNOSIS — G89.29 CHRONIC LEFT SHOULDER PAIN: ICD-10-CM

## 2021-08-30 DIAGNOSIS — R73.03 PREDIABETES: Primary | ICD-10-CM

## 2021-08-30 DIAGNOSIS — I10 HYPERTENSION, UNSPECIFIED TYPE: ICD-10-CM

## 2021-08-30 DIAGNOSIS — E66.9 DIABETES MELLITUS TYPE 2 IN OBESE (HCC): ICD-10-CM

## 2021-08-30 DIAGNOSIS — M54.50 CHRONIC MIDLINE LOW BACK PAIN WITHOUT SCIATICA: ICD-10-CM

## 2021-08-30 DIAGNOSIS — I50.22 CHRONIC SYSTOLIC CONGESTIVE HEART FAILURE (HCC): ICD-10-CM

## 2021-08-30 DIAGNOSIS — M54.50 LUMBAR PAIN: ICD-10-CM

## 2021-08-30 DIAGNOSIS — J42 CHRONIC BRONCHITIS, UNSPECIFIED CHRONIC BRONCHITIS TYPE (HCC): ICD-10-CM

## 2021-08-30 DIAGNOSIS — G89.29 CHRONIC MIDLINE LOW BACK PAIN WITHOUT SCIATICA: ICD-10-CM

## 2021-08-30 DIAGNOSIS — M25.512 CHRONIC LEFT SHOULDER PAIN: ICD-10-CM

## 2021-08-30 DIAGNOSIS — M54.50 CHRONIC BILATERAL LOW BACK PAIN WITHOUT SCIATICA: ICD-10-CM

## 2021-08-30 DIAGNOSIS — G89.29 CHRONIC BILATERAL LOW BACK PAIN WITHOUT SCIATICA: ICD-10-CM

## 2021-08-30 PROCEDURE — G8417 CALC BMI ABV UP PARAM F/U: HCPCS | Performed by: STUDENT IN AN ORGANIZED HEALTH CARE EDUCATION/TRAINING PROGRAM

## 2021-08-30 PROCEDURE — 99213 OFFICE O/P EST LOW 20 MIN: CPT | Performed by: STUDENT IN AN ORGANIZED HEALTH CARE EDUCATION/TRAINING PROGRAM

## 2021-08-30 PROCEDURE — 2022F DILAT RTA XM EVC RTNOPTHY: CPT | Performed by: STUDENT IN AN ORGANIZED HEALTH CARE EDUCATION/TRAINING PROGRAM

## 2021-08-30 PROCEDURE — 4004F PT TOBACCO SCREEN RCVD TLK: CPT | Performed by: STUDENT IN AN ORGANIZED HEALTH CARE EDUCATION/TRAINING PROGRAM

## 2021-08-30 PROCEDURE — G8427 DOCREV CUR MEDS BY ELIG CLIN: HCPCS | Performed by: STUDENT IN AN ORGANIZED HEALTH CARE EDUCATION/TRAINING PROGRAM

## 2021-08-30 PROCEDURE — 3017F COLORECTAL CA SCREEN DOC REV: CPT | Performed by: STUDENT IN AN ORGANIZED HEALTH CARE EDUCATION/TRAINING PROGRAM

## 2021-08-30 PROCEDURE — 3023F SPIROM DOC REV: CPT | Performed by: STUDENT IN AN ORGANIZED HEALTH CARE EDUCATION/TRAINING PROGRAM

## 2021-08-30 PROCEDURE — G8926 SPIRO NO PERF OR DOC: HCPCS | Performed by: STUDENT IN AN ORGANIZED HEALTH CARE EDUCATION/TRAINING PROGRAM

## 2021-08-30 PROCEDURE — 83036 HEMOGLOBIN GLYCOSYLATED A1C: CPT | Performed by: STUDENT IN AN ORGANIZED HEALTH CARE EDUCATION/TRAINING PROGRAM

## 2021-08-30 PROCEDURE — 3044F HG A1C LEVEL LT 7.0%: CPT | Performed by: STUDENT IN AN ORGANIZED HEALTH CARE EDUCATION/TRAINING PROGRAM

## 2021-08-30 RX ORDER — METOPROLOL SUCCINATE 50 MG/1
50 TABLET, EXTENDED RELEASE ORAL DAILY
Qty: 30 TABLET | Refills: 3 | Status: SHIPPED | OUTPATIENT
Start: 2021-08-30 | End: 2021-10-28 | Stop reason: SDUPTHER

## 2021-08-30 RX ORDER — NITROGLYCERIN 0.4 MG/1
0.4 TABLET SUBLINGUAL EVERY 5 MIN PRN
Qty: 25 TABLET | Refills: 0 | Status: SHIPPED | OUTPATIENT
Start: 2021-08-30 | End: 2021-09-27

## 2021-08-30 RX ORDER — NAPROXEN 500 MG/1
500 TABLET ORAL 2 TIMES DAILY
Qty: 40 TABLET | Refills: 0 | Status: ON HOLD | OUTPATIENT
Start: 2021-08-30 | End: 2021-12-01 | Stop reason: ALTCHOICE

## 2021-08-30 RX ORDER — POTASSIUM CHLORIDE 20 MEQ/1
20 TABLET, EXTENDED RELEASE ORAL
Qty: 60 TABLET | Refills: 3 | Status: SHIPPED | OUTPATIENT
Start: 2021-08-30 | End: 2022-01-04 | Stop reason: SDUPTHER

## 2021-08-30 RX ORDER — LISINOPRIL 30 MG/1
30 TABLET ORAL DAILY
Qty: 30 TABLET | Refills: 2 | Status: SHIPPED | OUTPATIENT
Start: 2021-08-30 | End: 2021-09-27 | Stop reason: ALTCHOICE

## 2021-08-30 RX ORDER — SIMVASTATIN 20 MG
20 TABLET ORAL NIGHTLY
Qty: 30 TABLET | Refills: 3 | Status: SHIPPED | OUTPATIENT
Start: 2021-08-30 | End: 2022-01-17

## 2021-08-30 RX ORDER — RANOLAZINE 500 MG/1
500 TABLET, EXTENDED RELEASE ORAL 2 TIMES DAILY
Qty: 60 TABLET | Refills: 0 | Status: ON HOLD | OUTPATIENT
Start: 2021-08-30 | End: 2021-12-01 | Stop reason: ALTCHOICE

## 2021-08-30 RX ORDER — GABAPENTIN 300 MG/1
CAPSULE ORAL
Qty: 30 CAPSULE | Refills: 1 | Status: SHIPPED | OUTPATIENT
Start: 2021-08-30 | End: 2021-12-13 | Stop reason: SDUPTHER

## 2021-08-30 RX ORDER — BUMETANIDE 2 MG/1
2 TABLET ORAL DAILY
Qty: 30 TABLET | Refills: 0 | Status: SHIPPED | OUTPATIENT
Start: 2021-08-30 | End: 2021-09-27

## 2021-08-30 RX ORDER — ALBUTEROL SULFATE 90 UG/1
2 AEROSOL, METERED RESPIRATORY (INHALATION) EVERY 6 HOURS PRN
Qty: 8.5 G | Refills: 1 | Status: SHIPPED | OUTPATIENT
Start: 2021-08-30 | End: 2022-09-16 | Stop reason: SDUPTHER

## 2021-08-30 RX ORDER — SPIRONOLACTONE 25 MG/1
25 TABLET ORAL NIGHTLY
Qty: 30 TABLET | Refills: 1 | Status: SHIPPED | OUTPATIENT
Start: 2021-08-30 | End: 2022-01-17 | Stop reason: SDUPTHER

## 2021-08-30 ASSESSMENT — ENCOUNTER SYMPTOMS
CHEST TIGHTNESS: 0
COUGH: 0
APNEA: 0
DIARRHEA: 0
SHORTNESS OF BREATH: 0
VOMITING: 0
ABDOMINAL PAIN: 0
COLOR CHANGE: 0
NAUSEA: 0
CONSTIPATION: 0

## 2021-08-30 NOTE — PATIENT INSTRUCTIONS
Thank you for letting us take care of you today. We hope all your questions were addressed. If a question was overlooked or something else comes to mind after you return home, please contact a member of your Care Team listed below. Your Care Team at [place] [address] is Team #5  Eleuterio Christine MD (Faculty)  Cinthia Barber MD (Resident)  Olivia Bustillo MD (Resident)  Stuart Lackey MD (Resident)  Jasmin Lund MD (Resident)  Tosin Del Castillo., DIXIE Smallwood., JANETH Parrish., Maci Garcia., [facility] office)  Dedrick Villalta, [address] (Clinical Practice Manager)  Delfina Gowers, [address] [place] [address] (Clinical Pharmacist)       Office phone number: [phone]    If you need to get in right away due to illness, please be advised we have \"Same Day\" appointments available Monday-Friday. Please call us at [phone] option #3 to schedule your \"Same Day\" appointment.

## 2021-08-30 NOTE — PROGRESS NOTES
. I agree with orders as documented by the resident. More than 25 minutes spent  in face to face encounter with the patient and more than half in counseling. Patient's questions were answered. Patient Voiced understanding to the counseling. Return in about 2 weeks (around 9/13/2021) for f/u leg swelling .    (GC Modifier)-Dr. Elizabeth Mcmahan MD

## 2021-08-30 NOTE — PROGRESS NOTES
Visit Information    Have you changed or started any medications since your last visit including any over-the-counter medicines, vitamins, or herbal medicines? no   Have you stopped taking any of your medications? Is so, why? -  no  Are you having any side effects from any of your medications? - no    Have you seen any other physician or provider since your last visit? yes - Cardiology, PT   Have you had any other diagnostic tests since your last visit? yes - Labs, VL, XR, EKG, Holter, Echo   Have you been seen in the emergency room and/or had an admission in a hospital since we last saw you?  yes - [facility]   Have you had your routine dental cleaning in the past 6 months?  no     Do you have an active MyChart account? If no, what is the barrier?   Yes    Patient Care Team:  Courtney Rodriguez MD as PCP - General (Family Medicine)  Author MD Paulette as PCP - [facility] Provider  Darcey Simmonds, MD as Consulting Physician (Cardiology)  Rikki Scruggs MD as Consulting Physician  Andrew Nayak DO as Consulting Physician (General Surgery)    Medical History Review  Past Medical, Family, and Social History reviewed and does contribute to the patient presenting condition    Health Maintenance   Topic Date Due    COVID-19 Vaccine (1) Never done    Shingles Vaccine (1 of 2) Never done    Flu vaccine (1) 09/01/2021    Lipid screen  01/22/2022    A1C test (Diabetic or Prediabetic)  03/08/2022    Potassium monitoring  08/23/2022    Creatinine monitoring  08/23/2022    Colon cancer screen colonoscopy  05/28/2024    Pneumococcal 0-64 years Vaccine (2 of 2 - PPSV23) 12/18/2024    DTaP/Tdap/Td vaccine (2 - Td or Tdap) 12/08/2030    Hepatitis C screen  Completed    HIV screen  Completed    Hepatitis A vaccine  Aged Out    Hepatitis B vaccine  Aged Out    Hib vaccine  Aged Out    Meningococcal (ACWY) vaccine  Aged Out

## 2021-08-30 NOTE — PROGRESS NOTES
Subjective:    Odalis Bangura is a 63 y.o. male with  has a past medical history of Bronchitis, CAD (coronary artery disease), Cardiomyopathy ([address] Chest pain, CHF (congestive heart failure) ([address] Chronic back pain, COPD (chronic obstructive pulmonary disease) ([address] Headache(784.0), Hyperlipidemia, Hypertension, Inguinal hernia, Low back pain with sciatica, Migraine, Mitral valve regurgitation, Osteoarthritis, Pneumonia, Sleep apnea, SOB (shortness of breath) on exertion, Tricuspid regurgitation, Type 2 diabetes mellitus without complication, without long-term current use of insulin ([address] Under care of team, and Wellness examination. Presented to the office today for:  Chief Complaint   Patient presents with    Follow-Up from [address] [place] [address] - Edema & shoulder pain    Pain     Top of spine and base of neck - No injury reported    Medication Refill     [address]       [facility]  Patient is a 59-year-old male with significant past medical history of chronic systolic congestive heart failure, hypertension, hyperglycemia, chronic low back pain, COPD who presented to the office today for follow-up [facility].  Patient's last ejection fraction in May was 03%, has systolic function decreased. Follows with Dr. Smitha Herrera for cardiology, next appointment in December. Was recently seen in the emergency department, Dopplers bilateral lower extremities were unremarkable. Patient states he is still having his chronic neck and back pain, is not able to restrict his fluids because he works at a [employer] where it is humid most of the day. Does need medication refills today, is taking Lasix 40 mg daily but states he is not urinating more frequently if anything he is urinating less. Review of Systems   Constitutional: Negative for activity change, appetite change, chills, fatigue and fever. Respiratory: Negative for apnea, cough, chest tightness and shortness of breath.     Cardiovascular: Positive for leg swelling. Negative for chest pain and palpitations. Gastrointestinal: Negative for abdominal pain, constipation, diarrhea, nausea and vomiting. Musculoskeletal: Negative for joint swelling. Skin: Negative for color change, pallor, rash and wound. Neurological: Negative for dizziness, tremors, facial asymmetry, light-headedness and headaches. Psychiatric/Behavioral: Negative for agitation, behavioral problems and confusion. The patient is not nervous/anxious. Family History   Problem Relation Age of Onset    Kidney Disease Mother     Diabetes Mother     Heart Disease Mother     Arthritis Mother     Heart Disease Father     Heart Attack Father     Arthritis Sister     Diabetes Sister     Heart Disease Sister     Diabetes Sister        Objective:    /66 (Site: Right Upper Arm, Position: Sitting, Cuff Size: Large Adult) Comment: machine  Pulse 102   Temp 98.8 °F (37.1 °C) (Temporal)   Ht 5' 5\" (1.651 m)   Wt 276 lb 12.8 oz (125.6 kg)   BMI 46.06 kg/m²    BP Readings from Last 3 Encounters:   08/30/21 122/66   08/23/21 (!) 150/90   08/23/21 139/82       Physical Exam  Vitals and nursing note reviewed. Constitutional:       Appearance: Normal appearance. He is normal weight. Cardiovascular:      Rate and Rhythm: Normal rate and regular rhythm. Pulses: Normal pulses. Heart sounds: Normal heart sounds. Pulmonary:      Effort: Pulmonary effort is normal.      Breath sounds: Normal breath sounds. Abdominal:      General: Abdomen is flat. Bowel sounds are normal.      Palpations: Abdomen is soft. Musculoskeletal:         General: Swelling and tenderness present. Right lower leg: Edema present. Left lower leg: Edema present. Comments: Bilateral lower extremity swelling with shallow ulceration and discharge, slightly tender to palpation   Neurological:      General: No focal deficit present.       Mental Status: He is alert and oriented to person, place, and time. Mental status is at baseline. Psychiatric:         Mood and Affect: Mood normal.         Behavior: Behavior normal.         Thought Content: Thought content normal.         Judgment: Judgment normal.       Lab Results   Component Value Date    WBC 9.7 08/23/2021    HGB 12.2 (L) 08/23/2021    HCT 38.5 (L) 08/23/2021     08/23/2021    CHOL 191 01/22/2021    TRIG 138 01/22/2021    HDL 46 01/22/2021    ALT 27 01/21/2021    AST 19 01/21/2021     08/23/2021    K 3.8 08/23/2021     08/23/2021    CREATININE 1.03 08/23/2021    BUN 16 08/23/2021    CO2 26 08/23/2021    TSH 0.41 01/22/2021    PSA 1.06 07/01/2014    INR 0.9 05/30/2021    LABA1C 5.9 03/08/2021     Lab Results   Component Value Date    CALCIUM 8.7 08/23/2021     Lab Results   Component Value Date    LDLCHOLESTEROL 117 01/22/2021       Assessment and Plan:    1. Prediabetes  - POCT glycosylated hemoglobin (Hb A1C)- 6.3,  - blood glucose monitor kit and supplies; Dispense sufficient amount for indicated testing frequency plus additional to accommodate PRN testing needs. Dispense all needed supplies to include: monitor, strips, lancing device, lancets, control solutions, alcohol swabs. Dispense: 1 kit; Refill: 0    2. Chronic bronchitis, unspecified chronic bronchitis type (HCC)  - albuterol (PROVENTIL) (5 MG/ML) 0.5% nebulizer solution; Take 0.5 mLs by nebulization every 6 hours as needed for Wheezing or Shortness of Breath  Dispense: 120 each; Refill: 3  - albuterol sulfate  (90 Base) MCG/ACT inhaler; Inhale 2 puffs into the lungs every 6 hours as needed for Wheezing  Dispense: 8.5 g; Refill: 1  - albuterol-ipratropium (COMBIVENT RESPIMAT)  MCG/ACT AERS inhaler; INHALE 1 PUFF INTO THE LUNGS EVERY 6 HOURS  Dispense: 4 g; Refill: 5    3. Chronic midline low back pain without sciatica  - gabapentin (NEURONTIN) 300 MG capsule; TAKE 1 CAPSULE BY MOUTH NIGHTLY AS NEEDED (PAIN)  Dispense: 30 capsule; Refill: 1    4. Hypertension, unspecified type  - lisinopril (PRINIVIL;ZESTRIL) 30 MG tablet; Take 1 tablet by mouth daily  Dispense: 30 tablet; Refill: 2  - nitroGLYCERIN (NITROSTAT) 0.4 MG SL tablet; Place 1 tablet under the tongue every 5 minutes as needed for Chest pain  Dispense: 25 tablet; Refill: 0    5. Diabetes mellitus type 2 in obese (HCC)  - metFORMIN (GLUCOPHAGE) 500 MG tablet; Take 2 tablets by mouth daily (with breakfast)  Dispense: 60 tablet; Refill: 3    6. Lumbar pain  - naproxen (NAPROSYN) 500 MG tablet; Take 1 tablet by mouth 2 times daily for 20 days  Dispense: 40 tablet; Refill: 0    7. Chronic left shoulder pain  - naproxen (NAPROSYN) 500 MG tablet; Take 1 tablet by mouth 2 times daily for 20 days  Dispense: 40 tablet; Refill: 0    8. Chronic systolic congestive heart failure (HCC)  - ranolazine (RANEXA) 500 MG extended release tablet; Take 1 tablet by mouth 2 times daily  Dispense: 60 tablet; Refill: 0  - simvastatin (ZOCOR) 20 MG tablet; Take 1 tablet by mouth nightly  Dispense: 30 tablet; Refill: 3  - spironolactone (ALDACTONE) 25 MG tablet; Take 1 tablet by mouth nightly  Dispense: 30 tablet; Refill: 1 - [facility]  - bumetanide (BUMEX) 2 MG tablet; Take 1 tablet by mouth daily  Dispense: 30 tablet; Refill: 0  - [facility] - Александр Shabazz DO, Cardiology, [facility]    9. Chronic bilateral low back pain without sciatica  - diclofenac sodium (VOLTAREN) 1 % GEL;  Apply topically 2 times daily  Dispense: 350 g; Refill: 2    Requested Prescriptions     Signed Prescriptions Disp Refills    albuterol (PROVENTIL) (5 MG/ML) 0.5% nebulizer solution 120 each 3     Sig: Take 0.5 mLs by nebulization every 6 hours as needed for Wheezing or Shortness of Breath    albuterol sulfate  (90 Base) MCG/ACT inhaler 8.5 g 1     Sig: Inhale 2 puffs into the lungs every 6 hours as needed for Wheezing    blood glucose monitor kit and supplies 1 kit 0     Sig: Dispense sufficient amount for indicated testing frequency plus additional to accommodate PRN testing needs. Dispense all needed supplies to include: monitor, strips, lancing device, lancets, control solutions, alcohol swabs.     albuterol-ipratropium (COMBIVENT RESPIMAT)  MCG/ACT AERS inhaler 4 g 5     Sig: INHALE 1 PUFF INTO THE LUNGS EVERY 6 HOURS    gabapentin (NEURONTIN) 300 MG capsule 30 capsule 1     Sig: TAKE 1 CAPSULE BY MOUTH NIGHTLY AS NEEDED (PAIN)    lisinopril (PRINIVIL;ZESTRIL) 30 MG tablet 30 tablet 2     Sig: Take 1 tablet by mouth daily    Masks (FACE MASK) MISC 50 each 3     Si each by Does not apply route as needed (as needed)    metoprolol succinate (TOPROL XL) 50 MG extended release tablet 30 tablet 3     Sig: Take 1 tablet by mouth daily    naproxen (NAPROSYN) 500 MG tablet 40 tablet 0     Sig: Take 1 tablet by mouth 2 times daily for 20 days    nitroGLYCERIN (NITROSTAT) 0.4 MG SL tablet 25 tablet 0     Sig: Place 1 tablet under the tongue every 5 minutes as needed for Chest pain    potassium chloride (KLOR-CON M) 20 MEQ extended release tablet 60 tablet 3     Sig: Take 1 tablet by mouth daily (with breakfast)    ranolazine (RANEXA) 500 MG extended release tablet 60 tablet 0     Sig: Take 1 tablet by mouth 2 times daily    simvastatin (ZOCOR) 20 MG tablet 30 tablet 3     Sig: Take 1 tablet by mouth nightly    spironolactone (ALDACTONE) 25 MG tablet 30 tablet 1     Sig: Take 1 tablet by mouth nightly    diclofenac sodium (VOLTAREN) 1 %  g 2     Sig: Apply topically 2 times daily    bumetanide (BUMEX) 2 MG tablet 30 tablet 0     Sig: Take 1 tablet by mouth daily    metFORMIN (GLUCOPHAGE) 500 MG tablet 60 tablet 3     Sig: Take 2 tablets by mouth daily (with breakfast)       Medications Discontinued During This Encounter   Medication Reason    naproxen (NAPROSYN) 500 MG tablet REORDER    albuterol (PROVENTIL) (5 MG/ML) 0.5% nebulizer solution REORDER    Masks (FACE MASK) MISC REORDER    COMBIVENT RESPIMAT  MCG/ACT AERS inhaler REORDER    gabapentin (NEURONTIN) 300 MG capsule REORDER    nitroGLYCERIN (NITROSTAT) 0.4 MG SL tablet REORDER    simvastatin (ZOCOR) 20 MG tablet REORDER    ranolazine (RANEXA) 500 MG extended release tablet REORDER    metFORMIN (GLUCOPHAGE) 500 MG tablet REORDER    lisinopril (PRINIVIL;ZESTRIL) 30 MG tablet REORDER    metoprolol succinate (TOPROL XL) 50 MG extended release tablet REORDER    potassium chloride (KLOR-CON M) 20 MEQ extended release tablet REORDER    albuterol sulfate  (90 Base) MCG/ACT inhaler REORDER    spironolactone (ALDACTONE) 25 MG tablet REORDER    blood glucose monitor kit and supplies REORDER    furosemide (LASIX) 40 MG tablet LIST CLEANUP    metFORMIN (GLUCOPHAGE) 500 MG tablet        Lee received counseling on the following healthy behaviors: nutrition, exercise and medication adherence    Discussed use,benefit, and side effects of prescribed medications. Barriers to medication compliance addressed. All patient questions answered. Pt voiced understanding. Return in about 2 weeks (around 9/13/2021) for f/u leg swelling . Disclaimer: Some orall of this note was transcribed using voice-recognition software. This may cause typographical errors occasionally. Although all effort is made to fix these errors, please do not hesitate to contact our office if there [place] concern with the understanding of this note.

## 2021-08-31 LAB — HBA1C MFR BLD: 6.3 %

## 2021-09-13 ENCOUNTER — OFFICE VISIT (OUTPATIENT)
Dept: FAMILY MEDICINE CLINIC | Age: 61
End: 2021-09-13
Payer: COMMERCIAL

## 2021-09-13 VITALS
WEIGHT: 280.4 LBS | SYSTOLIC BLOOD PRESSURE: 138 MMHG | BODY MASS INDEX: 46.66 KG/M2 | HEART RATE: 94 BPM | DIASTOLIC BLOOD PRESSURE: 82 MMHG

## 2021-09-13 DIAGNOSIS — E66.9 DIABETES MELLITUS TYPE 2 IN OBESE (HCC): ICD-10-CM

## 2021-09-13 DIAGNOSIS — E11.9 DIABETIC EYE EXAM (HCC): ICD-10-CM

## 2021-09-13 DIAGNOSIS — G89.29 CHRONIC MIDLINE LOW BACK PAIN WITHOUT SCIATICA: Primary | ICD-10-CM

## 2021-09-13 DIAGNOSIS — Z01.00 DIABETIC EYE EXAM (HCC): ICD-10-CM

## 2021-09-13 DIAGNOSIS — E11.69 DIABETES MELLITUS TYPE 2 IN OBESE (HCC): ICD-10-CM

## 2021-09-13 DIAGNOSIS — M54.50 CHRONIC MIDLINE LOW BACK PAIN WITHOUT SCIATICA: Primary | ICD-10-CM

## 2021-09-13 DIAGNOSIS — Z23 NEED FOR SHINGLES VACCINE: ICD-10-CM

## 2021-09-13 PROCEDURE — 3017F COLORECTAL CA SCREEN DOC REV: CPT | Performed by: STUDENT IN AN ORGANIZED HEALTH CARE EDUCATION/TRAINING PROGRAM

## 2021-09-13 PROCEDURE — 2022F DILAT RTA XM EVC RTNOPTHY: CPT | Performed by: STUDENT IN AN ORGANIZED HEALTH CARE EDUCATION/TRAINING PROGRAM

## 2021-09-13 PROCEDURE — 3044F HG A1C LEVEL LT 7.0%: CPT | Performed by: STUDENT IN AN ORGANIZED HEALTH CARE EDUCATION/TRAINING PROGRAM

## 2021-09-13 PROCEDURE — 4004F PT TOBACCO SCREEN RCVD TLK: CPT | Performed by: STUDENT IN AN ORGANIZED HEALTH CARE EDUCATION/TRAINING PROGRAM

## 2021-09-13 PROCEDURE — 99213 OFFICE O/P EST LOW 20 MIN: CPT | Performed by: STUDENT IN AN ORGANIZED HEALTH CARE EDUCATION/TRAINING PROGRAM

## 2021-09-13 PROCEDURE — G8427 DOCREV CUR MEDS BY ELIG CLIN: HCPCS | Performed by: STUDENT IN AN ORGANIZED HEALTH CARE EDUCATION/TRAINING PROGRAM

## 2021-09-13 PROCEDURE — G8417 CALC BMI ABV UP PARAM F/U: HCPCS | Performed by: STUDENT IN AN ORGANIZED HEALTH CARE EDUCATION/TRAINING PROGRAM

## 2021-09-13 RX ORDER — ZOSTER VACCINE RECOMBINANT, ADJUVANTED 50 MCG/0.5
0.5 KIT INTRAMUSCULAR SEE ADMIN INSTRUCTIONS
Qty: 0.5 ML | Refills: 0 | Status: SHIPPED | OUTPATIENT
Start: 2021-09-13 | End: 2022-03-12

## 2021-09-13 RX ORDER — LIDOCAINE 50 MG/G
1 PATCH TOPICAL DAILY
Qty: 30 PATCH | Refills: 0 | Status: SHIPPED | OUTPATIENT
Start: 2021-09-13 | End: 2021-10-13

## 2021-09-13 ASSESSMENT — ENCOUNTER SYMPTOMS
COUGH: 0
SHORTNESS OF BREATH: 0
COLOR CHANGE: 0
CHEST TIGHTNESS: 0
APNEA: 0

## 2021-09-13 NOTE — PROGRESS NOTES
Visit Information    Have you changed or started any medications since your last visit including any over-the-counter medicines, vitamins, or herbal medicines? no   Are you having any side effects from any of your medications? -  no  Have you stopped taking any of your medications? Is so, why? -  no    Have you seen any other physician or provider since your last visit? No  Have you had any other diagnostic tests since your last visit? No  Have you been seen in the emergency room and/or had an admission to a hospital since we last saw you? No  Have you had your routine dental cleaning in the past 6 months? no    Have you activated your [employer] account? If not, what are your barriers?  Yes     Patient Care Team:  Ariana Long MD as PCP - General (Family Medicine)  Jennifer Hart MD as PCP - [address] [place] [address] Provider  Jayden Mccormick MD as Consulting Physician (Cardiology)  Shabnam Casper MD as Consulting Physician  Sandor Sesay DO as Consulting Physician (General Surgery)    Medical History Review  Past Medical, Family, and Social History reviewed and does not contribute to the patient presenting condition    Health Maintenance   Topic Date Due    Diabetic foot exam  Never done    Diabetic retinal exam  Never done    COVID-19 Vaccine (1) Never done    Diabetic microalbuminuria test  Never done    Shingles Vaccine (1 of 2) Never done    Flu vaccine (1) 09/01/2021    Lipid screen  01/22/2022    Potassium monitoring  08/23/2022    Creatinine monitoring  08/23/2022    A1C test (Diabetic or Prediabetic)  08/30/2022    Colon cancer screen colonoscopy  05/28/2024    Pneumococcal 0-64 years Vaccine (2 of 2 - PPSV23) 12/18/2024    DTaP/Tdap/Td vaccine (2 - Td or Tdap) 12/08/2030    Hepatitis C screen  Completed    HIV screen  Completed    Hepatitis A vaccine  Aged Out    Hib vaccine  Aged Out    Meningococcal (ACWY) vaccine  Aged Out

## 2021-09-13 NOTE — PROGRESS NOTES
Subjective:    Lino Toro is a 63 y.o. male with  has a past medical history of Bronchitis, CAD (coronary artery disease), Cardiomyopathy ([address] Chest pain, CHF (congestive heart failure) ([address] Chronic back pain, COPD (chronic obstructive pulmonary disease) ([address] Headache(784.0), Hyperlipidemia, Hypertension, Inguinal hernia, Low back pain with sciatica, Migraine, Mitral valve regurgitation, Osteoarthritis, Pneumonia, Sleep apnea, SOB (shortness of breath) on exertion, Tricuspid regurgitation, Type 2 diabetes mellitus without complication, without long-term current use of insulin ([address] Under care of team, and Wellness examination. Presented to the office today for:  Chief Complaint   Patient presents with    Follow-up     swollen legs       HPI   Patient is a 69-year-old male with significant past medical history of chronic diastolic systolic CHF, hypertension, COPD, obstructive sleep apnea who presented the office today for scheduled follow-up. Patient was started on Bumex 2 mg daily during last visit, states he has been compliant with medication and shortness of breath and leg swelling have greatly improved. Patient has an appointment set with cardiologist for September 20 this month, states his back pain is also significantly improved but he did not receive the Voltaren gel. Agreeable to health maintenance. Review of Systems   Constitutional: Negative for activity change, appetite change, chills, fatigue and fever. Respiratory: Negative for apnea, cough, chest tightness and shortness of breath. Musculoskeletal: Negative for joint swelling. Skin: Negative for color change, pallor, rash and wound. Neurological: Negative for dizziness, tremors, facial asymmetry, light-headedness and headaches. Psychiatric/Behavioral: Negative for agitation, behavioral problems and confusion. The patient is not nervous/anxious.                   The patient has a   Family History   Problem Relation Age of Onset    Kidney Disease Mother     Diabetes Mother     Heart Disease Mother     Arthritis Mother     Heart Disease Father     Heart Attack Father     Arthritis Sister     Diabetes Sister     Heart Disease Sister     Diabetes Sister        Objective:    /82 (Site: Right Upper Arm, Position: Sitting, Cuff Size: Medium Adult)   Pulse 94   Wt 280 lb 6.4 oz (127.2 kg)   BMI 46.66 kg/m²    BP Readings from Last 3 Encounters:   09/13/21 138/82   08/30/21 122/66   08/23/21 (!) 150/90       Physical Exam  Vitals and nursing note reviewed. Constitutional:       Appearance: Normal appearance. He is obese. Cardiovascular:      Rate and Rhythm: Normal rate and regular rhythm. Pulses: Normal pulses. Heart sounds: Normal heart sounds. Pulmonary:      Effort: Pulmonary effort is normal.      Breath sounds: Normal breath sounds. Abdominal:      General: Abdomen is flat. Bowel sounds are normal.      Palpations: Abdomen is soft. Neurological:      General: No focal deficit present. Mental Status: He is alert and oriented to person, place, and time. Mental status is at baseline. Psychiatric:         Mood and Affect: Mood normal.         Behavior: Behavior normal.         Thought Content: Thought content normal.         Judgment: Judgment normal.         Lab Results   Component Value Date    WBC 9.7 08/23/2021    HGB 12.2 (L) 08/23/2021    HCT 38.5 (L) 08/23/2021     08/23/2021    CHOL 191 01/22/2021    TRIG 138 01/22/2021    HDL 46 01/22/2021    ALT 27 01/21/2021    AST 19 01/21/2021     08/23/2021    K 3.8 08/23/2021     08/23/2021    CREATININE 1.03 08/23/2021    BUN 16 08/23/2021    CO2 26 08/23/2021    TSH 0.41 01/22/2021    PSA 1.06 07/01/2014    INR 0.9 05/30/2021    LABA1C 6.3 08/30/2021     Lab Results   Component Value Date    CALCIUM 8.7 08/23/2021     Lab Results   Component Value Date    LDLCHOLESTEROL 117 01/22/2021       Assessment and Plan:    1. Chronic midline low back pain without sciatica  - lidocaine (LIDODERM) 5 %; Place 1 patch onto the skin daily 12 hours on, 12 hours off. Dispense: 30 patch; Refill: 0    2. Diabetes mellitus type 2 in obese (HCC)  - Microalbumin, Ur; Future    3. Diabetic eye exam ([facility]  - Ashley Mondragon MD, Ophthalmology, [place]    4. Need for shingles vaccine  - zoster recombinant adjuvanted vaccine [facility]) 50 MCG/0.5ML SUSR injection; Inject 0.5 mLs into the muscle See Admin Instructions 1 dose now and repeat in 2-6 months  Dispense: 0.5 mL; Refill: 0      Requested Prescriptions     Signed Prescriptions Disp Refills    lidocaine (LIDODERM) 5 % 30 patch 0     Sig: Place 1 patch onto the skin daily 12 hours on, 12 hours off.  zoster recombinant adjuvanted vaccine (SHINGRIX) 50 MCG/0.5ML SUSR injection 0.5 mL 0     Sig: Inject 0.5 mLs into the muscle See Admin Instructions 1 dose now and repeat in 2-6 months       There are no discontinued medications. Carlos Willem received counseling on the following healthy behaviors: nutrition, exercise and medication adherence    Discussed use,benefit, and side effects of prescribed medications. Barriers to medication compliance addressed. All patient questions answered. Pt voiced understanding. Return in about 6 weeks (around 10/25/2021) for f/u CHF . Disclaimer: Some orall of this note was transcribed using voice-recognition software. This may cause typographical errors occasionally. Although all effort is made to fix these errors, please do not hesitate to contact our office if there Nimisha Art concern with the understanding of this note.

## 2021-09-13 NOTE — PROGRESS NOTES
maintenance and vaccines updated. Return in about 6 weeks (around 10/25/2021) for f/u CHF .    (Maria M Jiménez ) Dr. Mckenzie Matamoros MD negative...

## 2021-09-27 ENCOUNTER — HOSPITAL ENCOUNTER (OUTPATIENT)
Dept: OTHER | Age: 61
Discharge: HOME | End: 2021-09-27
Payer: COMMERCIAL

## 2021-09-27 VITALS
WEIGHT: 280 LBS | DIASTOLIC BLOOD PRESSURE: 90 MMHG | RESPIRATION RATE: 20 BRPM | BODY MASS INDEX: 46.59 KG/M2 | HEART RATE: 106 BPM | OXYGEN SATURATION: 98 % | SYSTOLIC BLOOD PRESSURE: 142 MMHG

## 2021-09-27 DIAGNOSIS — I50.22 CHRONIC SYSTOLIC CONGESTIVE HEART FAILURE (HCC): Primary | ICD-10-CM

## 2021-09-27 DIAGNOSIS — I10 HYPERTENSION, UNSPECIFIED TYPE: ICD-10-CM

## 2021-09-27 DIAGNOSIS — I50.22 CHRONIC SYSTOLIC CONGESTIVE HEART FAILURE (HCC): ICD-10-CM

## 2021-09-27 DIAGNOSIS — G47.33 OSA (OBSTRUCTIVE SLEEP APNEA): ICD-10-CM

## 2021-09-27 DIAGNOSIS — R00.0 TACHYCARDIA: ICD-10-CM

## 2021-09-27 PROCEDURE — 99214 OFFICE O/P EST MOD 30 MIN: CPT | Performed by: NURSE PRACTITIONER

## 2021-09-27 PROCEDURE — 99212 OFFICE O/P EST SF 10 MIN: CPT

## 2021-09-27 RX ORDER — BUMETANIDE 2 MG/1
2 TABLET ORAL DAILY
Qty: 30 TABLET | Refills: 0 | Status: SHIPPED | OUTPATIENT
Start: 2021-09-27 | End: 2021-10-28 | Stop reason: SDUPTHER

## 2021-09-27 RX ORDER — NITROGLYCERIN 0.4 MG/1
0.4 TABLET SUBLINGUAL EVERY 5 MIN PRN
Qty: 25 TABLET | Refills: 0 | Status: SHIPPED | OUTPATIENT
Start: 2021-09-27

## 2021-09-27 ASSESSMENT — ENCOUNTER SYMPTOMS
SHORTNESS OF BREATH: 1
COUGH: 0
BLOOD IN STOOL: 0
EYE DISCHARGE: 0
ABDOMINAL PAIN: 0

## 2021-09-27 NOTE — PROGRESS NOTES
CHF Clinic at [facility]    Office: [phone] Fax: [phone] [facility] CHF CLINIC  [address] [address]  Dept: [phone]  Loc: [phone]    Adonis Parmar is a 63 y.o. male who presents today for CHF evaluation. HPI:     +  shortness of breath, with all acitivity  +   Fatigue, on disability now d/t this  +  Edema   Has not taken his pills today d/t coming to this appt. No  chest pain  Lately   Denies  palpitations     + occas  Orthopnea , occas needs to sleep in chair  + humberto, with cpap, compliant  + chew habit, no cigarettes.           Past Medical History:   Diagnosis Date    Bronchitis     CAD (coronary artery disease)     Cardiomyopathy ([address]     Chest pain     CHF (congestive heart failure) (HCC)     Chronic back pain     Chronic diastolic CHF (congestive heart failure) ([address] 8/1/2014    COPD (chronic obstructive pulmonary disease) (HCC)     Headache(784.0)     Hyperlipidemia     Hypertension     Inguinal hernia     Low back pain with sciatica     Migraine     Mitral valve regurgitation     Osteoarthritis     Pneumonia     Sleep apnea     with cpap    SOB (shortness of breath) on exertion     Tricuspid regurgitation     Type 2 diabetes mellitus without complication, without long-term current use of insulin ([address]     Under care of team 05/20/2021    cardiology-Dr Mario Coffman visit jan 2021    Wellness examination 05/20/2021    [facility]     Past Surgical History:   Procedure Laterality Date    ADENOIDECTOMY      COLONOSCOPY      COLONOSCOPY  05/28/2021    COLORECTAL CANCER SCREENING, NOT HIGH RISK, POLYPECTOMY    COLONOSCOPY N/A 5/28/2021    COLONOSCOPY POLYPECTOMY HOT BIOPSY performed by Julius Cervantes IV,  at [address]   exact date unknown       Family History Problem Relation Age of Onset    Kidney Disease Mother     Diabetes Mother     Heart Disease Mother     Arthritis Mother     Heart Disease Father     Heart Attack Father     Arthritis Sister     Diabetes Sister     Heart Disease Sister     Diabetes Sister        Social History     Tobacco Use    Smoking status: Former Smoker     Packs/day: 0.00     Years: 40.00     Pack years: 0.00     Quit date:      Years since quittin.7    Smokeless tobacco: Current User     Types: Chew    Tobacco comment: Chews / quit cigarettes   Substance Use Topics    Alcohol use: Yes     Alcohol/week: 1.0 standard drinks     Types: 1 Glasses of wine per week     Comment: occ. Current Outpatient Medications   Medication Sig Dispense Refill    sacubitril-valsartan (ENTRESTO) 24-26 MG per tablet Take 1 tablet by mouth 2 times daily 60 tablet 3    metFORMIN (GLUCOPHAGE) 500 MG tablet TAKE 1 TABLET BY MOUTH DAILY (WITH BREAKFAST) 60 tablet 3    lidocaine (LIDODERM) 5 % Place 1 patch onto the skin daily 12 hours on, 12 hours off. 30 patch 0    albuterol (PROVENTIL) (5 MG/ML) 0.5% nebulizer solution Take 0.5 mLs by nebulization every 6 hours as needed for Wheezing or Shortness of Breath 120 each 3    albuterol sulfate  (90 Base) MCG/ACT inhaler Inhale 2 puffs into the lungs every 6 hours as needed for Wheezing 8.5 g 1    blood glucose monitor kit and supplies Dispense sufficient amount for indicated testing frequency plus additional to accommodate PRN testing needs. Dispense all needed supplies to include: monitor, strips, lancing device, lancets, control solutions, alcohol swabs.  1 kit 0    albuterol-ipratropium (COMBIVENT RESPIMAT)  MCG/ACT AERS inhaler INHALE 1 PUFF INTO THE LUNGS EVERY 6 HOURS 4 g 5    gabapentin (NEURONTIN) 300 MG capsule TAKE 1 CAPSULE BY MOUTH NIGHTLY AS NEEDED (PAIN) 30 capsule 1    Masks (FACE MASK) MISC 1 each by Does not apply route as needed (as needed) 50 each 3    metoprolol succinate (TOPROL XL) 50 MG extended release tablet Take 1 tablet by mouth daily 30 tablet 3    naproxen (NAPROSYN) 500 MG tablet Take 1 tablet by mouth 2 times daily for 20 days 40 tablet 0    nitroGLYCERIN (NITROSTAT) 0.4 MG SL tablet Place 1 tablet under the tongue every 5 minutes as needed for Chest pain 25 tablet 0    potassium chloride (KLOR-CON M) 20 MEQ extended release tablet Take 1 tablet by mouth daily (with breakfast) 60 tablet 3    ranolazine (RANEXA) 500 MG extended release tablet Take 1 tablet by mouth 2 times daily 60 tablet 0    simvastatin (ZOCOR) 20 MG tablet Take 1 tablet by mouth nightly 30 tablet 3    spironolactone (ALDACTONE) 25 MG tablet Take 1 tablet by mouth nightly 30 tablet 1    diclofenac sodium (VOLTAREN) 1 % GEL Apply topically 2 times daily 350 g 2    bumetanide (BUMEX) 2 MG tablet Take 1 tablet by mouth daily 30 tablet 0    aspirin 81 MG tablet Take 1 tablet by mouth daily 30 tablet 3    zoster recombinant adjuvanted vaccine (SHINGRIX) 50 MCG/0.5ML SUSR injection Inject 0.5 mLs into the muscle See Admin Instructions 1 dose now and repeat in 2-6 months 0.5 mL 0     No current facility-administered medications for this encounter. Allergies   Allergen Reactions    Penicillins Swelling    Sulfa Antibiotics Swelling    Sulfasalazine Swelling    Pseudoeph-Doxylamine-Dm-Apap Rash    Rabbit Protein Rash     Fur, gerbils         Subjective:      Review of Systems   Constitutional: Positive for fatigue. Negative for activity change, chills and fever. Eyes: Negative for discharge and visual disturbance. Respiratory: Positive for shortness of breath. Negative for cough. Cardiovascular: Negative for chest pain and leg swelling. Gastrointestinal: Negative for abdominal pain and blood in stool. Endocrine: Negative for cold intolerance and heat intolerance. Genitourinary: Negative for dysuria and flank pain.    Musculoskeletal: Negative for joint swelling and myalgias. Skin: Negative for pallor and rash. Neurological: Negative for dizziness and headaches. Psychiatric/Behavioral: Negative for hallucinations and suicidal ideas. Objective:     Echo Summary  Technically difficult study  Left ventricle is normal in size and wall thickness. Severe anterior wall and apical hypokinesis  Left ventricular systolic function is moderately reduced. Calculated EF via  De La Paz's method is 33 %. Both atria are normal in size. Normal right ventricular size and function. Mild to moderate mitral regurgitation. No significant pericardial effusion is seen. Normal aortic root dimension. IVC not well visualized       Physical Exam  Vitals and nursing note reviewed. Constitutional:       Comments:      HENT:      Head: Normocephalic and atraumatic. Eyes:      General: No scleral icterus. Conjunctiva/sclera: Conjunctivae normal.   Cardiovascular:      Rate and Rhythm: Normal rate and regular rhythm. Heart sounds: Normal heart sounds. Pulmonary:      Effort: Pulmonary effort is normal.      Breath sounds: Normal breath sounds. No wheezing or rales. Abdominal:      General: Bowel sounds are normal.      Palpations: Abdomen is soft. Musculoskeletal:         General: Normal range of motion. Cervical back: Normal range of motion. Skin:     General: Skin is warm and dry. Neurological:      Mental Status: He is alert and oriented to person, place, and time. BP (!) 142/90   Pulse 106   Resp 20   Wt 280 lb (127 kg)   SpO2 98%   BMI 46.59 kg/m²   O2 Device: None (Room air)     Recheck HR: 99 bpm  Lower Extremity Measurements in cm.    R Calf Circumference (cm): 50.5 cm  L Calf Circumference (cm): 49 cm  R Ankle Circumference (cm): 31 cm  L Ankle Circumference (cm): 30 cm    CBC:   Lab Results   Component Value Date    WBC 9.7 08/23/2021    RBC 4.31 08/23/2021    RBC 4.56 02/17/2012    HGB 12.2 08/23/2021    HCT 38.5 08/23/2021    MCV 89.3 This Encounter   Medications    sacubitril-valsartan (ENTRESTO) 24-26 MG per tablet     Sig: Take 1 tablet by mouth 2 times daily     Dispense:  60 tablet     Refill:  3       Patientgiven verbal and/or written educational instructions. Follow up as directed. I have reviewed and agree with the nursing documentation. Verbally reviewed medication list with patient; patient verbalized understanding. Discussed 2000mg/day sodium restricted diet; patient verbalized understanding. Moderate daily exercise encouraged as tolerated. Discussed rest breaks as needed; patient verbalized understanding. Patient instructed to weigh self at the same time of each day, using same clothes and same scale; reinforced teaching to monitor for 3-5 lb weight increase over 1-2 days, and to notify the CHF clinic at [phone] or physician office if weight change noted. Patient verbalized understanding. Risks of smoking discussed with the patient if applicable; patient strongly discouraged to smoke. Patient verbalized understanding. Signs and symptoms of CHF discussed with patient, such as feeling more tired than normal, feeling short of breath, coughing that increases when you lie down, sudden weight gain, swelling of your feet, legs or belly. Patient verbalized understanding to notify the CHF clinic at [phone] or physician office if these symptoms occur. Compliance with plan of care and further disease process causes discussed with patient, patient encouraged to keep all follow up appointments. Patient verbalized understanding. Labs reviewed. Medications reviewed. Echocardiogram reviewed. Labs ordered   Medications ordered.      Electronically signedby PINKY Perez CNP on 9/27/2021 at 2:28 PM

## 2021-09-27 NOTE — TELEPHONE ENCOUNTER
E-scribe request for med refills. Please review and e-scribe if applicable.      Last Visit Date:  09/13/2021  Next Visit Date:  10/28/2021    Hemoglobin A1C (%)   Date Value   08/30/2021 6.3   03/08/2021 5.9   02/01/2021 6.3             ( goal A1C is < 7)   No results found for: LABMICR  LDL Cholesterol (mg/dL)   Date Value   01/22/2021 117       (goal LDL is <100)   AST (U/L)   Date Value   01/21/2021 19     ALT (U/L)   Date Value   01/21/2021 27     BUN (mg/dL)   Date Value   08/23/2021 16     BP Readings from Last 3 Encounters:   09/27/21 (!) 142/90   09/13/21 138/82   08/30/21 122/66          (goal 120/80)        Patient Active Problem List:     HTN (hypertension)     Skin tag     Low back pain with sciatica     Hyperglycemia     Infected sebaceous cyst     Chronic diastolic CHF (congestive heart failure) (HCC)     Chronic bilateral low back pain without sciatica     Chronic pain of right knee     Chest pain, unspecified     Bronchitis     Chronic systolic congestive heart failure (HCC)     Ventral hernia     Epigastric pain     Elevated LFTs     Shortness of breath     Rectus diastasis     Lumbosacral spondylosis without myelopathy     COPD exacerbation (HCC)     CHF (congestive heart failure), NYHA class I, acute on chronic, diastolic (HCC)     Mixed simple and mucopurulent chronic bronchitis (HCC)     VANDANA (obstructive sleep apnea)      ----Meghna Whiting

## 2021-10-13 ENCOUNTER — HOSPITAL ENCOUNTER (OUTPATIENT)
Age: 61
Discharge: HOME | End: 2021-10-13
Payer: COMMERCIAL

## 2021-10-13 ENCOUNTER — HOSPITAL ENCOUNTER (OUTPATIENT)
Dept: OTHER | Age: 61
Discharge: HOME | End: 2021-10-13
Payer: COMMERCIAL

## 2021-10-13 VITALS
RESPIRATION RATE: 22 BRPM | DIASTOLIC BLOOD PRESSURE: 80 MMHG | OXYGEN SATURATION: 98 % | SYSTOLIC BLOOD PRESSURE: 130 MMHG | BODY MASS INDEX: 45.3 KG/M2 | HEART RATE: 105 BPM | WEIGHT: 272.2 LBS

## 2021-10-13 DIAGNOSIS — I50.22 CHRONIC SYSTOLIC CONGESTIVE HEART FAILURE (HCC): Primary | ICD-10-CM

## 2021-10-13 DIAGNOSIS — R00.0 TACHYCARDIA: ICD-10-CM

## 2021-10-13 PROCEDURE — 99212 OFFICE O/P EST SF 10 MIN: CPT

## 2021-10-13 PROCEDURE — 99213 OFFICE O/P EST LOW 20 MIN: CPT | Performed by: NURSE PRACTITIONER

## 2021-10-13 RX ORDER — EMPAGLIFLOZIN 10 MG/1
10 TABLET, FILM COATED ORAL DAILY
COMMUNITY
End: 2021-10-28 | Stop reason: SDUPTHER

## 2021-10-13 ASSESSMENT — ENCOUNTER SYMPTOMS
SHORTNESS OF BREATH: 1
COUGH: 0
ABDOMINAL PAIN: 0
EYE DISCHARGE: 0
BLOOD IN STOOL: 0

## 2021-10-13 NOTE — PROGRESS NOTES
CHF Clinic at [facility]    Office: [phone] Fax: [phone] [facility] CHF CLINIC  [address] [address]  Dept: [phone]  Loc: [phone]    Olga De La Cruz is a 63 y.o. male who presents today for CHF evaluation. HPI:     +  shortness of breath, baseline    +  Fatigue occas   +  Edema , improved since last appt.    Denies chest pain   Denies  palpitations   No orthopnea , nor PND           Past Medical History:   Diagnosis Date    Bronchitis     CAD (coronary artery disease)     [facility]     Chest pain     [facility]congest[facility]) [facility]     Chronic back pain     Chronic diastolic CHF (congestive heart failure) ([facility] 8/1/2014    COPD (chronic obstructive pulmonary disease) ([facility])     Headache(784.0)     Hyperlipidemia     Hypertension     Inguinal hernia     Low back pain with sciatica     Migraine     Mitral valve regurgitation     Osteoarthritis     Pneumonia     Sleep apnea     with cpap    SOB (shortness of breath) on exertion     Tricuspid regurgitation     Type 2 diabetes mellitus without complication, without long-term current use of insulin ([facility]     Under care of team 05/20/2021    cardiology-Dr Yesy Gil visit jan 2021    Wellness examination 05/20/2021    [facility]     Past Surgical History:   Procedure Laterality Date    ADENOIDECTOMY      COLONOSCOPY      COLONOSCOPY  05/28/2021    COLORECTAL CANCER SCREENING, NOT HIGH RISK, POLYPECTOMY    COLONOSCOPY N/A 5/28/2021    COLONOSCOPY POLYPECTOMY HOT BIOPSY performed by Amber Cervantes IV, DO at [address]   exact date unknown       Family History   Problem Relation Age of Onset    Kidney Disease Mother     Diabetes Mother     Heart Disease Mother     Arthritis Mother     Heart Disease daily 60 tablet 0    simvastatin (ZOCOR) 20 MG tablet Take 1 tablet by mouth nightly 30 tablet 3    spironolactone (ALDACTONE) 25 MG tablet Take 1 tablet by mouth nightly 30 tablet 1    diclofenac sodium (VOLTAREN) 1 % GEL Apply topically 2 times daily 350 g 2    aspirin 81 MG tablet Take 1 tablet by mouth daily 30 tablet 3    bumetanide (BUMEX) 2 MG tablet TAKE 1 TABLET BY MOUTH DAILY (Patient taking differently: Take 2 mg by mouth 2 times daily ) 30 tablet 0    sacubitril-valsartan (ENTRESTO) 24-26 MG per tablet Take 1 tablet by mouth 2 times daily 60 tablet 3    zoster recombinant adjuvanted vaccine (SHINGRIX) 50 MCG/0.5ML SUSR injection Inject 0.5 mLs into the muscle See Admin Instructions 1 dose now and repeat in 2-6 months 0.5 mL 0    blood glucose monitor kit and supplies Dispense sufficient amount for indicated testing frequency plus additional to accommodate PRN testing needs. Dispense all needed supplies to include: monitor, strips, lancing device, lancets, control solutions, alcohol swabs. 1 kit 0    Masks (FACE MASK) MISC 1 each by Does not apply route as needed (as needed) 50 each 3     No current facility-administered medications for this encounter. Allergies   Allergen Reactions    Penicillins Swelling    Sulfa Antibiotics Swelling    Sulfasalazine Swelling    Pseudoeph-Doxylamine-Dm-Apap Rash    Rabbit Protein Rash     Fur, gerbils         Subjective:      Review of Systems   Constitutional: Positive for fatigue. Negative for activity change, chills and fever. Eyes: Negative for discharge and visual disturbance. Respiratory: Positive for shortness of breath. Negative for cough. Cardiovascular: Positive for leg swelling. Negative for chest pain. Gastrointestinal: Negative for abdominal pain and blood in stool. Endocrine: Negative for cold intolerance and heat intolerance. Genitourinary: Negative for dysuria and flank pain.    Musculoskeletal: Negative for joint swelling and myalgias. Skin: Negative for pallor and rash. Neurological: Positive for dizziness (rare). Negative for headaches. Psychiatric/Behavioral: Negative for hallucinations and suicidal ideas. Objective:     Physical Exam  Vitals and nursing note reviewed. Constitutional:       Comments:      HENT:      Head: Normocephalic and atraumatic. Eyes:      General: No scleral icterus. Conjunctiva/sclera: Conjunctivae normal.   Cardiovascular:      Rate and Rhythm: Normal rate and regular rhythm. Heart sounds: Normal heart sounds. Pulmonary:      Effort: Pulmonary effort is normal.      Breath sounds: Normal breath sounds. No wheezing or rales. Abdominal:      General: Bowel sounds are normal.      Palpations: Abdomen is soft. Musculoskeletal:         General: Normal range of motion. Cervical back: Normal range of motion. Skin:     General: Skin is warm and dry. Neurological:      Mental Status: He is alert and oriented to person, place, and time. CONCLUSIONS     Summary  Technically difficult study  Left ventricle is normal in size and wall thickness. Severe anterior wall and apical hypokinesis  Left ventricular systolic function is moderately reduced. Calculated EF via  De La Paz's method is 33 %. Both atria are normal in size. Normal right ventricular size and function. Mild to moderate mitral regurgitation. No significant pericardial effusion is seen. Normal aortic root dimension. IVC not well visualized     Signature  ----------------------------------------------------------------------------   Electronically signed by Andre Carranza(Interpreting physician) on   06/02/2021 10:05 AM      /80   Pulse 105   Resp 22   Wt 272 lb 3.2 oz (123.5 kg)   SpO2 98%   BMI 45.30 kg/m²   O2 Device: None (Room air)   HR is elevated . Will check again before leaving today. Lower Extremity Measurements in cm.    R Calf Circumference (cm): 47.5 cm  L Calf Circumference (cm): 46 cm  R Ankle Circumference (cm): 29.5 cm  L Ankle Circumference (cm): 27.5 cm    CBC:   Lab Results   Component Value Date    WBC 9.7 08/23/2021    RBC 4.31 08/23/2021    RBC 4.56 02/17/2012    HGB 12.2 08/23/2021    HCT 38.5 08/23/2021    MCV 89.3 08/23/2021    MCH 28.3 08/23/2021    MCHC 31.7 08/23/2021    RDW 13.0 08/23/2021     08/23/2021    PLT Platelet clumps present, count appears adequate. 02/17/2012    MPV 10.7 08/23/2021     CMP:    Lab Results   Component Value Date     08/23/2021    K 3.8 08/23/2021     08/23/2021    CO2 26 08/23/2021    BUN 16 08/23/2021    CREATININE 1.03 08/23/2021    GFRAA >60 08/23/2021    LABGLOM >60 08/23/2021    GLUCOSE 140 08/23/2021    GLUCOSE 90 02/17/2012    PROT 6.9 01/21/2021    LABALBU 3.9 01/21/2021    LABALBU 4.7 02/17/2012    CALCIUM 8.7 08/23/2021    BILITOT <0.15 01/21/2021    ALKPHOS 66 01/21/2021    AST 19 01/21/2021    ALT 27 01/21/2021     Lab Results   Component Value Date    LABA1C 6.3 08/30/2021           :Assessment      1. Chronic systolic congestive heart failure ([facility]    2. Tachycardia        :Plan      1. Chronic systolic congestive heart failure ([facility]    2. Tachycardia        Wt is down 8 lbs,  leg measurements greatly improved. Pt has made changes to diet and is aggressively trying to make changes. On Guideline-Directed Medication Therapy:     ACEI / ARB / ARNi: Entresto 24/26 mg b.i.d.- ordered but not yet picked up. PA sent to wrong pharmacy, awaiting from [place] for PA. Will completed ASAP  Beta - blocker  Aldosterone Antagonist  Diuretic Therapy  Jardiance started by CARDIO, pt taking samples. Improved status since last appt. We will completed his PA and get him started on entresto. Labs to follow in 2 w after start. Spoke with pt pharm and pt's OOP per month will be negligible. Pharm is waiting on PA to be completed by [facility] provider for Jardiance.      Start free month entresto now, , reduced second month, and allow PA to be done for future month's RX. HR down to 103 bpm. Will monitor  Next appt. RTC one month. Check labs in 2 w. Order slips given to pt. Orders Placed This Encounter   Procedures    Basic Metabolic Panel     Standing Status:   Future     Standing Expiration Date:   10/13/2022     No orders of the defined types were placed in this encounter. Patientgiven verbal and/or written educational instructions. Follow up as directed. I have reviewed and agree with the nursing documentation. Verbally reviewed medication list with patient; patient verbalized understanding. Discussed 2000mg/day sodium restricted diet; patient verbalized understanding. Moderate daily exercise encouraged as tolerated. Discussed rest breaks as needed; patient verbalized understanding. Patient instructed to weigh self at the same time of each day, using same clothes and same scale; reinforced teaching to monitor for 3-5 lb weight increase over 1-2 days, and to notify the CHF clinic at [phone] or physician office if weight change noted. Patient verbalized understanding. Risks of smoking discussed with the patient if applicable; patient strongly discouraged to smoke. Patient verbalized understanding. Signs and symptoms of CHF discussed with patient, such as feeling more tired than normal, feeling short of breath, coughing that increases when you lie down, sudden weight gain, swelling of your feet, legs or belly. Patient verbalized understanding to notify the CHF clinic at [phone] or physician office if these symptoms occur. Compliance with plan of care and further disease process causes discussed with patient, patient encouraged to keep all follow up appointments. Patient verbalized understanding. Echocardiogram reviewed. Labs reviewed. Labs done today. Medications reviewed. Medications checked with pt's pharmacist by Gretchen Perez.      Electronically signedby Lauren Gonzalez JONAH Granger, APRN - CNP on 10/13/2021 at 2:51 PM

## 2021-10-15 ENCOUNTER — HOSPITAL ENCOUNTER (OUTPATIENT)
Age: 61
Setting detail: SPECIMEN
Discharge: HOME | End: 2021-10-15
Payer: COMMERCIAL

## 2021-10-15 DIAGNOSIS — I50.22 CHRONIC SYSTOLIC CONGESTIVE HEART FAILURE (HCC): ICD-10-CM

## 2021-10-15 LAB
ANION GAP SERPL CALCULATED.3IONS-SCNC: 10 MMOL/L (ref 9–17)
BUN BLDV-MCNC: 20 MG/DL (ref 8–23)
BUN/CREAT BLD: ABNORMAL (ref 9–20)
CALCIUM SERPL-MCNC: 9 MG/DL (ref 8.6–10.4)
CHLORIDE BLD-SCNC: 96 MMOL/L (ref 98–107)
CO2: 31 MMOL/L (ref 20–31)
CREAT SERPL-MCNC: 1.15 MG/DL (ref 0.7–1.2)
GFR AFRICAN AMERICAN: >60 ML/MIN
GFR NON-AFRICAN AMERICAN: >60 ML/MIN
GFR SERPL CREATININE-BSD FRML MDRD: ABNORMAL ML/MIN/{1.73_M2}
GFR SERPL CREATININE-BSD FRML MDRD: ABNORMAL ML/MIN/{1.73_M2}
GLUCOSE BLD-MCNC: 158 MG/DL (ref 70–99)
POTASSIUM SERPL-SCNC: 4.3 MMOL/L (ref 3.7–5.3)
SODIUM BLD-SCNC: 137 MMOL/L (ref 135–144)

## 2021-10-15 PROCEDURE — 36415 COLL VENOUS BLD VENIPUNCTURE: CPT

## 2021-10-15 PROCEDURE — 80048 BASIC METABOLIC PNL TOTAL CA: CPT

## 2021-10-28 ENCOUNTER — OFFICE VISIT (OUTPATIENT)
Dept: FAMILY MEDICINE CLINIC | Age: 61
End: 2021-10-28
Payer: COMMERCIAL

## 2021-10-28 VITALS
DIASTOLIC BLOOD PRESSURE: 80 MMHG | BODY MASS INDEX: 46.66 KG/M2 | WEIGHT: 280.4 LBS | SYSTOLIC BLOOD PRESSURE: 124 MMHG | HEART RATE: 97 BPM

## 2021-10-28 DIAGNOSIS — G56.00 CARPAL TUNNEL SYNDROME, UNSPECIFIED LATERALITY: ICD-10-CM

## 2021-10-28 DIAGNOSIS — N52.8 OTHER MALE ERECTILE DYSFUNCTION: ICD-10-CM

## 2021-10-28 DIAGNOSIS — E11.69 DIABETES MELLITUS TYPE 2 IN OBESE (HCC): Primary | ICD-10-CM

## 2021-10-28 DIAGNOSIS — M47.817 LUMBOSACRAL SPONDYLOSIS WITHOUT MYELOPATHY: ICD-10-CM

## 2021-10-28 DIAGNOSIS — G56.01 CARPAL TUNNEL SYNDROME OF RIGHT WRIST: ICD-10-CM

## 2021-10-28 DIAGNOSIS — E66.9 DIABETES MELLITUS TYPE 2 IN OBESE (HCC): Primary | ICD-10-CM

## 2021-10-28 DIAGNOSIS — M54.40 ACUTE LEFT-SIDED LOW BACK PAIN WITH SCIATICA, SCIATICA LATERALITY UNSPECIFIED: ICD-10-CM

## 2021-10-28 DIAGNOSIS — I50.22 CHRONIC SYSTOLIC CONGESTIVE HEART FAILURE (HCC): ICD-10-CM

## 2021-10-28 PROCEDURE — 4004F PT TOBACCO SCREEN RCVD TLK: CPT | Performed by: STUDENT IN AN ORGANIZED HEALTH CARE EDUCATION/TRAINING PROGRAM

## 2021-10-28 PROCEDURE — 3044F HG A1C LEVEL LT 7.0%: CPT | Performed by: STUDENT IN AN ORGANIZED HEALTH CARE EDUCATION/TRAINING PROGRAM

## 2021-10-28 PROCEDURE — G8484 FLU IMMUNIZE NO ADMIN: HCPCS | Performed by: STUDENT IN AN ORGANIZED HEALTH CARE EDUCATION/TRAINING PROGRAM

## 2021-10-28 PROCEDURE — 3017F COLORECTAL CA SCREEN DOC REV: CPT | Performed by: STUDENT IN AN ORGANIZED HEALTH CARE EDUCATION/TRAINING PROGRAM

## 2021-10-28 PROCEDURE — G8417 CALC BMI ABV UP PARAM F/U: HCPCS | Performed by: STUDENT IN AN ORGANIZED HEALTH CARE EDUCATION/TRAINING PROGRAM

## 2021-10-28 PROCEDURE — 99213 OFFICE O/P EST LOW 20 MIN: CPT | Performed by: STUDENT IN AN ORGANIZED HEALTH CARE EDUCATION/TRAINING PROGRAM

## 2021-10-28 PROCEDURE — 2022F DILAT RTA XM EVC RTNOPTHY: CPT | Performed by: STUDENT IN AN ORGANIZED HEALTH CARE EDUCATION/TRAINING PROGRAM

## 2021-10-28 PROCEDURE — G8427 DOCREV CUR MEDS BY ELIG CLIN: HCPCS | Performed by: STUDENT IN AN ORGANIZED HEALTH CARE EDUCATION/TRAINING PROGRAM

## 2021-10-28 RX ORDER — SILDENAFIL 50 MG/1
50 TABLET, FILM COATED ORAL DAILY PRN
Qty: 10 TABLET | Refills: 0 | Status: SHIPPED | OUTPATIENT
Start: 2021-10-28 | End: 2022-06-14

## 2021-10-28 RX ORDER — EMPAGLIFLOZIN 10 MG/1
10 TABLET, FILM COATED ORAL DAILY
Qty: 30 TABLET | Refills: 3 | Status: SHIPPED | OUTPATIENT
Start: 2021-10-28 | End: 2022-06-14 | Stop reason: SDUPTHER

## 2021-10-28 RX ORDER — METOPROLOL SUCCINATE 50 MG/1
50 TABLET, EXTENDED RELEASE ORAL DAILY
Qty: 30 TABLET | Refills: 3 | Status: SHIPPED | OUTPATIENT
Start: 2021-10-28 | End: 2022-06-14 | Stop reason: SDUPTHER

## 2021-10-28 RX ORDER — BUMETANIDE 2 MG/1
2 TABLET ORAL 2 TIMES DAILY
Qty: 60 TABLET | Refills: 2 | Status: SHIPPED | OUTPATIENT
Start: 2021-10-28 | End: 2022-06-14 | Stop reason: SDUPTHER

## 2021-10-28 ASSESSMENT — ENCOUNTER SYMPTOMS
COLOR CHANGE: 0
APNEA: 0
SHORTNESS OF BREATH: 0
CHEST TIGHTNESS: 0
COUGH: 0

## 2021-10-28 NOTE — PROGRESS NOTES
Visit Information    Have you changed or started any medications since your last visit including any over-the-counter medicines, vitamins, or herbal medicines? no   Are you having any side effects from any of your medications? -  no  Have you stopped taking any of your medications? Is so, why? -  no    Have you seen any other physician or provider since your last visit? No  Have you had any other diagnostic tests since your last visit? No  Have you been seen in the emergency room and/or had an admission to a hospital since we last saw you? No  Have you had your routine dental cleaning in the past 6 months? no    Have you activated your [employer] account? If not, what are your barriers?  Yes     Patient Care Team:  Kera Cao MD as PCP - General (Family Medicine)  Rowena Mane MD as PCP - [facility]  Jo Gamino MD as Consulting Physician (Cardiology)  Dell Dominguez MD as Consulting Physician  Danae Welch DO as Consulting Physician (General Surgery)    Medical History Review  Past Medical, Family, and Social History reviewed and does not contribute to the patient presenting condition    Health Maintenance   Topic Date Due    Diabetic foot exam  Never done    Diabetic retinal exam  Never done    COVID-19 Vaccine (1) Never done    Diabetic microalbuminuria test  Never done    Shingles Vaccine (1 of 2) Never done    Flu vaccine (1) 09/01/2021    Lipid screen  01/22/2022    A1C test (Diabetic or Prediabetic)  08/30/2022    Potassium monitoring  10/15/2022    Creatinine monitoring  10/15/2022    Colon cancer screen colonoscopy  05/28/2024    Pneumococcal 0-64 years Vaccine (2 of 2 - PPSV23) 12/18/2024    DTaP/Tdap/Td vaccine (2 - Td or Tdap) 12/08/2030    Hepatitis C screen  Completed    HIV screen  Completed    Hepatitis A vaccine  Aged Out    Hib vaccine  Aged Out    Meningococcal (ACWY) vaccine  Aged Out

## 2021-10-28 NOTE — PROGRESS NOTES
Attending Physician Statement  I have discussed the care of Ruba Adams 63 y.o. male, including pertinent history and exam findings, with the resident Dr. Mame Marie MD.    History and Exam:   Chief Complaint   Patient presents with    Follow-up     CHF    Discuss Medications       Past Medical History:   Diagnosis Date    Bronchitis     CAD (coronary artery disease)     [facility]     Chest pain     CHF (congestive heart failure) ([address]     Chronic back pain     Chronic diastolic CHF (congestive heart failure) ([address] 8/1/2014    COPD (chronic obstructive pulmonary disease) ([address]     Headache(784.0)     Hyperlipidemia     Hypertension     Inguinal hernia     Low back pain with sciatica     Migraine     Mitral valve regurgitation     Osteoarthritis     Pneumonia     Sleep apnea     with cpap    SOB (shortness of breath) on exertion     Tricuspid regurgitation     Type 2 diabetes mellitus without complication, without long-term current use of insulin ([facility] [address]     Under care of team 05/20/2021    cardiology-Dr Lelon Spatz visit jan 2021    Wellness examination 05/20/2021    [id number]     Allergies   Allergen Reactions    Penicillins Swelling    Sulfa Antibiotics Swelling    Sulfasalazine Swelling    Pseudoeph-Doxylamine-Dm-Apap Rash    Rabbit Protein Rash     Fur, gerbils      I have seen and examined the patient and the key elements of the encounter have been performed by me.   BP Readings from Last 3 Encounters:   10/28/21 124/80   10/13/21 130/80   09/27/21 (!) 142/90     /80 (Site: Right Upper Arm, Position: Sitting, Cuff Size: Medium Adult)   Pulse 97   Wt 280 lb 6.4 oz (127.2 kg)   BMI 46.66 kg/m²   Lab Results   Component Value Date    WBC 9.7 08/23/2021    HGB 12.2 (L) 08/23/2021    HCT 38.5 (L) 08/23/2021     08/23/2021    CHOL 191 01/22/2021    TRIG 138 01/22/2021    HDL 46 01/22/2021    ALT 27 01/21/2021 AST 19 01/21/2021     10/15/2021    K 4.3 10/15/2021    CL 96 (L) 10/15/2021    CREATININE 1.15 10/15/2021    BUN 20 10/15/2021    CO2 31 10/15/2021    TSH 0.41 01/22/2021    PSA 1.06 07/01/2014    INR 0.9 05/30/2021    LABA1C 6.3 08/30/2021     Lab Results   Component Value Date    LABPROT 7.1 02/17/2012    LABALBU 3.9 01/21/2021     No results found for: IRON, TIBC, FERRITIN  Lab Results   Component Value Date    LDLCHOLESTEROL 117 01/22/2021     I agree with the assessment, plan and the diagnosis of    Diagnosis Orders   1. Diabetes mellitus type 2 in obese (HCC)  empagliflozin (JARDIANCE) 10 MG tablet   2. Chronic systolic congestive heart failure (HCC)  bumetanide (BUMEX) 2 MG tablet    sacubitril-valsartan (ENTRESTO) 24-26 MG per tablet    metoprolol succinate (TOPROL XL) 50 MG extended release tablet   3. Carpal tunnel syndrome, unspecified laterality     4. Carpal tunnel syndrome of right wrist  Elastic Bandages & Supports (WRIST SPLINT/COCK-UP/RIGHT L) MISC   5. Other male erectile dysfunction  sildenafil (VIAGRA) 50 MG tablet   6. Acute left-sided low back pain with sciatica, sciatica laterality unspecified     7. Lumbosacral spondylosis without myelopathy  [facility] - [facility]    . I agree with orders as documented by the resident. More than 25 minutes spent  in face to face encounter with the patient and more than half in counseling. Patient's questions were answered. Patient Voiced understanding to the counseling. Return in about 6 weeks (around 12/9/2021) for f/u chf .    (GC Modifier)-Dr. Suleman Parker MD

## 2021-10-28 NOTE — PROGRESS NOTES
Subjective:    Sima Leal is a 63 y.o. male with  has a past medical history of Bronchitis, CAD (coronary artery disease), Cardiomyopathy ([address] Chest pain, CHF (congestive heart failure) ([address] Chronic back pain, Chronic diastolic CHF (congestive heart failure) ([address] COPD (chronic obstructive pulmonary disease) ([address] Headache(784.0), Hyperlipidemia, Hypertension, Inguinal hernia, Low back pain with sciatica, Migraine, Mitral valve regurgitation, Osteoarthritis, Pneumonia, Sleep apnea, SOB (shortness of breath) on exertion, Tricuspid regurgitation, Type 2 diabetes mellitus without complication, without long-term current use of insulin ([address] Under care of team, and Wellness examination. Presented to the office today for:  Chief Complaint   Patient presents with    Follow-up     CHF    Discuss Medications       HPI  Patient is a 58-year-old male who is following up today for CHF. Patient follows with cardiology group, states he is doing well. No complaints of shortness of breath, compliant with medication. Has not experienced Covid vaccines, denies flu shot. 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 States he thinks it is carpal tunnel syndrome. Also complains of erectile dysfunction, states he started dating again  And would like some medication to ensure his libido. Review of Systems   Constitutional: Negative for activity change, appetite change, chills, fatigue and fever. Respiratory: Negative for apnea, cough, chest tightness and shortness of breath. Musculoskeletal: Positive for joint swelling. Negative for arthralgias. Skin: Negative for color change, pallor, rash and wound. Neurological: Positive for numbness. Negative for dizziness, tremors, facial asymmetry, light-headedness and headaches. Psychiatric/Behavioral: Negative for agitation, behavioral problems and confusion.  The patient is not nervous/anxious. The patient has a   Family History   Problem Relation Age of Onset    Kidney Disease Mother     Diabetes Mother     Heart Disease Mother     Arthritis Mother     Heart Disease Father     Heart Attack Father     Arthritis Sister     Diabetes Sister     Heart Disease Sister     Diabetes Sister        Objective:    /80 (Site: Right Upper Arm, Position: Sitting, Cuff Size: Medium Adult)   Pulse 97   Wt 280 lb 6.4 oz (127.2 kg)   BMI 46.66 kg/m²    BP Readings from Last 3 Encounters:   10/28/21 124/80   10/13/21 130/80   09/27/21 (!) 142/90       Physical Exam  Vitals and nursing note reviewed. Constitutional:       Appearance: Normal appearance. He is normal weight. Cardiovascular:      Rate and Rhythm: Normal rate and regular rhythm. Pulses: Normal pulses. Heart sounds: Normal heart sounds. Pulmonary:      Effort: Pulmonary effort is normal.      Breath sounds: Normal breath sounds. Abdominal:      General: Abdomen is flat. Bowel sounds are normal.      Palpations: Abdomen is soft. Neurological:      General: No focal deficit present. Mental Status: He is alert and oriented to person, place, and time. Mental status is at baseline. Psychiatric:         Mood and Affect: Mood normal.         Behavior: Behavior normal.         Thought Content:  Thought content normal.         Judgment: Judgment normal.         Lab Results   Component Value Date    WBC 9.7 08/23/2021    HGB 12.2 (L) 08/23/2021    HCT 38.5 (L) 08/23/2021     08/23/2021    CHOL 191 01/22/2021    TRIG 138 01/22/2021    HDL 46 01/22/2021    ALT 27 01/21/2021    AST 19 01/21/2021     10/15/2021    K 4.3 10/15/2021    CL 96 (L) 10/15/2021    CREATININE 1.15 10/15/2021    BUN 20 10/15/2021    CO2 31 10/15/2021    TSH 0.41 01/22/2021    PSA 1.06 07/01/2014    INR 0.9 05/30/2021    LABA1C 6.3 08/30/2021     Lab Results   Component Value Date    CALCIUM 9.0 10/15/2021     Lab Results   Component Value Date    LDLCHOLESTEROL 117 2021       Assessment and Plan:    1. Chronic systolic congestive heart failure (HCC)  - bumetanide (BUMEX) 2 MG tablet; Take 1 tablet by mouth 2 times daily  Dispense: 60 tablet; Refill: 2  - sacubitril-valsartan (ENTRESTO) 24-26 MG per tablet; Take 1 tablet by mouth 2 times daily  Dispense: 60 tablet; Refill: 3  - metoprolol succinate (TOPROL XL) 50 MG extended release tablet; Take 1 tablet by mouth daily  Dispense: 30 tablet; Refill: 3    2. Diabetes mellitus type 2 in obese (HCC)  - empagliflozin (JARDIANCE) 10 MG tablet; Take 1 tablet by mouth daily  Dispense: 30 tablet; Refill: 3    3. Carpal tunnel syndrome of right wrist  - Elastic Bandages & Supports (WRIST SPLINT/COCK-UP/RIGHT L) MISC; 1 each by Does not apply route daily  Dispense: 1 each; Refill: 0    4. Other male erectile dysfunction  - sildenafil (VIAGRA) 50 MG tablet; Take 1 tablet by mouth daily as needed for Erectile Dysfunction  Dispense: 10 tablet; Refill: 0    5.  Lumbar spondylosis  -Physical therapy [place]    Requested Prescriptions     Signed Prescriptions Disp Refills    bumetanide (BUMEX) 2 MG tablet 60 tablet 2     Sig: Take 1 tablet by mouth 2 times daily    sacubitril-valsartan (ENTRESTO) 24-26 MG per tablet 60 tablet 3     Sig: Take 1 tablet by mouth 2 times daily    empagliflozin (JARDIANCE) 10 MG tablet 30 tablet 3     Sig: Take 1 tablet by mouth daily    metoprolol succinate (TOPROL XL) 50 MG extended release tablet 30 tablet 3     Sig: Take 1 tablet by mouth daily    Elastic Bandages & Supports (WRIST SPLINT/COCK-UP/RIGHT L) MISC 1 each 0     Si each by Does not apply route daily    sildenafil (VIAGRA) 50 MG tablet 10 tablet 0     Sig: Take 1 tablet by mouth daily as needed for Erectile Dysfunction       Medications Discontinued During This Encounter   Medication Reason    metoprolol succinate (TOPROL XL) 50 MG extended release tablet

## 2021-11-16 ENCOUNTER — HOSPITAL ENCOUNTER (OUTPATIENT)
Dept: PHYSICAL THERAPY | Age: 61
Setting detail: THERAPIES SERIES
Discharge: HOME | End: 2021-11-16
Payer: COMMERCIAL

## 2021-11-16 PROCEDURE — 97110 THERAPEUTIC EXERCISES: CPT

## 2021-11-16 PROCEDURE — 97161 PT EVAL LOW COMPLEX 20 MIN: CPT

## 2021-11-18 ENCOUNTER — HOSPITAL ENCOUNTER (OUTPATIENT)
Dept: OTHER | Age: 61
Discharge: HOME | End: 2021-11-18
Payer: COMMERCIAL

## 2021-11-18 VITALS
WEIGHT: 276 LBS | RESPIRATION RATE: 16 BRPM | DIASTOLIC BLOOD PRESSURE: 80 MMHG | HEART RATE: 103 BPM | OXYGEN SATURATION: 99 % | BODY MASS INDEX: 45.93 KG/M2 | SYSTOLIC BLOOD PRESSURE: 112 MMHG

## 2021-11-18 PROCEDURE — 99212 OFFICE O/P EST SF 10 MIN: CPT

## 2021-11-18 NOTE — PROGRESS NOTES
Date:  2021  Time:  1:30 PM    CHF Clinic at [facility]    Office: [phone] Fax: [phone]    Re:  Sima Leal   Patient : 1960    Vital Signs: /80   Pulse 103   Resp 16   Wt 276 lb (125.2 kg)   SpO2 99%   BMI 45.93 kg/m²                                                   No results for input(s): CBC, HGB, HCT, WBC, PLATELET, NA, K, CL, CO2, BUN, CREATININE, GLUCOSE, BNP, INR in the last 72 hours. Respiratory:    Assessment  Charting Type: Reassessment    Breath Sounds  Right Upper Lobe: Clear  Right Middle Lobe: Clear  Right Lower Lobe: Diminished  Left Upper Lobe: Clear  Left Lower Lobe: Diminished    Cough/Sputum  Cough: Productive  Frequency: Occasional         Peripheral Vascular  RLE Edema: +1, Pitting  LLE Edema: +1, Pitting      Complaints: No new complaints    Physician Orders No new orders     Comment : Weight is up 4 pounds from last months visit patient was in [place] for a week admits to eating out everyday a lot of high sodium foods Discussed the importance of getting back on a low sodium diet and try to stay under 2000mg per day. Patient stays under the 64 ounces . Had follow labs on 10/13/2021 from being started on Entresto shown to [facility] CNP no new orders needed at this time. Patient has ECHO done at [facility] results unavailable at this time. We will see in 1 month 2021.     Electronically signed by Monae James RN on 2021 at 1:30 PM

## 2021-11-23 ENCOUNTER — HOSPITAL ENCOUNTER (OUTPATIENT)
Dept: PHYSICAL THERAPY | Age: 61
Setting detail: THERAPIES SERIES
Discharge: HOME | End: 2021-11-23
Payer: COMMERCIAL

## 2021-11-23 PROCEDURE — 97113 AQUATIC THERAPY/EXERCISES: CPT

## 2021-11-23 NOTE — FLOWSHEET NOTE
[facility]   [address]   Phone: [phone]   Fax: [phone]    Physical Therapy Daily Treatment Note      Date:  2021  Patient Name:  Weston Sesay    :  1960  MRN: [id number]  Physician:      Gagan Alcantara MD                      Insurance: [facility] Perone: NPRe  # of visits allowed/remainin  Medical Diagnosis:   J26.810 (ICD-10-CM) - Lumbosacral spondylosis without myelopathy       Rehab Codes: M 54.41, M 54.42,   Onset date: 10/28/21                         Next Dr's appt. : tbd  Visit Count:                                 Cancel/No Show: 0/0    Subjective:  Reports pain is constant in lower back. States while in [place] he was able to keep up on some exercise in the pool. Awaiting scheduling for cardiac testing- may have to get a pacemaker in January. Pain:  [x] Yes  [] No Location: Lower Back; denies radicular symptoms Pain Rating: (0-10 scale)10/10  Pain altered Tx:  [x] No  [] Yes  Action:  Comments: Initiated aquatic therapy with emphasis on core stability and postural awareness. Noted increased edema in (B) LEs R> L- patient reports he is aware and taking meds    Objective:  Modalities:   Precautions:  Exercises:      [address] [facility] J Exercise Log  Aquatic, Hip & DLS Program- Phase 1    Date of Eval:                                Primary PT: Ann Marie Asencio, PT  Diagnosis: Things to Focus On (goals):   Surgical Precautions:  Medical Precautions:  [] C-9 dates  [] Occ Med   [] Medicare       Date 21       Visit # 2/12       Walk F/L/R 2 Laps       Marching 2 Laps       Squats 10x5\"       Step-Ups F/L        Step Down F/L        Heel-toe raises 10x       SLR F/L/R 10x       Hip/Knee Flex/Ext 10x       F/L Lunges                Kickboard Ex.  Med       Iso Abd. 10x5\"       Push-pull 10x       Paddling 10x               UE Format:        Horiz Abd/Add        IR/ER (wipers)        Alt Demonstrates/verbalizes HEP/Ed previously given. Plan: [x] Continue per plan of care.    [] Other:      Treatment Charges: Mins Units   []  Modalities     []  Ther Exercise     []  Manual Therapy     []  Ther Activities     [x]  Aquatics 30 2   []  Neuromuscular     [] Vasocompression     [] Gait Training     [] Dry needling        [] 1 or 2 muscles        [] 3 or more muscles     []  Other     Total Treatment time 30 2     Time In: 7991 PM           Time Out: 140 PM    Electronically signed by:  Roxana Ghosh PTA

## 2021-11-29 DIAGNOSIS — G89.29 CHRONIC LEFT SHOULDER PAIN: ICD-10-CM

## 2021-11-29 DIAGNOSIS — M25.512 CHRONIC LEFT SHOULDER PAIN: ICD-10-CM

## 2021-11-29 DIAGNOSIS — M54.50 CHRONIC MIDLINE LOW BACK PAIN WITHOUT SCIATICA: ICD-10-CM

## 2021-11-29 DIAGNOSIS — M54.50 LUMBAR PAIN: ICD-10-CM

## 2021-11-29 DIAGNOSIS — G89.29 CHRONIC MIDLINE LOW BACK PAIN WITHOUT SCIATICA: ICD-10-CM

## 2021-11-29 RX ORDER — GABAPENTIN 300 MG/1
CAPSULE ORAL
Qty: 30 CAPSULE | Refills: 0 | Status: CANCELLED | OUTPATIENT
Start: 2021-11-29 | End: 2022-11-29

## 2021-11-30 ENCOUNTER — HOSPITAL ENCOUNTER (OUTPATIENT)
Age: 61
Setting detail: OBSERVATION
Discharge: HOME | End: 2021-12-02
Attending: EMERGENCY MEDICINE | Admitting: EMERGENCY MEDICINE
Payer: COMMERCIAL

## 2021-11-30 ENCOUNTER — APPOINTMENT (OUTPATIENT)
Dept: GENERAL RADIOLOGY | Age: 61
End: 2021-11-30
Payer: COMMERCIAL

## 2021-11-30 DIAGNOSIS — R07.9 CHEST PAIN, UNSPECIFIED TYPE: Primary | ICD-10-CM

## 2021-11-30 LAB
ABSOLUTE EOS #: 0.18 K/UL (ref 0–0.44)
ABSOLUTE IMMATURE GRANULOCYTE: 0.05 K/UL (ref 0–0.3)
ABSOLUTE LYMPH #: 1.86 K/UL (ref 1.1–3.7)
ABSOLUTE MONO #: 0.98 K/UL (ref 0.1–1.2)
ANION GAP SERPL CALCULATED.3IONS-SCNC: 14 MMOL/L (ref 9–17)
BASOPHILS # BLD: 0 % (ref 0–2)
BASOPHILS ABSOLUTE: 0.04 K/UL (ref 0–0.2)
BNP INTERPRETATION: NORMAL
BUN BLDV-MCNC: 21 MG/DL (ref 8–23)
BUN/CREAT BLD: ABNORMAL (ref 9–20)
CALCIUM SERPL-MCNC: 9.5 MG/DL (ref 8.6–10.4)
CHLORIDE BLD-SCNC: 93 MMOL/L (ref 98–107)
CO2: 25 MMOL/L (ref 20–31)
CREAT SERPL-MCNC: 0.88 MG/DL (ref 0.7–1.2)
DIFFERENTIAL TYPE: ABNORMAL
EOSINOPHILS RELATIVE PERCENT: 2 % (ref 1–4)
GFR AFRICAN AMERICAN: >60 ML/MIN
GFR NON-AFRICAN AMERICAN: >60 ML/MIN
GFR SERPL CREATININE-BSD FRML MDRD: ABNORMAL ML/MIN/{1.73_M2}
GFR SERPL CREATININE-BSD FRML MDRD: ABNORMAL ML/MIN/{1.73_M2}
GLUCOSE BLD-MCNC: 119 MG/DL (ref 70–99)
HCT VFR BLD CALC: 40 % (ref 40.7–50.3)
HEMOGLOBIN: 13.5 G/DL (ref 13–17)
IMMATURE GRANULOCYTES: 1 %
LYMPHOCYTES # BLD: 18 % (ref 24–43)
MCH RBC QN AUTO: 29.5 PG (ref 25.2–33.5)
MCHC RBC AUTO-ENTMCNC: 33.8 G/DL (ref 28.4–34.8)
MCV RBC AUTO: 87.3 FL (ref 82.6–102.9)
MONOCYTES # BLD: 9 % (ref 3–12)
NRBC AUTOMATED: 0 PER 100 WBC
PDW BLD-RTO: 13.2 % (ref 11.8–14.4)
PLATELET # BLD: 233 K/UL (ref 138–453)
PLATELET ESTIMATE: ABNORMAL
PMV BLD AUTO: 10.5 FL (ref 8.1–13.5)
POTASSIUM SERPL-SCNC: 3.9 MMOL/L (ref 3.7–5.3)
PRO-BNP: 52 PG/ML
RBC # BLD: 4.58 M/UL (ref 4.21–5.77)
RBC # BLD: ABNORMAL 10*6/UL
SARS-COV-2, RAPID: NOT DETECTED
SEG NEUTROPHILS: 70 % (ref 36–65)
SEGMENTED NEUTROPHILS ABSOLUTE COUNT: 7.39 K/UL (ref 1.5–8.1)
SODIUM BLD-SCNC: 132 MMOL/L (ref 135–144)
SPECIMEN DESCRIPTION: NORMAL
TROPONIN INTERP: NORMAL
TROPONIN INTERP: NORMAL
TROPONIN T: NORMAL NG/ML
TROPONIN T: NORMAL NG/ML
TROPONIN, HIGH SENSITIVITY: 7 NG/L (ref 0–22)
TROPONIN, HIGH SENSITIVITY: 8 NG/L (ref 0–22)
WBC # BLD: 10.5 K/UL (ref 3.5–11.3)
WBC # BLD: ABNORMAL 10*3/UL

## 2021-11-30 PROCEDURE — 87635 SARS-COV-2 COVID-19 AMP PRB: CPT

## 2021-11-30 PROCEDURE — 6370000000 HC RX 637 (ALT 250 FOR IP): Performed by: STUDENT IN AN ORGANIZED HEALTH CARE EDUCATION/TRAINING PROGRAM

## 2021-11-30 PROCEDURE — 93005 ELECTROCARDIOGRAM TRACING: CPT | Performed by: STUDENT IN AN ORGANIZED HEALTH CARE EDUCATION/TRAINING PROGRAM

## 2021-11-30 PROCEDURE — 85025 COMPLETE CBC W/AUTO DIFF WBC: CPT

## 2021-11-30 PROCEDURE — 83880 ASSAY OF NATRIURETIC PEPTIDE: CPT

## 2021-11-30 PROCEDURE — 84484 ASSAY OF TROPONIN QUANT: CPT

## 2021-11-30 PROCEDURE — 80048 BASIC METABOLIC PNL TOTAL CA: CPT

## 2021-11-30 PROCEDURE — G0378 HOSPITAL OBSERVATION PER HR: HCPCS

## 2021-11-30 PROCEDURE — 71045 X-RAY EXAM CHEST 1 VIEW: CPT

## 2021-11-30 PROCEDURE — 99283 EMERGENCY DEPT VISIT LOW MDM: CPT

## 2021-11-30 RX ORDER — BUMETANIDE 1 MG/1
2 TABLET ORAL ONCE
Status: COMPLETED | OUTPATIENT
Start: 2021-11-30 | End: 2021-11-30

## 2021-11-30 RX ADMIN — BUMETANIDE 2 MG: 1 TABLET ORAL at 22:16

## 2021-11-30 ASSESSMENT — ENCOUNTER SYMPTOMS
NAUSEA: 0
CHEST TIGHTNESS: 1
SINUS PAIN: 0
ABDOMINAL PAIN: 0
SINUS PRESSURE: 0
VOMITING: 0
SHORTNESS OF BREATH: 1

## 2021-11-30 NOTE — ED PROVIDER NOTES
[address]  ED  Emergency Department Encounter  EmergencyMedicine Resident     Pt Rickie Pennington  MRN: [id number]  Micheletgfmagdalena 1960  Date of evaluation: 11/30/21  PCP:  Anamaria Matute MD    [address]       Chief Complaint   Patient presents with    Chest Pain    Shortness of Breath       HISTORY OF PRESENT ILLNESS  (Location/Symptom, Timing/Onset, Context/Setting, Quality, Duration, Modifying Factors, Severity.)      Barb Doss is a 63 y.o. male who presents with chest pressure, shortness of breath. Patient with history of CHF, states that previous echo revealed EF of 33%. States that over the past 1 week he has had worsening shortness of breath and midsternal chest pressure. Symptoms worsened by walking or exertion as well as lying flat. No specific alleviating factors. Patient reports that he takes multiple medications at home and has been taking them as prescribed except for today and states that he missed several doses of medication while waiting in the waiting room of the emergency department. Does report that he has some chronic bilateral leg swelling as well but does not believe that it is any worse today than his usual.    PAST MEDICAL / SURGICAL / SOCIAL / FAMILY HISTORY      has a past medical history of Bronchitis, CAD (coronary artery disease), Cardiomyopathy ([address] Chest pain, CHF (congestive heart failure) ([address] Chronic back pain, Chronic diastolic CHF (congestive heart failure) ([address] COPD (chronic obstructive pulmonary disease) ([address] Headache(784.0), Hyperlipidemia, Hypertension, Inguinal hernia, Low back pain with sciatica, Migraine, Mitral valve regurgitation, Osteoarthritis, Pneumonia, Sleep apnea, SOB (shortness of breath) on exertion, Tricuspid regurgitation, Type 2 diabetes mellitus without complication, without long-term current use of insulin ([address] Under care of team, and Wellness examination.        has a past surgical history that includes hernia repair; Colonoscopy; Tonsillectomy (   exact date unknown); Adenoidectomy; Colonoscopy (2021); and Colonoscopy (N/A, 2021). Social History     Socioeconomic History    Marital status: Single     Spouse name: Not on file    Number of children: Not on file    Years of education: Not on file    Highest education level: Not on file   Occupational History    Not on file   Tobacco Use    Smoking status: Former Smoker     Packs/day: 0.00     Years: 40.00     Pack years: 0.00     Quit date:      Years since quittin.9    Smokeless tobacco: Current User     Types: Chew    Tobacco comment: Chews / quit cigarettes   Vaping Use    Vaping Use: Never used   Substance and Sexual Activity    Alcohol use: Yes     Alcohol/week: 1.0 standard drink     Types: 1 Glasses of wine per week     Comment: occ.  Drug use: No    Sexual activity: Not on file   Other Topics Concern    Not on file   Social History Narrative    Not on file     Social Determinants of Health     Financial Resource Strain:     Difficulty of Paying Living Expenses: Not on file   Food Insecurity:     Worried About Running Out of Food in the Last Year: Not on file    Dinh of Food in the Last Year: Not on file   Transportation Needs:     Lack of Transportation (Medical): Not on file    Lack of Transportation (Non-Medical):  Not on file   Physical Activity:     Days of Exercise per Week: Not on file    Minutes of Exercise per Session: Not on file   Stress:     Feeling of Stress : Not on file   Social Connections:     Frequency of Communication with Friends and Family: Not on file    Frequency of Social Gatherings with Friends and Family: Not on file    Attends [employer] Services: Not on file    Active Member of Clubs or Organizations: Not on file    Attends Club or Organization Meetings: Not on file    Marital Status: Not on file   Intimate Partner Violence:     Fear of Current or Ex-Partner: Not on file   Carlos Eduardo Echols Emotionally Abused: Not on file    Physically Abused: Not on file    Sexually Abused: Not on file   Housing Stability:     Unable to Pay for Housing in the Last Year: Not on file    Number of Places Lived in the Last Year: Not on file    Unstable Housing in the Last Year: Not on file       Family History   Problem Relation Age of Onset    Kidney Disease Mother     Diabetes Mother     Heart Disease Mother     Arthritis Mother     Heart Disease Father     Heart Attack Father     Arthritis Sister     Diabetes Sister     Heart Disease Sister     Diabetes Sister        Allergies:  Penicillins, Sulfa antibiotics, Sulfasalazine, Pseudoeph-doxylamine-dm-apap, and Rabbit protein    Home Medications:  Prior to Admission medications    Medication Sig Start Date End Date Taking?  Authorizing Provider   bumetanide (BUMEX) 2 MG tablet Take 1 tablet by mouth 2 times daily 10/28/21   Ariane Mejia MD   sacubitril-valsartan (ENTRESTO) 24-26 MG per tablet Take 1 tablet by mouth 2 times daily 10/28/21   Ariane Mejia MD   empagliflozin (JARDIANCE) 10 MG tablet Take 1 tablet by mouth daily 10/28/21   Ariane Mejia MD   metoprolol succinate (TOPROL XL) 50 MG extended release tablet Take 1 tablet by mouth daily 10/28/21   Ariane Mejia MD   Elastic Bandages & Supports (WRIST SPLINT/COCK-UP/RIGHT L) MISC 1 each by Does not apply route daily 10/28/21   Ariane Mejia MD   sildenafil (VIAGRA) 50 MG tablet Take 1 tablet by mouth daily as needed for Erectile Dysfunction 10/28/21   Ariane Mejia MD   nitroGLYCERIN (NITROSTAT) 0.4 MG SL tablet PLACE 1 TABLET UNDER THE TONGUE EVERY 5 MINUTES AS NEEDED FOR CHEST PAIN 9/27/21   Adry Regan MD   metFORMIN (GLUCOPHAGE) 500 MG tablet TAKE 1 TABLET BY MOUTH DAILY (WITH BREAKFAST) 9/17/21   Elba Johnson MD   zoster recombinant adjuvanted vaccine [facility]) 50 MCG/0.5ML SUSR injection Inject 0.5 mLs into the muscle See Admin Instructions 1 dose now and repeat in 2-6 months 9/13/21 3/12/22  Melly Mccoy MD   albuterol (PROVENTIL) (5 MG/ML) 0.5% nebulizer solution Take 0.5 mLs by nebulization every 6 hours as needed for Wheezing or Shortness of Breath 8/30/21   Melly Mccoy MD   albuterol sulfate  (90 Base) MCG/ACT inhaler Inhale 2 puffs into the lungs every 6 hours as needed for Wheezing 8/30/21   Melly Mccoy MD   blood glucose monitor kit and supplies Dispense sufficient amount for indicated testing frequency plus additional to accommodate PRN testing needs. Dispense all needed supplies to include: monitor, strips, lancing device, lancets, control solutions, alcohol swabs. 8/30/21   Melly Mccoy MD   albuterol-ipratropium (COMBIVENT RESPIMAT)  MCG/ACT AERS inhaler INHALE 1 PUFF INTO THE LUNGS EVERY 6 HOURS 8/30/21   Melly Mccoy MD   gabapentin (NEURONTIN) 300 MG capsule TAKE 1 CAPSULE BY MOUTH NIGHTLY AS NEEDED (PAIN) 8/30/21 8/30/22  Melly Mccoy MD   Masks (FACE MASK) MISC 1 each by Does not apply route as needed (as needed) 8/30/21   Melly Mccoy MD   naproxen (NAPROSYN) 500 MG tablet Take 1 tablet by mouth 2 times daily for 20 days 8/30/21 10/13/21  Melly Mccoy MD   potassium chloride (KLOR-CON M) 20 MEQ extended release tablet Take 1 tablet by mouth daily (with breakfast) 8/30/21   Melly Mccoy MD   ranolazine (RANEXA) 500 MG extended release tablet Take 1 tablet by mouth 2 times daily 8/30/21   Melly Mccoy MD   simvastatin (ZOCOR) 20 MG tablet Take 1 tablet by mouth nightly 8/30/21   Melly Mccoy MD   spironolactone (ALDACTONE) 25 MG tablet Take 1 tablet by mouth nightly 8/30/21   Melly Mccoy MD   diclofenac sodium (VOLTAREN) 1 % GEL Apply topically 2 times daily 8/30/21   Melly Mccoy MD   aspirin 81 MG tablet Take 1 tablet by mouth daily 8/13/19   Melly Mccoy MD       REVIEW OF SYSTEMS    (2-9 systems for level 4, 10 or more for level 5)      Review of Systems   Constitutional: Positive for fatigue. Negative for fever.    HENT: Negative for sinus pressure and sinus pain. Eyes: Negative for visual disturbance. Respiratory: Positive for chest tightness and shortness of breath. Cardiovascular: Positive for chest pain and leg swelling. Gastrointestinal: Negative for abdominal pain, nausea and vomiting. Endocrine: Negative for polydipsia and polyuria. Genitourinary: Negative for dysuria and flank pain. Skin: Negative for rash and wound. Neurological: Negative for weakness, numbness and headaches. Psychiatric/Behavioral: Negative for confusion. PHYSICAL EXAM   (up to 7 for level 4, 8 or more for level 5)      INITIAL VITALS:   /80   Pulse 94   Temp 97.5 °F (36.4 °C) (Oral)   Resp 18   Wt 265 lb (120.2 kg)   SpO2 98%   BMI 44.10 kg/m²     Physical Exam  Constitutional:       General: He is not in acute distress. Appearance: He is not toxic-appearing. HENT:      Head: Normocephalic and atraumatic. Cardiovascular:      Rate and Rhythm: Normal rate. Heart sounds: No murmur heard. No friction rub. No gallop. Pulmonary:      Breath sounds: No decreased breath sounds, wheezing, rhonchi or rales. Chest:      Chest wall: No tenderness. Musculoskeletal:      Right lower leg: Edema present. Left lower leg: Edema present. Skin:     Coloration: Skin is not cyanotic. Findings: No erythema or rash. Neurological:      Mental Status: He is alert. Motor: No weakness. DIFFERENTIAL  DIAGNOSIS     PLAN (LABS / IMAGING / EKG):  Orders Placed This Encounter   Procedures    XR CHEST PORTABLE    CBC Auto Differential    Basic Metabolic Panel w/ Reflex to MG    Troponin    Brain Natriuretic Peptide    EKG 12 Lead       MEDICATIONS ORDERED:  No orders of the defined types were placed in this encounter. DDX: CHF exacerbation, bronchitis, pneumonia, ACS,    DIAGNOSTIC RESULTS / EMERGENCY DEPARTMENT COURSE / MDM   LAB RESULTS:  No results found for this visit on 11/30/21.     IMPRESSION: Chronically ill-appearing 76-year-old male no acute distress presenting with midsternal chest pressure, shortness of breath. Vital signs within normal limits. Exam largely unremarkable. Likely CHF exacerbation. Plan for cardiac work-up. Admission to observation unit versus medicine service pending lab findings, specifically if any troponin elevation will admit to medicine service with heparin drip. EKG  EKG Interpretation    Interpreted by me    Rhythm: normal sinus   Rate: 100 bpm  Axis: normal  Ectopy: Occasional PVCs  Conduction: normal  ST Segments: no acute change  T Waves: no acute change  Q Waves: none    Clinical Impression: Nonspecific EKG, multiple PVCs    All EKG's are interpreted by the Emergency Department Physician who either signs or Co-signs this chart in the absence of a cardiologist.    EMERGENCY DEPARTMENT COURSE:  ED Course as of 12/01/21 1958 Tue Nov 30, 2021   1404 Signed out to Dr. Gregor Lehman pending labs. Plan for admission to observation unit versus medicine pending lab findings [ZW]   2140 Troponin unremarkable and stable. Patient reassessed, still endorsing some chest pain however states it is improved. Will give patient dose of p.o. Bumex as it is a home med and he is requesting it. Plan to meet yesterday here for cardiology evaluation given coronary artery disease, CHF and anginal symptoms [DS]      ED Course User Index  [DS] Inocente Bradley DO  [ZW] Thena Both, DO         PROCEDURES:  None  CONSULTS:  None    CRITICAL CARE:  Please see attending note    FINAL IMPRESSION      Chest pain    DISPOSITION / PLAN     DISPOSITION        PATIENT REFERRED TO:  No follow-up provider specified.     DISCHARGE MEDICATIONS:  New Prescriptions    No medications on file       Thena Both, DO  Emergency Medicine Resident    (Please note that portions of thisnote were completed with a voice recognition program.  Efforts were made to edit the dictations but occasionally words are mis-transcribed.) Cris Carpenter, DO  Resident  11/30/21 4049 Deon Chiang, DO  Resident  12/01/21 8267

## 2021-11-30 NOTE — ED NOTES
Pt re-assessed by writer, no change in status noted, vitals stable.         Vane Blunt, CHAZ  52/57/79 [id number]

## 2021-11-30 NOTE — ED PROVIDER NOTES
[address] [facility]                                      Emergency Department                                      Faculty Attestation                                         I performed a history and physical examination of the patient and discussed management with the resident. I reviewed the residents note and agree with the documented findings and plan of care. Any areas of disagreement are noted on the chart. I was personally present for the key portions of any procedures. I have documented in the chart those procedures where I was not present during the key portions. I agree with the chief complaint, past medical history, past surgical history, allergies, medications, social and family history as documented unless otherwise noted below. For mid-level providers such as nurse practitioners as well as physicians assistants:    I have personally seen and evaluated the patient. I find the patient's history and physical exam are consistent with NP/PA documentation. I agree with the care provided, treatment rendered, disposition, & follow-up plan. Additional findings are as noted  53-year-old gentleman with longstanding history of congestive heart failure comes in with 3 to 4 days of chest discomfort, increasing shortness of breath. Legs are swollen but does not think more swollen than typical.  Patient is not taking his medications today. Vital signs are acceptable, patient is in no apparent distress. Heart is regular rate and rhythm. Lungs are clear to all station bilaterally with poor inspiratory effort. Abdomen is soft, nontender, no mass, guarding, rebound, or peritoneal signs.   Bilateral lower extremity edema, symmetrical.     Krzysztof Zuñiga DO  11/30/21 1811      ECG interpretation performed by me:  Normal sinus rhythm  Ventricular rate of 100 bpm, borderline tachycardia  Normal axis  Normal intervals  No acute ST or T wave changes appreciated  Multiple PVCs, appears to be [address], DO  11/30/21 5310

## 2021-11-30 NOTE — ED PROVIDER NOTES
[address]  ED  Emergency Department  Emergency Medicine Resident Sign-out     Care of Rhea Durham was assumed from Dr. Roldan Alvarado and is being seen for Chest Pain and Shortness of Breath  . The patient's initial evaluation and plan have been discussed with the prior provider who initially evaluated the patient. EMERGENCY DEPARTMENT COURSE / MEDICAL DECISION MAKING:       MEDICATIONS GIVEN:  No orders of the defined types were placed in this encounter. LABS / RADIOLOGY:     Labs Reviewed   CBC WITH AUTO DIFFERENTIAL   BASIC METABOLIC PANEL W/ REFLEX TO MG FOR LOW K   TROPONIN   TROPONIN   BRAIN NATRIURETIC PEPTIDE       No results found. RECENT VITALS:     Temp: 97.5 °F (36.4 °C),  Pulse: 94, Resp: 18, BP: 126/80, SpO2: 98 %      This patient is a 63 y.o. Male with history of CHF presenting with worsening chest pain, shortness of breath over past several days. Patient with bilateral lower extremity swelling as well. Reports he takes Bumex at home but has not taken any of his medications today due to sitting in the hospital waiting room. Reports worsening of midsternal chest pressure and shortness of breath over the past week. Patient with history of CHF and the last echo with 33% EF. Awaiting of troponins, chest x-ray, aggressive cardiac work-up at this time. Likely admission for cardiology consultation      ED Course as of 11/30/21 2249 Tue Nov 30, 2021   4966 Signed out to Dr. Devonte Villatoro pending labs. Plan for admission to observation unit versus medicine pending lab findings [ZW]   2140 Troponin unremarkable and stable. Patient reassessed, still endorsing some chest pain however states it is improved. Will give patient dose of p.o. Bumex as it is a home med and he is requesting it.   Plan to admit to observation unit here for cardiology evaluation given coronary artery disease, CHF and anginal symptoms [DS]      ED Course User Index  [DS] Jaquan Winston DO  [ZW] Esther Burdick DO Edgar       OUTSTANDING TASKS / RECOMMENDATIONS:    1. Follow-up labs, possible heparin drip  2. Admission to either inpatient service versus observation unit pending labs     FINAL IMPRESSION:   No diagnosis found. DISPOSITION:         DISPOSITION:  []  Discharge   []  Transfer -    [x]  Admission -     []  Against Medical Advice   []  Eloped   FOLLOW-UP: No follow-up provider specified.    DISCHARGE MEDICATIONS: New Prescriptions    No medications on file          Teressa Gonzalez DO  Emergency Medicine Resident  [facility]        Teressa Gonzalez [place]  Resident  11/30/21 5967

## 2021-12-01 LAB
ABSOLUTE EOS #: 0.2 K/UL (ref 0–0.44)
ABSOLUTE IMMATURE GRANULOCYTE: 0.04 K/UL (ref 0–0.3)
ABSOLUTE LYMPH #: 1.58 K/UL (ref 1.1–3.7)
ABSOLUTE MONO #: 0.79 K/UL (ref 0.1–1.2)
ANION GAP SERPL CALCULATED.3IONS-SCNC: 14 MMOL/L (ref 9–17)
BASOPHILS # BLD: 0 % (ref 0–2)
BASOPHILS ABSOLUTE: 0.04 K/UL (ref 0–0.2)
BUN BLDV-MCNC: 23 MG/DL (ref 8–23)
BUN/CREAT BLD: ABNORMAL (ref 9–20)
CALCIUM SERPL-MCNC: 8.8 MG/DL (ref 8.6–10.4)
CHLORIDE BLD-SCNC: 94 MMOL/L (ref 98–107)
CO2: 26 MMOL/L (ref 20–31)
CREAT SERPL-MCNC: 1.11 MG/DL (ref 0.7–1.2)
DIFFERENTIAL TYPE: ABNORMAL
EKG ATRIAL RATE: 100 BPM
EKG ATRIAL RATE: 102 BPM
EKG P AXIS: 44 DEGREES
EKG P AXIS: 63 DEGREES
EKG P-R INTERVAL: 114 MS
EKG P-R INTERVAL: 162 MS
EKG Q-T INTERVAL: 358 MS
EKG Q-T INTERVAL: 374 MS
EKG QRS DURATION: 108 MS
EKG QRS DURATION: 98 MS
EKG QTC CALCULATION (BAZETT): 466 MS
EKG QTC CALCULATION (BAZETT): 482 MS
EKG R AXIS: 10 DEGREES
EKG R AXIS: 32 DEGREES
EKG T AXIS: 127 DEGREES
EKG T AXIS: 88 DEGREES
EKG VENTRICULAR RATE: 100 BPM
EKG VENTRICULAR RATE: 102 BPM
EOSINOPHILS RELATIVE PERCENT: 2 % (ref 1–4)
GFR AFRICAN AMERICAN: >60 ML/MIN
GFR NON-AFRICAN AMERICAN: >60 ML/MIN
GFR SERPL CREATININE-BSD FRML MDRD: ABNORMAL ML/MIN/{1.73_M2}
GFR SERPL CREATININE-BSD FRML MDRD: ABNORMAL ML/MIN/{1.73_M2}
GLUCOSE BLD-MCNC: 136 MG/DL (ref 70–99)
HCT VFR BLD CALC: 39.9 % (ref 40.7–50.3)
HEMOGLOBIN: 13.3 G/DL (ref 13–17)
IMMATURE GRANULOCYTES: 0 %
LYMPHOCYTES # BLD: 18 % (ref 24–43)
MCH RBC QN AUTO: 29.6 PG (ref 25.2–33.5)
MCHC RBC AUTO-ENTMCNC: 33.3 G/DL (ref 28.4–34.8)
MCV RBC AUTO: 88.7 FL (ref 82.6–102.9)
MONOCYTES # BLD: 9 % (ref 3–12)
NRBC AUTOMATED: 0 PER 100 WBC
PDW BLD-RTO: 13.2 % (ref 11.8–14.4)
PLATELET # BLD: 205 K/UL (ref 138–453)
PLATELET ESTIMATE: ABNORMAL
PMV BLD AUTO: 10.7 FL (ref 8.1–13.5)
POTASSIUM SERPL-SCNC: 4.3 MMOL/L (ref 3.7–5.3)
RBC # BLD: 4.5 M/UL (ref 4.21–5.77)
RBC # BLD: ABNORMAL 10*6/UL
SEG NEUTROPHILS: 71 % (ref 36–65)
SEGMENTED NEUTROPHILS ABSOLUTE COUNT: 6.3 K/UL (ref 1.5–8.1)
SODIUM BLD-SCNC: 134 MMOL/L (ref 135–144)
TROPONIN INTERP: NORMAL
TROPONIN T: NORMAL NG/ML
TROPONIN, HIGH SENSITIVITY: 8 NG/L (ref 0–22)
WBC # BLD: 9 K/UL (ref 3.5–11.3)
WBC # BLD: ABNORMAL 10*3/UL

## 2021-12-01 PROCEDURE — 96376 TX/PRO/DX INJ SAME DRUG ADON: CPT

## 2021-12-01 PROCEDURE — G0378 HOSPITAL OBSERVATION PER HR: HCPCS

## 2021-12-01 PROCEDURE — 36415 COLL VENOUS BLD VENIPUNCTURE: CPT

## 2021-12-01 PROCEDURE — 84484 ASSAY OF TROPONIN QUANT: CPT

## 2021-12-01 PROCEDURE — 2580000003 HC RX 258: Performed by: STUDENT IN AN ORGANIZED HEALTH CARE EDUCATION/TRAINING PROGRAM

## 2021-12-01 PROCEDURE — 80048 BASIC METABOLIC PNL TOTAL CA: CPT

## 2021-12-01 PROCEDURE — 6370000000 HC RX 637 (ALT 250 FOR IP): Performed by: STUDENT IN AN ORGANIZED HEALTH CARE EDUCATION/TRAINING PROGRAM

## 2021-12-01 PROCEDURE — 93010 ELECTROCARDIOGRAM REPORT: CPT | Performed by: INTERNAL MEDICINE

## 2021-12-01 PROCEDURE — 2500000003 HC RX 250 WO HCPCS: Performed by: INTERNAL MEDICINE

## 2021-12-01 PROCEDURE — 96374 THER/PROPH/DIAG INJ IV PUSH: CPT

## 2021-12-01 PROCEDURE — 85025 COMPLETE CBC W/AUTO DIFF WBC: CPT

## 2021-12-01 PROCEDURE — 93005 ELECTROCARDIOGRAM TRACING: CPT | Performed by: EMERGENCY MEDICINE

## 2021-12-01 PROCEDURE — 94761 N-INVAS EAR/PLS OXIMETRY MLT: CPT

## 2021-12-01 RX ORDER — ATORVASTATIN CALCIUM 20 MG/1
20 TABLET, FILM COATED ORAL NIGHTLY
Status: DISCONTINUED | OUTPATIENT
Start: 2021-12-01 | End: 2021-12-02 | Stop reason: HOSPADM

## 2021-12-01 RX ORDER — SODIUM CHLORIDE 0.9 % (FLUSH) 0.9 %
5-40 SYRINGE (ML) INJECTION PRN
Status: DISCONTINUED | OUTPATIENT
Start: 2021-12-01 | End: 2021-12-02 | Stop reason: HOSPADM

## 2021-12-01 RX ORDER — ALBUTEROL SULFATE 90 UG/1
1 AEROSOL, METERED RESPIRATORY (INHALATION) EVERY 6 HOURS PRN
Status: DISCONTINUED | OUTPATIENT
Start: 2021-12-01 | End: 2021-12-02 | Stop reason: HOSPADM

## 2021-12-01 RX ORDER — BUMETANIDE 0.25 MG/ML
2 INJECTION, SOLUTION INTRAMUSCULAR; INTRAVENOUS 2 TIMES DAILY
Status: DISCONTINUED | OUTPATIENT
Start: 2021-12-01 | End: 2021-12-02

## 2021-12-01 RX ORDER — METOPROLOL SUCCINATE 50 MG/1
50 TABLET, EXTENDED RELEASE ORAL DAILY
Status: DISCONTINUED | OUTPATIENT
Start: 2021-12-01 | End: 2021-12-02 | Stop reason: HOSPADM

## 2021-12-01 RX ORDER — POTASSIUM CHLORIDE 20 MEQ/1
20 TABLET, EXTENDED RELEASE ORAL
Status: DISCONTINUED | OUTPATIENT
Start: 2021-12-01 | End: 2021-12-02 | Stop reason: HOSPADM

## 2021-12-01 RX ORDER — RANOLAZINE 500 MG/1
500 TABLET, EXTENDED RELEASE ORAL 2 TIMES DAILY
Status: DISCONTINUED | OUTPATIENT
Start: 2021-12-01 | End: 2021-12-01

## 2021-12-01 RX ORDER — SODIUM CHLORIDE 9 MG/ML
25 INJECTION, SOLUTION INTRAVENOUS PRN
Status: DISCONTINUED | OUTPATIENT
Start: 2021-12-01 | End: 2021-12-02 | Stop reason: HOSPADM

## 2021-12-01 RX ORDER — SODIUM CHLORIDE 0.9 % (FLUSH) 0.9 %
5-40 SYRINGE (ML) INJECTION EVERY 12 HOURS SCHEDULED
Status: DISCONTINUED | OUTPATIENT
Start: 2021-12-01 | End: 2021-12-02 | Stop reason: HOSPADM

## 2021-12-01 RX ORDER — POLYETHYLENE GLYCOL 3350 17 G/17G
17 POWDER, FOR SOLUTION ORAL DAILY PRN
Status: DISCONTINUED | OUTPATIENT
Start: 2021-12-01 | End: 2021-12-02 | Stop reason: HOSPADM

## 2021-12-01 RX ORDER — ONDANSETRON 2 MG/ML
4 INJECTION INTRAMUSCULAR; INTRAVENOUS EVERY 4 HOURS PRN
Status: DISCONTINUED | OUTPATIENT
Start: 2021-12-01 | End: 2021-12-02 | Stop reason: HOSPADM

## 2021-12-01 RX ORDER — SPIRONOLACTONE 25 MG/1
25 TABLET ORAL NIGHTLY
Status: DISCONTINUED | OUTPATIENT
Start: 2021-12-01 | End: 2021-12-02 | Stop reason: HOSPADM

## 2021-12-01 RX ORDER — GABAPENTIN 300 MG/1
300 CAPSULE ORAL NIGHTLY
Status: DISCONTINUED | OUTPATIENT
Start: 2021-12-01 | End: 2021-12-02 | Stop reason: HOSPADM

## 2021-12-01 RX ORDER — BUMETANIDE 1 MG/1
2 TABLET ORAL 2 TIMES DAILY
Status: DISCONTINUED | OUTPATIENT
Start: 2021-12-01 | End: 2021-12-02 | Stop reason: HOSPADM

## 2021-12-01 RX ADMIN — BUMETANIDE 2 MG: 0.25 INJECTION INTRAMUSCULAR; INTRAVENOUS at 21:36

## 2021-12-01 RX ADMIN — BUMETANIDE 2 MG: 0.25 INJECTION INTRAMUSCULAR; INTRAVENOUS at 13:44

## 2021-12-01 RX ADMIN — SACUBITRIL AND VALSARTAN 1 TABLET: 24; 26 TABLET, FILM COATED ORAL at 14:59

## 2021-12-01 RX ADMIN — METOPROLOL SUCCINATE 50 MG: 50 TABLET, FILM COATED, EXTENDED RELEASE ORAL at 14:59

## 2021-12-01 RX ADMIN — SODIUM CHLORIDE, PRESERVATIVE FREE 10 ML: 5 INJECTION INTRAVENOUS at 21:07

## 2021-12-01 RX ADMIN — ATORVASTATIN CALCIUM 20 MG: 20 TABLET, FILM COATED ORAL at 21:33

## 2021-12-01 RX ADMIN — GABAPENTIN 300 MG: 300 CAPSULE ORAL at 21:33

## 2021-12-01 RX ADMIN — SPIRONOLACTONE 25 MG: 25 TABLET ORAL at 21:33

## 2021-12-01 RX ADMIN — SACUBITRIL AND VALSARTAN 1 TABLET: 24; 26 TABLET, FILM COATED ORAL at 21:33

## 2021-12-01 RX ADMIN — METFORMIN HYDROCHLORIDE 500 MG: 500 TABLET ORAL at 18:27

## 2021-12-01 NOTE — CARE COORDINATION
Case Management Initial Discharge Plan  Miquel Chance,             Met with:patient to discuss discharge plans. Information verified: address, contacts, phone number, , insurance Yes  Insurance Provider: [facility]    Emergency Contact/Next of Kin name & number: sister junior  Who are involved in patient's support system? Family     PCP: Lanny Altamirano MD  Date of last visit: 10/28      Discharge Planning    Living Arrangements:    lives alone    Home has 2 stories  2 stairs to climb to get into front door, Location of bedroom/bathroom in home 1st    Patient able to perform ADL's:Independent    Current Services (outpatient & in home) none  DME equipment: cpap, neb, cane      Is patient receiving oral anticoagulation therapy? No          Potential Assistance Needed:   f/u pcp         Evaluation: no    Expected Discharge date:       Patient expects to be discharged to:   home    If home: is the family and/or caregiver wiling & able to provide support at home? Lives alone        Transportation provider: car is in parking lot  Transportation arrangements needed for discharge: No    Readmission Risk              Risk of Unplanned Readmission:  0             Does patient have a readmission risk score greater than 14?: No  If yes, follow-up appointment must be made within 7 days of discharge.      Goals of Care: safe transition plan       Educated yes on transitional options, provided freedom of choice and are agreeable with plan      Discharge Plan: return home independently           Electronically signed by Hussein Gustafson RN on 21 at 10:23 AM EST

## 2021-12-01 NOTE — PROGRESS NOTES
[address]    Admission Medication Reconciliation       The patient's list of current home medications has been reviewed. Source(s) of information: Dispense Report     Based on information provided by the above source(s), I have updated the patient's home med list as described below. Please review the ACTION REQUESTED section of this note below for any discrepancies on current hospital orders. I changed or updated the following medications on the patient's home medication list:  Removed Ranexa   Naproxen      Added None      Adjusted   Spironolactone - changed to daily   Metformin - adjusted to twice daily    Other Notes None          PROVIDER ACTION REQUESTED  Medications that need to be addressed by a physician/nurse practitioner:    Medication Action Requested             Please feel free to call me with any questions about this encounter. Thank you.     Electronically signed by MIGUELITO Wilson [facility] on 12/1/2021 at 3:42 PM

## 2021-12-01 NOTE — ED NOTES
The following labs labeled with pt sticker and tubed to lab:     [] Blue     [x] Lavender   [] on ice  [x] Green/yellow  [] Green/black [] on ice  [] Yellow  [] Red  [] Pink      [] COVID-19 swab    [] Rapid  [] PCR  [] Peds Viral Panel     [] Urine Sample  [] Pelvic Cultures  [] Blood Cultures            Charles Stewart RN  12/01/21 0950

## 2021-12-01 NOTE — ED NOTES
Report to Inga Brower, with verbal understanding of the patients needs and concerns     Lea Ching RN  12/01/21 0441

## 2021-12-01 NOTE — PROGRESS NOTES
[facility]  Documentation Note                Admission Dx: Chest pain [R07.9]  Chest pain, unspecified type [R07.9]    Past Medical History:   has a past medical history of Bronchitis, CAD (coronary artery disease), Cardiomyopathy ([address] Chest pain, CHF (congestive heart failure) ([address] Chronic back pain, Chronic diastolic CHF (congestive heart failure) ([address] COPD (chronic obstructive pulmonary disease) ([facility] Headache(784.0), Hyperlipidemia, Hypertension, Inguinal hernia, Low back pain with sciatica, Migraine, Mitral valve regurgitation, Osteoarthritis, Pneumonia, Sleep apnea, SOB (shortness of breath) on exertion, Tricuspid regurgitation, Type 2 diabetes mellitus without complication, without long-term current use of insulin ([facility] Under care of team, and Wellness examination. Previous Testing:     MUGA scheduled on 12/17/2021     ECHO 11/16/2021: EF 30%. CATH 1/25/2021: Minimal luminal disease, and elevated filling pressures. STRESS 1/2021: Inferior reversible ischemia. EF 39%. ECHO 6/12/2020: EF 45-50%, moderate LVH/MR/PHTN, mild TR. Previous office/hospital visit:   Dr. Brown Living 9/29/2021:   - Hypertension  - Dyslipidemia  - Nuclear Stress 1/21- Inferior reversible ischemia, EF 39%  - Cardiac catheterization at [facility] on 01/21 revealed no obstructive coronary artery disease. - Echo 6/21- EF 33%, mild/mod MR    Plan --   1. Acute on chronic systolic heart failure.  -this is nonischemic cardiomyopathy based on his cardiac catheterization from 01/21.  -agree with Bumex, however increased to 2 mg p.o. b.i.d.  -check BMP in 2 weeks  -he has Ju Nip which is pending insurance approval.  -continue Aldactone  -will add Jardiance  -will check a 2D echo  -if his LVEF remains less than 35% then he will be a candidate for AICD for primary prevention    2. Essential hypertension. Continue current medications. 3. Dyslipidemia. Currently on a statin.     4. Shortness of breath, lower extremity edema  -see plan 1.    5. Overweight- We discussed need for diet weight loss and exercise.     Steve Ruiz [facility]

## 2021-12-01 NOTE — H&P
[address] [place], [place] [address]U / [address] [place] [address]  NOTE     Pt Name: Sabas Sesay  MRN: [id number]  [place] 1960  Date of evaluation: 12/1/21  Patient's PCP is :  Lanny Hawthorne MD    CHIEF COMPLAINT       Chief Complaint   Patient presents with    Chest Pain    Shortness of Breath         HISTORY OF PRESENT ILLNESS    Sabas Sesay is a 63 y.o. male who presents with a history of nonischemic cardiomyopathy and recent EF of 39% measured on echo. Patient presented to the hospital with chest pressure and shortness of breath has been worsening over the past week. Patient increasingly short of breath yesterday and decided come to the hospital for evaluation. Patient has shortness of breath which has improved. Chest x-ray does not show any significant vascular congestion but he does have a history of CHF. Patient has scheduled MUGA scan on 12/17/2021. Patient was evaluated by cardiology. Patient was felt to need further diuresis and reassessment. Location/Symptom: Shortness of breath and dyspnea  Timing/Onset: Over the past 24 hours worsening.   Provocation: Uncertain  Quality: Dyspnea  Radiation: None  Severity: Moderate  Timing/Duration: Worsening over days  Modifying Factors: Uncertain    REVIEW OF SYSTEMS        General ROS - No fevers, No malaise   Ophthalmic ROS - No discharge, No changes in vision  ENT ROS -  No sore throat, No rhinorrhea,   Respiratory ROS - no shortness of breath, no cough, no  wheezing  Cardiovascular ROS -chest pressure and dyspnea on exertion  Gastrointestinal ROS - No abdominal pain, no nausea or vomiting, no change in bowel habits, no black or bloody stools  Genito-Urinary ROS - No dysuria, trouble voiding, or hematuria  Musculoskeletal ROS - No myalgias, No arthalgias  Neurological ROS - No headache, no dizziness/lightheadedness, No focal weakness, no loss of sensation  Dermatological ROS - No lesions, No rash     (PQRS) Advance directives on face sheet per hospital policy. No change unless specifically mentioned in chart    [address]    has a past medical history of Bronchitis, CAD (coronary artery disease), Cardiomyopathy ([address] Chest pain, CHF (congestive heart failure) ([address] Chronic back pain, Chronic diastolic CHF (congestive heart failure) ([address] COPD (chronic obstructive pulmonary disease) ([address] Headache(784.0), Hyperlipidemia, Hypertension, Inguinal hernia, Low back pain with sciatica, Migraine, Mitral valve regurgitation, Osteoarthritis, Pneumonia, Sleep apnea, SOB (shortness of breath) on exertion, Tricuspid regurgitation, Type 2 diabetes mellitus without complication, without long-term current use of insulin ([address] Under care of team, and Wellness examination. I have reviewed the past medical history with the patient and it is  pertinent to this complaint. SURGICAL HISTORY      has a past surgical history that includes hernia repair; Colonoscopy; Tonsillectomy (   exact date unknown); Adenoidectomy; Colonoscopy (2021); and Colonoscopy (N/A, 2021). I have reviewed and agree with Surgical History entered and it is  pertinent to this complaint. CURRENT MEDICATIONS     No current facility-administered medications for this encounter. All medication charted and reviewed. ALLERGIES     is allergic to penicillins, sulfa antibiotics, sulfasalazine, pseudoeph-doxylamine-dm-apap, and rabbit protein. FAMILY HISTORY     He indicated that his mother is . He indicated that his father is . He indicated that only one of his two sisters is alive. family history includes Arthritis in his mother and sister; Diabetes in his mother, sister, and sister; Heart Attack in his father; Heart Disease in his father, mother, and sister; Kidney Disease in his mother. The patient denies any pertinent family history. I have reviewed and agree with the family history entered.   I have reviewed the Family History and it is not significant to the case    SOCIAL HISTORY      reports that he quit smoking about 22 years ago. He smoked 0.00 packs per day for 40.00 years. His smokeless tobacco use includes chew. He reports current alcohol use of about 1.0 standard drink of alcohol per week. He reports that he does not use drugs. I have reviewed and agree with all Social.  There are no  concerns for substance abuse/use. PHYSICAL EXAM     INITIAL VITALS:  weight is 265 lb (120.2 kg). His oral temperature is 98.3 °F (36.8 °C). His blood pressure is 151/89 (abnormal) and his pulse is 105. His respiration is 16 and oxygen saturation is 98%. CONSTITUTIONAL: AOx4, no apparent distress, appears stated age overall appears well. HEAD: normocephalic, atraumatic   EYES: PERRLA, EOMI    ENT: moist mucous membranes, uvula midline   NECK: supple, symmetric   BACK: symmetric   LUNGS: clear to auscultation bilaterally   CARDIOVASCULAR: regular rate and rhythm, no murmurs, rubs or gallops   ABDOMEN: soft, non-tender, non-distended with normal active bowel sounds   NEUROLOGIC:  MAEx4, no focal sensory or motor deficits   MUSCULOSKELETAL: no clubbing, cyanosis or edema   SKIN: no rash or wounds       DIFFERENTIAL DIAGNOSIS/MDM:     History of CHF with worsening chest pain shortness of breath bilateral lower extremity edema. Patient typically takes Bumex at home. Patient has worsening midsternal chest pain and shortness of breath over the past week. Patient's last echo showed 33% ejection fraction. Troponins flat and blood pressure normal here in the emergency department. Admitted for cardiology evaluation. Troponins 8 and 7. BNP 52.  Glucose controlled     Had echocardiogram in May of this year. Had stress test in January. Findings at that time were concerning for reversible ischemia in the inferior wall near apex.   Patient underwent cardiac catheterization at [facility].  Patient had recommendations for fluid management and medical management. No stents applied and medication regimen maximized. DIAGNOSTIC RESULTS     EKG: All EKG's are interpreted by the Observation Physician who either signs or Co-signs this chart in the absence of a cardiologist.    EKG Interpretation    Interpreted by observation physician    Rhythm: normal sinus   Rate: normal  Axis: normal  Ectopy: none  Conduction: normal  ST Segments: no acute change  T Waves: no acute change  Q Waves: none    Clinical Impression: no acute changes    Johana Lewis MD         RADIOLOGY:   I directly visualized the following  images and reviewed the radiologist interpretations:    XR CHEST PORTABLE    Result Date: 11/30/2021  EXAMINATION: ONE XRAY VIEW OF THE CHEST 11/30/2021 6:36 pm COMPARISON: 08/23/2021 HISTORY: ORDERING SYSTEM PROVIDED HISTORY: Chest pain, shortness of breath TECHNOLOGIST PROVIDED HISTORY: Chest pain, shortness of breath Reason for Exam: upr,chest pain,sob FINDINGS: The heart is similar in size to the prior study. No pleural effusion or pneumothorax is seen. No focal lung consolidation is identified. No acute cardiopulmonary abnormality identified. LABS:  I have reviewed and interpreted all available lab results. Labs Reviewed   CBC WITH AUTO DIFFERENTIAL - Abnormal; Notable for the following components:       Result Value    Hematocrit 40.0 (*)     Seg Neutrophils 70 (*)     Lymphocytes 18 (*)     Immature Granulocytes 1 (*)     All other components within normal limits   BASIC METABOLIC PANEL W/ REFLEX TO MG FOR LOW K - Abnormal; Notable for the following components:    Glucose 119 (*)     Sodium 132 (*)     Chloride 93 (*)     All other components within normal limits   COVID-19, RAPID   TROPONIN   TROPONIN   BRAIN NATRIURETIC PEPTIDE         CDU IMPRESSION / PLAN      Norm Villarreal is a 63 y.o. male who presents with dyspnea and shortness of breath    · Patient with complaints of dyspnea and shortness of breath. Patient with exertional complaints. Patient with nonischemic cardiomyopathy. EF of 30%. · Evaluated by cardiology  · Continue Entresto metoprolol and spironolactone  · Again diuresis with Bumex. · Follow-up as outpatient MUGA scan. · Patient for reassessment tomorrow  · Discussed with [facility] attending. If patient needs further evaluation after next 24 hours patient will be admitted to [facility] service. Otherwise patient will stay with observation unit. · She may meet criteria for admission after several rounds of diuresis. · Continue home medications and pain control  · Monitor vitals, labs, and imaging  · DISPO: pending consults and clinical improvement    CONSULTS:    IP CONSULT TO CARDIOLOGY    PROCEDURES:  Not indicated        PATIENT REFERRED TO:    No follow-up provider specified. --  Gary Chapa MD   Emergency Medicine Attending    This dictation was generated by voice recognition computer software. Although all attempts are made to edit the dictation for accuracy, there may be errors in the transcription that are not intended.

## 2021-12-01 NOTE — ED NOTES
The following labs labeled with pt sticker and tubed to lab:     [] Blue     [] Zuleima Rubi   [] on ice  [] Green/yellow  [] Green/black [] on ice  [] Yellow  [] Red  [] Pink      [x] COVID-19 swab    [x] Rapid  [] PCR  [] Peds Viral Panel     [] Urine Sample  [] Pelvic Cultures  [] Blood Cultures            Tasha Way RN  11/30/21 [id number]

## 2021-12-01 NOTE — CONSULTS
[facility]   Consultation Note               Today's Date: 12/1/2021  Patient Name: Gilma Leija  Date of admission: 11/30/2021  5:50 PM  Patient's age: 63 y.o., 1960  Admission Dx: Chest pain [R07.9]  Chest pain, unspecified type [R07.9]    Reason for Consult:  Chest pressure and sob    Requesting Physician: Elio Long MD    CHIEF COMPLAINT:    Chief Complaint   Patient presents with    Chest Pain    Shortness of Breath       History Obtained From:  Patient and EMR    HISTORY OF PRESENT ILLNESS:      The patient is a 63 y.o. with hx of non-ischemic cardiomyopathy with recent EF 39% presented to the hospital with complaints of chest pressure and shortness of breath that has been worsening over the past 1 week. He felt increasingly short of breath yesterday and decided to come to the hospital for evaluation. Today patient reports that his shortness of breath has improved. CXR doesn't show any significant vascular congestion. He's scheduled for MUGA scan on 12/17/21. Past Medical History:   has a past medical history of Bronchitis, CAD (coronary artery disease), Cardiomyopathy ([address] Chest pain, CHF (congestive heart failure) ([address] Chronic back pain, Chronic diastolic CHF (congestive heart failure) ([address] COPD (chronic obstructive pulmonary disease) ([address] Headache(784.0), Hyperlipidemia, Hypertension, Inguinal hernia, Low back pain with sciatica, Migraine, Mitral valve regurgitation, Osteoarthritis, Pneumonia, Sleep apnea, SOB (shortness of breath) on exertion, Tricuspid regurgitation, Type 2 diabetes mellitus without complication, without long-term current use of insulin ([address] Under care of team, and Wellness examination. Past Surgical History:   has a past surgical history that includes hernia repair; Colonoscopy; Tonsillectomy (1963   exact date unknown); Adenoidectomy; Colonoscopy (05/28/2021); and Colonoscopy (N/A, 5/28/2021).        Home Medications: Prior to Admission medications    Medication Sig Start Date End Date Taking?  Authorizing Provider   bumetanide (BUMEX) 2 MG tablet Take 1 tablet by mouth 2 times daily 10/28/21  Yes Sharan Mckeon MD   sacubitril-valsartan (ENTRESTO) 24-26 MG per tablet Take 1 tablet by mouth 2 times daily 10/28/21  Yes Sharan Mckeon MD   empagliflozin (JARDIANCE) 10 MG tablet Take 1 tablet by mouth daily 10/28/21  Yes Sharan Mckeon MD   metoprolol succinate (TOPROL XL) 50 MG extended release tablet Take 1 tablet by mouth daily 10/28/21  Yes Sharan Mckeon MD   metFORMIN (GLUCOPHAGE) 500 MG tablet TAKE 1 TABLET BY MOUTH DAILY (WITH BREAKFAST)  Patient taking differently: 2 times daily (with meals)  9/17/21  Yes Rochelle Connell MD   albuterol (PROVENTIL) (5 MG/ML) 0.5% nebulizer solution Take 0.5 mLs by nebulization every 6 hours as needed for Wheezing or Shortness of Breath 8/30/21  Yes Sharan Mckeon MD   gabapentin (NEURONTIN) 300 MG capsule TAKE 1 CAPSULE BY MOUTH NIGHTLY AS NEEDED (PAIN) 8/30/21 8/30/22 Yes Sharan Mckeon MD   naproxen (NAPROSYN) 500 MG tablet Take 1 tablet by mouth 2 times daily for 20 days 8/30/21 12/1/21 Yes Sharan Mckeon MD   simvastatin (ZOCOR) 20 MG tablet Take 1 tablet by mouth nightly 8/30/21  Yes Sharan Mckeon MD   spironolactone (ALDACTONE) 25 MG tablet Take 1 tablet by mouth nightly 8/30/21  Yes Sharan Mckeon MD   aspirin 81 MG tablet Take 1 tablet by mouth daily 8/13/19  Yes Sharan Mckeon MD   Elastic Bandages & Supports (WRIST SPLINT/COCK-UP/RIGHT L) MISC 1 each by Does not apply route daily 10/28/21   Sharan Mckeon MD   sildenafil (VIAGRA) 50 MG tablet Take 1 tablet by mouth daily as needed for Erectile Dysfunction 10/28/21   Sharan Mckeon MD   nitroGLYCERIN (NITROSTAT) 0.4 MG SL tablet PLACE 1 TABLET UNDER THE TONGUE EVERY 5 MINUTES AS NEEDED FOR CHEST PAIN 9/27/21   Dennis Conner MD   zoster recombinant adjuvanted vaccine [facility]) 50 MCG/0.5ML SUSR injection Inject 0.5 mLs into the muscle See Admin Instructions 1 dose now and repeat in 2-6 months 9/13/21 3/12/22  Rajeev Hilton MD   albuterol sulfate  (90 Base) MCG/ACT inhaler Inhale 2 puffs into the lungs every 6 hours as needed for Wheezing 8/30/21   Rajeev Hilton MD   blood glucose monitor kit and supplies Dispense sufficient amount for indicated testing frequency plus additional to accommodate PRN testing needs. Dispense all needed supplies to include: monitor, strips, lancing device, lancets, control solutions, alcohol swabs.  8/30/21   Rajeev Hilton MD   albuterol-ipratropium (COMBIVENT RESPIMAT)  MCG/ACT AERS inhaler INHALE 1 PUFF INTO THE LUNGS EVERY 6 HOURS 8/30/21   Rajeev Hilton MD   Masks (FACE MASK) MISC 1 each by Does not apply route as needed (as needed) 8/30/21   Rajeev Hilton MD   potassium chloride (KLOR-CON M) 20 MEQ extended release tablet Take 1 tablet by mouth daily (with breakfast) 8/30/21   Rajeev Hilton MD   ranolazine (RANEXA) 500 MG extended release tablet Take 1 tablet by mouth 2 times daily 8/30/21   Rajeev Hilton MD   diclofenac sodium (VOLTAREN) 1 % GEL Apply topically 2 times daily 8/30/21   Rajeev Hilton MD      Current Facility-Administered Medications: sodium chloride flush 0.9 % injection 5-40 mL, 5-40 mL, IntraVENous, 2 times per day  sodium chloride flush 0.9 % injection 5-40 mL, 5-40 mL, IntraVENous, PRN  0.9 % sodium chloride infusion, 25 mL, IntraVENous, PRN  enoxaparin (LOVENOX) injection 40 mg, 40 mg, SubCUTAneous, Daily  ondansetron (ZOFRAN) injection 4 mg, 4 mg, IntraVENous, Q4H PRN  polyethylene glycol (GLYCOLAX) packet 17 g, 17 g, Oral, Daily PRN  albuterol (PROVENTIL) nebulizer solution 2.5 mg, 2.5 mg, Nebulization, Q6H PRN  bumetanide (BUMEX) tablet 2 mg, 2 mg, Oral, BID  empagliflozin (JARDIANCE) tablet TABS 10 mg, 10 mg, Oral, Daily  gabapentin (NEURONTIN) capsule 300 mg, 300 mg, Oral, Nightly  metFORMIN (GLUCOPHAGE) tablet 500 mg, 500 mg, Oral, Daily with breakfast  metoprolol succinate (TOPROL XL) extended release tablet 50 mg, 50 mg, Oral, Daily  potassium chloride (KLOR-CON M) extended release tablet 20 mEq, 20 mEq, Oral, Daily with breakfast  ranolazine (RANEXA) extended release tablet 500 mg, 500 mg, Oral, BID  sacubitril-valsartan (ENTRESTO) 24-26 MG per tablet 1 tablet, 1 tablet, Oral, BID  atorvastatin (LIPITOR) tablet 20 mg, 20 mg, Oral, Nightly  spironolactone (ALDACTONE) tablet 25 mg, 25 mg, Oral, Nightly  albuterol sulfate  (90 Base) MCG/ACT inhaler 1 puff, 1 puff, Inhalation, Q6H PRN **AND** ipratropium (ATROVENT HFA) 17 MCG/ACT inhaler 1 puff, 1 puff, Inhalation, Q6H PRN      Allergies:  Penicillins, Sulfa antibiotics, Sulfasalazine, Pseudoeph-doxylamine-dm-apap, and Rabbit protein      Social History:   reports that he quit smoking about 22 years ago. He smoked 0.00 packs per day for 40.00 years. His smokeless tobacco use includes chew. He reports current alcohol use of about 1.0 standard drink of alcohol per week. He reports that he does not use drugs. Family History: family history includes Arthritis in his mother and sister; Diabetes in his mother, sister, and sister; Heart Attack in his father; Heart Disease in his father, mother, and sister; Kidney Disease in his mother. No h/o sudden cardiac death. REVIEW OF SYSTEMS:    · Constitutional: there has been no unanticipated weight loss. · Eyes: No visual changes or diplopia. · ENT: No Headaches  · Cardiovascular: see above  · Respiratory: No previous pulmonary problems, No cough  · Gastrointestinal: No abdominal pain. No change in bowel or bladder habits. · Genitourinary: No dysuria, trouble voiding, or hematuria. · Musculoskeletal:  No gait disturbance, No weakness or joint complaints. · Integumentary: No rash or pruritis.   · Neurological: No headache, diplopia      PHYSICAL EXAM:      BP (!) 148/88   Pulse 97   Temp 98.3 °F (36.8 °C) (Oral)   Resp 15   Ht 5' 5\" (1.651 m)   Wt 272 lb 6 oz (123.5 kg)   SpO2 98%   BMI 45.33 kg/m²    Constitutional and General Appearance: Alert, no distress  Respiratory:  · No increased work of breathing. · Clear to auscultation bilaterally. No wheeze or crackles. Cardiovascular:  · Normal S1 and S2.   · Jugular venous pulsation Normal  Abdomen:   · Soft  · No tenderness  Extremities:  · No lower extremity edema  Neurologic:  · Alert and oriented. · Moves all extremities well      DATA:    Diagnostics:    Labs:     CBC:   Recent Labs     11/30/21 1836 12/01/21  0628   WBC 10.5 9.0   HGB 13.5 13.3   HCT 40.0* 39.9*    205     BMP:   Recent Labs     11/30/21 1836 12/01/21  0628   * 134*   K 3.9 4.3   CO2 25 26   BUN 21 23   CREATININE 0.88 1.11   LABGLOM >60 >60   GLUCOSE 119* 136*     BNP: No results for input(s): BNP in the last 72 hours. PT/INR: No results for input(s): PROTIME, INR in the last 72 hours. APTT:No results for input(s): APTT in the last 72 hours. CARDIAC ENZYMES:  Recent Labs     11/30/21 1836 11/30/21 1954 12/01/21  0628   TROPHS 8 7 8     No results for input(s): CKTOTAL, CKMB, CKMBINDEX, TROPONINI in the last 72 hours. Invalid input(s):  [address]   Recent Labs     11/30/21  1836 11/30/21 1954 12/01/21  0628   TROPONINT NOT REPORTED NOT REPORTED NOT REPORTED     FASTING LIPID PANEL:  Lab Results   Component Value Date    HDL 46 01/22/2021    TRIG 138 01/22/2021     LIVER PROFILE:No results for input(s): AST, ALT, LABALBU in the last 72 hours. EKG: NSR with PVCs    MUGA scheduled on 12/17/2021      ECHO 11/16/2021: EF 30%.      CATH 1/25/2021: Minimal luminal disease, and elevated filling pressures.      STRESS 1/2021: Inferior reversible ischemia. EF 39%. IMPRESSION:    1. Non-ischemic cardiomyopathy with EF 30%  2. Acute on Chronic CHF  3. HTN  4. HL  5. VANDANA    RECOMMENDATIONS:  1. Continue entresto, metoprolol succinate and spironolactone  2. Bumex 2 mg IV BID  3.  Will follow up outpatient MUGA scan to determine need for ICD. Thank you for allowing us to participate in [address] [place] [address]. Will follow with you. Electronically signed on 12/01/21 at 10:08 AM by:    Shubham Rice MD, MD   Fellow, [address]    Attending Physician Statement  I have discussed the care of Miquel Chance, including pertinent history and exam findings,  with the cardiology fellow/resident. I have seen and examined the patient and the key elements of all parts of the encounter have been performed by me.   I agree with the assessment, plan and orders as documented by the resident with additional recommendations as below:       IV bumex 2 mg bid for today and switch to po tomorrow   Increase dose of BB for better HR and BP control  Resume entresto and jardiance   Plan for MUGA scan as scheduled in 12/17/2021 to reassess Arnold Prieto MD, Bonny Lesch

## 2021-12-02 VITALS
RESPIRATION RATE: 19 BRPM | HEIGHT: 65 IN | HEART RATE: 81 BPM | DIASTOLIC BLOOD PRESSURE: 64 MMHG | BODY MASS INDEX: 45.38 KG/M2 | OXYGEN SATURATION: 98 % | SYSTOLIC BLOOD PRESSURE: 117 MMHG | WEIGHT: 272.38 LBS | TEMPERATURE: 98.2 F

## 2021-12-02 LAB
TROPONIN INTERP: NORMAL
TROPONIN INTERP: NORMAL
TROPONIN T: NORMAL NG/ML
TROPONIN T: NORMAL NG/ML
TROPONIN, HIGH SENSITIVITY: 7 NG/L (ref 0–22)
TROPONIN, HIGH SENSITIVITY: <6 NG/L (ref 0–22)

## 2021-12-02 PROCEDURE — 6370000000 HC RX 637 (ALT 250 FOR IP): Performed by: STUDENT IN AN ORGANIZED HEALTH CARE EDUCATION/TRAINING PROGRAM

## 2021-12-02 PROCEDURE — 36415 COLL VENOUS BLD VENIPUNCTURE: CPT

## 2021-12-02 PROCEDURE — 6360000002 HC RX W HCPCS: Performed by: STUDENT IN AN ORGANIZED HEALTH CARE EDUCATION/TRAINING PROGRAM

## 2021-12-02 PROCEDURE — 2500000003 HC RX 250 WO HCPCS: Performed by: INTERNAL MEDICINE

## 2021-12-02 PROCEDURE — 96376 TX/PRO/DX INJ SAME DRUG ADON: CPT

## 2021-12-02 PROCEDURE — 84484 ASSAY OF TROPONIN QUANT: CPT

## 2021-12-02 PROCEDURE — 96372 THER/PROPH/DIAG INJ SC/IM: CPT

## 2021-12-02 PROCEDURE — 94761 N-INVAS EAR/PLS OXIMETRY MLT: CPT

## 2021-12-02 PROCEDURE — 2580000003 HC RX 258: Performed by: STUDENT IN AN ORGANIZED HEALTH CARE EDUCATION/TRAINING PROGRAM

## 2021-12-02 PROCEDURE — G0378 HOSPITAL OBSERVATION PER HR: HCPCS

## 2021-12-02 RX ADMIN — POTASSIUM CHLORIDE 20 MEQ: 1500 TABLET, EXTENDED RELEASE ORAL at 09:18

## 2021-12-02 RX ADMIN — METOPROLOL SUCCINATE 50 MG: 50 TABLET, FILM COATED, EXTENDED RELEASE ORAL at 09:18

## 2021-12-02 RX ADMIN — ENOXAPARIN SODIUM 40 MG: 40 INJECTION SUBCUTANEOUS at 09:19

## 2021-12-02 RX ADMIN — SACUBITRIL AND VALSARTAN 1 TABLET: 24; 26 TABLET, FILM COATED ORAL at 09:18

## 2021-12-02 RX ADMIN — METFORMIN HYDROCHLORIDE 500 MG: 500 TABLET ORAL at 09:18

## 2021-12-02 RX ADMIN — SODIUM CHLORIDE, PRESERVATIVE FREE 10 ML: 5 INJECTION INTRAVENOUS at 09:20

## 2021-12-02 RX ADMIN — BUMETANIDE 2 MG: 0.25 INJECTION INTRAMUSCULAR; INTRAVENOUS at 09:06

## 2021-12-02 ASSESSMENT — PAIN SCALES - GENERAL
PAINLEVEL_OUTOF10: 0

## 2021-12-02 NOTE — PROGRESS NOTES
OBS/CDU   RESIDENT NOTE      Patients PCP is:  Barbara Ahmadi MD        SUBJECTIVE      No acute events overnight. Has been able to tolerate a full diet without nausea or vomiting. The patient is urinating on his own and is passing flatus. Denies fever, chills, nausea, vomiting, chest pain, shortness of breath, abdominal pain, focal weakness, numbness, tingling, urinary/bowel symptoms, vision changes, visual hallucinations, or headache. PHYSICAL EXAM      General: NAD, AO X 3  Heent: EMOI, PERRL  Neck: SUPPLE, NO JVD  Cardiovascular: RRR, S1S2  Pulmonary: CTAB, NO SOB  Abdomen: SOFT, NTTP, ND, +BS  Extremities: +2/4 PULSES DISTAL,  SWELLING to bilat LEs  Neuro / Psych: NO NUMBNESS OR TINGLING, MENTATION AT BASELINE    PERTINENT TEST /EXAMS      I have reviewed all available laboratory results. MEDICATIONS CURRENT   sodium chloride flush 0.9 % injection 5-40 mL, 2 times per day  sodium chloride flush 0.9 % injection 5-40 mL, PRN  0.9 % sodium chloride infusion, PRN  enoxaparin (LOVENOX) injection 40 mg, Daily  ondansetron (ZOFRAN) injection 4 mg, Q4H PRN  polyethylene glycol (GLYCOLAX) packet 17 g, Daily PRN  albuterol (PROVENTIL) nebulizer solution 2.5 mg, Q6H PRN  bumetanide (BUMEX) tablet 2 mg, BID  empagliflozin (JARDIANCE) tablet TABS 10 mg, Daily  gabapentin (NEURONTIN) capsule 300 mg, Nightly  metoprolol succinate (TOPROL XL) extended release tablet 50 mg, Daily  potassium chloride (KLOR-CON M) extended release tablet 20 mEq, Daily with breakfast  sacubitril-valsartan (ENTRESTO) 24-26 MG per tablet 1 tablet, BID  atorvastatin (LIPITOR) tablet 20 mg, Nightly  spironolactone (ALDACTONE) tablet 25 mg, Nightly  albuterol sulfate  (90 Base) MCG/ACT inhaler 1 puff, Q6H PRN   And  ipratropium (ATROVENT HFA) 17 MCG/ACT inhaler 1 puff, Q6H PRN  metFORMIN (GLUCOPHAGE) tablet 500 mg, BID WC        All medication charted and reviewed.     CONSULTS      IP CONSULT TO CARDIOLOGY    ASSESSMENT/PLAN     Jareth Richardson Prateek Bird is a 63 y.o. male who presents with dyspnea and shortness of breath     · Patient with complaints of dyspnea and shortness of breath. Patient with exertional complaints. Patient with nonischemic cardiomyopathy. EF of 30%. ? Evaluated by cardiology  ? Continue Entresto metoprolol and spironolactone  ? Again diuresis with Bumex. ? Follow-up as outpatient MUGA scan. ? Discussed with [facility] attending. If patient needs further evaluation after next 24 hours patient will be admitted to [facility] service. Otherwise patient will stay with observation unit. ? She may meet criteria for admission after several rounds of diuresis. · Continue home medications and pain control  · Monitor vitals, labs, and imaging  · DISPO: pending consults and clinical improvement   --  Lucy Mcconnell MD  Emergency Medicine Resident Physician     This dictation was generated by voice recognition computer software. Although all attempts are made to edit the dictation for accuracy, there may be errors in the transcription that are not intended.

## 2021-12-02 NOTE — PROGRESS NOTES
CDU Daily Progress Note  Attending Physician       Pt Name: Atul Reynoso  MRN: [id number]  [place] 1960  Date of evaluation: 12/2/21    I performed a history and physical examination of the patient and discussed management with the resident. I reviewed the residents note and agree with the documented findings and plan of care. Any areas of disagreement are noted on the chart. I was personally present for the key portions of any procedures. I have documented in the chart those procedures where I was not present during the key portions. I have reviewed the emergency nurses triage note. I agree with the chief complaint, past medical history, past surgical history, allergies, medications, social and family history as documented unless otherwise noted below. Documentation of the HPI, Physical Exam and Medical Decision Making performed by medical students or scribes is based on my personal performance of the HPI, PE and MDM. For Physician Assistant/ Nurse Practitioner cases/documentation I have personally evaluated this patient and have completed at least one if not all key elements of the E/M (history, physical exam, and MDM). Additional findings are as noted. The Family History, Social History and Review of Systems are unchanged from the previous day. No significant events overnight. Patient admitted for chest pain and LE edema. He relates he is feeling better today. Cardiology has cleared for discharge and follow up after scheduled MUGA on 12/17/21.     Emelyn Wong MD,  MD  Attending Physician  Critical Decision Unit

## 2021-12-02 NOTE — DISCHARGE SUMMARY
CDU Discharge Summary        Patient:  Santosh Beth  YOB: 1960    MRN: [id number]   Acct: [de-identified]    Primary Care Physician: Yancy Del Rio MD    Admit date:  11/30/2021  5:50 PM  Discharge date: No discharge date for patient encounter. 12/02/21      Discharge Diagnoses:     Acute lower limb swelling due to CHF  Improved with diuresis    Follow-up:  Call today/tomorrow for a follow up appointment with Yancy Del Rio MD , or return to the Emergency Room with worsening symptoms    Stressed to patient the importance of following up with primary care doctor for further workup/management of symptoms. Pt verbalizes understanding and agrees with plan. Discharge Medications:  Changes to medications none     @DISCHARGEMEDSLIST(<NOROUTINE> error)@    Diet:  ADULT DIET; Regular , Advance as tolerated     Activity:  As tolerated    Consultants: IP CONSULT TO CARDIOLOGY    Procedures:  Not indicated none    Diagnostic Test:   Results for orders placed or performed during the hospital encounter of 11/30/21   COVID-19, Rapid    Specimen: Nasopharyngeal Swab   Result Value Ref Range    Specimen Description . NASOPHARYNGEAL SWAB     SARS-CoV-2, Rapid Not Detected Not Detected   CBC Auto Differential   Result Value Ref Range    WBC 10.5 3.5 - 11.3 k/uL    RBC 4.58 4.21 - 5.77 m/uL    Hemoglobin 13.5 13.0 - 17.0 g/dL    Hematocrit 40.0 (L) 40.7 - 50.3 %    MCV 87.3 82.6 - 102.9 fL    MCH 29.5 25.2 - 33.5 pg    MCHC 33.8 28.4 - 34.8 g/dL    RDW 13.2 11.8 - 14.4 %    Platelets 438 509 - 701 k/uL    MPV 10.5 8.1 - 13.5 fL    NRBC Automated 0.0 0.0 per 100 WBC    Differential Type NOT REPORTED     Seg Neutrophils 70 (H) 36 - 65 %    Lymphocytes 18 (L) 24 - 43 %    Monocytes 9 3 - 12 %    Eosinophils % 2 1 - 4 %    Basophils 0 0 - 2 %    Immature Granulocytes 1 (H) 0 %    Segs Absolute 7.39 1.50 - 8.10 k/uL    Absolute Lymph # 1.86 1.10 - 3.70 k/uL    Absolute Mono # 0.98 0.10 - 1.20 k/uL    Absolute Eos # 0.18 0.00 - 0.44 k/uL    Basophils Absolute 0.04 0.00 - 0.20 k/uL    Absolute Immature Granulocyte 0.05 0.00 - 0.30 k/uL    WBC Morphology NOT REPORTED     RBC Morphology NOT REPORTED     Platelet Estimate NOT REPORTED    Basic Metabolic Panel w/ Reflex to MG   Result Value Ref Range    Glucose 119 (H) 70 - 99 mg/dL    BUN 21 8 - 23 mg/dL    CREATININE 0.88 0.70 - 1.20 mg/dL    Bun/Cre Ratio NOT REPORTED 9 - 20    Calcium 9.5 8.6 - 10.4 mg/dL    Sodium 132 (L) 135 - 144 mmol/L    Potassium 3.9 3.7 - 5.3 mmol/L    Chloride 93 (L) 98 - 107 mmol/L    CO2 25 20 - 31 mmol/L    Anion Gap 14 9 - 17 mmol/L    GFR Non-African American >60 >60 mL/min    GFR African American >60 >60 mL/min    GFR Comment          GFR Staging NOT REPORTED    Troponin   Result Value Ref Range    Troponin, High Sensitivity 8 0 - 22 ng/L    Troponin T NOT REPORTED <0.03 ng/mL    Troponin Interp NOT REPORTED    Troponin   Result Value Ref Range    Troponin, High Sensitivity 7 0 - 22 ng/L    Troponin T NOT REPORTED <0.03 ng/mL    Troponin Interp NOT REPORTED    Brain Natriuretic Peptide   Result Value Ref Range    Pro-BNP 52 <300 pg/mL    BNP Interpretation NOT REPORTED    Troponin   Result Value Ref Range    Troponin, High Sensitivity 8 0 - 22 ng/L    Troponin T NOT REPORTED <0.03 ng/mL    Troponin Interp NOT REPORTED    Basic Metabolic Panel w/ Reflex to MG   Result Value Ref Range    Glucose 136 (H) 70 - 99 mg/dL    BUN 23 8 - 23 mg/dL    CREATININE 1.11 0.70 - 1.20 mg/dL    Bun/Cre Ratio NOT REPORTED 9 - 20    Calcium 8.8 8.6 - 10.4 mg/dL    Sodium 134 (L) 135 - 144 mmol/L    Potassium 4.3 3.7 - 5.3 mmol/L    Chloride 94 (L) 98 - 107 mmol/L    CO2 26 20 - 31 mmol/L    Anion Gap 14 9 - 17 mmol/L    GFR Non-African American >60 >60 mL/min    GFR African American >60 >60 mL/min    GFR Comment          GFR Staging NOT REPORTED    CBC auto differential   Result Value Ref Range    WBC 9.0 3.5 - 11.3 k/uL    RBC 4.50 4.21 - 5.77 m/uL    Hemoglobin 13.3 13.0 - 17.0 g/dL    Hematocrit 39.9 (L) 40.7 - 50.3 %    MCV 88.7 82.6 - 102.9 fL    MCH 29.6 25.2 - 33.5 pg    MCHC 33.3 28.4 - 34.8 g/dL    RDW 13.2 11.8 - 14.4 %    Platelets 533 578 - 444 k/uL    MPV 10.7 8.1 - 13.5 fL    NRBC Automated 0.0 0.0 per 100 WBC    Differential Type NOT REPORTED     Seg Neutrophils 71 (H) 36 - 65 %    Lymphocytes 18 (L) 24 - 43 %    Monocytes 9 3 - 12 %    Eosinophils % 2 1 - 4 %    Basophils 0 0 - 2 %    Immature Granulocytes 0 0 %    Segs Absolute 6.30 1.50 - 8.10 k/uL    Absolute Lymph # 1.58 1.10 - 3.70 k/uL    Absolute Mono # 0.79 0.10 - 1.20 k/uL    Absolute Eos # 0.20 0.00 - 0.44 k/uL    Basophils Absolute 0.04 0.00 - 0.20 k/uL    Absolute Immature Granulocyte 0.04 0.00 - 0.30 k/uL    WBC Morphology NOT REPORTED     RBC Morphology NOT REPORTED     Platelet Estimate NOT REPORTED    Troponin   Result Value Ref Range    Troponin, High Sensitivity <6 0 - 22 ng/L    Troponin T NOT REPORTED <0.03 ng/mL    Troponin Interp NOT REPORTED    Troponin   Result Value Ref Range    Troponin, High Sensitivity 7 0 - 22 ng/L    Troponin T NOT REPORTED <0.03 ng/mL    Troponin Interp NOT REPORTED    EKG 12 Lead   Result Value Ref Range    Ventricular Rate 100 BPM    Atrial Rate 100 BPM    P-R Interval 114 ms    QRS Duration 98 ms    Q-T Interval 374 ms    QTc Calculation (Bazett) 482 ms    P Axis 44 degrees    R Axis 10 degrees    T Axis 88 degrees   EKG 12 lead   Result Value Ref Range    Ventricular Rate 102 BPM    Atrial Rate 102 BPM    P-R Interval 162 ms    QRS Duration 108 ms    Q-T Interval 358 ms    QTc Calculation (Bazett) 466 ms    P Axis 63 degrees    R Axis 32 degrees    T Axis 127 degrees     XR CHEST PORTABLE    Result Date: 12/1/2021  EXAMINATION: ONE XRAY VIEW OF THE CHEST 11/30/2021 6:36 pm COMPARISON: 08/23/2021 HISTORY: ORDERING SYSTEM PROVIDED HISTORY: Chest pain, shortness of breath TECHNOLOGIST PROVIDED HISTORY: Chest pain, shortness of breath Reason for Exam: upr,chest pain,sob FINDINGS: The heart is similar in size to the prior study. No pleural effusion or pneumothorax is seen. No focal lung consolidation is identified. No acute cardiopulmonary abnormality identified. Physical Exam:    General appearance - NAD, AOx 3, obese  Lungs -CTAB, no R/R/R  Heart - RRR, no M/R/G  Abdomen - Soft, NT/ND  Neurological:  MAEx4, No focal motor deficit, sensory loss  Extremities - Cap refil <2 sec in all ext., + edema to bilateral lower extremities  Skin -warm, dry      Hospital Course:  Clinical course has improved, labs and imaging reviewed. Sanford Valencia originally presented to the hospital on 11/30/2021  5:50 PM. with bilateral lower extremity swelling. At that time it was determined that He required further observation and diuresis. He was admitted and labs and imaging were followed daily. Imaging results as above. He is medically stable to be discharged. Disposition: Home    Patient stated that they will not drive themselves home from the hospital if they have gotten pain killers/ narcotics earlier that day and that they will arrange for transportation on their own or work with the  for a ride. Patient counseled NOT to drive while under the influence of narcotics/ pain killers. Condition: Good    Patient stable and ready for discharge home. I have discussed plan of care with patient and they are in understanding. They were instructed to read discharge paperwork. All of their questions and concerns were addressed. Time Spent: 0 day      --  Zenaida Head MD  Emergency Medicine Resident Physician    This dictation was generated by voice recognition computer software. Although all attempts are made to edit the dictation for accuracy, there may be errors in the transcription that are not intended.

## 2021-12-02 NOTE — FLOWSHEET NOTE
Discharge instructions reviewed with patient, patient verbalized understanding of discharge instructions. Patient discharged from unit per wheelchair no noted distress. Patient states that he believes that drop in his hemoglobin is related to his sucralfate.  Pt states that his hemoglobin has been dropping since her changed from liquid sucralfate to tablets.  Pt states that this change was made because insurance would not cover the liquid.  Please advise.

## 2021-12-02 NOTE — DISCHARGE INSTR - DIET

## 2021-12-02 NOTE — PROGRESS NOTES
[facility]  Progress Note                   Date:   12/2/2021  Patient name: Good Finch  Date of admission:  11/30/2021  5:50 PM  MRN:   [id number]  YOB: 1960  PCP: Aminata Johnson MD    Reason for Admission: Chest pain [R07.9]  Chest pain, unspecified type [R07.9]    Subjective:       Clinical Changes /Abnormalities: Seen & examined in room sitting at side of bed after discussing with RN. Denies any chest pain or SOB. States LE edema has much improved. Labs, vitals, & tele reviewed. Review of Systems    Medications:   Scheduled Meds:   sodium chloride flush  5-40 mL IntraVENous 2 times per day    enoxaparin  40 mg SubCUTAneous Daily    [Held by provider] bumetanide  2 mg Oral BID    empagliflozin  10 mg Oral Daily    gabapentin  300 mg Oral Nightly    metoprolol succinate  50 mg Oral Daily    potassium chloride  20 mEq Oral Daily with breakfast    sacubitril-valsartan  1 tablet Oral BID    atorvastatin  20 mg Oral Nightly    spironolactone  25 mg Oral Nightly    bumetanide  2 mg IntraVENous BID    metFORMIN  500 mg Oral BID WC     Continuous Infusions:   sodium chloride       CBC:   Recent Labs     11/30/21  1836 12/01/21  0628   WBC 10.5 9.0   HGB 13.5 13.3    205     BMP:    Recent Labs     11/30/21  1836 12/01/21  0628   * 134*   K 3.9 4.3   CL 93* 94*   CO2 25 26   BUN 21 23   CREATININE 0.88 1.11   GLUCOSE 119* 136*     Hepatic:No results for input(s): AST, ALT, ALB, BILITOT, ALKPHOS in the last 72 hours. Troponin:   Recent Labs     12/01/21  0628 12/01/21  2335 12/02/21  0607   TROPHS 8 <6 7     BNP: No results for input(s): BNP in the last 72 hours. Lipids: No results for input(s): CHOL, HDL in the last 72 hours. Invalid input(s): LDLCALCU  INR: No results for input(s): INR in the last 72 hours.     Objective:   Vitals: /64   Pulse 81   Temp 98.2 °F (36.8 °C) (Oral)   Resp 19   Ht 5' 5\" (1.651 m)   Wt 272 lb 6 oz (123.5 kg) SpO2 98%   BMI 45.33 kg/m²   General appearance: alert and cooperative with exam  HEENT: Head: Normocephalic, no lesions, without obvious abnormality. Neck:no JVD, trachea midline, no adenopathy  Lungs: Clear to auscultation  Heart: Regular rate and rhythm, s1/s2 auscultated, no murmurs  Abdomen: soft, non-tender, bowel sounds active  Extremities: no edema  Neurologic: not done    MUGA scheduled on 12/17/2021      ECHO 11/16/2021: EF 30%.      CATH 1/25/2021: Minimal luminal disease, and elevated filling pressures.      STRESS 1/2021: Inferior reversible ischemia. EF 39%.      ECHO 6/12/2020: EF 45-50%, moderate LVH/MR/PHTN, mild TR. Assessment / Acute Cardiac Problems:   1. Acute on chronic systolic CHF  NICMP with LVEF ~ 30%  2. HTn  3. HLP   4. VANDANA  5. Obesity    Patient Active Problem List:     HTN (hypertension)     Skin tag     Low back pain with sciatica     Hyperglycemia     Infected sebaceous cyst     Chronic bilateral low back pain without sciatica     Chronic pain of right knee     Chest pain, unspecified     Bronchitis     Chronic systolic congestive heart failure (HCC)     Ventral hernia     Epigastric pain     Elevated LFTs     Shortness of breath     Rectus diastasis     Lumbosacral spondylosis without myelopathy     COPD exacerbation (HCC)     CHF (congestive heart failure), NYHA class I, acute on chronic, diastolic (HCC)     Mixed simple and mucopurulent chronic bronchitis (HCC)     VANDANA (obstructive sleep apnea)     Tachycardia     Chest pain      Plan of Treatment:   1. Stable and improve significantly after IV Bumex. Will switch back to oral. Continue PO Entresto & Jardiance  2. Has MUGA scheduled for 12/17  3.  OK for discharge from CV standpoint with OP f/u after MUGA    Electronically signed by PINKY Arce CNP on 12/2/2021 at [address]. [phone]

## 2021-12-02 NOTE — DISCHARGE INSTR - COC
Continuity of Care Form    Patient Name: Shayna Sandra   :  1960  MRN:  [id number]    Admit date:  2021  Discharge date:  ***    Code Status Order: Full Code   Advance Directives:      Admitting Physician:  Justa Gibson MD  PCP: Gaby Wall MD    Discharging Nurse: [facility] Unit/Room#: 8840/2925-52  Discharging Unit Phone Number: ***    Emergency Contact:   Extended Emergency Contact Information  Primary Emergency Contact: Dione Iglesias  Address: n/a   [address] Phone: [phone]  Relation: Brother/Sister    Past Surgical History:  Past Surgical History:   Procedure Laterality Date    ADENOIDECTOMY      COLONOSCOPY      COLONOSCOPY  2021    COLORECTAL CANCER SCREENING, NOT HIGH RISK, POLYPECTOMY    COLONOSCOPY N/A 2021    COLONOSCOPY POLYPECTOMY HOT BIOPSY performed by Royal Cervantes IV, DO at [facility]   exact date unknown       Immunization History:   Immunization History   Administered Date(s) Administered    Influenza Virus Vaccine 2010    PPD Test 10/07/2010    Pneumococcal Polysaccharide (Tyutbxxgt88) 2013    Tdap (Boostrix, Adacel) 2020       Active Problems:  Patient Active Problem List   Diagnosis Code    HTN (hypertension) I10    Skin tag L91.8    Low back pain with sciatica M54.40    Hyperglycemia R73.9    Infected sebaceous cyst L72.3, L08.9    Chronic bilateral low back pain without sciatica M54.50, G89.29    Chronic pain of right knee M25.561, G89.29    Chest pain, unspecified R07.9    Bronchitis J40    Chronic systolic congestive heart failure (HCC) I50.22    Ventral hernia K43.9    Epigastric pain R10.13    Elevated LFTs R79.89    Shortness of breath R06.02    Rectus diastasis M62.08    Lumbosacral spondylosis without myelopathy M47.817    COPD exacerbation (HCC) J44.1    CHF (congestive heart failure), NYHA class I, acute on chronic, diastolic (HCC) J31.01    Mixed simple and mucopurulent chronic bronchitis (HCC) J41.8    VANDANA (obstructive sleep apnea) G47.33    Tachycardia R00.0    Chest pain R07.9       Isolation/Infection:   Isolation            No Isolation          Patient Infection Status       Infection Onset Added Last Indicated Last Indicated By Review Planned Expiration Resolved Resolved By    None active    Resolved    COVID-19 (Rule Out) 21 COVID-19, Rapid (Ordered)   21 Serenity Garsia RN    COVID-19 (Rule Out) 21 COVID-19 (Ordered)   21 Rule-Out Test Resulted    COVID-19 (Rule Out) 12/10/20 12/11/20 12/10/20 COVID-19 (Ordered)   20 Rule-Out Test Resulted            Nurse Assessment:  Last Vital Signs: /64   Pulse 81   Temp 98.2 °F (36.8 °C) (Oral)   Resp 19   Ht 5' 5\" (1.651 m)   Wt 272 lb 6 oz (123.5 kg)   SpO2 98%   BMI 45.33 kg/m²     Last documented pain score (0-10 scale): Pain Level: 0  Last Weight:   Wt Readings from Last 1 Encounters:   21 272 lb 6 oz (123.5 kg)     Mental Status:  {IP PT MENTAL STATUS:}    IV Access:  { LEIGH IV ACCESS:232814597}    Nursing Mobility/ADLs:  Walking   {[facility] DME ZNIO:942446632}  Transfer  {[facility] DME EYSI:436774161}  Bathing  {[facility] DME TIFN:137716605}  Dressing  {[facility] DME BNK}  Toileting  {[facility] DME HHMP:982824995}  Feeding  {[facility] DME MJNX:207457339}  Med Admin  {[facility] DME ZCRY:056120661}  Med Delivery   { LEIGH MED Delivery:873902156}    Wound Care Documentation and Therapy:        Elimination:  Continence: Bowel: {YES / NY:21634}  Bladder: {YES / ZL:66671}  Urinary Catheter: {Urinary Catheter:363968748}   Colostomy/Ileostomy/Ileal Conduit: {YES / ZM:42306}       Date of Last BM: ***    Intake/Output Summary (Last 24 hours) at 2021 1359  Last data filed at 2021 0920  Gross per 24 hour   Intake 10 ml   Output    Net 10 ml     No intake/output data recorded.     Safety Concerns:     Davey Polanco LEIGH Safety Changes in YPCZX:866149726}    PHYSICIAN SIGNATURE:  {Esignature:586559392}

## 2021-12-02 NOTE — PLAN OF CARE
Problem: Falls - Risk of:  Goal: Will remain free from falls  Description: Will remain free from falls  12/2/2021 1242 by Nahid Stubbs RN  Outcome: Ongoing  01/9/5572 0998 by Winsome Chaves RN  Outcome: Ongoing  Goal: Absence of physical injury  Description: Absence of physical injury  12/2/2021 1242 by Nahid Stubbs RN  Outcome: Ongoing  10/1/4127 8534 by Winsome Chaves RN  Outcome: Ongoing

## 2021-12-02 NOTE — PROGRESS NOTES
Congestive Heart Failure Education completed and charted. CHF booklet given. Patient was receptive to education. Discussed the  importance of medication compliance. Discussed the importance of a heart healthy diet. Discussed 2000 mg sodium-restricted daily diet. Patient instructed to limit fluid intake to  1.5 to 2 liters per day. Patient instructed to weigh self at the same time of each day each morning, reinforced teaching to monitor for 3-5 lb weight increase over 1-2 days notify physician if change noted. Signs and symptoms of CHF discussed with patient, such as feeling more tired than normal, feeling short of breath, coughing that increases when lying down, sudden weight gain, swelling of the feet, legs or belly. Patient verbalized understanding to notify physician office if these symptoms occur. EF 33%   Pt is established with  [facility] .  Next appt is 12/16/21

## 2021-12-03 ENCOUNTER — TELEPHONE (OUTPATIENT)
Dept: FAMILY MEDICINE CLINIC | Age: 61
End: 2021-12-03

## 2021-12-03 LAB
EKG ATRIAL RATE: 96 BPM
EKG P AXIS: 61 DEGREES
EKG P-R INTERVAL: 156 MS
EKG Q-T INTERVAL: 368 MS
EKG QRS DURATION: 110 MS
EKG QTC CALCULATION (BAZETT): 464 MS
EKG R AXIS: 18 DEGREES
EKG T AXIS: 15 DEGREES
EKG VENTRICULAR RATE: 96 BPM

## 2021-12-03 PROCEDURE — 93010 ELECTROCARDIOGRAM REPORT: CPT | Performed by: INTERNAL MEDICINE

## 2021-12-03 NOTE — TELEPHONE ENCOUNTER
[facility] Transitions Initial Follow Up Call    Outreach made within 2 business days of discharge: Yes    Patient: Stevenson Nova   Patient : 1960   MRN: [id number]    Reason for Admission: Chest pain  Discharge Date: 21       Spoke with: Elvis Goldberg    Discharge department/facility: [facility] Interactive Patient Contact:  Was patient able to fill all prescriptions: Yes--no changes made to meds  Was patient instructed to bring all medications to the follow-up visit: Yes  Is patient taking all medications as directed in the discharge summary? Yes  Does patient understand their discharge instructions: Yes  Does patient have questions or concerns that need addressed prior to 7-14 day follow up office visit: no    Scheduled appointment with PCP within 7-14 days    Pt has MUGA scan scheduled for . Pt already had appt scheduled to see PCP on , appt type changed to hospital f/u w/pt's verbal ok. Pt states he has cardiologist's phone number and will call to schedule a f/u appt with them as well. Pt advised to call office or return to ED with increase in shortness of breath/edema/chest pain, pt verbalized understanding.     Follow Up  Future Appointments   Date Time Provider Sondra Hogue   2021  2:15 PM Ariane Mejia MD [facility]   12/15/2021  3:30 PM Columbus Leyden, PTA [facility] [place]   12/15/2021  4:15 PM Katey Hunter PT [facility] [place]   2021  1:30 PM [facility]  [address] [facility] RN

## 2021-12-07 ENCOUNTER — HOSPITAL ENCOUNTER (OUTPATIENT)
Dept: PHYSICAL THERAPY | Age: 61
Setting detail: THERAPIES SERIES
End: 2021-12-07
Payer: COMMERCIAL

## 2021-12-13 ENCOUNTER — HOSPITAL ENCOUNTER (OUTPATIENT)
Age: 61
Setting detail: SPECIMEN
Discharge: HOME | End: 2021-12-13

## 2021-12-13 ENCOUNTER — OFFICE VISIT (OUTPATIENT)
Dept: FAMILY MEDICINE CLINIC | Age: 61
End: 2021-12-13
Payer: COMMERCIAL

## 2021-12-13 VITALS
WEIGHT: 273 LBS | DIASTOLIC BLOOD PRESSURE: 72 MMHG | BODY MASS INDEX: 45.43 KG/M2 | HEART RATE: 95 BPM | SYSTOLIC BLOOD PRESSURE: 137 MMHG | OXYGEN SATURATION: 98 %

## 2021-12-13 DIAGNOSIS — E66.9 DIABETES MELLITUS TYPE 2 IN OBESE (HCC): Primary | ICD-10-CM

## 2021-12-13 DIAGNOSIS — E11.69 DIABETES MELLITUS TYPE 2 IN OBESE (HCC): Primary | ICD-10-CM

## 2021-12-13 DIAGNOSIS — I50.22 CHRONIC SYSTOLIC CONGESTIVE HEART FAILURE (HCC): Primary | ICD-10-CM

## 2021-12-13 DIAGNOSIS — M54.50 CHRONIC MIDLINE LOW BACK PAIN WITHOUT SCIATICA: ICD-10-CM

## 2021-12-13 DIAGNOSIS — E66.9 DIABETES MELLITUS TYPE 2 IN OBESE (HCC): ICD-10-CM

## 2021-12-13 DIAGNOSIS — E11.69 DIABETES MELLITUS TYPE 2 IN OBESE (HCC): ICD-10-CM

## 2021-12-13 DIAGNOSIS — G89.29 CHRONIC MIDLINE LOW BACK PAIN WITHOUT SCIATICA: ICD-10-CM

## 2021-12-13 LAB
CREATININE URINE: 52.5 MG/DL (ref 39–259)
MICROALBUMIN/CREAT 24H UR: <12 MG/L
MICROALBUMIN/CREAT UR-RTO: NORMAL MCG/MG CREAT

## 2021-12-13 PROCEDURE — 99214 OFFICE O/P EST MOD 30 MIN: CPT | Performed by: STUDENT IN AN ORGANIZED HEALTH CARE EDUCATION/TRAINING PROGRAM

## 2021-12-13 PROCEDURE — 1111F DSCHRG MED/CURRENT MED MERGE: CPT | Performed by: STUDENT IN AN ORGANIZED HEALTH CARE EDUCATION/TRAINING PROGRAM

## 2021-12-13 RX ORDER — NAPROXEN 500 MG/1
500 TABLET ORAL 2 TIMES DAILY PRN
Qty: 60 TABLET | Refills: 0 | Status: SHIPPED | OUTPATIENT
Start: 2021-12-13 | End: 2022-03-31

## 2021-12-13 RX ORDER — GABAPENTIN 300 MG/1
CAPSULE ORAL
Qty: 30 CAPSULE | Refills: 1 | Status: SHIPPED | OUTPATIENT
Start: 2021-12-13 | End: 2022-03-31

## 2021-12-13 ASSESSMENT — ENCOUNTER SYMPTOMS
APNEA: 0
CHEST TIGHTNESS: 0
COUGH: 0
SHORTNESS OF BREATH: 0
COLOR CHANGE: 0

## 2021-12-13 NOTE — PATIENT INSTRUCTIONS
Thank you for letting us take care of you today. We hope all your questions were addressed. If a question was overlooked or something else comes to mind after you return home, please contact a member of your Care Team listed below. Your Care Team at [place] [address] is Team #5  Ronni He MD (Faculty)  Evi Iraheta MD (Resident)  Paulina Stout MD (Resident)  Lilliana Cali MD (Resident)  Petey Rivera MD (Resident)  Willy Jackson., DIXIE Calvo., JANETH Alva., Sara Rodríguez., Josselyn [facility] office)  Lucina Thorne (Clinical Practice Manager)  [address] (Clinical Pharmacist)       Office phone number: [phone]    If you need to get in right away due to illness, please be advised we have \"Same Day\" appointments available Monday-Friday. Please call us at [phone] option #3 to schedule your \"Same Day\" appointment.

## 2021-12-13 NOTE — PROGRESS NOTES
Post-Discharge Transitional Care Management Services or Hospital Follow Up      Rhea Durham   YOB: 1960    Date of Office Visit:  12/13/2021  Date of Hospital Admission: 11/30/21  Date of Hospital Discharge: 12/2/21  Readmission Risk Score(high >=14%.  Medium >=10%):Readmission Risk Score: 18      Care management risk score Rising risk (score 2-5) and Complex Care (Scores >=6): 4     Non face to face  following discharge, date last encounter closed (first attempt may have been earlier): 12/3/2021  2:09 PM 12/3/2021  2:09 PM    Call initiated 2 business days of discharge: Yes     Patient Active Problem List   Diagnosis    HTN (hypertension)    Skin tag    Low back pain with sciatica    Hyperglycemia    Infected sebaceous cyst    Chronic bilateral low back pain without sciatica    Chronic pain of right knee    Chest pain, unspecified    Bronchitis    Chronic systolic congestive heart failure (HCC)    Ventral hernia    Epigastric pain    Elevated LFTs    Shortness of breath    Rectus diastasis    Lumbosacral spondylosis without myelopathy    COPD exacerbation ([address]    CHF (congestive heart failure), NYHA class I, acute on chronic, diastolic (HCC)    Mixed simple and mucopurulent chronic bronchitis (HCC)    VANDANA (obstructive sleep apnea)    Tachycardia    Chest pain       Allergies   Allergen Reactions    Penicillins Swelling    Sulfa Antibiotics Swelling    Sulfasalazine Swelling    Pseudoeph-Doxylamine-Dm-Apap Rash    Rabbit Protein Rash     Fur, gerbils       Medications listed as ordered at the time of discharge from hospital  @DISCHARGEMEDSLIST(<NOROUTINE> error)@      Medications marked \"taking\" at this time  Outpatient Medications Marked as Taking for the 12/13/21 encounter (Office Visit) with Alexander Magana MD   Medication Sig Dispense Refill    gabapentin (NEURONTIN) 300 MG capsule TAKE 1 CAPSULE BY MOUTH NIGHTLY AS NEEDED (PAIN) 30 capsule 1    naproxen (NAPROSYN) 500 MG tablet Take 1 tablet by mouth 2 times daily as needed for Pain 60 tablet 0    bumetanide (BUMEX) 2 MG tablet Take 1 tablet by mouth 2 times daily 60 tablet 2    sacubitril-valsartan (ENTRESTO) 24-26 MG per tablet Take 1 tablet by mouth 2 times daily 60 tablet 3    empagliflozin (JARDIANCE) 10 MG tablet Take 1 tablet by mouth daily 30 tablet 3    metoprolol succinate (TOPROL XL) 50 MG extended release tablet Take 1 tablet by mouth daily 30 tablet 3    Elastic Bandages & Supports (WRIST SPLINT/COCK-UP/RIGHT L) MISC 1 each by Does not apply route daily 1 each 0    sildenafil (VIAGRA) 50 MG tablet Take 1 tablet by mouth daily as needed for Erectile Dysfunction 10 tablet 0    nitroGLYCERIN (NITROSTAT) 0.4 MG SL tablet PLACE 1 TABLET UNDER THE TONGUE EVERY 5 MINUTES AS NEEDED FOR CHEST PAIN 25 tablet 0    metFORMIN (GLUCOPHAGE) 500 MG tablet TAKE 1 TABLET BY MOUTH DAILY (WITH BREAKFAST) (Patient taking differently: 2 times daily (with meals) ) 60 tablet 3    zoster recombinant adjuvanted vaccine (SHINGRIX) 50 MCG/0.5ML SUSR injection Inject 0.5 mLs into the muscle See Admin Instructions 1 dose now and repeat in 2-6 months 0.5 mL 0    albuterol (PROVENTIL) (5 MG/ML) 0.5% nebulizer solution Take 0.5 mLs by nebulization every 6 hours as needed for Wheezing or Shortness of Breath 120 each 3    albuterol sulfate  (90 Base) MCG/ACT inhaler Inhale 2 puffs into the lungs every 6 hours as needed for Wheezing 8.5 g 1    blood glucose monitor kit and supplies Dispense sufficient amount for indicated testing frequency plus additional to accommodate PRN testing needs. Dispense all needed supplies to include: monitor, strips, lancing device, lancets, control solutions, alcohol swabs.  1 kit 0    albuterol-ipratropium (COMBIVENT RESPIMAT)  MCG/ACT AERS inhaler INHALE 1 PUFF INTO THE LUNGS EVERY 6 HOURS 4 g 5    Masks (FACE MASK) MISC 1 each by Does not apply route as needed (as needed) 50 each 3    simvastatin (ZOCOR) 20 MG tablet Take 1 tablet by mouth nightly 30 tablet 3    spironolactone (ALDACTONE) 25 MG tablet Take 1 tablet by mouth nightly (Patient taking differently: Take 25 mg by mouth daily ) 30 tablet 1    diclofenac sodium (VOLTAREN) 1 % GEL Apply topically 2 times daily 350 g 2    aspirin 81 MG tablet Take 1 tablet by mouth daily 30 tablet 3        Medications patient taking as of now reconciled against medications ordered at time of hospital discharge: Yes    Chief Complaint   Patient presents with    Follow-Up from Hospital       HPI    Inpatient course: Discharge summary reviewed- see chart. Interval history/Current status:     Patient is a 69-year-old male with significant past medical history of CHF who presented to the office today after he was admitted to the hospital for 3 days 11/30-12/2 to the observation unit for management of CHF. Patient presented to the hospital with bilateral lower extremity swelling, was given Bumex 2 mg IV twice a day, Entresto and Jardiance was also given as well as Lopressor dose increased. Patient states he is doing well, needs refills on his gabapentin and naproxen. Review of Systems   Constitutional: Negative for activity change, appetite change, chills, fatigue and fever. Respiratory: Negative for apnea, cough, chest tightness and shortness of breath. Musculoskeletal: Negative for joint swelling. Skin: Negative for color change, pallor, rash and wound. Neurological: Negative for dizziness, tremors, facial asymmetry, light-headedness and headaches. Psychiatric/Behavioral: Negative for agitation, behavioral problems and confusion. The patient is not nervous/anxious. Vitals:    12/13/21 1406   BP: 137/72   Site: Left Upper Arm   Position: Sitting   Cuff Size: Large Adult   Pulse: 95   SpO2: 98%   Weight: 273 lb (123.8 kg)     Body mass index is 45.43 kg/m².    Wt Readings from Last 3 Encounters:   12/13/21 273 lb (123.8 kg) 12/01/21 272 lb 6 oz (123.5 kg)   11/18/21 276 lb (125.2 kg)     BP Readings from Last 3 Encounters:   12/13/21 137/72   12/02/21 117/64   11/18/21 112/80       Physical Exam  Vitals and nursing note reviewed. Constitutional:       Appearance: Normal appearance. He is obese. Cardiovascular:      Rate and Rhythm: Normal rate and regular rhythm. Pulses: Normal pulses. Heart sounds: Normal heart sounds. Pulmonary:      Effort: Pulmonary effort is normal.      Breath sounds: Normal breath sounds. Abdominal:      General: Abdomen is flat. Bowel sounds are normal.      Palpations: Abdomen is soft. Neurological:      General: No focal deficit present. Mental Status: He is alert and oriented to person, place, and time. Mental status is at baseline. Psychiatric:         Mood and Affect: Mood normal.         Behavior: Behavior normal.         Thought Content: Thought content normal.         Judgment: Judgment normal.         Assessment/Plan:  1. Chronic midline low back pain without sciatica  - gabapentin (NEURONTIN) 300 MG capsule; TAKE 1 CAPSULE BY MOUTH NIGHTLY AS NEEDED (PAIN)  Dispense: 30 capsule; Refill: 1  - naproxen (NAPROSYN) 500 MG tablet; Take 1 tablet by mouth 2 times daily as needed for Pain  Dispense: 60 tablet;  Refill: 0  - [place] DISCHARGE MEDS RECONCILED W/ CURRENT OUTPATIENT MED LIST      Medical Decision Making: straightforward

## 2021-12-13 NOTE — PROGRESS NOTES
Visit Information    Have you changed or started any medications since your last visit including any over-the-counter medicines, vitamins, or herbal medicines? no   Have you stopped taking any of your medications? Is so, why? -  no  Are you having any side effects from any of your medications? - no    Have you seen any other physician or provider since your last visit?  no   Have you had any other diagnostic tests since your last visit?  no   Have you been seen in the emergency room and/or had an admission in a hospital since we last saw you?  no   Have you had your routine dental cleaning in the past 6 months?  no     Do you have an active MyChart account? If no, what is the barrier?   Yes    Patient Care Team:  Jason Spears MD as PCP - General (Family Medicine)  Randal Del Rio MD as Consulting Physician (Cardiology)  Barry Weinstein MD as Consulting Physician  Osmany Bautista DO as Consulting Physician (General Surgery)    Medical History Review  Past Medical, Family, and Social History reviewed and does not contribute to the patient presenting condition    Health Maintenance   Topic Date Due    Diabetic foot exam  Never done    COVID-19 Vaccine (1) Never done    Diabetic microalbuminuria test  Never done    Diabetic retinal exam  Never done    Shingles Vaccine (1 of 2) Never done    Flu vaccine (1) 09/01/2021    Lipid screen  01/22/2022    A1C test (Diabetic or Prediabetic)  08/30/2022    Potassium monitoring  12/01/2022    Creatinine monitoring  12/01/2022    Colon cancer screen colonoscopy  05/28/2024    Pneumococcal 0-64 years Vaccine (2 of 2 - PPSV23) 12/18/2024    DTaP/Tdap/Td vaccine (2 - Td or Tdap) 12/08/2030    Hepatitis C screen  Completed    HIV screen  Completed    Hepatitis A vaccine  Aged Out    Hib vaccine  Aged Out    Meningococcal (ACWY) vaccine  Aged Out

## 2021-12-15 ENCOUNTER — HOSPITAL ENCOUNTER (OUTPATIENT)
Dept: PHYSICAL THERAPY | Age: 61
Setting detail: THERAPIES SERIES
Discharge: HOME | End: 2021-12-15
Payer: COMMERCIAL

## 2021-12-15 PROCEDURE — 97110 THERAPEUTIC EXERCISES: CPT

## 2021-12-15 PROCEDURE — 97113 AQUATIC THERAPY/EXERCISES: CPT

## 2021-12-15 NOTE — FLOWSHEET NOTE
[facility]   [address]   Phone: [phone]   Fax: [phone]    Physical Therapy Daily Treatment Note      Date:  12/15/2021  Patient Name:  Annika Duque    :  1960  MRN: [id number]  Physician:      Esme Burrows MD                      Insurance: [facility] Susana: NPRe  # of visits allowed/remainin  Medical Diagnosis:   Q34.957 (ICD-10-CM) - Lumbosacral spondylosis without myelopathy       Rehab Codes: M 54.41, M 54.42,   Onset date: 10/28/21                         Next Dr's appt. : tbd  Visit Count:    3/12                             Cancel/No Show: 0/2    Subjective:  Per patient her saw PCP Monday and was cleared to return to work upon DC from hospital. States he did not get a written clearance to resume therapy but was allowed to return to all activities since hospital stay 21. To see cardiologist this Friday regarding possible pacemaker or \"TENS unit for CHF\"  Feels swelling has been good in legs since hospitalization- now takes \"water pill\" at night. Feels warmer weather helps with breathing- colder makes it worse. Also notes increased energy  Pain:  [x] Yes  [] No Location: Lower Back; denies radicular symptoms Pain Rating: (0-10 scale) 2/10  Pain altered Tx:  [x] No  [] Yes  Action:  Comments:Emphasis on core stability and postural awareness. Objective:  Modalities:   Precautions:  Exercises:      [address]  Services Exercise Log  Aquatic, Hip & DLS Program- Phase 1    Date of Eval:                                Primary PT: Ady Mcguire, PT  Diagnosis:   Things to Focus On (goals):   Surgical Precautions:  Medical Precautions:  [] C-9 dates  [] Occ Med   [] Medicare       Date 11/23/21 12/15/21      Visit # 2/12 3      Walk F/L/R 2 Laps 2 Laps      Marching 2 Laps 2 Laps      Squats 10x5\" 10x5\"      Step-Ups F/L        Step Down F/L        Heel-toe raises 10x 10x      SLR F/L/R 10x 10x Hip/Knee Flex/Ext 10x 10x      F/L Lunges                Kickboard Ex. Med Med      Iso Abd. 10x5\" 10x5\"      Push-pull 10x 10x      Paddling 10x 10x              UE Format:        Horiz Abd/Add        IR/ER (wipers)        Alt Flex/Ext        Alt Press Down        Abd/Add                Deep Water: 1 Noodle 1 Noodle      Hang 5' No Time      Cycling 2' 2'      Jacks        X-Country                Balance        SLS                Stretches   Add     Achllies        Hamstring                Breast Stroke 2 Laps No Time      Pain Rating 10 2        Specific Instructions for next treatment: Progress with LE stretches and deep water aerobics      Assessment: [x] Progressing toward goals. Emphasis on posture and technique throughout program;  Resumed exercise in 30% WB warm water environment. Finished treatment with unloading aerobic exercise in deep water. Limited time this date as patient has re-assess with PT following hospitalization (requires extra time to change - reports difficulty getting socks and shoes on). [] No change. [] Other:    [x] Pt would benefit from aquatic therapy to decrease stress to lumbar spine, improve mobility, and increase activity tolerance to minimize symptoms and improve level of function. STG: (to be met in 6 treatments)  ? Pain: Pt will report decreased pain 6/10 or less to improve walking tolerance to 45 min or more   ? Strength: Pt will demonstrate improved B hip strength 4-/5 or higher to perform ADLs with decreased difficulty  ?  Function: Pt will demonstrate safe lifting/carrying techniques to decrease risk of injury at work   Independent with Home Exercise Programs     LTG: (to be met in 12 treatments)  Pt will demonstrate lumbar ROM WNL to improve mobility  Pt will demonstrate improved B hip strength 4/5 or higher to improve walking/standing tolerance  Pt will demonstrate improved balance evident by B tandem stance 10 seconds or more to decrease risk of falls. Patient goals: to be able to move better without pain     Pt. Education:  [x] Yes  [] No  [x] Reviewed Prior HEP/Ed  Method of Education: [x] Verbal  [x] Demo  [] Written  Comprehension of Education:  [x] Verbalizes understanding. [] Demonstrates understanding. [] Needs review. [] Demonstrates/verbalizes HEP/Ed previously given. Plan: [x] Continue per plan of care. PT re-assess/check in this date   [] Other:      Treatment Charges: Mins Units   []  Modalities     [x]  Ther Exercise 10 1   []  Manual Therapy     []  Ther Activities     [x]  Aquatics 27 2   []  Neuromuscular     [] Vasocompression     [] Gait Training     [] Dry needling        [] 1 or 2 muscles        [] 3 or more muscles     [x]  Other re-assessment  10 0   Total Treatment time 47 3     Time In: 332 PM           Time Out: 359 PM  Time in with PT: 4:20pm - 4:40pm    Treatment times reflect total treatment time combined by both PT and PTA for today's service. Physical therapy treatment completed today in part by physical therapist assistant (PTA).       Electronically signed by:  Joselyn Ny PTA

## 2021-12-15 NOTE — PROGRESS NOTES
[facility]  [address] [place] [address]. [address]         Phone: [phone]       Fax: [phone]    Physical Therapy Progress Note    Date: 12/15/2021      Patient: Sanford Valencia  : 1960  MRN: [id number]    Nestor Mejia AD                      Insurance: [employer]   Auth: NPRe  # of visits allowed/remainin  Medical Diagnosis:   M47.817 (ICD-10-CM) - Lumbosacral spondylosis without myelopathy       Rehab Codes: M 54.41, M 54.42,   Onset date: 10/28/21                         Next Dr's appt. : tbd  Visit Count:    3/12                             Cancel/No Show: 0/2    Subjective:  Pain:  [x] Yes  [] No  Location: low back Pain Rating: (0-10 scale) 4/10  Pain altered Tx:  [x] No  [] Yes  Action:  Comments: Pt comes to clinic post aquatic therapy session noting minimal soreness. Pt was admitted to ED  -  d/t CHF. Pt had f/u with PCP on  who cleared pt to return to work at [employer], 20 hours/week. Pt has f/u with cardiologist  to discuss possible intervention such as pacemaker. Pt notes decent compliance with HEP and noted performing lumbar extension HEP when pain is high with good amount of releif. Objective:           ROM  ° A/P STRENGTH     Left Right Left Right   Hip Flex     4/5 4/5   Ext           ER           IR           ABD    4/5 4/5   ADD           Knee Flex     5/5 5/5   Ext     5/5 5/5   Ankle DF     5/5 5/5   PF           INV           EVER                         Muscle length: mod L hamstring tightness, min R hamstring tightness  Mod B quad length      Lumbar ROM L R        Flexion  75%   No pain      Extension  wnl     No pain      Side-bending  wnl wnl       Rotation  wnl wnl       Lawrence Tests ? Pain ? Pain No Change Not Tested   RFIS []??  [x]? ?  []??  []??    PAULIE [x]? ?  []??  []??  []??    RFIL []??  []??  []??  [x]? ?    REIL []??  []??  []??  [x]? ?        Balance not assessed this date, below assessment from initial eval 11/16. BALANCE Left  Right   Tandem Stance (Eyes Open) 5 1   Tandem Stance (Eyes Closed)       Single Leg (Eyes Open) 0 0   Single Leg (Eyes Closed       Other: NBOS EC  20 sec           Assessment:  Patient has completed only 3 physical therapy sessions but d/t recent hospitalization, re-assessment performed this date. Patient notes good improvement of symptoms as well as increased energy the last few days d/t diet. Improved lumbar ROM with decreased pain in all planes noted. Improvement of B LE strength demonstrated leading to improved gait. Patient has f/u with cardiologist tomorrow 12/16 to discuss further intervention. PT to follow care but if cardiologist allows, pt would benefit from continued skilled therapy to improve LE strength, overall endurance, and improve walking/standing tolerance to decrease difficulty with ADLs and work tasks. STG: (to be met in 6 treatments) - assessed 12/15 by Vania Gupta, PT   1. ? Pain: Pt will report decreased pain 6/10 or less to improve walking tolerance to 45 min or more   - MET 4/10 pain   2. ? Strength: Pt will demonstrate improved B hip strength 4-/5 or higher to perform ADLs with decreased difficulty  - MET 4/5 hip strength   3. ? Function: Pt will demonstrate safe lifting/carrying techniques to decrease risk of injury at work   - ONGOING  4. Independent with Home Exercise Programs  - MET notes good improvement of pain with lumbar extension      LTG: (to be met in 12 treatments)  1. Pt will demonstrate lumbar ROM WNL to improve mobility  2. Pt will demonstrate improved B hip strength 4/5 or higher to improve walking/standing tolerance  3.  Pt will demonstrate improved balance evident by B tandem stance 10 seconds or more to decrease risk of falls.                     Patient goals: to be able to move better without pain       Todays Treatment:  Modalities:   Precautions:  Exercises: extension biased Exercise Reps/ Time Weight/ Level Comments   Sitting         Figure 4 stretch x20       B hip abduction x20       Hamstring stretch 2x20\"                 Standing marches x20   B UE support    Standing lumbar ext x10                 education 5 min   Importance of therapy and HEP    Other:      Special instructions for next session: [address]       Treatment to Date:  [x] Therapeutic Exercise   03724  [] Iontophoresis: 4 mg/mL Dexamethasone Sodium Phosphate  mAmin  19184   [] Therapeutic Activity  19579 [] Vasopneumatic cold with compression  20109    [] Gait Training   31268 [] Ultrasound   [id number]   [] Neuromuscular Re-education  12704 [] Electrical Stimulation Unattended  99516   [] Manual Therapy  30208 [] Electrical Stimulation Attended  47038   [x] Instruction in HEP  [] Lumbar/Cervical Traction  12901   [x] Aquatic Therapy   84031 [] Cold/hotpack    [] Massage   22604      [] Dry Needling, 1 or 2 muscles  88985   [] Biofeedback, first 15 minutes   43810  [] Biofeedback, additional 15 minutes   52357 [] Dry Needling, 3 or more muscles  80630      Patient Status:     [x] Continue per initial plan of care. 12 aquatic sessions     [] Additional visits necessary. [] Other:     Requested Frequency/Duration: 2 times per week for 12 treatments.         Electronically signed by: Saima Corcoran PT

## 2021-12-16 ENCOUNTER — HOSPITAL ENCOUNTER (OUTPATIENT)
Dept: OTHER | Age: 61
Discharge: HOME | End: 2021-12-16
Payer: COMMERCIAL

## 2021-12-16 VITALS
OXYGEN SATURATION: 96 % | HEART RATE: 97 BPM | BODY MASS INDEX: 46.06 KG/M2 | RESPIRATION RATE: 20 BRPM | WEIGHT: 276.8 LBS | SYSTOLIC BLOOD PRESSURE: 144 MMHG | DIASTOLIC BLOOD PRESSURE: 76 MMHG

## 2021-12-16 PROCEDURE — 99212 OFFICE O/P EST SF 10 MIN: CPT

## 2021-12-16 ASSESSMENT — PAIN DESCRIPTION - ORIENTATION: ORIENTATION: LOWER

## 2021-12-16 ASSESSMENT — PAIN DESCRIPTION - PAIN TYPE: TYPE: CHRONIC PAIN

## 2021-12-16 ASSESSMENT — PAIN DESCRIPTION - DESCRIPTORS: DESCRIPTORS: DULL

## 2021-12-16 ASSESSMENT — PAIN DESCRIPTION - LOCATION: LOCATION: BACK

## 2021-12-16 ASSESSMENT — PAIN SCALES - GENERAL: PAINLEVEL_OUTOF10: 4

## 2021-12-16 NOTE — PROGRESS NOTES
Date:  2021  Time:  1:54 PM    CHF Clinic at [facility]    Office: [phone] Fax: [phone]    Re:  Ale Born   Patient : 1960    Vital Signs: BP (!) 144/76   Pulse 97   Resp 20   Wt 276 lb 12.8 oz (125.6 kg)   SpO2 96%   BMI 46.06 kg/m²                       O2 Device: None (Room air)                           No results for input(s): CBC, HGB, HCT, WBC, PLATELET, NA, K, CL, CO2, BUN, CREATININE, GLUCOSE, BNP, INR in the last 72 hours. Respiratory:    Assessment  Charting Type: Reassessment    Breath Sounds  Right Upper Lobe: Clear  Right Middle Lobe: Clear  Right Lower Lobe: Clear, Diminished  Left Upper Lobe: Clear  Left Lower Lobe: Clear, Diminished    Cough/Sputum  Cough: Productive  Frequency: Occasional  Sputum Amount: Small  Sputum Color: White         Peripheral Vascular  RLE Edema: +1, Pitting  LLE Edema: +1, Pitting      Complaints: No new complaints at this time    Physician Orders None    Comment : Arrived for scheduled visit per ambulatory with cane assist. His weight is up 0.8 lbs from last month. Says more short of breath today, his COPD is flaring up. No complaints of chest pain at this time. Has MUGA Scan tomorrow in cardiology office. Moderate edema noted to lower leg, ankles and feet bilaterally. Admits to drinking more fluid than the 2L fluid restriction. Reinforced the importance of following a low sodium diet and fluid restrictions. Medication list reviewed and updated. No s/s acute chf at this time. Next CHF Clinic visit in 4 weeks.     Electronically signed by Radha Reich RN on 2021 at 1:54 PM

## 2021-12-16 NOTE — PROGRESS NOTES
Verbally reviewed medication list with patient; patient verbalized understanding. Discussed 2000mg/day sodium restricted diet; patient verbalized understanding. Moderate daily exercise encouraged as tolerated. Discussed rest breaks as needed; patient verbalized understanding. Patient instructed to weigh self at the same time of each day, using same clothes and same scale; reinforced teaching to monitor for 3-5 lb weight increase over 1-2 days, and to notify the CHF clinic at [phone] or physician office if weight change noted. Patient verbalized understanding. Risks of smoking discussed with the patient if applicable; patient strongly discouraged to smoke. Patient verbalized understanding. Signs and symptoms of CHF discussed with patient, such as feeling more tired than normal, feeling short of breath, coughing that increases when you lie down, sudden weight gain, swelling of your feet, legs or belly. Patient verbalized understanding to notify the CHF clinic at [phone] or physician office if these symptoms occur. Compliance with plan of care and further disease process causes discussed with patient, patient encouraged to keep all follow up appointments. Patient verbalized understanding.

## 2021-12-20 ENCOUNTER — HOSPITAL ENCOUNTER (OUTPATIENT)
Dept: PHYSICAL THERAPY | Age: 61
Setting detail: THERAPIES SERIES
Discharge: HOME | End: 2021-12-20
Payer: COMMERCIAL

## 2021-12-20 PROCEDURE — 97113 AQUATIC THERAPY/EXERCISES: CPT

## 2021-12-20 NOTE — FLOWSHEET NOTE
[facility]   [address]   Phone: [phone]   Fax: [phone]    Physical Therapy Daily Treatment Note      Date:  2021  Patient Name:  Santosh Beth    :  1960  MRN: [id number]  Physician:      Mari Chadwick MD                      Insurance: Zora Sink: NPRe  # of visits allowed/remainin  Medical Diagnosis:   U39.478 (ICD-10-CM) - Lumbosacral spondylosis without myelopathy       Rehab Codes: M 54.41, M 54.42,   Onset date: 10/28/21                         Next Dr's appt. : tbd  Visit Count:                                 Cancel/No Show: 0/2  Re-assessment with PT:      Subjective:    Patient has no new updates regarding pacemaker until his future appointment. Patient reports lower back is \"about the same\" today. Pain:  [x] Yes  [] No Location: Lower Back; denies radicular symptoms Pain Rating: (0-10 scale) 2/10  Pain altered Tx:  [x] No  [] Yes  Action:  Comments:    Objective:  Modalities:   Precautions:  Exercises:      [address] [facility] Exercise Log  Aquatic, Hip & DLS Program- Phase 1    Date of Eval:                                Primary PT: Erasmo Boone PT  Diagnosis: Things to Focus On (goals):   Surgical Precautions:  Medical Precautions:  [] C-9 dates  [] Occ Med   [] Medicare       Date 11/23/21 12/15/21 12/20/21     Visit # 2/12 3/12 4/12     Walk F/L/R 2 Laps 2 Laps 2 Laps     Marching 2 Laps 2 Laps 2 Laps     Squats 10x5\" 10x5\" 10x5\"     Step-Ups F/L        Step Down F/L        Heel-toe raises 10x 10x 10x     SLR F/L/R 10x 10x 10x     Hip/Knee Flex/Ext 10x 10x 10x     F/L Lunges                Kickboard Ex.  Med Med Med     Iso Abd. 10x5\" 10x5\" 10x5\"     Push-pull 10x 10x 10x     Paddling 10x 10x 10x             UE Format:        Horiz Abd/Add        IR/ER (wipers)        Alt Flex/Ext        Alt Press Down        Abd/Add                Deep Water: 1 Noodle 1 Noodle 1 Noodle     Hang 5' No Time 5'     Cycling 2' 2' 2'     Jacks        X-Country                Balance        SLS                Stretches        Achllies   2x20\"     Hamstring   2x20\"             Breast Stroke 2 Laps No Time 2 laps      Pain Rating 10 2 3       Specific Instructions for next treatment: Progress with LE stretches and deep water aerobics      Assessment: [x] Progressing toward goals. Continued with program-emphasis on posture and core engagement throughout. Patient did not have increased ain symptoms until the very end getting out of pool stating 3/10 versus 2/10 previous to exercise. Able to add LE stretches on step with little difficulty and plan to progress with deep water aerobics next session. [] No change. [] Other:    [x] Pt would benefit from aquatic therapy to decrease stress to lumbar spine, improve mobility, and increase activity tolerance to minimize symptoms and improve level of function. STG: (to be met in 6 treatments)  ? Pain: Pt will report decreased pain 6/10 or less to improve walking tolerance to 45 min or more   ? Strength: Pt will demonstrate improved B hip strength 4-/5 or higher to perform ADLs with decreased difficulty  ? Function: Pt will demonstrate safe lifting/carrying techniques to decrease risk of injury at work   Independent with Home Exercise Programs     LTG: (to be met in 12 treatments)  Pt will demonstrate lumbar ROM WNL to improve mobility  Pt will demonstrate improved B hip strength 4/5 or higher to improve walking/standing tolerance  Pt will demonstrate improved balance evident by B tandem stance 10 seconds or more to decrease risk of falls. Patient goals: to be able to move better without pain     Pt. Education:  [x] Yes  [] No  [x] Reviewed Prior HEP/Ed  Method of Education: [x] Verbal  [x] Demo  [] Written  Comprehension of Education:  [x] Verbalizes understanding. [] Demonstrates understanding.   [] Needs review. [] Demonstrates/verbalizes HEP/Ed previously given. Plan: [x] Continue per plan of care.  PT re-assess/check in this date   [] Other:      Treatment Charges: Mins Units   []  Modalities     []  Ther Exercise     []  Manual Therapy     []  Ther Activities     [x]  Aquatics 37 2   []  Neuromuscular     [] Vasocompression     [] Gait Training     [] Dry needling        [] 1 or 2 muscles        [] 3 or more muscles     []  Other      Total Treatment time 37 2     Time In: 125 PM           Time Out: 402pm      Electronically signed by:  Yassine Carrington PTA

## 2021-12-23 ENCOUNTER — HOSPITAL ENCOUNTER (OUTPATIENT)
Dept: PHYSICAL THERAPY | Age: 61
Setting detail: THERAPIES SERIES
Discharge: HOME | End: 2021-12-23
Payer: COMMERCIAL

## 2021-12-23 PROCEDURE — 97113 AQUATIC THERAPY/EXERCISES: CPT

## 2021-12-23 NOTE — FLOWSHEET NOTE
UE Format:     Add   Horiz Abd/Add        IR/ER (wipers)        Alt Flex/Ext        Alt Press Down        Abd/Add                Deep Water: 1 Noodle 1 Noodle 1 Noodle 1 Noodle    Hang 5' No Time 5' 5'    Cycling 2' 2' 2' 2'    Jacks    1'    X-Country    1'            Balance        SLS                Stretches        Achllies   2x20\" 2x20\"    Hamstring   2x20\" 2x20\"            Breast Stroke 2 Laps No Time 2 laps  2 Laps    Pain Rating 10 2 3 2      Specific Instructions for next treatment: Increase reps and add UE format to challenge trunk stabilization      Assessment: [x] Progressing toward goals. Added step ups, progressed deep water aerobic exercise and increased reps and speed to tolerance. Patient completed majority of LE program without UE assist challenging balance and core stability. Finished with deep water hang to unload spine    [] No change. [] Other:    [x] Pt would benefit from aquatic therapy to decrease stress to lumbar spine, improve mobility, and increase activity tolerance to minimize symptoms and improve level of function. STG: (to be met in 6 treatments)  1. ? Pain: Pt will report decreased pain 6/10 or less to improve walking tolerance to 45 min or more   2. ? Strength: Pt will demonstrate improved B hip strength 4-/5 or higher to perform ADLs with decreased difficulty  3. ? Function: Pt will demonstrate safe lifting/carrying techniques to decrease risk of injury at work   4. Independent with Home Exercise Programs     LTG: (to be met in 12 treatments)  1. Pt will demonstrate lumbar ROM WNL to improve mobility  2. Pt will demonstrate improved B hip strength 4/5 or higher to improve walking/standing tolerance  3. Pt will demonstrate improved balance evident by B tandem stance 10 seconds or more to decrease risk of falls. Patient goals: to be able to move better without pain     Pt.  Education:  [x] Yes  [] No  [x] Reviewed Prior HEP/Ed  Method of Education: [x] Verbal  [x] Demo  [] Written  Comprehension of Education:  [x] Verbalizes understanding. [] Demonstrates understanding. [] Needs review. [] Demonstrates/verbalizes HEP/Ed previously given. Plan: [x] Continue per plan of care.     [] Other:      Treatment Charges: Mins Units   []  Modalities     []  Ther Exercise     []  Manual Therapy     []  Ther Activities     [x]  Aquatics 38 3   []  Neuromuscular     [] Vasocompression     [] Gait Training     [] Dry needling        [] 1 or 2 muscles        [] 3 or more muscles     []  Other      Total Treatment time 38 3     Time In: 125 PM           Time Out: 208 PM      Electronically signed by:  Hollis Lam PTA

## 2021-12-27 ENCOUNTER — HOSPITAL ENCOUNTER (OUTPATIENT)
Dept: PHYSICAL THERAPY | Age: 61
Setting detail: THERAPIES SERIES
Discharge: HOME | End: 2021-12-27
Payer: COMMERCIAL

## 2021-12-27 PROCEDURE — 97113 AQUATIC THERAPY/EXERCISES: CPT

## 2021-12-27 NOTE — FLOWSHEET NOTE
[address] [facility]   [address]   Phone: [phone]   Fax: [phone]    Physical Therapy Daily Treatment Note      Date:  2021  Patient Name:  Mayur Tran    :  1960  MRN: [id number]  Physician:      Kalyan Newsome MD                      Insurance: Mariana Allen: NPRe  # of visits allowed/remainin  Medical Diagnosis:   V45.769 (ICD-10-CM) - Lumbosacral spondylosis without myelopathy       Rehab Codes: M 54.41, M 54.42,   Onset date: 10/28/21                         Next Dr's appt. : tbd  Visit Count:                                 Cancel/No Show: 0/  Re-assessment with PT:      Subjective:    Patient states he had a very subtle weekend, did not travel or work so not much pain today. Patient states he continues to feel better since the time of being at the hospital and has made dietary changes for a better prognosis. Pain:  [x] Yes  [] No Location: Lower Back; denies radicular symptoms Pain Rating: (0-10 scale) 2/10  Pain altered Tx:  [x] No  [] Yes  Action:  Comments:    Objective:  Modalities:   Precautions:  Exercises:      [address] [facility] Exercise Log  Aquatic, Hip & DLS Program- Phase 1    Date of Eval:                                Primary PT: Wali Solis PT  Diagnosis: Things to Focus On (goals):   Surgical Precautions:  Medical Precautions:  [] C-9 dates  [] Occ Med   [] Medicare       Date 12/15/21 12/20/21 12/23/21 12/27/21   Visit # 3/12 4/12 5/12 6/12   Walk F/L/R 2 Laps 2 Laps 2 Laps 2 Laps   Marching 2 Laps 2 Laps 2 Laps 2 Laps   Squats 10x5\" 10x5\" 10x5\" 10x5\"   Step-Ups F/L   Low 10x Low 10x   Step Down F/L       Heel-toe raises 10x 10x 10x 10x   SLR F/L/R 10x 10x 10x 10x   Hip/Knee Flex/Ext 10x 10x 10x 10x   F/L Lunges              Kickboard Ex.  Med Med Med Med   Iso Abd. 10x5\" 10x5\" 10x5\" 10x5\"   Push-pull 10x 10x 10x 10x   Paddling 10x 10x 10x 10x          UE Format: Horiz Abd/Add    10x   IR/ER (wipers)    10x   Alt Flex/Ext    10x   Alt Press Down    10x   Abd/Add    10x          Deep Water: 1 Noodle 1 Noodle 1 Noodle 1 Noodle   Hang No Time 5' 5' 5'   Cycling 2' 2' 2' 2'   Jacks   1' 1'   X-Country   1' 1'          Balance       SLS              Stretches       Achllies  2x20\" 2x20\" 2x20\"   Hamstring  2x20\" 2x20\" 2x20\"          Breast Stroke No Time 2 laps  2 Laps 2 Laps   Pain Rating 2 3 2 2     Specific Instructions for next treatment:       Assessment: [x] Progressing toward goals. Continued with most recent progressions with addition of UE format at chest deep water to challenge core and stability this date. Patient did not report increased pain or fatigue throughout session. Minimal cueing required for technique although moderate cueing required for posture. [] No change. [] Other:    [x] Pt would benefit from aquatic therapy to decrease stress to lumbar spine, improve mobility, and increase activity tolerance to minimize symptoms and improve level of function. STG: (to be met in 6 treatments)  1. ? Pain: Pt will report decreased pain 6/10 or less to improve walking tolerance to 45 min or more   2. ? Strength: Pt will demonstrate improved B hip strength 4-/5 or higher to perform ADLs with decreased difficulty  3. ? Function: Pt will demonstrate safe lifting/carrying techniques to decrease risk of injury at work   4. Independent with Home Exercise Programs     LTG: (to be met in 12 treatments)  1. Pt will demonstrate lumbar ROM WNL to improve mobility  2. Pt will demonstrate improved B hip strength 4/5 or higher to improve walking/standing tolerance  3. Pt will demonstrate improved balance evident by B tandem stance 10 seconds or more to decrease risk of falls. Patient goals: to be able to move better without pain     Pt.  Education:  [x] Yes  [] No  [x] Reviewed Prior HEP/Ed  Method of Education: [x] Verbal  [x] Demo  [] Written  Comprehension of Education:  [x] Verbalizes understanding. [] Demonstrates understanding. [] Needs review. [] Demonstrates/verbalizes HEP/Ed previously given. Plan: [x] Continue per plan of care.     [] Other:      Treatment Charges: Mins Units   []  Modalities     []  Ther Exercise     []  Manual Therapy     []  Ther Activities     [x]  Aquatics 50 3   []  Neuromuscular     [] Vasocompression     [] Gait Training     [] Dry needling        [] 1 or 2 muscles        [] 3 or more muscles     []  Other      Total Treatment time 50 3     Time In: 155 PM           Time Out: 245 PM      Electronically signed by:  Regi Ley PTA

## 2021-12-30 ENCOUNTER — HOSPITAL ENCOUNTER (OUTPATIENT)
Dept: PHYSICAL THERAPY | Age: 61
Setting detail: THERAPIES SERIES
Discharge: HOME | End: 2021-12-30
Payer: COMMERCIAL

## 2021-12-30 PROCEDURE — 97113 AQUATIC THERAPY/EXERCISES: CPT

## 2021-12-30 NOTE — FLOWSHEET NOTE
[address] [facility]   [address] [facility] [address]   Phone: [phone]   Fax: [phone]    Physical Therapy Daily Treatment Note      Date:  2021  Patient Name:  Sanford Valencia    :  1960  MRN: [id number]  Physician:      Melly Mccoy MD                      Insurance: [facility]: [facility]  # of visits allowed/remainin  Medical Diagnosis:   R34.780 (ICD-10-CM) - Lumbosacral spondylosis without myelopathy       Rehab Codes: M 54.41, M 54.42,   Onset date: 10/28/21                         Next Dr's appt. : tbd  Visit Count:                                 Cancel/No Show: 0/2  Re-assessment with PT:      Subjective:  Not as sore after visits now; feels pain has been more manageable. Reports feeling better overall since heart rate is more stable. Still awaiting approval for cardiac TENS unit. Pain:  [x] Yes  [] No Location: Lower Back; denies radicular symptoms Pain Rating: (0-10 scale) 2/10  Pain altered Tx:  [x] No  [] Yes  Action:  Comments:    Objective:  Modalities:   Precautions:  Exercises:      [address] [facility] Exercise Log  Aquatic, Hip & DLS Program- Phase 1    Date of Eval:                                Primary PT: Ashish León PT  Diagnosis: Things to Focus On (goals):   Surgical Precautions:  Medical Precautions:  [] C-9 dates  [] Occ Med   [] Medicare       Date 21    Visit #     Walk F/L/R 2 Laps 2 Laps 2 Laps    Marching 2 Laps 2 Laps 2 Laps        Low Box   Squats 10x5\" 10x5\" 15x5\"    Step-Ups F/L Low 10x Low 10x Low 15x    Step Down F/L       Heel-toe raises 10x 10x 15x    SLR F/L/R 10x 10x 15x    Hip/Knee Flex/Ext 10x 10x 15x    F/L Lunges              Kickboard Ex.  Med Med Med    Iso Abd. 10x5\" 10x5\" 10x5\"    Push-pull 10x 10x 15x    Paddling 10x 10x 15x           UE Format:       Horiz Abd/Add  10x 15x    IR/ER (wipers)  10x 15x    Alt Flex/Ext  10x 15x    Alt Press Down  10x 15x    Abd/Add  10x 15x           Deep Water: 1 Noodle 1 Noodle 1 Noodle    Hang 5' 5' 5'    Cycling 2' 2' 2'    Jacks 1' 1' 2'    X-Country 1' 1' 2'           Balance       SLS              Stretches       Achllies 2x20\" 2x20\" 2x20\"    Hamstring 2x20\" 2x20\" 2x20\"           Breast Stroke 2 Laps 2 Laps 2 Laps    Pain Rating 2 2 2      Specific Instructions for next treatment: Progress WB with LE exercise to challenge balance and improve trunk stability      Assessment: [x] Progressing toward goals. Increased reps to tolerance- continued emphasis on core stability, balance and improved posture. Patient tolerated well- no increased pain complaints. [] No change. [] Other:    [x] Pt would benefit from aquatic therapy to decrease stress to lumbar spine, improve mobility, and increase activity tolerance to minimize symptoms and improve level of function. STG: (to be met in 6 treatments)  ? Pain: Pt will report decreased pain 6/10 or less to improve walking tolerance to 45 min or more   ? Strength: Pt will demonstrate improved B hip strength 4-/5 or higher to perform ADLs with decreased difficulty  ? Function: Pt will demonstrate safe lifting/carrying techniques to decrease risk of injury at work   Independent with Home Exercise Programs     LTG: (to be met in 12 treatments)  Pt will demonstrate lumbar ROM WNL to improve mobility  Pt will demonstrate improved B hip strength 4/5 or higher to improve walking/standing tolerance  Pt will demonstrate improved balance evident by B tandem stance 10 seconds or more to decrease risk of falls. Patient goals: to be able to move better without pain     Pt. Education:  [x] Yes  [] No  [x] Reviewed Prior HEP/Ed  Method of Education: [x] Verbal  [x] Demo  [] Written  Comprehension of Education:  [x] Verbalizes understanding. [] Demonstrates understanding. [] Needs review.   [] Demonstrates/verbalizes HEP/Ed previously given. Plan: [x] Continue per plan of care.     [] Other:      Treatment Charges: Mins Units   []  Modalities     []  Ther Exercise     []  Manual Therapy     []  Ther Activities     [x]  Aquatics 49 3   []  Neuromuscular     [] Vasocompression     [] Gait Training     [] Dry needling        [] 1 or 2 muscles        [] 3 or more muscles     []  Other      Total Treatment time 49 3     Time In: 128 PM           Time Out: 229 PM      Electronically signed by:  Hanna Hamlin PTA

## 2022-01-03 ENCOUNTER — HOSPITAL ENCOUNTER (EMERGENCY)
Age: 62
Discharge: HOME | End: 2022-01-03
Attending: EMERGENCY MEDICINE
Payer: COMMERCIAL

## 2022-01-03 ENCOUNTER — APPOINTMENT (OUTPATIENT)
Dept: GENERAL RADIOLOGY | Age: 62
End: 2022-01-03
Payer: COMMERCIAL

## 2022-01-03 ENCOUNTER — APPOINTMENT (OUTPATIENT)
Dept: CT IMAGING | Age: 62
End: 2022-01-03
Payer: COMMERCIAL

## 2022-01-03 VITALS
BODY MASS INDEX: 45.82 KG/M2 | HEART RATE: 108 BPM | WEIGHT: 275 LBS | RESPIRATION RATE: 20 BRPM | OXYGEN SATURATION: 96 % | TEMPERATURE: 98.3 F | SYSTOLIC BLOOD PRESSURE: 146 MMHG | DIASTOLIC BLOOD PRESSURE: 68 MMHG | HEIGHT: 65 IN
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
## 2022-01-03 PROCEDURE — 73521 X-RAY EXAM HIPS BI 2 VIEWS: CPT

## 2022-01-03 PROCEDURE — 73562 X-RAY EXAM OF KNEE 3: CPT

## 2022-01-03 PROCEDURE — 70450 CT HEAD/BRAIN W/O DYE: CPT

## 2022-01-03 PROCEDURE — 72125 CT NECK SPINE W/O DYE: CPT

## 2022-01-03 PROCEDURE — 99285 EMERGENCY DEPT VISIT HI MDM: CPT

## 2022-01-03 PROCEDURE — 6370000000 HC RX 637 (ALT 250 FOR IP): Performed by: STUDENT IN AN ORGANIZED HEALTH CARE EDUCATION/TRAINING PROGRAM

## 2022-01-03 RX ORDER — ACETAMINOPHEN 500 MG
1000 TABLET ORAL 4 TIMES DAILY PRN
Qty: 20 TABLET | Refills: 0 | Status: SHIPPED | OUTPATIENT
Start: 2022-01-03 | End: 2022-06-14 | Stop reason: SDUPTHER

## 2022-01-03 RX ORDER — METHOCARBAMOL 500 MG/1
750 TABLET, FILM COATED ORAL 3 TIMES DAILY
Qty: 20 TABLET | Refills: 0 | Status: SHIPPED | OUTPATIENT
Start: 2022-01-03 | End: 2022-01-07

## 2022-01-03 RX ORDER — NAPROXEN 500 MG/1
500 TABLET ORAL 2 TIMES DAILY
Qty: 40 TABLET | Refills: 0 | OUTPATIENT
Start: 2022-01-03 | End: 2022-01-23

## 2022-01-03 RX ORDER — OXYCODONE HYDROCHLORIDE AND ACETAMINOPHEN 5; 325 MG/1; MG/1
1 TABLET ORAL ONCE
Status: COMPLETED | OUTPATIENT
Start: 2022-01-03 | End: 2022-01-03

## 2022-01-03 RX ORDER — IBUPROFEN 800 MG/1
800 TABLET ORAL 2 TIMES DAILY PRN
Qty: 20 TABLET | Refills: 0 | Status: SHIPPED | OUTPATIENT
Start: 2022-01-03 | End: 2022-01-03

## 2022-01-03 RX ADMIN — OXYCODONE HYDROCHLORIDE AND ACETAMINOPHEN 1 TABLET: 5; 325 TABLET ORAL at 22:46

## 2022-01-03 ASSESSMENT — PAIN SCALES - GENERAL: PAINLEVEL_OUTOF10: 8

## 2022-01-03 ASSESSMENT — PAIN DESCRIPTION - LOCATION: LOCATION: NECK;HEAD;BACK

## 2022-01-03 ASSESSMENT — ENCOUNTER SYMPTOMS
VOMITING: 0
NAUSEA: 0
BACK PAIN: 0
SHORTNESS OF BREATH: 0
ABDOMINAL PAIN: 0

## 2022-01-03 ASSESSMENT — PAIN DESCRIPTION - PAIN TYPE: TYPE: ACUTE PAIN

## 2022-01-04 RX ORDER — POTASSIUM CHLORIDE 20 MEQ/1
20 TABLET, EXTENDED RELEASE ORAL
Qty: 60 TABLET | Refills: 3 | Status: SHIPPED | OUTPATIENT
Start: 2022-01-04

## 2022-01-04 NOTE — ED PROVIDER NOTES
[address]     Pt Name: Saul Gaspar  MRN: [id number]  [place] 1960  Date of evaluation: 1/3/22       Saul Gaspar is a 57 y.o. male who presents with Fall, Head Injury, and Neck Pain      MDM: 57-year-old male presents for complaint of fall headache and neck pain. Patient reportedly slipped on ice fell and hit his head, denies loss of consciousness, denies any dizziness lightheadedness, denies any new numbness tingling weakness in the upper or lower extremities, denies any bowel bladder incontinence or saddle paresthesias. On initial exam patient in no acute distress, vitals are stable, no focal neuro deficits, will check imaging    Imaging reviewed and negative for acute process    Results with patient, discussed continued supportive care and need for follow-up with PCP and return precautions, patient voiced understanding and is comfortable plan and discharge home    Patient/Guardian was informed of their diagnosis and told to follow up with PCP  in 1-3 days. Patient demonstrates understanding and agreement with the plan. They were given the opportunity to ask questions and those questions were answered to the best of our ability with the available information. Patient/Guardian told to return to the ED for any new, worsening, changing or persistent symptoms. This dictation was prepared using [employer] [facility] [employer] voice recognition software. Vitals:   Vitals:    01/03/22 1914   BP: (!) 146/68   Pulse: 108   Resp: 20   Temp: 98.3 °F (36.8 °C)   TempSrc: Oral   SpO2: 96%   Weight: 275 lb (124.7 kg)   Height: 5' 5\" (1.651 m)         I personally saw and examined the patient. I have reviewed and agree with the resident's findings, including all diagnostic interpretations and treatment plan as written. I was present for the key portions of any procedures performed and the inclusive time noted for any critical care statement.       The care is provided during an unprecedented national emergency due to the novel coronavirus, COVID 19.   Daniel Cabrera DO  Attending Emergency Physician          Daniel Cabrera DO  01/04/22 153

## 2022-01-04 NOTE — ED TRIAGE NOTES
Mode of arrival (squad #, walk in, police, etc) : Walk in        Chief complaint(s): Fall, head injury        Arrival Note (brief scenario, treatment PTA, etc). : Pt arrives to ED c/o headache following a fall. Patient states that he slipped in ice in his driveway today and fell and hit his head. Patient denies LOC. Patient takes a baby aspirin daily. Patient c/o neck, back and head pain. C= \"Have you ever felt that you should Cut down on your drinking? \"  No  A= \"Have people Annoyed you by criticizing your drinking? \"  No  G= \"Have you ever felt bad or Guilty about your drinking? \"  No  E= \"Have you ever had a drink as an Eye-opener first thing in the morning to steady your nerves or to help a hangover? \"  No      Deferred []      Reason for deferring: N/A    *If yes to two or more: probable alcohol abuse. *

## 2022-01-04 NOTE — TELEPHONE ENCOUNTER
Please address the medication refill and close the encounter. If I can be of assistance, please route to the applicable pool. Thank you. Last visit: 12/13/21  Last Med refill: [id number]  Does patient have enough medication for 72 hours: No:     Next Visit Date:  Future Appointments   Date Time Provider Sondra Lennie   1/6/2022  1:00 PM Joya Gifford, PT [facility] [place]   1/6/2022  1:30 PM Ranjith Kent, PTA [facility] [place]   1/10/2022  1:30 PM Derek Aroras, PTA [facility] [place]   1/12/2022  1:30 PM Dwyane Soles, PTA [facility] [place]   1/13/2022  1:30 PM [facility] RM 1 [facility]   1/17/2022  1:30 PM Nely Romero MD [address] [place] [address] Maintenance   Topic Date Due    COVID-19 Vaccine (1) Never done    Diabetic foot exam  Never done    Diabetic retinal exam  Never done    Shingles Vaccine (1 of 2) Never done    Flu vaccine (1) 09/01/2021    Lipid screen  01/22/2022    Depression Screen  03/08/2022    A1C test (Diabetic or Prediabetic)  08/30/2022    Potassium monitoring  12/01/2022    Creatinine monitoring  12/01/2022    Diabetic microalbuminuria test  12/13/2022    Colon cancer screen colonoscopy  05/28/2024    Pneumococcal 0-64 years Vaccine (2 of 2 - PPSV23) 12/18/2024    DTaP/Tdap/Td vaccine (2 - Td or Tdap) 12/08/2030    Hepatitis C screen  Completed    HIV screen  Completed    Hepatitis A vaccine  Aged Out    Hib vaccine  Aged Out    Meningococcal (ACWY) vaccine  Aged Out       Hemoglobin A1C (%)   Date Value   08/30/2021 6.3   03/08/2021 5.9   02/01/2021 6.3             ( goal A1C is < 7)   Microalb/Crt.  Ratio (mcg/mg creat)   Date Value   12/13/2021 Can not be calculated     LDL Cholesterol (mg/dL)   Date Value   01/22/2021 117   06/20/2020 57       (goal LDL is <100)   AST (U/L)   Date Value   01/21/2021 19     ALT (U/L)   Date Value   01/21/2021 27     BUN (mg/dL)   Date Value   12/01/2021 23     BP Readings from Last 3 Encounters:   01/03/22 (!) 146/68   12/16/21 (!) 144/76   12/13/21 137/72          (goal 120/80)    All Future Testing planned in [facility]  Lab Frequency Next Occurrence   COVID-19 Once 01/65/2871   Basic Metabolic Panel Once 82/04/1794               Patient Active Problem List:     HTN (hypertension)     Skin tag     Low back pain with sciatica     Hyperglycemia     Infected sebaceous cyst     Chronic midline low back pain without sciatica     Chronic pain of right knee     Chest pain, unspecified     Bronchitis     Chronic systolic congestive heart failure (HCC)     Ventral hernia     Epigastric pain     Elevated LFTs     Shortness of breath     Rectus diastasis     Lumbosacral spondylosis without myelopathy     COPD exacerbation (HCC)     CHF (congestive heart failure), NYHA class I, acute on chronic, diastolic (HCC)     Mixed simple and mucopurulent chronic bronchitis (HCC)     VANDANA (obstructive sleep apnea)     Tachycardia     Chest pain

## 2022-01-04 NOTE — ED PROVIDER NOTES
[address]  Emergency Department Encounter  Emergency Medicine Resident     Pt Name: Pat Medina  UOR:[id number]  [place] 1960  Date of evaluation: 1/3/22  PCP:  Clara Juarez MD    [address] [place] [address]       Chief Complaint   Patient presents with    Fall    Head Injury    Neck Pain       HISTORY OF PRESENT ILLNESS  (Location/Symptom, Timing/Onset, Context/Setting, Quality, Duration, ModifyingFactors, Severity.)      Pat Medina is a 57 y.o. male who presents for evaluation of fall. Prior to arrival patient attempted to step up on the curb and slipped on the ice. Fell backward hitting his head. He did not lose consciousness. On aspirin but no true anticoagulation. Complaining of pain in the back of his head, neck pain, bilateral hip pain, and left knee pain. Felt dizzy immediately following the accident but this is improving. No numbness, weakness, tingling, chest pain, abdominal pain, back pain, upper extremity pain. PAST MEDICAL / SURGICAL / SOCIAL /FAMILY HISTORY      has a past medical history of Bronchitis, CAD (coronary artery disease), Cardiomyopathy ([address] Chest pain, CHF (congestive heart failure) (HCC), Chronic back pain, Chronic diastolic CHF (congestive heart failure) ([address] COPD (chronic obstructive pulmonary disease) ([address] Headache(784.0), Hyperlipidemia, Hypertension, Inguinal hernia, Low back pain with sciatica, Migraine, Mitral valve regurgitation, Osteoarthritis, Pneumonia, Sleep apnea, SOB (shortness of breath) on exertion, Tricuspid regurgitation, Type 2 diabetes mellitus without complication, without long-term current use of insulin ([address] Under care of team, and Wellness examination. No other pertinent PMH on review with patient/guardian. has a past surgical history that includes hernia repair; Colonoscopy; Tonsillectomy (1963   exact date unknown); Adenoidectomy; Colonoscopy (05/28/2021); and Colonoscopy (N/A, 5/28/2021).   No other pertinent PSH on review with patient/guardian. Social History     Socioeconomic History    Marital status: Single     Spouse name: Not on file    Number of children: Not on file    Years of education: Not on file    Highest education level: Not on file   Occupational History    Not on file   Tobacco Use    Smoking status: Former Smoker     Packs/day: 0.00     Years: 40.00     Pack years: 0.00     Quit date:      Years since quittin.0    Smokeless tobacco: Current User     Types: Chew    Tobacco comment: Chews / quit cigarettes   Vaping Use    Vaping Use: Never used   Substance and Sexual Activity    Alcohol use: Yes     Alcohol/week: 1.0 standard drink     Types: 1 Glasses of wine per week     Comment: occ.  Drug use: No    Sexual activity: Not on file   Other Topics Concern    Not on file   Social History Narrative    Not on file     Social Determinants of Health     Financial Resource Strain:     Difficulty of Paying Living Expenses: Not on file   Food Insecurity:     Worried About Running Out of Food in the Last Year: Not on file    Dinh of Food in the Last Year: Not on file   Transportation Needs:     Lack of Transportation (Medical): Not on file    Lack of Transportation (Non-Medical):  Not on file   Physical Activity:     Days of Exercise per Week: Not on file    Minutes of Exercise per Session: Not on file   Stress:     Feeling of Stress : Not on file   Social Connections:     Frequency of Communication with Friends and Family: Not on file    Frequency of Social Gatherings with Friends and Family: Not on file    Attends Yarsani Services: Not on file    Active Member of Clubs or Organizations: Not on file    Attends Club or Organization Meetings: Not on file    Marital Status: Not on file   Intimate Partner Violence:     Fear of Current or Ex-Partner: Not on file    Emotionally Abused: Not on file    Physically Abused: Not on file    Sexually Abused: Not on file   Housing Stability:     Unable to Pay for Housing in the Last Year: Not on file    Number of Places Lived in the Last Year: Not on file    Unstable Housing in the Last Year: Not on file       I counseled the patient against using tobacco products. Family History   Problem Relation Age of Onset    Kidney Disease Mother     Diabetes Mother     Heart Disease Mother     Arthritis Mother     Heart Disease Father     Heart Attack Father     Arthritis Sister     Diabetes Sister     Heart Disease Sister     Diabetes Sister      No other pertinent FamHx on review with patient/guardian. Allergies:  Penicillins, Sulfa antibiotics, Sulfasalazine, Pseudoeph-doxylamine-dm-apap, and Rabbit protein    Home Medications:  Prior to Admission medications    Medication Sig Start Date End Date Taking?  Authorizing Provider   acetaminophen (TYLENOL) 500 MG tablet Take 2 tablets by mouth 4 times daily as needed for Pain 1/3/22  Yes Nat Pereira DO   methocarbamol (ROBAXIN) 500 MG tablet Take 1.5 tablets by mouth 3 times daily for 4 days 1/3/22 1/7/22 Yes Nat Pereira DO   ibuprofen (ADVIL;MOTRIN) 800 MG tablet Take 1 tablet by mouth 2 times daily as needed for Pain 1/3/22 1/3/22  Nat Pereira DO   gabapentin (NEURONTIN) 300 MG capsule TAKE 1 CAPSULE BY MOUTH NIGHTLY AS NEEDED (PAIN) 12/13/21 12/13/22  Amador Clemens MD   naproxen (NAPROSYN) 500 MG tablet Take 1 tablet by mouth 2 times daily as needed for Pain 12/13/21   Amador Clemens MD   bumetanide (BUMEX) 2 MG tablet Take 1 tablet by mouth 2 times daily 10/28/21   Amador Clemens MD   sacubitril-valsartan (ENTRESTO) 24-26 MG per tablet Take 1 tablet by mouth 2 times daily 10/28/21   Amador Clemens MD   empagliflozin (JARDIANCE) 10 MG tablet Take 1 tablet by mouth daily 10/28/21   Amador Clemens MD   metoprolol succinate (TOPROL XL) 50 MG extended release tablet Take 1 tablet by mouth daily 10/28/21   Amador Clemens MD   Elastic Bandages & Supports (WRIST SPLINT/COCK-UP/RIGHT L) MISC 1 each by Does not apply route daily 10/28/21   Ashleigh Chase MD   sildenafil (VIAGRA) 50 MG tablet Take 1 tablet by mouth daily as needed for Erectile Dysfunction 10/28/21   Ashleigh Chase MD   nitroGLYCERIN (NITROSTAT) 0.4 MG SL tablet PLACE 1 TABLET UNDER THE TONGUE EVERY 5 MINUTES AS NEEDED FOR CHEST PAIN 9/27/21   Trudy Cramer MD   metFORMIN (GLUCOPHAGE) 500 MG tablet TAKE 1 TABLET BY MOUTH DAILY (WITH BREAKFAST)  Patient taking differently: 2 times daily (with meals)  9/17/21   Brett Tamayo MD   zoster recombinant adjuvanted vaccine [facility]) 50 MCG/0.5ML SUSR injection Inject 0.5 mLs into the muscle See Admin Instructions 1 dose now and repeat in 2-6 months 9/13/21 3/12/22  Ashleigh Chase MD   albuterol (PROVENTIL) (5 MG/ML) 0.5% nebulizer solution Take 0.5 mLs by nebulization every 6 hours as needed for Wheezing or Shortness of Breath 8/30/21   Ashleigh Chase MD   albuterol sulfate  (90 Base) MCG/ACT inhaler Inhale 2 puffs into the lungs every 6 hours as needed for Wheezing 8/30/21   Ashleigh Chase MD   blood glucose monitor kit and supplies Dispense sufficient amount for indicated testing frequency plus additional to accommodate PRN testing needs. Dispense all needed supplies to include: monitor, strips, lancing device, lancets, control solutions, alcohol swabs.  8/30/21   Ashleigh Chase MD   albuterol-ipratropium (COMBIVENT RESPIMAT)  MCG/ACT AERS inhaler INHALE 1 PUFF INTO THE LUNGS EVERY 6 HOURS 8/30/21   Ashleigh Chase MD   Masks (FACE MASK) MISC 1 each by Does not apply route as needed (as needed) 8/30/21   Ashleigh Chase MD   potassium chloride (KLOR-CON M) 20 MEQ extended release tablet Take 1 tablet by mouth daily (with breakfast)  Patient not taking: Reported on 12/13/2021 8/30/21   Ashleigh Chase MD   simvastatin (ZOCOR) 20 MG tablet Take 1 tablet by mouth nightly 8/30/21   Ashleigh Chase MD   spironolactone (ALDACTONE) 25 MG tablet Take 1 tablet by mouth nightly  Patient taking differently: Take 25 mg by mouth daily  8/30/21   Finesse John MD   diclofenac sodium (VOLTAREN) 1 % GEL Apply topically 2 times daily 8/30/21   Finesse John MD   aspirin 81 MG tablet Take 1 tablet by mouth daily 8/13/19   Finesse John MD       REVIEW OF SYSTEMS    (2-9 systems for level 4, 10 ormore for level 5)      Review of Systems   Constitutional: Negative for fever. Eyes: Negative for visual disturbance. Respiratory: Negative for shortness of breath. Cardiovascular: Negative for chest pain. Gastrointestinal: Negative for abdominal pain, nausea and vomiting. Genitourinary: Negative for hematuria. Musculoskeletal: Positive for neck pain. Negative for back pain. Positive for bilateral hip and left knee pain   Skin: Negative for wound. Allergic/Immunologic: Negative for immunocompromised state. Neurological: Negative for dizziness, weakness, numbness and headaches. Hematological: Does not bruise/bleed easily. Psychiatric/Behavioral: Negative for confusion. PHYSICAL EXAM   (up to 7 for level 4, 8 or more for level 5)      INITIAL VITALS:   BP (!) 146/68   Pulse 108   Temp 98.3 °F (36.8 °C) (Oral)   Resp 20   Ht 5' 5\" (1.651 m)   Wt 275 lb (124.7 kg)   SpO2 96%   BMI 45.76 kg/m²     Physical Exam  Constitutional:       General: He is not in acute distress. Appearance: Normal appearance. HENT:      Head: Normocephalic and atraumatic. Right Ear: Tympanic membrane, ear canal and external ear normal.      Left Ear: Tympanic membrane, ear canal and external ear normal.   Eyes:      General:         Right eye: No discharge. Left eye: No discharge. Cardiovascular:      Rate and Rhythm: Normal rate and regular rhythm. Pulses: Normal pulses. Heart sounds: No murmur heard. Pulmonary:      Effort: Pulmonary effort is normal. No respiratory distress. Breath sounds: Normal breath sounds. No wheezing, rhonchi or rales. Abdominal:      Palpations: Abdomen is soft. Tenderness: There is no abdominal tenderness. Musculoskeletal:      Cervical back: No muscular tenderness. Comments: Midline cervical tenderness. No thoracic or lumbar tenderness. Tenderness of hips bilateral with pelvis stable. Left knee tenderness. No clavicular tenderness, chest wall tenderness, abdominal tenderness. Pelvis stable. 5/5 strength BUE/BLE. Sensation intact in all extremities. Radial and pedal pulses 2+. Skin:     Capillary Refill: Capillary refill takes less than 2 seconds. Neurological:      General: No focal deficit present. Mental Status: He is alert. DIFFERENTIAL  DIAGNOSIS     PLAN (LABS / IMAGING / EKG):  Orders Placed This Encounter   Procedures    CT Head WO Contrast    CT CERVICAL SPINE WO CONTRAST    XR KNEE LEFT (3 VIEWS)    XR HIP BILATERAL W AP PELVIS (2 VIEWS)       MEDICATIONS ORDERED:  Orders Placed This Encounter   Medications    oxyCODONE-acetaminophen (PERCOCET) 5-325 MG per tablet 1 tablet    acetaminophen (TYLENOL) 500 MG tablet     Sig: Take 2 tablets by mouth 4 times daily as needed for Pain     Dispense:  20 tablet     Refill:  0    DISCONTD: ibuprofen (ADVIL;MOTRIN) 800 MG tablet     Sig: Take 1 tablet by mouth 2 times daily as needed for Pain     Dispense:  20 tablet     Refill:  0    methocarbamol (ROBAXIN) 500 MG tablet     Sig: Take 1.5 tablets by mouth 3 times daily for 4 days     Dispense:  20 tablet     Refill:  0       DIAGNOSTIC RESULTS / EMERGENCY DEPARTMENT COURSE / MDM     LABS:  No results found for this visit on 01/03/22. IMPRESSION/MDM/ED COURSE:  57 y.o. male presented with neck pain, hip pain, knee pain following nonsyncopal fall prior to arrival.  On exam patient resting company no acute distress, nontoxic appearing. Midline cervical tenderness. No thoracic or lumbar tenderness. Tenderness of hips bilateral with pelvis stable. Left knee tenderness.   Will obtain head/cervical CT as well as x-rays of bilateral hips and left knee. Neurovascularly intact. Symptomatic treatment with [facility].  Likely discharge home. ED Course as of 01/04/22 1509   Mon Jan 03, 2022 2307 IMPRESSION:  1. No acute intracranial abnormality. 2. Bubbly air-fluid level present in the right maxillary sinus. Finding  raises the possibility of acute sinusitis in the appropriate clinical setting. [AF]   2307 IMPRESSION:  No acute abnormality of the cervical spine [AF]   2307 IMPRESSION:     Left knee: No acute osseous abnormality. No fracture. Chondrocalcinosis.     Pelvis and bilateral hips: No fracture. [AF]   5867 Results discussed with patient. C-collar is cleared. Any reexamined without LCL/MCL laxity. Negative anterior/posterior drawer test.  Does have medial joint line tenderness, positive Long's concerning for meniscal tear. We will treat symptomatically and have patient follow-up with orthopedic if symptoms do not resolve in the next week. I discussed signs and symptoms that would require reevaluation in the ED. The patient expressed understanding and agreement with plan. All questions answered. [AF]      ED Course User Index  [AF] David Loge, DO       RADIOLOGY:  XR HIP BILATERAL W AP PELVIS (2 VIEWS)   Final Result      Left knee: No acute osseous abnormality. No fracture. Chondrocalcinosis. Pelvis and bilateral hips: No fracture. XR KNEE LEFT (3 VIEWS)   Final Result      Left knee: No acute osseous abnormality. No fracture. Chondrocalcinosis. Pelvis and bilateral hips: No fracture. CT CERVICAL SPINE WO CONTRAST   Final Result   No acute abnormality of the cervical spine. CT Head WO Contrast   Final Result   1. No acute intracranial abnormality. 2. Bubbly air-fluid level present in the right maxillary sinus. Finding   raises the possibility of acute sinusitis in the appropriate clinical setting.              EKG  None    All EKG's are interpreted by the Emergency Department Physician who either signs or Co-signs this chart in the absence of a cardiologist.    PROCEDURES:  None    CONSULTS:  None    FINAL IMPRESSION      1. Fall, initial encounter          DISPOSITION / PLAN     DISPOSITION Decision To Discharge 01/03/2022 11:10:22 PM      PATIENT REFERREDTO:  Latina Schwab, MD  [address]   [address]    In 2 days      [facility]  Orthopedic Surg  [facility] [address]  [address] [address]  [phone]    As needed      [address]:  Discharge Medication List as of 1/3/2022 11:41 PM      START taking these medications    Details   acetaminophen (TYLENOL) 500 MG tablet Take 2 tablets by mouth 4 times daily as needed for Pain, Disp-20 tablet, R-0Print      methocarbamol (ROBAXIN) 500 MG tablet Take 1.5 tablets by mouth 3 times daily for 4 days, Disp-20 tablet, R-0Print             Janice Hawley DO  PGY 2  Resident Physician Emergency Medicine  01/04/22 3:09 PM    (Please note that portions of this note were completed with a voice recognition program.Efforts were made to edit the dictations but occasionally words are mis-transcribed.)       Austin Carver DO  Resident  01/04/22 [id number]

## 2022-01-06 ENCOUNTER — HOSPITAL ENCOUNTER (OUTPATIENT)
Dept: PHYSICAL THERAPY | Age: 62
Setting detail: THERAPIES SERIES
Discharge: HOME | End: 2022-01-06
Payer: COMMERCIAL

## 2022-01-06 ENCOUNTER — APPOINTMENT (OUTPATIENT)
Dept: PHYSICAL THERAPY | Age: 62
End: 2022-01-06
Payer: COMMERCIAL

## 2022-01-06 PROCEDURE — 97113 AQUATIC THERAPY/EXERCISES: CPT

## 2022-01-06 NOTE — FLOWSHEET NOTE
[address] [place] [address] Outpatient Physical Therapy   [address] [place], [place] [address] [facility] [address]   Phone: [phone]   Fax: [phone]    Physical Therapy Daily Treatment Note      Date:  2022  Patient Name:  Juany Gaitan    :  1960  MRN: [id number]  Physician:      Rocio Ahmadi MD                      Insurance: [facility]: [facility]  # of visits allowed/remainin  Medical Diagnosis:   R13.571 (ICD-10-CM) - Lumbosacral spondylosis without myelopathy       Rehab Codes: M 54.41, M 54.42,   Onset date: 10/28/21                         Next Dr's appt. : tbd  Visit Count:                                 Cancel/No Show: 0/2  Re-assessment with PT:      Subjective:  Patient reports there was ice in his drive way and slipped landing straight on his back, he went to ER and everything seems to be fine except the doctor thinks he tore his L meniscus. Patient states he got approval for TENS unit earlier this week. Pain:  [x] Yes  [] No Location: Lower Back; denies radicular symptoms Pain Rating: (0-10 scale) \"12\"/10- generalized body pain this date   Pain altered Tx:  [x] No  [] Yes  Action:  Comments:arrived 20 minutes late to treatment. Objective:  Modalities:   Precautions:  Exercises:      [address] Services Exercise Log  Aquatic, Hip & DLS Program- Phase 1    Date of Eval:                                Primary PT: Kassie Tyler PT  Diagnosis: Things to Focus On (goals):   Surgical Precautions:  Medical Precautions:  [] C-9 dates  [] Occ Med   [] Medicare       Date 21   Visit #    Walk F/L/R 2 Laps 2 Laps 2 Laps 2 Laps   Marching 2 Laps 2 Laps 2 Laps 2 Laps       Low Box   Squats 10x5\" 10x5\" 15x5\" 15x5\"   Step-Ups F/L Low 10x Low 10x Low 15x Low 15x   Step Down F/L       Heel-toe raises 10x 10x 15x 15x   SLR F/L/R 10x 10x 15x 15x   Hip/Knee Flex/Ext 10x 10x 15x 15x   F/L Lunges              Kickboard Ex.  Med Med Med Med   Iso Abd. 10x5\" 10x5\" 10x5\" 10x5\"   Push-pull 10x 10x 15x 15x   Paddling 10x 10x 15x 15x          UE Format:       Horiz Abd/Add  10x 15x NT   IR/ER (wipers)  10x 15x NT   Alt Flex/Ext  10x 15x NT   Alt Press Down  10x 15x NT   Abd/Add  10x 15x NT          Deep Water: 1 Noodle 1 Noodle 1 Noodle 1 Noodle   Hang 5' 5' 5' 5'   Cycling 2' 2' 2' 2'   Jacks 1' 1' 2' 2'   X-Country 1' 1' 2' 2'          Balance       SLS              Stretches       Achllies 2x20\" 2x20\" 2x20\" 2x20\"   Hamstring 2x20\" 2x20\" 2x20\" 2x20\"          Breast Stroke 2 Laps 2 Laps 2 Laps NT   Pain Rating 2 2 2 No rating provided      Specific Instructions for next treatment: Progress WB with LE exercise to challenge balance and improve trunk stability      Assessment: [x] Progressing toward goals. Patient completed all exercises in slow guarded manner secondary to overall soreness from fall. Completed all exercises as time allowed this date. [] No change. [] Other:    [x] Pt would benefit from aquatic therapy to decrease stress to lumbar spine, improve mobility, and increase activity tolerance to minimize symptoms and improve level of function. STG: (to be met in 6 treatments)  1. ? Pain: Pt will report decreased pain 6/10 or less to improve walking tolerance to 45 min or more   2. ? Strength: Pt will demonstrate improved B hip strength 4-/5 or higher to perform ADLs with decreased difficulty  3. ? Function: Pt will demonstrate safe lifting/carrying techniques to decrease risk of injury at work   4. Independent with Home Exercise Programs     LTG: (to be met in 12 treatments)  1. Pt will demonstrate lumbar ROM WNL to improve mobility  2. Pt will demonstrate improved B hip strength 4/5 or higher to improve walking/standing tolerance  3. Pt will demonstrate improved balance evident by B tandem stance 10 seconds or more to decrease risk of falls.                      Patient goals: to be able to move better without pain     Pt. Education:  [x] Yes  [] No  [x] Reviewed Prior HEP/Ed  Method of Education: [x] Verbal  [x] Demo  [] Written  Comprehension of Education:  [x] Verbalizes understanding. [] Demonstrates understanding. [] Needs review. [] Demonstrates/verbalizes HEP/Ed previously given. Plan: [x] Continue per plan of care.     [] Other:      Treatment Charges: Mins Units   []  Modalities     []  Ther Exercise     []  Manual Therapy     []  Ther Activities     [x]  Aquatics 35 2   []  Neuromuscular     [] Vasocompression     [] Gait Training     [] Dry needling        [] 1 or 2 muscles        [] 3 or more muscles     []  Other      Total Treatment time 35 2     Time In: 150 PM           Time Out: 225 PM      Electronically signed by:  Shaji White PTA

## 2022-01-10 ENCOUNTER — HOSPITAL ENCOUNTER (OUTPATIENT)
Dept: PHYSICAL THERAPY | Age: 62
Setting detail: THERAPIES SERIES
Discharge: HOME | End: 2022-01-10
Payer: COMMERCIAL

## 2022-01-10 PROCEDURE — 97113 AQUATIC THERAPY/EXERCISES: CPT

## 2022-01-10 NOTE — FLOWSHEET NOTE
[facility] Outpatient Physical Therapy   [address] [facility] [address]   Phone: [phone]   Fax: [phone]    Physical Therapy Daily Treatment Note      Date:  1/10/2022  Patient Name:  Anamaria Calero    :  1960  MRN: [id number]  Physician:      Kasey Lombardo MD                      Insurance: Aaliyah Climes: NPRe  # of visits allowed/remainin  Medical Diagnosis:   J22.146 (ICD-10-CM) - Lumbosacral spondylosis without myelopathy       Rehab Codes: M 54.41, M 54.42,   Onset date: 10/28/21                         Next Dr's appt. : tbd  Visit Count:                                 Cancel/No Show: 0/3  Re-assessment with PT:      Subjective:  Awaiting for scheduling for TENS implant for heart. States he is still sore from fall last week. Has a call into ortho for left knee - new meniscus tear. States he avoid muscle relaxors since they make him drowsy     Pain:  [x] Yes  [] No Location: Lower Back, buttocks, B hips and knees  Pain Rating: (0-10 scale) 8/10  Pain altered Tx:  [x] No  [] Yes  Action:  Comments:    Objective:  Modalities:   Precautions:  Exercises:      [facility] Exercise Log  Aquatic, Hip & DLS Program- Phase 1    Date of Eval:                                Primary PT: Jerolyn Snellen, PT  Diagnosis: Things to Focus On (goals):   Surgical Precautions:  Medical Precautions:  [] C-9 dates  [] Occ Med   [] Medicare       Date 12/30/21 1/6/21 1/10/21    Visit #     Walk F/L/R 2 Laps 2 Laps 2 Laps    Marching 2 Laps 2 Laps 2 Laps      Low Box Low Box    Squats 15x5\" 15x5\" 10x5\"    Step-Ups F/L Low 15x Low 15x Low 10x    Step Down F/L       Heel-toe raises 15x 15x 10x    SLR F/L/R 15x 15x 10x    Hip/Knee Flex/Ext 15x 15x 10x    F/L Lunges              Kickboard Ex.  Med Med Med    Iso Abd. 10x5\" 10x5\" 10x5\"    Push-pull 15x 15x 15x    Paddling 15x 15x 15x           UE Format:       Horiz Abd/Add 15x NT 15x    IR/ER (wipers) 15x NT 15x    Alt Flex/Ext 15x NT 15x    Alt Press Down 15x NT 15x    Abd/Add 15x NT 15x           Deep Water: 1 Noodle 1 Noodle 1 Noodle    Hang 5' 5' 5'    Cycling 2' 2' 2'    Jacks 2' 2' 2'    X-Country 2' 2' 2'           Balance       SLS              Stretches       Achllies 2x20\" 2x20\" 2x20\"    Hamstring 2x20\" 2x20\" 2x20\"           Breast Stroke 2 Laps NT 2 Laps    Pain Rating 2 No rating provided  8      Specific Instructions for next treatment: PT re-assess next visit      Assessment: [x] Progressing toward goals. Resumed program as noted above. Overall patient tolerated well without increased pain complaints. [] No change. [] Other:    [x] Pt would benefit from aquatic therapy to decrease stress to lumbar spine, improve mobility, and increase activity tolerance to minimize symptoms and improve level of function. STG: (to be met in 6 treatments)  1. ? Pain: Pt will report decreased pain 6/10 or less to improve walking tolerance to 45 min or more   2. ? Strength: Pt will demonstrate improved B hip strength 4-/5 or higher to perform ADLs with decreased difficulty  3. ? Function: Pt will demonstrate safe lifting/carrying techniques to decrease risk of injury at work   4. Independent with Home Exercise Programs     LTG: (to be met in 12 treatments)  1. Pt will demonstrate lumbar ROM WNL to improve mobility  2. Pt will demonstrate improved B hip strength 4/5 or higher to improve walking/standing tolerance  3. Pt will demonstrate improved balance evident by B tandem stance 10 seconds or more to decrease risk of falls. Patient goals: to be able to move better without pain     Pt. Education:  [x] Yes  [] No  [x] Reviewed Prior HEP/Ed  Method of Education: [x] Verbal  [x] Demo  [] Written  Comprehension of Education:  [x] Verbalizes understanding. [] Demonstrates understanding. [] Needs review.   [] Demonstrates/verbalizes HEP/Ed previously given.     Plan: [x] Continue per plan of care.     [] Other:      Treatment Charges: Mins Units   []  Modalities     []  Ther Exercise     []  Manual Therapy     []  Ther Activities     [x]  Aquatics 43 3   []  Neuromuscular     [] Vasocompression     [] Gait Training     [] Dry needling        [] 1 or 2 muscles        [] 3 or more muscles     []  Other      Total Treatment time 43 3     Time In: 128 PM           Time Out: 230 PM      Electronically signed by:  Olvin Altamirano PTA

## 2022-01-13 ENCOUNTER — HOSPITAL ENCOUNTER (OUTPATIENT)
Dept: OTHER | Age: 62
Discharge: HOME | End: 2022-01-13
Payer: COMMERCIAL

## 2022-01-13 VITALS
OXYGEN SATURATION: 96 % | WEIGHT: 277 LBS | RESPIRATION RATE: 20 BRPM | DIASTOLIC BLOOD PRESSURE: 80 MMHG | HEART RATE: 92 BPM | SYSTOLIC BLOOD PRESSURE: 142 MMHG | BODY MASS INDEX: 46.1 KG/M2

## 2022-01-13 PROCEDURE — 99212 OFFICE O/P EST SF 10 MIN: CPT

## 2022-01-13 NOTE — PROGRESS NOTES
Date:  2022  Time:  1:51 PM    [facility] at [address] [place] [address]    Office: [phone] Fax: [phone]    Re:  Royce Richardson   Patient : 1960    Vital Signs: BP (!) 142/80   Pulse 92   Resp 20   Wt 277 lb (125.6 kg)   SpO2 96%   BMI 46.10 kg/m²                                                   No results for input(s): CBC, HGB, HCT, WBC, PLATELET, NA, K, CL, CO2, BUN, CREATININE, GLUCOSE, BNP, INR in the last 72 hours. Respiratory:    Assessment  Charting Type: Reassessment    Breath Sounds  Right Upper Lobe: Clear  Right Middle Lobe: Clear  Right Lower Lobe: Clear,Diminished  Left Upper Lobe: Clear  Left Lower Lobe: Clear,Diminished    Cough/Sputum  Cough: Productive  Frequency: Infrequent  Sputum Amount: Small  Sputum Color: White       Peripheral Vascular  RLE Edema: +2,Pitting  LLE Edema: +2,Pitting    Complaints: Nothing new    Comment : Patient here ambulatory for routine visit. Weight increased 1 lb in one month. No new or acute s/s CHF. Discussed low salt diet and fluid limits. States compliance with medications. He is having a CCM device implanted 2022. Patient is looking forward to this and states \"I'm looking for an improvement in my quality of life. \"  Next visit here 6 weeks 2022.     Electronically signed by Parul Roblero RN on 2022 at 1:51 PM

## 2022-01-17 ENCOUNTER — HOSPITAL ENCOUNTER (OUTPATIENT)
Age: 62
Setting detail: SPECIMEN
Discharge: HOME | End: 2022-01-17

## 2022-01-17 ENCOUNTER — OFFICE VISIT (OUTPATIENT)
Dept: FAMILY MEDICINE CLINIC | Age: 62
End: 2022-01-17
Payer: COMMERCIAL

## 2022-01-17 VITALS — DIASTOLIC BLOOD PRESSURE: 86 MMHG | BODY MASS INDEX: 46.33 KG/M2 | WEIGHT: 278.4 LBS | SYSTOLIC BLOOD PRESSURE: 150 MMHG

## 2022-01-17 DIAGNOSIS — N52.8 OTHER MALE ERECTILE DYSFUNCTION: ICD-10-CM

## 2022-01-17 DIAGNOSIS — Z13.220 SCREENING FOR HYPERLIPIDEMIA: ICD-10-CM

## 2022-01-17 DIAGNOSIS — Z23 NEED FOR SHINGLES VACCINE: ICD-10-CM

## 2022-01-17 DIAGNOSIS — E66.9 DIABETES MELLITUS TYPE 2 IN OBESE (HCC): Primary | ICD-10-CM

## 2022-01-17 DIAGNOSIS — I50.22 CHRONIC SYSTOLIC CONGESTIVE HEART FAILURE (HCC): ICD-10-CM

## 2022-01-17 DIAGNOSIS — E11.69 DIABETES MELLITUS TYPE 2 IN OBESE (HCC): Primary | ICD-10-CM

## 2022-01-17 LAB
CHOLESTEROL/HDL RATIO: 4
CHOLESTEROL: 144 MG/DL
HBA1C MFR BLD: 7 %
HDLC SERPL-MCNC: 36 MG/DL
LDL CHOLESTEROL: 60 MG/DL (ref 0–130)
TRIGL SERPL-MCNC: 241 MG/DL
VLDLC SERPL CALC-MCNC: ABNORMAL MG/DL (ref 1–30)

## 2022-01-17 PROCEDURE — G8484 FLU IMMUNIZE NO ADMIN: HCPCS | Performed by: STUDENT IN AN ORGANIZED HEALTH CARE EDUCATION/TRAINING PROGRAM

## 2022-01-17 PROCEDURE — 3017F COLORECTAL CA SCREEN DOC REV: CPT | Performed by: STUDENT IN AN ORGANIZED HEALTH CARE EDUCATION/TRAINING PROGRAM

## 2022-01-17 PROCEDURE — 3051F HG A1C>EQUAL 7.0%<8.0%: CPT | Performed by: STUDENT IN AN ORGANIZED HEALTH CARE EDUCATION/TRAINING PROGRAM

## 2022-01-17 PROCEDURE — 2022F DILAT RTA XM EVC RTNOPTHY: CPT | Performed by: STUDENT IN AN ORGANIZED HEALTH CARE EDUCATION/TRAINING PROGRAM

## 2022-01-17 PROCEDURE — 99213 OFFICE O/P EST LOW 20 MIN: CPT | Performed by: STUDENT IN AN ORGANIZED HEALTH CARE EDUCATION/TRAINING PROGRAM

## 2022-01-17 PROCEDURE — 83036 HEMOGLOBIN GLYCOSYLATED A1C: CPT | Performed by: STUDENT IN AN ORGANIZED HEALTH CARE EDUCATION/TRAINING PROGRAM

## 2022-01-17 PROCEDURE — 4004F PT TOBACCO SCREEN RCVD TLK: CPT | Performed by: STUDENT IN AN ORGANIZED HEALTH CARE EDUCATION/TRAINING PROGRAM

## 2022-01-17 PROCEDURE — G8417 CALC BMI ABV UP PARAM F/U: HCPCS | Performed by: STUDENT IN AN ORGANIZED HEALTH CARE EDUCATION/TRAINING PROGRAM

## 2022-01-17 PROCEDURE — G8427 DOCREV CUR MEDS BY ELIG CLIN: HCPCS | Performed by: STUDENT IN AN ORGANIZED HEALTH CARE EDUCATION/TRAINING PROGRAM

## 2022-01-17 RX ORDER — SIMVASTATIN 20 MG
20 TABLET ORAL NIGHTLY
Qty: 30 TABLET | Refills: 3 | Status: SHIPPED | OUTPATIENT
Start: 2022-01-17 | End: 2022-06-14 | Stop reason: SDUPTHER

## 2022-01-17 RX ORDER — SPIRONOLACTONE 25 MG/1
25 TABLET ORAL NIGHTLY
Qty: 30 TABLET | Refills: 1 | Status: SHIPPED | OUTPATIENT
Start: 2022-01-17 | End: 2022-03-31

## 2022-01-17 RX ORDER — ZOSTER VACCINE RECOMBINANT, ADJUVANTED 50 MCG/0.5
0.5 KIT INTRAMUSCULAR SEE ADMIN INSTRUCTIONS
Qty: 0.5 ML | Refills: 0 | Status: SHIPPED | OUTPATIENT
Start: 2022-01-17 | End: 2022-01-17

## 2022-01-17 RX ORDER — SILDENAFIL 100 MG/1
100 TABLET, FILM COATED ORAL PRN
Qty: 10 TABLET | Refills: 0 | Status: SHIPPED | OUTPATIENT
Start: 2022-01-17 | End: 2022-06-14 | Stop reason: SDUPTHER

## 2022-01-17 SDOH — HEALTH STABILITY: PHYSICAL HEALTH: ON AVERAGE, HOW MANY MINUTES DO YOU ENGAGE IN EXERCISE AT THIS LEVEL?: 0 MIN

## 2022-01-17 SDOH — HEALTH STABILITY: PHYSICAL HEALTH: ON AVERAGE, HOW MANY DAYS PER WEEK DO YOU ENGAGE IN MODERATE TO STRENUOUS EXERCISE (LIKE A BRISK WALK)?: 2 DAYS

## 2022-01-17 SDOH — HEALTH STABILITY: PHYSICAL HEALTH: ON AVERAGE, HOW MANY DAYS PER WEEK DO YOU ENGAGE IN MODERATE TO STRENUOUS EXERCISE (LIKE A BRISK WALK)?: 0 DAYS

## 2022-01-17 SDOH — ECONOMIC STABILITY: FOOD INSECURITY: WITHIN THE PAST 12 MONTHS, THE FOOD YOU BOUGHT JUST DIDN'T LAST AND YOU DIDN'T HAVE MONEY TO GET MORE.: NEVER TRUE

## 2022-01-17 SDOH — ECONOMIC STABILITY: FOOD INSECURITY: WITHIN THE PAST 12 MONTHS, YOU WORRIED THAT YOUR FOOD WOULD RUN OUT BEFORE YOU GOT MONEY TO BUY MORE.: NEVER TRUE

## 2022-01-17 SDOH — HEALTH STABILITY: PHYSICAL HEALTH: ON AVERAGE, HOW MANY MINUTES DO YOU ENGAGE IN EXERCISE AT THIS LEVEL?: 10 MIN

## 2022-01-17 ASSESSMENT — ENCOUNTER SYMPTOMS
SHORTNESS OF BREATH: 0
COUGH: 0
APNEA: 0
CHEST TIGHTNESS: 0
COLOR CHANGE: 0

## 2022-01-17 ASSESSMENT — LIFESTYLE VARIABLES: HOW OFTEN DO YOU HAVE A DRINK CONTAINING ALCOHOL: NEVER

## 2022-01-17 ASSESSMENT — SOCIAL DETERMINANTS OF HEALTH (SDOH)
DO YOU BELONG TO ANY CLUBS OR ORGANIZATIONS SUCH AS CHURCH GROUPS UNIONS, FRATERNAL OR ATHLETIC GROUPS, OR SCHOOL GROUPS?: PATIENT DECLINED
HOW HARD IS IT FOR YOU TO PAY FOR THE VERY BASICS LIKE FOOD, HOUSING, MEDICAL CARE, AND HEATING?: NOT HARD AT ALL
HOW OFTEN DO YOU GET TOGETHER WITH FRIENDS OR RELATIVES?: TWICE A WEEK
IN A TYPICAL WEEK, HOW MANY TIMES DO YOU TALK ON THE PHONE WITH FAMILY, FRIENDS, OR NEIGHBORS?: MORE THAN THREE TIMES A WEEK
ARE YOU MARRIED, WIDOWED, DIVORCED, SEPARATED, NEVER MARRIED, OR LIVING WITH A PARTNER?: PATIENT DECLINED
HOW OFTEN DO YOU ATTEND CHURCH OR RELIGIOUS SERVICES?: 1 TO 4 TIMES PER YEAR
HOW OFTEN DO YOU ATTENT MEETINGS OF THE CLUB OR ORGANIZATION YOU BELONG TO?: PATIENT DECLINED

## 2022-01-17 ASSESSMENT — PATIENT HEALTH QUESTIONNAIRE - PHQ9
DEPRESSION UNABLE TO ASSESS: YES
2. FEELING DOWN, DEPRESSED OR HOPELESS: NOT AT ALL
1. LITTLE INTEREST OR PLEASURE IN DOING THINGS: NOT AT ALL
SUM OF ALL RESPONSES TO PHQ9 QUESTIONS 1 & 2: 0

## 2022-01-17 NOTE — PATIENT INSTRUCTIONS
Thank you for letting us take care of you today. We hope all your questions were addressed. If a question was overlooked or something else comes to mind after you return home, please contact a member of your Care Team listed below. Your Care Team at [place] [address] is Team #5  Ronni Arzate MD (Faculty)  Ludmila Rai MD (Resident)  Han Payan MD (Resident)  Iesha Atwood MD (Resident)  Yan Hearn MD (Resident)  Libby Eisenberg., DIXIE Shabazz., JANETH Duenas., Hazel Chavez., [facility] office)  Na Nayak, [address] (Clinical Practice Manager)  Fara Rucker, [facility] (Clinical Pharmacist)       Office phone number: [phone]    If you need to get in right away due to illness, please be advised we have \"Same Day\" appointments available Monday-Friday. Please call us at [phone] option #3 to schedule your \"Same Day\" appointment.

## 2022-01-17 NOTE — PROGRESS NOTES
I have reviewed and discussed key elements [facility] with the resident including plan of care and follow up and agree with the care galilea plan. Diagnosis Orders   1. Diabetes mellitus type 2 in obese (HCC)  POCT glycosylated hemoglobin (Hb A1C)   2. Need for shingles vaccine  DISCONTINUED: zoster recombinant adjuvanted vaccine [facility]) 50 MCG/0.5ML SUSR injection    DISCONTINUED: zoster recombinant adjuvanted vaccine (SHINGRIX) 50 MCG/0.5ML SUSR injection   3. Screening for hyperlipidemia  Lipid Panel   4. Other male erectile dysfunction  sildenafil (VIAGRA) 100 MG tablet   5.  Chronic systolic congestive heart failure (HCC)  spironolactone (ALDACTONE) 25 MG tablet    simvastatin (ZOCOR) 20 MG tablet

## 2022-01-17 NOTE — PROGRESS NOTES
Visit Information    Have you changed or started any medications since your last visit including any over-the-counter medicines, vitamins, or herbal medicines? no   Are you having any side effects from any of your medications? -  no  Have you stopped taking any of your medications? Is so, why? -  no    Have you seen any other physician or provider since your last visit? No  Have you had any other diagnostic tests since your last visit? yes  Have you been seen in the emergency room and/or had an admission to a hospital since we last saw you? yes  Have you had your routine dental cleaning in the past 6 months? no    Have you activated your [employer] account? If not, what are your barriers?  Yes     Patient Care Team:  Stephani Beatty MD as PCP - General (Family Medicine)  Mallory Cisneros MD as Consulting Physician (Cardiology)  Evelina De Souza MD as Consulting Physician  Ale Rogers DO as Consulting Physician (General Surgery)    Medical History Review  Past Medical, Family, and Social History reviewed and does not contribute to the patient presenting condition    Health Maintenance   Topic Date Due    COVID-19 Vaccine (1) Never done    Diabetic foot exam  Never done    Diabetic retinal exam  Never done    Shingles Vaccine (1 of 2) Never done    Flu vaccine (1) 09/01/2021    Lipid screen  01/22/2022    Depression Screen  03/08/2022    A1C test (Diabetic or Prediabetic)  08/30/2022    Potassium monitoring  12/01/2022    Creatinine monitoring  12/01/2022    Diabetic microalbuminuria test  12/13/2022    Colon cancer screen colonoscopy  05/28/2024    Pneumococcal 0-64 years Vaccine (2 of 2 - PPSV23) 12/18/2024    DTaP/Tdap/Td vaccine (2 - Td or Tdap) 12/08/2030    Hepatitis C screen  Completed    HIV screen  Completed    Hepatitis A vaccine  Aged Out    Hib vaccine  Aged Out    Meningococcal (ACWY) vaccine  Aged Out

## 2022-01-17 NOTE — PROGRESS NOTES
Subjective:    Lianet Fuentes is a 57 y.o. male with  has a past medical history of Bronchitis, CAD (coronary artery disease), Cardiomyopathy ([address] Chest pain, CHF (congestive heart failure) ([address] Chronic back pain, Chronic diastolic CHF (congestive heart failure) ([address] COPD (chronic obstructive pulmonary disease) ([address] Headache(784.0), Hyperlipidemia, Hypertension, Inguinal hernia, Low back pain with sciatica, Migraine, Mitral valve regurgitation, Osteoarthritis, Pneumonia, Sleep apnea, SOB (shortness of breath) on exertion, Tricuspid regurgitation, Type 2 diabetes mellitus without complication, without long-term current use of insulin ([address] Under care of team, and Wellness examination. Presented to the office today for:  Chief Complaint   Patient presents with    Diabetes     follow up       HPI    Patient is a 57-year-old male who presented to the office today for diabetes follow-up. Last hemoglobin A1c was done in August 2021 which was 6.3. Patient needs refill on his strips, concerned about possible lump around his right breast.  States he noticed it a couple of months ago. Patient is getting CCM therapy procedure done on February 1, will see eye doctor on January 31. Got first dose of shingles vaccine, received his COVID booster. States his blood pressure is elevated today because he took his pill later than normal.  No other complaints today. Review of Systems   Constitutional: Negative for activity change, appetite change, chills, fatigue and fever. Respiratory: Negative for apnea, cough, chest tightness and shortness of breath. Musculoskeletal: Negative for joint swelling. Skin: Negative for color change, pallor, rash and wound. Neurological: Negative for dizziness, tremors, facial asymmetry, light-headedness and headaches. Psychiatric/Behavioral: Negative for agitation, behavioral problems and confusion. The patient is not nervous/anxious.           Family History   Problem Relation Age of Onset    Kidney Disease Mother     Diabetes Mother     Heart Disease Mother     Arthritis Mother     Heart Disease Father     Heart Attack Father     Arthritis Sister     Diabetes Sister     Heart Disease Sister     Diabetes Sister        Objective:    BP (!) 150/86   Wt 278 lb 6.4 oz (126.3 kg)   BMI 46.33 kg/m²    BP Readings from Last 3 Encounters:   01/17/22 (!) 150/86   01/13/22 (!) 142/80   01/03/22 (!) 146/68       Physical Exam  Vitals and nursing note reviewed. Constitutional:       Appearance: Normal appearance. He is obese. Cardiovascular:      Rate and Rhythm: Normal rate and regular rhythm. Pulses: Normal pulses. Heart sounds: Normal heart sounds. Pulmonary:      Effort: Pulmonary effort is normal.      Breath sounds: Normal breath sounds. Chest:      Chest wall: No mass, deformity or swelling. Breasts: Breasts are symmetrical.      Right: Normal. No swelling, mass or nipple discharge. Left: Normal. No swelling, mass or nipple discharge. Abdominal:      General: Abdomen is flat. Bowel sounds are normal.      Palpations: Abdomen is soft. Musculoskeletal:         General: No swelling or tenderness. Neurological:      General: No focal deficit present. Mental Status: He is alert and oriented to person, place, and time. Mental status is at baseline. Psychiatric:         Mood and Affect: Mood normal.         Behavior: Behavior normal.         Thought Content:  Thought content normal.         Judgment: Judgment normal.         Lab Results   Component Value Date    WBC 9.0 12/01/2021    HGB 13.3 12/01/2021    HCT 39.9 (L) 12/01/2021     12/01/2021    CHOL 191 01/22/2021    TRIG 138 01/22/2021    HDL 46 01/22/2021    ALT 27 01/21/2021    AST 19 01/21/2021     (L) 12/01/2021    K 4.3 12/01/2021    CL 94 (L) 12/01/2021    CREATININE 1.11 12/01/2021    BUN 23 12/01/2021    CO2 26 12/01/2021    TSH 0.41 01/22/2021    PSA 1.06 07/01/2014    INR 0.9 05/30/2021    LABA1C 7.0 01/17/2022    LABMICR Can not be calculated 12/13/2021     Lab Results   Component Value Date    CALCIUM 8.8 12/01/2021     Lab Results   Component Value Date    LDLCHOLESTEROL 117 01/22/2021       Assessment and Plan:    1. Diabetes mellitus type 2 in obese (HCC)  - POCT glycosylated hemoglobin (Hb A1C) 7.0 compared to 6.3 August 2021  -encourage patient to be more compliant with medication, patient voiced understanding    2. Need for shingles vaccine  -Received his first dose of shingles vaccine, will get second dose soon    3. Screening for hyperlipidemia  - Lipid Panel; Future    4. Other male erectile dysfunction  - sildenafil (VIAGRA) 100 MG tablet; Take 1 tablet by mouth as needed for Erectile Dysfunction  Dispense: 10 tablet; Refill: 0    5. Chronic systolic congestive heart failure (HCC)  - spironolactone (ALDACTONE) 25 MG tablet; Take 1 tablet by mouth nightly  Dispense: 30 tablet; Refill: 1  - simvastatin (ZOCOR) 20 MG tablet; Take 1 tablet by mouth nightly  Dispense: 30 tablet;  Refill: 3    Requested Prescriptions     Signed Prescriptions Disp Refills    sildenafil (VIAGRA) 100 MG tablet 10 tablet 0     Sig: Take 1 tablet by mouth as needed for Erectile Dysfunction    spironolactone (ALDACTONE) 25 MG tablet 30 tablet 1     Sig: Take 1 tablet by mouth nightly    simvastatin (ZOCOR) 20 MG tablet 30 tablet 3     Sig: Take 1 tablet by mouth nightly       Medications Discontinued During This Encounter   Medication Reason    zoster recombinant adjuvanted vaccine [facility]) 50 MCG/0.5ML SUSR injection     zoster recombinant adjuvanted vaccine (SHINGRIX) 50 MCG/0.5ML SUSR injection LIST CLEANUP    spironolactone (ALDACTONE) 25 MG tablet REORDER    simvastatin (ZOCOR) 20 MG tablet        Lee received counseling on the following healthy behaviors: nutrition, exercise and medication adherence    Discussed use,benefit, and side effects of prescribed medications. Barriers to medication compliance addressed. All patient questions answered. Pt voiced understanding. Return in about 4 weeks (around 2/14/2022). Disclaimer: Some orall of this note was transcribed using voice-recognition software. This may cause typographical errors occasionally. Although all effort is made to fix these errors, please do not hesitate to contact our office if there Aldair Tejeda concern with the understanding of this note.

## 2022-01-18 ENCOUNTER — HOSPITAL ENCOUNTER (OUTPATIENT)
Dept: PHYSICAL THERAPY | Age: 62
Setting detail: THERAPIES SERIES
Discharge: HOME | End: 2022-01-18
Payer: COMMERCIAL

## 2022-01-18 PROCEDURE — 97113 AQUATIC THERAPY/EXERCISES: CPT

## 2022-01-18 NOTE — FLOWSHEET NOTE
[facility]   [address] [facility] [address]   Phone: [phone]   Fax: [phone]    Physical Therapy Daily Treatment Note      Date:  2022  Patient Name:  Gem Rice    :  1960  MRN: [id number]  Physician:      Ludmila Rai MD                      Insurance: Jessica Gram: NPRe  # of visits allowed/remainin  Medical Diagnosis:   Z80.330 (ICD-10-CM) - Lumbosacral spondylosis without myelopathy       Rehab Codes: M 54.41, M 54.42,   Onset date: 10/28/21                         Next Dr's appt. : tbd  Visit Count:    10/12                             Cancel/No Show: 0/4      Subjective:  Surgery scheduled for Cardiac TENS 22. Put some cream on (B) knees prior to therapy which helped to decreased pain- states pain was 4/10 in (B) knees prior to; now 2.5/10. Patient reports he has held on calling ortho due to upcoming surgery. Also notes neck/chest sore since falling on buttock/hitting head. Pain:  [x] Yes  [] No Location: Lower Back, buttocks, B hips and knees  Pain Rating: (0-10 scale) 4/10  Pain altered Tx:  [x] No  [] Yes  Action:  Comments:    Objective:  Modalities:   Precautions:  Exercises:      [address] [facility] Exercise Log  Aquatic, Hip & DLS Program- Phase 1    Date of Eval:                                Primary PT: Marvin Campo, PT  Diagnosis: Things to Focus On (goals):   Surgical Precautions:  Medical Precautions:  [] C-9 dates  [] Occ Med   [] Medicare       Date 12/30/21 1/6/21 1/10/21 1/18/22   Visit # 7/12 8/12 9/12 10/12   Walk F/L/R 2 Laps 2 Laps 2 Laps 2 Laps   Marching 2 Laps 2 Laps 2 Laps 2 Laps     Low Box Low Box Low Box   Squats 15x5\" 15x5\" 10x5\" 15x5\"   Step-Ups F/L Low 15x Low 15x Low 10x Low 15x   Step Down F/L       Heel-toe raises 15x 15x 10x 15x   SLR F/L/R 15x 15x 10x 15x   Hip/Knee Flex/Ext 15x 15x 10x 15x   F/L Lunges              Kickboard Ex.  Med Med Med Med   Iso Abd. 10x5\" 10x5\" 10x5\" 10x5\"   Push-pull 15x 15x 15x 15x   Paddling 15x 15x 15x 15x          UE Format:       Horiz Abd/Add 15x NT 15x 15x   IR/ER (wipers) 15x NT 15x 15x   Alt Flex/Ext 15x NT 15x 15x   Alt Press Down 15x NT 15x 15x   Abd/Add 15x NT 15x 15x          Deep Water: 1 Noodle 1 Noodle 1 Noodle 1 Noodle   Hang 5' 5' 5' 5'   Cycling 2' 2' 2' 2'   Jacks 2' 2' 2' 2'   X-Country 2' 2' 2' 2'          Balance       SLS              Stretches       Achllies 2x20\" 2x20\" 2x20\" 2x20\"   Hamstring 2x20\" 2x20\" 2x20\" 2x20\"          Breast Stroke 2 Laps NT 2 Laps 2 Laps   Pain Rating 2 No rating provided  8 4     Specific Instructions for next treatment: PT re-assess next visit      Assessment: [x] Progressing toward goals. Completed program to tolerance with emphasis on posture and core stability. Increased reps/speed without issue. Patient more fatigue near end of program however less SOB noted this date    [] No change. [] Other:    [x] Pt would benefit from aquatic therapy to decrease stress to lumbar spine, improve mobility, and increase activity tolerance to minimize symptoms and improve level of function. STG: (to be met in 6 treatments)  1. ? Pain: Pt will report decreased pain 6/10 or less to improve walking tolerance to 45 min or more   2. ? Strength: Pt will demonstrate improved B hip strength 4-/5 or higher to perform ADLs with decreased difficulty  3. ? Function: Pt will demonstrate safe lifting/carrying techniques to decrease risk of injury at work   4. Independent with Home Exercise Programs     LTG: (to be met in 12 treatments)  1. Pt will demonstrate lumbar ROM WNL to improve mobility  2. Pt will demonstrate improved B hip strength 4/5 or higher to improve walking/standing tolerance  3. Pt will demonstrate improved balance evident by B tandem stance 10 seconds or more to decrease risk of falls. Patient goals: to be able to move better without pain     Pt. Education:  [x] Yes  [] No  [x] Reviewed Prior HEP/Ed  Method of Education: [x] Verbal  [x] Demo  [] Written  Comprehension of Education:  [x] Verbalizes understanding. [] Demonstrates understanding. [] Needs review. [] Demonstrates/verbalizes HEP/Ed previously given. Plan: [x] Continue per plan of care.     [] Other:      Treatment Charges: Mins Units   []  Modalities     []  Ther Exercise     []  Manual Therapy     []  Ther Activities     [x]  Aquatics 55 4   []  Neuromuscular     [] Vasocompression     [] Gait Training     [] Dry needling        [] 1 or 2 muscles        [] 3 or more muscles     []  Other      Total Treatment time 55 4     Time In: 248 PM           Time Out: 400 PM      Electronically signed by:  Av Tello PTA

## 2022-01-20 ENCOUNTER — HOSPITAL ENCOUNTER (OUTPATIENT)
Dept: PHYSICAL THERAPY | Age: 62
Setting detail: THERAPIES SERIES
Discharge: HOME | End: 2022-01-20
Payer: COMMERCIAL

## 2022-01-20 PROCEDURE — 97110 THERAPEUTIC EXERCISES: CPT

## 2022-01-20 PROCEDURE — 97113 AQUATIC THERAPY/EXERCISES: CPT

## 2022-01-20 NOTE — FLOWSHEET NOTE
[facility]   [address] [place] [address] [address]   Phone: [phone]   Fax: [phone]    Physical Therapy Daily Treatment Note      Date:  2022  Patient Name:  Manny Barraza    :  1960  MRN: [id number]  Physician:      Flory Hugo MD                      Insurance: Opal Drop: NPRe  # of visits allowed/remainin  Medical Diagnosis:   Y97.653 (ICD-10-CM) - Lumbosacral spondylosis without myelopathy       Rehab Codes: M 54.41, M 54.42,   Onset date: 10/28/21                         Next Dr's appt. : tbd  Visit Count:                                 Cancel/No Show: 0/4      Subjective:  States when driving at work today his L LE shot pain from heel to hip with spasms in foot. Reports his lower back is feeling better today but (B) knees are very painful. States colder weather has his COPD flared up - arrived to aquatic therapy with inhaler    Pain:  [x] Yes  [] No Location/Pain Rating: (0-10 scale): Lower Back , buttocks, B hips 4/10 ;  (B) knees 7/10  Pain altered Tx:  [x] No  [] Yes  Action:  Comments:    Objective:  Modalities:   Precautions:  Exercises:      [facility] Exercise Log  Aquatic, Hip & DLS Program- Phase 1    Date of Eval:                                Primary PT: Sofia Acharya PT  Diagnosis: Things to Focus On (goals):   Surgical Precautions:  Medical Precautions:  [] C-9 dates  [] Occ Med   [] Medicare       Date 12/30/21 1/6/21 1/10/21 1/18/22 1/20/22   Visit # 7/12 8/12 9/12 10/12 11/12   Walk F/L/R 2 Laps 2 Laps 2 Laps 2 Laps 2 Laps   Marching 2 Laps 2 Laps 2 Laps 2 Laps 2 Laps     Low Box Low Box Low Box Low Box   Squats 15x5\" 15x5\" 10x5\" 15x5\" 15x5\"   Step-Ups F/L Low 15x Low 15x Low 10x Low 15x Low 15x   Step Down F/L        Heel-toe raises 15x 15x 10x 15x 15x   SLR F/L/R 15x 15x 10x 15x 15x   Hip/Knee Flex/Ext 15x 15x 10x 15x 15x   F/L Lunges                Kickboard Ex.  Med Med Med Med Med   Iso Abd. 10x5\" 10x5\" 10x5\" 10x5\" 10x5\"   Push-pull 15x 15x 15x 15x 15x   Paddling 15x 15x 15x 15x 15x           UE Format:        Horiz Abd/Add 15x NT 15x 15x 15x   IR/ER (wipers) 15x NT 15x 15x 15x   Alt Flex/Ext 15x NT 15x 15x 15x   Alt Press Down 15x NT 15x 15x 15x   Abd/Add 15x NT 15x 15x 15x           Deep Water: 1 Noodle 1 Noodle 1 Noodle 1 Noodle 1 Noodle   Hang 5' 5' 5' 5' 5'   Cycling 2' 2' 2' 2' 2'   Jacks 2' 2' 2' 2' 2'   X-Country 2' 2' 2' 2' 2'           Balance        SLS                Stretches        Achllies 2x20\" 2x20\" 2x20\" 2x20\" 2x20\"   Hamstring 2x20\" 2x20\" 2x20\" 2x20\" 2x20\"           Breast Stroke 2 Laps NT 2 Laps 2 Laps 2 Laps   Pain Rating 2 No rating provided  8 4 1; 7     Specific Instructions for next treatment: Completed program to tolerance; prep for DC to Indep program due to upcoming surgery/procedure      Assessment: [x] Progressing toward goals. Initially very SOB upon arrival- as patient completed program use of inhaler 1x after walking. SOB seemed to dissipate. Completed program to tolerance- guarded with LE progressions due to (B) Knee pain- patient reports relief with left post treatment however right remained very painful. Finished with deep water unloading to assist with back and knee pain. [] No change. [] Other:    [x] Pt would benefit from aquatic therapy to decrease stress to lumbar spine, improve mobility, and increase activity tolerance to minimize symptoms and improve level of function. STG: (to be met in 6 treatments)  ? Pain: Pt will report decreased pain 6/10 or less to improve walking tolerance to 45 min or more   ? Strength: Pt will demonstrate improved B hip strength 4-/5 or higher to perform ADLs with decreased difficulty  ?  Function: Pt will demonstrate safe lifting/carrying techniques to decrease risk of injury at work   Independent with Home Exercise Programs     LTG: (to be met in 12 treatments)  Pt will demonstrate lumbar ROM WNL to improve mobility  Pt will demonstrate improved B hip strength 4/5 or higher to improve walking/standing tolerance  Pt will demonstrate improved balance evident by B tandem stance 10 seconds or more to decrease risk of falls. Patient goals: to be able to move better without pain     Pt. Education:  [x] Yes  [] No  [x] Reviewed Prior HEP/Ed  Method of Education: [x] Verbal  [x] Demo  [] Written  Comprehension of Education:  [x] Verbalizes understanding. [] Demonstrates understanding. [] Needs review. [] Demonstrates/verbalizes HEP/Ed previously given. Plan: [] Continue per plan of care. [x] Other: PT re-assess this date      Treatment Charges: Mins Units   []  Modalities     [x]  Ther Exercise 8 1   []  Manual Therapy     []  Ther Activities     [x]  Aquatics 47 3   []  Neuromuscular     [] Vasocompression     [] Gait Training     [] Dry needling        [] 1 or 2 muscles        [] 3 or more muscles     [x]  Other re-assessment 6 0   Total Treatment time 47 3     Time In:  350 PM           Time Out: 445 PM    Time spent with Lucina Melgoza PT for re-assessment: 5:08 pm - 5:22pm   Treatment times reflect total treatment time combined by both PT and PTA for today's service. Physical therapy treatment completed today in part by physical therapist assistant (PTA).       Electronically signed by:  Av Tello PTA

## 2022-01-20 NOTE — PROGRESS NOTES
[address] [facility]  [address] [facility]         Phone: [phone]       Fax: [phone]    Physical Therapy Progress Note    Date: 2022      Patient: Maury Angelo  : 1960  MRN: [id number]    MANDIE Peña                      Insurance: [employer]   Auth: NPRe  # of visits allowed/remainin  Medical Diagnosis:   M47.817 (ICD-10-CM) - Lumbosacral spondylosis without myelopathy       Rehab Codes: M 54.41, M 54.42,   Onset date: 10/28/21                         Next Dr's appt. : tbd  Visit Count:                                 Cancel/No Show: 0/4    Subjective:  Pain:  [x] Yes  [] No  Location: low back Pain Rating: (0-10 scale) 10  Pain altered Tx:  [x] No  [] Yes  Action:  Comments: Pt comes to clinic post aquatic therapy session noting minimal low back pain 1/10. Overall pt notes great improvement of low back pain start of therapy with improved activity tolerance. Pt does not chronic B knee pain worsened by recent fall on ice 1/3. Pt was evaluated in ED with no acute findings but pt advised to contact previous ortho physician for evaluation of knee pain. Pt states he will contact ortho after receiving cardiac tens . Objective:           ROM  ° A/P STRENGTH     Left Right Left Right   Hip Flex     4/5 4/5   Ext     4/5 4/5   ER           IR           ABD    4+/5 4+/5   ADD           Knee Flex     5/5 5/5   Ext     5/5 5/5   Ankle DF     5/5 5/5   PF           INV           EVER                         Muscle length: mod L hamstring tightness, min R hamstring tightness  Mod B quad length      Lumbar ROM L R        Flexion  75%   No pain      Extension  wnl     No pain      Side-bending  wnl wnl       Rotation  wnl wnl       Lawrence Tests ? Pain ? Pain No Change Not Tested   RFIS []??  [x]? ?  []??  []??    PAULIE [x]? ?  []??  []??  []??    RFIL []??  []??  []??  [x]? ?    REIL []??  []??  []??  [x]? ?      Balance not assessed this date, below assessment from initial eval 11/16. BALANCE Left  Right   Tandem Stance (Eyes Open) 20 20   Tandem Stance (Eyes Closed)       Single Leg (Eyes Open) 10 12   Single Leg (Eyes Closed       Other: NBOS EC  20 sec           Assessment:  Pt has completed 11/12 therapy sessions so re-assessment completed this date. Overall, pt demonstrates improved B hip strength, lumbar ROM, and improved activity tolerance. Recent set back d/t fall on ice two weeks ago leading to L knee injury. Pt will f/u with ortho for further evaluation but is to continue HEP for low back pain to maintain progression. Importance of HEP and improving overall activity provided to pt this date. Pt to complete 1 remaining aquatic session then discharge with HEP. STG: (to be met in 12 treatments) - assessed 12/15 by Jerolyn Snellen, PT   1. ? Pain: Pt will report decreased pain 6/10 or less to improve walking tolerance to 45 min or more   - MET 4/10 pain   2. ? Strength: Pt will demonstrate improved B hip strength 4-/5 or higher to perform ADLs with decreased difficulty  - MET 4/5 hip strength   3. ? Function: Pt will demonstrate safe lifting/carrying techniques to decrease risk of injury at work   - MET  4. Independent with Home Exercise Programs  - MET notes good improvement of pain with lumbar extension     LTG: (to be met in 12 treatments) - assessed 1/20 by Jerolyn Snellen PT   1. Pt will demonstrate lumbar ROM WNL to improve mobility  - MET   2. Pt will demonstrate improved B hip strength 4/5 or higher to improve walking/standing tolerance  - MET   3. Pt will demonstrate improved balance evident by B tandem stance 10 seconds or more to decrease risk of falls.    - MET      Patient goals: to be able to move better without pain       Todays Treatment:  Modalities:   Precautions:  Exercises: extension biased   Exercise Reps/ Time Weight/ Level Comments   Sitting         Figure 4 stretch x20       B hip abduction x20       Hamstring stretch 2x20\"                 Standing marches x20   B UE support    Standing lumbar ext x10                 education 5 min   Importance of therapy and HEP    Other:         Treatment to Date:  [x] Therapeutic Exercise   24727  [] Iontophoresis: 4 mg/mL Dexamethasone Sodium Phosphate  mAmin  46870   [] Therapeutic Activity  95045 [] Vasopneumatic cold with compression  90603    [] Gait Training   95566 [] Ultrasound   [id number]   [] Neuromuscular Re-education  65309 [] Electrical Stimulation Unattended  62716   [] Manual Therapy  01909 [] Electrical Stimulation Attended  93582   [x] Instruction in HEP  [] Lumbar/Cervical Traction  89142   [x] Aquatic Therapy   00495 [] Cold/hotpack    [] Massage   30488      [] Dry Needling, 1 or 2 muscles  84953   [] Biofeedback, first 15 minutes   92844  [] Biofeedback, additional 15 minutes   76571 [] Dry Needling, 3 or more muscles  31104      Patient Status:     [] Continue per initial plan of care. 12 aquatic sessions     [] Additional visits necessary. [x] Other: complete 1 remaining session then discharge d/t completion of POC. Requested Frequency/Duration: 2 times per week for 12 treatments.         Electronically signed by: Sachin Garcia PT

## 2022-02-01 ENCOUNTER — HOSPITAL ENCOUNTER (OUTPATIENT)
Dept: CARDIAC CATH/INVASIVE PROCEDURES | Age: 62
Discharge: HOME | End: 2022-02-01
Payer: COMMERCIAL

## 2022-02-01 ENCOUNTER — HOSPITAL ENCOUNTER (OUTPATIENT)
Dept: GENERAL RADIOLOGY | Age: 62
Discharge: HOME | End: 2022-02-03
Payer: COMMERCIAL

## 2022-02-01 VITALS
HEIGHT: 65 IN | HEART RATE: 96 BPM | WEIGHT: 272 LBS | BODY MASS INDEX: 45.32 KG/M2 | TEMPERATURE: 98.5 F | DIASTOLIC BLOOD PRESSURE: 81 MMHG | RESPIRATION RATE: 19 BRPM | SYSTOLIC BLOOD PRESSURE: 131 MMHG | OXYGEN SATURATION: 95 %

## 2022-02-01 LAB
ABSOLUTE EOS #: 0.08 K/UL (ref 0–0.44)
ABSOLUTE IMMATURE GRANULOCYTE: 0.03 K/UL (ref 0–0.3)
ABSOLUTE LYMPH #: 0.83 K/UL (ref 1.1–3.7)
ABSOLUTE MONO #: 0.88 K/UL (ref 0.1–1.2)
BASOPHILS # BLD: 0 % (ref 0–2)
BASOPHILS ABSOLUTE: 0.04 K/UL (ref 0–0.2)
BNP INTERPRETATION: NORMAL
CHOLESTEROL: 153 MG/DL
CREAT SERPL-MCNC: 0.84 MG/DL (ref 0.7–1.2)
DIFFERENTIAL TYPE: ABNORMAL
EOSINOPHILS RELATIVE PERCENT: 1 % (ref 1–4)
GFR AFRICAN AMERICAN: >60 ML/MIN
GFR NON-AFRICAN AMERICAN: >60 ML/MIN
GFR NON-AFRICAN AMERICAN: >60 ML/MIN
GFR SERPL CREATININE-BSD FRML MDRD: >60 ML/MIN
GFR SERPL CREATININE-BSD FRML MDRD: NORMAL ML/MIN/{1.73_M2}
GLUCOSE BLD-MCNC: 128 MG/DL (ref 74–100)
HCT VFR BLD CALC: 40.6 % (ref 40.7–50.3)
HEMOGLOBIN: 13.5 G/DL (ref 13–17)
IMMATURE GRANULOCYTES: 0 %
LYMPHOCYTES # BLD: 8 % (ref 24–43)
MCH RBC QN AUTO: 29.5 PG (ref 25.2–33.5)
MCHC RBC AUTO-ENTMCNC: 33.3 G/DL (ref 28.4–34.8)
MCV RBC AUTO: 88.6 FL (ref 82.6–102.9)
MONOCYTES # BLD: 8 % (ref 3–12)
NRBC AUTOMATED: 0 PER 100 WBC
PDW BLD-RTO: 12.4 % (ref 11.8–14.4)
PLATELET # BLD: 259 K/UL (ref 138–453)
PLATELET ESTIMATE: ABNORMAL
PMV BLD AUTO: 10.2 FL (ref 8.1–13.5)
POC BUN: 21 MG/DL (ref 8–26)
POC CREATININE: 1.02 MG/DL (ref 0.51–1.19)
POC HEMATOCRIT: 41 % (ref 41–53)
POC HEMOGLOBIN: 13.8 G/DL (ref 13.5–17.5)
POC LACTIC ACID: 1.22 MMOL/L (ref 0.56–1.39)
POC POTASSIUM: 4.1 MMOL/L (ref 3.5–4.5)
POC SODIUM: 141 MMOL/L (ref 138–146)
PRO-BNP: 91 PG/ML
RBC # BLD: 4.58 M/UL (ref 4.21–5.77)
RBC # BLD: ABNORMAL 10*6/UL
SEG NEUTROPHILS: 83 % (ref 36–65)
SEGMENTED NEUTROPHILS ABSOLUTE COUNT: 9.11 K/UL (ref 1.5–8.1)
SODIUM BLD-SCNC: 140 MMOL/L (ref 135–144)
URIC ACID: 7.8 MG/DL (ref 3.4–7)
WBC # BLD: 11 K/UL (ref 3.5–11.3)
WBC # BLD: ABNORMAL 10*3/UL

## 2022-02-01 PROCEDURE — 6360000002 HC RX W HCPCS: Performed by: STUDENT IN AN ORGANIZED HEALTH CARE EDUCATION/TRAINING PROGRAM

## 2022-02-01 PROCEDURE — 6360000002 HC RX W HCPCS

## 2022-02-01 PROCEDURE — 71045 X-RAY EXAM CHEST 1 VIEW: CPT

## 2022-02-01 PROCEDURE — 82465 ASSAY BLD/SERUM CHOLESTEROL: CPT

## 2022-02-01 PROCEDURE — 84550 ASSAY OF BLOOD/URIC ACID: CPT

## 2022-02-01 PROCEDURE — 83605 ASSAY OF LACTIC ACID: CPT

## 2022-02-01 PROCEDURE — 84520 ASSAY OF UREA NITROGEN: CPT

## 2022-02-01 PROCEDURE — 84295 ASSAY OF SERUM SODIUM: CPT

## 2022-02-01 PROCEDURE — 2500000003 HC RX 250 WO HCPCS

## 2022-02-01 PROCEDURE — 82565 ASSAY OF CREATININE: CPT

## 2022-02-01 PROCEDURE — 7100000000 HC PACU RECOVERY - FIRST 15 MIN

## 2022-02-01 PROCEDURE — 6370000000 HC RX 637 (ALT 250 FOR IP)

## 2022-02-01 PROCEDURE — 83880 ASSAY OF NATRIURETIC PEPTIDE: CPT

## 2022-02-01 PROCEDURE — 93005 ELECTROCARDIOGRAM TRACING: CPT | Performed by: INTERNAL MEDICINE

## 2022-02-01 PROCEDURE — 85014 HEMATOCRIT: CPT

## 2022-02-01 PROCEDURE — C1898 LEAD, PMKR, OTHER THAN TRANS: HCPCS

## 2022-02-01 PROCEDURE — 2709999900 HC NON-CHARGEABLE SUPPLY

## 2022-02-01 PROCEDURE — C1824 GENERATOR, CCM, IMPLANT: HCPCS

## 2022-02-01 PROCEDURE — C1894 INTRO/SHEATH, NON-LASER: HCPCS

## 2022-02-01 PROCEDURE — 82947 ASSAY GLUCOSE BLOOD QUANT: CPT

## 2022-02-01 PROCEDURE — 0408T INSJ/RPLC CARDIAC MODULJ SYS: CPT

## 2022-02-01 PROCEDURE — 84132 ASSAY OF SERUM POTASSIUM: CPT

## 2022-02-01 PROCEDURE — 7100000001 HC PACU RECOVERY - ADDTL 15 MIN

## 2022-02-01 PROCEDURE — 85025 COMPLETE CBC W/AUTO DIFF WBC: CPT

## 2022-02-01 RX ORDER — SODIUM CHLORIDE 0.9 % (FLUSH) 0.9 %
5-40 SYRINGE (ML) INJECTION PRN
Status: DISCONTINUED | OUTPATIENT
Start: 2022-02-01 | End: 2022-02-02 | Stop reason: HOSPADM

## 2022-02-01 RX ORDER — SODIUM CHLORIDE 0.9 % (FLUSH) 0.9 %
5-40 SYRINGE (ML) INJECTION EVERY 12 HOURS SCHEDULED
Status: DISCONTINUED | OUTPATIENT
Start: 2022-02-01 | End: 2022-02-02 | Stop reason: HOSPADM

## 2022-02-01 RX ORDER — SODIUM CHLORIDE 9 MG/ML
25 INJECTION, SOLUTION INTRAVENOUS PRN
Status: DISCONTINUED | OUTPATIENT
Start: 2022-02-01 | End: 2022-02-02 | Stop reason: HOSPADM

## 2022-02-01 RX ORDER — SODIUM CHLORIDE 9 MG/ML
INJECTION, SOLUTION INTRAVENOUS CONTINUOUS
Status: DISCONTINUED | OUTPATIENT
Start: 2022-02-01 | End: 2022-02-02 | Stop reason: HOSPADM

## 2022-02-01 RX ADMIN — Medication 15 UNITS: at 11:59

## 2022-02-01 RX ADMIN — SODIUM CHLORIDE: 9 INJECTION, SOLUTION INTRAVENOUS at 09:20

## 2022-02-01 NOTE — RESEARCH
Asked by Dr. Dat Serna to evaluate pt. For Optimizer Smart Post-Approval Study device implantation/ Intervention. Pt met preliminary inclusion/exclusion criteria. Pt  approached at 12/15/2022. Multilpe questions answered. Consent given to pt. Pt. Read study consent. Questions answered. Consent voluntarily signed at 08:47 2/1/2022. A copy of signed consent given to pt. Safety labs/Ekg obtained. Pt was approved for Device implantataion though PRIA process.

## 2022-02-01 NOTE — PROGRESS NOTES
Resting quietly. Eating sandwich. Left arm in sling and left hand elevated on pillow. No c/o discomfort.

## 2022-02-01 NOTE — OP NOTE
[facility]                Procedure Note  IMPULSE HEMODYNAMIC OPTIMIZER        Becca Monae (60 y.o., male)  1960 2/1/2022      Procedure: Impulse optimizer    Operators:  Primary:   Assistant/ CV Fellow: Indication: CHF    Pre Procedure Conscious Sedation Data:    ASA Class:    [] I [] II [x] III [] IV    Mallampati Class:  [] I [] II [x] III [] IV        Model # Serial #   Device serial number  [id number]        RV1-Lead [id number]   RV2-Lead [id number]       All leads were tested to optimize position in the septal area (Two leds in basal septal and apical septal areas. All program parameters wer documented in chart    · [x] Sedation monitoring  · [] Left Subclavian Angiogram   · [x] RV lead x 2  · [] RA lead   · [] LV lead   · [x] Intra - OP Lead Testing  · [] Coronary Sinus Angiogram   · [x] Pocket   · [x] Generators Implant  · [x] Fluoro time: 5 min      Procedure  After the usual preparation of the left neck and chest, the patient was draped in the usual sterile manner. Local anesthesia was infused below the right clavicle from the midline laterally. An incision was made inferior and parallel to the clavicle. The incision was carried down to the fascia. A pocket was formed inferior using blunt dissection. A thin walled 18 gauge needle was used to puncture the left subclavian vein using the modified Seldinger technique. A guide wire was passed into the right heart under fluoroscopic control. This was repeated with a second guide wire. RV Lead Implant: Time 2    A 7 [place] sheath was then passed over the guide wire and the guide wire removed. The ventricular lead was advanced through the sheath into the right heart. The lead was then positioned in the right ventricle under fluoroscopic control. The acute pacing and sensing thresholds were measured and found to be satisfactory. Generator:     The implanted leads were attached to the pacemaker / AICD using the setscrews. The pocket was irrigated with antibiotic solution. The pulse generator and leads were coiled and placed in the pocket. Fluoroscopy was used to verify the final placement of the pacemaker and leads. The pocket was closed using multiple layers of suture and a dry sterile dressing was applied. There were no complications, patient tolerated the procedure well. The patient left the EP lab in stable condition.       Estimated Blood Loss:  [x] Minimal < 25 cc [] Moderate 25-50 cc  [] >50 cc      Impression / Device:  Successful Implantation of: IMPULSE OPTIMIZER      Plan:  Telemetry monitoring  Interigate pacemaker before discharge  CXR if needed  Wound check at [facility] in 7days  Discharge if patient remains stable        Electronically signed by Nandini Persaud MD on 2/1/2022 at [address] [facility]  [phone]

## 2022-02-01 NOTE — PROGRESS NOTES
Patient admitted, consent signed, all questions answered. Pt ready for procedure. Bed in low position, call light to reach with side rails up 2 of 2. Chesty  Clipped and prepped with chloraprep.

## 2022-02-01 NOTE — PROGRESS NOTES
Noted CXR results WNL. IV removed. Pt ambulated to . Assisted with drsg. Sling on left arm. Ready for discharge.

## 2022-02-01 NOTE — H&P
[facility]  Pre- Procedure History and Physical/Update          Patient Name:  Becca Monae  MRN:    [id number]  YOB: 1960  Date of evaluation:  2/1/2022       Please refer to the consult note / H&P completed by Dr. Bulmaro Courtney in the medical record and note that:       [x] I have examined the patient and reviewed the H&P/Consult and there are no changes to be made to the assessment or plan. [] I have examined the patient and reviewed the H&P/Consult and have noted the following changes:        Past Medical History:   Diagnosis Date    Bronchitis     CAD (coronary artery disease)     Cardiomyopathy ([address]     Chest pain     CHF (congestive heart failure) (HCC)     Chronic back pain     Chronic diastolic CHF (congestive heart failure) ([address] 8/1/2014    COPD (chronic obstructive pulmonary disease) (HCC)     Headache(784.0)     Hyperlipidemia     Hypertension     Inguinal hernia     Low back pain with sciatica     Migraine     Mitral valve regurgitation     Osteoarthritis     Pneumonia     Sleep apnea     with cpap    SOB (shortness of breath) on exertion     Tricuspid regurgitation     Type 2 diabetes mellitus without complication, without long-term current use of insulin ([address]     Under care of team 05/20/2021    cardiology-Dr Vincent Roque visit jan 2021    Wellness examination 05/20/2021    [facility]       Past Surgical History:   Procedure Laterality Date    ADENOIDECTOMY      COLONOSCOPY      COLONOSCOPY  05/28/2021    COLORECTAL CANCER SCREENING, NOT HIGH RISK, POLYPECTOMY    COLONOSCOPY N/A 5/28/2021    COLONOSCOPY POLYPECTOMY HOT BIOPSY performed by Ivan Cervantes IV, DO at [address] [facility]  02/01/2022    impulse optimizer   [address]   exact date unknown        reports that he quit smoking about 23 years ago.  He smoked 0.00 packs per day for 40.00 years. His smokeless tobacco use includes chew. He reports current alcohol use of about 1.0 standard drink of alcohol per week. He reports that he does not use drugs. Prior to Admission medications    Medication Sig Start Date End Date Taking?  Authorizing Provider   sildenafil (VIAGRA) 100 MG tablet Take 1 tablet by mouth as needed for Erectile Dysfunction 1/17/22  Yes Shaina Briceno MD   spironolactone (ALDACTONE) 25 MG tablet Take 1 tablet by mouth nightly 1/17/22  Yes Shaina Briceno MD   simvastatin (ZOCOR) 20 MG tablet Take 1 tablet by mouth nightly 1/17/22  Yes Shaina Briceno MD   acetaminophen (TYLENOL) 500 MG tablet Take 2 tablets by mouth 4 times daily as needed for Pain 1/3/22  Yes Jose Mejia,    bumetanide (BUMEX) 2 MG tablet Take 1 tablet by mouth 2 times daily 10/28/21  Yes Shaina Briceno MD   sacubitril-valsartan (ENTRESTO) 24-26 MG per tablet Take 1 tablet by mouth 2 times daily 10/28/21  Yes Shaina Briceno MD   empagliflozin (JARDIANCE) 10 MG tablet Take 1 tablet by mouth daily 10/28/21  Yes Shaina Briceno MD   metoprolol succinate (TOPROL XL) 50 MG extended release tablet Take 1 tablet by mouth daily 10/28/21  Yes Shaina Briceno MD   Elastic Bandages & Supports (WRIST SPLINT/COCK-UP/RIGHT L) MISC 1 each by Does not apply route daily 10/28/21  Yes Shaina Briceno MD   sildenafil (VIAGRA) 50 MG tablet Take 1 tablet by mouth daily as needed for Erectile Dysfunction 10/28/21  Yes Shaina Briceno MD   metFORMIN (GLUCOPHAGE) 500 MG tablet TAKE 1 TABLET BY MOUTH DAILY (WITH BREAKFAST)  Patient taking differently: 2 times daily (with meals)  9/17/21  Yes Makenna Whaley MD   zoster recombinant adjuvanted vaccine [facility]) 50 MCG/0.5ML SUSR injection Inject 0.5 mLs into the muscle See Admin Instructions 1 dose now and repeat in 2-6 months 9/13/21 3/12/22 Yes Shaina Briceno MD   albuterol (PROVENTIL) (5 MG/ML) 0.5% nebulizer solution Take 0.5 mLs by nebulization every 6 hours as needed for Wheezing or Shortness of Breath 8/30/21  Yes Divya Yi MD   albuterol sulfate  (90 Base) MCG/ACT inhaler Inhale 2 puffs into the lungs every 6 hours as needed for Wheezing 8/30/21  Yes Divya Yi MD   blood glucose monitor kit and supplies Dispense sufficient amount for indicated testing frequency plus additional to accommodate PRN testing needs. Dispense all needed supplies to include: monitor, strips, lancing device, lancets, control solutions, alcohol swabs. 8/30/21  Yes Divya Yi MD   albuterol-ipratropium (COMBIVENT RESPIMAT)  MCG/ACT AERS inhaler INHALE 1 PUFF INTO THE LUNGS EVERY 6 HOURS 8/30/21  Yes Divya Yi MD   Masks (FACE MASK) [address] [facility] 1 each by Does not apply route as needed (as needed) 8/30/21  Yes Divya Yi MD   diclofenac sodium (VOLTAREN) 1 % GEL Apply topically 2 times daily 8/30/21  Yes Divya Yi MD   aspirin 81 MG tablet Take 1 tablet by mouth daily 8/13/19  Yes Divya Yi MD   potassium chloride (KLOR-CON M) 20 MEQ extended release tablet Take 1 tablet by mouth daily (with breakfast) 1/4/22   Brielle Shell MD   ibuprofen (ADVIL;MOTRIN) 800 MG tablet Take 1 tablet by mouth 2 times daily as needed for Pain 1/3/22 1/3/22  Marysol Tariq DO   gabapentin (NEURONTIN) 300 MG capsule TAKE 1 CAPSULE BY MOUTH NIGHTLY AS NEEDED (PAIN) 12/13/21 12/13/22  Divya Yi MD   naproxen (NAPROSYN) 500 MG tablet Take 1 tablet by mouth 2 times daily as needed for Pain 12/13/21   Divya Yi MD   nitroGLYCERIN (NITROSTAT) 0.4 MG SL tablet PLACE 1 TABLET UNDER THE TONGUE EVERY 5 MINUTES AS NEEDED FOR CHEST PAIN 9/27/21   Jennifer Brooke MD       Allergies   Allergen Reactions    Penicillins Swelling    Sulfa Antibiotics Swelling    Sulfasalazine Swelling    Pseudoeph-Doxylamine-Dm-Apap Rash    Rabbit Protein Rash     Fur, gerbils         REVIEW OF SYSTEMS:     A detailed review of system was performed as already noted and is otherwise as above.        PHYSICAL EXAM:     Vitals: 02/01/22 0907   BP: (!) 150/95   Pulse: 110   Resp: 20   Temp: 98.5 °F (36.9 °C)   SpO2: 99%        Constitutional and General Appearance: Alert. Not in acute stress. Respiratory:  · Clear to auscultation b/l. No wheeze or crackles. Cardiovascular:  · Regular rate and rhythm. No murmurs. · Jugular venous pulsation is normal  Abdomen:  · No masses or tenderness  Extremities:  · Lower extremity edema - No  · Skin: Warm and dry  Neurological:  · Alert and oriented. · Moves all extremities well      Active Problems:    * No active hospital problems. *  Resolved Problems:    * No resolved hospital problems. *      Assessment:  1. Non-ischemic cardiomyopathy with LVEF 33% (ECHO on 6/1/21)  2. HTN  3. Dyslipidemia  4. Obesity      Plan:  1. Proceed with planned impulse optimizer device. 2. Further orders to follow. Pre Procedure Conscious Sedation Data:  ASA Class:                  [] I [x] II [] III [] IV     Mallampati Class:       [] I [x] II [] III [] IV      The risks, benefits, and alternatives of the prcoedure were discussed in detail with the patient. The patient agrees to proceed with the procedure, verbalizes understanding and signed consent.        Vin Castellon MD, MD  Fellow, [id number] [facility]

## 2022-02-03 LAB
EKG ATRIAL RATE: 107 BPM
EKG P AXIS: 53 DEGREES
EKG P-R INTERVAL: 152 MS
EKG Q-T INTERVAL: 356 MS
EKG QRS DURATION: 96 MS
EKG QTC CALCULATION (BAZETT): 475 MS
EKG R AXIS: 10 DEGREES
EKG T AXIS: 73 DEGREES
EKG VENTRICULAR RATE: 107 BPM

## 2022-02-03 PROCEDURE — 93010 ELECTROCARDIOGRAM REPORT: CPT | Performed by: INTERNAL MEDICINE

## 2022-02-14 ENCOUNTER — OFFICE VISIT (OUTPATIENT)
Dept: FAMILY MEDICINE CLINIC | Age: 62
End: 2022-02-14
Payer: COMMERCIAL

## 2022-02-14 VITALS
WEIGHT: 277 LBS | SYSTOLIC BLOOD PRESSURE: 142 MMHG | HEART RATE: 104 BPM | HEIGHT: 65 IN | BODY MASS INDEX: 46.15 KG/M2 | DIASTOLIC BLOOD PRESSURE: 84 MMHG

## 2022-02-14 DIAGNOSIS — I50.33 CHF (CONGESTIVE HEART FAILURE), NYHA CLASS I, ACUTE ON CHRONIC, DIASTOLIC (HCC): ICD-10-CM

## 2022-02-14 DIAGNOSIS — I10 PRIMARY HYPERTENSION: ICD-10-CM

## 2022-02-14 DIAGNOSIS — I50.22 CHRONIC SYSTOLIC CONGESTIVE HEART FAILURE (HCC): Primary | ICD-10-CM

## 2022-02-14 PROCEDURE — G8417 CALC BMI ABV UP PARAM F/U: HCPCS | Performed by: STUDENT IN AN ORGANIZED HEALTH CARE EDUCATION/TRAINING PROGRAM

## 2022-02-14 PROCEDURE — 3017F COLORECTAL CA SCREEN DOC REV: CPT | Performed by: STUDENT IN AN ORGANIZED HEALTH CARE EDUCATION/TRAINING PROGRAM

## 2022-02-14 PROCEDURE — 99213 OFFICE O/P EST LOW 20 MIN: CPT | Performed by: STUDENT IN AN ORGANIZED HEALTH CARE EDUCATION/TRAINING PROGRAM

## 2022-02-14 PROCEDURE — G8427 DOCREV CUR MEDS BY ELIG CLIN: HCPCS | Performed by: STUDENT IN AN ORGANIZED HEALTH CARE EDUCATION/TRAINING PROGRAM

## 2022-02-14 PROCEDURE — G8484 FLU IMMUNIZE NO ADMIN: HCPCS | Performed by: STUDENT IN AN ORGANIZED HEALTH CARE EDUCATION/TRAINING PROGRAM

## 2022-02-14 PROCEDURE — 4004F PT TOBACCO SCREEN RCVD TLK: CPT | Performed by: STUDENT IN AN ORGANIZED HEALTH CARE EDUCATION/TRAINING PROGRAM

## 2022-02-14 ASSESSMENT — ENCOUNTER SYMPTOMS
COUGH: 0
COLOR CHANGE: 0
CHEST TIGHTNESS: 0
SHORTNESS OF BREATH: 0
APNEA: 0

## 2022-02-14 NOTE — PATIENT INSTRUCTIONS
Thank you for letting us take care of you today. We hope all your questions were addressed. If a question was overlooked or something else comes to mind after you return home, please contact a member of your Care Team listed below. Your Care Team at [place] [address] is Team #5  Ady Burns MD (Faculty)  Kasey Lombardo MD (Resident)  Shellie Schafer MD (Resident)  Eunice Chan MD (Resident)  Briana Mack MD (Resident)  Stephanie Espinoza., DIXIE Portillo., JANETH Castle., Tiffany Manuel., [facility] office)  Marielle Garland, [place] (Clinical Practice Manager)  Zaid Hardin, [facility] (Clinical Pharmacist)       Office phone number: [phone]    If you need to get in right away due to illness, please be advised we have \"Same Day\" appointments available Monday-Friday. Please call us at [phone] option #3 to schedule your \"Same Day\" appointment.

## 2022-02-14 NOTE — PROGRESS NOTES
Attending Physician Statement  I have discussed the care of DuncanMosherincluding pertinent history and exam findings,  with the resident. I have reviewed the key elements of all parts of the encounter with the resident. I agree with the assessment, plan and orders as documented by the resident.   (GE Modifier)    S/p CCM procedure- doing well

## 2022-02-14 NOTE — PROGRESS NOTES
Subjective:    Saul Gaspar is a 57 y.o. male with  has a past medical history of Bronchitis, CAD (coronary artery disease), Cardiomyopathy ([address] Chest pain, CHF (congestive heart failure) ([address] Chronic back pain, Chronic diastolic CHF (congestive heart failure) ([address] COPD (chronic obstructive pulmonary disease) ([address] Headache(784.0), Hyperlipidemia, Hypertension, Inguinal hernia, Low back pain with sciatica, Migraine, Mitral valve regurgitation, Osteoarthritis, Pneumonia, Sleep apnea, SOB (shortness of breath) on exertion, Tricuspid regurgitation, Type 2 diabetes mellitus without complication, without long-term current use of insulin ([address] Under care of team, and Wellness examination. Presented to the office today for:  Chief Complaint   Patient presents with    1 Month Follow-Up     f/u from tens unit procedure    Health Maintenance     pt had both doses of shingles vaccine, pt had eye exam last month on [place]- [facility] request records, pt declined flu vacine       HPI     Patient is a 68-year-old male who presented to the office today post follow-up from CCM procedure(cardiac contractility modulation) patient states he has never felt as energetic since he does currently, followed up with cardiology last week. States his shortness of breath is gone, denies any chest pain at all. Got his COVID-19 booster, denies flu shot. No other complaints today. Review of Systems   Constitutional: Negative for activity change, appetite change, chills, fatigue and fever. Respiratory: Negative for apnea, cough, chest tightness and shortness of breath. Musculoskeletal: Negative for joint swelling. Skin: Negative for color change, pallor, rash and wound. Neurological: Negative for dizziness, tremors, facial asymmetry, light-headedness and headaches. Psychiatric/Behavioral: Negative for agitation, behavioral problems and confusion. The patient is not nervous/anxious.                   The patient has a Family History   Problem Relation Age of Onset    Kidney Disease Mother     Diabetes Mother     Heart Disease Mother     Arthritis Mother     Heart Disease Father     Heart Attack Father     Arthritis Sister     Diabetes Sister     Heart Disease Sister     Diabetes Sister        Objective:    BP (!) 142/84 (Site: Right Upper Arm, Position: Sitting, Cuff Size: Medium Adult)   Pulse 104   Ht 5' 5\" (1.651 m)   Wt 277 lb (125.6 kg)   BMI 46.10 kg/m²    BP Readings from Last 3 Encounters:   02/14/22 (!) 142/84   02/01/22 131/81   01/17/22 (!) 150/86       Physical Exam  Vitals and nursing note reviewed. Constitutional:       Appearance: Normal appearance. He is obese. Cardiovascular:      Rate and Rhythm: Normal rate and regular rhythm. Pulses: Normal pulses. Heart sounds: Normal heart sounds. Pulmonary:      Effort: Pulmonary effort is normal.      Breath sounds: Normal breath sounds. Abdominal:      General: Abdomen is flat. Bowel sounds are normal.      Palpations: Abdomen is soft. Neurological:      General: No focal deficit present. Mental Status: He is alert and oriented to person, place, and time. Mental status is at baseline. Psychiatric:         Mood and Affect: Mood normal.         Behavior: Behavior normal.         Thought Content:  Thought content normal.         Judgment: Judgment normal.         Lab Results   Component Value Date    WBC 11.0 02/01/2022    HGB 13.5 02/01/2022    HCT 40.6 (L) 02/01/2022     02/01/2022    CHOL 153 02/01/2022    TRIG 241 (H) 01/17/2022    HDL 36 (L) 01/17/2022    ALT 27 01/21/2021    AST 19 01/21/2021     02/01/2022    K 4.3 12/01/2021    CL 94 (L) 12/01/2021    CREATININE 0.84 02/01/2022    BUN 23 12/01/2021    CO2 26 12/01/2021    TSH 0.41 01/22/2021    PSA 1.06 07/01/2014    INR 0.9 05/30/2021    LABA1C 7.0 01/17/2022    LABMICR Can not be calculated 12/13/2021     Lab Results   Component Value Date    CALCIUM 8.8 12/01/2021     Lab Results   Component Value Date    LDLCHOLESTEROL 60 01/17/2022       Assessment and Plan:    1. Chronic systolic congestive heart failure ([address]  -CCM procedure done, tolerated well    2. Primary hypertension  -Stable    3. CHF (congestive heart failure), NYHA class I, acute on chronic, diastolic (HCC)            Requested Prescriptions      No prescriptions requested or ordered in this encounter       There are no discontinued medications. Brenda Cruz received counseling on the following healthy behaviors: nutrition, exercise and medication adherence    Discussed use,benefit, and side effects of prescribed medications. Barriers to medication compliance addressed. All patient questions answered. Pt voiced understanding. Return in about 3 months (around 5/14/2022) for annual physical .        Disclaimer: Some orall of this note was transcribed using voice-recognition software. This may cause typographical errors occasionally. Although all effort is made to fix these errors, please do not hesitate to contact our office if there Seb [employer] concern with the understanding of this note.

## 2022-02-16 PROBLEM — Z13.220 SCREENING FOR HYPERLIPIDEMIA: Status: RESOLVED | Noted: 2022-01-17 | Resolved: 2022-02-16

## 2022-02-17 ENCOUNTER — HOSPITAL ENCOUNTER (OUTPATIENT)
Dept: OTHER | Age: 62
Discharge: HOME | End: 2022-02-17
Payer: COMMERCIAL

## 2022-02-17 VITALS
HEART RATE: 98 BPM | DIASTOLIC BLOOD PRESSURE: 80 MMHG | RESPIRATION RATE: 20 BRPM | WEIGHT: 273.8 LBS | OXYGEN SATURATION: 96 % | BODY MASS INDEX: 45.56 KG/M2 | SYSTOLIC BLOOD PRESSURE: 122 MMHG

## 2022-02-17 PROCEDURE — 99212 OFFICE O/P EST SF 10 MIN: CPT

## 2022-02-17 ASSESSMENT — PAIN DESCRIPTION - DESCRIPTORS: DESCRIPTORS: ACHING

## 2022-02-17 ASSESSMENT — PAIN SCALES - GENERAL: PAINLEVEL_OUTOF10: 3

## 2022-02-17 ASSESSMENT — PAIN DESCRIPTION - LOCATION: LOCATION: KNEE

## 2022-02-17 ASSESSMENT — PAIN DESCRIPTION - ORIENTATION: ORIENTATION: RIGHT;LEFT

## 2022-02-17 NOTE — PROGRESS NOTES
Date:  2022  Time:  1:55 PM    CHF Clinic at [facility]    Office: [phone] Fax: [phone]    Re:  Kennith Cockayne   Patient : 1960    Vital Signs: /80   Pulse 98   Resp 20   Wt 273 lb 12.8 oz (124.2 kg)   SpO2 96%   BMI 45.56 kg/m²                       O2 Device: None (Room air)                           No results for input(s): CBC, HGB, HCT, WBC, PLATELET, NA, K, CL, CO2, BUN, CREATININE, GLUCOSE, BNP, INR in the last 72 hours. Respiratory:         Breath Sounds  Right Upper Lobe: Clear  Right Middle Lobe: Clear  Right Lower Lobe: Clear  Left Upper Lobe: Clear  Left Lower Lobe: Clear    Cough/Sputum  Cough: Productive  Frequency: Occasional  Sputum Amount: Small  Sputum Color: White         Peripheral Vascular  RLE Edema: +1,Pitting  LLE Edema: +1,+2,Pitting      Complaints: No complaints. I feel great! Comment : Pt here for CHF f/u assessment. He had an impulse optimizer device  implanted on 22. Pt states within 2 days he's felt so much better. States he has a lot more energy,and \" I feel like a 26year old somedays! \". He denies increased shortness of breath, chest pain or any complaint. Left upper anterior chest device site looks clean dry , edges of wound well approximated and no drainage, redness or warmth at the site. His lower extremity swelling measurements are decreased from last visit here one month ago. Still has 1+ edema bilaterallyl today but down from 2+ bilaterally at last visit. Pt encouraged to keep feet elevated when up in chair. He has had some noncompliance with low sodium diet at times. Ate two hotdogs last evening for dinner. Pt educated again about making better food choices , and eating foods which are lower in sodium along with following the fluid restriction of 2 liters daily. He is compliant with all of his medicationsl. Current diuretics: Bumex 2 mg po BID and Aldactone 25 mg every night. No other complaints.  Next f/u here in one month.      Electronically signed by Montana Troy RN on 2/17/2022 at 1:55 PM

## 2022-03-25 ENCOUNTER — HOSPITAL ENCOUNTER (OUTPATIENT)
Dept: OTHER | Age: 62
Discharge: HOME | End: 2022-03-25
Payer: COMMERCIAL

## 2022-03-25 VITALS
HEART RATE: 74 BPM | RESPIRATION RATE: 16 BRPM | OXYGEN SATURATION: 96 % | DIASTOLIC BLOOD PRESSURE: 80 MMHG | SYSTOLIC BLOOD PRESSURE: 120 MMHG | WEIGHT: 270 LBS | BODY MASS INDEX: 44.93 KG/M2

## 2022-03-25 DIAGNOSIS — I50.22 CHRONIC SYSTOLIC CONGESTIVE HEART FAILURE (HCC): Primary | ICD-10-CM

## 2022-03-25 DIAGNOSIS — K46.9 HERNIA OF ABDOMINAL CAVITY: ICD-10-CM

## 2022-03-25 PROBLEM — I50.33 CHF (CONGESTIVE HEART FAILURE), NYHA CLASS I, ACUTE ON CHRONIC, DIASTOLIC (HCC): Status: RESOLVED | Noted: 2021-05-30 | Resolved: 2022-03-25

## 2022-03-25 PROCEDURE — 99212 OFFICE O/P EST SF 10 MIN: CPT

## 2022-03-25 PROCEDURE — 99212 OFFICE O/P EST SF 10 MIN: CPT | Performed by: NURSE PRACTITIONER

## 2022-03-25 ASSESSMENT — ENCOUNTER SYMPTOMS
BLOOD IN STOOL: 0
SHORTNESS OF BREATH: 1
ABDOMINAL PAIN: 0
EYE DISCHARGE: 0
COUGH: 1

## 2022-03-25 NOTE — PROGRESS NOTES
CHF Clinic at [facility]    Office: [phone] Fax: [phone] [facility]  [address] [address]  Dept: [phone]  Loc: [phone]    Harjeet Ann is a 57 y.o. male who presents today for CHF evaluation. HPI:     No  shortness of breath  +   Fatigue, naps 3-4 x/ week. Napping less since device placed. Only napped once this week.   +  Edema  At baseline.    Denies chest pain   Denies  palpitations   No orthopnea , nor PND           Past Medical History:   Diagnosis Date    Bronchitis     CAD (coronary artery disease)     Cardiomyopathy ([address]     Chest pain     CHF (congestive heart failure) ([address]     CHF (congestive heart failure), NYHA class I, acute on chronic, diastolic ([facility]) 3/48/4578    Chronic back pain     Chronic diastolic CHF (congestive heart failure) ([address] 8/1/2014    COPD (chronic obstructive pulmonary disease) ([facility])     Headache(784.0)     Hyperlipidemia     Hypertension     Inguinal hernia     Low back pain with sciatica     Migraine     Mitral valve regurgitation     Osteoarthritis     Pneumonia     Sleep apnea     with cpap    SOB (shortness of breath) on exertion     Tricuspid regurgitation     Type 2 diabetes mellitus without complication, without long-term current use of insulin ([address]     Under care of team 05/20/2021    cardiology-Dr Eduardo Felton visit jan 2021    Wellness examination 05/20/2021    [facility]     Past Surgical History:   Procedure Laterality Date    ADENOIDECTOMY      COLONOSCOPY      COLONOSCOPY  05/28/2021    COLORECTAL CANCER SCREENING, NOT HIGH RISK, POLYPECTOMY    COLONOSCOPY N/A 5/28/2021    COLONOSCOPY POLYPECTOMY HOT BIOPSY performed by Jerry Cervantes IV, DO at [address]  02/01/2022    impulse optimizer    TONSILLECTOMY  1963   exact date unknown       Family History   Problem Relation Age of Onset    Kidney Disease Mother     Diabetes Mother     Heart Disease Mother     Arthritis Mother     Heart Disease Father     Heart Attack Father     Arthritis Sister     Diabetes Sister     Heart Disease Sister     Diabetes Sister        Social History     Tobacco Use    Smoking status: Former Smoker     Packs/day: 0.00     Years: 40.00     Pack years: 0.00     Quit date:      Years since quittin.2    Smokeless tobacco: Current User     Types: Chew    Tobacco comment: Chews / quit cigarettes   Substance Use Topics    Alcohol use: Yes     Alcohol/week: 1.0 standard drink     Types: 1 Glasses of wine per week     Comment: occ.       Current Outpatient Medications   Medication Sig Dispense Refill    sildenafil (VIAGRA) 100 MG tablet Take 1 tablet by mouth as needed for Erectile Dysfunction 10 tablet 0    spironolactone (ALDACTONE) 25 MG tablet Take 1 tablet by mouth nightly 30 tablet 1    simvastatin (ZOCOR) 20 MG tablet Take 1 tablet by mouth nightly 30 tablet 3    potassium chloride (KLOR-CON M) 20 MEQ extended release tablet Take 1 tablet by mouth daily (with breakfast) 60 tablet 3    acetaminophen (TYLENOL) 500 MG tablet Take 2 tablets by mouth 4 times daily as needed for Pain (Patient not taking: Reported on 2022) 20 tablet 0    gabapentin (NEURONTIN) 300 MG capsule TAKE 1 CAPSULE BY MOUTH NIGHTLY AS NEEDED (PAIN) 30 capsule 1    naproxen (NAPROSYN) 500 MG tablet Take 1 tablet by mouth 2 times daily as needed for Pain (Patient not taking: Reported on 2022) 60 tablet 0    bumetanide (BUMEX) 2 MG tablet Take 1 tablet by mouth 2 times daily 60 tablet 2    sacubitril-valsartan (ENTRESTO) 24-26 MG per tablet Take 1 tablet by mouth 2 times daily 60 tablet 3    empagliflozin (JARDIANCE) 10 MG tablet Take 1 tablet by mouth daily 30 tablet 3    metoprolol succinate (TOPROL XL) 50 MG extended release tablet Take 1 tablet by mouth daily 30 tablet 3    Elastic Bandages & Supports (WRIST SPLINT/COCK-UP/RIGHT L) MISC 1 each by Does not apply route daily 1 each 0    sildenafil (VIAGRA) 50 MG tablet Take 1 tablet by mouth daily as needed for Erectile Dysfunction (Patient not taking: Reported on 2/14/2022) 10 tablet 0    nitroGLYCERIN (NITROSTAT) 0.4 MG SL tablet PLACE 1 TABLET UNDER THE TONGUE EVERY 5 MINUTES AS NEEDED FOR CHEST PAIN 25 tablet 0    metFORMIN (GLUCOPHAGE) 500 MG tablet TAKE 1 TABLET BY MOUTH DAILY (WITH BREAKFAST) (Patient taking differently: 2 times daily (with meals) ) 60 tablet 3    albuterol (PROVENTIL) (5 MG/ML) 0.5% nebulizer solution Take 0.5 mLs by nebulization every 6 hours as needed for Wheezing or Shortness of Breath 120 each 3    albuterol sulfate  (90 Base) MCG/ACT inhaler Inhale 2 puffs into the lungs every 6 hours as needed for Wheezing 8.5 g 1    blood glucose monitor kit and supplies Dispense sufficient amount for indicated testing frequency plus additional to accommodate PRN testing needs. Dispense all needed supplies to include: monitor, strips, lancing device, lancets, control solutions, alcohol swabs. 1 kit 0    albuterol-ipratropium (COMBIVENT RESPIMAT)  MCG/ACT AERS inhaler INHALE 1 PUFF INTO THE LUNGS EVERY 6 HOURS 4 g 5    Masks (FACE MASK) MISC 1 each by Does not apply route as needed (as needed) 50 each 3    diclofenac sodium (VOLTAREN) 1 % GEL Apply topically 2 times daily 350 g 2    aspirin 81 MG tablet Take 1 tablet by mouth daily 30 tablet 3     No current facility-administered medications for this encounter. Allergies   Allergen Reactions    Penicillins Swelling    Sulfa Antibiotics Swelling    Sulfasalazine Swelling    Pseudoeph-Doxylamine-Dm-Apap Rash    Rabbit Protein Rash     Fur, gerbils         Subjective:      Review of Systems   Constitutional: Positive for fatigue. Negative for activity change, chills and fever.    Eyes: Negative for discharge and visual disturbance. Respiratory: Positive for cough (assoc with copd flare ups) and shortness of breath. Cardiovascular: Negative for chest pain and leg swelling. Gastrointestinal: Negative for abdominal pain and blood in stool. Endocrine: Negative for cold intolerance and heat intolerance. Genitourinary: Negative for dysuria and flank pain. Musculoskeletal: Negative for joint swelling and myalgias. Skin: Negative for pallor and rash. Neurological: Negative for dizziness, light-headedness and headaches. Psychiatric/Behavioral: Negative for hallucinations and suicidal ideas. Objective:     Physical Exam  Vitals and nursing note reviewed. Constitutional:       Appearance: He is obese. Comments:      HENT:      Head: Normocephalic and atraumatic. Eyes:      General: No scleral icterus. Conjunctiva/sclera: Conjunctivae normal.   Cardiovascular:      Rate and Rhythm: Normal rate and regular rhythm. Heart sounds: Normal heart sounds. Pulmonary:      Effort: Pulmonary effort is normal.      Breath sounds: Normal breath sounds. No wheezing or rales. Abdominal:      Comments: Mod firm obese abdomen. Pt reports his baseline d/t hernia. Musculoskeletal:         General: Normal range of motion. Cervical back: Normal range of motion. Skin:     General: Skin is warm and dry. Neurological:      Mental Status: He is alert and oriented to person, place, and time. /80   Pulse 74   Resp 16   Wt 270 lb (122.5 kg)   SpO2 96%   BMI 44.93 kg/m²           Lower Extremity Measurements in cm.    R Calf Circumference (cm): 29 cm  L Calf Circumference (cm): 27 cm  R Ankle Circumference (cm): 47 cm  L Ankle Circumference (cm): 45 cm    CBC:   Lab Results   Component Value Date    WBC 11.0 02/01/2022    RBC 4.58 02/01/2022    RBC 4.56 02/17/2012    HGB 13.5 02/01/2022    HCT 40.6 02/01/2022    MCV 88.6 02/01/2022    MCH 29.5 02/01/2022    MCHC 33.3 02/01/2022    RDW 12.4 02/01/2022     02/01/2022    PLT Platelet clumps present, count appears adequate. 02/17/2012    MPV 10.2 02/01/2022     CMP:    Lab Results   Component Value Date     02/01/2022    K 4.3 12/01/2021    CL 94 12/01/2021    CO2 26 12/01/2021    BUN 23 12/01/2021    CREATININE 0.84 02/01/2022    CREATININE 1.02 02/01/2022    GFRAA >60 02/01/2022    LABGLOM >60 02/01/2022    GLUCOSE 136 12/01/2021    GLUCOSE 90 02/17/2012    PROT 6.9 01/21/2021    LABALBU 3.9 01/21/2021    LABALBU 4.7 02/17/2012    CALCIUM 8.8 12/01/2021    BILITOT <0.15 01/21/2021    ALKPHOS 66 01/21/2021    AST 19 01/21/2021    ALT 27 01/21/2021     Lab Results   Component Value Date    LABA1C 7.0 01/17/2022     CONCLUSIONS     Summary  Technically difficult study  Left ventricle is normal in size and wall thickness. Severe anterior wall and apical hypokinesis  Left ventricular systolic function is moderately reduced. Calculated EF via  De La Paz's method is 33 %. Both atria are normal in size. Normal right ventricular size and function. Mild to moderate mitral regurgitation. No significant pericardial effusion is seen. Normal aortic root dimension. IVC not well visualized     Signature  ----------------------------------------------------------------------------   Electronically signed by Andre Carranza(Interpreting physician) on   06/02/2021 10:05 AM    :Assessment      1. Chronic systolic congestive [facility] failure ([facility]    2. Hernia of abdominal cavity        :Plan      1. Chronic systolic congestive heart failure ([facility]    2. Hernia of abdominal cavity      Wt is down 3 lbs   leg measurements are down in 2   measurements , up  in one, and unchanged in another. .       Edema is present, at baseline. Lungs clear. On Guideline-Directed Medication Therapy:     ACEI / ARB / ARNi: Entresto 24/26 mg b.i.d.: not titrated up d/t /80. Concerns for HOTN with increase.    Beta - blocker  Aldosterone

## 2022-03-31 DIAGNOSIS — I50.22 CHRONIC SYSTOLIC CONGESTIVE HEART FAILURE (HCC): ICD-10-CM

## 2022-03-31 DIAGNOSIS — M54.50 CHRONIC MIDLINE LOW BACK PAIN WITHOUT SCIATICA: ICD-10-CM

## 2022-03-31 DIAGNOSIS — G89.29 CHRONIC MIDLINE LOW BACK PAIN WITHOUT SCIATICA: ICD-10-CM

## 2022-03-31 DIAGNOSIS — J42 CHRONIC BRONCHITIS, UNSPECIFIED CHRONIC BRONCHITIS TYPE (HCC): ICD-10-CM

## 2022-03-31 RX ORDER — ALBUTEROL SULFATE 2.5 MG/3ML
SOLUTION RESPIRATORY (INHALATION)
Qty: 360 ML | Refills: 3 | Status: SHIPPED | OUTPATIENT
Start: 2022-03-31 | End: 2023-03-31

## 2022-03-31 RX ORDER — GABAPENTIN 300 MG/1
CAPSULE ORAL
Qty: 30 CAPSULE | Refills: 1 | Status: SHIPPED | OUTPATIENT
Start: 2022-03-31 | End: 2023-03-31

## 2022-03-31 RX ORDER — SPIRONOLACTONE 25 MG/1
25 TABLET ORAL NIGHTLY
Qty: 30 TABLET | Refills: 2 | Status: SHIPPED | OUTPATIENT
Start: 2022-03-31 | End: 2022-06-14 | Stop reason: SDUPTHER

## 2022-03-31 RX ORDER — NAPROXEN 500 MG/1
TABLET ORAL
Qty: 30 TABLET | Refills: 2 | Status: SHIPPED | OUTPATIENT
Start: 2022-03-31 | End: 2022-06-14 | Stop reason: SDUPTHER

## 2022-03-31 NOTE — TELEPHONE ENCOUNTER
Please address the medication refill and close the encounter. If I can be of assistance, please route to the applicable pool. Thank you. Last visit: 2-14-22  Last Med refill: 1-17-22  Does patient have enough medication for 72 hours: No:     Next Visit Date:  No future appointments. Health Maintenance   Topic Date Due    Diabetic foot exam  Never done    Diabetic retinal exam  Never done    COVID-19 Vaccine (3 - Booster for Pfizer series) 02/20/2022    Depression Screen  03/08/2022    Flu vaccine (1) 02/14/2023 (Originally 9/1/2021)    Diabetic microalbuminuria test  12/13/2022    A1C test (Diabetic or Prediabetic)  01/17/2023    Lipid screen  02/01/2023    Potassium monitoring  02/01/2023    Creatinine monitoring  02/01/2023    Colorectal Cancer Screen  05/28/2024    Pneumococcal 0-64 years Vaccine (2 of 2 - PPSV23) 12/18/2024    DTaP/Tdap/Td vaccine (2 - Td or Tdap) 12/08/2030    Shingles Vaccine  Completed    Hepatitis C screen  Completed    HIV screen  Completed    Hepatitis A vaccine  Aged Out    Hib vaccine  Aged Out    Meningococcal (ACWY) vaccine  Aged Out       Hemoglobin A1C (%)   Date Value   01/17/2022 7.0   08/30/2021 6.3   03/08/2021 5.9             ( goal A1C is < 7)   Microalb/Crt.  Ratio (mcg/mg creat)   Date Value   12/13/2021 Can not be calculated     LDL Cholesterol (mg/dL)   Date Value   01/17/2022 60   01/22/2021 117       (goal LDL is <100)   AST (U/L)   Date Value   01/21/2021 19     ALT (U/L)   Date Value   01/21/2021 27     BUN (mg/dL)   Date Value   12/01/2021 23     BP Readings from Last 3 Encounters:   03/25/22 120/80   02/17/22 122/80   02/14/22 (!) 142/84          (goal 120/80)    All Future Testing planned in [facility]  Lab Frequency Next Occurrence   COVID-19 Once 96/98/3254   Basic Metabolic Panel Once 01/34/2981   Initiate PAT Protocol Once 02/15/2022               Patient Active Problem List:     HTN (hypertension)     Skin tag     Low back pain with sciatica     Hyperglycemia     Infected sebaceous cyst     Chronic midline low back pain without sciatica     Chronic pain of right knee     Chest pain, unspecified     Bronchitis     Chronic systolic congestive heart failure (HCC)     Ventral hernia     Epigastric pain     Elevated LFTs     Shortness of breath     Rectus diastasis     Lumbosacral spondylosis without myelopathy     COPD exacerbation (HCC)     Mixed simple and mucopurulent chronic bronchitis (HCC)     VANDANA (obstructive sleep apnea)     Tachycardia     Chest pain     Diabetes mellitus type 2 in obese [facility])     Need for shingles vaccine     Other male erectile dysfunction     Hernia of abdominal cavity

## 2022-04-20 ENCOUNTER — HOSPITAL ENCOUNTER (EMERGENCY)
Age: 62
Discharge: HOME | End: 2022-04-20
Attending: EMERGENCY MEDICINE
Payer: COMMERCIAL

## 2022-04-20 ENCOUNTER — APPOINTMENT (OUTPATIENT)
Dept: GENERAL RADIOLOGY | Age: 62
End: 2022-04-20
Payer: COMMERCIAL

## 2022-04-20 VITALS
OXYGEN SATURATION: 97 % | WEIGHT: 270 LBS | SYSTOLIC BLOOD PRESSURE: 128 MMHG | HEART RATE: 107 BPM | RESPIRATION RATE: 17 BRPM | DIASTOLIC BLOOD PRESSURE: 62 MMHG | TEMPERATURE: 98.4 F | BODY MASS INDEX: 44.93 KG/M2

## 2022-04-20 DIAGNOSIS — J44.1 COPD EXACERBATION (HCC): Primary | ICD-10-CM

## 2022-04-20 LAB
ABSOLUTE EOS #: 0.1 K/UL (ref 0–0.4)
ABSOLUTE LYMPH #: 1.3 K/UL (ref 1–4.8)
ABSOLUTE MONO #: 0.7 K/UL (ref 0.1–1.3)
ANION GAP SERPL CALCULATED.3IONS-SCNC: 11 MMOL/L (ref 9–17)
BASOPHILS # BLD: 0 % (ref 0–2)
BASOPHILS ABSOLUTE: 0 K/UL (ref 0–0.2)
BUN BLDV-MCNC: 22 MG/DL (ref 8–23)
CALCIUM SERPL-MCNC: 8.9 MG/DL (ref 8.6–10.4)
CHLORIDE BLD-SCNC: 99 MMOL/L (ref 98–107)
CO2: 27 MMOL/L (ref 20–31)
CREAT SERPL-MCNC: 0.77 MG/DL (ref 0.7–1.2)
D-DIMER QUANTITATIVE: 0.33 MG/L FEU (ref 0–0.59)
EOSINOPHILS RELATIVE PERCENT: 2 % (ref 0–4)
GFR AFRICAN AMERICAN: >60 ML/MIN
GFR NON-AFRICAN AMERICAN: >60 ML/MIN
GFR SERPL CREATININE-BSD FRML MDRD: ABNORMAL ML/MIN/{1.73_M2}
GLUCOSE BLD-MCNC: 171 MG/DL (ref 70–99)
HCT VFR BLD CALC: 36.5 % (ref 41–53)
HEMOGLOBIN: 12.3 G/DL (ref 13.5–17.5)
INFLUENZA A: NOT DETECTED
INFLUENZA B: NOT DETECTED
LYMPHOCYTES # BLD: 17 % (ref 24–44)
MCH RBC QN AUTO: 27.9 PG (ref 26–34)
MCHC RBC AUTO-ENTMCNC: 33.8 G/DL (ref 31–37)
MCV RBC AUTO: 82.4 FL (ref 80–100)
MONOCYTES # BLD: 10 % (ref 1–7)
PDW BLD-RTO: 13.1 % (ref 11.5–14.9)
PLATELET # BLD: 217 K/UL (ref 150–450)
PMV BLD AUTO: 7.9 FL (ref 6–12)
POTASSIUM SERPL-SCNC: 3.1 MMOL/L (ref 3.7–5.3)
PRO-BNP: 93 PG/ML
RBC # BLD: 4.43 M/UL (ref 4.5–5.9)
SARS-COV-2 RNA, RT PCR: NOT DETECTED
SEG NEUTROPHILS: 71 % (ref 36–66)
SEGMENTED NEUTROPHILS ABSOLUTE COUNT: 5.1 K/UL (ref 1.3–9.1)
SODIUM BLD-SCNC: 137 MMOL/L (ref 135–144)
SOURCE: NORMAL
SPECIMEN DESCRIPTION: NORMAL
TROPONIN, HIGH SENSITIVITY: 10 NG/L (ref 0–22)
TROPONIN, HIGH SENSITIVITY: 9 NG/L (ref 0–22)
WBC # BLD: 7.3 K/UL (ref 3.5–11)

## 2022-04-20 PROCEDURE — 96365 THER/PROPH/DIAG IV INF INIT: CPT

## 2022-04-20 PROCEDURE — 6370000000 HC RX 637 (ALT 250 FOR IP): Performed by: EMERGENCY MEDICINE

## 2022-04-20 PROCEDURE — 71045 X-RAY EXAM CHEST 1 VIEW: CPT

## 2022-04-20 PROCEDURE — 36415 COLL VENOUS BLD VENIPUNCTURE: CPT

## 2022-04-20 PROCEDURE — 83880 ASSAY OF NATRIURETIC PEPTIDE: CPT

## 2022-04-20 PROCEDURE — 80048 BASIC METABOLIC PNL TOTAL CA: CPT

## 2022-04-20 PROCEDURE — 6370000000 HC RX 637 (ALT 250 FOR IP): Performed by: STUDENT IN AN ORGANIZED HEALTH CARE EDUCATION/TRAINING PROGRAM

## 2022-04-20 PROCEDURE — 99285 EMERGENCY DEPT VISIT HI MDM: CPT

## 2022-04-20 PROCEDURE — 85025 COMPLETE CBC W/AUTO DIFF WBC: CPT

## 2022-04-20 PROCEDURE — 94640 AIRWAY INHALATION TREATMENT: CPT

## 2022-04-20 PROCEDURE — 85379 FIBRIN DEGRADATION QUANT: CPT

## 2022-04-20 PROCEDURE — 87636 SARSCOV2 & INF A&B AMP PRB: CPT

## 2022-04-20 PROCEDURE — 93005 ELECTROCARDIOGRAM TRACING: CPT | Performed by: STUDENT IN AN ORGANIZED HEALTH CARE EDUCATION/TRAINING PROGRAM

## 2022-04-20 PROCEDURE — 84484 ASSAY OF TROPONIN QUANT: CPT

## 2022-04-20 PROCEDURE — 6360000002 HC RX W HCPCS: Performed by: EMERGENCY MEDICINE

## 2022-04-20 RX ORDER — POTASSIUM CHLORIDE 7.45 MG/ML
10 INJECTION INTRAVENOUS ONCE
Status: COMPLETED | OUTPATIENT
Start: 2022-04-20 | End: 2022-04-20

## 2022-04-20 RX ORDER — IPRATROPIUM BROMIDE AND ALBUTEROL SULFATE 2.5; .5 MG/3ML; MG/3ML
1 SOLUTION RESPIRATORY (INHALATION) EVERY 4 HOURS PRN
Status: DISCONTINUED | OUTPATIENT
Start: 2022-04-20 | End: 2022-04-21 | Stop reason: HOSPADM

## 2022-04-20 RX ORDER — PREDNISONE 20 MG/1
60 TABLET ORAL ONCE
Status: COMPLETED | OUTPATIENT
Start: 2022-04-20 | End: 2022-04-20

## 2022-04-20 RX ORDER — PREDNISONE 50 MG/1
50 TABLET ORAL DAILY
Qty: 5 TABLET | Refills: 0 | Status: SHIPPED | OUTPATIENT
Start: 2022-04-20 | End: 2022-04-25

## 2022-04-20 RX ORDER — POTASSIUM CHLORIDE 20 MEQ/1
40 TABLET, EXTENDED RELEASE ORAL ONCE
Status: COMPLETED | OUTPATIENT
Start: 2022-04-20 | End: 2022-04-20

## 2022-04-20 RX ADMIN — POTASSIUM CHLORIDE 10 MEQ: 7.46 INJECTION, SOLUTION INTRAVENOUS at 20:26

## 2022-04-20 RX ADMIN — IPRATROPIUM BROMIDE AND ALBUTEROL SULFATE 1 AMPULE: 2.5; .5 SOLUTION RESPIRATORY (INHALATION) at 19:44

## 2022-04-20 RX ADMIN — POTASSIUM CHLORIDE 40 MEQ: 20 TABLET, EXTENDED RELEASE ORAL at 20:19

## 2022-04-20 RX ADMIN — PREDNISONE 60 MG: 20 TABLET ORAL at 20:19

## 2022-04-20 RX ADMIN — IPRATROPIUM BROMIDE AND ALBUTEROL SULFATE 1 AMPULE: 2.5; .5 SOLUTION RESPIRATORY (INHALATION) at 19:53

## 2022-04-20 ASSESSMENT — ENCOUNTER SYMPTOMS
SHORTNESS OF BREATH: 1
ABDOMINAL PAIN: 0
COUGH: 1
NAUSEA: 0
VOMITING: 0
COLOR CHANGE: 0

## 2022-04-20 ASSESSMENT — PAIN SCALES - GENERAL: PAINLEVEL_OUTOF10: 4

## 2022-04-20 ASSESSMENT — LIFESTYLE VARIABLES
HOW OFTEN DO YOU HAVE A DRINK CONTAINING ALCOHOL: 2-3 TIMES A WEEK
HOW MANY STANDARD DRINKS CONTAINING ALCOHOL DO YOU HAVE ON A TYPICAL DAY: 1 OR 2

## 2022-04-20 ASSESSMENT — PAIN - FUNCTIONAL ASSESSMENT: PAIN_FUNCTIONAL_ASSESSMENT: 0-10

## 2022-04-20 NOTE — ED PROVIDER NOTES
[facility] [facility] ED  Emergency Department Encounter  Emergency Medicine Resident     Pt Name: Tomas Magana  MRN: [id number]  [place] 1960  Date of evaluation: 4/20/22  PCP:  Ana Tobias MD    [address] [place] [address]       Chief Complaint   Patient presents with    Shortness of Breath    Palpitations       HISTORY OFPRESENT ILLNESS  (Location/Symptom, Timing/Onset, Context/Setting, Quality, Duration, Modifying Del[place] Kat.)      Tomas Magana is a 57 y.o. male with past medical history of COPD and CHF presents with complaints of 1 week of progressively worsening shortness of breath. Patient states shortness of breath used to be intermittent however now it is constant. He does have associated chest tightening sensation at the bottom of his lungs. Patient does have inhalers at home which she has been using without alleviation of his symptoms but does state that the inhaler makes him cough more. Patient does have a cough that is nonproductive. Patient states that he was taking cold medicine and so did not take his water pill. He does not feel like his legs are more swollen than his normal baseline. Patient denies history of DVT/PE.      PAST MEDICAL / SURGICAL / SOCIAL / FAMILY HISTORY      has a past medical history of Bronchitis, CAD (coronary artery disease), Cardiomyopathy ([address] Chest pain, CHF (congestive heart failure) ([address] CHF (congestive heart failure), NYHA class I, acute on chronic, diastolic (HCC), Chronic back pain, Chronic diastolic CHF (congestive heart failure) ([address] COPD (chronic obstructive pulmonary disease) ([address] Headache(784.0), Hyperlipidemia, Hypertension, Inguinal hernia, Low back pain with sciatica, Migraine, Mitral valve regurgitation, Osteoarthritis, Pneumonia, Sleep apnea, SOB (shortness of breath) on exertion, Tricuspid regurgitation, Type 2 diabetes mellitus without complication, without long-term current use of insulin ([address] Under care of team, and Wellness examination. has a past surgical history that includes hernia repair; Colonoscopy; Tonsillectomy (1963   exact date unknown); Adenoidectomy; Colonoscopy (2021); Colonoscopy (N/A, 2021); and other surgical history (2022). Social History     Socioeconomic History    Marital status: Single     Spouse name: Not on file    Number of children: Not on file    Years of education: Not on file    Highest education level: Not on file   Occupational History    Not on file   Tobacco Use    Smoking status: Former Smoker     Packs/day: 0.00     Years: 40.00     Pack years: 0.00     Quit date:      Years since quittin.3    Smokeless tobacco: Current User     Types: Chew    Tobacco comment: Chews / quit cigarettes   Vaping Use    Vaping Use: Never used   Substance and Sexual Activity    Alcohol use: Yes     Alcohol/week: 1.0 standard drink     Types: 1 Glasses of wine per week     Comment: occ.  Drug use: No    Sexual activity: Not on file   Other Topics Concern    Not on file   Social History Narrative    Not on file     Social Determinants of Health     Financial Resource Strain: Low Risk     Difficulty of Paying Living Expenses: Not hard at all   Food Insecurity: No Food Insecurity    Worried About Running Out of Food in the Last Year: Never true    [address] in the Last Year: Never true   Transportation Needs:     Lack of Transportation (Medical): Not on file    Lack of Transportation (Non-Medical):  Not on file   Physical Activity: Insufficiently Active    Days of Exercise per Week: 2 days    Minutes of Exercise per Session: 10 min   Stress:     Feeling of Stress : Not on file   Social Connections: Unknown    Frequency of Communication with Friends and Family: More than three times a week    Frequency of Social Gatherings with Friends and Family: Twice a week    Attends Orthodox Services: 1 to 4 times per year   [employer] [facility] or Organizations: Patient refused    Attends Club or Organization Meetings: Patient refused    Marital Status: Patient refused   Intimate Partner Violence:     Fear of Current or Ex-Partner: Not on file    Emotionally Abused: Not on file    Physically Abused: Not on file    Sexually Abused: Not on file   Housing Stability:     Unable to Pay for Housing in the Last Year: Not on file    Number of Eramosushila in the Last Year: Not on file    Unstable Housing in the Last Year: Not on file       Family History   Problem Relation Age of Onset    Kidney Disease Mother     Diabetes Mother     Heart Disease Mother     Arthritis Mother     Heart Disease Father     Heart Attack Father     Arthritis Sister     Diabetes Sister     Heart Disease Sister     Diabetes Sister        Allergies:  Penicillins, Sulfa antibiotics, Sulfasalazine, Pseudoeph-doxylamine-dm-apap, and Rabbit protein    Home Medications:  Prior to Admission medications    Medication Sig Start Date End Date Taking?  Authorizing Provider   predniSONE (DELTASONE) 50 MG tablet Take 1 tablet by mouth daily for 5 days 4/20/22 4/25/22 Yes Dolph Foil, DO   albuterol (PROVENTIL) (2.5 MG/3ML) 0.083% nebulizer solution INHALE 1 UNIT DOSE BY NEBULIZATION UP TO EVERY 6 HOURS AS NEEDED FOR WHEEZING OR SHORTNESS OF BREATH 3/31/22 3/31/23  Renée Wilder MD   gabapentin (NEURONTIN) 300 MG capsule TAKE 1 CAPSULE BY MOUTH NIGHTLY AS NEEDED (PAIN) 3/31/22 3/31/23  Renée Wilder MD   naproxen (NAPROSYN) 500 MG tablet TAKE 1 TABLET BY MOUTH 2 TIMES DAILY FOR 20 DAYS 3/31/22   Renée Wilder MD   spironolactone (ALDACTONE) 25 MG tablet TAKE 1 TABLET BY MOUTH NIGHTLY 3/31/22   Renée Wilder MD   sildenafil (VIAGRA) 100 MG tablet Take 1 tablet by mouth as needed for Erectile Dysfunction 1/17/22   Renée Wildre MD   simvastatin (ZOCOR) 20 MG tablet Take 1 tablet by mouth nightly 1/17/22   Renée Wilder MD   potassium chloride (KLOR-CON M) 20 MEQ extended release tablet Take 1 tablet by mouth daily (with breakfast) 1/4/22   Christian Fuentes MD   acetaminophen (TYLENOL) 500 MG tablet Take 2 tablets by mouth 4 times daily as needed for Pain  Patient not taking: Reported on 2/14/2022 1/3/22   Yojana Mack DO   ibuprofen (ADVIL;MOTRIN) 800 MG tablet Take 1 tablet by mouth 2 times daily as needed for Pain 1/3/22 1/3/22  Yojana Mack DO   bumetanide (BUMEX) 2 MG tablet Take 1 tablet by mouth 2 times daily 10/28/21   Charli Slater MD   sacubitril-valsartan (ENTRESTO) 24-26 MG per tablet Take 1 tablet by mouth 2 times daily 10/28/21   Charli Slater MD   empagliflozin (JARDIANCE) 10 MG tablet Take 1 tablet by mouth daily 10/28/21   Charli Slater MD   metoprolol succinate (TOPROL XL) 50 MG extended release tablet Take 1 tablet by mouth daily 10/28/21   Charli Slater MD   Elastic Bandages & Supports (WRIST SPLINT/COCK-UP/RIGHT L) MISC 1 each by Does not apply route daily 10/28/21   Charli Slater MD   sildenafil (VIAGRA) 50 MG tablet Take 1 tablet by mouth daily as needed for Erectile Dysfunction  Patient not taking: Reported on 2/14/2022 10/28/21   Charli Slater MD   nitroGLYCERIN (NITROSTAT) 0.4 MG SL tablet PLACE 1 TABLET UNDER THE TONGUE EVERY 5 MINUTES AS NEEDED FOR CHEST PAIN 9/27/21   David Castro MD   metFORMIN (GLUCOPHAGE) 500 MG tablet TAKE 1 TABLET BY MOUTH DAILY (WITH BREAKFAST)  Patient taking differently: 2 times daily (with meals)  9/17/21   Gabi Malone MD   albuterol sulfate  (90 Base) MCG/ACT inhaler Inhale 2 puffs into the lungs every 6 hours as needed for Wheezing 8/30/21   Charli Slater MD   blood glucose monitor kit and supplies Dispense sufficient amount for indicated testing frequency plus additional to accommodate PRN testing needs. Dispense all needed supplies to include: monitor, strips, lancing device, lancets, control solutions, alcohol swabs.  8/30/21   Charli Slater MD   albuterol-ipratropium (COMBIVENT RESPIMAT)  MCG/ACT AERS inhaler INHALE 1 PUFF INTO THE LUNGS EVERY 6 HOURS 8/30/21   Seun Hyde MD   Masks [facility] MASK) [address] [facility] 1 each by Does not apply route as needed (as needed) 8/30/21   Seun Hyde MD   diclofenac sodium (VOLTAREN) 1 % GEL Apply topically 2 times daily 8/30/21   Seun Hyde MD   aspirin 81 MG tablet Take 1 tablet by mouth daily 8/13/19   Seun Hyde MD       REVIEW OF SYSTEMS    (2-9 systems for level 4, 10 or more for level 5)      Review of Systems   Constitutional: Negative for fatigue and fever. Respiratory: Positive for cough and shortness of breath. Cardiovascular: Positive for chest pain. Gastrointestinal: Negative for abdominal pain, nausea and vomiting. Musculoskeletal: Negative for myalgias. Skin: Negative for color change and rash. Neurological: Negative for headaches. PHYSICAL EXAM   (up to 7 for level 4, 8 or more for level 5)     INITIAL VITALS:    weight is 270 lb (122.5 kg). His oral temperature is 98.4 °F (36.9 °C). His blood pressure is 128/62 and his pulse is 107. His respiration is 17 and oxygen saturation is 97%. Physical Exam  Constitutional:       General: He is not in acute distress. Appearance: He is well-developed. He is not ill-appearing or toxic-appearing. HENT:      Mouth/Throat:      Mouth: Mucous membranes are moist.   Eyes:      Pupils: Pupils are equal, round, and reactive to light. Cardiovascular:      Rate and Rhythm: Regular rhythm. Tachycardia present. Pulses: Normal pulses. Heart sounds: No murmur heard. Pulmonary:      Comments: Speaking in full sentences, nontachypneic, nonlabored respirations, bilateral expiratory wheezing auscultated in the lung bases  Chest:      Chest wall: No tenderness. Abdominal:      Palpations: Abdomen is soft. Tenderness: There is no abdominal tenderness. There is no rebound. Musculoskeletal:      Comments: Bilateral lower extremity 1+ pitting edema   Skin:     General: Skin is warm and dry. Findings: No rash. Neurological:      Mental Status: He is alert. DIFFERENTIAL  DIAGNOSIS     PLAN (LABS / IMAGING / EKG):  Orders Placed This Encounter   Procedures    COVID-19 & Influenza Combo    XR CHEST PORTABLE    CBC with Auto Differential    Basic Metabolic Panel    Troponin    D-Dimer, Quantitative    Brain Natriuretic Peptide    Troponin    Respiratory Care Evaluation and Treat    EKG 12 Lead       MEDICATIONS ORDERED:  Orders Placed This Encounter   Medications    predniSONE (DELTASONE) tablet 60 mg    potassium chloride (KLOR-CON M) extended release tablet 40 mEq    potassium chloride 10 mEq/100 mL IVPB (Peripheral Line)    ipratropium-albuterol (DUONEB) nebulizer solution 1 ampule     Order Specific Question:   Initiate RT Bronchodilator Protocol     Answer: Yes    predniSONE (DELTASONE) 50 MG tablet     Sig: Take 1 tablet by mouth daily for 5 days     Dispense:  5 tablet     Refill:  0       DDX: Acute on chronic CHF, acute on chronic COPD, ACS, COVID, influenza, pneumonia    Initial MDM/Plan: 57 y.o. male who presents with shortness of breath ongoing for 1 week which has been worsening with associated chest tightness. Seen and examined, no acute distress, vital signs remarkable for tachycardia in the low 100s, oxygen saturation 98% on room air. Afebrile. Nonlabored respirations, speaking in full sentences. Lungs with bilateral expiratory wheezing auscultated at the bases. Bilateral lower extremity with 1+ pitting edema. Given tachycardia and shortness of breath will obtain D-dimer to evaluate for PE, given wheezing on exam most likely COPD exacerbation, will also obtain cardiac evaluation, chest x-ray. If D-dimer positive will scan for pulmonary embolism with CT study. We will also have respiratory therapy give breathing treatment, steroids, reevaluation.     DIAGNOSTIC RESULTS / EMERGENCY DEPARTMENT COURSE / MDM     LABS:  Labs Reviewed   CBC WITH AUTO DIFFERENTIAL - Abnormal; Notable for the following components:       Result Value    RBC 4.43 (*)     Hemoglobin 12.3 (*)     Hematocrit 36.5 (*)     Seg Neutrophils 71 (*)     Lymphocytes 17 (*)     Monocytes 10 (*)     All other components within normal limits   BASIC METABOLIC PANEL - Abnormal; Notable for the following components:    Glucose 171 (*)     Potassium 3.1 (*)     All other components within normal limits   COVID-19 & INFLUENZA COMBO   TROPONIN   D-DIMER, QUANTITATIVE   BRAIN NATRIURETIC PEPTIDE   TROPONIN         RADIOLOGY:  XR CHEST PORTABLE    Result Date: 4/20/2022  EXAMINATION: ONE XRAY VIEW OF THE CHEST 4/20/2022 3:40 pm COMPARISON: 02/01/2022 HISTORY: ORDERING SYSTEM PROVIDED HISTORY: b/l wheezing, sob TECHNOLOGIST PROVIDED HISTORY: b/l wheezing, sob Reason for Exam: PT CO SOB and wheeze X 1 week. HX cardiac and pulmonary conditions. FINDINGS: Stable ICD leads. The cardiac size is normal. No acute infiltrates or pleural effusions are seen. Pulmonary vascularity appears normal..  There are degenerative changes in the spine . No acute bony abnormalities. The hilar structures are normal.     No acute cardiopulmonary disease       EKG  EKG Interpretation    Interpreted by me    Rhythm: normal sinus   Rate: Tachycardic  Axis: normal  Ectopy: none  Conduction: normal  ST Segments: no acute change  T Waves: no acute change  Q Waves: none    Clinical Impression: Sinus tachycardia, normal axis, normal intervals, no acute ischemia    All EKG's are interpreted by the Emergency Department Physician who either signs or Co-signs this chart in the absence of a cardiologist.    EMERGENCY DEPARTMENT COURSE:  ED Course as of 04/20/22 1920 Wed Apr 20, 2022   Darnell Prieto 1 57-year-old male with past medical history of COPD and CHF presents with complaints of 1 week of progressively worsening shortness of breath. Patient states shortness of breath used to be intermittent however now it is constant.   He does have associated chest tightening sensation at the bottom of his lungs. Patient does have inhalers at home which she has been using without alleviation of his symptoms but does state that the inhaler makes him cough more. Patient does have a cough that is nonproductive. Patient states that he was taking cold medicine and so did not take his water pill. He does not feel like his legs are more swollen than his normal baseline. Patient denies history of DVT/PE. [DS]      ED Course User Index  [DS] Randell Carvajal DO     D-dimer 0.33 making PE unlikely no need for CT study. Chest x-ray unremarkable. Given wheezing patient was given breathing treatment as well as dose of steroids. Oxygen saturation upon reassessment 99%. Troponin x2 is stable. COVID and flu negative. Patient was ambulated with pulse ox and maintained oxygen saturation 99%. Given no hypoxia and unremarkable lab work patient is okay for discharge home at this time. Patient does report he has inhalers at home so no need for refill. Patient was given short course of steroid burst for acute on chronic COPD exacerbation. Return precautions were discussed. Discharged home. PROCEDURES:  None    CONSULTS:  None    CRITICAL CARE:  Please see attending note    FINAL IMPRESSION      1. COPD exacerbation ([facility]          DISPOSITION / PLAN     DISPOSITION Decision To Discharge 04/20/2022 10:17:04 PM        PATIENTREFERRED TO:  Lucia Morton MD  [address]   [address]  [address]  [phone]    Schedule an appointment as soon as possible for a visit   As needed      DISCHARGE MEDICATIONS:  Discharge Medication List as of 4/20/2022 10:26 PM      START taking these medications    Details   predniSONE (DELTASONE) 50 MG tablet Take 1 tablet by mouth daily for 5 days, Disp-5 tablet, R-0Print             Randell Carvajal DO  EmergencyMedicine Resident    (Please note that portions of this note were completed with a voice recognition program.  Efforts were made to edit the dictations but occasionally words are mis-transcribed.)        Star Shah DO  Resident  04/21/22 1281

## 2022-04-20 NOTE — ED TRIAGE NOTES
Mode of arrival (squad #, walk in, police, etc) : walk in        Chief complaint(s): Shortness of breath, palpitations        Arrival Note (brief scenario, treatment PTA, etc). : pt reports he has had SOB and palpitations going for about a week now. Pt states today he is tired of it. Pt states he has a hx of CHF. Pt has dyspnea on exertions. 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 Pt. Reports he had a cold and then it developed into what he is experiencing today. Pt reports he has not taken his Bumex yet today. C= \"Have you ever felt that you should Cut down on your drinking? \"  No  A= \"Have people Annoyed you by criticizing your drinking? \"  No  G= \"Have you ever felt bad or Guilty about your drinking? \"  No  E= \"Have you ever had a drink as an Eye-opener first thing in the morning to steady your nerves or to help a hangover? \"  No      Deferred []      Reason for deferring: N/A    *If yes to two or more: probable alcohol abuse. *

## 2022-04-20 NOTE — ED PROVIDER NOTES
EMERGENCY DEPARTMENT ENCOUNTER   ATTENDING ATTESTATION     Pt Name: Adonis Parmar  MRN: [id number]  [place] 1960  Date of evaluation: 4/20/22       Adonis Parmar is a 57 y.o. male who presents with Shortness of Breath and Palpitations      MDM: 79-year-old male presents for complaint of shortness of breath. On initial exam patient in no acute distress, vitals are stable, is noted to have some wheezing on lung exam, does report history of COPD as well, will check cardiac work-up and chest x-ray, will provide breathing treatments and reassess    Labs reviewed potassium noted to be 3.1, will replace, remaining labs were unremarkable, chest x-ray was negative    Patient reevaluated after breathing treatments reports feeling little bit better    Repeat troponin was negative, patient was ambulated with pulse ox in no change in his SPO2    Patient was reevaluated discussed admission versus outpatient treatment, patient with no oxygen requirements, wheezing improved after treatments, feel he is stable for discharge home and patient would like to go home at this time, discussed need for follow-up with his PCP and return precautions, patient voiced understanding simple plan and discharge home    Patient/Guardian was informed of their diagnosis and told to follow up with PCP  in 1-3 days. Patient demonstrates understanding and agreement with the plan. They were given the opportunity to ask questions and those questions were answered to the best of our ability with the available information. Patient/Guardian told to return to the ED for any new, worsening, changing or persistent symptoms. This dictation was prepared using [employer] [place] [employer] voice recognition software. Vitals:   Vitals:    04/20/22 1947 04/20/22 1953 04/20/22 2000 04/20/22 2003   BP:       Pulse:   98 107   Resp: 20 20 10 17   Temp:       TempSrc:       SpO2:   100% 97%   Weight:             I personally saw and examined the patient.  I have reviewed and agree with the resident's findings, including all diagnostic interpretations and treatment plan as written. I was present for the key portions of any procedures performed and the inclusive time noted for any critical care statement. The care is provided during an unprecedented national emergency due to the novel coronavirus, COVID 19.   [facility],   Attending Emergency Physician            [facility],   04/21/22 5028

## 2022-04-20 NOTE — PROGRESS NOTES
Onur Field states being diagnosed with COPD, taking prn Albuterol aerosol treatments at home and no home O2 noted.

## 2022-04-21 ENCOUNTER — HOSPITAL ENCOUNTER (OUTPATIENT)
Age: 62
Discharge: HOME | End: 2022-04-21
Payer: COMMERCIAL

## 2022-04-21 DIAGNOSIS — E87.6 HYPOKALEMIA: Primary | ICD-10-CM

## 2022-04-21 DIAGNOSIS — E87.6 HYPOKALEMIA: ICD-10-CM

## 2022-04-21 LAB
ANION GAP SERPL CALCULATED.3IONS-SCNC: 16 MMOL/L (ref 9–17)
BUN BLDV-MCNC: 23 MG/DL (ref 8–23)
CALCIUM SERPL-MCNC: 8.9 MG/DL (ref 8.6–10.4)
CHLORIDE BLD-SCNC: 98 MMOL/L (ref 98–107)
CO2: 29 MMOL/L (ref 20–31)
CREAT SERPL-MCNC: 0.94 MG/DL (ref 0.7–1.2)
EKG ATRIAL RATE: 106 BPM
EKG P AXIS: 67 DEGREES
EKG P-R INTERVAL: 160 MS
EKG Q-T INTERVAL: 354 MS
EKG QRS DURATION: 106 MS
EKG QTC CALCULATION (BAZETT): 470 MS
EKG R AXIS: 59 DEGREES
EKG T AXIS: -7 DEGREES
EKG VENTRICULAR RATE: 106 BPM
GFR AFRICAN AMERICAN: >60 ML/MIN
GFR NON-AFRICAN AMERICAN: >60 ML/MIN
GFR SERPL CREATININE-BSD FRML MDRD: ABNORMAL ML/MIN/{1.73_M2}
GLUCOSE BLD-MCNC: 179 MG/DL (ref 70–99)
POTASSIUM SERPL-SCNC: 3.9 MMOL/L (ref 3.7–5.3)
SODIUM BLD-SCNC: 143 MMOL/L (ref 135–144)

## 2022-04-21 PROCEDURE — 93010 ELECTROCARDIOGRAM REPORT: CPT | Performed by: INTERNAL MEDICINE

## 2022-04-21 PROCEDURE — 36415 COLL VENOUS BLD VENIPUNCTURE: CPT

## 2022-04-21 PROCEDURE — 80048 BASIC METABOLIC PNL TOTAL CA: CPT

## 2022-04-21 NOTE — PROGRESS NOTES
Patient phoned today to remind of appt in CHF clinic tomorrow. He states he was in [employer] yesterday for SOB. He states his potassium was low = 3.1. He was medicated for this in the ER but never redrawn. Spoke with Prabhakar Duffy CNP and BMP ordered. Patient nitrified to have drawn prior to patient's appt tomorrow. He agrees.

## 2022-04-21 NOTE — ED NOTES
Pt discharged in stable condition with prescriptions and dc instructions. Pt ambulates to door with steady gait and without assistance.         Padilla Muse RN  04/20/22 0688

## 2022-04-22 ENCOUNTER — HOSPITAL ENCOUNTER (OUTPATIENT)
Dept: OTHER | Age: 62
Discharge: HOME | End: 2022-04-22
Payer: COMMERCIAL

## 2022-04-22 VITALS
DIASTOLIC BLOOD PRESSURE: 70 MMHG | OXYGEN SATURATION: 100 % | HEART RATE: 100 BPM | SYSTOLIC BLOOD PRESSURE: 120 MMHG | WEIGHT: 265 LBS | RESPIRATION RATE: 16 BRPM | BODY MASS INDEX: 44.1 KG/M2

## 2022-04-22 DIAGNOSIS — I50.22 CHRONIC SYSTOLIC CONGESTIVE HEART FAILURE (HCC): Primary | ICD-10-CM

## 2022-04-22 DIAGNOSIS — E87.6 HYPOKALEMIA: ICD-10-CM

## 2022-04-22 PROCEDURE — 99212 OFFICE O/P EST SF 10 MIN: CPT

## 2022-04-22 PROCEDURE — 99213 OFFICE O/P EST LOW 20 MIN: CPT | Performed by: NURSE PRACTITIONER

## 2022-04-22 ASSESSMENT — ENCOUNTER SYMPTOMS
COUGH: 0
ABDOMINAL PAIN: 0
EYE DISCHARGE: 0
SHORTNESS OF BREATH: 0
BLOOD IN STOOL: 0

## 2022-04-22 NOTE — PROGRESS NOTES
CHF Clinic at [facility]    Office: [phone] Fax: [phone] [facility] CHF CLINIC  [address] [id number]  Dept: [phone]  Loc: [phone]    Nevaeh Welch is a 57 y.o. male who presents today for CHF evaluation. HPI:     Denies shortness of breath  Denies  fatigue  Denies Edema   Denies chest pain   Denies  palpitations     Had recent hypokalemia. reviev KCl IV in er and is now taking KCl 20 meq po qd. Pt states he was taken off of his KCl bid in the past for K+ going too high. Labs were reviewed and no elevated K+ noted in epic.              Past Medical History:   Diagnosis Date    Bronchitis     CAD (coronary artery disease)     Cardiomyopathy ([address]     Chest pain     CHF [facility]congestive heart failure) ([facility]     CHF (congestive heart failure), NYHA class I, acute on chronic, diastolic ([facility]) 2/57/7578    Chronic back pain     Chronic diastolic CHF (congestive heart failure) ([address] 8/1/2014    COPD (chronic obstructive pulmonary disease) ([facility]     Headache(784.0)     Hyperlipidemia     Hypertension     Inguinal hernia     Low back pain with sciatica     Migraine     Mitral valve regurgitation     Osteoarthritis     Pneumonia     Sleep apnea     with cpap    SOB (shortness of breath) on exertion     Tricuspid regurgitation     Type 2 diabetes mellitus without complication, without long-term current use of insulin ([address]     Under care of team 05/20/2021    cardiology-Dr Maya Merritt visit jan 2021    Wellness examination 05/20/2021    [facility]     Past Surgical History:   Procedure Laterality Date    ADENOIDECTOMY      COLONOSCOPY      COLONOSCOPY  05/28/2021    COLORECTAL CANCER SCREENING, NOT HIGH RISK, POLYPECTOMY    COLONOSCOPY N/A 5/28/2021    COLONOSCOPY POLYPECTOMY HOT BIOPSY performed by Collin Angel IV, DO at [address] OTHER SURGICAL HISTORY  2022    impulse optimizer   [address]   exact date unknown       Family History   Problem Relation Age of Onset    Kidney Disease Mother     Diabetes Mother     Heart Disease Mother     Arthritis Mother     Heart Disease Father     Heart Attack Father     Arthritis Sister     Diabetes Sister     Heart Disease Sister     Diabetes Sister        Social History     Tobacco Use    Smoking status: Former Smoker     Packs/day: 0.00     Years: 40.00     Pack years: 0.00     Quit date:      Years since quittin.3    Smokeless tobacco: Current User     Types: Chew    Tobacco comment: Chews / quit cigarettes   Substance Use Topics    Alcohol use: Yes     Alcohol/week: 1.0 standard drink     Types: 1 Glasses of wine per week     Comment: occ.       Current Outpatient Medications   Medication Sig Dispense Refill    predniSONE (DELTASONE) 50 MG tablet Take 1 tablet by mouth daily for 5 days 5 tablet 0    albuterol (PROVENTIL) (2.5 MG/3ML) 0.083% nebulizer solution INHALE 1 UNIT DOSE BY NEBULIZATION UP TO EVERY 6 HOURS AS NEEDED FOR WHEEZING OR SHORTNESS OF BREATH 360 mL 3    gabapentin (NEURONTIN) 300 MG capsule TAKE 1 CAPSULE BY MOUTH NIGHTLY AS NEEDED (PAIN) 30 capsule 1    naproxen (NAPROSYN) 500 MG tablet TAKE 1 TABLET BY MOUTH 2 TIMES DAILY FOR 20 DAYS 30 tablet 2    spironolactone (ALDACTONE) 25 MG tablet TAKE 1 TABLET BY MOUTH NIGHTLY 30 tablet 2    sildenafil (VIAGRA) 100 MG tablet Take 1 tablet by mouth as needed for Erectile Dysfunction 10 tablet 0    simvastatin (ZOCOR) 20 MG tablet Take 1 tablet by mouth nightly 30 tablet 3    potassium chloride (KLOR-CON M) 20 MEQ extended release tablet Take 1 tablet by mouth daily (with breakfast) 60 tablet 3    acetaminophen (TYLENOL) 500 MG tablet Take 2 tablets by mouth 4 times daily as needed for Pain (Patient not taking: Reported on 2022) 20 tablet 0    bumetanide (BUMEX) 2 MG tablet Take 1 tablet by mouth 2 times daily 60 tablet 2    sacubitril-valsartan (ENTRESTO) 24-26 MG per tablet Take 1 tablet by mouth 2 times daily 60 tablet 3    empagliflozin (JARDIANCE) 10 MG tablet Take 1 tablet by mouth daily 30 tablet 3    metoprolol succinate (TOPROL XL) 50 MG extended release tablet Take 1 tablet by mouth daily 30 tablet 3    Elastic Bandages & Supports (WRIST SPLINT/COCK-UP/RIGHT L) MISC 1 each by Does not apply route daily 1 each 0    sildenafil (VIAGRA) 50 MG tablet Take 1 tablet by mouth daily as needed for Erectile Dysfunction (Patient not taking: Reported on 2/14/2022) 10 tablet 0    nitroGLYCERIN (NITROSTAT) 0.4 MG SL tablet PLACE 1 TABLET UNDER THE TONGUE EVERY 5 MINUTES AS NEEDED FOR CHEST PAIN 25 tablet 0    metFORMIN (GLUCOPHAGE) 500 MG tablet TAKE 1 TABLET BY MOUTH DAILY (WITH BREAKFAST) (Patient taking differently: 2 times daily (with meals) ) 60 tablet 3    albuterol sulfate  (90 Base) MCG/ACT inhaler Inhale 2 puffs into the lungs every 6 hours as needed for Wheezing 8.5 g 1    blood glucose monitor kit and supplies Dispense sufficient amount for indicated testing frequency plus additional to accommodate PRN testing needs. Dispense all needed supplies to include: monitor, strips, lancing device, lancets, control solutions, alcohol swabs. 1 kit 0    albuterol-ipratropium (COMBIVENT RESPIMAT)  MCG/ACT AERS inhaler INHALE 1 PUFF INTO THE LUNGS EVERY 6 HOURS 4 g 5    Masks (FACE MASK) MISC 1 each by Does not apply route as needed (as needed) 50 each 3    diclofenac sodium (VOLTAREN) 1 % GEL Apply topically 2 times daily 350 g 2    aspirin 81 MG tablet Take 1 tablet by mouth daily 30 tablet 3     No current facility-administered medications for this encounter.      Allergies   Allergen Reactions    Penicillins Swelling    Sulfa Antibiotics Swelling    Sulfasalazine Swelling    Pseudoeph-Doxylamine-Dm-Apap Rash    Rabbit Protein Rash     Fur, gerbils         Subjective:      Review of Systems   Constitutional: Negative for activity change, chills, fatigue and fever. Eyes: Negative for discharge and visual disturbance. Respiratory: Negative for cough and shortness of breath. Cardiovascular: Negative for chest pain and leg swelling. Gastrointestinal: Negative for abdominal pain and blood in stool. Endocrine: Negative for cold intolerance and heat intolerance. Genitourinary: Negative for dysuria and flank pain. Musculoskeletal: Negative for joint swelling and myalgias. Skin: Negative for pallor and rash. Neurological: Negative for dizziness and headaches. Psychiatric/Behavioral: Negative for hallucinations and suicidal ideas. Objective:     Physical Exam  Vitals and nursing note reviewed. Constitutional:       Comments:      HENT:      Head: Normocephalic and atraumatic. Eyes:      General: No scleral icterus. Conjunctiva/sclera: Conjunctivae normal.   Cardiovascular:      Rate and Rhythm: Normal rate and regular rhythm. Heart sounds: Normal heart sounds. Pulmonary:      Effort: Pulmonary effort is normal.      Breath sounds: Normal breath sounds. No wheezing or rales. Abdominal:      General: Bowel sounds are normal.      Palpations: Abdomen is soft. Musculoskeletal:         General: Normal range of motion. Cervical back: Normal range of motion. Skin:     General: Skin is warm and dry. Neurological:      Mental Status: He is alert and oriented to person, place, and time. /70   Pulse 100   Resp 16   Wt 265 lb (120.2 kg)   SpO2 100%   BMI 44.10 kg/m²           Lower Extremity Measurements in cm.    R Calf Circumference (cm): 47 cm  L Calf Circumference (cm): 45 cm  R Ankle Circumference (cm): 29 cm  L Ankle Circumference (cm): 28 cm    CBC:   Lab Results   Component Value Date    WBC 7.3 04/20/2022    RBC 4.43 04/20/2022    RBC 4.56 02/17/2012    HGB 12.3 04/20/2022    HCT 36.5 04/20/2022    MCV 82.4 04/20/2022    MCH 27.9 04/20/2022    MCHC 33.8 04/20/2022    RDW 13.1 04/20/2022     04/20/2022    PLT Platelet clumps present, count appears adequate. 02/17/2012    MPV 7.9 04/20/2022     CMP:    Lab Results   Component Value Date     04/21/2022    K 3.9 04/21/2022    CL 98 04/21/2022    CO2 29 04/21/2022    BUN 23 04/21/2022    CREATININE 0.94 04/21/2022    GFRAA >60 04/21/2022    LABGLOM >60 04/21/2022    GLUCOSE 179 04/21/2022    GLUCOSE 90 02/17/2012    PROT 6.9 01/21/2021    LABALBU 3.9 01/21/2021    LABALBU 4.7 02/17/2012    CALCIUM 8.9 04/21/2022    BILITOT <0.15 01/21/2021    ALKPHOS 66 01/21/2021    AST 19 01/21/2021    ALT 27 01/21/2021     Lab Results   Component Value Date    LABA1C 7.0 01/17/2022            CONCLUSIONS     Summary  Technically difficult study  Left ventricle is normal in size and wall thickness. Severe anterior wall and apical hypokinesis  Left ventricular systolic function is moderately reduced. Calculated EF via  De La Paz's method is 33 %. Both atria are normal in size. Normal right ventricular size and function. Mild to moderate mitral regurgitation. No significant pericardial effusion is seen. Normal aortic root dimension. IVC not well visualized     Signature  ----------------------------------------------------------------------------   Electronically signed by Andre Carranza(Interpreting physician) on   06/02/2021 10:05 AM        :Assessment      1. Chronic systolic congestive heart failure ([address]    2. Hypokalemia        :Plan      1. Chronic systolic congestive heart failure ([address]    2. Hypokalemia      Pt to remain on KCl 20 meq qd. Pt to have labs in one week. Will check K+ levels at this dosing. Pt is also on entresto and aldactone. Will monitor closely. Pt has bubble packs and will take the evening dose out. He has been doing this already since the ER. Patient verbalizes understanding.  If pt remains stable on KCl 20 mq qd, will send order to   [facility]       On Guideline-Directed Medication Therapy:     ACEI / ARB / ARNi: Entresto 24/26 mg b.i.d. Beta - blocker  Aldosterone Antagonist  SGLT2- jardiance   Diuretic       Pt reminded of the importance of taking his diuretic as ordered. Pt reminded to follow a low sodium diet , 2000 mg/ day, and fluid limits of 2 liters daily. Patient verbalizes understanding. rtc 1 month. No orders of the defined types were placed in this encounter. No orders of the defined types were placed in this encounter. Patientgiven verbal and/or written educational instructions. Follow up as directed. I have reviewed and agree with the nursing documentation. Verbally reviewed medication list with patient; patient verbalized understanding. Discussed 2000mg/day sodium restricted diet; patient verbalized understanding. Moderate daily exercise encouraged as tolerated. Discussed rest breaks as needed; patient verbalized understanding. Patient instructed to weigh self at the same time of each day, using same clothes and same scale; reinforced teaching to monitor for 3-5 lb weight increase over 1-2 days, and to notify the CHF clinic at [phone] or physician office if weight change noted. Patient verbalized understanding. Risks of smoking discussed with the patient if applicable; patient strongly discouraged to smoke. Patient verbalized understanding. Signs and symptoms of CHF discussed with patient, such as feeling more tired than normal, feeling short of breath, coughing that increases when you lie down, sudden weight gain, swelling of your feet, legs or belly. Patient verbalized understanding to notify the CHF clinic at [phone] or physician office if these symptoms occur. Compliance with plan of care and further disease process causes discussed with patient, patient encouraged to keep all follow up appointments.   Patient verbalized understanding. Echocardiogram reviewed. Labs reviewed. Medications reviewed.       Electronically signedby PINKY Weber CNP on 4/22/2022 at 11:58 AM

## 2022-05-14 ENCOUNTER — HOSPITAL ENCOUNTER (OUTPATIENT)
Age: 62
Discharge: HOME | End: 2022-05-14
Payer: COMMERCIAL

## 2022-05-14 LAB
ANION GAP SERPL CALCULATED.3IONS-SCNC: 12 MMOL/L (ref 9–17)
BUN BLDV-MCNC: 17 MG/DL (ref 8–23)
CALCIUM SERPL-MCNC: 8.9 MG/DL (ref 8.6–10.4)
CHLORIDE BLD-SCNC: 98 MMOL/L (ref 98–107)
CO2: 27 MMOL/L (ref 20–31)
CREAT SERPL-MCNC: 1.12 MG/DL (ref 0.7–1.2)
GFR AFRICAN AMERICAN: >60 ML/MIN
GFR NON-AFRICAN AMERICAN: >60 ML/MIN
GFR SERPL CREATININE-BSD FRML MDRD: ABNORMAL ML/MIN/{1.73_M2}
GLUCOSE BLD-MCNC: 182 MG/DL (ref 70–99)
POTASSIUM SERPL-SCNC: 4 MMOL/L (ref 3.7–5.3)
SODIUM BLD-SCNC: 137 MMOL/L (ref 135–144)

## 2022-05-14 PROCEDURE — 80048 BASIC METABOLIC PNL TOTAL CA: CPT

## 2022-05-14 PROCEDURE — 36415 COLL VENOUS BLD VENIPUNCTURE: CPT

## 2022-05-20 ENCOUNTER — HOSPITAL ENCOUNTER (OUTPATIENT)
Dept: OTHER | Age: 62
Discharge: HOME | End: 2022-05-20
Payer: COMMERCIAL

## 2022-05-20 VITALS
DIASTOLIC BLOOD PRESSURE: 74 MMHG | HEART RATE: 74 BPM | WEIGHT: 266 LBS | OXYGEN SATURATION: 97 % | RESPIRATION RATE: 16 BRPM | BODY MASS INDEX: 44.26 KG/M2 | SYSTOLIC BLOOD PRESSURE: 120 MMHG

## 2022-05-20 PROCEDURE — 99212 OFFICE O/P EST SF 10 MIN: CPT

## 2022-05-20 NOTE — PROGRESS NOTES
Date:  2022  Time:  11:19 AM    CHF Clinic at [address] [facility]    Office: [phone] Fax: [phone]    Re:  Adonis Parmar   Patient : 1960    Vital Signs: /74   Pulse 74   Resp 16   Wt 266 lb (120.7 kg)   SpO2 97%   BMI 44.26 kg/m²                                                   No results for input(s): CBC, HGB, HCT, WBC, PLATELET, NA, K, CL, CO2, BUN, CREATININE, GLUCOSE, BNP, INR in the last 72 hours. Respiratory:    Assessment  Charting Type: Reassessment    Breath Sounds  Right Upper Lobe: Clear  Right Middle Lobe: Clear  Right Lower Lobe: Clear  Left Upper Lobe: Clear  Left Lower Lobe: Clear              Peripheral Vascular  RLE Edema: +1,Non-pitting  LLE Edema: +1,Non-pitting      Complaints: No new complaint    Physician Orders No new orders     Comment : Weight is up 1 pound from last month visit. Denies any chest pain occasional SOB with to much activity. Discussed in detail low sodium diet 2000mg per day and 64 ounces per day. Saw Jm Montesinos 2022 he increased his Lopressor to 100mg PO q day patient tolerating well B/P 120/74. We will see in 1 month 2022.     Electronically signed by Ann Lim RN on 2022 at 11:19 AM

## 2022-06-01 DIAGNOSIS — N52.8 OTHER MALE ERECTILE DYSFUNCTION: ICD-10-CM

## 2022-06-01 RX ORDER — SILDENAFIL 100 MG/1
100 TABLET, FILM COATED ORAL PRN
Qty: 10 TABLET | Refills: 0 | OUTPATIENT
Start: 2022-06-01

## 2022-06-01 NOTE — TELEPHONE ENCOUNTER
Last visit: 2/14/22  Last Med refill: 1/17/22  Does patient have enough medication for 72 hours: no    Next Visit Date:  Future Appointments   Date Time Provider Sondra Hogue   6/8/2022  3:00 PM MD Chelle Johns [facility]   6/20/2022 11:30 AM STV CHF CLINIC RM 1 [facility] CHF CLI [facility]       Health Maintenance   Topic Date Due    Diabetic foot exam  Never done    Diabetic retinal exam  Never done    Pneumococcal 0-64 years Vaccine (2 - PCV) 04/01/2014    Prostate Specific Antigen (PSA) Screening or Monitoring  07/01/2015    COVID-19 Vaccine (3 - Booster for Pfizer series) 02/20/2022    Depression Screen  03/08/2022    Flu vaccine (Season Ended) 02/14/2023 (Originally 9/1/2022)    Diabetic microalbuminuria test  12/13/2022    A1C test (Diabetic or Prediabetic)  01/17/2023    Lipids  02/01/2023    Colorectal Cancer Screen  05/28/2024    DTaP/Tdap/Td vaccine (2 - Td or Tdap) 12/08/2030    Shingles vaccine  Completed    Hepatitis C screen  Completed    HIV screen  Completed    Hepatitis A vaccine  Aged Out    Hib vaccine  Aged Out    Meningococcal (ACWY) vaccine  Aged Out       Hemoglobin A1C (%)   Date Value   01/17/2022 7.0   08/30/2021 6.3   03/08/2021 5.9             ( goal A1C is < 7)   Microalb/Crt.  Ratio (mcg/mg creat)   Date Value   12/13/2021 Can not be calculated     LDL Cholesterol (mg/dL)   Date Value   01/17/2022 60   01/22/2021 117       (goal LDL is <100)   AST (U/L)   Date Value   01/21/2021 19     ALT (U/L)   Date Value   01/21/2021 27     BUN (mg/dL)   Date Value   05/14/2022 17     BP Readings from Last 3 Encounters:   05/20/22 120/74   04/22/22 120/70   04/20/22 128/62          (goal 120/80)    All Future Testing planned in [facility]  Lab Frequency Next Occurrence   Basic Metabolic Panel Once 49/33/4204   Basic Metabolic Panel Once 06/07/3680               Patient Active Problem List:     HTN (hypertension)     Skin tag     Low back pain with sciatica     Hyperglycemia Infected sebaceous cyst     Chronic midline low back pain without sciatica     Chronic pain of right knee     Chest pain, unspecified     Bronchitis     Chronic systolic congestive heart failure (HCC)     Ventral hernia     Epigastric pain     Elevated LFTs     Shortness of breath     Rectus diastasis     Lumbosacral spondylosis without myelopathy     COPD exacerbation (HCC)     Mixed simple and mucopurulent chronic bronchitis (HCC)     VANDANA (obstructive sleep apnea)     Tachycardia     Chest pain     Diabetes mellitus type 2 in obese [facility])     Need for shingles vaccine     Other male erectile dysfunction     Hernia of abdominal cavity     Hypokalemia

## 2022-06-14 ENCOUNTER — OFFICE VISIT (OUTPATIENT)
Dept: FAMILY MEDICINE CLINIC | Age: 62
End: 2022-06-14
Payer: COMMERCIAL

## 2022-06-14 ENCOUNTER — HOSPITAL ENCOUNTER (OUTPATIENT)
Age: 62
Discharge: HOME | End: 2022-06-14
Payer: COMMERCIAL

## 2022-06-14 VITALS
DIASTOLIC BLOOD PRESSURE: 67 MMHG | HEART RATE: 89 BPM | WEIGHT: 270.6 LBS | SYSTOLIC BLOOD PRESSURE: 115 MMHG | BODY MASS INDEX: 45.03 KG/M2

## 2022-06-14 DIAGNOSIS — I50.22 CHRONIC SYSTOLIC CONGESTIVE HEART FAILURE (HCC): ICD-10-CM

## 2022-06-14 DIAGNOSIS — M54.50 CHRONIC MIDLINE LOW BACK PAIN WITHOUT SCIATICA: ICD-10-CM

## 2022-06-14 DIAGNOSIS — E11.69 DIABETES MELLITUS TYPE 2 IN OBESE (HCC): Primary | ICD-10-CM

## 2022-06-14 DIAGNOSIS — E87.6 HYPOKALEMIA: ICD-10-CM

## 2022-06-14 DIAGNOSIS — E66.9 DIABETES MELLITUS TYPE 2 IN OBESE (HCC): Primary | ICD-10-CM

## 2022-06-14 DIAGNOSIS — N52.8 OTHER MALE ERECTILE DYSFUNCTION: ICD-10-CM

## 2022-06-14 DIAGNOSIS — J42 CHRONIC BRONCHITIS, UNSPECIFIED CHRONIC BRONCHITIS TYPE (HCC): ICD-10-CM

## 2022-06-14 DIAGNOSIS — G89.29 CHRONIC MIDLINE LOW BACK PAIN WITHOUT SCIATICA: ICD-10-CM

## 2022-06-14 LAB
ANION GAP SERPL CALCULATED.3IONS-SCNC: 14 MMOL/L (ref 9–17)
BUN BLDV-MCNC: 15 MG/DL (ref 8–23)
CALCIUM SERPL-MCNC: 9.2 MG/DL (ref 8.6–10.4)
CHLORIDE BLD-SCNC: 98 MMOL/L (ref 98–107)
CO2: 25 MMOL/L (ref 20–31)
CREAT SERPL-MCNC: 0.91 MG/DL (ref 0.7–1.2)
GFR AFRICAN AMERICAN: >60 ML/MIN
GFR NON-AFRICAN AMERICAN: >60 ML/MIN
GFR SERPL CREATININE-BSD FRML MDRD: ABNORMAL ML/MIN/{1.73_M2}
GLUCOSE BLD-MCNC: 191 MG/DL (ref 70–99)
POTASSIUM SERPL-SCNC: 3.9 MMOL/L (ref 3.7–5.3)
SODIUM BLD-SCNC: 137 MMOL/L (ref 135–144)

## 2022-06-14 PROCEDURE — 36415 COLL VENOUS BLD VENIPUNCTURE: CPT

## 2022-06-14 PROCEDURE — 80048 BASIC METABOLIC PNL TOTAL CA: CPT

## 2022-06-14 PROCEDURE — 99396 PREV VISIT EST AGE 40-64: CPT | Performed by: STUDENT IN AN ORGANIZED HEALTH CARE EDUCATION/TRAINING PROGRAM

## 2022-06-14 RX ORDER — SPIRONOLACTONE 25 MG/1
25 TABLET ORAL NIGHTLY
Qty: 30 TABLET | Refills: 2 | Status: SHIPPED | OUTPATIENT
Start: 2022-06-14 | End: 2022-09-13

## 2022-06-14 RX ORDER — BUMETANIDE 2 MG/1
2 TABLET ORAL 2 TIMES DAILY
Qty: 60 TABLET | Refills: 2 | Status: SHIPPED | OUTPATIENT
Start: 2022-06-14 | End: 2022-09-13

## 2022-06-14 RX ORDER — NAPROXEN 500 MG/1
TABLET ORAL
Qty: 30 TABLET | Refills: 2 | Status: SHIPPED | OUTPATIENT
Start: 2022-06-14

## 2022-06-14 RX ORDER — GLUCOSAMINE HCL/CHONDROITIN SU 500-400 MG
CAPSULE ORAL
Qty: 200 STRIP | Refills: 2 | Status: SHIPPED | OUTPATIENT
Start: 2022-06-14 | End: 2022-08-23 | Stop reason: SDUPTHER

## 2022-06-14 RX ORDER — ACETAMINOPHEN 500 MG
1000 TABLET ORAL 4 TIMES DAILY PRN
Qty: 20 TABLET | Refills: 0 | Status: SHIPPED | OUTPATIENT
Start: 2022-06-14

## 2022-06-14 RX ORDER — METOPROLOL SUCCINATE 50 MG/1
50 TABLET, EXTENDED RELEASE ORAL DAILY
Qty: 30 TABLET | Refills: 3 | Status: SHIPPED | OUTPATIENT
Start: 2022-06-14 | End: 2022-09-16 | Stop reason: SDUPTHER

## 2022-06-14 RX ORDER — SIMVASTATIN 20 MG
20 TABLET ORAL NIGHTLY
Qty: 30 TABLET | Refills: 3 | Status: SHIPPED | OUTPATIENT
Start: 2022-06-14 | End: 2022-10-12

## 2022-06-14 RX ORDER — SILDENAFIL 100 MG/1
100 TABLET, FILM COATED ORAL PRN
Qty: 10 TABLET | Refills: 0 | Status: SHIPPED | OUTPATIENT
Start: 2022-06-14

## 2022-06-14 ASSESSMENT — ENCOUNTER SYMPTOMS
SHORTNESS OF BREATH: 0
CHEST TIGHTNESS: 0
COUGH: 0
COLOR CHANGE: 0
APNEA: 0

## 2022-06-14 ASSESSMENT — PATIENT HEALTH QUESTIONNAIRE - PHQ9
SUM OF ALL RESPONSES TO PHQ QUESTIONS 1-9: 0
SUM OF ALL RESPONSES TO PHQ QUESTIONS 1-9: 0
2. FEELING DOWN, DEPRESSED OR HOPELESS: 0
1. LITTLE INTEREST OR PLEASURE IN DOING THINGS: 0
SUM OF ALL RESPONSES TO PHQ QUESTIONS 1-9: 0
SUM OF ALL RESPONSES TO PHQ9 QUESTIONS 1 & 2: 0
SUM OF ALL RESPONSES TO PHQ QUESTIONS 1-9: 0

## 2022-06-14 NOTE — PROGRESS NOTES
Visit Information    Have you changed or started any medications since your last visit including any over-the-counter medicines, vitamins, or herbal medicines? no   Are you having any side effects from any of your medications? -  no  Have you stopped taking any of your medications? Is so, why? -  no    Have you seen any other physician or provider since your last visit? No  Have you had any other diagnostic tests since your last visit? No  Have you been seen in the emergency room and/or had an admission to a hospital since we last saw you? No  Have you had your routine dental cleaning in the past 6 months? no    Have you activated your [employer] account? If not, what are your barriers?  Yes     Patient Care Team:  Orlando Ulrich MD as PCP - General (Family Medicine)  Rose Marie Wills MD as Consulting Physician (Cardiology)  Jose Parker MD as Consulting Physician  Houston Escudero DO as Consulting Physician (General Surgery)    Medical History Review  Past Medical, Family, and Social History reviewed and does not contribute to the patient presenting condition    Health Maintenance   Topic Date Due    Diabetic foot exam  Never done    Diabetic retinal exam  Never done    Pneumococcal 0-64 years Vaccine (2 - PCV) 04/01/2014    Prostate Specific Antigen (PSA) Screening or Monitoring  07/01/2015    COVID-19 Vaccine (3 - Booster for Guy Peter series) 02/20/2022    Depression Screen  03/08/2022    Flu vaccine (Season Ended) 02/14/2023 (Originally 9/1/2022)    Diabetic microalbuminuria test  12/13/2022    A1C test (Diabetic or Prediabetic)  01/17/2023    Lipids  02/01/2023    Colorectal Cancer Screen  05/28/2024    DTaP/Tdap/Td vaccine (2 - Td or Tdap) 12/08/2030    Shingles vaccine  Completed    Hepatitis C screen  Completed    HIV screen  Completed    Hepatitis A vaccine  Aged Out    Hib vaccine  Aged Out    Meningococcal (ACWY) vaccine  Aged Out

## 2022-06-14 NOTE — PATIENT INSTRUCTIONS
Thank you for letting us take care of you today. We hope all your questions were addressed. If a question was overlooked or something else comes to mind after you return home, please contact a member of your Care Team listed below. Your Care Team at [place] [address] is Team #5  Khanh Alonzo MD (Faculty)  Ariana Long MD (Resident)  Mulugeta Bansal MD (Resident)  Avis Baires MD (Resident)  Darrell Fuller MD (Resident)  Saskia Portillo., SUE Donald., CHAZ Vee., Maame Whaley., [facility] office)  Lucina Treviño (Clinical Practice Manager)  Beronica Maradiaga, [facility] (Clinical Pharmacist)       Office phone number: [phone]    If you need to get in right away due to illness, please be advised we have \"Same Day\" appointments available Monday-Friday. Please call us at [phone] option #3 to schedule your \"Same Day\" appointment.

## 2022-06-14 NOTE — PROGRESS NOTES
I have reviewed and discussed key elements of [address] [facility] with the resident including plan of care and follow up and agree with the care galilea plan. Diagnosis Orders   1. Diabetes mellitus type 2 in obese ([facility])  blood glucose monitor strips    empagliflozin (JARDIANCE) 10 MG tablet    metFORMIN (GLUCOPHAGE) 500 MG tablet   2. Chronic bronchitis, unspecified chronic bronchitis type ([address]     3. Chronic systolic congestive heart failure ([facility])  bumetanide (BUMEX) 2 MG tablet    metoprolol succinate (TOPROL XL) 50 MG extended release tablet    sacubitril-valsartan (ENTRESTO) 24-26 MG per tablet    simvastatin (ZOCOR) 20 MG tablet    spironolactone (ALDACTONE) 25 MG tablet   4. Chronic midline low back pain without sciatica  naproxen (NAPROSYN) 500 MG tablet   5. Hypokalemia  Basic Metabolic Panel   6.  Other male erectile dysfunction  sildenafil (VIAGRA) 100 MG tablet

## 2022-06-14 NOTE — PROGRESS NOTES
Subjective:    Timothy Casas is a 57 y.o. male with  has a past medical history of Bronchitis, CAD (coronary artery disease), Cardiomyopathy ([address] Chest pain, CHF (congestive heart failure) ([address] CHF (congestive heart failure), NYHA class I, acute on chronic, diastolic (HCC), Chronic back pain, Chronic diastolic CHF (congestive heart failure) ([address] COPD (chronic obstructive pulmonary disease) ([address] Headache(784.0), Hyperlipidemia, Hypertension, Inguinal hernia, Low back pain with sciatica, Migraine, Mitral valve regurgitation, Osteoarthritis, Pneumonia, Sleep apnea, SOB (shortness of breath) on exertion, Tricuspid regurgitation, Type 2 diabetes mellitus without complication, without long-term current use of insulin ([address] Under care of team, and Wellness examination. Presented to the office today for:  Chief Complaint   Patient presents with    Annual Exam       HPI  Patient is a 56-year-old male who presented to the office today for routine follow-up and physical.  Patient needs multiple medication refills, all sent to new pharmacy. Patient overall states he is doing well, has some itching on his chest from incision site, no pain. Patient otherwise is asymptomatic, blood pressure very well controlled today. Patient states he was recently taking potassium daily due to a hypokalemic episode in April, however since then potassium has been normal.  We will get 1 more BMP and potentially stop patient's potassium. Review of Systems   Constitutional: Negative for activity change, appetite change, chills, fatigue and fever. Respiratory: Negative for apnea, cough, chest tightness and shortness of breath. Cardiovascular: Negative for chest pain, palpitations and leg swelling. Musculoskeletal: Negative for joint swelling. Skin: Negative for color change, pallor, rash and wound. Neurological: Negative for dizziness, tremors, facial asymmetry, light-headedness and headaches. Psychiatric/Behavioral: Negative for agitation, behavioral problems and confusion. The patient is not nervous/anxious. The patient has a   Family History   Problem Relation Age of Onset    Kidney Disease Mother     Diabetes Mother     Heart Disease Mother     Arthritis Mother     Heart Disease Father     Heart Attack Father     Arthritis Sister     Diabetes Sister     Heart Disease Sister     Diabetes Sister        Objective:    /67 (Site: Left Upper Arm, Position: Sitting, Cuff Size: Medium Adult)   Pulse 89   Wt 270 lb 9.6 oz (122.7 kg)   BMI 45.03 kg/m²    BP Readings from Last 3 Encounters:   06/14/22 115/67   05/20/22 120/74   04/22/22 120/70       Physical Exam  Vitals and nursing note reviewed. Constitutional:       Appearance: Normal appearance. He is obese. Cardiovascular:      Rate and Rhythm: Normal rate and regular rhythm. Pulses: Normal pulses. Heart sounds: Normal heart sounds. Pulmonary:      Effort: Pulmonary effort is normal.      Breath sounds: Normal breath sounds. Neurological:      General: No focal deficit present. Mental Status: He is alert and oriented to person, place, and time. Mental status is at baseline. Psychiatric:         Mood and Affect: Mood normal.         Behavior: Behavior normal.         Thought Content:  Thought content normal.         Judgment: Judgment normal.         Lab Results   Component Value Date    WBC 7.3 04/20/2022    HGB 12.3 (L) 04/20/2022    HCT 36.5 (L) 04/20/2022     04/20/2022    CHOL 153 02/01/2022    TRIG 241 (H) 01/17/2022    HDL 36 (L) 01/17/2022    ALT 27 01/21/2021    AST 19 01/21/2021     05/14/2022    K 4.0 05/14/2022    CL 98 05/14/2022    CREATININE 1.12 05/14/2022    BUN 17 05/14/2022    CO2 27 05/14/2022    TSH 0.41 01/22/2021    PSA 1.06 07/01/2014    INR 0.9 05/30/2021    LABA1C 7.0 01/17/2022    LABMICR Can not be calculated 12/13/2021     Lab Results   Component Value Date    CALCIUM 8.9 05/14/2022     Lab Results   Component Value Date    LDLCHOLESTEROL 60 01/17/2022       Assessment and Plan:    1. Diabetes mellitus type 2 in obese (HCC)  - blood glucose monitor strips; Test 2-3 times a day & as needed for symptoms of irregular blood glucose. Dispense sufficient amount for indicated testing frequency plus additional to accommodate PRN testing needs. Dispense: 200 strip; Refill: 2  - empagliflozin (JARDIANCE) 10 MG tablet; Take 1 tablet by mouth daily  Dispense: 30 tablet; Refill: 3  - metFORMIN (GLUCOPHAGE) 500 MG tablet; Take 1 tablet by mouth 2 times daily (with meals)  Dispense: 60 tablet; Refill: 2    2. Chronic bronchitis, unspecified chronic bronchitis type ([facility]    3. Chronic systolic congestive heart failure (HCC)  - bumetanide (BUMEX) 2 MG tablet; Take 1 tablet by mouth 2 times daily  Dispense: 60 tablet; Refill: 2  - metoprolol succinate (TOPROL XL) 50 MG extended release tablet; Take 1 tablet by mouth daily  Dispense: 30 tablet; Refill: 3  - sacubitril-valsartan (ENTRESTO) 24-26 MG per tablet; Take 1 tablet by mouth 2 times daily  Dispense: 60 tablet; Refill: 3  - simvastatin (ZOCOR) 20 MG tablet; Take 1 tablet by mouth nightly  Dispense: 30 tablet; Refill: 3  - spironolactone (ALDACTONE) 25 MG tablet; Take 1 tablet by mouth nightly  Dispense: 30 tablet; Refill: 2    4. Chronic midline low back pain without sciatica  - naproxen (NAPROSYN) 500 MG tablet; TAKE 1 TABLET BY MOUTH 2 TIMES DAILY FOR 20 DAYS  Dispense: 30 tablet; Refill: 2    5. Hypokalemia  - Basic Metabolic Panel; Future    6. Other male erectile dysfunction  - sildenafil (VIAGRA) 100 MG tablet; Take 1 tablet by mouth as needed for Erectile Dysfunction  Dispense: 10 tablet; Refill: 0          Requested Prescriptions     Signed Prescriptions Disp Refills    blood glucose monitor strips 200 strip 2     Sig: Test 2-3 times a day & as needed for symptoms of irregular blood glucose.  Dispense sufficient amount for indicated testing frequency plus additional to accommodate PRN testing needs.     acetaminophen (TYLENOL) 500 MG tablet 20 tablet 0     Sig: Take 2 tablets by mouth 4 times daily as needed for Pain    bumetanide (BUMEX) 2 MG tablet 60 tablet 2     Sig: Take 1 tablet by mouth 2 times daily    empagliflozin (JARDIANCE) 10 MG tablet 30 tablet 3     Sig: Take 1 tablet by mouth daily    metFORMIN (GLUCOPHAGE) 500 MG tablet 60 tablet 2     Sig: Take 1 tablet by mouth 2 times daily (with meals)    metoprolol succinate (TOPROL XL) 50 MG extended release tablet 30 tablet 3     Sig: Take 1 tablet by mouth daily    naproxen (NAPROSYN) 500 MG tablet 30 tablet 2     Sig: TAKE 1 TABLET BY MOUTH 2 TIMES DAILY FOR 20 DAYS    sacubitril-valsartan (ENTRESTO) 24-26 MG per tablet 60 tablet 3     Sig: Take 1 tablet by mouth 2 times daily    sildenafil (VIAGRA) 100 MG tablet 10 tablet 0     Sig: Take 1 tablet by mouth as needed for Erectile Dysfunction    simvastatin (ZOCOR) 20 MG tablet 30 tablet 3     Sig: Take 1 tablet by mouth nightly    spironolactone (ALDACTONE) 25 MG tablet 30 tablet 2     Sig: Take 1 tablet by mouth nightly       Medications Discontinued During This Encounter   Medication Reason    sildenafil (VIAGRA) 50 MG tablet LIST CLEANUP    metFORMIN (GLUCOPHAGE) 500 MG tablet REORDER    bumetanide (BUMEX) 2 MG tablet REORDER    sacubitril-valsartan (ENTRESTO) 24-26 MG per tablet REORDER    empagliflozin (JARDIANCE) 10 MG tablet REORDER    metoprolol succinate (TOPROL XL) 50 MG extended release tablet REORDER    acetaminophen (TYLENOL) 500 MG tablet REORDER    sildenafil (VIAGRA) 100 MG tablet REORDER    simvastatin (ZOCOR) 20 MG tablet REORDER    naproxen (NAPROSYN) 500 MG tablet REORDER    spironolactone (ALDACTONE) 25 MG tablet REORDER       Jesus received counseling on the following healthy behaviors: nutrition, exercise and medication adherence    Discussed use,benefit, and side effects of prescribed medications. Barriers to medication compliance addressed. All patient questions answered. Pt voiced understanding. Return in about 3 months (around 9/14/2022) for routine fu. Disclaimer: Some orall of this note was transcribed using voice-recognition software. This may cause typographical errors occasionally. Although all effort is made to fix these errors, please do not hesitate to contact our office if there Patsi Favorite concern with the understanding of this note.

## 2022-06-20 ENCOUNTER — HOSPITAL ENCOUNTER (OUTPATIENT)
Dept: OTHER | Age: 62
Discharge: HOME | End: 2022-06-20
Payer: COMMERCIAL

## 2022-06-20 VITALS
RESPIRATION RATE: 20 BRPM | WEIGHT: 273 LBS | DIASTOLIC BLOOD PRESSURE: 92 MMHG | SYSTOLIC BLOOD PRESSURE: 144 MMHG | HEART RATE: 105 BPM | OXYGEN SATURATION: 97 % | BODY MASS INDEX: 45.43 KG/M2

## 2022-06-20 PROCEDURE — 99212 OFFICE O/P EST SF 10 MIN: CPT

## 2022-06-20 NOTE — PROGRESS NOTES
Date:  2022  Time:  12:31 PM    [facility] at [place] [address]    Office: [phone] Fax: [phone]    Re:  Randall Turpin   Patient : 1960    Vital Signs: BP (!) 144/92   Pulse (!) 105   Resp 20   Wt 273 lb (123.8 kg)   SpO2 97%   BMI 45.43 kg/m²                       O2 Device: None (Room air)                           No results for input(s): CBC, HGB, HCT, WBC, PLATELET, NA, K, CL, CO2, BUN, CREATININE, GLUCOSE, BNP, INR in the last 72 hours. Respiratory:    Assessment  Charting Type: Reassessment    Breath Sounds  Right Upper Lobe: Clear  Right Middle Lobe: Clear,Diminished  Right Lower Lobe: Clear,Diminished  Left Upper Lobe: Clear  Left Lower Lobe: Clear,Diminished    Cough/Sputum  Cough: None       Peripheral Vascular  RLE Edema: +1,Pitting  LLE Edema: +1,Pitting      Complaints: Some increased SOB      Comment : Patient here ambulatory for routine visit. He had a long walk from the parking garage and is SOB upon arrival. Recovers easliy. Weight increased 7 lbs in one month. No acute s/s CHF, however leg edema with slight increase. Long discussion about low salt diet and fluid limits. States compliance with medications. Has not taken any meds yet today. Encouraged to use his albuterol nebulizer as prescribed. He states he may take an extra 1/2 bumex tablet today. Usual dose is 2 mg 2x day. Recently had BMP drawn and reviewed. Next visit here 4 weeks 2022.     Electronically signed by Pio Conroy RN on 2022 at 12:31 PM

## 2022-07-18 ENCOUNTER — HOSPITAL ENCOUNTER (OUTPATIENT)
Dept: OTHER | Age: 62
Discharge: HOME | End: 2022-07-18
Payer: COMMERCIAL

## 2022-07-18 VITALS
WEIGHT: 271 LBS | SYSTOLIC BLOOD PRESSURE: 120 MMHG | HEART RATE: 93 BPM | BODY MASS INDEX: 45.1 KG/M2 | OXYGEN SATURATION: 98 % | DIASTOLIC BLOOD PRESSURE: 80 MMHG | RESPIRATION RATE: 16 BRPM

## 2022-07-18 PROCEDURE — 99212 OFFICE O/P EST SF 10 MIN: CPT

## 2022-07-18 NOTE — PROGRESS NOTES
Date:  2022  Time:  11:45 AM    [facility]F Clinic at [address] [facility] [place]    Office: [phone] Fax: [phone]    Re:  Rhea Durham   Patient : 1960    Vital Signs: /80   Pulse 93   Resp 16   Wt 271 lb (122.9 kg)   SpO2 98%   BMI 45.10 kg/m²                                                   No results for input(s): CBC, HGB, HCT, WBC, PLATELET, NA, K, CL, CO2, BUN, CREATININE, GLUCOSE, BNP, INR in the last 72 hours. Respiratory:    Assessment  Charting Type: Reassessment    Breath Sounds  Right Upper Lobe: Clear  Right Middle Lobe: Clear  Right Lower Lobe: Diminished  Left Upper Lobe: Clear  Left Lower Lobe: Diminished    Cough/Sputum  Cough: None         Peripheral Vascular  RLE Edema: +1, Pitting  LLE Edema: +1, Pitting      ComplaintsNo new complaints     Physician Orders No new orders     Comment Weight is down 3 pounds from last months visit. Discussed in detail low sodium diet 2000mg per day and 64 ounces of fluid per day admits to going over on fluids daily told importance of lowering fluid intake keeping legs elevated when in chair. We will see in 1 month 8/15/2022.     Electronically signed by Pat Tinoco RN on 2022 at 11:45 AM

## 2022-08-15 ENCOUNTER — HOSPITAL ENCOUNTER (OUTPATIENT)
Dept: OTHER | Age: 62
Discharge: HOME | End: 2022-08-15
Payer: COMMERCIAL

## 2022-08-15 VITALS
BODY MASS INDEX: 45.26 KG/M2 | DIASTOLIC BLOOD PRESSURE: 80 MMHG | HEART RATE: 93 BPM | WEIGHT: 272 LBS | RESPIRATION RATE: 20 BRPM | SYSTOLIC BLOOD PRESSURE: 120 MMHG | OXYGEN SATURATION: 98 %

## 2022-08-15 PROCEDURE — 99212 OFFICE O/P EST SF 10 MIN: CPT

## 2022-08-15 ASSESSMENT — PAIN SCALES - GENERAL: PAINLEVEL_OUTOF10: 10

## 2022-08-15 ASSESSMENT — PAIN DESCRIPTION - PAIN TYPE: TYPE: CHRONIC PAIN

## 2022-08-15 ASSESSMENT — PAIN DESCRIPTION - ORIENTATION: ORIENTATION: LOWER

## 2022-08-15 ASSESSMENT — PAIN DESCRIPTION - LOCATION: LOCATION: BACK

## 2022-08-15 ASSESSMENT — PAIN DESCRIPTION - DESCRIPTORS: DESCRIPTORS: ACHING

## 2022-08-15 NOTE — PROGRESS NOTES
Date:  8/15/2022  Time:  12:27 PM    CHF Clinic at [address] [facility]    Office: [phone] Fax: [phone]    Re:  Hay Godinez   Patient : 1960    Vital Signs: /80   Pulse 93   Resp 20   Wt 272 lb (123.4 kg)   SpO2 98%   BMI 45.26 kg/m²                       O2 Device: None (Room air)                           No results for input(s): CBC, HGB, HCT, WBC, PLATELET, NA, K, CL, CO2, BUN, CREATININE, GLUCOSE, BNP, INR in the last 72 hours. Respiratory:    Assessment  Charting Type: Reassessment    Breath Sounds  Right Upper Lobe: Clear  Right Middle Lobe: Clear  Right Lower Lobe: Diminished  Left Upper Lobe: Clear  Left Lower Lobe: Diminished    Cough/Sputum  Cough: None         Peripheral Vascular  RLE Edema: +1, Pitting  LLE Edema: +1, Pitting      Complaints: no complaints    Comment : wt is up 1 lb in one month. Pt with chronic bilaterally lower extremity pitting edema . Measurements slightly elevated on right calf compared to last visit here, otherwise edema of  bilateral ankles and left calf similar. He states he has NOT taken his AM diuretics yet today and \" I usually do before coming here\". He also has not been using his knee high compression copper stockings lately. Pt encouraged to keep feet elevated when up in chair and to use compression stockings daily. Pt states his stockings are not too tight or constricting. Reviewed pt's current medication list. Current diuretics: Bumex 2 mg BID and Aldactone 25 mg once daily. He states he's eating a lot of fresh fruit and veggies but did eat a grilled cheese sandwich and tomato soup recently. Reminded to follow 2 gm sodium diet , alternative food choices reviewed with pt . Also reminded to follow fluid restriction of 2 liters daily. Next f/u here in one month.       Electronically signed by Rosibel Emanuel RN on 8/15/2022 at 12:27 PM

## 2022-08-21 ENCOUNTER — APPOINTMENT (OUTPATIENT)
Dept: GENERAL RADIOLOGY | Age: 62
End: 2022-08-21
Payer: COMMERCIAL

## 2022-08-21 ENCOUNTER — HOSPITAL ENCOUNTER (EMERGENCY)
Age: 62
Discharge: HOME | End: 2022-08-21
Attending: EMERGENCY MEDICINE
Payer: COMMERCIAL

## 2022-08-21 VITALS
TEMPERATURE: 98.5 F | RESPIRATION RATE: 16 BRPM | BODY MASS INDEX: 44.98 KG/M2 | HEIGHT: 65 IN | DIASTOLIC BLOOD PRESSURE: 87 MMHG | SYSTOLIC BLOOD PRESSURE: 105 MMHG | WEIGHT: 270 LBS | OXYGEN SATURATION: 96 % | HEART RATE: 96 BPM

## 2022-08-21 DIAGNOSIS — J44.1 COPD EXACERBATION (HCC): Primary | ICD-10-CM

## 2022-08-21 LAB
ABSOLUTE EOS #: 0.3 K/UL (ref 0–0.4)
ABSOLUTE LYMPH #: 1.4 K/UL (ref 1–4.8)
ABSOLUTE MONO #: 0.7 K/UL (ref 0.1–1.3)
ALBUMIN SERPL-MCNC: 3.8 G/DL (ref 3.5–5.2)
ALP BLD-CCNC: 82 U/L (ref 40–129)
ALT SERPL-CCNC: 33 U/L (ref 5–41)
ANION GAP SERPL CALCULATED.3IONS-SCNC: 12 MMOL/L (ref 9–17)
AST SERPL-CCNC: 21 U/L
BASOPHILS # BLD: 0 % (ref 0–2)
BASOPHILS ABSOLUTE: 0 K/UL (ref 0–0.2)
BILIRUB SERPL-MCNC: 0.18 MG/DL (ref 0.3–1.2)
BUN BLDV-MCNC: 20 MG/DL (ref 8–23)
CALCIUM SERPL-MCNC: 8.9 MG/DL (ref 8.6–10.4)
CHLORIDE BLD-SCNC: 98 MMOL/L (ref 98–107)
CO2: 27 MMOL/L (ref 20–31)
CREAT SERPL-MCNC: 0.93 MG/DL (ref 0.7–1.2)
D-DIMER QUANTITATIVE: 0.35 MG/L FEU (ref 0–0.59)
EOSINOPHILS RELATIVE PERCENT: 3 % (ref 0–4)
GFR AFRICAN AMERICAN: >60 ML/MIN
GFR NON-AFRICAN AMERICAN: >60 ML/MIN
GFR SERPL CREATININE-BSD FRML MDRD: ABNORMAL ML/MIN/{1.73_M2}
GLUCOSE BLD-MCNC: 222 MG/DL (ref 70–99)
HCT VFR BLD CALC: 35.9 % (ref 41–53)
HEMOGLOBIN: 12.4 G/DL (ref 13.5–17.5)
INFLUENZA A: NOT DETECTED
INFLUENZA B: NOT DETECTED
INR BLD: 0.9
LYMPHOCYTES # BLD: 14 % (ref 24–44)
MAGNESIUM: 2.2 MG/DL (ref 1.6–2.6)
MCH RBC QN AUTO: 29.8 PG (ref 26–34)
MCHC RBC AUTO-ENTMCNC: 34.6 G/DL (ref 31–37)
MCV RBC AUTO: 86.1 FL (ref 80–100)
MONOCYTES # BLD: 7 % (ref 1–7)
PDW BLD-RTO: 14 % (ref 11.5–14.9)
PLATELET # BLD: 208 K/UL (ref 150–450)
PMV BLD AUTO: 8.4 FL (ref 6–12)
POTASSIUM SERPL-SCNC: 4.1 MMOL/L (ref 3.7–5.3)
PRO-BNP: 81 PG/ML
PROTHROMBIN TIME: 12.6 SEC (ref 11.8–14.6)
RBC # BLD: 4.16 M/UL (ref 4.5–5.9)
SARS-COV-2 RNA, RT PCR: NOT DETECTED
SEG NEUTROPHILS: 76 % (ref 36–66)
SEGMENTED NEUTROPHILS ABSOLUTE COUNT: 7.7 K/UL (ref 1.3–9.1)
SODIUM BLD-SCNC: 137 MMOL/L (ref 135–144)
SOURCE: NORMAL
SPECIMEN DESCRIPTION: NORMAL
TOTAL PROTEIN: 6.6 G/DL (ref 6.4–8.3)
TROPONIN, HIGH SENSITIVITY: 9 NG/L (ref 0–22)
TROPONIN, HIGH SENSITIVITY: 9 NG/L (ref 0–22)
WBC # BLD: 10.1 K/UL (ref 3.5–11)

## 2022-08-21 PROCEDURE — 94664 DEMO&/EVAL PT USE INHALER: CPT

## 2022-08-21 PROCEDURE — 71045 X-RAY EXAM CHEST 1 VIEW: CPT

## 2022-08-21 PROCEDURE — 85379 FIBRIN DEGRADATION QUANT: CPT

## 2022-08-21 PROCEDURE — 93005 ELECTROCARDIOGRAM TRACING: CPT | Performed by: EMERGENCY MEDICINE

## 2022-08-21 PROCEDURE — 93005 ELECTROCARDIOGRAM TRACING: CPT

## 2022-08-21 PROCEDURE — 80053 COMPREHEN METABOLIC PANEL: CPT

## 2022-08-21 PROCEDURE — 94640 AIRWAY INHALATION TREATMENT: CPT

## 2022-08-21 PROCEDURE — 36415 COLL VENOUS BLD VENIPUNCTURE: CPT

## 2022-08-21 PROCEDURE — 84484 ASSAY OF TROPONIN QUANT: CPT

## 2022-08-21 PROCEDURE — 85025 COMPLETE CBC W/AUTO DIFF WBC: CPT

## 2022-08-21 PROCEDURE — 83735 ASSAY OF MAGNESIUM: CPT

## 2022-08-21 PROCEDURE — 96374 THER/PROPH/DIAG INJ IV PUSH: CPT

## 2022-08-21 PROCEDURE — 87636 SARSCOV2 & INF A&B AMP PRB: CPT

## 2022-08-21 PROCEDURE — 94761 N-INVAS EAR/PLS OXIMETRY MLT: CPT

## 2022-08-21 PROCEDURE — 83880 ASSAY OF NATRIURETIC PEPTIDE: CPT

## 2022-08-21 PROCEDURE — 6370000000 HC RX 637 (ALT 250 FOR IP): Performed by: EMERGENCY MEDICINE

## 2022-08-21 PROCEDURE — 85610 PROTHROMBIN TIME: CPT

## 2022-08-21 PROCEDURE — 99285 EMERGENCY DEPT VISIT HI MDM: CPT

## 2022-08-21 PROCEDURE — 6360000002 HC RX W HCPCS: Performed by: EMERGENCY MEDICINE

## 2022-08-21 RX ORDER — DOXYCYCLINE HYCLATE 100 MG
100 TABLET ORAL 2 TIMES DAILY
Qty: 20 TABLET | Refills: 0 | Status: SHIPPED | OUTPATIENT
Start: 2022-08-21 | End: 2022-08-31

## 2022-08-21 RX ORDER — IPRATROPIUM BROMIDE AND ALBUTEROL SULFATE 2.5; .5 MG/3ML; MG/3ML
1 SOLUTION RESPIRATORY (INHALATION) EVERY 4 HOURS PRN
Status: DISCONTINUED | OUTPATIENT
Start: 2022-08-21 | End: 2022-08-21 | Stop reason: HOSPADM

## 2022-08-21 RX ORDER — PREDNISONE 20 MG/1
40 TABLET ORAL DAILY
Qty: 10 TABLET | Refills: 0 | Status: SHIPPED | OUTPATIENT
Start: 2022-08-21 | End: 2022-08-26

## 2022-08-21 RX ORDER — METHYLPREDNISOLONE SODIUM SUCCINATE 125 MG/2ML
125 INJECTION, POWDER, LYOPHILIZED, FOR SOLUTION INTRAMUSCULAR; INTRAVENOUS ONCE
Status: COMPLETED | OUTPATIENT
Start: 2022-08-21 | End: 2022-08-21

## 2022-08-21 RX ADMIN — IPRATROPIUM BROMIDE AND ALBUTEROL SULFATE 1 AMPULE: .5; 2.5 SOLUTION RESPIRATORY (INHALATION) at 14:02

## 2022-08-21 RX ADMIN — IPRATROPIUM BROMIDE AND ALBUTEROL SULFATE 1 AMPULE: .5; 2.5 SOLUTION RESPIRATORY (INHALATION) at 17:01

## 2022-08-21 RX ADMIN — METHYLPREDNISOLONE SODIUM SUCCINATE 125 MG: 125 INJECTION, POWDER, FOR SOLUTION INTRAMUSCULAR; INTRAVENOUS at 14:37

## 2022-08-21 ASSESSMENT — ENCOUNTER SYMPTOMS
COUGH: 1
BACK PAIN: 0
ABDOMINAL PAIN: 0
SHORTNESS OF BREATH: 1
EYE PAIN: 0
COLOR CHANGE: 0

## 2022-08-21 ASSESSMENT — PAIN - FUNCTIONAL ASSESSMENT: PAIN_FUNCTIONAL_ASSESSMENT: NONE - DENIES PAIN

## 2022-08-21 NOTE — ED NOTES
Mode of arrival (squad #, walk in, police, etc) : Walk in         Chief complaint(s): Shortness of Breath and Cough         Arrival Note (brief scenario, treatment PTA, etc). : patient has had worsening shortness of breath and a cough over the last few days. History of CHF with CCM device placed in February         C= [facility] you ever felt that you should Cut down on your drinking? \"  No  A= \"Have people Annoyed you by criticizing your drinking? \"  No  G= \"Have you ever felt bad or Guilty about your drinking? \"  No  E= \"Have you ever had a drink as an Eye-opener first thing in the morning to steady your nerves or to help a hangover? \"  No      Deferred []      Reason for deferring: N/A    *If yes to two or more: probable alcohol abuse. Rohini Toledo RN  08/21/22 1521

## 2022-08-21 NOTE — Clinical Note
Skylar Strong was seen and treated in our emergency department on 8/21/2022. He may return to work on 08/24/2022. If you have any questions or concerns, please don't hesitate to call.       Chitra Lott, DO

## 2022-08-21 NOTE — LETTER
[address] ED  [address] [facility] [address]  Phone: [phone]               August 21, 2022    Patient: Sowmya Garcia   YOB: 1960   Date of Visit: 8/21/2022       To Whom It May Concern:    Maranda Renee was seen and treated in our emergency department on 8/21/2022. He can return to work on 8/23/2022.       Sincerely,       Malissa Mcgarry RN         Signature:__________________________________

## 2022-08-21 NOTE — ED PROVIDER NOTES
EMERGENCY DEPARTMENT ENCOUNTER    Pt Name: Joe Gresham  MRN: [id number]  [place] 1960  Date of evaluation: 8/21/22  CHIEF COMPLAINT       Chief Complaint   Patient presents with    Shortness of Breath    Cough     HISTORY OF PRESENT ILLNESS   59-year-old male with history of CHF as well as COPD presents for complaints of cough and shortness of breath patient reports had worsening cough and shortness of breath over the last 3 days. Patient states he is having shortness of breath both with exertion and at rest.  Patient reports he been coughing much more, states he has been bringing up some sputum. Reports some chest discomfort while he is coughing. Denies any associated fevers at home, denies any significant swelling in his abdomen and legs that he has noted. Denies any recent sick contacts. The history is provided by the patient. REVIEW OF SYSTEMS     Review of Systems   Constitutional:  Negative for chills and fever. HENT:  Negative for congestion and ear pain. Eyes:  Negative for pain. Respiratory:  Positive for cough and shortness of breath. Cardiovascular:  Positive for chest pain. Negative for palpitations and leg swelling. Gastrointestinal:  Negative for abdominal pain. Genitourinary:  Negative for dysuria and flank pain. Musculoskeletal:  Negative for back pain. Skin:  Negative for color change. Neurological:  Negative for numbness and headaches. Psychiatric/Behavioral:  Negative for confusion. All other systems reviewed and are negative.   PASTMEDICAL HISTORY     Past Medical History:   Diagnosis Date    Bronchitis     CAD (coronary artery disease)     Cardiomyopathy ([facility] [address] [address]     Chest pain     CHF (congestive heart failure) (HCC)     CHF (congestive heart failure), NYHA class I, acute on chronic, diastolic ([facility]) 2/18/2207    Chronic back pain     Chronic diastolic CHF (congestive heart failure) ([address] 8/1/2014    COPD (chronic obstructive pulmonary disease) ([facility] [address] TONSILLECTOMY  1964   exact date unknown     CURRENT MEDICATIONS       Discharge Medication List as of 8/21/2022  5:17 PM        CONTINUE these medications which have NOT CHANGED    Details   blood glucose monitor strips Test 2-3 times a day & as needed for symptoms of irregular blood glucose. Dispense sufficient amount for indicated testing frequency plus additional to accommodate PRN testing needs. , Disp-200 strip, R-2, Normal      acetaminophen (TYLENOL) 500 MG tablet Take 2 tablets by mouth 4 times daily as needed for Pain, Disp-20 tablet, R-0Normal      bumetanide (BUMEX) 2 MG tablet Take 1 tablet by mouth 2 times daily, Disp-60 tablet, R-2Normal      empagliflozin (JARDIANCE) 10 MG tablet Take 1 tablet by mouth daily, Disp-30 tablet, R-3Normal      metFORMIN (GLUCOPHAGE) 500 MG tablet Take 1 tablet by mouth 2 times daily (with meals), Disp-60 tablet, R-2Normal      metoprolol succinate (TOPROL XL) 50 MG extended release tablet Take 1 tablet by mouth daily, Disp-30 tablet, R-3Normal      naproxen (NAPROSYN) 500 MG tablet TAKE 1 TABLET BY MOUTH 2 TIMES DAILY FOR 20 DAYS, Disp-30 tablet, R-2Normal      sacubitril-valsartan (ENTRESTO) 24-26 MG per tablet Take 1 tablet by mouth 2 times daily, Disp-60 tablet, R-3Normal      sildenafil (VIAGRA) 100 MG tablet Take 1 tablet by mouth as needed for Erectile Dysfunction, Disp-10 tablet, R-0Normal      simvastatin (ZOCOR) 20 MG tablet Take 1 tablet by mouth nightly, Disp-30 tablet, R-3Normal      spironolactone (ALDACTONE) 25 MG tablet Take 1 tablet by mouth nightly, Disp-30 tablet, R-2Normal      albuterol (PROVENTIL) (2.5 MG/3ML) 0.083% nebulizer solution INHALE 1 UNIT DOSE BY NEBULIZATION UP TO EVERY 6 HOURS AS NEEDED FOR WHEEZING OR SHORTNESS OF BREATH, Disp-360 mL, R-3Normal      gabapentin (NEURONTIN) 300 MG capsule TAKE 1 CAPSULE BY MOUTH NIGHTLY AS NEEDED (PAIN), Disp-30 capsule, R-1Normal      potassium chloride (KLOR-CON M) 20 MEQ extended release tablet Take 1 tablet by mouth daily (with breakfast), Disp-60 tablet, R-3Normal      Elastic Bandages & Supports (WRIST SPLINT/COCK-UP/RIGHT L) MISC DAILY Starting Thu 10/28/2021, Disp-1 each, R-0, Normal      nitroGLYCERIN (NITROSTAT) 0.4 MG SL tablet PLACE 1 TABLET UNDER THE TONGUE EVERY 5 MINUTES AS NEEDED FOR CHEST PAIN, Disp-25 tablet, R-0Normal      albuterol sulfate  (90 Base) MCG/ACT inhaler Inhale 2 puffs into the lungs every 6 hours as needed for Wheezing, Disp-8.5 g, R-1Normal      blood glucose monitor kit and supplies Dispense sufficient amount for indicated testing frequency plus additional to accommodate PRN testing needs. Dispense all needed supplies to include: monitor, strips, lancing device, lancets, control solutions, alcohol swabs., Disp-1 kit, R-0, Normal      albuterol-ipratropium (COMBIVENT RESPIMAT)  MCG/ACT AERS inhaler INHALE 1 PUFF INTO THE LUNGS EVERY 6 HOURS, Disp-4 g, R-5Normal      Masks (FACE MASK) MISC 1 each by Does not apply route as needed (as needed), Disp-50 each, R-3Normal      diclofenac sodium (VOLTAREN) 1 % GEL Apply topically 2 times daily, Topical, 2 TIMES DAILY Starting Mon 2021, Disp-350 g, R-2, Normal      aspirin 81 MG tablet Take 1 tablet by mouth daily, Disp-30 tablet, R-3Normal           ALLERGIES     is allergic to penicillins, sulfa antibiotics, sulfasalazine, pseudoeph-doxylamine-dm-apap, and rabbit protein. FAMILY HISTORY     He indicated that his mother is . He indicated that his father is . He indicated that only one of his two sisters is alive. SOCIAL HISTORY       Social History     Tobacco Use    Smoking status: Former     Packs/day: 0.00     Years: 40.00     Pack years: 0.00     Types: Cigarettes     Quit date:      Years since quittin.6    Smokeless tobacco: Current     Types: Chew    Tobacco comments:     Chews / quit cigarettes   Vaping Use    Vaping Use: Never used   Substance Use Topics    Alcohol use:  Yes Alcohol/week: 1.0 standard drink     Types: 1 Glasses of wine per week     Comment: occ. Drug use: No     PHYSICAL EXAM     INITIAL VITALS: /87   Pulse 96   Temp 98.5 °F (36.9 °C)   Resp 16   Ht 5' 5\" (1.651 m)   Wt 270 lb (122.5 kg)   SpO2 96%   BMI 44.93 kg/m²    Physical Exam  Vitals and nursing note reviewed. Constitutional:       General: He is not in acute distress. Appearance: Normal appearance. He is not ill-appearing. HENT:      Head: Normocephalic and atraumatic. Right Ear: External ear normal.      Left Ear: External ear normal.      Nose: Nose normal.      Mouth/Throat:      Mouth: Mucous membranes are moist.   Eyes:      Extraocular Movements: Extraocular movements intact. Pupils: Pupils are equal, round, and reactive to light. Cardiovascular:      Rate and Rhythm: Normal rate and regular rhythm. Pulses: Normal pulses. Radial pulses are 2+ on the right side and 2+ on the left side. Dorsalis pedis pulses are 2+ on the right side and 2+ on the left side. Heart sounds: Normal heart sounds. Pulmonary:      Effort: Pulmonary effort is normal.      Breath sounds: Wheezing present. Comments: Faint Wheezing B/L  Abdominal:      General: Abdomen is flat. Palpations: Abdomen is soft. Tenderness: There is no abdominal tenderness. Musculoskeletal:         General: No tenderness. Normal range of motion. Cervical back: Neck supple. No spinous process tenderness or muscular tenderness. Right lower le+ Pitting Edema present. Left lower le+ Pitting Edema present. Skin:     General: Skin is warm and dry. Capillary Refill: Capillary refill takes less than 2 seconds. Neurological:      General: No focal deficit present. Mental Status: He is alert and oriented to person, place, and time. Cranial Nerves: Cranial nerves are intact. Sensory: Sensation is intact. Motor: Motor function is intact. Psychiatric:         Behavior: Behavior normal.         Thought Content: Thought content does not include homicidal or suicidal ideation. MEDICAL DECISION MAKINyear old male presents for complaint of shortness of breath and cough. On initial exam patient in no acute distress, vitals are stable, patient has some faint wheezing bilaterally, does report history of both CHF and COPD, will check labs and imaging, given that he was having some wheezing, will give dose of steroid breathing treatment and reassess      Labs reviewed and unremarkable, chest x-ray was negative    Patient was reevaluated wheezing is improved patient reports he is feeling somewhat better    Discussed results with patient, discussed concern for COPD exacerbation, discussed admission versus trial of outpatient course of steroids and antibiotics, patient reporting he does not want to be admitted at this time and would like to try outpatient treatment, feel this is appropriate as he is not requiring any supplemental O2 no significant lab abnormalities, no significant abnormalities of his vital signs, discussed with patient strict return precautions and need for follow-up with his PCP, patient voiced understanding and is comfortable with plan and discharge home. Patient/Guardian was informed of their diagnosis and told to follow up with PCP  in 1-3 days. Patient demonstrates understanding and agreement with the plan. They were given the opportunity to ask questions and those questions were answered to the best of our ability with the available information. Patient/Guardian told to return to the ED for any new, worsening, changing or persistent symptoms. This dictation was prepared using [employer] voice recognition software.           CRITICAL CARE:       PROCEDURES:    Procedures    DIAGNOSTIC RESULTS   EKG:All EKG's are interpreted by the Emergency Department Physician who either signs or Co-signs this chart in the absence of a cardiologist.    This rhythm rate of 96, normal axis, no ST segment elevation or depression, nonspecific T wave changes    RADIOLOGY:All plain film, CT, MRI, and formal ultrasound images (except ED bedside ultrasound) are read by the radiologist, see reports below, unless otherwisenoted in MDM or here. XR CHEST PORTABLE   Final Result   Unchanged appearance of the chest without acute airspace disease identified. LABS: All lab results were reviewed by myself, and all abnormals are listed below.   Labs Reviewed   CBC WITH AUTO DIFFERENTIAL - Abnormal; Notable for the following components:       Result Value    RBC 4.16 (*)     Hemoglobin 12.4 (*)     Hematocrit 35.9 (*)     Seg Neutrophils 76 (*)     Lymphocytes 14 (*)     All other components within normal limits   COMPREHENSIVE METABOLIC PANEL - Abnormal; Notable for the following components:    Glucose 222 (*)     Total Bilirubin 0.18 (*)     All other components within normal limits   COVID-19 & INFLUENZA COMBO   D-DIMER, QUANTITATIVE   MAGNESIUM   PROTIME-INR   TROPONIN   TROPONIN   BRAIN NATRIURETIC PEPTIDE       EMERGENCY DEPARTMENTCOURSE:         Vitals:    Vitals:    08/21/22 1323 08/21/22 1402 08/21/22 1701 08/21/22 1738   BP: 127/66   105/87   Pulse:  96 85 96   Resp:  18 16 16   Temp:       SpO2:  96% 97% 96%   Weight:       Height:           The patient was given the following medications while in the emergency department:  Orders Placed This Encounter   Medications    methylPREDNISolone sodium (SOLU-MEDROL) injection 125 mg    DISCONTD: ipratropium-albuterol (DUONEB) nebulizer solution 1 ampule     Order Specific Question:   Initiate RT Bronchodilator Protocol     Answer:   Yes - ED protocol    predniSONE (DELTASONE) 20 MG tablet     Sig: Take 2 tablets by mouth daily for 5 days     Dispense:  10 tablet     Refill:  0    doxycycline hyclate (VIBRA-TABS) 100 MG tablet     Sig: Take 1 tablet by mouth 2 times daily for 10 days Dispense:  20 tablet     Refill:  0     CONSULTS:  None    FINAL IMPRESSION      1. COPD exacerbation [facility])          DISPOSITION/PLAN   DISPOSITION Decision To Discharge 08/21/2022 05:16:57 PM      PATIENT REFERRED TO:  Palomo Smalls MD  [address]  [address]  [phone]    Schedule an appointment as soon as possible for a visit       [facility] ED  [facility] [address] [place] [address] [address] [address]  [phone]    As needed, If symptoms worsen  DISCHARGE MEDICATIONS:  Discharge Medication List as of 8/21/2022  5:17 PM        START taking these medications    Details   predniSONE (DELTASONE) 20 MG tablet Take 2 tablets by mouth daily for 5 days, Disp-10 tablet, R-0Print      doxycycline hyclate (VIBRA-TABS) 100 MG tablet Take 1 tablet by mouth 2 times daily for 10 days, Disp-20 tablet, R-0Print           The care is provided during an unprecedented national emergency due to the novel coronavirus, COVID 19.   [address] [facility] [address], DO                     [address] [facility] [address], DO  08/21/22 2124

## 2022-08-23 DIAGNOSIS — E11.69 DIABETES MELLITUS TYPE 2 IN OBESE (HCC): ICD-10-CM

## 2022-08-23 DIAGNOSIS — E66.9 DIABETES MELLITUS TYPE 2 IN OBESE (HCC): ICD-10-CM

## 2022-08-23 LAB
EKG ATRIAL RATE: 96 BPM
EKG P AXIS: 61 DEGREES
EKG P-R INTERVAL: 158 MS
EKG Q-T INTERVAL: 370 MS
EKG QRS DURATION: 98 MS
EKG QTC CALCULATION (BAZETT): 467 MS
EKG R AXIS: 30 DEGREES
EKG T AXIS: 108 DEGREES
EKG VENTRICULAR RATE: 96 BPM

## 2022-08-23 PROCEDURE — 93010 ELECTROCARDIOGRAM REPORT: CPT | Performed by: INTERNAL MEDICINE

## 2022-08-23 RX ORDER — GLUCOSAMINE HCL/CHONDROITIN SU 500-400 MG
CAPSULE ORAL
Qty: 200 STRIP | Refills: 2 | Status: SHIPPED | OUTPATIENT
Start: 2022-08-23

## 2022-08-23 NOTE — TELEPHONE ENCOUNTER
Last visit: 6/14/22  Last Med refill: 8/14/22  Does patient have enough medication for 72 hours: Yes    Next Visit Date:  Future Appointments   Date Time Provider Sondra Hogue   9/19/2022  9:00 AM [facility]F CLINIC RM 1 [facility] CHF CLI [facility]       Health Maintenance   Topic Date Due    Diabetic foot exam  Never done    Diabetic retinal exam  Never done    Pneumococcal 0-64 years Vaccine (2 - PCV) 04/01/2014    COVID-19 Vaccine (4 - Booster for Pfizer series) 08/07/2022    Flu vaccine (1) 09/01/2022    Diabetic microalbuminuria test  12/13/2022    A1C test (Diabetic or Prediabetic)  01/17/2023    Lipids  02/01/2023    Depression Screen  06/14/2023    Colorectal Cancer Screen  05/28/2024    DTaP/Tdap/Td vaccine (2 - Td or Tdap) 12/08/2030    Shingles vaccine  Completed    Hepatitis C screen  Completed    HIV screen  Completed    Hepatitis A vaccine  Aged Out    Hib vaccine  Aged Out    Meningococcal (ACWY) vaccine  Aged Out       Hemoglobin A1C (%)   Date Value   01/17/2022 7.0   08/30/2021 6.3   03/08/2021 5.9             ( goal A1C is < 7)   Microalb/Crt.  Ratio (mcg/mg creat)   Date Value   12/13/2021 Can not be calculated     LDL Cholesterol (mg/dL)   Date Value   01/17/2022 60   01/22/2021 117       (goal LDL is <100)   AST (U/L)   Date Value   08/21/2022 21     ALT (U/L)   Date Value   08/21/2022 33     BUN (mg/dL)   Date Value   08/21/2022 20     BP Readings from Last 3 Encounters:   08/21/22 105/87   08/15/22 120/80   07/18/22 120/80          (goal 120/80)    All Future Testing planned in [facility]  Lab Frequency Next Occurrence   Basic Metabolic Panel Once 69/36/2188   Basic Metabolic Panel Once 62/30/3799               Patient Active Problem List:     HTN (hypertension)     Skin tag     Low back pain with sciatica     Hyperglycemia     Infected sebaceous cyst     Chronic midline low back pain without sciatica     Chronic pain of right knee     Chest pain, unspecified     Bronchitis     Chronic systolic congestive heart failure (HCC)     Ventral hernia     Epigastric pain     Elevated LFTs     Shortness of breath     Rectus diastasis     Lumbosacral spondylosis without myelopathy     COPD exacerbation (HCC)     Mixed simple and mucopurulent chronic bronchitis (HCC)     VANDANA (obstructive sleep apnea)     Tachycardia     Chest pain     Diabetes mellitus type 2 in [facility])     Need for shingles vaccine     Other male erectile dysfunction     Hernia of abdominal cavity     Hypokalemia

## 2022-09-13 DIAGNOSIS — I50.22 CHRONIC SYSTOLIC CONGESTIVE HEART FAILURE (HCC): ICD-10-CM

## 2022-09-13 DIAGNOSIS — E66.9 DIABETES MELLITUS TYPE 2 IN OBESE (HCC): ICD-10-CM

## 2022-09-13 DIAGNOSIS — E11.69 DIABETES MELLITUS TYPE 2 IN OBESE (HCC): ICD-10-CM

## 2022-09-13 RX ORDER — BUMETANIDE 2 MG/1
TABLET ORAL
Qty: 60 TABLET | Refills: 2 | Status: SHIPPED | OUTPATIENT
Start: 2022-09-13 | End: 2022-09-16 | Stop reason: SDUPTHER

## 2022-09-13 RX ORDER — SPIRONOLACTONE 25 MG/1
TABLET ORAL
Qty: 30 TABLET | Refills: 2 | Status: SHIPPED | OUTPATIENT
Start: 2022-09-13

## 2022-09-13 NOTE — TELEPHONE ENCOUNTER
Please address the medication refill and close the encounter. If I can be of assistance, please route to the applicable pool. Thank you. Last visit: 6-14-22  Last Med refill: 8-11-22  Does patient have enough medication for 72 hours: No:     Next Visit Date:  Future Appointments   Date Time Provider Sondra Hogue   9/19/2022  9:00 AM STV CHF CLINIC RM 1 [facility] CHF CLI [facility]       Health Maintenance   Topic Date Due    Diabetic foot exam  Never done    Diabetic retinal exam  Never done    Pneumococcal 0-64 years Vaccine (2 - PCV) 04/01/2014    COVID-19 Vaccine (4 - Booster for Pfizer series) 08/07/2022    Flu vaccine (1) 09/01/2022    Diabetic microalbuminuria test  12/13/2022    A1C test (Diabetic or Prediabetic)  01/17/2023    Lipids  02/01/2023    Depression Screen  06/14/2023    Colorectal Cancer Screen  05/28/2024    DTaP/Tdap/Td vaccine (2 - Td or Tdap) 12/08/2030    Shingles vaccine  Completed    Hepatitis C screen  Completed    HIV screen  Completed    Hepatitis A vaccine  Aged Out    Hib vaccine  Aged Out    Meningococcal (ACWY) vaccine  Aged Out       Hemoglobin A1C (%)   Date Value   01/17/2022 7.0   08/30/2021 6.3   03/08/2021 5.9             ( goal A1C is < 7)   Microalb/Crt.  Ratio (mcg/mg creat)   Date Value   12/13/2021 Can not be calculated     LDL Cholesterol (mg/dL)   Date Value   01/17/2022 60   01/22/2021 117       (goal LDL is <100)   AST (U/L)   Date Value   08/21/2022 21     ALT (U/L)   Date Value   08/21/2022 33     BUN (mg/dL)   Date Value   08/21/2022 20     BP Readings from Last 3 Encounters:   08/21/22 105/87   08/15/22 120/80   07/18/22 120/80          (goal 120/80)    All Future Testing planned in [facility]  Lab Frequency Next Occurrence   Basic Metabolic Panel Once 30/84/6433   Basic Metabolic Panel Once 30/96/5786               Patient Active Problem List:     HTN (hypertension)     Skin tag     Low back pain with sciatica     Hyperglycemia     Infected sebaceous cyst     Chronic midline low back pain without sciatica     Chronic pain of right knee     Chest pain, unspecified     Bronchitis     Chronic systolic congestive heart failure (HCC)     Ventral hernia     Epigastric pain     Elevated LFTs     Shortness of breath     Rectus diastasis     Lumbosacral spondylosis without myelopathy     COPD exacerbation (HCC)     Mixed simple and mucopurulent chronic bronchitis (HCC)     VANDANA (obstructive sleep apnea)     Tachycardia     Chest pain     Diabetes mellitus type 2 in [facility])     Need for shingles vaccine     Other male erectile dysfunction     Hernia of abdominal cavity     Hypokalemia

## 2022-09-16 ENCOUNTER — OFFICE VISIT (OUTPATIENT)
Dept: FAMILY MEDICINE CLINIC | Age: 62
End: 2022-09-16
Payer: COMMERCIAL

## 2022-09-16 VITALS
WEIGHT: 277.8 LBS | HEART RATE: 90 BPM | BODY MASS INDEX: 46.28 KG/M2 | HEIGHT: 65 IN | DIASTOLIC BLOOD PRESSURE: 78 MMHG | SYSTOLIC BLOOD PRESSURE: 127 MMHG

## 2022-09-16 DIAGNOSIS — Z09 HOSPITAL DISCHARGE FOLLOW-UP: ICD-10-CM

## 2022-09-16 DIAGNOSIS — M25.561 CHRONIC PAIN OF BOTH KNEES: ICD-10-CM

## 2022-09-16 DIAGNOSIS — M25.562 CHRONIC PAIN OF BOTH KNEES: ICD-10-CM

## 2022-09-16 DIAGNOSIS — E66.9 DIABETES MELLITUS TYPE 2 IN OBESE (HCC): Primary | ICD-10-CM

## 2022-09-16 DIAGNOSIS — I50.22 CHRONIC SYSTOLIC CONGESTIVE HEART FAILURE (HCC): ICD-10-CM

## 2022-09-16 DIAGNOSIS — E11.69 DIABETES MELLITUS TYPE 2 IN OBESE (HCC): Primary | ICD-10-CM

## 2022-09-16 DIAGNOSIS — J42 CHRONIC BRONCHITIS, UNSPECIFIED CHRONIC BRONCHITIS TYPE (HCC): ICD-10-CM

## 2022-09-16 DIAGNOSIS — G89.29 CHRONIC PAIN OF BOTH KNEES: ICD-10-CM

## 2022-09-16 LAB — HBA1C MFR BLD: 7.6 %

## 2022-09-16 PROCEDURE — G8417 CALC BMI ABV UP PARAM F/U: HCPCS

## 2022-09-16 PROCEDURE — G8427 DOCREV CUR MEDS BY ELIG CLIN: HCPCS

## 2022-09-16 PROCEDURE — 3051F HG A1C>EQUAL 7.0%<8.0%: CPT

## 2022-09-16 PROCEDURE — 2022F DILAT RTA XM EVC RTNOPTHY: CPT

## 2022-09-16 PROCEDURE — 3023F SPIROM DOC REV: CPT

## 2022-09-16 PROCEDURE — 3017F COLORECTAL CA SCREEN DOC REV: CPT

## 2022-09-16 PROCEDURE — 99213 OFFICE O/P EST LOW 20 MIN: CPT

## 2022-09-16 PROCEDURE — 1111F DSCHRG MED/CURRENT MED MERGE: CPT

## 2022-09-16 PROCEDURE — 4004F PT TOBACCO SCREEN RCVD TLK: CPT

## 2022-09-16 PROCEDURE — 83036 HEMOGLOBIN GLYCOSYLATED A1C: CPT

## 2022-09-16 RX ORDER — ALBUTEROL SULFATE 90 UG/1
2 AEROSOL, METERED RESPIRATORY (INHALATION) EVERY 6 HOURS PRN
Qty: 8.5 G | Refills: 1 | Status: SHIPPED | OUTPATIENT
Start: 2022-09-16

## 2022-09-16 RX ORDER — METOPROLOL SUCCINATE 100 MG/1
100 TABLET, EXTENDED RELEASE ORAL DAILY
Qty: 30 TABLET | Refills: 3 | Status: SHIPPED | OUTPATIENT
Start: 2022-09-16

## 2022-09-16 RX ORDER — BUMETANIDE 2 MG/1
TABLET ORAL
Qty: 60 TABLET | Refills: 2 | Status: SHIPPED | OUTPATIENT
Start: 2022-09-16

## 2022-09-16 ASSESSMENT — ENCOUNTER SYMPTOMS
BACK PAIN: 0
WHEEZING: 0
GASTROINTESTINAL NEGATIVE: 1
SHORTNESS OF BREATH: 0
COUGH: 0

## 2022-09-16 ASSESSMENT — PATIENT HEALTH QUESTIONNAIRE - PHQ9
2. FEELING DOWN, DEPRESSED OR HOPELESS: 0
SUM OF ALL RESPONSES TO PHQ QUESTIONS 1-9: 0
1. LITTLE INTEREST OR PLEASURE IN DOING THINGS: 0
SUM OF ALL RESPONSES TO PHQ QUESTIONS 1-9: 0
SUM OF ALL RESPONSES TO PHQ9 QUESTIONS 1 & 2: 0

## 2022-09-16 NOTE — PROGRESS NOTES
Visit Information    Have you changed or started any medications since your last visit including any over-the-counter medicines, vitamins, or herbal medicines? no   Are you having any side effects from any of your medications? -  no  Have you stopped taking any of your medications? Is so, why? -  no    Have you seen any other physician or provider since your last visit? No  Have you had any other diagnostic tests since your last visit? Yes - Records Requested  Have you been seen in the emergency room and/or had an admission to a hospital since we last saw you? Yes - Records Requested  Have you had your routine dental cleaning in the past 6 months? no    Have you activated your [employer] account? If not, what are your barriers?  Yes     Patient Care Team:  Jerome Zelaya MD as PCP - Debora Negrete MD as Consulting Physician (Cardiology)  Ammy Monahan MD as Consulting Physician  Armando Carcamo DO as Consulting Physician (General Surgery)    Medical History Review  Past Medical, Family, and Social History reviewed and does not contribute to the patient presenting condition    Health Maintenance   Topic Date Due    Diabetic foot exam  Never done    Diabetic retinal exam  Never done    Pneumococcal 0-64 years Vaccine (2 - PCV) 04/01/2014    COVID-19 Vaccine (4 - Booster for Pfizer series) 08/07/2022    Flu vaccine (1) 09/01/2022    Diabetic microalbuminuria test  12/13/2022    A1C test (Diabetic or Prediabetic)  01/17/2023    Lipids  02/01/2023    Depression Screen  06/14/2023    Colorectal Cancer Screen  05/28/2024    DTaP/Tdap/Td vaccine (2 - Td or Tdap) 12/08/2030    Shingles vaccine  Completed    Hepatitis C screen  Completed    HIV screen  Completed    Hepatitis A vaccine  Aged Out    Hib vaccine  Aged Out    Meningococcal (ACWY) vaccine  Aged Out

## 2022-09-16 NOTE — PATIENT INSTRUCTIONS
Thank you for letting us take care of you today. We hope all your questions were addressed. If a question was overlooked or something else comes to mind after you return home, please contact a member of your Care Team listed below. Your Care Team at [place] [address] is Team #4  Nikhil Lopez MD (Faculty)  Melyssa Landin MD (Resident)  Aime Saha MD (Resident)  Fabienne Estrella MD (Resident)  Valarie Valencia MD (Resident)  John QUEVEDO,DIXIE Toribio., CHAZ [place]., Christopher Rosas., [facility] office)  Paolo Jarrell, [address] (Clinical Practice Manager)  Brett Moses [facility] (Clinical Pharmacist)       Office phone number: [phone]    If you need to get in right away due to illness, please be advised we have \"Same Day\" appointments available Monday-Friday. Please call us at [phone] option #3 to schedule your \"Same Day\" appointment.

## 2022-09-16 NOTE — PROGRESS NOTES
Suzette Oropeza (:  1960) is a 57 y.o. male,Established patient, here for evaluation of the following chief complaint(s):  Follow-Up from Hospital (COPD follow up), Diabetes, and Discuss Medications (refills)         ASSESSMENT/PLAN:  1. Diabetes mellitus type 2 in obese (HCC)  -     POCT glycosylated hemoglobin (Hb A1C) 7.6  -     Increase metformin to 1000 mg instead of 500 mg daily  metFORMIN (GLUCOPHAGE) 1000 MG tablet; TAKE 1 TABLET BY MOUTH TWICE DAILY WITH MEALS, Disp-30 tablet, R-3Normal  -     empagliflozin (JARDIANCE) 10 MG tablet; Take 1 tablet by mouth daily, Disp-30 tablet, R-3Normal  2. Chronic systolic congestive heart failure ([facility])  -     metoprolol succinate (TOPROL XL) 100 MG extended release tablet; Take 1 tablet by mouth daily Dose increased by cardiologist 2022, Disp-30 tablet, R-3Normal  -     bumetanide (BUMEX) 2 MG tablet; TAKE 1 TABLET BY MOUTH TWICE DAILY, Disp-60 tablet, R-2Normal  3. Chronic bronchitis, unspecified chronic bronchitis type ([facility])  -     albuterol-ipratropium (COMBIVENT RESPIMAT)  MCG/ACT AERS inhaler; INHALE 1 PUFF INTO THE LUNGS EVERY 6 HOURS, Disp-4 g, R-5Normal  -     albuterol sulfate HFA (PROVENTIL;VENTOLIN;PROAIR) 108 (90 Base) MCG/ACT inhaler; Inhale 2 puffs into the lungs every 6 hours as needed for Wheezing, Disp-8.5 g, R-1Normal  -     Full PFT Study With Bronchodilator; Future  4. Chronic pain of both knees  -     [facility]  5. Hospital discharge follow-up  -     MD DISCHARGE MEDS RECONCILED W/ CURRENT OUTPATIENT MED LIST      Return in 3 months (on 2022). Subjective   SUBJECTIVE/OBJECTIVE:  57years old male patient with past ministry of diabetes, hypertension, CHF, COPD came to the office for COPD follow-up. Patient went to the ED for COPD exacerbation and was discharged on a steroid. Patient said he is improving, denies any chest pain, shortness of breath or wheezes.   His A1c today is 7.6 up from 7. He takes Jardiance and metformin daily. He also complaining of chronic knee pain and is asking for physical therapy referral.  Denies any fever, chills, chest pain, shortness of breath, weakness, numbness, bowel or urinary habit changes. Review of Systems   Constitutional:  Negative for diaphoresis, fatigue and fever. Respiratory:  Negative for cough, shortness of breath and wheezing. Cardiovascular:  Negative for chest pain, palpitations and leg swelling. Gastrointestinal: Negative. Musculoskeletal:  Positive for arthralgias. Negative for back pain. Neurological:  Negative for dizziness, weakness, light-headedness, numbness and headaches. Objective   Physical Exam  Constitutional:       General: He is not in acute distress. Appearance: Normal appearance. HENT:      Head: Normocephalic. Cardiovascular:      Rate and Rhythm: Normal rate and regular rhythm. Pulses: Normal pulses. Heart sounds: Normal heart sounds. No murmur heard. Pulmonary:      Effort: Pulmonary effort is normal.      Breath sounds: Normal breath sounds. No wheezing, rhonchi or rales. Musculoskeletal:      Right lower leg: No edema. Left lower leg: No edema. Neurological:      General: No focal deficit present. Mental Status: He is alert and oriented to person, place, and time. On this date 9/16/2022 I have spent 25 minutes reviewing previous notes, test results and face to face with the patient discussing the diagnosis and importance of compliance with the treatment plan as well as documenting on the day of the visit. An electronic signature was used to authenticate this note.     --Jass Puentes MD

## 2022-09-19 ENCOUNTER — HOSPITAL ENCOUNTER (OUTPATIENT)
Dept: OTHER | Age: 62
Discharge: HOME | End: 2022-09-19
Payer: COMMERCIAL

## 2022-09-19 VITALS
RESPIRATION RATE: 20 BRPM | DIASTOLIC BLOOD PRESSURE: 72 MMHG | HEART RATE: 100 BPM | OXYGEN SATURATION: 96 % | WEIGHT: 270.8 LBS | SYSTOLIC BLOOD PRESSURE: 112 MMHG | BODY MASS INDEX: 45.06 KG/M2

## 2022-09-19 PROCEDURE — 99212 OFFICE O/P EST SF 10 MIN: CPT

## 2022-09-19 ASSESSMENT — PAIN SCALES - GENERAL: PAINLEVEL_OUTOF10: 5

## 2022-09-19 ASSESSMENT — PAIN DESCRIPTION - LOCATION: LOCATION: HIP

## 2022-09-19 ASSESSMENT — PAIN DESCRIPTION - DESCRIPTORS: DESCRIPTORS: DISCOMFORT

## 2022-09-19 ASSESSMENT — PAIN DESCRIPTION - ORIENTATION: ORIENTATION: RIGHT;LEFT

## 2022-09-19 NOTE — PROGRESS NOTES
Verbally reviewed medication list with patient; patient verbalized understanding. Discussed 2000mg/day sodium restricted diet; patient verbalized understanding. Moderate daily exercise encouraged as tolerated. Discussed rest breaks as needed; patient verbalized understanding. Patient instructed to weigh self at the same time of each day, using same clothes and same scale; reinforced teaching to monitor for 3-5 lb weight increase over 1-2 days, and to notify the CHF clinic at [phone] or physician office if weight change noted. Patient verbalized understanding. Risks of smoking discussed with the patient if applicable; patient strongly discouraged to smoke. Patient verbalized understanding. Signs and symptoms of CHF discussed with patient, such as feeling more tired than normal, feeling short of breath, coughing that increases when you lie down, sudden weight gain, swelling of your feet, legs or belly. Patient verbalized understanding to notify the CHF clinic at [phone] or physician office if these symptoms occur. Compliance with plan of care and further disease process causes discussed with patient, patient encouraged to keep all follow up appointments. Patient verbalized understanding.

## 2022-09-19 NOTE — PROGRESS NOTES
Date:  2022  Time:  9:41 AM    CHF Clinic at [facility]    Office: [phone] Fax: [phone]    Re:  Aron Capps   Patient : 1960    Vital Signs: /72   Pulse 100   Resp 20   Wt 270 lb 12.8 oz (122.8 kg)   SpO2 96%   BMI 45.06 kg/m²                       O2 Device: None (Room air)                           No results for input(s): CBC, HGB, HCT, WBC, PLATELET, NA, K, CL, CO2, BUN, CREATININE, GLUCOSE, BNP, INR in the last 72 hours. Respiratory:    Assessment  Charting Type: Reassessment    Breath Sounds  Right Upper Lobe: Clear  Right Middle Lobe: Clear  Right Lower Lobe: Clear, Diminished  Left Upper Lobe: Clear  Left Lower Lobe: Clear, Diminished    Cough/Sputum  Cough: Productive  Frequency: Occasional  Sputum Amount: Small  Sputum Color: White         Peripheral Vascular  RLE Edema: +1, Pitting  LLE Edema: +1, Pitting      Complaints: No new complaints at this time    Physician Orders None    Comment : Arrived for scheduled visit per ambulatory. His weight is down 1.2 lbs from last month. Ongoing dyspnea with exertion, says his COPD has been flaring up, went to ER recently received short course of steroids and antibiotics. Is feeling better now but still has episodes of dyspnea with exertion. Denies any chest pain at this time. Ongoing lower leg, ankle and pedal edema noted with a slight increase from last visit. Encouraged patient to wear his compression socks and elevate legs while sitting down. Medication list reviewed and updated. Reinforced low sodium diet and fluid restrictions. With patient. No s/s acute chf at this time. Next CHF Clinic visit 10/27/22.     Electronically signed by Kip Loco RN on 2022 at 9:41 AM

## 2022-09-20 ENCOUNTER — TELEPHONE (OUTPATIENT)
Dept: FAMILY MEDICINE CLINIC | Age: 62
End: 2022-09-20

## 2022-09-20 NOTE — TELEPHONE ENCOUNTER
----- Message from [facility] sent at 9/19/2022  3:54 PM EDT -----  Subject: Message to Provider    QUESTIONS  Information for Provider? Patient is wanting his physical therapy order to   be sent to [facility] [address] instead of [facility]. 's outpatient   therapy. Please reach out to patient to discuss. ---------------------------------------------------------------------------  --------------  Wellington MARIE  [phone]; Do not leave any message, patient will call back for answer  ---------------------------------------------------------------------------  --------------  SCRIPT ANSWERS  Relationship to Patient?  Self

## 2022-09-30 ENCOUNTER — HOSPITAL ENCOUNTER (OUTPATIENT)
Dept: PHYSICAL THERAPY | Age: 62
Setting detail: THERAPIES SERIES
Discharge: HOME | End: 2022-09-30
Payer: COMMERCIAL

## 2022-09-30 PROCEDURE — 97162 PT EVAL MOD COMPLEX 30 MIN: CPT

## 2022-09-30 PROCEDURE — 97110 THERAPEUTIC EXERCISES: CPT

## 2022-09-30 NOTE — PROGRESS NOTES
[facility]  [address] [place] [address]. [address]         Phone: [phone]       Fax: [phone]     Physical Therapy Lower Extremity Evaluation/DISCHARGE     Date:  2022  Patient: Doc Carter                 : 1960                    MRN: [id number]  Physician:      Del Muller MD                    Insurance: [employer] Patient: NPRe  # of visits allowed/remainin  Medical Diagnosis:   (B) knee pain M25.561, M25.562; ADD M47.817 (ICD-10-CM) - Lumbosacral spondylosis without myelopathy       Rehab Codes:  M25.561, M25.562; M 54.5,    Onset date: referral 22                        Next Dr's appt. : tbd  Visit Count:                                 Cancel/No Show: 0/0     Subjective:   CC: Patient reports to therapy with complaints of low back pain, (B) LE weakness, (B) medial knee joint discomfort (R) more than (L). Has reported multiple falls or near falls yearly 2-3 with usually being able to \"catch himself\" per the patient. Noted onset of lumbar symptoms about 40 years ago and stated that he has a \"fused\" lumbar spine but \"won't let the surgeons touch me\". Notes increased complaints of back pain with standing more than 45 minutes to an hour and also with lifting/carrying especially with more recently cleaning out his porch and doing this repetitively. Patient with (B) knee pain, (R) more than (L), with intermittent complaints of instability of the knees as well as increased knee pain with going up to second floor for storage. Patient was limited with end range AROM for the lumbar spine and knees at this time. HPI: (onset date) 22 referral date; Chronic low back pain began when pt was 16 yo after falling out of a tree; previous 4 rounds of physical therapy at current location with most recent therapy performed via aquatic. No previous lumbar surgery.       PMHx: [] Unremarkable        [x] Diabetes     [x] HTN [] Pacemaker              [x] MI/Heart Problems [] Cancer       [x] Arthritis       [] Other:              [x] Refer to full medical chart  In EPIC      Comorbidities:   [x] Obesity [] Dialysis  [] Other:   [x] COPD [] Dementia [] Other:   [] Stroke [x] Sleep apnea - does not use CPAP d/t \"missing parts\"  [] Other:   [] Vascular disease [] Rheumatic disease [] Other:      Tests:  [x] X-Ray:        [] MRI:                       [] Other:      1/8/2020 XR LUMBAR SPINE  FINDINGS:   The vertebral body heights are maintained. The disc spaces are maintained. There is mild degenerative facet hypertrophy. There is no spondylolisthesis. The visualized bony pelvis is intact. The SI joints are maintained. The   surrounding soft tissues are unremarkable. FINDINGS:   Left knee:       No acute fracture or dislocation is detected. The osseous structures are   intact and properly aligned. No concerning lytic or sclerotic lesion is   identified. There is moderate amount of calcification of lining the meniscal   contour suggestive of chondrocalcinosis. The visualized joints appear   unremarkable. Pelvis and bilateral hips:       No fracture or dislocation is detected. Hip joint spaces are well preserved   and anatomically aligned. No SI joint or pubic symphysis abnormality is   identified. No focal lytic or sclerotic lesion is detected. The sacrum is   partially obscured by overlying bowel gas.        1/22/22      Medications: [x] Refer to full medical record  [] None          [] Other:  Allergies:      [x] Refer to full medical record  [] None          [] Other:     Pain location:  low back; (B) knees (R) more than (L)   Pain rating/description at rest:  4/10   Pain rating/description at best:  0/10 when sleeping      Pain rating/description at worst:    10/10  Worsens with: standing 45min-1hr hours; repetitive lifting/carrying with cleaning out porch     Sleep quality/quanity  wakes up d/t restroom Sleeping position:          Function:  Hand Dominance  [x] Right  [] Left              Current level of function:     ADLs Independent in all except:   Dressing: difficulty with socks      Mobility: limited standing and walking tolerance       Lifting/carrying:      Driving: indep      Grocery shopping: limited with prolonged ambulation      Home Set up Unknown    Job Description    [] Normal Duty  [] Light Duty   [] Off D/T Condition  [] Retired    [] Not Employed    [x]  Disability starting April   Return to work:    Gait    Device: no AD   Has 2 walking sticks, 2-3x/month   Distance: 10 min   Assistance: indep   Impairments: increased B ER, decreased susan, decreased B step length/height, decreased B hip extension             Objective:               ROM  ° A/P STRENGTH TESTS (+/-) Left Right Not Tested     Left Right Left Right Ant. Drawer     []    Hip Flex     4/5 4/5 Post. Drawer     []    Ext         Lachmans     []    ER         Valgus Stress     []    IR         Varus Stress     []    ABD     4/5* 4+/5 Longs     []    ADD         Apleys Comp.     []    Knee Flex     5/5 4-/5 Apleys Dist.     []    Ext  0-110 pain medial joint 0-110 pain medial joint  5/5 3+/5 * Hip Scouring     []    Ankle DF     5/5 5/5 ROBBINs + + []    PF         Piriformis + + []    INV         Herberts + + []    EVER         Talor Tilt     []              Pat-Fem Grind     []       * = pt reports pain        Lumbar ROM L R        Flexion  1/2 distal from patella to ankles   Increase pain     Extension  5 degrees   No pain      Side-bending          Rotation          Lawrence Tests ? Pain ?  Pain No Change Not Tested   RFIS []  []  [x]  []    PAULIE []  []  [x]  []    RFIL []  []  []  [x]    REIL []  []  []  [x]             OBSERVATION No Deficit Deficit Not Tested Comments   Posture           Forward Head []  [x]  []      Rounded Shoulders []  [x]  []      LE bony alignment  []  [x]  []  B genu valgus    Leg Length Discrp [x] []  []      SLR []  [x]  []     Palpation []  [x]  []  TTP to B paraspinals ; MOD TTP (R) MEDIAL KNEE JOINT, MIN TTP (L) medial knee joint   Sensation []  [x]  []  Pt reported decreased B sensation    Edema []  []  [x]      Neurological []  []  [x]      Patellar Mobility []  []  [x]      Patellar Orientation []  []  [x]      Step test 4\" []  []  [x]      Squat  []  []  [x]                                        Ellison Fall Risk Assessment    Risk Factor Scale  Score   History of Falls [] Yes  [] No 25  0 25   Secondary Diagnosis [] Yes  [] No 15  0 0   Ambulatory Aid [] Furniture  [] Crutches/cane/walker  [] None/bedrest/wheelchair/nurse 30  15  0 0   IV/Heparin Lock [] Yes  [] No 20  0 0   Gait/Transferring [] Impaired  [] Weak  [] Normal/bedrest/immobile 20  10  0 0   Mental Status [] Forgets limitations  [] Oriented to own ability 15  0 0      Total:25     Based on the Assessment score: check the appropriate box. []  No intervention needed   Low =   Score of 0-24    [x]  Use standard prevention interventions Moderate =  Score of 24-44   [x] Give patient handout and discuss fall prevention strategies   [x] Establish goal of education for patient/family RE: fall prevention strategies    []  Use high risk prevention interventions High = Score of 45 and higher   [] Give patient handout and discuss fall prevention strategies   [] Establish goal of education for patient/family Re: fall prevention strategies   [] Discuss lifeline / other resources    Electronically signed by:   Chika Head PT  Date: 9/30/2022        Assessment:  [x] Patient with limited knee ROM especially on the (R) side with full flexion of the knee. Patient with limited tolerance of stairs due to (R) knee pain. Symptoms consistent with (B) medial joint issue, (R) worse than (L). Patient with low back pain and intermittent complaints of radicular symptoms consistent with degenerative lumbar disc issues.   Patient would benefit from therapy to work on knee, back mobility and general function. STG: (to be met in 6 treatments)  ? Pain: Pt will report decreased pain 6/10 or less to improve walking tolerance to one hour or more   ? Strength: Pt will demonstrate improved B hip strength and (B) knee strength 4/5 or higher to perform ADLs with decreased difficulty  ? Function: Pt will demonstrate safe lifting/carrying techniques to decrease risk of injury   Independent with Home Exercise Programs     LTG: (to be met in 12 treatments)  ? Pain: Pt will report decreased pain 1-2/10 or less to improve walking tolerance to one hour or more   ? Strength: Pt will demonstrate improved B hip strength and (B) knee strength 4+/5 or higher to perform ADLs with decreased difficulty  ? Function: Pt will demonstrate safe lifting/carrying techniques to decrease risk of injury   Independent with Home Exercise Programs                    Patient goals: to be able to move better without pain         Evaluation Complexity:  History (Personal factors, comorbidities) [] 0 [] 1-2 [x] 3+   Exam (limitations, restrictions) [] 1-2 [x] 3 [] 4+   Clinical presentation (progression) [] Stable [x] Evolving  [] Unstable   Decision Making [] Low [x] Moderate [] High     [] Low Complexity [x] Moderate Complexity [] High Complexity         Rehab Potential:  [] Good  [x] Fair  [] Poor   Suggested Professional Referral:  [x] No  [] Yes:  Barriers to Goal Achievement[de-identified]  [x] No  [] Yes:  Domestic Concerns:  [x] No  [] Yes:     Pt. Education:  [x] Plans/Goals, Risks/Benefits discussed  [x] Home exercise program    Method of Education: [x] Verbal  [x] Demo  [] Written  Comprehension of Education:  [x] Verbalizes understanding. [] Demonstrates understanding. [] Needs Review. [] Demonstrates/verbalizes understanding of HEP/Ed previously given.      Treatment Plan:  [x] Therapeutic Exercise   42404             [] Iontophoresis: 4 mg/mL Dexamethasone Sodium Phosphate  Nissa  [id number]   [] Therapeutic Activity  24214 [] Vasopneumatic cold with compression  85455               [] Gait Training    [phone] [] Ultrasound        [id number]   [] Neuromuscular Re-education  [phone] [] Electrical Stimulation Unattended  42639   [x] Manual Therapy  48231 [] Electrical Stimulation Attended  [id number]   [x] Instruction in HEP       [] Lumbar/Cervical Traction  [id number]   [x] Aquatic Therapy           96097 with progression to land if tolerated [x] Cold/hotpack     [] Massage   43617      [] Dry Needling, 1 or 2 muscles  28769   [] Biofeedback, first 15 minutes   17725  [] Biofeedback, additional 15 minutes   15820 [] Dry Needling, 3 or more muscles  71889         Frequency:   2x/week for 12 visits        Todays Treatment:  Modalities:   Precautions:  Exercises: extension biased- all below exercises given verbally for HEP. Exercise Reps/ Time Weight/ Level Comments   Heel slides 15  HEP   Supine calf stretch 3 x 20\"  HEP   Supine hamstring stretch 3 x 20\"  HEP   Standing lumbar extension 10  HEP verbal                                 Other:      Special instructions for next session: AQUATICS DLS PHASE 1         Treatment Charges: Mins Units   [x] Evaluation       [x]  Low       []  Moderate       []  High 30 1   []  Modalities       [x]  Ther Exercise 20 2   []  Manual Therapy       []  Ther Activities       []  Aquatics       []  Neuromuscular       []  Gait Training       []  Dry Needling           1-2 muscles       []  Dry Needling           3 or more          muscles       [] Vasocompression       []  Other 50 3           TOTAL TREATMENT TIME: 50 min      Time in: 1130 am      Time Out: 1220PM     Electronically signed by: Guanakito Arthur, PT    Discharge/Addendum completed 10/26/22- Patient did not return after initial therapy session. Unclear prognosis for full function. Unable to fully tell efficacy of treatment and unclear prognosis for function long term due to minimal number of therapy visits.   Would have benefited from additional PT. Presented as (B) medial knee joint issue and degenerative low back issue and was given exercises for home accordingly.  Scarlette Severe, PT

## 2022-10-04 ENCOUNTER — HOSPITAL ENCOUNTER (OUTPATIENT)
Dept: PHYSICAL THERAPY | Age: 62
Setting detail: THERAPIES SERIES
Discharge: HOME | End: 2022-10-04

## 2022-10-04 NOTE — FLOWSHEET NOTE
[x] [facility]) [facility]  [address]  [place]: [phone]   F: [phone]     Physical Therapy Cancel/No Show note    Date: 10/4/2022  Patient: Ti Rosa  : 1960  MRN: [id number]    Visit Count:   Cancels/No Shows to date:     For today's appointment patient:    [x]  Cancelled    [] Rescheduled appointment    [] No-show     Reason given by patient:    []  Patient ill    []  Conflicting appointment    [] No transportation      [] Conflict with work    [x] No reason given    [] Weather related    [] COVID-19    [] Other:      Comments:        [x] Next appointment was confirmed    Electronically signed by: Joel Sanchez PTA

## 2022-10-12 DIAGNOSIS — I50.22 CHRONIC SYSTOLIC CONGESTIVE HEART FAILURE (HCC): ICD-10-CM

## 2022-10-12 DIAGNOSIS — E11.69 DIABETES MELLITUS TYPE 2 IN OBESE (HCC): ICD-10-CM

## 2022-10-12 DIAGNOSIS — E66.9 DIABETES MELLITUS TYPE 2 IN OBESE (HCC): ICD-10-CM

## 2022-10-12 RX ORDER — EMPAGLIFLOZIN 10 MG/1
TABLET, FILM COATED ORAL
Qty: 30 TABLET | Refills: 3 | Status: SHIPPED | OUTPATIENT
Start: 2022-10-12

## 2022-10-12 RX ORDER — SIMVASTATIN 20 MG
TABLET ORAL
Qty: 30 TABLET | Refills: 3 | Status: SHIPPED | OUTPATIENT
Start: 2022-10-12

## 2022-10-12 RX ORDER — METOPROLOL SUCCINATE 50 MG/1
50 TABLET, EXTENDED RELEASE ORAL DAILY
Qty: 30 TABLET | Refills: 3 | OUTPATIENT
Start: 2022-10-12

## 2022-10-12 NOTE — TELEPHONE ENCOUNTER
Last visit: 9/16/22  Last Med refill: 9/16/22  Does patient have enough medication for 72 hours: Yes    Next Visit Date:  Future Appointments   Date Time Provider Sondra Hogue   10/27/2022 10:00 AM [facility] RM [facility]       Health Maintenance   Topic Date Due    Diabetic foot exam  Never done    Diabetic retinal exam  Never done    Flu vaccine (1) 08/01/2022    COVID-19 Vaccine (4 - Booster for Pfizer series) 08/07/2022    Diabetic microalbuminuria test  12/13/2022    Lipids  02/01/2023    A1C test (Diabetic or Prediabetic)  09/16/2023    Depression Screen  09/16/2023    Colorectal Cancer Screen  05/28/2024    DTaP/Tdap/Td vaccine (2 - Td or Tdap) 12/08/2030    Shingles vaccine  Completed    Pneumococcal 0-64 years Vaccine  Completed    Hepatitis C screen  Completed    HIV screen  Completed    Hepatitis A vaccine  Aged Out    Hib vaccine  Aged Out    Meningococcal (ACWY) vaccine  Aged Out       Hemoglobin A1C (%)   Date Value   09/16/2022 7.6   01/17/2022 7.0   08/30/2021 6.3             ( goal A1C is < 7)   Microalb/Crt.  Ratio (mcg/mg creat)   Date Value   12/13/2021 Can not be calculated     LDL Cholesterol (mg/dL)   Date Value   01/17/2022 60   01/22/2021 117       (goal LDL is <100)   AST (U/L)   Date Value   08/21/2022 21     ALT (U/L)   Date Value   08/21/2022 33     BUN (mg/dL)   Date Value   08/21/2022 20     BP Readings from Last 3 Encounters:   09/19/22 112/72   09/16/22 127/78   08/21/22 105/87          (goal 120/80)    All Future Testing planned in [facility]  Lab Frequency Next Occurrence   Basic Metabolic Panel Once 08/87/6641   Full PFT Study With Bronchodilator Once 09/16/2022               Patient Active Problem List:     HTN (hypertension)     Skin tag     Low back pain with sciatica     Hyperglycemia     Infected sebaceous cyst     Chronic midline low back pain without sciatica     Chronic pain of right knee     Chest pain, unspecified     Bronchitis     Chronic systolic congestive heart failure (HCC)     Ventral hernia     Epigastric pain     Elevated LFTs     Shortness of breath     Rectus diastasis     Lumbosacral spondylosis without myelopathy     COPD exacerbation (HCC)     Mixed simple and mucopurulent chronic bronchitis (HCC)     VANDANA (obstructive sleep apnea)     Tachycardia     Chest pain     Diabetes mellitus type 2 in [facility])     Need for shingles vaccine     Other male erectile dysfunction     Hernia of abdominal cavity     Hypokalemia

## 2022-10-12 NOTE — TELEPHONE ENCOUNTER
E-scribe request for med refills. Please review and e-scribe if applicable. Last Visit Date:  9/16/22  Next Visit Date:  Visit date not found    Hemoglobin A1C (%)   Date Value   09/16/2022 7.6   01/17/2022 7.0   08/30/2021 6.3             ( goal A1C is < 7)   Microalb/Crt.  Ratio (mcg/mg creat)   Date Value   12/13/2021 Can not be calculated     LDL Cholesterol (mg/dL)   Date Value   01/17/2022 60       (goal LDL is <100)   AST (U/L)   Date Value   08/21/2022 21     ALT (U/L)   Date Value   08/21/2022 33     BUN (mg/dL)   Date Value   08/21/2022 20     BP Readings from Last 3 Encounters:   09/19/22 112/72   09/16/22 127/78   08/21/22 105/87          (goal 120/80)        Patient Active Problem List:     HTN (hypertension)     Skin tag     Low back pain with sciatica     Hyperglycemia     Infected sebaceous cyst     Chronic midline low back pain without sciatica     Chronic pain of right knee     Chest pain, unspecified     Bronchitis     Chronic systolic congestive heart failure (HCC)     Ventral hernia     Epigastric pain     Elevated LFTs     Shortness of breath     Rectus diastasis     Lumbosacral spondylosis without myelopathy     COPD exacerbation (HCC)     Mixed simple and mucopurulent chronic bronchitis (HCC)     VANDANA (obstructive sleep apnea)     Tachycardia     Chest pain     Diabetes mellitus type 2 in obese ([address]     Need for shingles vaccine     Other male erectile dysfunction     Hernia of abdominal cavity     Hypokalemia      ----Anthony Prieto

## 2022-10-19 DIAGNOSIS — I50.22 CHRONIC SYSTOLIC CONGESTIVE HEART FAILURE (HCC): ICD-10-CM

## 2022-10-19 RX ORDER — SACUBITRIL AND VALSARTAN 24; 26 MG/1; MG/1
TABLET, FILM COATED ORAL
Qty: 60 TABLET | Refills: 3 | Status: SHIPPED | OUTPATIENT
Start: 2022-10-19

## 2022-10-19 NOTE — TELEPHONE ENCOUNTER
Please address the medication refill and close the encounter. If I can be of assistance, please route to the applicable pool. Thank you. Last visit: 9-16-22  Last Med refill: 9-15-22  Does patient have enough medication for 72 hours: No:     Next Visit Date:  Future Appointments   Date Time Provider Sondra Hogue   10/27/2022 10:00 AM [facility]IN[facility] RM 1 [facility] CHF CLI [facility]       Health Maintenance   Topic Date Due    Diabetic foot exam  Never done    Diabetic retinal exam  Never done    Flu vaccine (1) 08/01/2022    COVID-19 Vaccine (4 - Booster for Pfizer series) 08/07/2022    Diabetic microalbuminuria test  12/13/2022    Lipids  02/01/2023    A1C test (Diabetic or Prediabetic)  09/16/2023    Depression Screen  09/16/2023    Colorectal Cancer Screen  05/28/2024    DTaP/Tdap/Td vaccine (2 - Td or Tdap) 12/08/2030    Shingles vaccine  Completed    Pneumococcal 0-64 years Vaccine  Completed    Hepatitis C screen  Completed    HIV screen  Completed    Hepatitis A vaccine  Aged Out    Hib vaccine  Aged Out    Meningococcal (ACWY) vaccine  Aged Out       Hemoglobin A1C (%)   Date Value   09/16/2022 7.6   01/17/2022 7.0   08/30/2021 6.3             ( goal A1C is < 7)   Microalb/Crt.  Ratio (mcg/mg creat)   Date Value   12/13/2021 Can not be calculated     LDL Cholesterol (mg/dL)   Date Value   01/17/2022 60   01/22/2021 117       (goal LDL is <100)   AST (U/L)   Date Value   08/21/2022 21     ALT (U/L)   Date Value   08/21/2022 33     BUN (mg/dL)   Date Value   08/21/2022 20     BP Readings from Last 3 Encounters:   09/19/22 112/72   09/16/22 127/78   08/21/22 105/87          (goal 120/80)    All Future Testing planned in [facility]  Lab Frequency Next Occurrence   Basic Metabolic Panel Once 41/03/5866   Full PFT Study With Bronchodilator Once 09/16/2022               Patient Active Problem List:     HTN (hypertension)     Skin tag     Low back pain with sciatica     Hyperglycemia     Infected sebaceous cyst     Chronic midline low back pain without sciatica     Chronic pain of right knee     Chest pain, unspecified     Bronchitis     Chronic systolic congestive heart failure (HCC)     Ventral hernia     Epigastric pain     Elevated LFTs     Shortness of breath     Rectus diastasis     Lumbosacral spondylosis without myelopathy     COPD exacerbation (HCC)     Mixed simple and mucopurulent chronic bronchitis (HCC)     VANDANA (obstructive sleep apnea)     Tachycardia     Chest pain     Diabetes mellitus type 2 in [facility])     Need for shingles vaccine     Other male erectile dysfunction     Hernia of abdominal cavity     Hypokalemia

## 2022-10-27 ENCOUNTER — HOSPITAL ENCOUNTER (OUTPATIENT)
Dept: OTHER | Age: 62
Discharge: HOME | End: 2022-10-27
Payer: COMMERCIAL

## 2022-10-27 VITALS
OXYGEN SATURATION: 99 % | SYSTOLIC BLOOD PRESSURE: 120 MMHG | RESPIRATION RATE: 16 BRPM | DIASTOLIC BLOOD PRESSURE: 70 MMHG | HEART RATE: 84 BPM | WEIGHT: 270 LBS | BODY MASS INDEX: 44.93 KG/M2

## 2022-10-27 PROCEDURE — 99212 OFFICE O/P EST SF 10 MIN: CPT

## 2022-10-27 NOTE — PROGRESS NOTES
Date:  10/27/2022  Time:  10:29 AM    CHF Clinic at [facility]    Office: [phone] Fax: [phone]    Re:  Doc Carter   Patient : 1960    Vital Signs: /70   Pulse 84   Resp 16   Wt 270 lb (122.5 kg)   SpO2 99%   BMI 44.93 kg/m²                                                   No results for input(s): CBC, HGB, HCT, WBC, PLATELET, NA, K, CL, CO2, BUN, CREATININE, GLUCOSE, BNP, INR in the last 72 hours. Respiratory:    Assessment  Charting Type: Reassessment    Breath Sounds  Right Upper Lobe: Clear  Right Middle Lobe: Clear  Right Lower Lobe: Clear  Left Upper Lobe: Clear  Left Lower Lobe: Diminished    Cough/Sputum  Cough: Productive         Peripheral Vascular  RLE Edema: +1, Pitting  LLE Edema: +1, Non-pitting      Complaints: No new complaints     Physician Orders No new orders     Comment : Weight is up  . 8Pounds From last visit although was in [place] for 2 weeks on vacation eating higher salt meals. Discussed importance of getting back to low sodium diet 2000mg per day and 64 ounces of fluid per day. See's  on 2022 we will see following week 2022.     Electronically signed by Amy Carballo RN on 10/27/2022 at 10:29 AM

## 2022-11-15 DIAGNOSIS — J42 CHRONIC BRONCHITIS, UNSPECIFIED CHRONIC BRONCHITIS TYPE (HCC): ICD-10-CM

## 2022-11-15 NOTE — TELEPHONE ENCOUNTER
Last visit:   Last Med refill:   Does patient have enough medication for 72 hours: No:     Next Visit Date:  Future Appointments   Date Time Provider Sondra Hogue   11/16/2022  3:00 PM Kaylee Ramirez, PT [facility]   12/1/2022 10:00 AM [facility] CHF CLINIC RM 1 [facility] CHF CLI [facility]       Health Maintenance   Topic Date Due    Diabetic foot exam  Never done    Diabetic retinal exam  Never done    Flu vaccine (1) 08/01/2022    Diabetic microalbuminuria test  12/13/2022    Lipids  02/01/2023    A1C test (Diabetic or Prediabetic)  09/16/2023    Depression Screen  09/16/2023    Colorectal Cancer Screen  05/28/2024    DTaP/Tdap/Td vaccine (2 - Td or Tdap) 12/08/2030    Shingles vaccine  Completed    Pneumococcal 0-64 years Vaccine  Completed    COVID-19 Vaccine  Completed    Hepatitis C screen  Completed    HIV screen  Completed    Hepatitis A vaccine  Aged Out    Hib vaccine  Aged Out    Meningococcal (ACWY) vaccine  Aged Out       Hemoglobin A1C (%)   Date Value   09/16/2022 7.6   01/17/2022 7.0   08/30/2021 6.3             ( goal A1C is < 7)   Microalb/Crt.  Ratio (mcg/mg creat)   Date Value   12/13/2021 Can not be calculated     LDL Cholesterol (mg/dL)   Date Value   01/17/2022 60   01/22/2021 117       (goal LDL is <100)   AST (U/L)   Date Value   08/21/2022 21     ALT (U/L)   Date Value   08/21/2022 33     BUN (mg/dL)   Date Value   08/21/2022 20     BP Readings from Last 3 Encounters:   10/27/22 120/70   09/19/22 112/72   09/16/22 127/78          (goal 120/80)    All Future Testing planned in [facility]  Lab Frequency Next Occurrence   Basic Metabolic Panel Once 12/51/9820   Full PFT Study With Bronchodilator Once 09/16/2022               Patient Active Problem List:     HTN (hypertension)     Skin tag     Low back pain with sciatica     Hyperglycemia     Infected sebaceous cyst     Chronic midline low back pain without sciatica     Chronic pain of right knee     Chest pain, unspecified     Bronchitis Chronic systolic congestive heart failure (HCC)     Ventral hernia     Epigastric pain     Elevated LFTs     Shortness of breath     Rectus diastasis     Lumbosacral spondylosis without myelopathy     COPD exacerbation (HCC)     Mixed simple and mucopurulent chronic bronchitis (HCC)     VANDANA (obstructive sleep apnea)     Tachycardia     Chest pain     Diabetes mellitus type 2 in obese [facility])     Need for shingles vaccine     Other male erectile dysfunction     Hernia of abdominal cavity     Hypokalemia         Please address the medication refill and close the encounter. If I can be of assistance, please route to the applicable pool. Thank you.

## 2022-11-16 ENCOUNTER — HOSPITAL ENCOUNTER (OUTPATIENT)
Dept: PHYSICAL THERAPY | Age: 62
Setting detail: THERAPIES SERIES
Discharge: HOME | End: 2022-11-16
Payer: COMMERCIAL

## 2022-11-16 PROCEDURE — 97110 THERAPEUTIC EXERCISES: CPT

## 2022-11-18 NOTE — CONSULTS
[x] [facility]) - [facility] & Therapy  [address]  [place]: [phone]   F: [phone]    Physical Therapy Progress Note    Date: 2022      Patient: Susan Mast  : 1960  MRN: [id number]    Physician:      Di Boas MD                    [facility] NP[facility]  # of visits allowed/remainin  Medical Diagnosis:   (B) knee pain M25.561, M25.562; ADD M47.817 (ICD-10-CM) - Lumbosacral spondylosis without myelopathy       Rehab Codes:  M25.561, M25.562; M 54.5,    Onset date: referral 22                        Next Dr's appt. : tbd  Visit Count:                                 Cancel/No Show: 0/0  Date of initial visit: 22                Date of final visit: 22      Subjective:  Patient returns to therapy after time away. Was out of town and then ill and did not return to therapy secondary to illness. Returns today with complaints of central back pain and complaints of pain in the (B) knees, (R) more than (L). Patient states he has been compliant with basic HEP at this time. We updated HEP today and reviewed these exercises. Patient to start aquatic therapy at this time and we reviewed HEP with the patient. Functional measures relatively unchanged secondary to lack of therapy appointments.    Pain location:  low back; (B) knees (R) more than (L)   Pain rating/description at rest:  3/10   Pain rating/description at best:  0/10 when sleeping       Pain rating/description at worst:    10/10  Worsens with: standing 45min-1hr hours; repetitive lifting/carrying with cleaning out porch     Sleep quality/quanity  wakes up d/t restroom   Sleeping position:        Objective:  Modalities:   Precautions:  Exercises: extension biased- all below exercises as part of formal HEP on 22  Exercise Reps/ Time Weight/ Level Comments   Heel slides 15X      Supine calf stretch 3 x 20\"      Supine hamstring stretch 3 x 20\" Standing lumbar extension 10       LAQ  15x        LTR  15x        SKTC  10 x 5\"        bridge  15x        seated march  15x           ROM  ° A/P STRENGTH TESTS (+/-) Left Right Not Tested     Left Right Left Right Ant. Drawer     []    Hip Flex     4/5 4/5 Post. Drawer     []    Ext         Lachmans     []    ER         Valgus Stress     []    IR         Varus Stress     []    ABD     4/5* 4+/5 Longs     []    ADD         Apleys Comp.     []    Knee Flex     5/5 4-/5 Apleys Dist.     []    Ext  0-110 pain medial joint 0-110 pain medial joint  5/5 3+/5 * Hip Scouring     []    Ankle DF     5/5 5/5 ROBBINs + + []    PF         Piriformis + + []    INV         Herberts + + []    EVER         Talor Tilt     []              Pat-Fem Grind     []       * = pt reports pain     Lumbar ROM L R        Flexion  1/2 distal from patella to ankles   Increase pain     Extension  5 degrees   No pain      Side-bending            Rotation            Lawrence Tests ? Pain ? Pain No Change Not Tested   RFIS []  []  [x]  []    PAULIE []  []  [x]  []    RFIL []  []  []  [x]    REIL []  []  []  [x]          OBSERVATION No Deficit Deficit Not Tested Comments   Posture           Forward Head []  [x]  []      Rounded Shoulders []  [x]  []      LE bony alignment  []  [x]  []  B genu valgus    Leg Length Discrp [x]  []  []      SLR []  [x]  []      Palpation []  [x]  []  TTP to B paraspinals ; MOD TTP (R) MEDIAL KNEE JOINT, MIN TTP (L) medial knee joint   Sensation []  [x]  []  Pt reported decreased B sensation    Edema []  []  [x]      Neurological []  []  [x]      Patellar Mobility []  []  [x]      Patellar Orientation []  []  [x]      Step test 4\" []  []  [x]      Squat  []  []  [x]          Assessment:  Presents as OA issues (B) knees and degenerative issue low back. See below for comprehensive HEP that was completed/reviewed with the patient today. STG: (to be met in 6 treatments)  ?  Pain: Pt will report decreased pain 6/10 or less to improve walking tolerance to one hour or more   ? Strength: Pt will demonstrate improved B hip strength and (B) knee strength 4/5 or higher to perform ADLs with decreased difficulty  ? Function: Pt will demonstrate safe lifting/carrying techniques to decrease risk of injury   Independent with Home Exercise Programs     LTG: (to be met in 12 treatments)  ? Pain: Pt will report decreased pain 1-2/10 or less to improve walking tolerance to one hour or more   ? Strength: Pt will demonstrate improved B hip strength and (B) knee strength 4+/5 or higher to perform ADLs with decreased difficulty  ? Function: Pt will demonstrate safe lifting/carrying techniques to decrease risk of injury   Independent with Home Exercise Programs    Treatment to Date:  [x] Therapeutic Exercise   10589  [] Iontophoresis: 4 mg/mL Dexamethasone Sodium Phosphate  mAmin  59462   [] Therapeutic Activity  77617 [] Vasopneumatic cold with compression  83248    [] Gait Training   07099 [] Ultrasound   70575   [x] Neuromuscular Re-education  61059 [] Electrical Stimulation Unattended  41192   [x] Manual Therapy  23161 [] Electrical Stimulation Attended  12972   [x] Instruction in HEP  [] Lumbar/Cervical Traction  18408   [x] Aquatic Therapy   98130 [] Cold/hotpack    [] Massage   95271      [] Dry Needling, 1 or 2 muscles  92712   [] Biofeedback, first 15 minutes   15326  [] Biofeedback, additional 15 minutes   20457 [] Dry Needling, 3 or more muscles  44347      Patient Status:     [x] Continue per initial plan of care. [] Additional visits necessary. [] Other:     Requested Frequency/Duration: 2-3 times per week for 12 total treatments.         Electronically signed by: James Mejia PT

## 2022-12-01 ENCOUNTER — HOSPITAL ENCOUNTER (OUTPATIENT)
Dept: OTHER | Age: 62
Discharge: HOME | End: 2022-12-01
Payer: COMMERCIAL

## 2022-12-01 VITALS
SYSTOLIC BLOOD PRESSURE: 108 MMHG | HEART RATE: 88 BPM | BODY MASS INDEX: 45.26 KG/M2 | OXYGEN SATURATION: 99 % | RESPIRATION RATE: 20 BRPM | WEIGHT: 272 LBS | DIASTOLIC BLOOD PRESSURE: 62 MMHG

## 2022-12-01 PROCEDURE — 99212 OFFICE O/P EST SF 10 MIN: CPT

## 2022-12-01 NOTE — PROGRESS NOTES
Date:  2022  Time:  10:09 AM    CHF Clinic at [facility]    Office: [phone] Fax: [phone]    Re:  Georges Walker   Patient : 1960    Vital Signs: /62   Pulse 88   Resp 20   Wt 272 lb (123.4 kg)   SpO2 99%   BMI 45.26 kg/m²                       O2 Device: None (Room air)                           No results for input(s): CBC, HGB, HCT, WBC, PLATELET, NA, K, CL, CO2, BUN, CREATININE, GLUCOSE, BNP, INR in the last 72 hours. Respiratory:    Assessment  Charting Type: Reassessment    Breath Sounds  Right Upper Lobe: Clear  Right Middle Lobe: Clear  Right Lower Lobe: Clear  Left Upper Lobe: Clear  Left Lower Lobe: Clear, Diminished    Cough/Sputum  Cough: Productive  Sputum Amount: Small  Sputum Color: Clear       Peripheral Vascular  RLE Edema: +1, Pitting  LLE Edema: +1, Pitting    Complaints: Nothing new      Comment : Patient here ambulatory for routine visit. Weight increased 2 lbs in one month. Patient surprised with this as he is very active. Some swelling to lower legs. Denies increase in SOB. States compliance with low salt diet and fluid limits. State compliance with meds but some days he only takes his bumex 1x day. Encouraged to take 2x day. Seeing cardiology in 2023. He feels well since the CCM device inserted in Feb. Next visit here 4 weeks 2022.     Electronically signed by Laura Frazier RN on 2022 at 10:09 AM

## 2022-12-06 ENCOUNTER — HOSPITAL ENCOUNTER (OUTPATIENT)
Dept: RESEARCH | Age: 62
Discharge: HOME | End: 2022-12-06

## 2022-12-28 ENCOUNTER — HOSPITAL ENCOUNTER (OUTPATIENT)
Dept: PHYSICAL THERAPY | Age: 62
Setting detail: THERAPIES SERIES
Discharge: HOME | End: 2022-12-28

## 2022-12-29 ENCOUNTER — HOSPITAL ENCOUNTER (OUTPATIENT)
Dept: OTHER | Age: 62
Discharge: HOME | End: 2022-12-29
Payer: COMMERCIAL

## 2022-12-29 VITALS
RESPIRATION RATE: 20 BRPM | HEART RATE: 110 BPM | WEIGHT: 267.5 LBS | OXYGEN SATURATION: 99 % | SYSTOLIC BLOOD PRESSURE: 140 MMHG | DIASTOLIC BLOOD PRESSURE: 82 MMHG | BODY MASS INDEX: 44.51 KG/M2

## 2022-12-29 PROCEDURE — 99212 OFFICE O/P EST SF 10 MIN: CPT

## 2022-12-29 NOTE — FLOWSHEET NOTE
[x] [facility]) - [facility] & Therapy  [address]  [place]: [phone]   F: [phone]     Physical Therapy Cancel/No Show note/Discharge    Date: 2022  Patient: Susan Mast  : 1960  MRN: [id number]    Visit Count:   Cancels/No Shows to date: 1/3    For today's appointment patient:    []  Cancelled    [] Rescheduled appointment    [x] No-show     Reason given by patient:    []  Patient ill    []  Conflicting appointment    [] No transportation      [] Conflict with work    [] No reason given    [] Weather related    [] COVID-19    [x] Other:  Was rescheduled for additional PROGRESS NOTE and continuation of therapy after being off for a month. Was sick per patient. Did not show for rescheduled appointment. Wait to have physician rewrite script to see patient after discharge due to minimal number of completed therapy visits. Unclear prognosis secondary to minimal number of therapy visits for his knees and back.       [] Next appointment was confirmed    Electronically signed by: Hoa Zhou, PT

## 2022-12-29 NOTE — PROGRESS NOTES
Date:  2022  Time:  12:02 PM    CHF Clinic at [address]    Office: [phone] Fax: [phone]    Re:  Samira Sandoval   Patient : 1960    Vital Signs: BP (!) 140/82 Comment: no a.m. meds yet  Pulse (!) 110 Comment: no a.m meds yet  Resp 20   Wt 267 lb 8 oz (121.3 kg)   SpO2 99%   BMI 44.51 kg/m²                       O2 Device: None (Room air)                           No results for input(s): CBC, HGB, HCT, WBC, PLATELET, NA, K, CL, CO2, BUN, CREATININE, GLUCOSE, BNP, INR in the last 72 hours. Respiratory:    Assessment  Charting Type: Reassessment    Breath Sounds  Right Upper Lobe: Clear  Right Middle Lobe: Clear  Right Lower Lobe: Clear  Left Upper Lobe: Clear    Cough/Sputum  Cough: Productive  Frequency: Infrequent  Sputum Amount: Small  Sputum Color: Clear         Peripheral Vascular  RLE Edema: +1, Pitting  LLE Edema: +1, Pitting      Complaints: Nothing new      Comment : Patient here ambulatory for routine visit. Weight down 5 lbs in one month. Patient is very pleased with this. No new or acute s/s CHF. Discussed low salt diet and fluid limits. States better compliance with meds. Bumex is 2 mg 2x day. Seeing cardiology on 2023. Going for CCM device check 2023. Next visit here 4 weeks 2023. Instructed to phone here for any new s/s CHF.      Electronically signed by Eveline Gimenez RN on 2022 at 12:02 PM

## 2023-01-13 DIAGNOSIS — I50.22 CHRONIC SYSTOLIC CONGESTIVE HEART FAILURE (HCC): ICD-10-CM

## 2023-01-13 RX ORDER — METOPROLOL SUCCINATE 100 MG/1
TABLET, EXTENDED RELEASE ORAL
Qty: 30 TABLET | Refills: 0 | Status: ON HOLD | OUTPATIENT
Start: 2023-01-13 | End: 2023-03-14

## 2023-01-13 NOTE — TELEPHONE ENCOUNTER
Metoprolol Succinate pending refill         Health Maintenance   Topic Date Due    Diabetic foot exam  Never done    Diabetic retinal exam  Never done    Flu vaccine (1) 08/01/2022    Diabetic Alb to Cr ratio (uACR) test  12/13/2022    Lipids  02/01/2023    GFR test (Diabetes, CKD 3-4, OR last GFR 15-59)  08/21/2023    A1C test (Diabetic or Prediabetic)  09/16/2023    Depression Screen  09/16/2023    Colorectal Cancer Screen  05/28/2024    DTaP/Tdap/Td vaccine (2 - Td or Tdap) 12/08/2030    Shingles vaccine  Completed    Pneumococcal 0-64 years Vaccine  Completed    COVID-19 Vaccine  Completed    Hepatitis C screen  Completed    HIV screen  Completed    Hepatitis A vaccine  Aged Out    Hib vaccine  Aged Out    Meningococcal (ACWY) vaccine  Aged Out             (applicable per patient's age: Cancer Screenings, Depression Screening, Fall Risk Screening, Immunizations)    Hemoglobin A1C (%)   Date Value   09/16/2022 7.6   01/17/2022 7.0   08/30/2021 6.3     Microalb/Crt.  Ratio (mcg/mg creat)   Date Value   12/13/2021 Can not be calculated     LDL Cholesterol (mg/dL)   Date Value   01/17/2022 60     AST (U/L)   Date Value   08/21/2022 21     ALT (U/L)   Date Value   08/21/2022 33     BUN (mg/dL)   Date Value   08/21/2022 20      (goal A1C is < 7)   (goal LDL is <100) need 30-50% reduction from baseline     BP Readings from Last 3 Encounters:   12/29/22 (!) 140/82   12/01/22 108/62   10/27/22 120/70    (goal /80)      All Future Testing planned in [facility]:  Lab Frequency Next Occurrence   Basic Metabolic Panel Once 10/90/1602   Full PFT Study With Bronchodilator Once 09/16/2022       Next Visit Date:  Future Appointments   Date Time Provider Sondra Hogue   1/26/2023 10:00 AM STV CHF CLINIC RM 1 [facility] CHF CLI [facility]            Patient Active Problem List:     HTN (hypertension)     Skin tag     Low back pain with sciatica     Hyperglycemia     Infected sebaceous cyst     Chronic midline low back pain without sciatica     Chronic pain of right knee     Chest pain, unspecified     Bronchitis     Chronic systolic congestive heart failure (HCC)     Ventral hernia     Epigastric pain     Elevated LFTs     Shortness of breath     Rectus diastasis     Lumbosacral spondylosis without myelopathy     COPD exacerbation (HCC)     Mixed simple and mucopurulent chronic bronchitis (HCC)     VANDANA (obstructive sleep apnea)     Tachycardia     Chest pain     Diabetes mellitus type 2 in [facility])     Need for shingles vaccine     Other male erectile dysfunction     Hernia of abdominal cavity     Hypokalemia

## 2023-01-16 NOTE — TELEPHONE ENCOUNTER
Metoprolol Succinate  pending refill        Health Maintenance   Topic Date Due    Diabetic foot exam  Never done    Diabetic retinal exam  Never done    Flu vaccine (1) 08/01/2022    Diabetic Alb to Cr ratio (uACR) test  12/13/2022    Lipids  02/01/2023    GFR test (Diabetes, CKD 3-4, OR last GFR 15-59)  08/21/2023    A1C test (Diabetic or Prediabetic)  09/16/2023    Depression Screen  09/16/2023    Colorectal Cancer Screen  05/28/2024    DTaP/Tdap/Td vaccine (2 - Td or Tdap) 12/08/2030    Shingles vaccine  Completed    Pneumococcal 0-64 years Vaccine  Completed    COVID-19 Vaccine  Completed    Hepatitis C screen  Completed    HIV screen  Completed    Hepatitis A vaccine  Aged Out    Hib vaccine  Aged Out    Meningococcal (ACWY) vaccine  Aged Out             (applicable per patient's age: Cancer Screenings, Depression Screening, Fall Risk Screening, Immunizations)    Hemoglobin A1C (%)   Date Value   09/16/2022 7.6   01/17/2022 7.0   08/30/2021 6.3     Microalb/Crt.  Ratio (mcg/mg creat)   Date Value   12/13/2021 Can not be calculated     LDL Cholesterol (mg/dL)   Date Value   01/17/2022 60     AST (U/L)   Date Value   08/21/2022 21     ALT (U/L)   Date Value   08/21/2022 33     BUN (mg/dL)   Date Value   08/21/2022 20      (goal A1C is < 7)   (goal LDL is <100) need 30-50% reduction from baseline     BP Readings from Last 3 Encounters:   12/29/22 (!) 140/82   12/01/22 108/62   10/27/22 120/70    (goal /80)      All Future Testing planned in [facility]:  Lab Frequency Next Occurrence   Basic Metabolic Panel Once 06/08/3428   Full PFT Study With Bronchodilator Once 09/16/2022       Next Visit Date:  Future Appointments   Date Time Provider Sondra Hogue   1/26/2023 10:00 AM STV CHF CLINIC RM 1 [facility] CHF CLI [facility]            Patient Active Problem List:     HTN (hypertension)     Skin tag     Low back pain with sciatica     Hyperglycemia     Infected sebaceous cyst     Chronic midline low back pain without sciatica     Chronic pain of right knee     Chest pain, unspecified     Bronchitis     Chronic systolic congestive heart failure (HCC)     Ventral hernia     Epigastric pain     Elevated LFTs     Shortness of breath     Rectus diastasis     Lumbosacral spondylosis without myelopathy     COPD exacerbation (HCC)     Mixed simple and mucopurulent chronic bronchitis (HCC)     VANDANA (obstructive sleep apnea)     Tachycardia     Chest pain     Diabetes mellitus type 2 in [facility])     Need for shingles vaccine     Other male erectile dysfunction     Hernia of abdominal cavity     Hypokalemia

## 2023-01-26 ENCOUNTER — HOSPITAL ENCOUNTER (OUTPATIENT)
Dept: OTHER | Age: 63
Discharge: HOME | End: 2023-01-26
Payer: COMMERCIAL

## 2023-01-26 VITALS
OXYGEN SATURATION: 98 % | WEIGHT: 269.2 LBS | RESPIRATION RATE: 20 BRPM | BODY MASS INDEX: 44.8 KG/M2 | HEART RATE: 88 BPM | SYSTOLIC BLOOD PRESSURE: 140 MMHG | DIASTOLIC BLOOD PRESSURE: 82 MMHG

## 2023-01-26 PROCEDURE — 99212 OFFICE O/P EST SF 10 MIN: CPT

## 2023-01-26 NOTE — PROGRESS NOTES
Date:  2023  Time:  10:34 AM    CHF Clinic at [facility]    Office: [phone] Fax: [phone]    Re:  Rhea Durham   Patient : 1960    Vital Signs: BP (!) 140/82   Pulse 88   Resp 20   Wt 269 lb 3.2 oz (122.1 kg)   SpO2 98%   BMI 44.80 kg/m²                       O2 Device: None (Room air)                           No results for input(s): CBC, HGB, HCT, WBC, PLATELET, NA, K, CL, CO2, BUN, CREATININE, GLUCOSE, BNP, INR in the last 72 hours. Respiratory:    Assessment  Charting Type: Reassessment    Breath Sounds  Right Upper Lobe: Clear  Right Middle Lobe: Clear  Right Lower Lobe: Clear  Left Upper Lobe: Clear  Left Lower Lobe: Clear, Diminished    Cough/Sputum  Cough: Productive  Frequency: Infrequent  Sputum Amount: Small  Sputum Color: Clear       Peripheral Vascular  RLE Edema: +1, Pitting  LLE Edema: +1, Pitting    Complaints: Nothing new    Comment : Patient here ambulatory for routine visit. Weight increased 2 lbs in one month. Some increased swelling but he has been on his feet all night at work. No new or acute s/s CHF. Discussed low salt diet and fluid limits. States compliance with meds. Had a recent med change per cardiology. Toprol increased to 200 mg daily for tachycardia. Next visit there in July. Has lab orders to be drawn soon from cardio. Next visit here 4 weeks 2023.     Electronically signed by Keenan Shearer RN on 2023 at 10:34 AM

## 2023-01-27 ENCOUNTER — HOSPITAL ENCOUNTER (OUTPATIENT)
Age: 63
Discharge: HOME | End: 2023-01-27
Payer: COMMERCIAL

## 2023-01-27 LAB
ALBUMIN SERPL-MCNC: 4.1 G/DL (ref 3.5–5.2)
ALP BLD-CCNC: 70 U/L (ref 40–129)
ALT SERPL-CCNC: 43 U/L (ref 5–41)
ANION GAP SERPL CALCULATED.3IONS-SCNC: 9 MMOL/L (ref 9–17)
AST SERPL-CCNC: 23 U/L
BILIRUB SERPL-MCNC: 0.3 MG/DL (ref 0.3–1.2)
BUN BLDV-MCNC: 16 MG/DL (ref 8–23)
CALCIUM SERPL-MCNC: 9.2 MG/DL (ref 8.6–10.4)
CHLORIDE BLD-SCNC: 101 MMOL/L (ref 98–107)
CHOLESTEROL/HDL RATIO: 3.6
CHOLESTEROL: 137 MG/DL
CO2: 31 MMOL/L (ref 20–31)
CREAT SERPL-MCNC: 0.96 MG/DL (ref 0.7–1.2)
GFR SERPL CREATININE-BSD FRML MDRD: >60 ML/MIN/1.73M2
GLUCOSE BLD-MCNC: 104 MG/DL (ref 70–99)
HDLC SERPL-MCNC: 38 MG/DL
LDL CHOLESTEROL: 57 MG/DL (ref 0–130)
POTASSIUM SERPL-SCNC: 4.5 MMOL/L (ref 3.7–5.3)
SODIUM BLD-SCNC: 141 MMOL/L (ref 135–144)
TOTAL PROTEIN: 7.1 G/DL (ref 6.4–8.3)
TRIGL SERPL-MCNC: 208 MG/DL

## 2023-01-27 PROCEDURE — 80061 LIPID PANEL: CPT

## 2023-01-27 PROCEDURE — 80053 COMPREHEN METABOLIC PANEL: CPT

## 2023-01-27 PROCEDURE — 36415 COLL VENOUS BLD VENIPUNCTURE: CPT

## 2023-02-12 DIAGNOSIS — I50.22 CHRONIC SYSTOLIC CONGESTIVE HEART FAILURE (HCC): ICD-10-CM

## 2023-02-13 RX ORDER — SACUBITRIL AND VALSARTAN 24; 26 MG/1; MG/1
TABLET, FILM COATED ORAL
Qty: 60 TABLET | Refills: 3 | Status: SHIPPED | OUTPATIENT
Start: 2023-02-13

## 2023-02-13 NOTE — TELEPHONE ENCOUNTER
E-scribe request for med refills. Please review and e-scribe if applicable. Last Visit Date:  9/16/22  Next Visit Date:  2/13/2023    Hemoglobin A1C (%)   Date Value   09/16/2022 7.6   01/17/2022 7.0   08/30/2021 6.3             ( goal A1C is < 7)   Microalb/Crt.  Ratio (mcg/mg creat)   Date Value   12/13/2021 Can not be calculated     LDL Cholesterol (mg/dL)   Date Value   01/27/2023 57       (goal LDL is <100)   AST (U/L)   Date Value   01/27/2023 23     ALT (U/L)   Date Value   01/27/2023 43 (H)     BUN (mg/dL)   Date Value   01/27/2023 16     BP Readings from Last 3 Encounters:   01/26/23 (!) 140/82   12/29/22 (!) 140/82   12/01/22 108/62          (goal 120/80)        Patient Active Problem List:     HTN (hypertension)     Skin tag     Low back pain with sciatica     Hyperglycemia     Infected sebaceous cyst     Chronic midline low back pain without sciatica     Chronic pain of right knee     Chest pain, unspecified     Bronchitis     Chronic systolic congestive heart failure (HCC)     Ventral hernia     Epigastric pain     Elevated LFTs     Shortness of breath     Rectus diastasis     Lumbosacral spondylosis without myelopathy     COPD exacerbation (HCC)     Mixed simple and mucopurulent chronic bronchitis (HCC)     VANDANA (obstructive sleep apnea)     Tachycardia     Chest pain     Diabetes mellitus type 2 in obese ([address]     Need for shingles vaccine     Other male erectile dysfunction     Hernia of abdominal cavity     Hypokalemia      ----Patricia Peraza

## 2023-02-17 ENCOUNTER — OFFICE VISIT (OUTPATIENT)
Dept: FAMILY MEDICINE CLINIC | Age: 63
End: 2023-02-17
Payer: COMMERCIAL

## 2023-02-17 VITALS
SYSTOLIC BLOOD PRESSURE: 134 MMHG | BODY MASS INDEX: 43.99 KG/M2 | HEART RATE: 75 BPM | HEIGHT: 65 IN | DIASTOLIC BLOOD PRESSURE: 80 MMHG | WEIGHT: 264 LBS

## 2023-02-17 DIAGNOSIS — J44.1 COPD EXACERBATION (HCC): Primary | ICD-10-CM

## 2023-02-17 DIAGNOSIS — E11.69 DIABETES MELLITUS TYPE 2 IN OBESE (HCC): ICD-10-CM

## 2023-02-17 DIAGNOSIS — G89.29 CHRONIC MIDLINE LOW BACK PAIN WITHOUT SCIATICA: ICD-10-CM

## 2023-02-17 DIAGNOSIS — I50.22 CHRONIC SYSTOLIC CONGESTIVE HEART FAILURE (HCC): ICD-10-CM

## 2023-02-17 DIAGNOSIS — E66.9 DIABETES MELLITUS TYPE 2 IN OBESE (HCC): ICD-10-CM

## 2023-02-17 DIAGNOSIS — M54.50 CHRONIC MIDLINE LOW BACK PAIN WITHOUT SCIATICA: ICD-10-CM

## 2023-02-17 LAB — HBA1C MFR BLD: 6.7 %

## 2023-02-17 PROCEDURE — 83036 HEMOGLOBIN GLYCOSYLATED A1C: CPT

## 2023-02-17 RX ORDER — BUMETANIDE 2 MG/1
TABLET ORAL
Qty: 60 TABLET | Refills: 2 | Status: SHIPPED | OUTPATIENT
Start: 2023-02-17

## 2023-02-17 RX ORDER — SIMVASTATIN 20 MG
TABLET ORAL
Qty: 30 TABLET | Refills: 3 | Status: SHIPPED | OUTPATIENT
Start: 2023-02-17

## 2023-02-17 RX ORDER — GABAPENTIN 300 MG/1
CAPSULE ORAL
Qty: 30 CAPSULE | Refills: 1 | Status: SHIPPED | OUTPATIENT
Start: 2023-02-17 | End: 2024-02-17

## 2023-02-17 SDOH — ECONOMIC STABILITY: INCOME INSECURITY: HOW HARD IS IT FOR YOU TO PAY FOR THE VERY BASICS LIKE FOOD, HOUSING, MEDICAL CARE, AND HEATING?: NOT HARD AT ALL

## 2023-02-17 SDOH — ECONOMIC STABILITY: FOOD INSECURITY: WITHIN THE PAST 12 MONTHS, THE FOOD YOU BOUGHT JUST DIDN'T LAST AND YOU DIDN'T HAVE MONEY TO GET MORE.: NEVER TRUE

## 2023-02-17 SDOH — ECONOMIC STABILITY: HOUSING INSECURITY
IN THE LAST 12 MONTHS, WAS THERE A TIME WHEN YOU DID NOT HAVE A STEADY PLACE TO SLEEP OR SLEPT IN A SHELTER (INCLUDING NOW)?: NO

## 2023-02-17 SDOH — ECONOMIC STABILITY: FOOD INSECURITY: WITHIN THE PAST 12 MONTHS, YOU WORRIED THAT YOUR FOOD WOULD RUN OUT BEFORE YOU GOT MONEY TO BUY MORE.: NEVER TRUE

## 2023-02-17 ASSESSMENT — PATIENT HEALTH QUESTIONNAIRE - PHQ9
SUM OF ALL RESPONSES TO PHQ QUESTIONS 1-9: 0
SUM OF ALL RESPONSES TO PHQ9 QUESTIONS 1 & 2: 0
1. LITTLE INTEREST OR PLEASURE IN DOING THINGS: 0
2. FEELING DOWN, DEPRESSED OR HOPELESS: 0

## 2023-02-17 ASSESSMENT — ENCOUNTER SYMPTOMS
CHEST TIGHTNESS: 0
SHORTNESS OF BREATH: 0
BACK PAIN: 0
CHOKING: 0
COUGH: 0

## 2023-02-17 NOTE — PROGRESS NOTES
[facility]    Family Medicine Residency Program - Outpatient Note      Subjective:    Arden Martin is a 60 y.o. male with  has a past medical history of Bronchitis, CAD (coronary artery disease), Cardiomyopathy ([address] Chest pain, CHF (congestive heart failure) ([address] CHF (congestive heart failure), NYHA class I, acute on chronic, diastolic (HCC), Chronic back pain, Chronic diastolic CHF (congestive heart failure) ([address] COPD (chronic obstructive pulmonary disease) ([address] Headache(784.0), Hyperlipidemia, Hypertension, Inguinal hernia, Low back pain with sciatica, Migraine, Mitral valve regurgitation, Osteoarthritis, Patient in clinical research study, Pneumonia, Sleep apnea, SOB (shortness of breath) on exertion, Tricuspid regurgitation, Type 2 diabetes mellitus without complication, without long-term current use of insulin ([address] Under care of team, and Wellness examination. Presented to the office today for:  Chief Complaint   Patient presents with    Diabetes     Follow up       HPI    Patient is here to follow up with DM  Takes Metformin 1000 mg twice daily and Jardiance 10 mg  Denies nausea, vomiting, diarrhea   Last A1c was 7.6 today it is 6.7  Ophthalmologist - f/u   Podiatrist - f/u    Also complaining of neuropathy takes gabapentin 300 mg daily  Did not take it for over a month      HTN   On Toprol XL, Aldactone   Has mild headache, chest pain, SOB due to COPD not worsening  Patient is not compliant with the diet but BP is wnl    CHF   Takes Bumex, Soheila Cannon is on pacemaker  F/u with [facility]  Appointment 2/23 this month with CHF clinic      Review of Systems   Constitutional:  Negative for activity change and appetite change. Respiratory:  Negative for cough, choking, chest tightness and shortness of breath. Cardiovascular:  Negative for chest pain. Genitourinary:  Negative for dysuria and frequency. Musculoskeletal:  Negative for arthralgias and back pain. Neurological:  Negative for seizures, light-headedness and numbness. Psychiatric/Behavioral:  Negative for agitation and behavioral problems. The patient has a   Family History   Problem Relation Age of Onset    Kidney Disease Mother     Diabetes Mother     Heart Disease Mother     Arthritis Mother     Heart Disease Father     Heart Attack Father     Arthritis Sister     Diabetes Sister     Heart Disease Sister     Diabetes Sister        Objective:    /80 (Site: Right Upper Arm, Position: Sitting, Cuff Size: Medium Adult)   Pulse 75   Ht 5' 5\" (1.651 m)   Wt 264 lb (119.7 kg)   BMI 43.93 kg/m²    BP Readings from Last 3 Encounters:   02/17/23 134/80   01/26/23 (!) 140/82   12/29/22 (!) 140/82       Physical Exam  Cardiovascular:      Rate and Rhythm: Normal rate and regular rhythm. Pulses: Normal pulses. Heart sounds: Normal heart sounds. Pulmonary:      Effort: Pulmonary effort is normal.      Breath sounds: Normal breath sounds. Musculoskeletal:         General: Normal range of motion. Skin:     General: Skin is warm. Neurological:      General: No focal deficit present. Mental Status: He is alert and oriented to person, place, and time.    Psychiatric:         Mood and Affect: Mood normal.         Behavior: Behavior normal.       Lab Results   Component Value Date    WBC 10.1 08/21/2022    HGB 12.4 (L) 08/21/2022    HCT 35.9 (L) 08/21/2022     08/21/2022    CHOL 137 01/27/2023    TRIG 208 (H) 01/27/2023    HDL 38 (L) 01/27/2023    ALT 43 (H) 01/27/2023    AST 23 01/27/2023     01/27/2023    K 4.5 01/27/2023     01/27/2023    CREATININE 0.96 01/27/2023    BUN 16 01/27/2023    CO2 31 01/27/2023    TSH 0.41 01/22/2021    PSA 1.06 07/01/2014    INR 0.9 08/21/2022    LABA1C 6.7 02/17/2023    LABMICR Can not be calculated 12/13/2021     Lab Results   Component Value Date    CALCIUM 9.2 01/27/2023     Lab Results   Component Value Date LDLCHOLESTEROL 57 01/27/2023       Assessment and Plan:    1. Chronic systolic congestive heart failure (HCC)  -Follows with CHF clinic and [facility]  -Continue Current regimen  - simvastatin (ZOCOR) 20 MG tablet; TAKE 1 TABLET BY MOUTH EVERY NIGHT  Dispense: 30 tablet; Refill: 3  - bumetanide (BUMEX) 2 MG tablet; TAKE 1 TABLET BY MOUTH TWICE DAILY  Dispense: 60 tablet; Refill: 2    2. Chronic midline low back pain without sciatica  -Continue  - gabapentin (NEURONTIN) 300 MG capsule; TAKE 1 CAPSULE BY MOUTH NIGHTLY AS NEEDED (PAIN)  Dispense: 30 capsule; Refill: 1    3. Diabetes mellitus type 2 in obese (HCC)  A1c dropped to  6.7  -continue current regimen  -Consider reducing metformin dose if  falls in prediabetic range  - metFORMIN (GLUCOPHAGE) 1000 MG tablet; TAKE 1 TABLET BY MOUTH TWICE DAILY WITH MEALS  Dispense: 60 tablet; Refill: 3  - POCT glycosylated hemoglobin (Hb A1C)  - Protein / Creatinine Ratio, Urine; Future  - empagliflozin (JARDIANCE) 10 MG tablet; TAKE 1 TABLET BY MOUTH DAILY  Dispense: 30 tablet; Refill: 3    4.  COPD exacerbation ([facility]  -Has baseline SOB  -Takes albuterol and combivent  - Pauline Patel MD, Pulmonology, [facility]          Requested Prescriptions     Signed Prescriptions Disp Refills    simvastatin (ZOCOR) 20 MG tablet 30 tablet 3     Sig: TAKE 1 TABLET BY MOUTH EVERY NIGHT    metFORMIN (GLUCOPHAGE) 1000 MG tablet 60 tablet 3     Sig: TAKE 1 TABLET BY MOUTH TWICE DAILY WITH MEALS    gabapentin (NEURONTIN) 300 MG capsule 30 capsule 1     Sig: TAKE 1 CAPSULE BY MOUTH NIGHTLY AS NEEDED (PAIN)    bumetanide (BUMEX) 2 MG tablet 60 tablet 2     Sig: TAKE 1 TABLET BY MOUTH TWICE DAILY    empagliflozin (JARDIANCE) 10 MG tablet 30 tablet 3     Sig: TAKE 1 TABLET BY MOUTH DAILY       Medications Discontinued During This Encounter   Medication Reason    gabapentin (NEURONTIN) 300 MG capsule REORDER    metFORMIN (GLUCOPHAGE) 1000 MG tablet REORDER    bumetanide (BUMEX) 2 MG tablet REORDER    simvastatin (ZOCOR) 20 MG tablet REORDER    JARDIANCE 10 MG tablet REORDER    empagliflozin (JARDIANCE) 10 MG tablet     empagliflozin (JARDIANCE) 25 MG tablet        Lee received counseling on the following healthy behaviors: nutrition, exercise and medication adherence    Discussed use,benefit, and side effects of prescribed medications. Barriers to medication compliance addressed. All patient questions answered. Pt voiced understanding. Return in about 3 months (around 5/17/2023) for HTN, DM. Disclaimer: Some orall of this note was transcribed using voice-recognition software. This may cause typographical errors occasionally. Although all effort is made to fix these errors, please do not hesitate to contact our office if there Dilshad Ortiz concern with the understanding of this note.

## 2023-02-17 NOTE — PATIENT INSTRUCTIONS
Thank you for letting us take care of you today. We hope all your questions were addressed. If a question was overlooked or something else comes to mind after you return home, please contact a member of your Care Team listed below. Your Care Team at [place] [address] is Team #5  Megan Velazquez MD (Faculty)  Betty Chadwick MD (Faculty)  Anali Thapa MD (Resident)  Jason Perez MD (Resident)  Pravin Meyer MD (Resident)  Merline Graver, MD, (Resident)  Arie Howard., SUE Stout, JANETH Moffett.,  CHAZ Robertson., Estrada Girard., [place] [facility] office)  Darshan Tolentino, [address] [facility] (Clinical Practice Manager)  Nneka Lund [facility] (Clinical Pharmacist)       Office phone number: [phone]    If you need to get in right away due to illness, please be advised we have \"Same Day\" appointments available Monday-Friday. Please call us at [phone] option #3 to schedule your \"Same Day\" appointment.

## 2023-02-17 NOTE — PROGRESS NOTES
Attending Physician Statement  I have discussed the care of Beryle Honer, 60 y.o. male,including pertinent history and exam findings,  with the resident Dr. Arcelia Cardona MD.  History:  Chief Complaint   Patient presents with    Diabetes     Follow up     Patient is here for follow up on type II DM, COPD and CHF. I have reviewed the key elements of the encounter with the resident. Examination was done by resident as documented in residents note. BP Readings from Last 3 Encounters:   02/17/23 134/80   01/26/23 (!) 140/82   12/29/22 (!) 140/82     /80 (Site: Right Upper Arm, Position: Sitting, Cuff Size: Medium Adult)   Pulse 75   Ht 5' 5\" (1.651 m)   Wt 264 lb (119.7 kg)   BMI 43.93 kg/m²   Lab Results   Component Value Date    WBC 10.1 08/21/2022    HGB 12.4 (L) 08/21/2022    HCT 35.9 (L) 08/21/2022     08/21/2022    CHOL 137 01/27/2023    TRIG 208 (H) 01/27/2023    HDL 38 (L) 01/27/2023    ALT 43 (H) 01/27/2023    AST 23 01/27/2023     01/27/2023    K 4.5 01/27/2023     01/27/2023    CREATININE 0.96 01/27/2023    BUN 16 01/27/2023    CO2 31 01/27/2023    TSH 0.41 01/22/2021    PSA 1.06 07/01/2014    INR 0.9 08/21/2022    LABA1C 6.7 02/17/2023    LABMICR Can not be calculated 12/13/2021     Lab Results   Component Value Date    CALCIUM 9.2 01/27/2023     Lab Results   Component Value Date    LDLCHOLESTEROL 57 01/27/2023     I agree with the assessment, plan and diagnosis of    Diagnosis Orders   1. COPD exacerbation [facility])  Esther Castro MD, Pulmonology, [facility]      2. Chronic systolic congestive heart failure (HCC)  simvastatin (ZOCOR) 20 MG tablet    bumetanide (BUMEX) 2 MG tablet      3. Chronic midline low back pain without sciatica  gabapentin (NEURONTIN) 300 MG capsule      4.  Diabetes mellitus type 2 in obese (HCC)  metFORMIN (GLUCOPHAGE) 1000 MG tablet    POCT glycosylated hemoglobin (Hb A1C)    Protein / Creatinine Ratio, Urine    empagliflozin (JARDIANCE) 10 MG tablet    DISCONTINUED: empagliflozin (JARDIANCE) 10 MG tablet    DISCONTINUED: empagliflozin (JARDIANCE) 25 MG tablet        I agree with  orders as documented by the resident. Recommendations:   Patient is doing well on current regimen with Metformin and Jardiance. Hgb A1C improved. He is requesting refills on his medications regimen. He was counseled regarding his regimen and provided with refills. He has follow up with Cardiology and Pulmonary. He has appointment for pacemaker check as well. Return in about 3 months (around 5/17/2023) for HTN, DM.    (Flo Velasquez ) Dr. Katlyn Nguyen MD

## 2023-02-17 NOTE — PROGRESS NOTES
Visit Information    Have you changed or started any medications since your last visit including any over-the-counter medicines, vitamins, or herbal medicines? no   Are you having any side effects from any of your medications? -  no  Have you stopped taking any of your medications? Is so, why? -  no    Have you seen any other physician or provider since your last visit? No  Have you had any other diagnostic tests since your last visit? No  Have you been seen in the emergency room and/or had an admission to a hospital since we last saw you? No  Have you had your routine dental cleaning in the past 6 months? no    Have you activated your [email or web address] account? If not, what are your barriers?  Yes     Patient Care Team:  Cailin Estrada MD as PCP - Audley Merlin, MD as Consulting Physician (Cardiology)  Richi Dubon MD as Consulting Physician  Carolina Singer DO as Consulting Physician (General Surgery)    Medical History Review  Past Medical, Family, and Social History reviewed and does not contribute to the patient presenting condition    Health Maintenance   Topic Date Due    Diabetic foot exam  Never done    Diabetic retinal exam  Never done    Flu vaccine (1) 08/01/2022    Diabetic Alb to Cr ratio (uACR) test  12/13/2022    A1C test (Diabetic or Prediabetic)  09/16/2023    Depression Screen  09/16/2023    Lipids  01/27/2024    GFR test (Diabetes, CKD 3-4, OR last GFR 15-59)  01/27/2024    Colorectal Cancer Screen  05/28/2024    DTaP/Tdap/Td vaccine (2 - Td or Tdap) 12/08/2030    Shingles vaccine  Completed    Pneumococcal 0-64 years Vaccine  Completed    COVID-19 Vaccine  Completed    Hepatitis C screen  Completed    HIV screen  Completed    Hepatitis A vaccine  Aged Out    Hib vaccine  Aged Out    Meningococcal (ACWY) vaccine  Aged Out

## 2023-02-23 ENCOUNTER — HOSPITAL ENCOUNTER (OUTPATIENT)
Dept: OTHER | Age: 63
Discharge: HOME | End: 2023-02-23
Payer: COMMERCIAL

## 2023-02-23 VITALS
SYSTOLIC BLOOD PRESSURE: 118 MMHG | BODY MASS INDEX: 43.57 KG/M2 | WEIGHT: 261.8 LBS | RESPIRATION RATE: 20 BRPM | HEART RATE: 78 BPM | DIASTOLIC BLOOD PRESSURE: 62 MMHG

## 2023-02-23 PROCEDURE — 99212 OFFICE O/P EST SF 10 MIN: CPT

## 2023-02-23 ASSESSMENT — PAIN SCALES - GENERAL: PAINLEVEL_OUTOF10: 4

## 2023-02-23 ASSESSMENT — PAIN DESCRIPTION - ORIENTATION: ORIENTATION: MID

## 2023-02-23 ASSESSMENT — PAIN DESCRIPTION - PAIN TYPE: TYPE: ACUTE PAIN

## 2023-02-23 ASSESSMENT — PAIN DESCRIPTION - LOCATION: LOCATION: CHEST

## 2023-02-23 NOTE — PROGRESS NOTES
Verbally reviewed medication list with patient; patient verbalized understanding.    Discussed 2000mg/day sodium restricted diet; patient verbalized understanding.    Moderate daily exercise encouraged as tolerated. Discussed rest breaks as needed; patient verbalized understanding.    Patient instructed to weigh self at the same time of each day, using same clothes and same scale; reinforced teaching to monitor for 3-5 lb weight increase over 1-2 days, and to notify the CHF clinic at [phone] or physician office if weight change noted.  Patient verbalized understanding.    Risks of smoking discussed with the patient if applicable; patient strongly discouraged to smoke.  Patient verbalized understanding.    Signs and symptoms of CHF discussed with patient, such as feeling more tired than normal, feeling short of breath, coughing that increases when you lie down, sudden weight gain, swelling of your feet, legs or belly.  Patient verbalized understanding to notify the CHF clinic at [phone] or physician office if these symptoms occur.    Compliance with plan of care and further disease process causes discussed with patient, patient encouraged to keep all follow up appointments.  Patient verbalized understanding.

## 2023-02-23 NOTE — PROGRESS NOTES
Date:  2023  Time:  10:32 AM    CHF Clinic at [address] [facility]    Office: [phone] Fax: [phone]    Re:  Alicia Silveira   Patient : 1960    Vital Signs: /62   Pulse 78   Resp 20   Wt 261 lb 12.8 oz (118.8 kg)   BMI 43.57 kg/m²                                                   No results for input(s): CBC, HGB, HCT, WBC, PLATELET, NA, K, CL, CO2, BUN, CREATININE, GLUCOSE, BNP, INR in the last 72 hours. Respiratory:    Assessment  Charting Type: Reassessment    Breath Sounds  Right Upper Lobe: Clear  Right Middle Lobe: Clear  Right Lower Lobe: Clear  Left Upper Lobe: Clear  Left Lower Lobe: Clear, Diminished    Cough/Sputum  Cough: Productive  Frequency: Occasional  Sputum Amount: Small  Sputum Color: Cloudy         Peripheral Vascular  RLE Edema: +1, Non-pitting  LLE Edema: +1, Non-pitting      Complaints: None at this time    Physician Orders None    Comment : Patient here for scheduled visit per ambulatory. His weight is down 7.4 lbs from last month. Ongoing lower leg, ankle and pedal edema noted with a slight decrease from last visit. Denies any increase in dyspnea with exertion or chest pain. Medication list reviewed and updated. Reinforced low sodium diet and fluid restrictions. No signs of fluid overload at this time. Next chf clinic visit 23.     Electronically signed by Rishi Hadley RN on 2023 at 10:32 AM

## 2023-03-13 ENCOUNTER — APPOINTMENT (OUTPATIENT)
Dept: GENERAL RADIOLOGY | Age: 63
End: 2023-03-13
Payer: COMMERCIAL

## 2023-03-13 ENCOUNTER — HOSPITAL ENCOUNTER (INPATIENT)
Age: 63
LOS: 1 days | Discharge: HOME | End: 2023-03-14
Attending: EMERGENCY MEDICINE | Admitting: STUDENT IN AN ORGANIZED HEALTH CARE EDUCATION/TRAINING PROGRAM
Payer: COMMERCIAL

## 2023-03-13 DIAGNOSIS — J44.1 COPD WITH ACUTE EXACERBATION (HCC): ICD-10-CM

## 2023-03-13 DIAGNOSIS — R06.02 SHORTNESS OF BREATH: Primary | ICD-10-CM

## 2023-03-13 LAB
ABSOLUTE EOS #: 0.63 K/UL (ref 0–0.44)
ABSOLUTE IMMATURE GRANULOCYTE: 0.03 K/UL (ref 0–0.3)
ABSOLUTE LYMPH #: 1.36 K/UL (ref 1.1–3.7)
ABSOLUTE MONO #: 0.96 K/UL (ref 0.1–1.2)
ANION GAP SERPL CALCULATED.3IONS-SCNC: 13 MMOL/L (ref 9–17)
BASOPHILS # BLD: 0 % (ref 0–2)
BASOPHILS ABSOLUTE: 0.04 K/UL (ref 0–0.2)
BNP SERPL-MCNC: 79 PG/ML
BUN SERPL-MCNC: 25 MG/DL (ref 8–23)
CALCIUM SERPL-MCNC: 8.9 MG/DL (ref 8.6–10.4)
CHLORIDE SERPL-SCNC: 101 MMOL/L (ref 98–107)
CO2 SERPL-SCNC: 25 MMOL/L (ref 20–31)
CREAT SERPL-MCNC: 1.02 MG/DL (ref 0.7–1.2)
EOSINOPHILS RELATIVE PERCENT: 6 % (ref 1–4)
FLUAV AG SPEC QL: NEGATIVE
FLUBV AG SPEC QL: NEGATIVE
GFR SERPL CREATININE-BSD FRML MDRD: >60 ML/MIN/1.73M2
GLUCOSE BLD-MCNC: 339 MG/DL (ref 75–110)
GLUCOSE SERPL-MCNC: 109 MG/DL (ref 70–99)
HCT VFR BLD AUTO: 42.6 % (ref 40.7–50.3)
HGB BLD-MCNC: 14.1 G/DL (ref 13–17)
IMMATURE GRANULOCYTES: 0 %
LYMPHOCYTES # BLD: 13 % (ref 24–43)
MCH RBC QN AUTO: 29.4 PG (ref 25.2–33.5)
MCHC RBC AUTO-ENTMCNC: 33.1 G/DL (ref 28.4–34.8)
MCV RBC AUTO: 88.9 FL (ref 82.6–102.9)
MONOCYTES # BLD: 9 % (ref 3–12)
NRBC AUTOMATED: 0 PER 100 WBC
PDW BLD-RTO: 13.1 % (ref 11.8–14.4)
PLATELET # BLD AUTO: 216 K/UL (ref 138–453)
PMV BLD AUTO: 10.7 FL (ref 8.1–13.5)
POTASSIUM SERPL-SCNC: 4.2 MMOL/L (ref 3.7–5.3)
RBC # BLD: 4.79 M/UL (ref 4.21–5.77)
SARS-COV-2 RDRP RESP QL NAA+PROBE: NOT DETECTED
SEG NEUTROPHILS: 72 % (ref 36–65)
SEGMENTED NEUTROPHILS ABSOLUTE COUNT: 7.18 K/UL (ref 1.5–8.1)
SODIUM SERPL-SCNC: 139 MMOL/L (ref 135–144)
SPECIMEN DESCRIPTION: NORMAL
TROPONIN I SERPL DL<=0.01 NG/ML-MCNC: 10 NG/L (ref 0–22)
TROPONIN I SERPL DL<=0.01 NG/ML-MCNC: 10 NG/L (ref 0–22)
WBC # BLD AUTO: 10.2 K/UL (ref 3.5–11.3)

## 2023-03-13 PROCEDURE — 6360000002 HC RX W HCPCS: Performed by: STUDENT IN AN ORGANIZED HEALTH CARE EDUCATION/TRAINING PROGRAM

## 2023-03-13 PROCEDURE — 1200000000 HC SEMI PRIVATE

## 2023-03-13 PROCEDURE — 6360000002 HC RX W HCPCS

## 2023-03-13 PROCEDURE — 2580000003 HC RX 258

## 2023-03-13 PROCEDURE — 93005 ELECTROCARDIOGRAM TRACING: CPT | Performed by: ANESTHESIOLOGY

## 2023-03-13 PROCEDURE — 99285 EMERGENCY DEPT VISIT HI MDM: CPT

## 2023-03-13 PROCEDURE — 87804 INFLUENZA ASSAY W/OPTIC: CPT

## 2023-03-13 PROCEDURE — 84484 ASSAY OF TROPONIN QUANT: CPT

## 2023-03-13 PROCEDURE — 6370000000 HC RX 637 (ALT 250 FOR IP)

## 2023-03-13 PROCEDURE — 96374 THER/PROPH/DIAG INJ IV PUSH: CPT

## 2023-03-13 PROCEDURE — 87635 SARS-COV-2 COVID-19 AMP PRB: CPT

## 2023-03-13 PROCEDURE — 85025 COMPLETE CBC W/AUTO DIFF WBC: CPT

## 2023-03-13 PROCEDURE — 83880 ASSAY OF NATRIURETIC PEPTIDE: CPT

## 2023-03-13 PROCEDURE — 94640 AIRWAY INHALATION TREATMENT: CPT

## 2023-03-13 PROCEDURE — 80048 BASIC METABOLIC PNL TOTAL CA: CPT

## 2023-03-13 PROCEDURE — 71046 X-RAY EXAM CHEST 2 VIEWS: CPT

## 2023-03-13 PROCEDURE — 82947 ASSAY GLUCOSE BLOOD QUANT: CPT

## 2023-03-13 RX ORDER — ACETAMINOPHEN 650 MG/1
650 SUPPOSITORY RECTAL EVERY 6 HOURS PRN
Status: DISCONTINUED | OUTPATIENT
Start: 2023-03-13 | End: 2023-03-14 | Stop reason: HOSPADM

## 2023-03-13 RX ORDER — ASPIRIN 81 MG/1
81 TABLET ORAL DAILY
Status: DISCONTINUED | OUTPATIENT
Start: 2023-03-14 | End: 2023-03-13

## 2023-03-13 RX ORDER — METHYLPREDNISOLONE SODIUM SUCCINATE 40 MG/ML
40 INJECTION, POWDER, LYOPHILIZED, FOR SOLUTION INTRAMUSCULAR; INTRAVENOUS 2 TIMES DAILY
Status: DISCONTINUED | OUTPATIENT
Start: 2023-03-13 | End: 2023-03-14 | Stop reason: HOSPADM

## 2023-03-13 RX ORDER — AZITHROMYCIN 250 MG/1
500 TABLET, FILM COATED ORAL DAILY
Status: DISCONTINUED | OUTPATIENT
Start: 2023-03-13 | End: 2023-03-14

## 2023-03-13 RX ORDER — BUMETANIDE 1 MG/1
2 TABLET ORAL 2 TIMES DAILY
Status: DISCONTINUED | OUTPATIENT
Start: 2023-03-13 | End: 2023-03-14 | Stop reason: HOSPADM

## 2023-03-13 RX ORDER — POLYETHYLENE GLYCOL 3350 17 G/17G
17 POWDER, FOR SOLUTION ORAL DAILY PRN
Status: DISCONTINUED | OUTPATIENT
Start: 2023-03-13 | End: 2023-03-14 | Stop reason: HOSPADM

## 2023-03-13 RX ORDER — DEXAMETHASONE SODIUM PHOSPHATE 10 MG/ML
10 INJECTION, SOLUTION INTRAMUSCULAR; INTRAVENOUS ONCE
Status: COMPLETED | OUTPATIENT
Start: 2023-03-13 | End: 2023-03-13

## 2023-03-13 RX ORDER — INSULIN LISPRO 100 [IU]/ML
0-4 INJECTION, SOLUTION INTRAVENOUS; SUBCUTANEOUS NIGHTLY
Status: DISCONTINUED | OUTPATIENT
Start: 2023-03-13 | End: 2023-03-14 | Stop reason: HOSPADM

## 2023-03-13 RX ORDER — ALBUTEROL SULFATE 90 UG/1
1 AEROSOL, METERED RESPIRATORY (INHALATION) EVERY 4 HOURS
Status: DISCONTINUED | OUTPATIENT
Start: 2023-03-13 | End: 2023-03-14

## 2023-03-13 RX ORDER — IPRATROPIUM BROMIDE AND ALBUTEROL SULFATE 2.5; .5 MG/3ML; MG/3ML
1 SOLUTION RESPIRATORY (INHALATION)
Status: DISCONTINUED | OUTPATIENT
Start: 2023-03-14 | End: 2023-03-13

## 2023-03-13 RX ORDER — SODIUM CHLORIDE 0.9 % (FLUSH) 0.9 %
5-40 SYRINGE (ML) INJECTION EVERY 12 HOURS SCHEDULED
Status: DISCONTINUED | OUTPATIENT
Start: 2023-03-13 | End: 2023-03-14 | Stop reason: HOSPADM

## 2023-03-13 RX ORDER — INSULIN GLARGINE 100 [IU]/ML
10 INJECTION, SOLUTION SUBCUTANEOUS NIGHTLY
Status: DISCONTINUED | OUTPATIENT
Start: 2023-03-13 | End: 2023-03-14 | Stop reason: HOSPADM

## 2023-03-13 RX ORDER — DEXTROSE MONOHYDRATE 100 MG/ML
INJECTION, SOLUTION INTRAVENOUS CONTINUOUS PRN
Status: DISCONTINUED | OUTPATIENT
Start: 2023-03-13 | End: 2023-03-14 | Stop reason: HOSPADM

## 2023-03-13 RX ORDER — PANTOPRAZOLE SODIUM 40 MG/1
40 TABLET, DELAYED RELEASE ORAL
Status: DISCONTINUED | OUTPATIENT
Start: 2023-03-14 | End: 2023-03-14 | Stop reason: HOSPADM

## 2023-03-13 RX ORDER — SPIRONOLACTONE 25 MG/1
25 TABLET ORAL DAILY
Status: DISCONTINUED | OUTPATIENT
Start: 2023-03-13 | End: 2023-03-14 | Stop reason: HOSPADM

## 2023-03-13 RX ORDER — ATORVASTATIN CALCIUM 20 MG/1
20 TABLET, FILM COATED ORAL DAILY
Status: DISCONTINUED | OUTPATIENT
Start: 2023-03-13 | End: 2023-03-14 | Stop reason: HOSPADM

## 2023-03-13 RX ORDER — SODIUM CHLORIDE 9 MG/ML
INJECTION, SOLUTION INTRAVENOUS PRN
Status: DISCONTINUED | OUTPATIENT
Start: 2023-03-13 | End: 2023-03-14 | Stop reason: HOSPADM

## 2023-03-13 RX ORDER — ACETAMINOPHEN 325 MG/1
650 TABLET ORAL EVERY 6 HOURS PRN
Status: DISCONTINUED | OUTPATIENT
Start: 2023-03-13 | End: 2023-03-14 | Stop reason: HOSPADM

## 2023-03-13 RX ORDER — ASPIRIN 81 MG/1
81 TABLET ORAL DAILY
Status: DISCONTINUED | OUTPATIENT
Start: 2023-03-13 | End: 2023-03-14 | Stop reason: HOSPADM

## 2023-03-13 RX ORDER — POTASSIUM CHLORIDE 20 MEQ/1
20 TABLET, EXTENDED RELEASE ORAL
Status: DISCONTINUED | OUTPATIENT
Start: 2023-03-14 | End: 2023-03-14 | Stop reason: HOSPADM

## 2023-03-13 RX ORDER — INSULIN LISPRO 100 [IU]/ML
0-8 INJECTION, SOLUTION INTRAVENOUS; SUBCUTANEOUS
Status: DISCONTINUED | OUTPATIENT
Start: 2023-03-14 | End: 2023-03-14 | Stop reason: HOSPADM

## 2023-03-13 RX ORDER — GABAPENTIN 300 MG/1
300 CAPSULE ORAL DAILY PRN
Status: DISCONTINUED | OUTPATIENT
Start: 2023-03-13 | End: 2023-03-14 | Stop reason: HOSPADM

## 2023-03-13 RX ORDER — SPIRONOLACTONE 25 MG/1
25 TABLET ORAL DAILY
Status: DISCONTINUED | OUTPATIENT
Start: 2023-03-14 | End: 2023-03-13

## 2023-03-13 RX ORDER — ONDANSETRON 4 MG/1
4 TABLET, ORALLY DISINTEGRATING ORAL EVERY 8 HOURS PRN
Status: DISCONTINUED | OUTPATIENT
Start: 2023-03-13 | End: 2023-03-14 | Stop reason: HOSPADM

## 2023-03-13 RX ORDER — LEVALBUTEROL INHALATION SOLUTION 1.25 MG/3ML
0.63 SOLUTION RESPIRATORY (INHALATION) EVERY 6 HOURS
Status: DISCONTINUED | OUTPATIENT
Start: 2023-03-14 | End: 2023-03-14 | Stop reason: HOSPADM

## 2023-03-13 RX ORDER — METOPROLOL SUCCINATE 100 MG/1
200 TABLET, EXTENDED RELEASE ORAL DAILY
Status: DISCONTINUED | OUTPATIENT
Start: 2023-03-14 | End: 2023-03-13

## 2023-03-13 RX ORDER — SODIUM CHLORIDE 0.9 % (FLUSH) 0.9 %
5-40 SYRINGE (ML) INJECTION PRN
Status: DISCONTINUED | OUTPATIENT
Start: 2023-03-13 | End: 2023-03-14 | Stop reason: HOSPADM

## 2023-03-13 RX ORDER — AZITHROMYCIN 250 MG/1
500 TABLET, FILM COATED ORAL ONCE
Status: DISCONTINUED | OUTPATIENT
Start: 2023-03-13 | End: 2023-03-13

## 2023-03-13 RX ORDER — ENOXAPARIN SODIUM 100 MG/ML
40 INJECTION SUBCUTANEOUS DAILY
Status: DISCONTINUED | OUTPATIENT
Start: 2023-03-13 | End: 2023-03-14 | Stop reason: HOSPADM

## 2023-03-13 RX ORDER — METOPROLOL SUCCINATE 100 MG/1
200 TABLET, EXTENDED RELEASE ORAL DAILY
Status: DISCONTINUED | OUTPATIENT
Start: 2023-03-13 | End: 2023-03-14 | Stop reason: HOSPADM

## 2023-03-13 RX ORDER — LEVALBUTEROL INHALATION SOLUTION 0.63 MG/3ML
0.63 SOLUTION RESPIRATORY (INHALATION) EVERY 4 HOURS
Status: DISCONTINUED | OUTPATIENT
Start: 2023-03-13 | End: 2023-03-13

## 2023-03-13 RX ORDER — AZITHROMYCIN 250 MG/1
500 TABLET, FILM COATED ORAL DAILY
Status: DISCONTINUED | OUTPATIENT
Start: 2023-03-14 | End: 2023-03-13

## 2023-03-13 RX ORDER — ONDANSETRON 2 MG/ML
4 INJECTION INTRAMUSCULAR; INTRAVENOUS EVERY 6 HOURS PRN
Status: DISCONTINUED | OUTPATIENT
Start: 2023-03-13 | End: 2023-03-14 | Stop reason: HOSPADM

## 2023-03-13 RX ADMIN — ALBUTEROL SULFATE 7.5 MG: 2.5 SOLUTION RESPIRATORY (INHALATION) at 15:55

## 2023-03-13 RX ADMIN — INSULIN LISPRO 4 UNITS: 100 INJECTION, SOLUTION INTRAVENOUS; SUBCUTANEOUS at 22:18

## 2023-03-13 RX ADMIN — ENOXAPARIN SODIUM 40 MG: 100 INJECTION SUBCUTANEOUS at 22:06

## 2023-03-13 RX ADMIN — ATORVASTATIN CALCIUM 20 MG: 20 TABLET, FILM COATED ORAL at 22:07

## 2023-03-13 RX ADMIN — DEXAMETHASONE SODIUM PHOSPHATE 10 MG: 10 INJECTION INTRAMUSCULAR; INTRAVENOUS at 16:48

## 2023-03-13 RX ADMIN — INSULIN GLARGINE 10 UNITS: 100 INJECTION, SOLUTION SUBCUTANEOUS at 22:54

## 2023-03-13 RX ADMIN — AZITHROMYCIN 500 MG: 250 TABLET, FILM COATED ORAL at 22:16

## 2023-03-13 RX ADMIN — BUMETANIDE 2 MG: 1 TABLET ORAL at 22:07

## 2023-03-13 RX ADMIN — METOPROLOL SUCCINATE 200 MG: 100 TABLET, EXTENDED RELEASE ORAL at 22:22

## 2023-03-13 RX ADMIN — SPIRONOLACTONE 25 MG: 25 TABLET ORAL at 22:22

## 2023-03-13 RX ADMIN — GABAPENTIN 300 MG: 300 CAPSULE ORAL at 22:07

## 2023-03-13 RX ADMIN — IPRATROPIUM BROMIDE 0.5 MG: 0.5 SOLUTION RESPIRATORY (INHALATION) at 15:54

## 2023-03-13 RX ADMIN — SACUBITRIL AND VALSARTAN 1 TABLET: 24; 26 TABLET, FILM COATED ORAL at 22:05

## 2023-03-13 RX ADMIN — SODIUM CHLORIDE, PRESERVATIVE FREE 10 ML: 5 INJECTION INTRAVENOUS at 22:10

## 2023-03-13 RX ADMIN — ALBUTEROL SULFATE 7.5 MG: 2.5 SOLUTION RESPIRATORY (INHALATION) at 18:09

## 2023-03-13 RX ADMIN — METHYLPREDNISOLONE SODIUM SUCCINATE 40 MG: 40 INJECTION, POWDER, FOR SOLUTION INTRAMUSCULAR; INTRAVENOUS at 22:07

## 2023-03-13 RX ADMIN — Medication 81 MG: at 22:21

## 2023-03-13 ASSESSMENT — ENCOUNTER SYMPTOMS
WHEEZING: 1
ABDOMINAL DISTENTION: 0
COUGH: 1
NAUSEA: 0
TROUBLE SWALLOWING: 0
CHEST TIGHTNESS: 0
BACK PAIN: 0
VOMITING: 0
DIARRHEA: 0
FACIAL SWELLING: 0
ABDOMINAL PAIN: 0
SHORTNESS OF BREATH: 1
SORE THROAT: 0
RHINORRHEA: 0

## 2023-03-13 NOTE — ED PROVIDER NOTES
[facility]  ED  Emergency Department Encounter  Emergency Medicine Resident     Pt Aki Moore  MRN: [id number]  [place] 1960  Date of evaluation: 3/13/23  PCP:  Ankit King MD  Note Started: 3:49 PM EDT      CHIEF COMPLAINT       Chief Complaint   Patient presents with    Shortness of Breath       HISTORY OF PRESENT ILLNESS  (Location/Symptom, Timing/Onset, Context/Setting, Quality, Duration, Modifying Factors, Severity.)      Jasmin Quiroz is a 60 y.o. male who presents with shortness of breath that started last night. Patient does have a history of CHF and COPD, does state that he has had intermittent chills but no fevers. Intermittently has a productive cough. Is not on any home oxygen. Denies any relief from his home inhalers but is concerned he is having a COPD exacerbation. Denies any nausea, vomiting or chest pain. Has no known sick contacts. PAST MEDICAL / SURGICAL / SOCIAL / FAMILY HISTORY      has a past medical history of Bronchitis, CAD (coronary artery disease), Cardiomyopathy ([address] Chest pain, CHF (congestive heart failure) ([address] CHF (congestive heart failure), NYHA class I, acute on chronic, diastolic (HCC), Chronic back pain, Chronic diastolic CHF (congestive heart failure) ([address] COPD (chronic obstructive pulmonary disease) ([address] Headache(784.0), Hyperlipidemia, Hypertension, Inguinal hernia, Low back pain with sciatica, Migraine, Mitral valve regurgitation, Osteoarthritis, Patient in clinical research study, Pneumonia, Sleep apnea, SOB (shortness of breath) on exertion, Tricuspid regurgitation, Type 2 diabetes mellitus without complication, without long-term current use of insulin ([address] Under care of team, and Wellness examination. has a past surgical history that includes hernia repair; Colonoscopy; Tonsillectomy (1963   exact date unknown); Adenoidectomy; Colonoscopy (05/28/2021);  Colonoscopy (N/A, 5/28/2021); and other surgical history (2022). Social History     Socioeconomic History    Marital status: Single     Spouse name: Not on file    Number of children: Not on file    Years of education: Not on file    Highest education level: Not on file   Occupational History    Not on file   Tobacco Use    Smoking status: Former     Packs/day: 0.00     Years: 40.00     Pack years: 0.00     Types: Cigarettes     Quit date:      Years since quittin.2    Smokeless tobacco: Current     Types: Chew    Tobacco comments:     Chews / quit cigarettes   Vaping Use    Vaping Use: Never used   Substance and Sexual Activity    Alcohol use: Yes     Alcohol/week: 1.0 standard drink     Types: 1 Glasses of wine per week     Comment: occ. Drug use: No    Sexual activity: Not on file   Other Topics Concern    Not on file   Social History Narrative    Not on file     Social Determinants of Health     Financial Resource Strain: Low Risk     Difficulty of Paying Living Expenses: Not hard at all   Food Insecurity: No Food Insecurity    Worried About [address] in the Last Year: Never true    [address] in the Last Year: Never true   Transportation Needs: Unknown    Lack of Transportation (Medical): Not on file    Lack of Transportation (Non-Medical):  No   Physical Activity: Not on file   Stress: Not on file   Social Connections: Not on file   Intimate Partner Violence: Not on file   Housing Stability: Unknown    Unable to Pay for Housing in the Last Year: Not on file    Number of Places Lived in the Last Year: Not on file    Unstable Housing in the Last Year: No       Family History   Problem Relation Age of Onset    Kidney Disease Mother     Diabetes Mother     Heart Disease Mother     Arthritis Mother     Heart Disease Father     Heart Attack Father     Arthritis Sister     Diabetes Sister     Heart Disease Sister     Diabetes Sister        Allergies:  Penicillins, Sulfa antibiotics, Sulfasalazine, Pseudoeph-doxylamine-dm-apap, and Rabbit protein    Home Medications:  Prior to Admission medications    Medication Sig Start Date End Date Taking? Authorizing Provider   simvastatin (ZOCOR) 20 MG tablet TAKE 1 TABLET BY MOUTH EVERY NIGHT 2/17/23   Ann Marie Jensen MD   metFORMIN (GLUCOPHAGE) 1000 MG tablet TAKE 1 TABLET BY MOUTH TWICE DAILY WITH MEALS 2/17/23   Ann Marie Dickens MD   gabapentin (NEURONTIN) 300 MG capsule TAKE 1 CAPSULE BY MOUTH NIGHTLY AS NEEDED (PAIN) 2/17/23 2/17/24  Ann Marie Dickens MD   bumetanide (BUMEX) 2 MG tablet TAKE 1 TABLET BY MOUTH TWICE DAILY 2/17/23   Ann Marie Jensen MD   empagliflozin (JARDIANCE) 10 MG tablet TAKE 1 TABLET BY MOUTH DAILY 2/17/23   Ann Marie Dickens MD   ENTRESTO 24-26 MG per tablet TAKE 1 TABLET BY MOUTH TWICE DAILY 2/13/23   Isabel Harman MD   metoprolol succinate (TOPROL XL) 100 MG extended release tablet TAKE 1 TABLET BY MOUTH DAILY  Patient taking differently: 200 mg Increased by cardiology 1/12/2023 1/13/23   Carla San MD   PROAIR  (90 Base) MCG/ACT inhaler INHALE 2 PUFFS INTO THE LUNGS EVERY 6 HOURS AS NEEDED FOR WHEEZING 11/15/22   Vel Griffith MD   albuterol-ipratropium (COMBIVENT RESPIMAT)  MCG/ACT AERS inhaler INHALE 1 PUFF INTO THE LUNGS EVERY 6 HOURS 9/16/22   Andrei Edmondson MD   spironolactone (ALDACTONE) 25 MG tablet TAKE 1 TABLET BY MOUTH EVERY NIGHT 9/13/22   Ann Marie Dickens MD   blood glucose monitor strips True Metrix Test Strips  Test 2-3 times a day & as needed for symptoms of irregular blood glucose. Dispense sufficient amount for indicated testing frequency plus additional to accommodate PRN testing needs.  8/23/22   Eduardo Aceves MD   acetaminophen (TYLENOL) 500 MG tablet Take 2 tablets by mouth 4 times daily as needed for Pain 6/14/22   Sharron García MD   naproxen (NAPROSYN) 500 MG tablet TAKE 1 TABLET BY MOUTH 2 TIMES DAILY FOR 20 DAYS 6/14/22   Sharron García MD   sildenafil (VIAGRA) 100 MG tablet Take 1 tablet by mouth as needed for Erectile Dysfunction  Patient not taking: Reported on 2/23/2023 6/14/22   Héctor Treadwell MD   albuterol (PROVENTIL) (2.5 MG/3ML) 0.083% nebulizer solution INHALE 1 UNIT DOSE BY NEBULIZATION UP TO EVERY 6 HOURS AS NEEDED FOR WHEEZING OR SHORTNESS OF BREATH 3/31/22 3/31/23  Héctor Treadwell MD   potassium chloride (KLOR-CON M) 20 MEQ extended release tablet Take 1 tablet by mouth daily (with breakfast) 1/4/22   Kirby Garay MD   ibuprofen (ADVIL;MOTRIN) 800 MG tablet Take 1 tablet by mouth 2 times daily as needed for Pain 1/3/22 1/3/22  Sara Patel DO   Elastic Bandages & Supports (WRIST SPLINT/COCK-UP/RIGHT L) MISC 1 each by Does not apply route daily 10/28/21   Héctor Treadwell MD   nitroGLYCERIN (NITROSTAT) 0.4 MG SL tablet PLACE 1 TABLET UNDER THE TONGUE EVERY 5 MINUTES AS NEEDED FOR CHEST PAIN 9/27/21   Maury Beauchamp MD   blood glucose monitor kit and supplies Dispense sufficient amount for indicated testing frequency plus additional to accommodate PRN testing needs. Dispense all needed supplies to include: monitor, strips, lancing device, lancets, control solutions, alcohol swabs. 8/30/21   Héctor Treadwell MD   Masks (FACE MASK) MISC 1 each by Does not apply route as needed (as needed) 8/30/21   Héctor Treadwell MD   diclofenac sodium (VOLTAREN) 1 % GEL Apply topically 2 times daily 8/30/21   Héctor Treadwell MD   aspirin 81 MG tablet Take 1 tablet by mouth daily 8/13/19   Héctor Treadwell MD         REVIEW OF SYSTEMS       Review of Systems   Constitutional:  Positive for chills. Negative for fever. HENT:  Negative for facial swelling and trouble swallowing. Eyes:  Negative for visual disturbance. Respiratory:  Positive for cough and shortness of breath. Negative for chest tightness. Cardiovascular:  Negative for chest pain. Gastrointestinal:  Negative for abdominal distention, abdominal pain, nausea and vomiting.    Genitourinary:  Negative for difficulty urinating. Musculoskeletal:  Negative for back pain and neck stiffness. Neurological:  Negative for headaches. Psychiatric/Behavioral:  Negative for agitation and hallucinations. PHYSICAL EXAM      INITIAL VITALS:   /73   Pulse 94   Temp 98.5 °F (36.9 °C) (Oral)   Resp 19   SpO2 95%     Physical Exam  Constitutional:       General: He is awake. Appearance: Normal appearance. HENT:      Head: Normocephalic and atraumatic. Right Ear: External ear normal.      Left Ear: External ear normal.      Nose: Nose normal.   Eyes:      General: Lids are normal.      Extraocular Movements: Extraocular movements intact. Cardiovascular:      Rate and Rhythm: Normal rate. Pulses: Normal pulses. Heart sounds: Normal heart sounds. Pulmonary:      Effort: Accessory muscle usage present. Breath sounds: Wheezing present. Musculoskeletal:      Cervical back: Normal range of motion. Comments: Normal range of motion, strength and sensation intact in all extremities. Neurological:      Mental Status: He is alert and oriented to person, place, and time. Sensory: Sensation is intact. Motor: Motor function is intact. Psychiatric:         Mood and Affect: Mood normal.         Behavior: Behavior is cooperative. DDX/DIAGNOSTIC RESULTS / EMERGENCY DEPARTMENT COURSE / MDM     Medical Decision Making  59-year-old male with history of COPD and CHF who presents due to shortness of breath. On arrival patient is afebrile and saturating well on room air. Not on home oxygen but does use multiple inhalers at home which have not helped him symptoms. Does report intermittent chills and productive cough, will obtain rapid COVID and rapid flu. Cardiac work-up ordered. Likely CHF exacerbation versus COPD exacerbation versus pneumonia. Pneumonia less likely as patient is afebrile with nonpurulent sputum.   Will reevaluate once laboratory work-up results, will provide Decadron and breathing treatments per RT. Likely admission    Amount and/or Complexity of Data Reviewed  Labs: ordered. Radiology: ordered. Risk  Prescription drug management. EMERGENCY DEPARTMENT COURSE:      ED Course as of 03/13/23 1729   Mon Mar 13, 2023   1535 33% previous echo [SS]   1618 X-ray unremarkable [SS]      ED Course User Index  [SS] Renata Melo MD     Patient care signed out to oncoming resident  PROCEDURES:  None    CONSULTS:  None    CRITICAL CARE:  There was significant risk of life threatening deterioration of patient's condition requiring my direct management. Critical care time 0 minutes, excluding any documented procedures. FINAL IMPRESSION      1. Shortness of breath          DISPOSITION / PLAN     DISPOSITION        PATIENT REFERRED TO:  No follow-up provider specified.     DISCHARGE MEDICATIONS:  New Prescriptions    No medications on file       Renata Melo MD  Emergency Medicine Resident    (Please note that portions of thisnote were completed with a voice recognition program.  Efforts were made to edit the dictations but occasionally words are mis-transcribed.)        Renata Melo MD  Resident  03/13/23 1727

## 2023-03-13 NOTE — ED PROVIDER NOTES
[facility]     Emergency Department     Faculty Attestation    I performed a history and physical examination of the patient and discussed management with the resident. I reviewed the residents note and agree with the documented findings and plan of care. Any areas of disagreement are noted on the chart. I was personally present for the key portions of any procedures. I have documented in the chart those procedures where I was not present during the key portions. I have reviewed the emergency nurses triage note. I agree with the chief complaint, past medical history, past surgical history, allergies, medications, social and family history as documented unless otherwise noted below. For Physician Assistant/ Nurse Practitioner cases/documentation I have personally evaluated this patient and have completed at least one if not all key elements of the E/M (history, physical exam, and MDM). Additional findings are as noted. I have personally seen and evaluated the patient. I find the patient's history and physical exam are consistent with the NP/PA documentation. I agree with the care provided, treatment rendered, disposition and follow-up plan. Tribes dyspnea denies chest pain denies abdominal pain nausea vomiting history of COPD currently with diminished breath sounds bilaterally mentating clearly at this time      Critical Care     Danitza Turk M.D.   Attending Emergency  Physician           Evan Pérez MD  03/13/23 [id number]

## 2023-03-13 NOTE — ED PROVIDER NOTES
[facility]  Emergency Department  Emergency Medicine Resident Sign-out     Care of Holly Stearns was assumed from Dr. Tamera Sever and is being seen for Shortness of Breath  . The patient's initial evaluation and plan have been discussed with the prior provider who initially evaluated the patient. EMERGENCY DEPARTMENT COURSE / MEDICAL DECISION MAKING:       MEDICATIONS GIVEN:  Orders Placed This Encounter   Medications    dexamethasone (PF) (DECADRON) injection 10 mg    DISCONTD: albuterol (PROVENTIL) nebulizer solution 7.5 mg     Order Specific Question:   Initiate RT Bronchodilator Protocol     Answer:   Yes - ED Protocol    DISCONTD: ipratropium (ATROVENT) 0.02 % nebulizer solution 0.5 mg     Order Specific Question:   Initiate RT Bronchodilator Protocol     Answer:   Yes - ED Protocol    DISCONTD: albuterol-ipratropium (COMBIVENT RESPIMAT)  MCG/ACT inhaler 1 puff     Order Specific Question:   Initiate RT Bronchodilator Protocol     Answer:   No    DISCONTD: aspirin EC tablet 81 mg    bumetanide (BUMEX) tablet 2 mg    sacubitril-valsartan (ENTRESTO) 24-26 MG per tablet 1 tablet    gabapentin (NEURONTIN) capsule 300 mg    DISCONTD: metoprolol succinate (TOPROL XL) extended release tablet 200 mg    atorvastatin (LIPITOR) tablet 20 mg    DISCONTD: spironolactone (ALDACTONE) tablet 25 mg    methylPREDNISolone sodium (SOLU-MEDROL) injection 40 mg    potassium chloride (KLOR-CON M) extended release tablet 20 mEq    DISCONTD: ipratropium-albuterol (DUONEB) nebulizer solution 1 ampule     Order Specific Question:   Initiate RT Bronchodilator Protocol     Answer:   No    DISCONTD: azithromycin (ZITHROMAX) tablet 500 mg     Order Specific Question:   Antimicrobial Indications     Answer:   COPD Exacerbation     Order Specific Question:   COPD exacerbation duration of therapy     Answer:    Other     Order Specific Question:   Other COPD Exacerbation Duration     Answer:   3 days    glucose chewable tablet 16 g    OR Linked Order Group     dextrose bolus 10% 125 mL     dextrose bolus 10% 250 mL    glucagon (rDNA) injection 1 mg    dextrose 10 % infusion    insulin lispro (HUMALOG) injection vial 0-8 Units    insulin lispro (HUMALOG) injection vial 0-4 Units    sodium chloride flush 0.9 % injection 5-40 mL    sodium chloride flush 0.9 % injection 5-40 mL    0.9 % sodium chloride infusion    enoxaparin (LOVENOX) injection 40 mg     Order Specific Question:   Indication of Use     Answer:   Prophylaxis-DVT/PE    OR Linked Order Group     ondansetron (ZOFRAN-ODT) disintegrating tablet 4 mg     ondansetron (ZOFRAN) injection 4 mg    polyethylene glycol (GLYCOLAX) packet 17 g    OR Linked Order Group     acetaminophen (TYLENOL) tablet 650 mg     acetaminophen (TYLENOL) suppository 650 mg    pantoprazole (PROTONIX) tablet 40 mg    DISCONTD: levalbuterol (XOPENEX) nebulization 0.63 mg    ipratropium (ATROVENT) 0.02 % nebulizer solution 0.5 mg     Order Specific Question:   Initiate RT Bronchodilator Protocol     Answer:   No    DISCONTD: azithromycin (ZITHROMAX) tablet 500 mg     Order Specific Question:   Antimicrobial Indications     Answer:   COPD Exacerbation     Order Specific Question:   COPD exacerbation duration of therapy     Answer: Other     Order Specific Question:   Other COPD Exacerbation Duration     Answer:   3 days    azithromycin (ZITHROMAX) tablet 500 mg     Order Specific Question:   Antimicrobial Indications     Answer:   COPD Exacerbation     Order Specific Question:   COPD exacerbation duration of therapy     Answer:    Other     Order Specific Question:   Other COPD Exacerbation Duration     Answer:   3 days    levalbuterol (XOPENEX) nebulizer solution 0.63 mg    metoprolol succinate (TOPROL XL) extended release tablet 200 mg    spironolactone (ALDACTONE) tablet 25 mg    aspirin EC tablet 81 mg    AND Linked Order Group     albuterol sulfate HFA (PROVENTIL;VENTOLIN;PROAIR) 108 (90 Base) MCG/ACT inhaler 1 puff      Order Specific Question:   Initiate RT Bronchodilator Protocol      Answer:   No     ipratropium (ATROVENT HFA) 17 MCG/ACT inhaler 1 puff      Order Specific Question:   Initiate RT Bronchodilator Protocol      Answer:   No    insulin glargine (LANTUS) injection vial 10 Units       LABS / RADIOLOGY:     Labs Reviewed   BASIC METABOLIC PANEL - Abnormal; Notable for the following components:       Result Value    Glucose 109 (*)     BUN 25 (*)     All other components within normal limits   CBC WITH AUTO DIFFERENTIAL - Abnormal; Notable for the following components:    Seg Neutrophils 72 (*)     Lymphocytes 13 (*)     Eosinophils % 6 (*)     Absolute Eos # 0.63 (*)     All other components within normal limits   POC GLUCOSE FINGERSTICK - Abnormal; Notable for the following components:    POC Glucose 339 (*)     All other components within normal limits   COVID-19, RAPID   RAPID INFLUENZA A/B ANTIGENS   BRAIN NATRIURETIC PEPTIDE   TROPONIN   TROPONIN   BASIC METABOLIC PANEL W/ REFLEX TO MG FOR LOW K   CBC WITH AUTO DIFFERENTIAL   POCT GLUCOSE   POCT GLUCOSE       XR CHEST (2 VW)    Result Date: 3/13/2023  EXAMINATION: TWO XRAY VIEWS OF THE CHEST 3/13/2023 12:39 pm COMPARISON: 08/21/2022 HISTORY: ORDERING SYSTEM PROVIDED HISTORY: SOB TECHNOLOGIST PROVIDED HISTORY: SOB FINDINGS: Cardial pericardial silhouette is unremarkable. Bipolar pacemaker again seen. No focal infiltrate identified. No pneumothorax. No free air. No acute bony appear     No acute abnormality detected. RECENT VITALS:     Temp: 98.2 °F (36.8 °C),  Heart Rate: (!) 111, Resp: 20, BP: 112/61, SpO2: 96 %    This patient is a 60 y.o. Male with history of CHF and COPD, not on home oxygen. Presents with shortness of breath since yesterday evening, no relief from his home inhalers. Denies any fevers but has had chills and a productive cough. Chest x-ray unremarkable, patient was given treatments per RT and Decadron.   Likely admission from CHF exacerbation versus the COPD exacerbation. Family medicine     ED Course as of 03/14/23 0109   Mon Mar 13, 2023   1535 33% previous echo [SS]   1618 X-ray unremarkable [SS]   1738 Flu A Antigen: NEGATIVE [MA]   1738 Flu B Antigen: NEGATIVE [MA]   1738 XR CHEST (2 VW)  FINDINGS:  Cardial pericardial silhouette is unremarkable. Bipolar pacemaker again  seen. No focal infiltrate identified. No pneumothorax. No free air. No  acute bony appear     IMPRESSION:  No acute abnormality detected. [MA]   56 Discussed with family medicine attending, Dr. Mima Pollock has accepted patient. He will have resident come down and evaluate patient. [MA]      ED Course User Index  [MA] Dotty Lombardo DO  [SS] Haley Wagner MD           OUTSTANDING TASKS / RECOMMENDATIONS:    Covid / flu  Admission        FINAL IMPRESSION:     1. Shortness of breath    2. COPD with acute exacerbation ([facility] [address]        DISPOSITION:         DISPOSITION:  []  Discharge   []  Transfer -    [x]  Admission -  family medicine   []  Against Medical Advice   []  Eloped   FOLLOW-UP: No follow-up provider specified.    DISCHARGE MEDICATIONS: Current Discharge Medication List              Dotty Lombardo DO  Emergency Medicine Resident  [address] [place]  Resident  03/14/23 [id number]

## 2023-03-13 NOTE — H&P
[facility]  History & Physical Examination Note              Date:   3/13/2023  Patient name:  Christie Oliveira  Date of admission:  3/13/2023  3:23 PM  MRN:   [id number]  YOB: 1960    CHIEF COMPLAINT:     Chief Complaint   Patient presents with    Shortness of Breath     History Obtained From:  Patient and chart review. HPI:     The patient is a 60 y.o.  male with history of hypertension, COPD, CHF who presented with shortness of breath that started last night. Patient states that he started having increased shortness of breath that started yesterday. Also states that he has been coughing a lot more with increased sputum production that is white in color. Denies any chest pain. Shortness of breath is present at rest and not related to exertion. Patient not on any home oxygen at home. At home patient takes Combivent inhaler and albuterol inhaler as rescue. States that he has been taking it as advised. Endorsed some chills. Denies any sick contacts. Has been eating and drinking okay tolerating fluids without difficulty. At ER patient is vitally stable. Currently saturating at 95% on room air. During evaluation patient was saturating at 93% on room air. COVID-19 and rapid influenza swabs were negative. Chest x-ray showed no acute abnormality. CBC and BMP largely unremarkable. Patient was given 10 mg IV Decadron in the ED. Was also being given breathing treatments during evaluation. On physical exam patient is very wheezy. Patient is being admitted for further management of COPD exacerbation.        PAST MEDICAL HISTORY:        has a past medical history of Bronchitis, CAD (coronary artery disease), Cardiomyopathy ([facility] [address] Chest pain, CHF (congestive heart failure) ([facility] [address] CHF (congestive heart failure), NYHA class I, acute on chronic, diastolic (HCC), Chronic back pain, Chronic diastolic CHF (congestive heart failure) ([facility] [address] COPD (chronic obstructive pulmonary disease) ([facility] [address] Headache(784.0), Hyperlipidemia, Hypertension, Inguinal hernia, Low back pain with sciatica, Migraine, Mitral valve regurgitation, Osteoarthritis, Patient in clinical research study, Pneumonia, Sleep apnea, SOB (shortness of breath) on exertion, Tricuspid regurgitation, Type 2 diabetes mellitus without complication, without long-term current use of insulin ([facility] [address] Under care of team, and Wellness examination. PAST SURGICAL HISTORY:      has a past surgical history that includes hernia repair; Colonoscopy; Tonsillectomy (1963   exact date unknown); Adenoidectomy; Colonoscopy (05/28/2021); Colonoscopy (N/A, 5/28/2021); and other surgical history (02/01/2022). FAMILY HISTORY:     family history includes Arthritis in his mother and sister; Diabetes in his mother, sister, and sister; Heart Attack in his father; Heart Disease in his father, mother, and sister; Kidney Disease in his mother. HOME MEDICATIONS:     Prior to Admission medications    Medication Sig Start Date End Date Taking?  Authorizing Provider   simvastatin (ZOCOR) 20 MG tablet TAKE 1 TABLET BY MOUTH EVERY NIGHT 2/17/23   Ann Marie Patel MD   metFORMIN (GLUCOPHAGE) 1000 MG tablet TAKE 1 TABLET BY MOUTH TWICE DAILY WITH MEALS 2/17/23   Ann Marie Fallon MD   gabapentin (NEURONTIN) 300 MG capsule TAKE 1 CAPSULE BY MOUTH NIGHTLY AS NEEDED (PAIN) 2/17/23 2/17/24  Ann Marie Fallon MD   bumetanide (BUMEX) 2 MG tablet TAKE 1 TABLET BY MOUTH TWICE DAILY 2/17/23   Ann Marie Patel MD   empagliflozin (JARDIANCE) 10 MG tablet TAKE 1 TABLET BY MOUTH DAILY 2/17/23   Ann Marie Fallon MD   ENTRESTO 24-26 MG per tablet TAKE 1 TABLET BY MOUTH TWICE DAILY 2/13/23   Bar Martinez MD   metoprolol succinate (TOPROL XL) 100 MG extended release tablet TAKE 1 TABLET BY MOUTH DAILY  Patient taking differently: 200 mg Increased by cardiology 1/12/2023 1/13/23 Stevenson Cannon MD   PROAIR  (34 Base) MCG/ACT inhaler INHALE 2 PUFFS INTO THE LUNGS EVERY 6 HOURS AS NEEDED FOR WHEEZING 11/15/22   Reyes Revel, MD   albuterol-ipratropium (COMBIVENT RESPIMAT)  MCG/ACT AERS inhaler INHALE 1 PUFF INTO THE LUNGS EVERY 6 HOURS 9/16/22   Andrei Edmondson MD   spironolactone (ALDACTONE) 25 MG tablet TAKE 1 TABLET BY MOUTH EVERY NIGHT 9/13/22   Ann Marie Rodriguez MD   blood glucose monitor strips True Metrix Test Strips  Test 2-3 times a day & as needed for symptoms of irregular blood glucose. Dispense sufficient amount for indicated testing frequency plus additional to accommodate PRN testing needs.  8/23/22   Marielena Connelly MD   acetaminophen (TYLENOL) 500 MG tablet Take 2 tablets by mouth 4 times daily as needed for Pain 6/14/22   Jagdeep Plata MD   naproxen (NAPROSYN) 500 MG tablet TAKE 1 TABLET BY MOUTH 2 TIMES DAILY FOR 20 DAYS 6/14/22   Jagdeep Plata MD   sildenafil (VIAGRA) 100 MG tablet Take 1 tablet by mouth as needed for Erectile Dysfunction  Patient not taking: Reported on 2/23/2023 6/14/22   Jagdeep Plata MD   albuterol (PROVENTIL) (2.5 MG/3ML) 0.083% nebulizer solution INHALE 1 UNIT DOSE BY NEBULIZATION UP TO EVERY 6 HOURS AS NEEDED FOR WHEEZING OR SHORTNESS OF BREATH 3/31/22 3/31/23  Jagdeep Plata MD   potassium chloride (KLOR-CON M) 20 MEQ extended release tablet Take 1 tablet by mouth daily (with breakfast) 1/4/22   Neville Weeks MD   ibuprofen (ADVIL;MOTRIN) 800 MG tablet Take 1 tablet by mouth 2 times daily as needed for Pain 1/3/22 1/3/22  Justin Isaacs DO   Elastic Bandages & Supports (WRIST SPLINT/COCK-UP/RIGHT L) MISC 1 each by Does not apply route daily 10/28/21   Jagdeep Plata MD   nitroGLYCERIN (NITROSTAT) 0.4 MG SL tablet PLACE 1 TABLET UNDER THE TONGUE EVERY 5 MINUTES AS NEEDED FOR CHEST PAIN 9/27/21   Jeremiah Aceves MD   blood glucose monitor kit and supplies Dispense sufficient amount for indicated testing frequency plus additional to accommodate PRN testing needs. Dispense all needed supplies to include: monitor, strips, lancing device, lancets, control solutions, alcohol swabs. 8/30/21   Ernesto Baires MD   Masks (FACE MASK) MISC 1 each by Does not apply route as needed (as needed) 8/30/21   Ernesto Baires MD   diclofenac sodium (VOLTAREN) 1 % GEL Apply topically 2 times daily 8/30/21   Ernesto Baires MD   aspirin 81 MG tablet Take 1 tablet by mouth daily 8/13/19   Ernesto Baires MD       ALLERGIES:      Penicillins, Sulfa antibiotics, Sulfasalazine, Pseudoeph-doxylamine-dm-apap, and Rabbit protein    SOCIAL HISTORY:      reports that he quit smoking about 24 years ago. His smoking use included cigarettes. His smokeless tobacco use includes chew. He reports current alcohol use of about 1.0 standard drink per week. He reports that he does not use drugs. REVIEW OF SYSTEMS:     Review of Systems   Constitutional:  Positive for chills. Negative for fever. HENT:  Negative for rhinorrhea and sore throat. Eyes:  Negative for visual disturbance. Respiratory:  Positive for cough, shortness of breath and wheezing. Cardiovascular:  Positive for leg swelling. Negative for chest pain. Gastrointestinal:  Negative for abdominal pain, diarrhea and nausea. Genitourinary:  Negative for dysuria and hematuria. Musculoskeletal:  Negative for back pain. Skin:  Negative for rash. Neurological:  Negative for numbness and headaches. Psychiatric/Behavioral:  Negative for agitation. PHYSICAL EXAM:     Physical Exam  Vitals reviewed. Constitutional:       General: He is not in acute distress. Appearance: Normal appearance. He is obese. HENT:      Head: Normocephalic. Eyes:      Pupils: Pupils are equal, round, and reactive to light. Cardiovascular:      Rate and Rhythm: Normal rate and regular rhythm. Pulses: Normal pulses. Heart sounds: Normal heart sounds. No murmur heard.   Pulmonary:      Effort: Pulmonary effort is normal.      Breath sounds: Wheezing present. Abdominal:      General: Bowel sounds are normal.      Palpations: Abdomen is soft. Tenderness: There is no abdominal tenderness. Musculoskeletal:      Cervical back: Normal range of motion. Right lower leg: Edema present. Left lower leg: Edema present. Neurological:      Mental Status: He is alert.    Psychiatric:         Mood and Affect: Mood normal.       DIAGNOSTICS:      Laboratory Testing:    Recent Results (from the past 24 hour(s))   Brain Natriuretic Peptide    Collection Time: 03/13/23  3:38 PM   Result Value Ref Range    Pro-BNP 79 <300 pg/mL   Troponin    Collection Time: 03/13/23  3:38 PM   Result Value Ref Range    Troponin, High Sensitivity 10 0 - 22 ng/L   Basic Metabolic Panel    Collection Time: 03/13/23  3:38 PM   Result Value Ref Range    Glucose 109 (H) 70 - 99 mg/dL    BUN 25 (H) 8 - 23 mg/dL    Creatinine 1.02 0.70 - 1.20 mg/dL    Est, Glom Filt Rate >60 >60 mL/min/1.73m2    Calcium 8.9 8.6 - 10.4 mg/dL    Sodium 139 135 - 144 mmol/L    Potassium 4.2 3.7 - 5.3 mmol/L    Chloride 101 98 - 107 mmol/L    CO2 25 20 - 31 mmol/L    Anion Gap 13 9 - 17 mmol/L   CBC with Auto Differential    Collection Time: 03/13/23  3:38 PM   Result Value Ref Range    WBC 10.2 3.5 - 11.3 k/uL    RBC 4.79 4.21 - 5.77 m/uL    Hemoglobin 14.1 13.0 - 17.0 g/dL    Hematocrit 42.6 40.7 - 50.3 %    MCV 88.9 82.6 - 102.9 fL    MCH 29.4 25.2 - 33.5 pg    MCHC 33.1 28.4 - 34.8 g/dL    RDW 13.1 11.8 - 14.4 %    Platelets 129 740 - 156 k/uL    MPV 10.7 8.1 - 13.5 fL    NRBC Automated 0.0 0.0 per 100 WBC    Seg Neutrophils 72 (H) 36 - 65 %    Lymphocytes 13 (L) 24 - 43 %    Monocytes 9 3 - 12 %    Eosinophils % 6 (H) 1 - 4 %    Basophils 0 0 - 2 %    Immature Granulocytes 0 0 %    Segs Absolute 7.18 1.50 - 8.10 k/uL    Absolute Lymph # 1.36 1.10 - 3.70 k/uL    Absolute Mono # 0.96 0.10 - 1.20 k/uL    Absolute Eos # 0.63 (H) 0.00 - 0.44 k/uL    Basophils Absolute 0. 04 0.00 - 0.20 k/uL    Absolute Immature Granulocyte 0.03 0.00 - 0.30 k/uL   COVID-19, Rapid    Collection Time: 03/13/23  3:54 PM    Specimen: Nasopharyngeal Swab   Result Value Ref Range    Specimen Description . NASOPHARYNGEAL SWAB     SARS-CoV-2, Rapid Not Detected Not Detected   RAPID INFLUENZA A/B ANTIGENS    Collection Time: 03/13/23  4:27 PM    Specimen: Nasopharyngeal   Result Value Ref Range    Flu A Antigen NEGATIVE NEGATIVE    Flu B Antigen NEGATIVE NEGATIVE         Imaging/Diagonstics:  XR CHEST (2 VW)    Result Date: 3/13/2023  EXAMINATION: TWO XRAY VIEWS OF THE CHEST 3/13/2023 12:39 pm COMPARISON: 08/21/2022 HISTORY: ORDERING SYSTEM PROVIDED HISTORY: SOB TECHNOLOGIST PROVIDED HISTORY: SOB FINDINGS: Cardial pericardial silhouette is unremarkable. Bipolar pacemaker again seen. No focal infiltrate identified. No pneumothorax. No free air. No acute bony appear     No acute abnormality detected. ASSESSMENT:       Principal Problem:    COPD exacerbation ([address]  Resolved Problems:    * No resolved hospital problems.  *      PLAN:     Patient status: Admit the patient as Inpatient in   Med/Surge    COPD exacerbation  - Patient presenting with increased cough, sputum production, increased shortness of breath  - Is wheezy on presentation  - Currently on room air  - Patient given 10 mg IV Decadron once in the ED  - We will start patient on IV Solu-Medrol 40 mg twice daily  - Xopenex nebulization treatment q4hr  - Atrovent nebulizer solution 4 times daily  - On Zithromax 500 mg once today  - Resume home Combivent inhaler      Chronic systolic congestive Heart Failure    -Previous echo from June 2021 shows EF of 33%  - Resume patient's home dose of Bumex, Entresto  -Is currently on a pacemaker  - Follows with [facility]  - Patient on low-sodium diet    Type 2 diabetes  - Hypoglycemia protocol in place  - Medium dose sliding scale for insulin  - POCT glucose ACHS 4 times daily  - At home patient takes Jardiance, metformin  - Last A1c in 2/17/2023 : 6.7    Essential HTN  -Resume patient's home dose of Aldactone 25 mg once daily  -Resume Toprol once daily 100 mg              DVT prophylaxis: Lovenox 40 mg SC  GI prophylaxis: Protonix 40 mg daily      Consultations:   Consults: IP CONSULT TO FAMILY MEDICINE  IP CONSULT TO SOCIAL WORK        Above plan discussed with the patient   Plan will be discussed with the attending, Dr. Melo Hardin, DO  Family Medicine Resident  3/13/2023 6:31 PM

## 2023-03-14 VITALS
WEIGHT: 258.16 LBS | SYSTOLIC BLOOD PRESSURE: 126 MMHG | BODY MASS INDEX: 43.01 KG/M2 | OXYGEN SATURATION: 94 % | TEMPERATURE: 97 F | HEART RATE: 95 BPM | DIASTOLIC BLOOD PRESSURE: 76 MMHG | RESPIRATION RATE: 16 BRPM | HEIGHT: 65 IN

## 2023-03-14 DIAGNOSIS — I50.22 CHRONIC SYSTOLIC CONGESTIVE HEART FAILURE (HCC): ICD-10-CM

## 2023-03-14 LAB
GLUCOSE BLD-MCNC: 208 MG/DL (ref 75–110)
GLUCOSE BLD-MCNC: 281 MG/DL (ref 75–110)

## 2023-03-14 PROCEDURE — 99235 HOSP IP/OBS SAME DATE MOD 70: CPT | Performed by: STUDENT IN AN ORGANIZED HEALTH CARE EDUCATION/TRAINING PROGRAM

## 2023-03-14 PROCEDURE — 82947 ASSAY GLUCOSE BLOOD QUANT: CPT

## 2023-03-14 PROCEDURE — 94664 DEMO&/EVAL PT USE INHALER: CPT

## 2023-03-14 PROCEDURE — 6370000000 HC RX 637 (ALT 250 FOR IP)

## 2023-03-14 PROCEDURE — 94761 N-INVAS EAR/PLS OXIMETRY MLT: CPT

## 2023-03-14 PROCEDURE — 2580000003 HC RX 258

## 2023-03-14 PROCEDURE — 94640 AIRWAY INHALATION TREATMENT: CPT

## 2023-03-14 PROCEDURE — 6360000002 HC RX W HCPCS

## 2023-03-14 PROCEDURE — 97161 PT EVAL LOW COMPLEX 20 MIN: CPT

## 2023-03-14 PROCEDURE — 97530 THERAPEUTIC ACTIVITIES: CPT

## 2023-03-14 RX ORDER — ALBUTEROL SULFATE 90 UG/1
1 AEROSOL, METERED RESPIRATORY (INHALATION) EVERY 4 HOURS
Status: DISCONTINUED | OUTPATIENT
Start: 2023-03-14 | End: 2023-03-14

## 2023-03-14 RX ORDER — AZITHROMYCIN 250 MG/1
250 TABLET, FILM COATED ORAL DAILY
Qty: 4 TABLET | Refills: 0 | Status: SHIPPED | OUTPATIENT
Start: 2023-03-14 | End: 2023-03-18

## 2023-03-14 RX ORDER — AZITHROMYCIN 250 MG/1
250 TABLET, FILM COATED ORAL DAILY
Status: DISCONTINUED | OUTPATIENT
Start: 2023-03-14 | End: 2023-03-14 | Stop reason: HOSPADM

## 2023-03-14 RX ORDER — SPIRONOLACTONE 25 MG/1
TABLET ORAL
Qty: 30 TABLET | Refills: 2 | Status: SHIPPED | OUTPATIENT
Start: 2023-03-14

## 2023-03-14 RX ORDER — METOPROLOL SUCCINATE 100 MG/1
200 TABLET, EXTENDED RELEASE ORAL DAILY
Qty: 180 TABLET | Refills: 0 | Status: SHIPPED | OUTPATIENT
Start: 2023-03-14 | End: 2023-06-12

## 2023-03-14 RX ORDER — PREDNISONE 20 MG/1
40 TABLET ORAL DAILY
Qty: 10 TABLET | Refills: 0 | Status: SHIPPED | OUTPATIENT
Start: 2023-03-14 | End: 2023-03-19

## 2023-03-14 RX ADMIN — TIOTROPIUM BROMIDE INHALATION SPRAY 2 PUFF: 3.12 SPRAY, METERED RESPIRATORY (INHALATION) at 13:05

## 2023-03-14 RX ADMIN — SACUBITRIL AND VALSARTAN 1 TABLET: 24; 26 TABLET, FILM COATED ORAL at 09:38

## 2023-03-14 RX ADMIN — IPRATROPIUM BROMIDE 1 PUFF: 17 AEROSOL, METERED RESPIRATORY (INHALATION) at 09:14

## 2023-03-14 RX ADMIN — SPIRONOLACTONE 25 MG: 25 TABLET ORAL at 09:38

## 2023-03-14 RX ADMIN — PANTOPRAZOLE SODIUM 40 MG: 40 TABLET, DELAYED RELEASE ORAL at 09:38

## 2023-03-14 RX ADMIN — INSULIN LISPRO 2 UNITS: 100 INJECTION, SOLUTION INTRAVENOUS; SUBCUTANEOUS at 09:47

## 2023-03-14 RX ADMIN — Medication 81 MG: at 09:38

## 2023-03-14 RX ADMIN — SODIUM CHLORIDE, PRESERVATIVE FREE 10 ML: 5 INJECTION INTRAVENOUS at 09:40

## 2023-03-14 RX ADMIN — ENOXAPARIN SODIUM 40 MG: 100 INJECTION SUBCUTANEOUS at 09:39

## 2023-03-14 RX ADMIN — METHYLPREDNISOLONE SODIUM SUCCINATE 40 MG: 40 INJECTION, POWDER, FOR SOLUTION INTRAMUSCULAR; INTRAVENOUS at 09:39

## 2023-03-14 RX ADMIN — ALBUTEROL SULFATE 1 PUFF: 90 AEROSOL, METERED RESPIRATORY (INHALATION) at 09:16

## 2023-03-14 RX ADMIN — INSULIN LISPRO 4 UNITS: 100 INJECTION, SOLUTION INTRAVENOUS; SUBCUTANEOUS at 12:53

## 2023-03-14 RX ADMIN — POTASSIUM CHLORIDE 20 MEQ: 1500 TABLET, EXTENDED RELEASE ORAL at 09:38

## 2023-03-14 RX ADMIN — BUMETANIDE 2 MG: 1 TABLET ORAL at 09:38

## 2023-03-14 RX ADMIN — METOPROLOL SUCCINATE 200 MG: 100 TABLET, EXTENDED RELEASE ORAL at 09:38

## 2023-03-14 NOTE — PLAN OF CARE
Problem: Safety - Adult  Goal: Free from fall injury  3/14/2023 0253 by Paloma Mondragon RN  Outcome: Progressing  3/14/2023 0253 by Paloma Mondragon RN  Outcome: Progressing

## 2023-03-14 NOTE — PROGRESS NOTES
Physical Therapy  [facility]  Physical Therapy Initial Assessment    Name: Tiffanie Garcia  : 1960  MRN: [id number]  Date of Service: 3/14/2023  Chief Complaint   Patient presents with    Shortness of Breath       Discharge Recommendations:    No further therapy required at discharge. PT Equipment Recommendations  Equipment Needed: No      Patient Diagnosis(es): The primary encounter diagnosis was Shortness of breath. A diagnosis of COPD with acute exacerbation ([address] was also pertinent to this visit. Past Medical History:  has a past medical history of Bronchitis, CAD (coronary artery disease), Cardiomyopathy ([address] Chest pain, CHF (congestive heart failure) ([address] CHF (congestive heart failure), NYHA class I, acute on chronic, diastolic (HCC), Chronic back pain, Chronic diastolic CHF (congestive heart failure) ([address] COPD (chronic obstructive pulmonary disease) ([address] Headache(784.0), Hyperlipidemia, Hypertension, Inguinal hernia, Low back pain with sciatica, Migraine, Mitral valve regurgitation, Osteoarthritis, Patient in clinical research study, Pneumonia, Sleep apnea, SOB (shortness of breath) on exertion, Tricuspid regurgitation, Type 2 diabetes mellitus without complication, without long-term current use of insulin ([address] Under care of team, and Wellness examination. Past Surgical History:  has a past surgical history that includes hernia repair; Colonoscopy; Tonsillectomy (   exact date unknown); Adenoidectomy; Colonoscopy (2021); Colonoscopy (N/A, 2021); and other surgical history (2022). Assessment   Assessment: Pt amb 250' IND no AD. Pt demonstrated safety and functional mobility throughout eval. Pt denied further PT needs at this time. PT to sign-off.   Therapy Prognosis: Excellent  Decision Making: Low Complexity  Requires PT Follow-Up: No  Activity Tolerance  Activity Tolerance: Patient tolerated treatment well  Activity Tolerance Comments: Pt experienced Dyspnea on Exertion w/ increased ambulation distance. Pt reported this is baseline for him, and improving since hospital stay. Plan   Physcial Therapy Plan  General Plan: Discharge with evaluation only  Safety Devices  Type of Devices: Gait belt, Nurse notified, Left in bed, Call light within reach  Restraints  Restraints Initially in Place: No     Restrictions  Restrictions/Precautions  Restrictions/Precautions: Up as Tolerated, General Precautions  Required Braces or Orthoses?: No  Position Activity Restriction  Other position/activity restrictions: Up w/ Assist     Subjective   General  Chart Reviewed: Yes  Patient assessed for rehabilitation services?: Yes  Response To Previous Treatment: Not applicable  Family / Caregiver Present: No  Follows Commands: Within Functional Limits  General Comment  Comments: RN and pt agreeable to PT. pt alert in bed upon arrival.  Subjective  Subjective: Pt reports 8-9/10 LBP from injury at 15years of age. Repots chronic n/t in CHOCO feet d/t neuropathy.          Social/Functional History  Social/Functional History  Lives With: Alone  Type of Home: House  Home Layout: Two level, Able to Live on Main level with bedroom/bathroom (Storage on upper level, rarely goes up there)  Home Access: Level entry, Stairs to enter without rails  Entrance Stairs - Number of Steps: 2  Bathroom Shower/Tub: Tub/Shower unit  Bathroom Toilet: Standard  Bathroom Equipment: None  Bathroom Accessibility: Accessible  Home Equipment: Cane (Uses cane intermittently for pain)  Receives Help From: Neighbor ([address] can assist 24/7)  ADL Assistance: Independent  Homemaking Assistance: Independent  Homemaking Responsibilities: Yes  Ambulation Assistance: Independent  Transfer Assistance: Independent  Active : Yes  Mode of Transportation: Truck  Occupation: On disability, Part time employment  Type of Occupation: Works a couple days to stay active  [address]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  Vision/Hearing  Vision  Vision: Impaired  Vision Exceptions: Wears glasses at all times; Wears glasses for distance  Hearing  Hearing: Within functional limits    Cognition   Orientation  Overall Orientation Status: Within Functional Limits  Cognition  Overall Cognitive Status: WFL     Objective   AROM RLE (degrees)  RLE AROM: WFL  AROM LLE (degrees)  LLE AROM : WFL  AROM RUE (degrees)  RUE AROM : WFL  AROM LUE (degrees)  LUE AROM : WFL  Strength RLE  Strength RLE: WFL  Comment: Grossly 5/5  Strength LLE  Strength LLE: WFL  Comment: Grossly 5/5  Strength RUE  Strength RUE: WFL  Comment: Grossly 5/5  Strength LUE  Strength LUE: WFL  Comment: Grossly 5/5           Bed mobility  Bed Mobility Comments: Sitting EOB upon arrival and exit  Transfers  Sit to Stand: Independent  Stand to Sit: Independent  Ambulation  Surface: Level tile  Device: No Device  Assistance: Independent  Quality of Gait: Pt ambulates with mildly reduced gait speed, ER hips CHOCO, symmetrical step length. No instability or LOB.   Distance: 250'  More Ambulation?: No     Balance  Posture: Good  Sitting - Static: Good  Sitting - Dynamic: Good  Standing - Static: Good  Standing - Dynamic: Good  Comments: Standing Balance assessed w/out RW       AM-PAC Score  AM-PAC Inpatient Mobility Raw Score : 24 (03/14/23 1146)  AM-PAC Inpatient T-Scale Score : 61.14 (03/14/23 1146)  Mobility Inpatient CMS 0-100% Score: 0 (03/14/23 1146)  Mobility Inpatient CMS G-Code Modifier : [address] (03/14/23 1146)       Goals  Short Term Goals  Time Frame for Short Term Goals: D/C PT       Education  Patient Education  Education Given To: Patient  Education Provided: Role of Therapy;Plan of Care  Education Method: Demonstration;Verbal  Barriers to Learning: None  Education Outcome: Verbalized understanding;Demonstrated understanding      Therapy Time   Individual Concurrent Group Co-treatment   Time In 1008         Time Out 1025         Minutes 17         Timed Code Treatment Minutes: 12 Minutes       NITO Oliveira   This treatment/evaluation completed by signing SPT. Signing PT agrees with treatment and documentation.

## 2023-03-14 NOTE — CARE COORDINATION
Case Management Assessment  Initial Evaluation    Date/Time of Evaluation: 3/14/2023 12:13 PM  Assessment Completed by: Kelsie Ornelas RN    If patient is discharged prior to next notation, then this note serves as note for discharge by case management. Patient Name: Holger Cotton                   YOB: 1960  Diagnosis: Shortness of breath [R06.02]  COPD exacerbation ([facility] [J44.1]  COPD with acute exacerbation ([facility] [address] [J44.1]                   Date / Time: 3/13/2023  3:23 PM    Patient Admission Status: Inpatient   Readmission Risk (Low < 19, Mod (19-27), High > 27): Readmission Risk Score: 9.3    Current PCP: Shellie Sevilla MD  PCP verified by CM? Yes    Chart Reviewed: Yes      History Provided by:    Patient Orientation: Alert and Oriented    Patient Cognition: Alert    Hospitalization in the last 30 days (Readmission):  No    If yes, Readmission Assessment in CM Navigator will be completed. Advance Directives:      Code Status: Full Code   Patient's Primary Decision Maker is: Patient Declined (Legal Next of Kin Remains as Decision Maker)      Discharge Planning:    Patient lives with: Alone Type of Home: House  Primary Care Giver: Self  Patient Support Systems include: Family Members   Current Financial resources:    Current community resources:    Current services prior to admission: C-pap            Current DME:              Type of Home Care services:  None    ADLS  Prior functional level: Independent in ADLs/IADLs  Current functional level: Independent in ADLs/IADLs    PT AM-PAC: 24 /24  OT AM-PAC:   /24    Family can provide assistance at DC: Yes  Would you like Case Management to discuss the discharge plan with any other family members/significant others, and if so, who?  No  Plans to Return to Present Housing: Yes  Other Identified Issues/Barriers to RETURNING to current housing: none  Potential Assistance needed at discharge: N/A            Potential DME:    Patient expects to discharge to: House  Plan for transportation at discharge:      Financial    Payor: [address]  [address] / Plan: [address] [place]  [address] / Product Type: *No Product type* /     Does insurance require precert for SNF: Yes    Potential assistance Purchasing Medications:    Meds-to-Beds request: Yes      [address]rell [address] [facility] [phone]  [address] [facility] [address]  Phone: [phone] Fax: [phone]    Anamaria Armendariz [address] [place] [address]  [address]  [address]  Phone: [phone] Fax: [phone]      Notes:    Factors facilitating achievement of predicted outcomes: Motivated    Barriers to discharge: none    Additional Case Management Notes: patient is from home and plans on return at discharge. No needs identified. The Plan for Transition of Care is related to the following treatment goals of Shortness of breath [R06.02]  COPD exacerbation ([facility]) [J44.1]  COPD with acute exacerbation ([facility] [address] [Z13.4]    IF APPLICABLE: The Patient and/or patient representative Juancho Woodruff and his family were provided with a choice of provider and agrees with the discharge plan. Freedom of choice list with basic dialogue that supports the patient's individualized plan of care/goals and shares the quality data associated with the providers was provided to: Patient   Patient Representative Name:       The Patient and/or Patient Representative Agree with the Discharge Plan?  Yes    Anisha Valenzuela RN  Case Management Department  Ph: [phone] Fax:

## 2023-03-14 NOTE — PROGRESS NOTES
[facility]  Progress Note    Date:   3/14/2023  Patient name:  Avel Walker  Date of admission:  3/13/2023  3:23 PM  MRN:   [id number]  YOB: 1960    SUBJECTIVE/Last 24 hours update:     Patient seen and examined at the bed side , no new acute events overnight. Reports improvement in his shortness of breath, states chest no longer as tight. Still feels like he is wheezing currently. Currently saturating well on room air. Blood sugar was elevated overnight, did receive 10 units of Lantus and 4 units of short acting. This morning better under controlled. Likely related to steroids. Notes from nursing staff and Consults had been reviewed, and the overnight progress had been checked with the nursing staff as well. HPI:   The patient is a 60 y.o.  male with history of hypertension, COPD, CHF who presented with shortness of breath that started last night. Patient states that he started having increased shortness of breath that started yesterday. Also states that he has been coughing a lot more with increased sputum production that is white in color. Denies any chest pain. Shortness of breath is present at rest and not related to exertion. Patient not on any home oxygen at home. At home patient takes Combivent inhaler and albuterol inhaler as rescue. States that he has been taking it as advised. Endorsed some chills. Denies any sick contacts. Has been eating and drinking okay tolerating fluids without difficulty. At ER patient is vitally stable. Currently saturating at 95% on room air. During evaluation patient was saturating at 93% on room air. COVID-19 and rapid influenza swabs were negative. Chest x-ray showed no acute abnormality. CBC and BMP largely unremarkable. Patient was given 10 mg IV Decadron in the ED. Was also being given breathing treatments during evaluation.   On physical exam patient is very wheezy. Patient is being admitted for further management of COPD exacerbation    REVIEW OF SYSTEMS:      CONSTITUTIONAL:  no fevers, no headcahes  EYES: negative for blury vision  HEENT: No headaches, No nasal congestion, no difficulty swallowing  RESPIRATORY: Positive for wheezing and cough negative for dyspnea  CARDIOVASCULAR: negative for chest pain, no palpitations  GASTROINTESTINAL: no nausea, no vomiting, no change in bowel habits, no abdominal pain   GENITOURINARY: negative for dysuria, no hematuria   MUSCULOSKELETAL: no joint pains, no muscle aches, no swelling of joints or extremities  NEUROLOGICAL: No  Weakness or numbness      PAST MEDICAL HISTORY:      has a past medical history of Bronchitis, CAD (coronary artery disease), Cardiomyopathy ([address] Chest pain, CHF (congestive heart failure) ([address] CHF (congestive heart failure), NYHA class I, acute on chronic, diastolic (HCC), Chronic back pain, Chronic diastolic CHF (congestive heart failure) (HCC), COPD (chronic obstructive pulmonary disease) ([address] Headache(784.0), Hyperlipidemia, Hypertension, Inguinal hernia, Low back pain with sciatica, Migraine, Mitral valve regurgitation, Osteoarthritis, Patient in clinical research study, Pneumonia, Sleep apnea, SOB (shortness of breath) on exertion, Tricuspid regurgitation, Type 2 diabetes mellitus without complication, without long-term current use of insulin ([facility] [address] Under care of team, and Wellness examination. PAST SURGICAL HISTORY:      has a past surgical history that includes hernia repair; Colonoscopy; Tonsillectomy (1963   exact date unknown); Adenoidectomy; Colonoscopy (05/28/2021); Colonoscopy (N/A, 5/28/2021); and other surgical history (02/01/2022). SOCIAL HISTORY:      reports that he quit smoking about 24 years ago. His smoking use included cigarettes. His smokeless tobacco use includes chew. He reports current alcohol use of about 1.0 standard drink per week.  He reports that he does not use drugs.     FAMILY HISTORY:     family history includes Arthritis in his mother and sister; Diabetes in his mother, sister, and sister; Heart Attack in his father; Heart Disease in his father, mother, and sister; Kidney Disease in his mother. HOME MEDICATIONS:      Prior to Admission medications    Medication Sig Start Date End Date Taking? Authorizing Provider   simvastatin (ZOCOR) 20 MG tablet TAKE 1 TABLET BY MOUTH EVERY NIGHT 2/17/23   Ann Marie Negrete MD   metFORMIN (GLUCOPHAGE) 1000 MG tablet TAKE 1 TABLET BY MOUTH TWICE DAILY WITH MEALS 2/17/23   Ann Marie Boyd MD   gabapentin (NEURONTIN) 300 MG capsule TAKE 1 CAPSULE BY MOUTH NIGHTLY AS NEEDED (PAIN) 2/17/23 2/17/24  Ann Marie Boyd MD   bumetanide (BUMEX) 2 MG tablet TAKE 1 TABLET BY MOUTH TWICE DAILY 2/17/23   Ann Marie Negrete MD   empagliflozin (JARDIANCE) 10 MG tablet TAKE 1 TABLET BY MOUTH DAILY 2/17/23   Ann Marie Boyd MD   ENTRESTO 24-26 MG per tablet TAKE 1 TABLET BY MOUTH TWICE DAILY 2/13/23   Irineo Barney MD   metoprolol succinate (TOPROL XL) 100 MG extended release tablet TAKE 1 TABLET BY MOUTH DAILY  Patient taking differently: 200 mg Increased by cardiology 1/12/2023 1/13/23   Jamal Goodman MD   PROAIR  (90 Base) MCG/ACT inhaler INHALE 2 PUFFS INTO THE LUNGS EVERY 6 HOURS AS NEEDED FOR WHEEZING 11/15/22   Yuliet Lugo MD   albuterol-ipratropium (COMBIVENT RESPIMAT)  MCG/ACT AERS inhaler INHALE 1 PUFF INTO THE LUNGS EVERY 6 HOURS 9/16/22   Andrei Edmondson MD   spironolactone (ALDACTONE) 25 MG tablet TAKE 1 TABLET BY MOUTH EVERY NIGHT 9/13/22   Ann Marie Boyd MD   blood glucose monitor strips True Metrix Test Strips  Test 2-3 times a day & as needed for symptoms of irregular blood glucose. Dispense sufficient amount for indicated testing frequency plus additional to accommodate PRN testing needs.  8/23/22   Olga Mack MD   acetaminophen (TYLENOL) 500 MG tablet Take 2 tablets by mouth 4 times daily as needed for Pain  Patient not taking: Reported on 3/13/2023 6/14/22   Megan Haynes MD   naproxen (NAPROSYN) 500 MG tablet TAKE 1 TABLET BY MOUTH 2 TIMES DAILY FOR 20 DAYS 6/14/22   Megan Haynes MD   sildenafil (VIAGRA) 100 MG tablet Take 1 tablet by mouth as needed for Erectile Dysfunction  Patient not taking: No sig reported 6/14/22   Megan Haynes MD   albuterol (PROVENTIL) (2.5 MG/3ML) 0.083% nebulizer solution INHALE 1 UNIT DOSE BY NEBULIZATION UP TO EVERY 6 HOURS AS NEEDED FOR WHEEZING OR SHORTNESS OF BREATH 3/31/22 3/31/23  Megan Haynes MD   potassium chloride (KLOR-CON M) 20 MEQ extended release tablet Take 1 tablet by mouth daily (with breakfast) 1/4/22   Rogelio Salvador MD   ibuprofen (ADVIL;MOTRIN) 800 MG tablet Take 1 tablet by mouth 2 times daily as needed for Pain 1/3/22 1/3/22  Sandra Green DO   Elastic Bandages & Supports (WRIST SPLINT/COCK-UP/RIGHT L) MISC 1 each by Does not apply route daily  Patient not taking: Reported on 3/13/2023 10/28/21   Megan Haynes MD   nitroGLYCERIN (NITROSTAT) 0.4 MG SL tablet PLACE 1 TABLET UNDER THE TONGUE EVERY 5 MINUTES AS NEEDED FOR CHEST PAIN 9/27/21   Zena Ingram MD   blood glucose monitor kit and supplies Dispense sufficient amount for indicated testing frequency plus additional to accommodate PRN testing needs. Dispense all needed supplies to include: monitor, strips, lancing device, lancets, control solutions, alcohol swabs.  8/30/21   Megan Haynes MD   Masks (FACE MASK) MISC 1 each by Does not apply route as needed (as needed) 8/30/21   Megan Haynes MD   diclofenac sodium (VOLTAREN) 1 % GEL Apply topically 2 times daily 8/30/21   Megan Haynes MD   aspirin 81 MG tablet Take 1 tablet by mouth daily 8/13/19   Megan Haynes MD       ALLERGIES:     Penicillins, Sulfa antibiotics, Sulfasalazine, Pseudoeph-doxylamine-dm-apap, and Rabbit protein      OBJECTIVE:       Vitals:    03/13/23 2136 03/13/23 2205 03/13/23 2222 03/14/23 0752   BP:  112/61  126/76   Pulse:   (!) 111 89   Resp:    20   Temp:    97 °F (36.1 °C)   TempSrc:    Oral   SpO2:    97%   Weight: 258 lb 2.5 oz (117.1 kg)      Height: 5' 5\" (1.651 m)            Intake/Output Summary (Last 24 hours) at 3/14/2023 0836  Last data filed at 3/14/2023 0457  Gross per 24 hour   Intake 1440 ml   Output 1250 ml   Net 190 ml      Physical Exam  Constitutional:       Appearance: Normal appearance. He is obese. HENT:      Head: Normocephalic and atraumatic. Nose: Nose normal.      Mouth/Throat:      Mouth: Mucous membranes are moist.   Eyes:      Extraocular Movements: Extraocular movements intact. Pupils: Pupils are equal, round, and reactive to light. Cardiovascular:      Rate and Rhythm: Normal rate and regular rhythm. Pulses: Normal pulses. Heart sounds: Normal heart sounds. Pulmonary:      Effort: Pulmonary effort is normal.      Breath sounds: Wheezing present. Abdominal:      General: Bowel sounds are normal.      Palpations: Abdomen is soft. Musculoskeletal:         General: Normal range of motion. Cervical back: Neck supple. Right lower leg: Edema present. Left lower leg: Edema present. Skin:     General: Skin is warm and dry. Capillary Refill: Capillary refill takes less than 2 seconds. Neurological:      General: No focal deficit present. Mental Status: He is alert and oriented to person, place, and time.    Psychiatric:         Mood and Affect: Mood normal.         Behavior: Behavior normal.        DIAGNOSTICS:     Laboratory Testing:    Recent Results (from the past 24 hour(s))   Brain Natriuretic Peptide    Collection Time: 03/13/23  3:38 PM   Result Value Ref Range    Pro-BNP 79 <300 pg/mL   Troponin    Collection Time: 03/13/23  3:38 PM   Result Value Ref Range    Troponin, High Sensitivity 10 0 - 22 ng/L   Basic Metabolic Panel    Collection Time: 03/13/23  3:38 PM   Result Value Ref Range Glucose 109 (H) 70 - 99 mg/dL    BUN 25 (H) 8 - 23 mg/dL    Creatinine 1.02 0.70 - 1.20 mg/dL    Est, Glom Filt Rate >60 >60 mL/min/1.73m2    Calcium 8.9 8.6 - 10.4 mg/dL    Sodium 139 135 - 144 mmol/L    Potassium 4.2 3.7 - 5.3 mmol/L    Chloride 101 98 - 107 mmol/L    CO2 25 20 - 31 mmol/L    Anion Gap 13 9 - 17 mmol/L   CBC with Auto Differential    Collection Time: 03/13/23  3:38 PM   Result Value Ref Range    WBC 10.2 3.5 - 11.3 k/uL    RBC 4.79 4.21 - 5.77 m/uL    Hemoglobin 14.1 13.0 - 17.0 g/dL    Hematocrit 42.6 40.7 - 50.3 %    MCV 88.9 82.6 - 102.9 fL    MCH 29.4 25.2 - 33.5 pg    MCHC 33.1 28.4 - 34.8 g/dL    RDW 13.1 11.8 - 14.4 %    Platelets 216 138 - 453 k/uL    MPV 10.7 8.1 - 13.5 fL    NRBC Automated 0.0 0.0 per 100 WBC    Seg Neutrophils 72 (H) 36 - 65 %    Lymphocytes 13 (L) 24 - 43 %    Monocytes 9 3 - 12 %    Eosinophils % 6 (H) 1 - 4 %    Basophils 0 0 - 2 %    Immature Granulocytes 0 0 %    Segs Absolute 7.18 1.50 - 8.10 k/uL    Absolute Lymph # 1.36 1.10 - 3.70 k/uL    Absolute Mono # 0.96 0.10 - 1.20 k/uL    Absolute Eos # 0.63 (H) 0.00 - 0.44 k/uL    Basophils Absolute 0.04 0.00 - 0.20 k/uL    Absolute Immature Granulocyte 0.03 0.00 - 0.30 k/uL   COVID-19, Rapid    Collection Time: 03/13/23  3:54 PM    Specimen: Nasopharyngeal Swab   Result Value Ref Range    Specimen Description .NASOPHARYNGEAL SWAB     SARS-CoV-2, Rapid Not Detected Not Detected   RAPID INFLUENZA A/B ANTIGENS    Collection Time: 03/13/23  4:27 PM    Specimen: Nasopharyngeal   Result Value Ref Range    Flu A Antigen NEGATIVE NEGATIVE    Flu B Antigen NEGATIVE NEGATIVE   Troponin    Collection Time: 03/13/23  5:59 PM   Result Value Ref Range    Troponin, High Sensitivity 10 0 - 22 ng/L   POC Glucose Fingerstick    Collection Time: 03/13/23 10:01 PM   Result Value Ref Range    POC Glucose 339 (H) 75 - 110 mg/dL   POC Glucose Fingerstick    Collection Time: 03/14/23  7:50 AM   Result Value Ref Range    POC Glucose 208 (H) 75  - 110 mg/dL         Imaging/Diagonstics:      Current Facility-Administered Medications   Medication Dose Route Frequency Provider Last Rate Last Admin    bumetanide (BUMEX) tablet 2 mg  2 mg Oral BID Quratulain Herrera, DO   2 mg at 03/13/23 2207    sacubitril-valsartan (ENTRESTO) 24-26 MG per tablet 1 tablet  1 tablet Oral BID Quratulain Herrera, DO   1 tablet at 03/13/23 2205    gabapentin (NEURONTIN) capsule 300 mg  300 mg Oral Daily PRN Quratulain Herrera, DO   300 mg at 03/13/23 2207    atorvastatin (LIPITOR) tablet 20 mg  20 mg Oral Daily Quratulain Herrera, DO   20 mg at 03/13/23 2207    methylPREDNISolone sodium (SOLU-MEDROL) injection 40 mg  40 mg IntraVENous BID Quratulain Herrera, DO   40 mg at 03/13/23 2207    potassium chloride (KLOR-CON M) extended release tablet 20 mEq  20 mEq Oral Daily with breakfast Quratulain Herrera, DO        glucose chewable tablet 16 g  4 tablet Oral PRN Quratulain Herrera, DO        dextrose bolus 10% 125 mL  125 mL IntraVENous PRN Quratulain Herrera, DO        Or    dextrose bolus 10% 250 mL  250 mL IntraVENous PRN Quratulain Herrera, DO        glucagon (rDNA) injection 1 mg  1 mg SubCUTAneous PRN Quratulain Herrera, DO        dextrose 10 % infusion   IntraVENous Continuous PRN Quratulain Herrera, DO        insulin lispro (HUMALOG) injection vial 0-8 Units  0-8 Units SubCUTAneous TID WC Quratulain Herrera, DO        insulin lispro (HUMALOG) injection vial 0-4 Units  0-4 Units SubCUTAneous Nightly Quratulain Herrera, DO   4 Units at 03/13/23 2218    sodium chloride flush 0.9 % injection 5-40 mL  5-40 mL IntraVENous 2 times per day Quratulain Herrera, DO   10 mL at 03/13/23 2210    sodium chloride flush 0.9 % injection 5-40 mL  5-40 mL IntraVENous PRN Quratulain Herrera, DO        0.9 % sodium chloride infusion   IntraVENous PRN Quratulain Herrera, DO        enoxaparin (LOVENOX) injection 40 mg  40 mg SubCUTAneous Daily Quratulain Herrera, DO   40 mg at 03/13/23 3961    ondansetron (ZOFRAN-ODT) disintegrating tablet 4 mg  4 mg Oral Q8H PRN Quratulain Herrera, DO        Or    ondansetron (ZOFRAN) injection 4 mg  4 mg IntraVENous Q6H PRN Quratulain Herrera, DO        polyethylene glycol (GLYCOLAX) packet 17 g  17 g Oral Daily PRN Quratulain Herrera, DO        acetaminophen (TYLENOL) tablet 650 mg  650 mg Oral Q6H PRN Quratulain Herrera, DO        Or    acetaminophen (TYLENOL) suppository 650 mg  650 mg Rectal Q6H PRN Quratulain Herrera, DO        pantoprazole (PROTONIX) tablet 40 mg  40 mg Oral QAM AC Quratulain Herrera, DO        ipratropium (ATROVENT) 0.02 % nebulizer solution 0.5 mg  0.5 mg Nebulization 4x daily Andrei Edmondson MD        azithromycin (ZITHROMAX) tablet 500 mg  500 mg Oral Daily Quratulain Herrera, DO   500 mg at 03/13/23 2216    levalbuterol (XOPENEX) nebulizer solution 0.63 mg  0.63 mg Nebulization Q6H Andrei Edmondson MD        metoprolol succinate (TOPROL XL) extended release tablet 200 mg  200 mg Oral Daily Andrei Edmondson MD   200 mg at 03/13/23 2222    spironolactone (ALDACTONE) tablet 25 mg  25 mg Oral Daily Andrei Edmondson MD   25 mg at 03/13/23 2222    aspirin EC tablet 81 mg  81 mg Oral Daily Quratulain Herrera, DO   81 mg at 03/13/23 2221    albuterol sulfate HFA (PROVENTIL;VENTOLIN;PROAIR) 108 (90 Base) MCG/ACT inhaler 1 puff  1 puff Inhalation Q4H Quratulain Herrera, DO        And    ipratropium (ATROVENT HFA) 17 MCG/ACT inhaler 1 puff  1 puff Inhalation Q4H Quratulain Herrera, DO        insulin glargine (LANTUS) injection vial 10 Units  10 Units SubCUTAneous Nightly Andrei Edmondson MD   10 Units at 03/13/23 2256       ASSESSMENT:     Principal Problem:    COPD exacerbation ([address]  Resolved Problems:    * No resolved hospital problems.  *      PLAN:     COPD exacerbation  - Patient presenting with increased cough, sputum production, increased shortness of breath  - Is wheezy on presentation  - Currently on room air  - Patient given 10 mg IV Decadron once in the ED  - We will start patient on IV Solu-Medrol 40 mg twice daily  - Xopenex nebulization treatment q4hr  - Atrovent nebulizer solution 4 times daily  - On Zithromax 500 mg once today, 250 mg daily for 4 days  - Resume home Combivent inhaler  - Breathing has improved        Chronic systolic congestive Heart Failure    -Previous echo from June 2021 shows EF of 33%  - Resume patient's home dose of Bumex, Entresto  -Is currently on a pacemaker  - Follows with CHF clinic [place]  - Patient on low-sodium diet    Type 2 diabetes  - Hypoglycemia protocol in place  - Medium dose sliding scale for insulin  - POCT glucose ACHS 4 times daily  - At home patient takes Jardiance, metformin  - Last A1c in 2/17/2023 : 6.7  - Was given 10 units of Lantus overnight due to elevated blood sugars, likely steroid-induced     Essential HTN  -Resume patient's home dose of Aldactone 25 mg once daily  -Resume Toprol once daily 100 mg    Dispo: Likely discharge later today with steroid taper, patient's shortness of breath has improved currently on room air    DVT prophylaxis: Lovenox 40 mg SC  GI prophylaxis: Protonix 40 mg daily    Plan will be discussed with the attending, Dr. Chon Spencer MD  Family Medicine Resident  3/14/2023 8:36 AM            Please note that this chart was generated using voice recognition Dragon dictation software.   Although every effort was made to ensure the accuracy of this automated transcription, some errors in transcription may have occurred

## 2023-03-14 NOTE — DISCHARGE SUMMARY
Patient discharged with all belongings to private residence. Discharge paperwork provided and discussed. All questions answered. One IV removed with no complications and catheter intact. Medications provided to patient via meds to beds. Patient left unit via wheelchair.

## 2023-03-14 NOTE — TELEPHONE ENCOUNTER
E-scribe request for med refill. Please review and e-scribe if applicable. Last Visit Date:  02/17/2023  Next Visit Date:  Visit date not found    Hemoglobin A1C (%)   Date Value   02/17/2023 6.7   09/16/2022 7.6   01/17/2022 7.0             ( goal A1C is < 7)   Microalb/Crt.  Ratio (mcg/mg creat)   Date Value   12/13/2021 Can not be calculated     LDL Cholesterol (mg/dL)   Date Value   01/27/2023 57       (goal LDL is <100)   AST (U/L)   Date Value   01/27/2023 23     ALT (U/L)   Date Value   01/27/2023 43 (H)     BUN (mg/dL)   Date Value   03/13/2023 25 (H)     BP Readings from Last 3 Encounters:   03/14/23 126/76   02/23/23 118/62   02/17/23 134/80          (goal 120/80)        Patient Active Problem List:     HTN (hypertension)     Skin tag     Low back pain with sciatica     Hyperglycemia     Infected sebaceous cyst     Chronic midline low back pain without sciatica     Chronic pain of right knee     Chest pain, unspecified     Bronchitis     Chronic systolic congestive heart failure (HCC)     Ventral hernia     Epigastric pain     Elevated LFTs     Shortness of breath     Rectus diastasis     Lumbosacral spondylosis without myelopathy     COPD exacerbation (HCC)     Mixed simple and mucopurulent chronic bronchitis (HCC)     VANDANA (obstructive sleep apnea)     Tachycardia     Chest pain     Diabetes mellitus type 2 in obese ([address]     Need for shingles vaccine     Other male erectile dysfunction     Hernia of abdominal cavity     Hypokalemia      ----Angelica Logan

## 2023-03-14 NOTE — DISCHARGE SUMMARY
Department of [address] [place] [address]    Discharge Summary      NAME:  Neftaly Cao  :  1960  MRN:  [id number]    Admit date:  3/13/2023  Discharge date:  3/14/2023    Admitting Physician:  Ruel Mott MD    Primary Diagnosis on Admission:   Present on Admission:   COPD exacerbation ([facility] [address]      Secondary Diagnoses:  does not have any pertinent problems on file. Admission Condition:  stable     Discharged Condition: stable    Hospital Course: The patient was admitted for the management of acute COPD exacerbation. Today on day of discharge pt feels better with no further complaints. Vitals and Labs are at pts baseline. All consultants involved during this admission are agreeable to d/c. This is a 58-year-old male with past medical history hypertension, COPD, CHF who presented with shortness of breath. Patient has also reported increasing cough with increased sputum production. Shortness of breath occurs at rest.  Does not use any oxygen at home. Patient was saturating well on room air. COVID and rapid flu were negative. Patient was started on IV steroids and given breathing treatments. Ayush  Respiratory status improved. Chest x-ray demonstrated no acute abnormalities. Patient was started on Zithromax for 5 days. Patient's respiratory status improved throughout course of hospitalization at time of discharge was not in acute respiratory distress. Inhalers were changed at discharge. Given albuterol and Spiriva. PFTs were also ordered on an outpatient basis. Patient is to follow-up with primary care provider for further management and treatment of chronic conditions.     Consults:  none    Significant Diagnostic/theraputic interventions: IV steroids, breathing treatments, Zithromax      Disposition:   home    Instructions to Patient:      Follow up with Ayana Boyd MD in  1 week    Discharge Medications:       Medication List        START taking these medications      azithromycin 250 MG tablet  Commonly known as: ZITHROMAX  Take 1 tablet by mouth daily for 4 doses     predniSONE 20 MG tablet  Commonly known as: DELTASONE  Take 2 tablets by mouth daily for 5 days     tiotropium 2.5 MCG/ACT Aers inhaler  Commonly known as: SPIRIVA RESPIMAT  Inhale 2 puffs into the lungs daily            CHANGE how you take these medications      albuterol (2.5 MG/3ML) 0.083% nebulizer solution  Commonly known as: PROVENTIL  INHALE 1 UNIT DOSE BY NEBULIZATION UP TO EVERY 6 HOURS AS NEEDED FOR WHEEZING OR SHORTNESS OF BREATH  What changed: Another medication with the same name was removed. Continue taking this medication, and follow the directions you see here.            CONTINUE taking these medications      aspirin 81 MG tablet  Take 1 tablet by mouth daily     blood glucose monitor kit and supplies  Dispense sufficient amount for indicated testing frequency plus additional to accommodate PRN testing needs. Dispense all needed supplies to include: monitor, strips, lancing device, lancets, control solutions, alcohol swabs.     blood glucose test strips  True Metrix Test Strips  Test 2-3 times a day & as needed for symptoms of irregular blood glucose. Dispense sufficient amount for indicated testing frequency plus additional to accommodate PRN testing needs.     bumetanide 2 MG tablet  Commonly known as: BUMEX  TAKE 1 TABLET BY MOUTH TWICE DAILY     diclofenac sodium 1 % Gel  Commonly known as: VOLTAREN  Apply topically 2 times daily     empagliflozin 10 MG tablet  Commonly known as: Jardiance  TAKE 1 TABLET BY MOUTH DAILY     Entresto 24-26 MG per tablet  Generic drug: sacubitril-valsartan  TAKE 1 TABLET BY MOUTH TWICE DAILY     Face Mask Misc  1 each by Does not apply route as needed (as needed)     gabapentin 300 MG capsule  Commonly known as: NEURONTIN  TAKE 1 CAPSULE BY MOUTH NIGHTLY AS NEEDED (PAIN)     metFORMIN 1000 MG tablet  Commonly known as:  GLUCOPHAGE  TAKE 1 TABLET BY MOUTH TWICE DAILY WITH MEALS     naproxen 500 MG tablet  Commonly known as: NAPROSYN  TAKE 1 TABLET BY MOUTH 2 TIMES DAILY FOR 20 DAYS     nitroGLYCERIN 0.4 MG SL tablet  Commonly known as: NITROSTAT  PLACE 1 TABLET UNDER THE TONGUE EVERY 5 MINUTES AS NEEDED FOR CHEST PAIN     potassium chloride 20 MEQ extended release tablet  Commonly known as: KLOR-CON M  Take 1 tablet by mouth daily (with breakfast)     simvastatin 20 MG tablet  Commonly known as: ZOCOR  TAKE 1 TABLET BY MOUTH EVERY NIGHT     Wrist Splint/Cock-Up/Right L Misc  1 each by Does not apply route daily            STOP taking these medications      acetaminophen 500 MG tablet  Commonly known as: TYLENOL     albuterol-ipratropium  MCG/ACT Aers inhaler  Commonly known as: Combivent Respimat            ASK your doctor about these medications      metoprolol succinate 100 MG extended release tablet  Commonly known as: TOPROL XL  Take 2 tablets by mouth daily Increased by cardiology 1/12/2023  Ask about: Which instructions should I use?     sildenafil 100 MG tablet  Commonly known as: Viagra  Take 1 tablet by mouth as needed for Erectile Dysfunction     spironolactone 25 MG tablet  Commonly known as: ALDACTONE  TAKE 1 TABLET BY MOUTH EVERY NIGHT  Ask about: Which instructions should I use?               Where to Get Your Medications        These medications were sent to [facility] [address] [facility] [phone] - F [phone]  [address] [place] [address]      Phone: [phone]   azithromycin 250 MG tablet  predniSONE 20 MG tablet  tiotropium 2.5 MCG/ACT Aers inhaler       These medications were sent to [id number] - [place], [place]  [address] [facility] - P [phone] - F [phone]  [address] [facility] [address]      Phone: [phone]   metoprolol succinate 100 MG extended release tablet  spironolactone 25 MG tablet         Send Copies to: Andrei  MD [place],       Note that over 30 minutes was spent in preparing discharge papers, discussing discharge with patient and family, medication review, etc.      Fredy Cardenas MD  Family Medicine Resident  Family Medicine Inpatient Service  3/14/2023 2:49 PM          Please note that this chart was generated using voice recognition Dragon dictation software.   Although every effort was made to ensure the accuracy of this automated transcription, some errors in transcription may have occurred

## 2023-03-14 NOTE — PROGRESS NOTES
Congestive Heart Failure Education completed and charted. CHF booklet was refused d/t having copies at home already. Patient was receptive to education. Discussed the  importance of medication compliance. Discussed the importance of a heart healthy diet. Discussed 2000 mg sodium-restricted daily diet. Patient instructed to limit fluid intake to  1.5 to 2 liters per day. Patient instructed to weigh self at the same time of each day each morning, reinforced teaching to monitor for 3-5 lb weight increase over 1-2 days notify physician if change noted. Signs and symptoms of CHF discussed with patient, such as feeling more tired than normal, feeling short of breath, coughing that increases when lying down, sudden weight gain, swelling of the feet, legs or belly. Patient verbalized understanding to notify physician office if these symptoms occur.   EF 33%  Pt is established with  CHF Clinic   Next Appt  is 3/23/23

## 2023-03-14 NOTE — PROGRESS NOTES
CLINICAL PHARMACY NOTE: MEDS TO BEDS    Total # of Prescriptions Filled: 3   The following medications were delivered to the patient:  Spiriva respimat 2.5 mcg inh  Prednisone 20 mg tab  Azithromycin 250 mg tab  Additional Documentation: metoprolol sent to [facility]

## 2023-03-14 NOTE — TELEPHONE ENCOUNTER
Last visit:   Last Med refill:   Does patient have enough medication for 72 hours: No:     Next Visit Date:  Future Appointments   Date Time Provider Sondra Hogue   3/23/2023 10:30 AM [facility] CHF CLINIC RM 1 [facility] CHF CLI  Albert   5/26/2023 10:30 AM SCHEDULE, [facility] RESPIRATORY SPEC Resp Spec [facility]       Health Maintenance   Topic Date Due    Diabetic foot exam  Never done    Diabetic retinal exam  Never done    Flu vaccine (1) 08/01/2022    Diabetic Alb to Cr ratio (uACR) test  12/13/2022    Lipids  01/27/2024    A1C test (Diabetic or Prediabetic)  02/17/2024    Depression Screen  02/17/2024    GFR test (Diabetes, CKD 3-4, OR last GFR 15-59)  03/13/2024    Colorectal Cancer Screen  05/28/2024    DTaP/Tdap/Td vaccine (2 - Td or Tdap) 12/08/2030    Shingles vaccine  Completed    Pneumococcal 0-64 years Vaccine  Completed    COVID-19 Vaccine  Completed    Hepatitis C screen  Completed    HIV screen  Completed    Hepatitis A vaccine  Aged Out    Hib vaccine  Aged Out    Meningococcal (ACWY) vaccine  Aged Out       Hemoglobin A1C (%)   Date Value   02/17/2023 6.7   09/16/2022 7.6   01/17/2022 7.0             ( goal A1C is < 7)   Microalb/Crt.  Ratio (mcg/mg creat)   Date Value   12/13/2021 Can not be calculated     LDL Cholesterol (mg/dL)   Date Value   01/27/2023 57   01/17/2022 60       (goal LDL is <100)   AST (U/L)   Date Value   01/27/2023 23     ALT (U/L)   Date Value   01/27/2023 43 (H)     BUN (mg/dL)   Date Value   03/13/2023 25 (H)     BP Readings from Last 3 Encounters:   03/14/23 126/76   02/23/23 118/62   02/17/23 134/80          (goal 120/80)    All Future Testing planned in [facility]  Lab Frequency Next Occurrence   Basic Metabolic Panel Once 32/48/9786   Full PFT Study With Bronchodilator Once 09/16/2022   Protein / Creatinine Ratio, Urine Once 02/17/2023   Initiate ED RT Aerosol protocol DAILY (RT)    POCT glucose 4 TIMES DAILY BEFORE MEALS & AT BEDTIME    HYPOGLYCEMIA TREATMENT: blood glucose LESS THAN 70 mg/dL and patient ALERT and TOLERATING PO PRN    HYPOGLYCEMIA TREATMENT: blood glucose LESS THAN 70 mg/dL and patient NOT ALERT or NPO PRN    POCT Glucose PRN    Initiate Oxygen Therapy Protocol PRN    Up as tolerated PRN    Up with assistance PRN    Basic Metabolic Panel w/ Reflex to MG Daily (Lab)    CBC with Auto Differential Daily (Lab)                Patient Active Problem List:     HTN (hypertension)     Skin tag     Low back pain with sciatica     Hyperglycemia     Infected sebaceous cyst     Chronic midline low back pain without sciatica     Chronic pain of right knee     Chest pain, unspecified     Bronchitis     Chronic systolic congestive heart failure (HCC)     Ventral hernia     Epigastric pain     Elevated LFTs     Shortness of breath     Rectus diastasis     Lumbosacral spondylosis without myelopathy     COPD exacerbation (HCC)     Mixed simple and mucopurulent chronic bronchitis (HCC)     VANDANA (obstructive sleep apnea)     Tachycardia     Chest pain     Diabetes mellitus type 2 in obese (HCC)     Need for shingles vaccine     Other male erectile dysfunction     Hernia of abdominal cavity     Hypokalemia           Please address the medication refill and close the encounter. If I can be of assistance, please route to the applicable pool. Thank you.

## 2023-03-15 ENCOUNTER — TELEPHONE (OUTPATIENT)
Dept: OTHER | Age: 63
End: 2023-03-15

## 2023-03-16 LAB
EKG ATRIAL RATE: 91 BPM
EKG P AXIS: 61 DEGREES
EKG P-R INTERVAL: 154 MS
EKG Q-T INTERVAL: 368 MS
EKG QRS DURATION: 92 MS
EKG QTC CALCULATION (BAZETT): 452 MS
EKG R AXIS: 43 DEGREES
EKG T AXIS: 54 DEGREES
EKG VENTRICULAR RATE: 91 BPM

## 2023-03-16 PROCEDURE — 93010 ELECTROCARDIOGRAM REPORT: CPT | Performed by: INTERNAL MEDICINE

## 2023-03-27 DIAGNOSIS — G89.29 CHRONIC MIDLINE LOW BACK PAIN WITHOUT SCIATICA: ICD-10-CM

## 2023-03-27 DIAGNOSIS — M54.50 CHRONIC MIDLINE LOW BACK PAIN WITHOUT SCIATICA: ICD-10-CM

## 2023-03-28 RX ORDER — NAPROXEN 500 MG/1
TABLET ORAL
Qty: 30 TABLET | Refills: 2 | OUTPATIENT
Start: 2023-03-28

## 2023-04-03 ENCOUNTER — HOSPITAL ENCOUNTER (OUTPATIENT)
Dept: OTHER | Age: 63
Discharge: HOME | End: 2023-04-03
Payer: COMMERCIAL

## 2023-04-03 VITALS
BODY MASS INDEX: 43.2 KG/M2 | DIASTOLIC BLOOD PRESSURE: 72 MMHG | RESPIRATION RATE: 20 BRPM | HEART RATE: 96 BPM | SYSTOLIC BLOOD PRESSURE: 110 MMHG | OXYGEN SATURATION: 98 % | WEIGHT: 259.6 LBS

## 2023-04-03 PROCEDURE — 99212 OFFICE O/P EST SF 10 MIN: CPT

## 2023-04-03 NOTE — PROGRESS NOTES
Date:  4/3/2023  Time:  10:56 AM    CHF Clinic at [address] [place] [address]    Office: [phone] Fax: [phone]    Re:  Hong Read   Patient : 1960    Vital Signs: /72   Pulse 96   Resp 20   Wt 259 lb 9.6 oz (117.8 kg)   SpO2 98%   BMI 43.20 kg/m²                       O2 Device: None (Room air)                           No results for input(s): CBC, HGB, HCT, WBC, PLATELET, NA, K, CL, CO2, BUN, CREATININE, GLUCOSE, BNP, INR in the last 72 hours. Respiratory:    Assessment  Charting Type: Reassessment    Breath Sounds  Right Upper Lobe: Clear  Right Middle Lobe: Expiratory Wheezes  Right Lower Lobe: Expiratory Wheezes  Left Upper Lobe: Clear  Left Lower Lobe: Clear    Cough/Sputum  Cough: Productive  Frequency: Infrequent  Sputum Amount: Small  Sputum Color: Clear         Peripheral Vascular  RLE Edema: +1, Non-pitting  LLE Edema: +1, Non-pitting    Future Appointments   Date Time Provider Sondra Hogue   2023 10:30 AM SCHEDULE, [facility] RESPIRATORY SPEC Resp Spec [facility]      Complaints: Nothing new      Comment : Patient here ambulatory for routine visit. Weight decreased 1.5 lbs in one month. Had a recent hospital admit for COPD. Given antibiotics and steroids. Now feeling improved. No new or acute s/s CHF. Discussed low salt diet and fluid limits. States compliance with meds. Bumex is 2 mg 2x day. Seeing cardiology in July. Next visit here 4 weeks 2023.     Electronically signed by Natacha Singer RN on 4/3/2023 at 10:56 AM

## 2023-04-07 ENCOUNTER — HOSPITAL ENCOUNTER (EMERGENCY)
Age: 63
Discharge: HOME | End: 2023-04-07
Attending: EMERGENCY MEDICINE
Payer: COMMERCIAL

## 2023-04-07 VITALS
HEART RATE: 86 BPM | SYSTOLIC BLOOD PRESSURE: 161 MMHG | BODY MASS INDEX: 43.15 KG/M2 | DIASTOLIC BLOOD PRESSURE: 75 MMHG | WEIGHT: 259 LBS | RESPIRATION RATE: 18 BRPM | TEMPERATURE: 97.9 F | OXYGEN SATURATION: 100 % | HEIGHT: 65 IN
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
## 2023-04-07 DIAGNOSIS — S16.1XXA ACUTE STRAIN OF NECK MUSCLE, INITIAL ENCOUNTER: ICD-10-CM

## 2023-04-07 PROCEDURE — 6370000000 HC RX 637 (ALT 250 FOR IP): Performed by: PHYSICIAN ASSISTANT

## 2023-04-07 PROCEDURE — 6360000002 HC RX W HCPCS: Performed by: PHYSICIAN ASSISTANT

## 2023-04-07 RX ORDER — KETOROLAC TROMETHAMINE 30 MG/ML
30 INJECTION, SOLUTION INTRAMUSCULAR; INTRAVENOUS ONCE
Status: COMPLETED | OUTPATIENT
Start: 2023-04-07 | End: 2023-04-07

## 2023-04-07 RX ORDER — LIDOCAINE 50 MG/G
1 PATCH TOPICAL DAILY
Qty: 10 PATCH | Refills: 0 | Status: SHIPPED | OUTPATIENT
Start: 2023-04-07 | End: 2023-04-17

## 2023-04-07 RX ORDER — LIDOCAINE 4 G/G
1 PATCH TOPICAL ONCE
Status: DISCONTINUED | OUTPATIENT
Start: 2023-04-07 | End: 2023-04-07 | Stop reason: HOSPADM

## 2023-04-07 RX ORDER — TIZANIDINE 4 MG/1
4 TABLET ORAL EVERY 8 HOURS PRN
Qty: 10 TABLET | Refills: 0 | Status: SHIPPED | OUTPATIENT
Start: 2023-04-07

## 2023-04-07 RX ADMIN — KETOROLAC TROMETHAMINE 30 MG: 30 INJECTION, SOLUTION INTRAMUSCULAR; INTRAVENOUS at 16:01

## 2023-04-07 ASSESSMENT — ENCOUNTER SYMPTOMS
ABDOMINAL PAIN: 0
BACK PAIN: 0
SHORTNESS OF BREATH: 0

## 2023-04-07 ASSESSMENT — VISUAL ACUITY: OU: 1
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
## 2023-04-07 NOTE — DISCHARGE INSTRUCTIONS
Please follow up with PCP. Recommend return to the ED if you develop worsening neck pain, stiffness, headache, dizziness, numbness, chest pain, weakness. Do not drive or drink alcohol while taking zanaflex. Can cause drowsiness.

## 2023-05-01 ENCOUNTER — HOSPITAL ENCOUNTER (OUTPATIENT)
Dept: OTHER | Age: 63
Discharge: HOME | End: 2023-05-01
Payer: COMMERCIAL

## 2023-05-01 VITALS
WEIGHT: 263.8 LBS | HEART RATE: 87 BPM | OXYGEN SATURATION: 99 % | RESPIRATION RATE: 20 BRPM | SYSTOLIC BLOOD PRESSURE: 122 MMHG | DIASTOLIC BLOOD PRESSURE: 72 MMHG | BODY MASS INDEX: 43.9 KG/M2

## 2023-05-01 PROCEDURE — 99212 OFFICE O/P EST SF 10 MIN: CPT

## 2023-05-01 NOTE — PROGRESS NOTES
Date:  2023  Time:  10:46 AM    CHF Clinic at [facility]    Office: [phone] Fax: [phone]    Re:  Мария Watson   Patient : 1960    Vital Signs: /72   Pulse 87   Resp 20   Wt 263 lb 12.8 oz (119.7 kg)   SpO2 99%   BMI 43.90 kg/m²                       O2 Device: None (Room air)                           No results for input(s): CBC, HGB, HCT, WBC, PLATELET, NA, K, CL, CO2, BUN, CREATININE, GLUCOSE, BNP, INR in the last 72 hours. Respiratory:    Assessment  Charting Type: Reassessment    Breath Sounds  Right Upper Lobe: Clear  Right Middle Lobe: Clear  Right Lower Lobe: Clear, Diminished  Left Upper Lobe: Clear  Left Lower Lobe: Clear, Diminished    Cough/Sputum  Cough: Productive  Frequency: Infrequent  Sputum Amount: Small  Sputum Color: Clear         Peripheral Vascular  RLE Edema: +1, Non-pitting  LLE Edema: +1, Pitting    Future Appointments   Date Time Provider Sondra Hogue   2023 10:30 AM SCHEDULE, [facility] RESPIRATORY SPEC Resp Spec [facility]      Complaints: Nothing new      Comment : Patietn here ambulatory for routine visit. Weight increased 4 lbs in one month. Patient disappointed because he thought he lost weight. No new or acute s/s CHF. Saw Dr Kenny Lemons in January, next visit in July. Discussed low salt diet and fluid limits. States compliance with meds. Bumex is 2 mg 2x day. Next visit here 4 weeks 2023. Instructed to phone here for any new s/s CHF.      Electronically signed by Wayne Skinner RN on 2023 at 10:46 AM

## 2023-05-08 ENCOUNTER — OFFICE VISIT (OUTPATIENT)
Dept: FAMILY MEDICINE CLINIC | Age: 63
End: 2023-05-08

## 2023-05-08 VITALS
HEIGHT: 65 IN | DIASTOLIC BLOOD PRESSURE: 84 MMHG | SYSTOLIC BLOOD PRESSURE: 137 MMHG | HEART RATE: 118 BPM | BODY MASS INDEX: 44.72 KG/M2 | WEIGHT: 268.4 LBS

## 2023-05-08 DIAGNOSIS — J42 CHRONIC BRONCHITIS, UNSPECIFIED CHRONIC BRONCHITIS TYPE (HCC): ICD-10-CM

## 2023-05-08 DIAGNOSIS — M54.50 CHRONIC MIDLINE LOW BACK PAIN WITHOUT SCIATICA: ICD-10-CM

## 2023-05-08 DIAGNOSIS — E87.6 HYPOKALEMIA: ICD-10-CM

## 2023-05-08 DIAGNOSIS — R00.0 TACHYCARDIA: Primary | ICD-10-CM

## 2023-05-08 DIAGNOSIS — I50.22 CHRONIC SYSTOLIC CONGESTIVE HEART FAILURE (HCC): ICD-10-CM

## 2023-05-08 DIAGNOSIS — G89.29 CHRONIC MIDLINE LOW BACK PAIN WITHOUT SCIATICA: ICD-10-CM

## 2023-05-08 RX ORDER — CYCLOBENZAPRINE HCL 10 MG
10 TABLET ORAL NIGHTLY PRN
Qty: 10 TABLET | Refills: 0 | Status: SHIPPED | OUTPATIENT
Start: 2023-05-08

## 2023-05-08 RX ORDER — TIZANIDINE 4 MG/1
4 TABLET ORAL EVERY 8 HOURS PRN
Qty: 10 TABLET | Refills: 0 | Status: CANCELLED | OUTPATIENT
Start: 2023-05-08

## 2023-05-08 RX ORDER — NAPROXEN 500 MG/1
TABLET ORAL
Qty: 30 TABLET | Refills: 0 | Status: SHIPPED | OUTPATIENT
Start: 2023-05-08
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
## 2023-05-26 ENCOUNTER — OFFICE VISIT (OUTPATIENT)
Dept: PULMONOLOGY | Age: 63
End: 2023-05-26

## 2023-05-26 VITALS
OXYGEN SATURATION: 98 % | WEIGHT: 264 LBS | HEART RATE: 80 BPM | BODY MASS INDEX: 43.99 KG/M2 | SYSTOLIC BLOOD PRESSURE: 128 MMHG | HEIGHT: 65 IN | DIASTOLIC BLOOD PRESSURE: 76 MMHG

## 2023-05-26 DIAGNOSIS — G47.33 OSA (OBSTRUCTIVE SLEEP APNEA): Primary | ICD-10-CM

## 2023-05-26 RX ORDER — ALBUTEROL SULFATE 90 UG/1
2 AEROSOL, METERED RESPIRATORY (INHALATION) 4 TIMES DAILY PRN
Qty: 54 G | Refills: 1 | Status: SHIPPED | OUTPATIENT
Start: 2023-05-26

## 2023-05-26 ASSESSMENT — SLEEP AND FATIGUE QUESTIONNAIRES
ESS TOTAL SCORE: 20
HOW LIKELY ARE YOU TO NOD OFF OR FALL ASLEEP WHILE SITTING AND TALKING TO SOMEONE: 0
HOW LIKELY ARE YOU TO NOD OFF OR FALL ASLEEP WHILE SITTING QUIETLY AFTER LUNCH WITHOUT ALCOHOL: 3
HOW LIKELY ARE YOU TO NOD OFF OR FALL ASLEEP WHILE LYING DOWN TO REST IN THE AFTERNOON WHEN CIRCUMSTANCES PERMIT: 3
HOW LIKELY ARE YOU TO NOD OFF OR FALL ASLEEP WHILE SITTING AND READING: 3
HOW LIKELY ARE YOU TO NOD OFF OR FALL ASLEEP WHILE SITTING INACTIVE IN A PUBLIC PLACE: 3
HOW LIKELY ARE YOU TO NOD OFF OR FALL ASLEEP IN A CAR, WHILE STOPPED FOR A FEW MINUTES IN TRAFFIC: 2
HOW LIKELY ARE YOU TO NOD OFF OR FALL ASLEEP WHILE WATCHING TV: 3
HOW LIKELY ARE YOU TO NOD OFF OR FALL ASLEEP WHEN YOU ARE A PASSENGER IN A CAR FOR AN HOUR WITHOUT A BREAK: 3

## 2023-05-26 NOTE — PROGRESS NOTES
quadrants,     no masses, no organomegaly         2+ and symmetric all extremities     Skin color, texture, turgor normal, no rashes or lesions       Cervical, supraclavicular not enlarged or matted or tender      CNII-XII intact, normal strength 5/5 . Sensation grossly normal  and reflexes normal 2+  throughout     Clubbing No  Lower ext edema 2+ edema  Upper ext edema no edema         Musculoskeletal no synovitis. No joint swelling or tenderness but does have joint pains when ambulating. CBC: No results for input(s): WBC, HGB, PLT in the last 72 hours. BMP:  No results for input(s): NA, K, CL, CO2, BUN, CREATININE, GLUCOSE in the last 72 hours. Hepatic: No results for input(s): AST, ALT, ALB, BILITOT, ALKPHOS in the last 72 hours. Amylase: No results found for: AMYLASE  Lipase:   Lab Results   Component Value Date/Time    LIPASE 29 01/21/2021 03:50 AM     Troponin: No results for input(s): TROPONINI in the last 72 hours. BNP: No results for input(s): BNP in the last 72 hours. Lipids: No results for input(s): CHOL, HDL in the last 72 hours. Invalid input(s): LDLCALCU  ABGs:   Lab Results   Component Value Date/Time    PO2ART 86.3 04/05/2013 04:00 PM     INR: No results for input(s): INR in the last 72 hours. Thyroid:   Lab Results   Component Value Date/Time    TSH 0.41 01/22/2021 05:29 AM     Urinalysis: No results for input(s): BACTERIA, BLOODU, CLARITYU, COLORU, PHUR, PROTEINU, RBCUA, SPECGRAV, BILIRUBINUR, NITRU, WBCUA, LEUKOCYTESUR, GLUCOSEU in the last 72 hours. Cultures:-  No    CXR  Unremarkable      CT Scans  No recent CT    Echo    Recent echo            IMPRESSION:      COPD  Morbid obesity  Obstructive sleep apnea syndrome  Hypertension  Diabetes  Recurrent heart failure   CCM device in place to support the left ventricle  :                PLAN:      I discussed the situation with the patient in detail. He has COPD which is moderate in severity.   However the pulmonary function is

## 2023-05-26 NOTE — PATIENT INSTRUCTIONS
@[facility]@        YOU ARE BEING REFERRED TO:    GEORGE ALONSO (a [facility])    [address] [place] [address] [address], [place]    Main Phone Number: [phone]    Phone number for scheduling sleep study: [phone]      Please contact the above numbers to schedule your sleep study. Once you have completed the FIRST NIGHT you may be asked to return for a SECOND NIGHT if necessary. After the SECOND NIGHT the sleep center will order a CPAP/BiPAP machine for you. An order for the machine will be sent to a durable medical equipment company ([employer]). The sleep center will provide you with the [employer] companies information. Once you have your machine please contact our office to schedule a follow up appointment. Your follow up will be scheduled for a date 30-90 days after you have received your machine. At that follow up visit we will need to have a compliance download from your DME company. Please contact your [employer] company to have the compliance download faxed to our office at   [phone] for your follow up appointment. IF YOU HAVE ANY QUESTIONS ABOUT YOUR SLEEP STUDY   PLEASE CONTACT THE SLEEP CENTER.

## 2023-05-31 ENCOUNTER — TELEPHONE (OUTPATIENT)
Dept: PULMONOLOGY | Age: 63
End: 2023-05-31

## 2023-05-31 NOTE — TELEPHONE ENCOUNTER
Prior Authorization not required for patient/medication Case ID: [id number]      [facility]    [phone]    PA not needed      albuterol sulfate HFA (VENTOLIN HFA) 108 (90 Base) MCG/ACT inhaler    Inhale 2 puffs into the lungs 4 times daily as needed for Wheezing

## 2023-06-12 ENCOUNTER — TELEPHONE (OUTPATIENT)
Dept: FAMILY MEDICINE CLINIC | Age: 63
End: 2023-06-12

## 2023-06-19 ENCOUNTER — HOSPITAL ENCOUNTER (OUTPATIENT)
Dept: PHYSICAL THERAPY | Age: 63
Setting detail: THERAPIES SERIES
Discharge: HOME | End: 2023-06-19
Payer: COMMERCIAL

## 2023-06-19 PROCEDURE — 97110 THERAPEUTIC EXERCISES: CPT

## 2023-06-19 PROCEDURE — 97162 PT EVAL MOD COMPLEX 30 MIN: CPT

## 2023-06-19 NOTE — CONSULTS
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;  remodeling/projects at home are limited by back problems        ADL/IADL Previous level of function Current level of function Who currently assists the patient with task   Bathing  [x] Independent  [] Assist [x] Independent  [] Assist difficulty getting in/out of tub   Dress/grooming [x] Independent  [] Assist [x] Independent  [] Assist    Transfer/mobility [x] Independent  [] Assist [x] Independent  [] Assist Uses walking stick for long walks; difficulty getting in/out of tub   Feeding [x] Independent  [] Assist [x] Independent  [] Assist    Toileting [x] Independent  [] Assist [x] Independent  [] Assist    Driving [x] Independent  [] Assist [x] Independent  [] Assist    Housekeeping [x] Independent  [] Assist [x] Independent  [] Assist Modified due to back pain   Grocery shop/meal prep [x] Independent  [] Assist [] Independent  [x] Assist Friends assist w/grocery shopping     Gait Prior level of function Current level of function    [x] Independent  [] Assist [x] Independent  [] Assist   Device: [x] Independent [x] Independent    [] Straight Cane [] Quad cane [] Straight Cane   [x] Walking stick (as needed)    [] Standard walker [] Rolling walker   [] 4 wheeled walker [] Standard walker [] Rolling walker   [] 4 wheeled walker    [] Wheelchair [] Wheelchair   Pt has rolling walker at home    Pain:  [x] Yes  [] No Location: [facility] Pain Rating: (0-10 scale) 8/10  Pain altered Tx:  [] Yes  [x] No  Action:    Symptoms:  [] Improving [x] Worsening [] Same  Better:  [] AM    [] PM    [] Sit    [] Rise/Sit    []Stand    [] Walk    [] Lying    [] Other:  Worse: [] AM    [] PM    [] Sit    [] Rise/Sit    []Stand    [] Walk    [] Lying    []

## 2023-06-20 ENCOUNTER — HOSPITAL ENCOUNTER (OUTPATIENT)
Dept: OTHER | Age: 63
Discharge: HOME | End: 2023-06-20
Payer: COMMERCIAL

## 2023-06-20 VITALS
DIASTOLIC BLOOD PRESSURE: 80 MMHG | SYSTOLIC BLOOD PRESSURE: 130 MMHG | OXYGEN SATURATION: 99 % | HEART RATE: 77 BPM | RESPIRATION RATE: 20 BRPM | BODY MASS INDEX: 44.36 KG/M2 | WEIGHT: 266.6 LBS

## 2023-06-20 DIAGNOSIS — E11.69 DIABETES MELLITUS TYPE 2 IN OBESE (HCC): ICD-10-CM

## 2023-06-20 DIAGNOSIS — I50.22 CHRONIC SYSTOLIC CONGESTIVE HEART FAILURE (HCC): ICD-10-CM

## 2023-06-20 DIAGNOSIS — E66.9 DIABETES MELLITUS TYPE 2 IN OBESE (HCC): ICD-10-CM

## 2023-06-20 PROCEDURE — 99212 OFFICE O/P EST SF 10 MIN: CPT

## 2023-06-20 RX ORDER — METOPROLOL SUCCINATE 100 MG/1
TABLET, EXTENDED RELEASE ORAL
Qty: 180 TABLET | Refills: 0 | Status: SHIPPED | OUTPATIENT
Start: 2023-06-20

## 2023-06-20 RX ORDER — SACUBITRIL AND VALSARTAN 24; 26 MG/1; MG/1
TABLET, FILM COATED ORAL
Qty: 60 TABLET | Refills: 3 | Status: SHIPPED | OUTPATIENT
Start: 2023-06-20

## 2023-06-20 ASSESSMENT — PAIN DESCRIPTION - LOCATION: LOCATION: BACK

## 2023-06-20 ASSESSMENT — PAIN SCALES - GENERAL: PAINLEVEL_OUTOF10: 4

## 2023-06-20 ASSESSMENT — PAIN DESCRIPTION - ORIENTATION: ORIENTATION: LOWER

## 2023-06-20 NOTE — TELEPHONE ENCOUNTER
E-scribe request for ENTRESTO 24-26 MG TAB. Please review and e-scribe if applicable. Last Visit Date:  5/8/2023  Next Visit Date:  6/20/2023    Hemoglobin A1C (%)   Date Value   02/17/2023 6.7   09/16/2022 7.6   01/17/2022 7.0             ( goal A1C is < 7)   Microalb/Crt.  Ratio (mcg/mg creat)   Date Value   12/13/2021 Can not be calculated     LDL Cholesterol (mg/dL)   Date Value   01/27/2023 57       (goal LDL is <100)   AST (U/L)   Date Value   01/27/2023 23     ALT (U/L)   Date Value   01/27/2023 43 (H)     BUN (mg/dL)   Date Value   03/13/2023 25 (H)     BP Readings from Last 3 Encounters:   05/26/23 128/76   05/08/23 137/84   05/01/23 122/72          (goal 120/80)        Patient Active Problem List:     HTN (hypertension)     Skin tag     Low back pain with sciatica     Hyperglycemia     Infected sebaceous cyst     Chronic midline low back pain without sciatica     Chronic pain of right knee     Chest pain, unspecified     Bronchitis     Chronic systolic congestive heart failure (HCC)     Ventral hernia     Epigastric pain     Elevated LFTs     Shortness of breath     Rectus diastasis     Lumbosacral spondylosis without myelopathy     COPD exacerbation (HCC)     Mixed simple and mucopurulent chronic bronchitis (HCC)     VANDANA (obstructive sleep apnea)     Tachycardia     Chest pain     Diabetes mellitus type 2 in obese ([address]     Need for shingles vaccine     Other male erectile dysfunction     Hernia of abdominal cavity     Hypokalemia      ----JF

## 2023-06-20 NOTE — TELEPHONE ENCOUNTER
E-scribe request for METOPROLOL SUCCINATE 100 MG. Please review and e-scribe if applicable. Last Visit Date:  5/8/2023  Next Visit Date:  7/7/2023    Hemoglobin A1C (%)   Date Value   02/17/2023 6.7   09/16/2022 7.6   01/17/2022 7.0             ( goal A1C is < 7)   Microalb/Crt.  Ratio (mcg/mg creat)   Date Value   12/13/2021 Can not be calculated     LDL Cholesterol (mg/dL)   Date Value   01/27/2023 57       (goal LDL is <100)   AST (U/L)   Date Value   01/27/2023 23     ALT (U/L)   Date Value   01/27/2023 43 (H)     BUN (mg/dL)   Date Value   03/13/2023 25 (H)     BP Readings from Last 3 Encounters:   05/26/23 128/76   05/08/23 137/84   05/01/23 122/72          (goal 120/80)        Patient Active Problem List:     HTN (hypertension)     Skin tag     Low back pain with sciatica     Hyperglycemia     Infected sebaceous cyst     Chronic midline low back pain without sciatica     Chronic pain of right knee     Chest pain, unspecified     Bronchitis     Chronic systolic congestive heart failure (HCC)     Ventral hernia     Epigastric pain     Elevated LFTs     Shortness of breath     Rectus diastasis     Lumbosacral spondylosis without myelopathy     COPD exacerbation (HCC)     Mixed simple and mucopurulent chronic bronchitis (HCC)     VANDANA (obstructive sleep apnea)     Tachycardia     Chest pain     Diabetes mellitus type 2 in obese ([address]     Need for shingles vaccine     Other male erectile dysfunction     Hernia of abdominal cavity     Hypokalemia      ----JF

## 2023-06-20 NOTE — PROGRESS NOTES
Date:  2023  Time:  11:03 AM    CHF Clinic at [address] [facility]    Office: [phone] Fax: [phone]    Re:  Em Chauhan   Patient : 1960    Vital Signs: /80   Pulse 77   Resp 20   Wt 266 lb 9.6 oz (120.9 kg)   SpO2 99%   BMI 44.36 kg/m²                       O2 Device: None (Room air)                           No results for input(s): CBC, HGB, HCT, WBC, PLATELET, NA, K, CL, CO2, BUN, CREATININE, GLUCOSE, BNP, INR in the last 72 hours. Respiratory:    Assessment  Charting Type: Reassessment    Breath Sounds  Right Upper Lobe: Clear  Right Middle Lobe: Clear  Right Lower Lobe: Clear  Left Upper Lobe: Clear  Left Lower Lobe: Clear    Cough/Sputum  Cough: Productive  Frequency: Occasional  Sputum Amount: Small  Sputum Color: Clear         Peripheral Vascular  RLE Edema: +1, Pitting  LLE Edema: +1, Pitting    Future Appointments   Date Time Provider Sondra Keyisti   2023 11:00 AM Idalia Gaytan MD [address]   2023  1:45 PM [facility] RESP THERAPY  [facility]   2023  2:00 PM MD Chelle Perez  [facility]   2023 10:30 AM [facility] CHF CLINIC RM 1 [facility] CHF CLI [facility]   2023  8:30 AM SCHEDULE, [facility] RESPIRATORY SPEC Resp Spec [id number]      Complaints: None at this time    Physician Orders None    Comment : Arrived for scheduled visit per ambulatory. Weight up 2.8 lbs from last month. Denies any increase in dyspnea with exertion or chest pain. Ongoing lower leg, ankle and pedal edema noted with a slight increase from last visit. Medication list reviewed as well as low sodium diet and fluid restrictions. No signs of fluid overload at this time. Next chf clinic visit 23.     Electronically signed by Jose R Blackwell RN on 2023 at 11:03 AM

## 2023-06-20 NOTE — TELEPHONE ENCOUNTER
E-scribe request for METFORMIN 1000 MG TAB. Please review and e-scribe if applicable. Last Visit Date:  5/8/2023  Next Visit Date:  6/20/2023    Hemoglobin A1C (%)   Date Value   02/17/2023 6.7   09/16/2022 7.6   01/17/2022 7.0             ( goal A1C is < 7)   Microalb/Crt.  Ratio (mcg/mg creat)   Date Value   12/13/2021 Can not be calculated     LDL Cholesterol (mg/dL)   Date Value   01/27/2023 57       (goal LDL is <100)   AST (U/L)   Date Value   01/27/2023 23     ALT (U/L)   Date Value   01/27/2023 43 (H)     BUN (mg/dL)   Date Value   03/13/2023 25 (H)     BP Readings from Last 3 Encounters:   05/26/23 128/76   05/08/23 137/84   05/01/23 122/72          (goal 120/80)        Patient Active Problem List:     HTN (hypertension)     Skin tag     Low back pain with sciatica     Hyperglycemia     Infected sebaceous cyst     Chronic midline low back pain without sciatica     Chronic pain of right knee     Chest pain, unspecified     Bronchitis     Chronic systolic congestive heart failure (HCC)     Ventral hernia     Epigastric pain     Elevated LFTs     Shortness of breath     Rectus diastasis     Lumbosacral spondylosis without myelopathy     COPD exacerbation (HCC)     Mixed simple and mucopurulent chronic bronchitis (HCC)     VANDANA (obstructive sleep apnea)     Tachycardia     Chest pain     Diabetes mellitus type 2 in obese ([address]     Need for shingles vaccine     Other male erectile dysfunction     Hernia of abdominal cavity     Hypokalemia      ----JF

## 2023-06-20 NOTE — TELEPHONE ENCOUNTER
E-scribe request for med refills. Please review and e-scribe if applicable. Last Visit Date:  5/8/23  Next Visit Date:  7/7/2023    Hemoglobin A1C (%)   Date Value   02/17/2023 6.7   09/16/2022 7.6   01/17/2022 7.0             ( goal A1C is < 7)   Microalb/Crt.  Ratio (mcg/mg creat)   Date Value   12/13/2021 Can not be calculated     LDL Cholesterol (mg/dL)   Date Value   01/27/2023 57       (goal LDL is <100)   AST (U/L)   Date Value   01/27/2023 23     ALT (U/L)   Date Value   01/27/2023 43 (H)     BUN (mg/dL)   Date Value   03/13/2023 25 (H)     BP Readings from Last 3 Encounters:   06/20/23 130/80   05/26/23 128/76   05/08/23 137/84          (goal 120/80)        Patient Active Problem List:     HTN (hypertension)     Skin tag     Low back pain with sciatica     Hyperglycemia     Infected sebaceous cyst     Chronic midline low back pain without sciatica     Chronic pain of right knee     Chest pain, unspecified     Bronchitis     Chronic systolic congestive heart failure (HCC)     Ventral hernia     Epigastric pain     Elevated LFTs     Shortness of breath     Rectus diastasis     Lumbosacral spondylosis without myelopathy     COPD exacerbation (HCC)     Mixed simple and mucopurulent chronic bronchitis (HCC)     VANDANA (obstructive sleep apnea)     Tachycardia     Chest pain     Diabetes mellitus type 2 in obese ([address])     Need for shingles vaccine     Other male erectile dysfunction     Hernia of abdominal cavity     Hypokalemia      ----Diane Iraheta

## 2023-06-20 NOTE — PROGRESS NOTES
Verbally reviewed medication list with patient; patient verbalized understanding. Discussed 2000mg/day sodium restricted diet; patient verbalized understanding. Moderate daily exercise encouraged as tolerated. Discussed rest breaks as needed; patient verbalized understanding. Patient instructed to weigh self at the same time of each day, using same clothes and same scale; reinforced teaching to monitor for 3-5 lb weight increase over 1-2 days, and to notify the CHF clinic at [phone] or physician office if weight change noted. Patient verbalized understanding. Risks of smoking discussed with the patient if applicable; patient strongly discouraged to smoke. Patient verbalized understanding. Signs and symptoms of CHF discussed with patient, such as feeling more tired than normal, feeling short of breath, coughing that increases when you lie down, sudden weight gain, swelling of your feet, legs or belly. Patient verbalized understanding to notify the CHF clinic at [phone] or physician office if these symptoms occur. Compliance with plan of care and further disease process causes discussed with patient, patient encouraged to keep all follow up appointments. Patient verbalized understanding.

## 2023-06-22 ENCOUNTER — HOSPITAL ENCOUNTER (OUTPATIENT)
Dept: PAIN MANAGEMENT | Age: 63
Discharge: HOME | End: 2023-06-22
Payer: COMMERCIAL

## 2023-06-22 VITALS
HEART RATE: 81 BPM | SYSTOLIC BLOOD PRESSURE: 134 MMHG | BODY MASS INDEX: 44.32 KG/M2 | OXYGEN SATURATION: 99 % | RESPIRATION RATE: 20 BRPM | DIASTOLIC BLOOD PRESSURE: 66 MMHG | TEMPERATURE: 97.6 F | WEIGHT: 266 LBS | HEIGHT: 65 IN

## 2023-06-22 DIAGNOSIS — G89.29 CHRONIC MIDLINE LOW BACK PAIN WITHOUT SCIATICA: ICD-10-CM

## 2023-06-22 DIAGNOSIS — M47.817 LUMBOSACRAL SPONDYLOSIS WITHOUT MYELOPATHY: Primary | ICD-10-CM

## 2023-06-22 DIAGNOSIS — M54.50 CHRONIC MIDLINE LOW BACK PAIN WITHOUT SCIATICA: ICD-10-CM

## 2023-06-22 PROCEDURE — 99215 OFFICE O/P EST HI 40 MIN: CPT

## 2023-06-22 ASSESSMENT — ENCOUNTER SYMPTOMS
VOMITING: 0
BACK PAIN: 1
NAUSEA: 0
DIARRHEA: 0
CONSTIPATION: 0

## 2023-06-22 NOTE — PROGRESS NOTES
The patient is a 60 y. o. Non- / non  male. Chief Complaint   Patient presents with    Back Pain        Back Pain  Associated symptoms include weakness. Pertinent negatives include no chest pain, fever, headaches or numbness.      79-year-old man with history of chronic low back pain  Located in the lower lumbar area  Relates the pain onset 2 a remote injury at age 15  Since then he had worsening back pain  No previous lumbar spine surgery  No previous interventional procedure  Completed physical therapy last year with no significant improvement  Pain aggravates with routine activity interfere with quality of life  Denies any progressive leg weakness or bladder bowel incontinence  Patient of tried NSAIDs and muscle relaxant with limited benefit    -Predominantly axial lower back pain located in the lumbar area across midline affect both side no significant dermatomal radiation  Describes it as aching throbbing stiffness  Rates intensity as 7/10  Symptoms of progressively been worsening over last several years  Clinical presentation suggest facet mediated pain  Will recommend for diagnostic lumbar medial branch nerve block at bilateral lower lumbar facet L3-4 and L4-5  If this provide good relief we will then consider for confirmatory block and radiofrequency ablation    Last imaging was 2018  I will obtain new MRI lumbar spine     Patient is here today for: Low back pain  Pain level: 6  Character: burning, sharp  Radiating: down both legs  Weakness or numbness: yes  Aggravating Factors: walking, standing, lifting  Alleviating Factors: lying on back with knees bent  Constant or intermitting: constant  Bladder/bowel loss: no       Past Medical History:   Diagnosis Date    Bronchitis     CAD (coronary artery disease)     Cardiomyopathy ([facility]     Chest pain     CHF (congestive heart failure) ([facility])     CHF (congestive heart failure), NYHA class I, acute on chronic, diastolic ([address] 39/42/8939    Chronic

## 2023-06-23 NOTE — TRANSFER CENTER NOTE
[x] [facility]  [address]  [phone]  F: [phone]        THERAPY RESPONSIBILITY OF CARE TRANSFER FORM       PATIENT NAME: Idalia Ferrell  MRN: [id number]   : 1960      TRANSFERRING FACILITY:    [] [facility]   [] [facility]   []  [facility]   [] [facility]   [] [facility] [] [facility]   [x] [facility]  [] [facility]   [] Other:       ACCEPTING FACILITY   [] [facility]   [x] [facility]   []  [facility]   [] [facility]   [] [facility] [] [facility]   [] [facility]  [] [facility]   [] Other:          REASON FOR TRANSFER: Pt wishes to do aquatic ex. TRANSFER OF CARE:    I am transferring the care of the above patient to: ***, PT      Manjula Nayak, PT  2023      ACCEPTANCE OF CARE:     I am accepting the care of the above patient.  ***, PT

## 2023-06-23 NOTE — FLOWSHEET NOTE
Scot Fall Risk Assessment    Patient Name:  Nyal Greenfield  : 1960    Risk Factor Scale  Score   History of Falls [x] Yes  [] No 25  0 25   Secondary Diagnosis [] Yes  [x] No 15  0 0   Ambulatory Aid [] Furniture  [] Crutches/cane/walker  [x] None/bedrest/wheelchair/nurse 30  15  0 0   IV/Heparin Lock [] Yes  [x] No 20  0 0   Gait/Transferring [] Impaired  [x] Weak  [] Normal/bedrest/immobile 20  10  0 10   Mental Status [] Forgets limitations  [x] Oriented to own ability 15  0 0      Total:35     Based on the Assessment score: check the appropriate box.     []  No intervention needed   Low =   Score of 0-24    [x]  Use standard prevention interventions Moderate =  Score of 24-44   [x] Give patient handout and discuss fall prevention strategies   [x] Establish goal of education for patient/family RE: fall prevention strategies    []  Use high risk prevention interventions High = Score of 45 and higher   [] Give patient handout and discuss fall prevention strategies   [] Establish goal of education for patient/family Re: fall prevention strategies   [] Discuss lifeline / other resources    Electronically signed by:   Manuel Vivar PT  Date: 2023

## 2023-06-27 ENCOUNTER — HOSPITAL ENCOUNTER (OUTPATIENT)
Dept: PULMONOLOGY | Age: 63
Discharge: HOME | End: 2023-06-27
Payer: COMMERCIAL

## 2023-06-27 DIAGNOSIS — G47.33 OSA (OBSTRUCTIVE SLEEP APNEA): ICD-10-CM

## 2023-06-27 LAB
DLCO %PRED: NORMAL
DLCO PRED: NORMAL
DLCO/VA %PRED: NORMAL
DLCO/VA PRED: NORMAL
DLCO/VA: NORMAL
DLCO: NORMAL
EXPIRATORY TIME: NORMAL
FEF 25-75% %PRED-PRE: NORMAL
FEF 25-75% PRED: NORMAL
FEF 25-75%-PRE: NORMAL
FEV1 %PRED-PRE: NORMAL
FEV1 PRED: NORMAL
FEV1/FVC %PRED-PRE: NORMAL
FEV1/FVC PRED: NORMAL
FEV1/FVC: NORMAL
FEV1: NORMAL
FVC %PRED-PRE: NORMAL
FVC PRED: NORMAL
FVC: NORMAL
GAW %PRED: NORMAL
GAW PRED: NORMAL
GAW: NORMAL
IC %PRED: NORMAL
IC PRED: NORMAL
IC: NORMAL
MVV %PRED-PRE: NORMAL
MVV PRED: NORMAL
MVV-PRE: NORMAL
PEF %PRED-PRE: NORMAL
PEF PRED: NORMAL
PEF-PRE: NORMAL
RAW %PRED: NORMAL
RAW PRED: NORMAL
RAW: NORMAL
RV %PRED: NORMAL
RV PRED: NORMAL
RV: NORMAL
SVC %PRED: NORMAL
SVC PRED: NORMAL
SVC: NORMAL
TLC %PRED: NORMAL
TLC PRED: NORMAL
TLC: NORMAL
VA %PRED: NORMAL
VA PRED: NORMAL
VA: NORMAL
VTG %PRED: NORMAL
VTG PRED: NORMAL
VTG: NORMAL

## 2023-06-27 PROCEDURE — 94060 EVALUATION OF WHEEZING: CPT

## 2023-06-27 PROCEDURE — 94726 PLETHYSMOGRAPHY LUNG VOLUMES: CPT

## 2023-06-27 PROCEDURE — 94618 PULMONARY STRESS TESTING: CPT

## 2023-06-27 PROCEDURE — 94664 DEMO&/EVAL PT USE INHALER: CPT

## 2023-06-27 PROCEDURE — 94375 RESPIRATORY FLOW VOLUME LOOP: CPT

## 2023-06-27 PROCEDURE — 6370000000 HC RX 637 (ALT 250 FOR IP): Performed by: INTERNAL MEDICINE

## 2023-06-27 PROCEDURE — 94729 DIFFUSING CAPACITY: CPT

## 2023-06-27 RX ORDER — ALBUTEROL SULFATE 90 UG/1
2 AEROSOL, METERED RESPIRATORY (INHALATION) ONCE
Status: COMPLETED | OUTPATIENT
Start: 2023-06-27 | End: 2023-06-27

## 2023-06-27 RX ADMIN — ALBUTEROL SULFATE 2 PUFF: 90 AEROSOL, METERED RESPIRATORY (INHALATION) at 14:13

## 2023-07-07 ENCOUNTER — HOSPITAL ENCOUNTER (OUTPATIENT)
Age: 63
Setting detail: SPECIMEN
Discharge: HOME | End: 2023-07-07

## 2023-07-07 ENCOUNTER — OFFICE VISIT (OUTPATIENT)
Dept: FAMILY MEDICINE CLINIC | Age: 63
End: 2023-07-07
Payer: COMMERCIAL

## 2023-07-07 VITALS
HEART RATE: 84 BPM | WEIGHT: 267.6 LBS | BODY MASS INDEX: 44.58 KG/M2 | SYSTOLIC BLOOD PRESSURE: 117 MMHG | HEIGHT: 65 IN | DIASTOLIC BLOOD PRESSURE: 64 MMHG

## 2023-07-07 DIAGNOSIS — I50.22 CHRONIC SYSTOLIC CONGESTIVE HEART FAILURE (HCC): ICD-10-CM

## 2023-07-07 DIAGNOSIS — E11.69 DIABETES MELLITUS TYPE 2 IN OBESE (HCC): Primary | ICD-10-CM

## 2023-07-07 DIAGNOSIS — G89.29 CHRONIC MIDLINE LOW BACK PAIN WITHOUT SCIATICA: ICD-10-CM

## 2023-07-07 DIAGNOSIS — E66.9 DIABETES MELLITUS TYPE 2 IN OBESE (HCC): Primary | ICD-10-CM

## 2023-07-07 DIAGNOSIS — E66.9 DIABETES MELLITUS TYPE 2 IN OBESE (HCC): ICD-10-CM

## 2023-07-07 DIAGNOSIS — M54.50 CHRONIC MIDLINE LOW BACK PAIN WITHOUT SCIATICA: ICD-10-CM

## 2023-07-07 DIAGNOSIS — E11.69 DIABETES MELLITUS TYPE 2 IN OBESE (HCC): ICD-10-CM

## 2023-07-07 LAB
CREAT UR-MCNC: 64.3 MG/DL (ref 39–259)
HBA1C MFR BLD: 7.4 %
MICROALBUMIN UR-MCNC: <12 MG/L
MICROALBUMIN/CREAT UR-RTO: NORMAL MCG/MG CREAT

## 2023-07-07 PROCEDURE — 83037 HB GLYCOSYLATED A1C HOME DEV: CPT

## 2023-07-07 RX ORDER — SIMVASTATIN 20 MG
TABLET ORAL
Qty: 30 TABLET | Refills: 3 | Status: SHIPPED | OUTPATIENT
Start: 2023-07-07

## 2023-07-07 RX ORDER — NAPROXEN 500 MG/1
TABLET ORAL
Qty: 30 TABLET | Refills: 0 | Status: SHIPPED | OUTPATIENT
Start: 2023-07-07

## 2023-07-07 ASSESSMENT — ENCOUNTER SYMPTOMS
WHEEZING: 0
SHORTNESS OF BREATH: 0
ABDOMINAL PAIN: 0
VOMITING: 0
DIARRHEA: 0
COUGH: 0
NAUSEA: 0
CONSTIPATION: 0

## 2023-07-07 ASSESSMENT — PATIENT HEALTH QUESTIONNAIRE - PHQ9
SUM OF ALL RESPONSES TO PHQ9 QUESTIONS 1 & 2: 0
2. FEELING DOWN, DEPRESSED OR HOPELESS: 0
SUM OF ALL RESPONSES TO PHQ QUESTIONS 1-9: 0
SUM OF ALL RESPONSES TO PHQ QUESTIONS 1-9: 0
1. LITTLE INTEREST OR PLEASURE IN DOING THINGS: 0
SUM OF ALL RESPONSES TO PHQ QUESTIONS 1-9: 0
SUM OF ALL RESPONSES TO PHQ QUESTIONS 1-9: 0

## 2023-07-07 NOTE — PATIENT INSTRUCTIONS
Thank you for letting us take care of you today. We hope all your questions were addressed. If a question was overlooked or something else comes to mind after you return home, please contact a member of your Care Team listed below. Your Care Team at [address] [place] [address] is Team #2  Ward Enriquez M.D. (Faculty)  Lucrecia Velasquez, (Resident)  Radha Clark, (Resident)  Genaro Licona, (Resident)  Ashlyn Olson, (Resident)  Darlin Gillis, (Resident)  Koko Mendezs, [address] [place] [address], [facility]  Chad Santiago,  [facility]  Asuncion Jennings, [facility]  [place] U[place]a, [facility]  Emy Canales, [facility]  John Condon) [address], [place] ([facility])  Talisha Hilario [facility] (Clinical Pharmacist)     Office phone number: [phone]    If you need to get in right away due to illness, please be advised we have \"Same Day\" appointments available Monday-Friday. Please call us at [phone] option #3 to schedule your \"Same Day\" appointment.

## 2023-07-09 ENCOUNTER — HOSPITAL ENCOUNTER (OUTPATIENT)
Dept: SLEEP CENTER | Age: 63
Discharge: HOME | End: 2023-07-11
Payer: COMMERCIAL

## 2023-07-09 DIAGNOSIS — G47.33 OSA (OBSTRUCTIVE SLEEP APNEA): ICD-10-CM

## 2023-07-09 PROCEDURE — 95810 POLYSOM 6/> YRS 4/> PARAM: CPT

## 2023-07-10 VITALS
OXYGEN SATURATION: 97 % | RESPIRATION RATE: 14 BRPM | HEART RATE: 82 BPM | WEIGHT: 264 LBS | HEIGHT: 65 IN | BODY MASS INDEX: 43.99 KG/M2

## 2023-07-10 ASSESSMENT — SLEEP AND FATIGUE QUESTIONNAIRES
HOW LIKELY ARE YOU TO NOD OFF OR FALL ASLEEP WHILE SITTING AND TALKING TO SOMEONE: 1
HOW LIKELY ARE YOU TO NOD OFF OR FALL ASLEEP WHILE WATCHING TV: 2
HOW LIKELY ARE YOU TO NOD OFF OR FALL ASLEEP WHILE SITTING INACTIVE IN A PUBLIC PLACE: 2
HOW LIKELY ARE YOU TO NOD OFF OR FALL ASLEEP WHILE SITTING QUIETLY AFTER LUNCH WITHOUT ALCOHOL: 2
HOW LIKELY ARE YOU TO NOD OFF OR FALL ASLEEP WHILE LYING DOWN TO REST IN THE AFTERNOON WHEN CIRCUMSTANCES PERMIT: 3
HOW LIKELY ARE YOU TO NOD OFF OR FALL ASLEEP WHILE SITTING AND READING: 3
ESS TOTAL SCORE: 18
HOW LIKELY ARE YOU TO NOD OFF OR FALL ASLEEP IN A CAR, WHILE STOPPED FOR A FEW MINUTES IN TRAFFIC: 2
HOW LIKELY ARE YOU TO NOD OFF OR FALL ASLEEP WHEN YOU ARE A PASSENGER IN A CAR FOR AN HOUR WITHOUT A BREAK: 3

## 2023-07-18 LAB — STATUS: NORMAL

## 2023-07-19 ENCOUNTER — HOSPITAL ENCOUNTER (OUTPATIENT)
Dept: PAIN MANAGEMENT | Facility: CLINIC | Age: 63
Discharge: HOME | End: 2023-07-19

## 2023-07-19 NOTE — DISCHARGE INSTRUCTIONS
Discharge Instructions following Sedation or Anesthesia:  You have  received  a sedative/anesthetic therefore, you should not consume any alcoholic beverages for minimum of 12 hours. Do not drive or operate machinery for 24 hours. Do not sign legal documents for 24 hours. Dizziness, drowsiness, and unsteadiness may occur. Rest when need to. Increase diet as tolerated. Keep up on fluids if diet allows. General Instructions:  Do not take a tub bath for 72 hours after procedure (this includes hot tubs and swimming pools). You may shower, but avoid hot water to injection site. Avoid strenuous activity TODAY especially if you experience dizziness. Remove band-aid the next day. Wash off any residual iodine   Do not use heat, heating pad, or any other heating device over the injection site for 3 days after the procedure. If you experience pain after your procedure, you may continue with your current pain medication as prescribed. (DO NOT INCREASE YOUR PAIN MEDICATION WITHOUT TALKING TO DOCTOR)  Soreness and pain at injection site is common, may use ice to reduce soreness. Please complete pain diary as instructed.      Call [facility] at [phone] if you experience:   Fever, chills or temperature over 100    Vomiting, Headache, persistent stiff neck, nausea, blurred vision   Difficulty in urinating or unable to urinate with 8 hours   Increase in weakness, numbness or loss of function   Increased redness, swelling or drainage at the injection site

## 2023-07-20 ENCOUNTER — HOSPITAL ENCOUNTER (OUTPATIENT)
Dept: OTHER | Age: 63
Discharge: HOME | End: 2023-07-20
Payer: COMMERCIAL

## 2023-07-20 ENCOUNTER — HOSPITAL ENCOUNTER (OUTPATIENT)
Age: 63
Discharge: HOME | End: 2023-07-20
Payer: COMMERCIAL

## 2023-07-20 VITALS
DIASTOLIC BLOOD PRESSURE: 80 MMHG | WEIGHT: 263.8 LBS | SYSTOLIC BLOOD PRESSURE: 130 MMHG | BODY MASS INDEX: 43.9 KG/M2 | HEART RATE: 89 BPM | RESPIRATION RATE: 20 BRPM | OXYGEN SATURATION: 97 %

## 2023-07-20 DIAGNOSIS — I50.22 CHRONIC SYSTOLIC CONGESTIVE HEART FAILURE (HCC): Primary | ICD-10-CM

## 2023-07-20 DIAGNOSIS — E87.6 HYPOKALEMIA: Primary | ICD-10-CM

## 2023-07-20 DIAGNOSIS — I50.22 CHRONIC SYSTOLIC CONGESTIVE HEART FAILURE (HCC): ICD-10-CM

## 2023-07-20 LAB
ANION GAP SERPL CALCULATED.3IONS-SCNC: 16 MMOL/L (ref 9–17)
BUN SERPL-MCNC: 18 MG/DL (ref 8–23)
CALCIUM SERPL-MCNC: 9.4 MG/DL (ref 8.6–10.4)
CHLORIDE SERPL-SCNC: 95 MMOL/L (ref 98–107)
CO2 SERPL-SCNC: 26 MMOL/L (ref 20–31)
CREAT SERPL-MCNC: 1.3 MG/DL (ref 0.7–1.2)
GFR SERPL CREATININE-BSD FRML MDRD: >60 ML/MIN/1.73M2
GLUCOSE SERPL-MCNC: 183 MG/DL (ref 70–99)
POTASSIUM SERPL-SCNC: 3.6 MMOL/L (ref 3.7–5.3)
SODIUM SERPL-SCNC: 137 MMOL/L (ref 135–144)

## 2023-07-20 PROCEDURE — 80048 BASIC METABOLIC PNL TOTAL CA: CPT

## 2023-07-20 PROCEDURE — 99212 OFFICE O/P EST SF 10 MIN: CPT

## 2023-07-20 PROCEDURE — 36415 COLL VENOUS BLD VENIPUNCTURE: CPT

## 2023-07-20 RX ORDER — BUMETANIDE 2 MG/1
TABLET ORAL
Qty: 60 TABLET | Refills: 2 | Status: SHIPPED | OUTPATIENT
Start: 2023-07-20

## 2023-07-20 ASSESSMENT — PAIN DESCRIPTION - PAIN TYPE: TYPE: CHRONIC PAIN

## 2023-07-20 ASSESSMENT — PAIN DESCRIPTION - ORIENTATION: ORIENTATION: LOWER

## 2023-07-20 ASSESSMENT — PAIN SCALES - GENERAL: PAINLEVEL_OUTOF10: 3

## 2023-07-20 ASSESSMENT — PAIN DESCRIPTION - LOCATION: LOCATION: BACK

## 2023-07-20 ASSESSMENT — PAIN DESCRIPTION - DESCRIPTORS: DESCRIPTORS: ACHING

## 2023-07-20 NOTE — TELEPHONE ENCOUNTER
E-scribe request for BUMEX 2 MG. Please review and e-scribe if applicable. Last Visit Date:  7/7/2023  Next Visit Date:  10/13/2023    Hemoglobin A1C (%)   Date Value   07/07/2023 7.4   02/17/2023 6.7   09/16/2022 7.6             ( goal A1C is < 7)   Microalb/Crt.  Ratio (mcg/mg creat)   Date Value   07/07/2023 Can not be calculated     LDL Cholesterol (mg/dL)   Date Value   01/27/2023 57       (goal LDL is <100)   AST (U/L)   Date Value   01/27/2023 23     ALT (U/L)   Date Value   01/27/2023 43 (H)     BUN (mg/dL)   Date Value   03/13/2023 25 (H)     BP Readings from Last 3 Encounters:   07/20/23 130/80   07/07/23 117/64   06/22/23 134/66          (goal 120/80)        Patient Active Problem List:     HTN (hypertension)     Skin tag     Low back pain with sciatica     Hyperglycemia     Infected sebaceous cyst     Chronic midline low back pain without sciatica     Chronic pain of right knee     Chest pain, unspecified     Bronchitis     Chronic systolic congestive heart failure (HCC)     Ventral hernia     Epigastric pain     Elevated LFTs     Shortness of breath     Rectus diastasis     Lumbosacral spondylosis without myelopathy     COPD exacerbation (HCC)     Mixed simple and mucopurulent chronic bronchitis (HCC)     VANDANA (obstructive sleep apnea)     Tachycardia     Chest pain     Diabetes mellitus type 2 in obese ([address])     Need for shingles vaccine     Other male erectile dysfunction     Hernia of abdominal cavity     Hypokalemia      ----JF

## 2023-07-21 ENCOUNTER — TELEPHONE (OUTPATIENT)
Dept: OTHER | Age: 63
End: 2023-07-21

## 2023-07-21 NOTE — TELEPHONE ENCOUNTER
Phoned pt at home to notify lab BMP from yesterday was reviewed by Audra Crespo CNP. No medication changes. Potassium 3.6 and instructed to repeat lab bmp prior to next appt in one month.

## 2023-07-31 DIAGNOSIS — I50.22 CHRONIC SYSTOLIC CONGESTIVE HEART FAILURE (HCC): ICD-10-CM

## 2023-07-31 NOTE — TELEPHONE ENCOUNTER
Faxed Refill Request of Spironolactone 25 mg tab received from [address] [place] [address]. Medication Pended. Pt last seen on 7/7/2023, Next appt is 10/13/2023.     Health Maintenance   Topic Date Due    Diabetic foot exam  Never done    Diabetic retinal exam  Never done    Flu vaccine (1) 08/01/2023    Lipids  01/27/2024    Colorectal Cancer Screen  05/28/2024    A1C test (Diabetic or Prediabetic)  07/07/2024    Diabetic Alb to Cr ratio (uACR) test  07/07/2024    Depression Screen  07/07/2024    GFR test (Diabetes, CKD 3-4, OR last GFR 15-59)  07/20/2024    DTaP/Tdap/Td vaccine (2 - Td or Tdap) 12/08/2030    Shingles vaccine  Completed    Pneumococcal 0-64 years Vaccine  Completed    COVID-19 Vaccine  Completed    Hepatitis C screen  Completed    HIV screen  Completed    Hepatitis A vaccine  Aged Out    Hib vaccine  Aged Out    Meningococcal (ACWY) vaccine  Aged Out    Prostate Specific Antigen (PSA) Screening or Monitoring  Discontinued       Hemoglobin A1C (%)   Date Value   07/07/2023 7.4   02/17/2023 6.7   09/16/2022 7.6             ( goal A1C is < 7)   No components found for: LABMICR  LDL Cholesterol (mg/dL)   Date Value   01/27/2023 57       (goal LDL is <100)   AST (U/L)   Date Value   01/27/2023 23     ALT (U/L)   Date Value   01/27/2023 43 (H)     BUN (mg/dL)   Date Value   07/20/2023 18     BP Readings from Last 3 Encounters:   07/20/23 130/80   07/07/23 117/64   06/22/23 134/66          (goal 120/80)    All Future Testing planned in [facility]  Lab Frequency Next Occurrence   Protein / Creatinine Ratio, Urine Once 52/00/8085   Basic Metabolic Panel Once 22/56/8101   Sleep Study with PAP Titration Once 08/26/2023   MRI LUMBAR SPINE WO CONTRAST Once 39/22/3336   Basic Metabolic Panel Once 43/45/6159       Next Visit Date:  Future Appointments   Date Time Provider [address]   8/3/2023  7:30 PM [facility] SLEEP RM 4 [facility] Sleep [place]   8/9/2023  2:45 PM Александр Mele, DO [facility]   8/17/2023 10:30

## 2023-08-01 ENCOUNTER — TELEPHONE (OUTPATIENT)
Dept: PAIN MANAGEMENT | Age: 63
End: 2023-08-01

## 2023-08-01 RX ORDER — SPIRONOLACTONE 25 MG/1
25 TABLET ORAL NIGHTLY
Qty: 30 TABLET | Refills: 3 | Status: SHIPPED | OUTPATIENT
Start: 2023-08-01

## 2023-08-03 ENCOUNTER — HOSPITAL ENCOUNTER (OUTPATIENT)
Dept: SLEEP CENTER | Age: 63
Discharge: HOME | End: 2023-08-05
Payer: COMMERCIAL

## 2023-08-03 VITALS
HEIGHT: 65 IN | RESPIRATION RATE: 14 BRPM | HEART RATE: 76 BPM | BODY MASS INDEX: 43.99 KG/M2 | WEIGHT: 264 LBS | OXYGEN SATURATION: 95 %

## 2023-08-03 DIAGNOSIS — G47.33 OSA (OBSTRUCTIVE SLEEP APNEA): ICD-10-CM

## 2023-08-03 PROCEDURE — 95811 POLYSOM 6/>YRS CPAP 4/> PARM: CPT

## 2023-08-15 LAB — STATUS: NORMAL

## 2023-08-16 ENCOUNTER — HOSPITAL ENCOUNTER (OUTPATIENT)
Age: 63
Discharge: HOME | End: 2023-08-16
Payer: COMMERCIAL

## 2023-08-16 DIAGNOSIS — E87.6 HYPOKALEMIA: ICD-10-CM

## 2023-08-16 DIAGNOSIS — I50.22 CHRONIC SYSTOLIC CONGESTIVE HEART FAILURE (HCC): Primary | ICD-10-CM

## 2023-08-16 DIAGNOSIS — E66.9 DIABETES MELLITUS TYPE 2 IN OBESE (HCC): ICD-10-CM

## 2023-08-16 DIAGNOSIS — E11.69 DIABETES MELLITUS TYPE 2 IN OBESE (HCC): ICD-10-CM

## 2023-08-16 LAB
ANION GAP SERPL CALCULATED.3IONS-SCNC: 12 MMOL/L (ref 9–17)
BUN SERPL-MCNC: 14 MG/DL (ref 8–23)
CALCIUM SERPL-MCNC: 8.8 MG/DL (ref 8.6–10.4)
CHLORIDE SERPL-SCNC: 102 MMOL/L (ref 98–107)
CO2 SERPL-SCNC: 26 MMOL/L (ref 20–31)
CREAT SERPL-MCNC: 1 MG/DL (ref 0.7–1.2)
GFR SERPL CREATININE-BSD FRML MDRD: >60 ML/MIN/1.73M2
GLUCOSE SERPL-MCNC: 159 MG/DL (ref 70–99)
POTASSIUM SERPL-SCNC: 4 MMOL/L (ref 3.7–5.3)
SODIUM SERPL-SCNC: 140 MMOL/L (ref 135–144)

## 2023-08-16 PROCEDURE — 80048 BASIC METABOLIC PNL TOTAL CA: CPT

## 2023-08-16 PROCEDURE — 36415 COLL VENOUS BLD VENIPUNCTURE: CPT

## 2023-08-17 ENCOUNTER — HOSPITAL ENCOUNTER (OUTPATIENT)
Dept: OTHER | Age: 63
Discharge: HOME | End: 2023-08-17
Payer: COMMERCIAL

## 2023-08-17 VITALS
OXYGEN SATURATION: 98 % | WEIGHT: 264.2 LBS | SYSTOLIC BLOOD PRESSURE: 122 MMHG | BODY MASS INDEX: 43.97 KG/M2 | DIASTOLIC BLOOD PRESSURE: 80 MMHG | HEART RATE: 74 BPM | RESPIRATION RATE: 20 BRPM

## 2023-08-17 PROCEDURE — 99213 OFFICE O/P EST LOW 20 MIN: CPT

## 2023-08-17 NOTE — TELEPHONE ENCOUNTER
E-scribe request for Quinton Fuller. Please review and e-scribe if applicable.      Last Visit Date:  7/7/2023  Next Visit Date:  10/13/2023    Hemoglobin A1C (%)   Date Value   07/07/2023 7.4   02/17/2023 6.7   09/16/2022 7.6             ( goal A1C is < 7)   No components found for: LABMICR  LDL Cholesterol (mg/dL)   Date Value   01/27/2023 57       (goal LDL is <100)   AST (U/L)   Date Value   01/27/2023 23     ALT (U/L)   Date Value   01/27/2023 43 (H)     BUN (mg/dL)   Date Value   08/16/2023 14     BP Readings from Last 3 Encounters:   07/20/23 130/80   07/07/23 117/64   06/22/23 134/66          (goal 120/80)        Patient Active Problem List:     HTN (hypertension)     Skin tag     Low back pain with sciatica     Hyperglycemia     Infected sebaceous cyst     Chronic midline low back pain without sciatica     Chronic pain of right knee     Chest pain, unspecified     Bronchitis     Chronic systolic congestive heart failure (HCC)     Ventral hernia     Epigastric pain     Elevated LFTs     Shortness of breath     Rectus diastasis     Lumbosacral spondylosis without myelopathy     COPD exacerbation (HCC)     Mixed simple and mucopurulent chronic bronchitis (HCC)     VANDANA (obstructive sleep apnea)     Tachycardia     Chest pain     Diabetes mellitus type 2 in obese ([address])     Need for shingles vaccine     Other male erectile dysfunction     Hernia of abdominal cavity     Hypokalemia      ----JF

## 2023-08-17 NOTE — TELEPHONE ENCOUNTER
Patients Cardiology nurse contacting office because patient would like his Metoprolol changed from 100 mg twice daily to 200 mg once daily. Please advise.

## 2023-08-22 RX ORDER — METOPROLOL SUCCINATE 200 MG/1
200 TABLET, EXTENDED RELEASE ORAL DAILY
Qty: 30 TABLET | Refills: 3 | Status: SHIPPED | OUTPATIENT
Start: 2023-08-22

## 2023-08-24 ENCOUNTER — TELEPHONE (OUTPATIENT)
Dept: FAMILY MEDICINE CLINIC | Age: 63
End: 2023-08-24

## 2023-08-24 NOTE — TELEPHONE ENCOUNTER
Patient called office asking why his Gabapentin is not being filled. Patient states when he seen his PCP in July, he was supposed to fill it. Patient was told there is no documentation in the chart from last appointment that even discusses Gabapentin. Patient was told office has not filled Gabapentin for him since 02/2023. Patient was upset he needed to make an appointment and was told medication can only be filled by his PCP or PCP attending and they are not available until September. Patient said thanks any ways and hung up.

## 2023-08-25 DIAGNOSIS — G89.29 CHRONIC MIDLINE LOW BACK PAIN WITHOUT SCIATICA: ICD-10-CM

## 2023-08-25 DIAGNOSIS — M54.50 CHRONIC MIDLINE LOW BACK PAIN WITHOUT SCIATICA: ICD-10-CM

## 2023-08-25 RX ORDER — GABAPENTIN 300 MG/1
CAPSULE ORAL
Qty: 30 CAPSULE | Refills: 1 | Status: SHIPPED | OUTPATIENT
Start: 2023-08-25 | End: 2024-08-24

## 2023-09-07 ENCOUNTER — HOSPITAL ENCOUNTER (OUTPATIENT)
Dept: OTHER | Age: 63
Discharge: HOME | End: 2023-09-07
Payer: COMMERCIAL

## 2023-09-07 VITALS
RESPIRATION RATE: 16 BRPM | BODY MASS INDEX: 42.27 KG/M2 | HEART RATE: 89 BPM | OXYGEN SATURATION: 99 % | SYSTOLIC BLOOD PRESSURE: 118 MMHG | WEIGHT: 254 LBS | DIASTOLIC BLOOD PRESSURE: 74 MMHG

## 2023-09-07 PROCEDURE — 99212 OFFICE O/P EST SF 10 MIN: CPT

## 2023-10-05 ENCOUNTER — HOSPITAL ENCOUNTER (OUTPATIENT)
Dept: OTHER | Age: 63
Discharge: HOME | End: 2023-10-05
Payer: COMMERCIAL

## 2023-10-05 VITALS
HEART RATE: 93 BPM | DIASTOLIC BLOOD PRESSURE: 80 MMHG | SYSTOLIC BLOOD PRESSURE: 142 MMHG | WEIGHT: 251.4 LBS | BODY MASS INDEX: 41.84 KG/M2 | OXYGEN SATURATION: 98 %

## 2023-10-05 PROCEDURE — 99212 OFFICE O/P EST SF 10 MIN: CPT

## 2023-10-06 ENCOUNTER — HOSPITAL ENCOUNTER (EMERGENCY)
Age: 63
Discharge: HOME | End: 2023-10-06
Attending: EMERGENCY MEDICINE
Payer: COMMERCIAL

## 2023-10-06 ENCOUNTER — APPOINTMENT (OUTPATIENT)
Dept: GENERAL RADIOLOGY | Age: 63
End: 2023-10-06
Payer: COMMERCIAL

## 2023-10-06 VITALS
RESPIRATION RATE: 18 BRPM | WEIGHT: 251 LBS | DIASTOLIC BLOOD PRESSURE: 82 MMHG | SYSTOLIC BLOOD PRESSURE: 126 MMHG | OXYGEN SATURATION: 97 % | BODY MASS INDEX: 41.82 KG/M2 | HEIGHT: 65 IN | TEMPERATURE: 98.3 F | HEART RATE: 80 BPM

## 2023-10-06 DIAGNOSIS — S70.01XA CONTUSION OF RIGHT HIP, INITIAL ENCOUNTER: Primary | ICD-10-CM

## 2023-10-06 PROCEDURE — 99284 EMERGENCY DEPT VISIT MOD MDM: CPT

## 2023-10-06 PROCEDURE — 96372 THER/PROPH/DIAG INJ SC/IM: CPT

## 2023-10-06 PROCEDURE — 6360000002 HC RX W HCPCS: Performed by: EMERGENCY MEDICINE

## 2023-10-06 PROCEDURE — 73502 X-RAY EXAM HIP UNI 2-3 VIEWS: CPT

## 2023-10-06 RX ORDER — OXYCODONE HYDROCHLORIDE AND ACETAMINOPHEN 5; 325 MG/1; MG/1
1 TABLET ORAL EVERY 6 HOURS PRN
Qty: 10 TABLET | Refills: 0 | Status: SHIPPED | OUTPATIENT
Start: 2023-10-06 | End: 2023-10-11

## 2023-10-06 RX ORDER — TIZANIDINE 4 MG/1
4 TABLET ORAL EVERY 8 HOURS PRN
Qty: 15 TABLET | Refills: 0 | Status: SHIPPED | OUTPATIENT
Start: 2023-10-06 | End: 2023-10-13 | Stop reason: SDUPTHER

## 2023-10-06 RX ORDER — KETOROLAC TROMETHAMINE 30 MG/ML
30 INJECTION, SOLUTION INTRAMUSCULAR; INTRAVENOUS ONCE
Status: DISCONTINUED | OUTPATIENT
Start: 2023-10-06 | End: 2023-10-06

## 2023-10-06 RX ORDER — KETOROLAC TROMETHAMINE 30 MG/ML
30 INJECTION, SOLUTION INTRAMUSCULAR; INTRAVENOUS ONCE
Status: COMPLETED | OUTPATIENT
Start: 2023-10-06 | End: 2023-10-06

## 2023-10-06 RX ADMIN — KETOROLAC TROMETHAMINE 30 MG: 30 INJECTION, SOLUTION INTRAMUSCULAR; INTRAVENOUS at 21:09

## 2023-10-06 ASSESSMENT — ENCOUNTER SYMPTOMS
COLOR CHANGE: 0
NAUSEA: 0
TROUBLE SWALLOWING: 0
SHORTNESS OF BREATH: 0
FACIAL SWELLING: 0
RHINORRHEA: 0
CONSTIPATION: 0
BACK PAIN: 0
EYE PAIN: 0
EYE DISCHARGE: 0
VOMITING: 0
EYE REDNESS: 0
BLOOD IN STOOL: 0
SINUS PRESSURE: 0
CHEST TIGHTNESS: 0
COUGH: 0
ABDOMINAL PAIN: 0
WHEEZING: 0
SORE THROAT: 0
DIARRHEA: 0

## 2023-10-06 ASSESSMENT — PAIN DESCRIPTION - LOCATION: LOCATION: HIP

## 2023-10-06 ASSESSMENT — PAIN SCALES - GENERAL: PAINLEVEL_OUTOF10: 10

## 2023-10-06 ASSESSMENT — PAIN DESCRIPTION - PAIN TYPE: TYPE: ACUTE PAIN

## 2023-10-06 ASSESSMENT — PAIN DESCRIPTION - ORIENTATION: ORIENTATION: RIGHT

## 2023-10-06 ASSESSMENT — PAIN - FUNCTIONAL ASSESSMENT: PAIN_FUNCTIONAL_ASSESSMENT: 0-10

## 2023-10-07 NOTE — DISCHARGE INSTRUCTIONS
Thank you for visiting [address] [facility] [facility] ED. You need to call in morning to make appointment as directed with appropriate doctor. Should you have any questions regarding your care or further treatment, please call [facility] Emergency Department at [phone]. Take any medications as prescribed, if given any. Return to ED if symptoms worsen, do not improve, and unable to follow up with your physician, or other concerns arise.

## 2023-10-07 NOTE — ED PROVIDER NOTES
Psychiatric/Behavioral:  Negative for confusion, decreased concentration, hallucinations, self-injury, sleep disturbance and suicidal ideas.         PAST MEDICAL HISTORY     Past Medical History:   Diagnosis Date    Bronchitis     CAD (coronary artery disease)     Cardiomyopathy ([address])     Chest pain     CHF (congestive heart failure) ([facility])     CHF (congestive heart failure), NYHA class I, acute on chronic, diastolic ([facility]) 12/03/4722    Chronic back pain     Chronic diastolic CHF (congestive heart failure) ([address]) 08/01/2014    COPD (chronic obstructive pulmonary disease) ([facility])     Headache(784.0)     Hyperlipidemia     Hypertension     Inguinal hernia     Low back pain with sciatica     Migraine     Mitral valve regurgitation     Osteoarthritis     Patient in clinical research study 02/01/2022    [employer]    Pneumonia     Sleep apnea     with cpap    SOB (shortness of breath) on exertion     Tricuspid regurgitation     Type 2 diabetes mellitus without complication, without long-term current use of insulin ([address])     Under care of team 05/20/2021    cardiology-Dr Mariela Wagoner visit jan 2021    Wellness examination 05/20/2021    [id number]       SURGICAL HISTORY       Past Surgical History:   Procedure Laterality Date    ADENOIDECTOMY      COLONOSCOPY      COLONOSCOPY  05/28/2021    COLORECTAL CANCER SCREENING, NOT HIGH RISK, POLYPECTOMY    COLONOSCOPY N/A 5/28/2021    COLONOSCOPY POLYPECTOMY HOT BIOPSY performed by Dora Cervatnes IV,  at [facility]  02/01/2022    [employer] [address]   exact date unknown       CURRENT MEDICATIONS       Previous Medications    ALBUTEROL (PROVENTIL) (2.5 MG/3ML) 0.083% NEBULIZER SOLUTION    INHALE 1 UNIT DOSE BY NEBULIZATION UP TO EVERY 6 HOURS AS NEEDED FOR WHEEZING OR SHORTNESS OF BREATH    ALBUTEROL SULFATE HFA (VENTOLIN HFA) 108 (90 BASE) MCG/ACT occasionally words are mis-transcribed.)    Sherin Phoenix, MD  Attending Emergency Physician                        Sherin Phoenix, MD  10/06/23 4530

## 2023-10-13 ENCOUNTER — OFFICE VISIT (OUTPATIENT)
Dept: FAMILY MEDICINE CLINIC | Age: 63
End: 2023-10-13
Payer: COMMERCIAL

## 2023-10-13 ENCOUNTER — HOSPITAL ENCOUNTER (OUTPATIENT)
Age: 63
Setting detail: SPECIMEN
Discharge: HOME | End: 2023-10-13

## 2023-10-13 VITALS
SYSTOLIC BLOOD PRESSURE: 129 MMHG | BODY MASS INDEX: 40.94 KG/M2 | WEIGHT: 246 LBS | HEART RATE: 79 BPM | DIASTOLIC BLOOD PRESSURE: 81 MMHG

## 2023-10-13 DIAGNOSIS — E66.9 DIABETES MELLITUS TYPE 2 IN OBESE (HCC): Primary | ICD-10-CM

## 2023-10-13 DIAGNOSIS — M54.50 CHRONIC MIDLINE LOW BACK PAIN WITHOUT SCIATICA: ICD-10-CM

## 2023-10-13 DIAGNOSIS — E11.69 DIABETES MELLITUS TYPE 2 IN OBESE (HCC): ICD-10-CM

## 2023-10-13 DIAGNOSIS — E11.69 DIABETES MELLITUS TYPE 2 IN OBESE (HCC): Primary | ICD-10-CM

## 2023-10-13 DIAGNOSIS — E66.9 DIABETES MELLITUS TYPE 2 IN OBESE (HCC): ICD-10-CM

## 2023-10-13 DIAGNOSIS — I50.22 CHRONIC SYSTOLIC CONGESTIVE HEART FAILURE (HCC): ICD-10-CM

## 2023-10-13 DIAGNOSIS — G89.29 CHRONIC MIDLINE LOW BACK PAIN WITHOUT SCIATICA: ICD-10-CM

## 2023-10-13 LAB
ANION GAP SERPL CALCULATED.3IONS-SCNC: 12 MMOL/L (ref 9–17)
BUN SERPL-MCNC: 14 MG/DL (ref 8–23)
CALCIUM SERPL-MCNC: 9.3 MG/DL (ref 8.6–10.4)
CHLORIDE SERPL-SCNC: 99 MMOL/L (ref 98–107)
CO2 SERPL-SCNC: 28 MMOL/L (ref 20–31)
CREAT SERPL-MCNC: 0.9 MG/DL (ref 0.7–1.2)
GFR SERPL CREATININE-BSD FRML MDRD: >60 ML/MIN/1.73M2
GLUCOSE SERPL-MCNC: 113 MG/DL (ref 70–99)
HBA1C MFR BLD: 6.5 %
POTASSIUM SERPL-SCNC: 4.2 MMOL/L (ref 3.7–5.3)
SODIUM SERPL-SCNC: 139 MMOL/L (ref 135–144)

## 2023-10-13 PROCEDURE — 83036 HEMOGLOBIN GLYCOSYLATED A1C: CPT

## 2023-10-13 PROCEDURE — 90677 PCV20 VACCINE IM: CPT

## 2023-10-13 RX ORDER — GABAPENTIN 400 MG/1
CAPSULE ORAL
Qty: 30 CAPSULE | Refills: 1 | Status: SHIPPED | OUTPATIENT
Start: 2023-10-13 | End: 2024-10-12

## 2023-10-13 RX ORDER — TIZANIDINE 4 MG/1
4 TABLET ORAL EVERY 8 HOURS PRN
Qty: 15 TABLET | Refills: 0 | Status: SHIPPED | OUTPATIENT
Start: 2023-10-13

## 2023-10-13 RX ORDER — SPIRONOLACTONE 25 MG/1
25 TABLET ORAL NIGHTLY
Qty: 30 TABLET | Refills: 3 | Status: SHIPPED | OUTPATIENT
Start: 2023-10-13

## 2023-10-13 RX ORDER — NAPROXEN 500 MG/1
TABLET ORAL
Qty: 30 TABLET | Refills: 0 | Status: SHIPPED | OUTPATIENT
Start: 2023-10-13

## 2023-10-13 RX ORDER — POTASSIUM CHLORIDE 1500 MG/1
20 TABLET, EXTENDED RELEASE ORAL DAILY
Qty: 90 TABLET | Refills: 0 | Status: SHIPPED | OUTPATIENT
Start: 2023-10-13

## 2023-10-13 RX ORDER — METFORMIN HYDROCHLORIDE 500 MG/1
500 TABLET, EXTENDED RELEASE ORAL
Qty: 90 TABLET | Refills: 1 | Status: CANCELLED | OUTPATIENT
Start: 2023-10-13

## 2023-10-13 ASSESSMENT — ENCOUNTER SYMPTOMS
NAUSEA: 0
SHORTNESS OF BREATH: 0
CONSTIPATION: 0
VOMITING: 0
DIARRHEA: 0
ABDOMINAL PAIN: 0
WHEEZING: 0
COUGH: 0

## 2023-10-13 NOTE — PROGRESS NOTES
Jeff Erwin (:  1960) is a 60 y.o. male,Established patient, here for evaluation of the following chief complaint(s):  Diabetes and Fall (About 1 week ago, went to ED for hip pain )         ASSESSMENT/PLAN:  1. Diabetes mellitus type 2 in obese (HCC)  -     A1c today 6.5. Will stop metformin per patient preference and continue Jardiance. We will recheck A1c in 3 months    POCT glycosylated hemoglobin (Hb A1C)  -     Basic Metabolic Panel; Future  2. Chronic midline low back pain without sciatica  -     gabapentin (NEURONTIN) 400 MG capsule; TAKE 1 CAPSULE BY MOUTH NIGHTLY AS NEEDED (PAIN), Disp-30 capsule, R-1Normal  -     naproxen (NAPROSYN) 500 MG tablet; TAKE 1 TABLET BY MOUTH 2 TIMES DAILY FOR 20 DAYS, Disp-30 tablet, R-0Normal  3. Chronic systolic congestive heart failure (HCC)  -     spironolactone (ALDACTONE) 25 MG tablet; Take 1 tablet by mouth nightly, Disp-30 tablet, R-3Normal  -     Basic Metabolic Panel; Future      Return in about 13 weeks (around 2024). Subjective   SUBJECTIVE/OBJECTIVE:  60years old male patient with past medical history of heart failure status post AICD, COPD, hypertension, diabetes mellitus came to the office for diabetes follow-up. Patient A1c is 6.5 down from. ... she is on Jardiance and metformin. Patient would like to stop taking the metformin due to GI side effects. Patient had a fall recently and he hit his head. He did not lose consciousness or vomit after that.  he has an x-ray hip which did not show any acute bony abnormalities. Review of Systems   Constitutional:  Negative for diaphoresis, fatigue and fever. Respiratory:  Negative for cough, shortness of breath and wheezing. Cardiovascular:  Negative for chest pain, palpitations and leg swelling. Gastrointestinal:  Negative for abdominal pain, constipation, diarrhea, nausea and vomiting.    Musculoskeletal:         Hip pain   Neurological:  Negative for dizziness, weakness,

## 2023-10-17 DIAGNOSIS — I50.22 CHRONIC SYSTOLIC CONGESTIVE HEART FAILURE (HCC): ICD-10-CM

## 2023-10-17 RX ORDER — BUMETANIDE 2 MG/1
TABLET ORAL
Qty: 60 TABLET | Refills: 2 | Status: SHIPPED | OUTPATIENT
Start: 2023-10-17

## 2023-10-17 RX ORDER — SACUBITRIL AND VALSARTAN 24; 26 MG/1; MG/1
TABLET, FILM COATED ORAL
Qty: 60 TABLET | Refills: 3 | Status: SHIPPED | OUTPATIENT
Start: 2023-10-17

## 2023-10-17 NOTE — TELEPHONE ENCOUNTER
List:     HTN (hypertension)     Skin tag     Low back pain with sciatica     Hyperglycemia     Infected sebaceous cyst     Chronic midline low back pain without sciatica     Chronic pain of right knee     Chest pain, unspecified     Bronchitis     Chronic systolic congestive heart failure (HCC)     Ventral hernia     Epigastric pain     Elevated LFTs     Shortness of breath     Rectus diastasis     Lumbosacral spondylosis without myelopathy     COPD exacerbation (HCC)     Mixed simple and mucopurulent chronic bronchitis (HCC)     VANDANA (obstructive sleep apnea)     Tachycardia     Chest pain     Diabetes mellitus type 2 in obese [facility])     Need for shingles vaccine     Other male erectile dysfunction     Hernia of abdominal cavity     Hypokalemia

## 2023-11-09 DIAGNOSIS — I50.22 CHRONIC SYSTOLIC CONGESTIVE HEART FAILURE (HCC): ICD-10-CM

## 2023-11-10 RX ORDER — SIMVASTATIN 20 MG
TABLET ORAL
Qty: 30 TABLET | Refills: 3 | Status: SHIPPED | OUTPATIENT
Start: 2023-11-10

## 2023-11-10 NOTE — TELEPHONE ENCOUNTER
Last visit: 10/13/23  Last Med refill: 7/7/23  Does patient have enough medication for 72 hours: no    Next Visit Date:  Future Appointments   Date Time Provider [address]   11/16/2023 11:30 AM [facility] RM 1 [facility]   11/16/2023  1:45 PM SCHEDULE, [facility] [place] [facility] [place] [facility]       Health Maintenance   Topic Date Due    Diabetic foot exam  Never done    Diabetic retinal exam  Never done    Hepatitis B vaccine (1 of 3 - Risk 3-dose series) Never done    Flu vaccine (1) 08/01/2023    COVID-19 Vaccine (5 - 2023-24 season) 09/01/2023    Lipids  01/27/2024    Colorectal Cancer Screen  05/28/2024    Diabetic Alb to Cr ratio (uACR) test  07/07/2024    Depression Screen  07/07/2024    A1C test (Diabetic or Prediabetic)  10/13/2024    GFR test (Diabetes, CKD 3-4, OR last GFR 15-59)  10/13/2024    DTaP/Tdap/Td vaccine (2 - Td or Tdap) 12/08/2030    Shingles vaccine  Completed    Pneumococcal 0-64 years Vaccine  Completed    Hepatitis C screen  Completed    HIV screen  Completed    Hepatitis A vaccine  Aged Out    Hib vaccine  Aged Out    Meningococcal (ACWY) vaccine  Aged Out    Prostate Specific Antigen (PSA) Screening or Monitoring  Discontinued       Hemoglobin A1C (%)   Date Value   10/13/2023 6.5   07/07/2023 7.4   02/17/2023 6.7             ( goal A1C is < 7)   No components found for: \"LABMICR\"  LDL Cholesterol (mg/dL)   Date Value   01/27/2023 57   01/17/2022 60       (goal LDL is <100)   AST (U/L)   Date Value   01/27/2023 23     ALT (U/L)   Date Value   01/27/2023 43 (H)     BUN (mg/dL)   Date Value   10/13/2023 14     BP Readings from Last 3 Encounters:   10/18/23 130/80   10/13/23 129/81   10/06/23 126/82          (goal 120/80)    All Future Testing planned in [facility]  Lab Frequency Next Occurrence   Protein / Creatinine Ratio, Urine Once 02/17/2023   MRI LUMBAR SPINE WO CONTRAST Once 06/22/2023   Echo (TTE) complete (PRN contrast/bubble/strain/3D) Once 10/18/2023

## 2023-11-16 ENCOUNTER — HOSPITAL ENCOUNTER (OUTPATIENT)
Dept: OTHER | Age: 63
Discharge: HOME | End: 2023-11-16
Payer: COMMERCIAL

## 2023-11-16 VITALS
OXYGEN SATURATION: 96 % | WEIGHT: 245.2 LBS | BODY MASS INDEX: 40.8 KG/M2 | HEART RATE: 91 BPM | RESPIRATION RATE: 20 BRPM

## 2023-11-16 PROCEDURE — 99212 OFFICE O/P EST SF 10 MIN: CPT

## 2023-11-16 NOTE — TELEPHONE ENCOUNTER
LAST VISIT: 5/26/23  NEXT VISIT: 3/1/24 (patient cancelled once and no showed once since last visit)    Per last dictation patient is on this medication. Please sign for refill if ok. Thank you.

## 2023-11-21 ENCOUNTER — APPOINTMENT (OUTPATIENT)
Dept: GENERAL RADIOLOGY | Age: 63
End: 2023-11-21
Payer: COMMERCIAL

## 2023-11-21 ENCOUNTER — HOSPITAL ENCOUNTER (EMERGENCY)
Age: 63
Discharge: HOME | End: 2023-11-21
Attending: EMERGENCY MEDICINE
Payer: COMMERCIAL

## 2023-11-21 VITALS
HEART RATE: 86 BPM | OXYGEN SATURATION: 97 % | DIASTOLIC BLOOD PRESSURE: 79 MMHG | TEMPERATURE: 98.6 F | SYSTOLIC BLOOD PRESSURE: 137 MMHG | RESPIRATION RATE: 16 BRPM

## 2023-11-21 DIAGNOSIS — M54.50 ACUTE BILATERAL LOW BACK PAIN WITHOUT SCIATICA: Primary | ICD-10-CM

## 2023-11-21 PROCEDURE — 6360000002 HC RX W HCPCS: Performed by: EMERGENCY MEDICINE

## 2023-11-21 PROCEDURE — 99284 EMERGENCY DEPT VISIT MOD MDM: CPT

## 2023-11-21 PROCEDURE — 72100 X-RAY EXAM L-S SPINE 2/3 VWS: CPT

## 2023-11-21 PROCEDURE — 96372 THER/PROPH/DIAG INJ SC/IM: CPT

## 2023-11-21 RX ORDER — KETOROLAC TROMETHAMINE 30 MG/ML
30 INJECTION, SOLUTION INTRAMUSCULAR; INTRAVENOUS ONCE
Status: COMPLETED | OUTPATIENT
Start: 2023-11-21 | End: 2023-11-21

## 2023-11-21 RX ORDER — DEXAMETHASONE SODIUM PHOSPHATE 4 MG/ML
4 INJECTION, SOLUTION INTRA-ARTICULAR; INTRALESIONAL; INTRAMUSCULAR; INTRAVENOUS; SOFT TISSUE ONCE
Status: COMPLETED | OUTPATIENT
Start: 2023-11-21 | End: 2023-11-21

## 2023-11-21 RX ORDER — LIDOCAINE 50 MG/G
1 PATCH TOPICAL EVERY 24 HOURS
Qty: 30 PATCH | Refills: 0 | Status: SHIPPED | OUTPATIENT
Start: 2023-11-21 | End: 2023-12-21

## 2023-11-21 RX ORDER — IBUPROFEN 600 MG/1
600 TABLET ORAL EVERY 6 HOURS PRN
Qty: 28 TABLET | Refills: 0 | Status: SHIPPED | OUTPATIENT
Start: 2023-11-21 | End: 2023-11-28

## 2023-11-21 RX ORDER — TIZANIDINE 4 MG/1
4 TABLET ORAL EVERY 8 HOURS PRN
Qty: 20 TABLET | Refills: 0 | Status: SHIPPED | OUTPATIENT
Start: 2023-11-21

## 2023-11-21 RX ADMIN — KETOROLAC TROMETHAMINE 30 MG: 30 INJECTION, SOLUTION INTRAMUSCULAR; INTRAVENOUS at 20:06

## 2023-11-21 RX ADMIN — DEXAMETHASONE SODIUM PHOSPHATE 4 MG: 4 INJECTION INTRA-ARTICULAR; INTRALESIONAL; INTRAMUSCULAR; INTRAVENOUS; SOFT TISSUE at 20:06

## 2023-11-21 ASSESSMENT — ENCOUNTER SYMPTOMS
RHINORRHEA: 0
COLOR CHANGE: 0
SINUS PRESSURE: 0
BACK PAIN: 1
VOMITING: 0
FACIAL SWELLING: 0
TROUBLE SWALLOWING: 0
DIARRHEA: 0
SORE THROAT: 0
SHORTNESS OF BREATH: 0
COUGH: 0
CONSTIPATION: 0
CHEST TIGHTNESS: 0
EYE PAIN: 0
NAUSEA: 0
EYE DISCHARGE: 0
WHEEZING: 0
ABDOMINAL PAIN: 0
EYE REDNESS: 0
BLOOD IN STOOL: 0

## 2023-11-21 ASSESSMENT — PAIN SCALES - GENERAL
PAINLEVEL_OUTOF10: 9
PAINLEVEL_OUTOF10: 8

## 2023-11-21 ASSESSMENT — PAIN - FUNCTIONAL ASSESSMENT
PAIN_FUNCTIONAL_ASSESSMENT: 0-10
PAIN_FUNCTIONAL_ASSESSMENT: NONE - DENIES PAIN

## 2023-11-22 NOTE — ED NOTES
Pt was discharged from the ER. Discharge paperwork was reviewed with the patient. Patient had no further questions and showed an understanding of instructions.        Mariana Trivedi RN  11/21/23 9828
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
## 2023-12-14 ENCOUNTER — HOSPITAL ENCOUNTER (OUTPATIENT)
Dept: OTHER | Age: 63
Discharge: HOME | End: 2023-12-14
Payer: COMMERCIAL

## 2023-12-14 VITALS
HEART RATE: 95 BPM | DIASTOLIC BLOOD PRESSURE: 60 MMHG | RESPIRATION RATE: 16 BRPM | SYSTOLIC BLOOD PRESSURE: 110 MMHG | WEIGHT: 242.6 LBS | BODY MASS INDEX: 40.42 KG/M2 | OXYGEN SATURATION: 98 %

## 2023-12-14 DIAGNOSIS — I50.22 CHRONIC SYSTOLIC CONGESTIVE HEART FAILURE (HCC): Primary | ICD-10-CM

## 2023-12-14 PROCEDURE — 99212 OFFICE O/P EST SF 10 MIN: CPT

## 2024-02-13 DIAGNOSIS — I50.22 CHRONIC SYSTOLIC CONGESTIVE HEART FAILURE (HCC): ICD-10-CM

## 2024-02-13 NOTE — TELEPHONE ENCOUNTER
Last visit: 10/13/2023  Last Med refill: 10/17/2023  Does patient have enough medication for 72 hours: No:     Next Visit Date:  Future Appointments   Date Time Provider Department Center   3/1/2024 11:15 AM Bart Serrano MD Resp Spec [email or web address]       Health Maintenance   Topic Date Due    Diabetic foot exam  Never done    Diabetic retinal exam  Never done    Respiratory Syncytial Virus (RSV) Pregnant or age 60 yrs+ (1 - 1-dose 60+ series) Never done    Flu vaccine (1) 08/01/2023    COVID-19 Vaccine (5 - 2023-24 season) 09/01/2023    Lipids  01/27/2024    Colorectal Cancer Screen  05/28/2024    Diabetic Alb to Cr ratio (uACR) test  07/07/2024    Depression Screen  07/07/2024    A1C test (Diabetic or Prediabetic)  10/13/2024    GFR test (Diabetes, CKD 3-4, OR last GFR 15-59)  10/13/2024    DTaP/Tdap/Td vaccine (2 - Td or Tdap) 12/08/2030    Shingles vaccine  Completed    Pneumococcal 0-64 years Vaccine  Completed    Hepatitis C screen  Completed    HIV screen  Completed    Hepatitis A vaccine  Aged Out    Hepatitis B vaccine  Aged Out    Hib vaccine  Aged Out    Polio vaccine  Aged Out    Meningococcal (ACWY) vaccine  Aged Out    Prostate Specific Antigen (PSA) Screening or Monitoring  Discontinued       Hemoglobin A1C (%)   Date Value   10/13/2023 6.5   07/07/2023 7.4   02/17/2023 6.7             ( goal A1C is < 7)   No components found for: \"LABMICR\"  LDL Cholesterol (mg/dL)   Date Value   01/27/2023 57   01/17/2022 60       (goal LDL is <100)   AST (U/L)   Date Value   01/27/2023 23     ALT (U/L)   Date Value   01/27/2023 43 (H)     BUN (mg/dL)   Date Value   10/13/2023 14     BP Readings from Last 3 Encounters:   12/14/23 110/60   11/21/23 137/79   11/16/23 130/80          (goal 120/80)    All Future Testing planned in [facility]  Lab Frequency Next Occurrence   Protein / Creatinine Ratio, Urine Once 02/17/2023   MRI LUMBAR SPINE WO CONTRAST Once 06/22/2023   Basic Metabolic Panel Once 12/14/2023

## 2024-02-14 RX ORDER — METOPROLOL SUCCINATE 200 MG/1
200 TABLET, EXTENDED RELEASE ORAL DAILY
Qty: 30 TABLET | Refills: 0 | Status: SHIPPED | OUTPATIENT
Start: 2024-02-14 | End: 2024-03-18 | Stop reason: SDUPTHER

## 2024-02-14 RX ORDER — BUMETANIDE 2 MG/1
TABLET ORAL
Qty: 60 TABLET | Refills: 2 | Status: SHIPPED | OUTPATIENT
Start: 2024-02-14 | End: 2024-02-16 | Stop reason: SDUPTHER

## 2024-02-14 NOTE — TELEPHONE ENCOUNTER
Please call patient to schedule an appointment.  
    Patient Active Problem List:     HTN (hypertension)     Skin tag     Low back pain with sciatica     Hyperglycemia     Infected sebaceous cyst     Chronic midline low back pain without sciatica     Chronic pain of right knee     Chest pain, unspecified     Bronchitis     Chronic systolic congestive heart failure (HCC)     Ventral hernia     Epigastric pain     Elevated LFTs     Shortness of breath     Rectus diastasis     Lumbosacral spondylosis without myelopathy     COPD exacerbation (HCC)     Mixed simple and mucopurulent chronic bronchitis (HCC)     VANDANA (obstructive sleep apnea)     Tachycardia     Chest pain     Diabetes mellitus type 2 in obese (HCC)     Need for shingles vaccine     Other male erectile dysfunction     Hernia of abdominal cavity     Hypokalemia

## 2024-02-16 ENCOUNTER — OFFICE VISIT (OUTPATIENT)
Dept: FAMILY MEDICINE CLINIC | Age: 64
End: 2024-02-16
Payer: COMMERCIAL

## 2024-02-16 VITALS
SYSTOLIC BLOOD PRESSURE: 151 MMHG | WEIGHT: 240 LBS | DIASTOLIC BLOOD PRESSURE: 95 MMHG | HEART RATE: 106 BPM | BODY MASS INDEX: 39.99 KG/M2 | HEIGHT: 65 IN

## 2024-02-16 DIAGNOSIS — I10 PRIMARY HYPERTENSION: ICD-10-CM

## 2024-02-16 DIAGNOSIS — M54.50 CHRONIC MIDLINE LOW BACK PAIN WITHOUT SCIATICA: ICD-10-CM

## 2024-02-16 DIAGNOSIS — E66.9 DIABETES MELLITUS TYPE 2 IN OBESE (HCC): Primary | ICD-10-CM

## 2024-02-16 DIAGNOSIS — E11.69 DIABETES MELLITUS TYPE 2 IN OBESE (HCC): Primary | ICD-10-CM

## 2024-02-16 DIAGNOSIS — J42 CHRONIC BRONCHITIS, UNSPECIFIED CHRONIC BRONCHITIS TYPE (HCC): ICD-10-CM

## 2024-02-16 DIAGNOSIS — G89.29 CHRONIC MIDLINE LOW BACK PAIN WITHOUT SCIATICA: ICD-10-CM

## 2024-02-16 DIAGNOSIS — I50.22 CHRONIC SYSTOLIC CONGESTIVE HEART FAILURE (HCC): ICD-10-CM

## 2024-02-16 LAB — HBA1C MFR BLD: 7.1 %

## 2024-02-16 PROCEDURE — G8427 DOCREV CUR MEDS BY ELIG CLIN: HCPCS

## 2024-02-16 PROCEDURE — 99213 OFFICE O/P EST LOW 20 MIN: CPT

## 2024-02-16 PROCEDURE — 4004F PT TOBACCO SCREEN RCVD TLK: CPT

## 2024-02-16 PROCEDURE — 3051F HG A1C>EQUAL 7.0%<8.0%: CPT

## 2024-02-16 PROCEDURE — 3077F SYST BP >= 140 MM HG: CPT

## 2024-02-16 PROCEDURE — 3017F COLORECTAL CA SCREEN DOC REV: CPT

## 2024-02-16 PROCEDURE — 83036 HEMOGLOBIN GLYCOSYLATED A1C: CPT

## 2024-02-16 PROCEDURE — 3023F SPIROM DOC REV: CPT

## 2024-02-16 PROCEDURE — G8417 CALC BMI ABV UP PARAM F/U: HCPCS

## 2024-02-16 PROCEDURE — 2022F DILAT RTA XM EVC RTNOPTHY: CPT

## 2024-02-16 PROCEDURE — G8484 FLU IMMUNIZE NO ADMIN: HCPCS

## 2024-02-16 PROCEDURE — 3080F DIAST BP >= 90 MM HG: CPT

## 2024-02-16 RX ORDER — GABAPENTIN 400 MG/1
CAPSULE ORAL
Qty: 30 CAPSULE | Refills: 1 | Status: SHIPPED | OUTPATIENT
Start: 2024-02-16 | End: 2024-02-16

## 2024-02-16 RX ORDER — BUMETANIDE 2 MG/1
2 TABLET ORAL 2 TIMES DAILY
Qty: 60 TABLET | Refills: 2 | Status: SHIPPED | OUTPATIENT
Start: 2024-02-16

## 2024-02-16 RX ORDER — NAPROXEN 500 MG/1
TABLET ORAL
Qty: 30 TABLET | Refills: 0 | Status: SHIPPED | OUTPATIENT
Start: 2024-02-16

## 2024-02-16 NOTE — PROGRESS NOTES
Jesus Stevens (:  1960) is a 63 y.o. male,Established patient, here for evaluation of the following chief complaint(s):  Diabetes (Follow up)         ASSESSMENT/PLAN:  1. Diabetes mellitus type 2 in obese (HCC)  -    Will increase Jardiance to 25 mg daily and follow-up in 3 months.   POCT glycosylated hemoglobin (Hb A1C)  -     empagliflozin (JARDIANCE) 25 MG tablet; TAKE 1 TABLET BY MOUTH DAILY, Disp-90 tablet, R-1Normal  -     Lipid Panel; Future  2. Primary hypertension  -Not controlled because patient did not take his medications today.  I told the patient to take his medications when he wakes up either early in the morning or late.  Patient voiced understanding  3. Chronic systolic congestive heart failure (HCC)  -    Last echo showed EF of 50% with diastolic dysfunction.  Patient follows up with cardiology.   bumetanide (BUMEX) 2 MG tablet; Take 1 tablet by mouth 2 times daily, Disp-60 tablet, R-2Normal  4. Chronic midline low back pain without sciatica  -     gabapentin (NEURONTIN) 400 MG capsule; TAKE 1 CAPSULE BY MOUTH EVERY NIGHT AS NEEDED FOR PAIN, Disp-30 capsule, R-1Normal  -     naproxen (NAPROSYN) 500 MG tablet; TAKE 1 TABLET BY MOUTH 2 TIMES DAILY FOR 20 DAYS, Disp-30 tablet, R-0Normal  5. Chronic bronchitis, unspecified chronic bronchitis type (HCC)  -Controlled and stable.    Return in about 13 weeks (around 2024).         Subjective   SUBJECTIVE/OBJECTIVE:  63 years old male patient with past med history of combined systolic and diastolic heart failure, hypertension, type 2 diabetes came to the office for follow-up.  A1c today 7.1 up from 6.5.  During the last visit, patient requested starting metformin and continue the Jardiance.  Today, patient is little bit disappointed about increasing A1c.  Patient does not want to restart taking metformin.    Patient blood pressure is elevated today.  Patient said that he did not take his medications this morning because he woke up

## 2024-02-16 NOTE — PATIENT INSTRUCTIONS
Thank you for letting us take care of you today. We hope all your questions were addressed. If a question was overlooked or something else comes to mind after you return home, please contact a member of your Care Team listed below.      Your Care Team at [facility] is Team #2  Cooper Christianson M.D. (Faculty)  Tracey Alcaraz, (Resident)  Cynthia Osorio, (Resident)  Joe Laureano, (Resident)  Dima Edmondson, (Resident)  Jeannie Decker, (Resident)  Raeann Russo, [facility]  Roni Daigle, [facility]  Nina Nina, [facility]  Paula Molina, [facility]  Farzana Nash,  [facility]  Susan Palma, [facility]  Savanna Villalta, [facility]  Tori Justin, [facility]  John (LJ) Shena ADRIANA (Clinical Practice Manager)  Kaitylnn Chávez [facility] (Clinical Pharmacist)     Office phone number: [phone]    If you need to get in right away due to illness, please be advised we have \"Same Day\" appointments available Monday-Friday. Please call us at [phone] option #3 to schedule your \"Same Day\" appointment.

## 2024-02-16 NOTE — PROGRESS NOTES
Visit Information    Have you changed or started any medications since your last visit including any over-the-counter medicines, vitamins, or herbal medicines? no   Are you having any side effects from any of your medications? -  no  Have you stopped taking any of your medications? Is so, why? -  no    Have you seen any other physician or provider since your last visit? No  Have you had any other diagnostic tests since your last visit? No  Have you been seen in the emergency room and/or had an admission to a hospital since we last saw you? No  Have you had your routine dental cleaning in the past 6 months? no    Have you activated your Tame account? If not, what are your barriers? Yes     Patient Care Team:  Andrei Edmondson MD as PCP - General  Usman Sierra MD as Consulting Physician (Cardiology)  Po Lynn MD as Consulting Physician  Tiffanie Garnica DO as Consulting Physician (General Surgery)  Bart Serrano MD as Consulting Physician (Pulmonary Disease)    Medical History Review  Past Medical, Family, and Social History reviewed and does not contribute to the patient presenting condition    Health Maintenance   Topic Date Due    Diabetic foot exam  Never done    Diabetic retinal exam  Never done    Respiratory Syncytial Virus (RSV) Pregnant or age 60 yrs+ (1 - 1-dose 60+ series) Never done    Flu vaccine (1) 08/01/2023    COVID-19 Vaccine (5 - 2023-24 season) 09/01/2023    Lipids  01/27/2024    Colorectal Cancer Screen  05/28/2024    Diabetic Alb to Cr ratio (uACR) test  07/07/2024    Depression Screen  07/07/2024    A1C test (Diabetic or Prediabetic)  10/13/2024    GFR test (Diabetes, CKD 3-4, OR last GFR 15-59)  10/13/2024    DTaP/Tdap/Td vaccine (2 - Td or Tdap) 12/08/2030    Shingles vaccine  Completed    Pneumococcal 0-64 years Vaccine  Completed    Hepatitis C screen  Completed    HIV screen  Completed    Hepatitis A vaccine  Aged Out    Hepatitis B vaccine  Aged Out    Hib

## 2024-02-16 NOTE — PROGRESS NOTES
Attending Physician Statement  I have discussed the care of Jesus Stevens, including pertinent history and exam findings,  with the resident. I have reviewed the key elements of all parts of the encounter with the resident.  I agree with the assessment, plan and orders as documented by the resident.  (GE Modifier)    Kendrick Abrhaam MD

## 2024-03-12 DIAGNOSIS — G89.29 CHRONIC MIDLINE LOW BACK PAIN WITHOUT SCIATICA: ICD-10-CM

## 2024-03-12 DIAGNOSIS — M54.50 CHRONIC MIDLINE LOW BACK PAIN WITHOUT SCIATICA: ICD-10-CM

## 2024-03-13 RX ORDER — GABAPENTIN 400 MG/1
CAPSULE ORAL
Qty: 30 CAPSULE | Refills: 1 | Status: SHIPPED | OUTPATIENT
Start: 2024-03-13 | End: 2024-04-12

## 2024-03-13 NOTE — TELEPHONE ENCOUNTER
Panel Once 02/16/2024               Patient Active Problem List:     HTN (hypertension)     Skin tag     Low back pain with sciatica     Hyperglycemia     Infected sebaceous cyst     Chronic midline low back pain without sciatica     Chronic pain of right knee     Chest pain, unspecified     Chronic bronchitis (HCC)     Chronic systolic congestive heart failure (HCC)     Ventral hernia     Epigastric pain     Elevated LFTs     Shortness of breath     Rectus diastasis     Lumbosacral spondylosis without myelopathy     COPD exacerbation (HCC)     Mixed simple and mucopurulent chronic bronchitis (HCC)     VANDANA (obstructive sleep apnea)     Tachycardia     Chest pain     Diabetes mellitus type 2 in obese (HCC)     Need for shingles vaccine     Other male erectile dysfunction     Hernia of abdominal cavity     Hypokalemia

## 2024-03-14 DIAGNOSIS — I50.22 CHRONIC SYSTOLIC CONGESTIVE HEART FAILURE (HCC): ICD-10-CM

## 2024-03-14 RX ORDER — SPIRONOLACTONE 25 MG/1
25 TABLET ORAL NIGHTLY
Qty: 30 TABLET | Refills: 3 | OUTPATIENT
Start: 2024-03-14

## 2024-03-14 RX ORDER — SPIRONOLACTONE 25 MG/1
25 TABLET ORAL NIGHTLY
Qty: 30 TABLET | Refills: 3 | Status: SHIPPED | OUTPATIENT
Start: 2024-03-14

## 2024-03-14 NOTE — TELEPHONE ENCOUNTER
Last visit: 2/16/24  Last Med refill: 10/13/23  Does patient have enough medication for 72 hours: No:     Next Visit Date:  Future Appointments   Date Time Provider Department Center   5/17/2024  2:00 PM Andrei Ed[facility]son MD [facility]       Health Maintenance   Topic Date Due    Diabetic foot exam  Never done    Diabetic retinal exam  Never done    Respiratory Syncytial Virus (RSV) Pregnant or age 60 yrs+ (1 - 1-dose 60+ series) Never done    Flu vaccine (1) 08/01/2023    COVID-19 Vaccine (5 - 2023-24 season) 09/01/2023    Annual Wellness Visit (Medicare Advantage)  Never done    Lipids  01/27/2024    Colorectal Cancer Screen  05/28/2024    Diabetic Alb to Cr ratio (uACR) test  07/07/2024    GFR test (Diabetes, CKD 3-4, OR last GFR 15-59)  10/13/2024    A1C test (Diabetic or Prediabetic)  02/16/2025    Depression Screen  02/16/2025    DTaP/Tdap/Td vaccine (2 - Td or Tdap) 12/08/2030    Shingles vaccine  Completed    Pneumococcal 0-64 years Vaccine  Completed    Hepatitis C screen  Completed    HIV screen  Completed    Hepatitis A vaccine  Aged Out    Hepatitis B vaccine  Aged Out    Hib vaccine  Aged Out    Polio vaccine  Aged Out    Meningococcal (ACWY) vaccine  Aged Out    Prostate Specific Antigen (PSA) Screening or Monitoring  Discontinued       Hemoglobin A1C (%)   Date Value   02/16/2024 7.1   10/13/2023 6.5   07/07/2023 7.4             ( goal A1C is < 7)   No components found for: \"LABMICR\"  LDL Cholesterol (mg/dL)   Date Value   01/27/2023 57   01/17/2022 60       (goal LDL is <100)   AST (U/L)   Date Value   01/27/2023 23     ALT (U/L)   Date Value   01/27/2023 43 (H)     BUN (mg/dL)   Date Value   10/13/2023 14     BP Readings from Last 3 Encounters:   02/16/24 (!) 151/95   12/14/23 110/60   11/21/23 137/79          (goal 120/80)    All Future Testing planned in [facility]  Lab Frequency Next Occurrence   MRI LUMBAR SPINE WO CONTRAST Once 06/22/2023   Basic Metabolic Panel Once 12/14/2023   Lipid

## 2024-03-18 DIAGNOSIS — E11.69 DIABETES MELLITUS TYPE 2 IN OBESE (HCC): ICD-10-CM

## 2024-03-18 DIAGNOSIS — E66.9 DIABETES MELLITUS TYPE 2 IN OBESE (HCC): ICD-10-CM

## 2024-03-18 DIAGNOSIS — J42 CHRONIC BRONCHITIS, UNSPECIFIED CHRONIC BRONCHITIS TYPE (HCC): ICD-10-CM

## 2024-03-18 DIAGNOSIS — M54.50 CHRONIC MIDLINE LOW BACK PAIN WITHOUT SCIATICA: ICD-10-CM

## 2024-03-18 DIAGNOSIS — I50.22 CHRONIC SYSTOLIC CONGESTIVE HEART FAILURE (HCC): ICD-10-CM

## 2024-03-18 DIAGNOSIS — G89.29 CHRONIC MIDLINE LOW BACK PAIN WITHOUT SCIATICA: ICD-10-CM

## 2024-03-18 RX ORDER — BUMETANIDE 2 MG/1
2 TABLET ORAL 2 TIMES DAILY
Qty: 60 TABLET | Refills: 2 | Status: SHIPPED | OUTPATIENT
Start: 2024-03-18

## 2024-03-18 RX ORDER — ALBUTEROL SULFATE 90 UG/1
2 AEROSOL, METERED RESPIRATORY (INHALATION) 4 TIMES DAILY PRN
Qty: 54 G | Refills: 1 | Status: SHIPPED | OUTPATIENT
Start: 2024-03-18

## 2024-03-18 RX ORDER — GABAPENTIN 400 MG/1
CAPSULE ORAL
Qty: 30 CAPSULE | Refills: 1 | Status: SHIPPED | OUTPATIENT
Start: 2024-03-18 | End: 2024-04-17

## 2024-03-18 RX ORDER — NAPROXEN 500 MG/1
TABLET ORAL
Qty: 30 TABLET | Refills: 0 | Status: SHIPPED | OUTPATIENT
Start: 2024-03-18

## 2024-03-18 RX ORDER — POTASSIUM CHLORIDE 1500 MG/1
20 TABLET, EXTENDED RELEASE ORAL DAILY
Qty: 90 TABLET | Refills: 0 | Status: SHIPPED | OUTPATIENT
Start: 2024-03-18

## 2024-03-18 RX ORDER — SPIRONOLACTONE 25 MG/1
25 TABLET ORAL NIGHTLY
Qty: 30 TABLET | Refills: 3 | Status: SHIPPED | OUTPATIENT
Start: 2024-03-18

## 2024-03-18 RX ORDER — ALBUTEROL SULFATE 2.5 MG/3ML
SOLUTION RESPIRATORY (INHALATION)
Qty: 360 ML | Refills: 3 | Status: SHIPPED | OUTPATIENT
Start: 2024-03-18

## 2024-03-18 RX ORDER — METOPROLOL SUCCINATE 200 MG/1
200 TABLET, EXTENDED RELEASE ORAL DAILY
Qty: 30 TABLET | Refills: 0 | Status: SHIPPED | OUTPATIENT
Start: 2024-03-18

## 2024-03-18 RX ORDER — SACUBITRIL AND VALSARTAN 24; 26 MG/1; MG/1
1 TABLET, FILM COATED ORAL 2 TIMES DAILY
Qty: 60 TABLET | Refills: 3 | Status: SHIPPED | OUTPATIENT
Start: 2024-03-18

## 2024-03-18 RX ORDER — SIMVASTATIN 20 MG
20 TABLET ORAL NIGHTLY
Qty: 30 TABLET | Refills: 3 | Status: SHIPPED | OUTPATIENT
Start: 2024-03-18

## 2024-03-18 NOTE — TELEPHONE ENCOUNTER
Received a fax from [facility] stating patient is moving to their pharmacy. They are requesting a script for all the pended medications. Some of the medications may seem as a duplicate due to recently being filled at [facility]. Writer spoke with patient and confirmed that they switched pharmacies. Pharmacy updated in patient chart.

## 2024-04-15 RX ORDER — METOPROLOL SUCCINATE 200 MG/1
200 TABLET, EXTENDED RELEASE ORAL DAILY
Qty: 30 TABLET | Refills: 3 | Status: SHIPPED | OUTPATIENT
Start: 2024-04-15

## 2024-04-15 NOTE — TELEPHONE ENCOUNTER
E-scribe request for METOPROLOL. Please review and e-scribe if applicable.     Last Visit Date:  2/16/2024  Next Visit Date:  5/17/2024    Hemoglobin A1C (%)   Date Value   02/16/2024 7.1   10/13/2023 6.5   07/07/2023 7.4             ( goal A1C is < 7)   No components found for: \"LABMICR\"  LDL Cholesterol (mg/dL)   Date Value   01/27/2023 57       (goal LDL is <100)   AST (U/L)   Date Value   01/27/2023 23     ALT (U/L)   Date Value   01/27/2023 43 (H)     BUN (mg/dL)   Date Value   10/13/2023 14     BP Readings from Last 3 Encounters:   02/16/24 (!) 151/95   12/14/23 110/60   11/21/23 137/79          (goal 120/80)        Patient Active Problem List:     HTN (hypertension)     Skin tag     Low back pain with sciatica     Hyperglycemia     Infected sebaceous cyst     Chronic midline low back pain without sciatica     Chronic pain of right knee     Chest pain, unspecified     Chronic bronchitis (HCC)     Chronic systolic congestive heart failure (HCC)     Ventral hernia     Epigastric pain     Elevated LFTs     Shortness of breath     Rectus diastasis     Lumbosacral spondylosis without myelopathy     COPD exacerbation (HCC)     Mixed simple and mucopurulent chronic bronchitis (HCC)     VANDANA (obstructive sleep apnea)     Tachycardia     Chest pain     Diabetes mellitus type 2 in obese     Need for shingles vaccine     Other male erectile dysfunction     Hernia of abdominal cavity     Hypokalemia      ----JF

## 2024-05-06 ENCOUNTER — HOSPITAL ENCOUNTER (OUTPATIENT)
Age: 64
Setting detail: OBSERVATION
Discharge: HOME | End: 2024-05-08
Attending: EMERGENCY MEDICINE | Admitting: INTERNAL MEDICINE
Payer: MEDICARE

## 2024-05-06 ENCOUNTER — APPOINTMENT (OUTPATIENT)
Dept: GENERAL RADIOLOGY | Age: 64
End: 2024-05-06
Payer: MEDICARE

## 2024-05-06 DIAGNOSIS — M54.50 CHRONIC MIDLINE LOW BACK PAIN WITHOUT SCIATICA: ICD-10-CM

## 2024-05-06 DIAGNOSIS — G89.29 CHRONIC MIDLINE LOW BACK PAIN WITHOUT SCIATICA: ICD-10-CM

## 2024-05-06 DIAGNOSIS — R07.9 CHEST PAIN, UNSPECIFIED TYPE: Primary | ICD-10-CM

## 2024-05-06 DIAGNOSIS — I20.9 ANGINA PECTORIS (HCC): ICD-10-CM

## 2024-05-06 DIAGNOSIS — R06.02 SHORTNESS OF BREATH: ICD-10-CM

## 2024-05-06 PROBLEM — R07.89 ATYPICAL CHEST PAIN: Status: ACTIVE | Noted: 2024-05-06

## 2024-05-06 LAB
ANION GAP SERPL CALCULATED.3IONS-SCNC: 10 MMOL/L (ref 9–17)
BASOPHILS # BLD: 0 K/UL (ref 0–0.2)
BASOPHILS NFR BLD: 0 % (ref 0–2)
BUN SERPL-MCNC: 20 MG/DL (ref 8–23)
CALCIUM SERPL-MCNC: 9.4 MG/DL (ref 8.6–10.4)
CHLORIDE SERPL-SCNC: 103 MMOL/L (ref 98–107)
CO2 SERPL-SCNC: 29 MMOL/L (ref 20–31)
CREAT SERPL-MCNC: 1.1 MG/DL (ref 0.7–1.2)
EOSINOPHIL # BLD: 0.2 K/UL (ref 0–0.4)
EOSINOPHILS RELATIVE PERCENT: 3 % (ref 0–4)
ERYTHROCYTE [DISTWIDTH] IN BLOOD BY AUTOMATED COUNT: 13.8 % (ref 11.5–14.9)
GFR, ESTIMATED: 75 ML/MIN/1.73M2
GLUCOSE BLD-MCNC: 205 MG/DL (ref 75–110)
GLUCOSE SERPL-MCNC: 192 MG/DL (ref 70–99)
HCT VFR BLD AUTO: 40.9 % (ref 41–53)
HGB BLD-MCNC: 13.9 G/DL (ref 13.5–17.5)
INR PPP: 1
LYMPHOCYTES NFR BLD: 1.4 K/UL (ref 1–4.8)
LYMPHOCYTES RELATIVE PERCENT: 18 % (ref 24–44)
MAGNESIUM SERPL-MCNC: 2.2 MG/DL (ref 1.6–2.6)
MCH RBC QN AUTO: 29.8 PG (ref 26–34)
MCHC RBC AUTO-ENTMCNC: 34 G/DL (ref 31–37)
MCV RBC AUTO: 87.6 FL (ref 80–100)
MONOCYTES NFR BLD: 0.6 K/UL (ref 0.1–1.3)
MONOCYTES NFR BLD: 8 % (ref 1–7)
NEUTROPHILS NFR BLD: 71 % (ref 36–66)
NEUTS SEG NFR BLD: 5.6 K/UL (ref 1.3–9.1)
PARTIAL THROMBOPLASTIN TIME: 24 SEC (ref 24–36)
PHOSPHATE SERPL-MCNC: 4.2 MG/DL (ref 2.5–4.5)
PLATELET # BLD AUTO: 192 K/UL (ref 150–450)
PMV BLD AUTO: 8.3 FL (ref 6–12)
POTASSIUM SERPL-SCNC: 4.1 MMOL/L (ref 3.7–5.3)
PROTHROMBIN TIME: 13.5 SEC (ref 11.8–14.6)
RBC # BLD AUTO: 4.67 M/UL (ref 4.5–5.9)
SODIUM SERPL-SCNC: 142 MMOL/L (ref 135–144)
TROPONIN I SERPL HS-MCNC: 7 NG/L (ref 0–22)
TROPONIN I SERPL HS-MCNC: 9 NG/L (ref 0–22)
WBC OTHER # BLD: 7.9 K/UL (ref 3.5–11)

## 2024-05-06 PROCEDURE — 80048 BASIC METABOLIC PNL TOTAL CA: CPT

## 2024-05-06 PROCEDURE — 83735 ASSAY OF MAGNESIUM: CPT

## 2024-05-06 PROCEDURE — G0378 HOSPITAL OBSERVATION PER HR: HCPCS

## 2024-05-06 PROCEDURE — 96372 THER/PROPH/DIAG INJ SC/IM: CPT

## 2024-05-06 PROCEDURE — 85610 PROTHROMBIN TIME: CPT

## 2024-05-06 PROCEDURE — 6370000000 HC RX 637 (ALT 250 FOR IP)

## 2024-05-06 PROCEDURE — 36415 COLL VENOUS BLD VENIPUNCTURE: CPT

## 2024-05-06 PROCEDURE — 85730 THROMBOPLASTIN TIME PARTIAL: CPT

## 2024-05-06 PROCEDURE — 84484 ASSAY OF TROPONIN QUANT: CPT

## 2024-05-06 PROCEDURE — 6360000002 HC RX W HCPCS

## 2024-05-06 PROCEDURE — 94664 DEMO&/EVAL PT USE INHALER: CPT

## 2024-05-06 PROCEDURE — 2060000000 HC ICU INTERMEDIATE R&B

## 2024-05-06 PROCEDURE — 94761 N-INVAS EAR/PLS OXIMETRY MLT: CPT

## 2024-05-06 PROCEDURE — 71045 X-RAY EXAM CHEST 1 VIEW: CPT

## 2024-05-06 PROCEDURE — 2580000003 HC RX 258

## 2024-05-06 PROCEDURE — 85025 COMPLETE CBC W/AUTO DIFF WBC: CPT

## 2024-05-06 PROCEDURE — 82947 ASSAY GLUCOSE BLOOD QUANT: CPT

## 2024-05-06 PROCEDURE — 94640 AIRWAY INHALATION TREATMENT: CPT

## 2024-05-06 PROCEDURE — 84100 ASSAY OF PHOSPHORUS: CPT

## 2024-05-06 PROCEDURE — 99285 EMERGENCY DEPT VISIT HI MDM: CPT

## 2024-05-06 RX ORDER — ALBUTEROL SULFATE 90 UG/1
2 AEROSOL, METERED RESPIRATORY (INHALATION) 4 TIMES DAILY PRN
Status: DISCONTINUED | OUTPATIENT
Start: 2024-05-06 | End: 2024-05-08 | Stop reason: HOSPADM

## 2024-05-06 RX ORDER — ENOXAPARIN SODIUM 100 MG/ML
30 INJECTION SUBCUTANEOUS 2 TIMES DAILY
Status: DISCONTINUED | OUTPATIENT
Start: 2024-05-06 | End: 2024-05-08 | Stop reason: HOSPADM

## 2024-05-06 RX ORDER — NICOTINE 21 MG/24HR
1 PATCH, TRANSDERMAL 24 HOURS TRANSDERMAL DAILY
Status: DISCONTINUED | OUTPATIENT
Start: 2024-05-06 | End: 2024-05-08 | Stop reason: HOSPADM

## 2024-05-06 RX ORDER — ALBUTEROL SULFATE 2.5 MG/3ML
2.5 SOLUTION RESPIRATORY (INHALATION)
Status: DISCONTINUED | OUTPATIENT
Start: 2024-05-06 | End: 2024-05-08 | Stop reason: HOSPADM

## 2024-05-06 RX ORDER — INSULIN LISPRO 100 [IU]/ML
0-4 INJECTION, SOLUTION INTRAVENOUS; SUBCUTANEOUS
Status: DISCONTINUED | OUTPATIENT
Start: 2024-05-06 | End: 2024-05-08 | Stop reason: HOSPADM

## 2024-05-06 RX ORDER — METOPROLOL SUCCINATE 100 MG/1
200 TABLET, EXTENDED RELEASE ORAL DAILY
Status: DISCONTINUED | OUTPATIENT
Start: 2024-05-06 | End: 2024-05-08 | Stop reason: HOSPADM

## 2024-05-06 RX ORDER — SPIRONOLACTONE 25 MG/1
25 TABLET ORAL NIGHTLY
Status: DISCONTINUED | OUTPATIENT
Start: 2024-05-06 | End: 2024-05-08 | Stop reason: HOSPADM

## 2024-05-06 RX ORDER — BUMETANIDE 1 MG/1
2 TABLET ORAL 2 TIMES DAILY
Status: DISCONTINUED | OUTPATIENT
Start: 2024-05-06 | End: 2024-05-08 | Stop reason: HOSPADM

## 2024-05-06 RX ORDER — SODIUM CHLORIDE 9 MG/ML
INJECTION, SOLUTION INTRAVENOUS PRN
Status: DISCONTINUED | OUTPATIENT
Start: 2024-05-06 | End: 2024-05-08 | Stop reason: HOSPADM

## 2024-05-06 RX ORDER — SODIUM CHLORIDE 0.9 % (FLUSH) 0.9 %
5-40 SYRINGE (ML) INJECTION EVERY 12 HOURS SCHEDULED
Status: DISCONTINUED | OUTPATIENT
Start: 2024-05-06 | End: 2024-05-08 | Stop reason: HOSPADM

## 2024-05-06 RX ORDER — ATORVASTATIN CALCIUM 10 MG/1
10 TABLET, FILM COATED ORAL DAILY
Status: DISCONTINUED | OUTPATIENT
Start: 2024-05-06 | End: 2024-05-08 | Stop reason: HOSPADM

## 2024-05-06 RX ORDER — BISACODYL 10 MG
10 SUPPOSITORY, RECTAL RECTAL DAILY PRN
Status: DISCONTINUED | OUTPATIENT
Start: 2024-05-06 | End: 2024-05-08 | Stop reason: HOSPADM

## 2024-05-06 RX ORDER — ACETAMINOPHEN 650 MG/1
650 SUPPOSITORY RECTAL EVERY 6 HOURS PRN
Status: DISCONTINUED | OUTPATIENT
Start: 2024-05-06 | End: 2024-05-08 | Stop reason: HOSPADM

## 2024-05-06 RX ORDER — POTASSIUM CHLORIDE 7.45 MG/ML
10 INJECTION INTRAVENOUS PRN
Status: DISCONTINUED | OUTPATIENT
Start: 2024-05-06 | End: 2024-05-08 | Stop reason: HOSPADM

## 2024-05-06 RX ORDER — ACETAMINOPHEN 325 MG/1
650 TABLET ORAL EVERY 6 HOURS PRN
Status: DISCONTINUED | OUTPATIENT
Start: 2024-05-06 | End: 2024-05-08 | Stop reason: HOSPADM

## 2024-05-06 RX ORDER — POTASSIUM CHLORIDE 20 MEQ/1
40 TABLET, EXTENDED RELEASE ORAL PRN
Status: DISCONTINUED | OUTPATIENT
Start: 2024-05-06 | End: 2024-05-08 | Stop reason: HOSPADM

## 2024-05-06 RX ORDER — NITROGLYCERIN 0.4 MG/1
0.4 TABLET SUBLINGUAL EVERY 5 MIN PRN
Status: DISCONTINUED | OUTPATIENT
Start: 2024-05-06 | End: 2024-05-08 | Stop reason: HOSPADM

## 2024-05-06 RX ORDER — TIZANIDINE 4 MG/1
4 TABLET ORAL EVERY 8 HOURS PRN
Status: DISCONTINUED | OUTPATIENT
Start: 2024-05-06 | End: 2024-05-08 | Stop reason: HOSPADM

## 2024-05-06 RX ORDER — INSULIN LISPRO 100 [IU]/ML
0-4 INJECTION, SOLUTION INTRAVENOUS; SUBCUTANEOUS NIGHTLY
Status: DISCONTINUED | OUTPATIENT
Start: 2024-05-06 | End: 2024-05-08 | Stop reason: HOSPADM

## 2024-05-06 RX ORDER — ONDANSETRON 4 MG/1
4 TABLET, ORALLY DISINTEGRATING ORAL EVERY 8 HOURS PRN
Status: DISCONTINUED | OUTPATIENT
Start: 2024-05-06 | End: 2024-05-08 | Stop reason: HOSPADM

## 2024-05-06 RX ORDER — POTASSIUM CHLORIDE 20 MEQ/1
20 TABLET, EXTENDED RELEASE ORAL DAILY
Status: DISCONTINUED | OUTPATIENT
Start: 2024-05-06 | End: 2024-05-08 | Stop reason: HOSPADM

## 2024-05-06 RX ORDER — POLYETHYLENE GLYCOL 3350 17 G/17G
17 POWDER, FOR SOLUTION ORAL DAILY PRN
Status: DISCONTINUED | OUTPATIENT
Start: 2024-05-06 | End: 2024-05-08 | Stop reason: HOSPADM

## 2024-05-06 RX ORDER — GABAPENTIN 400 MG/1
CAPSULE ORAL
Qty: 30 CAPSULE | Refills: 0 | Status: SHIPPED | OUTPATIENT
Start: 2024-05-06 | End: 2024-06-05

## 2024-05-06 RX ORDER — METOPROLOL SUCCINATE 200 MG/1
TABLET, EXTENDED RELEASE ORAL
Qty: 30 TABLET | Refills: 1 | Status: SHIPPED | OUTPATIENT
Start: 2024-05-06

## 2024-05-06 RX ORDER — SODIUM CHLORIDE 0.9 % (FLUSH) 0.9 %
10 SYRINGE (ML) INJECTION PRN
Status: DISCONTINUED | OUTPATIENT
Start: 2024-05-06 | End: 2024-05-08 | Stop reason: HOSPADM

## 2024-05-06 RX ORDER — DEXTROSE MONOHYDRATE 100 MG/ML
INJECTION, SOLUTION INTRAVENOUS CONTINUOUS PRN
Status: DISCONTINUED | OUTPATIENT
Start: 2024-05-06 | End: 2024-05-08 | Stop reason: HOSPADM

## 2024-05-06 RX ORDER — BUMETANIDE 1 MG/1
2 TABLET ORAL 2 TIMES DAILY
Status: DISCONTINUED | OUTPATIENT
Start: 2024-05-06 | End: 2024-05-06

## 2024-05-06 RX ORDER — ONDANSETRON 2 MG/ML
4 INJECTION INTRAMUSCULAR; INTRAVENOUS EVERY 6 HOURS PRN
Status: DISCONTINUED | OUTPATIENT
Start: 2024-05-06 | End: 2024-05-08 | Stop reason: HOSPADM

## 2024-05-06 RX ORDER — GABAPENTIN 400 MG/1
400 CAPSULE ORAL NIGHTLY
Status: DISCONTINUED | OUTPATIENT
Start: 2024-05-06 | End: 2024-05-08 | Stop reason: HOSPADM

## 2024-05-06 RX ORDER — MAGNESIUM SULFATE HEPTAHYDRATE 40 MG/ML
2000 INJECTION, SOLUTION INTRAVENOUS PRN
Status: DISCONTINUED | OUTPATIENT
Start: 2024-05-06 | End: 2024-05-08 | Stop reason: HOSPADM

## 2024-05-06 RX ORDER — ASPIRIN 81 MG/1
81 TABLET ORAL DAILY
Status: DISCONTINUED | OUTPATIENT
Start: 2024-05-06 | End: 2024-05-08 | Stop reason: HOSPADM

## 2024-05-06 RX ORDER — LANOLIN ALCOHOL/MO/W.PET/CERES
3 CREAM (GRAM) TOPICAL NIGHTLY PRN
Status: DISCONTINUED | OUTPATIENT
Start: 2024-05-06 | End: 2024-05-08 | Stop reason: HOSPADM

## 2024-05-06 RX ADMIN — BUMETANIDE 2 MG: 1 TABLET ORAL at 19:23

## 2024-05-06 RX ADMIN — SACUBITRIL AND VALSARTAN 1 TABLET: 24; 26 TABLET, FILM COATED ORAL at 21:11

## 2024-05-06 RX ADMIN — SPIRONOLACTONE 25 MG: 25 TABLET ORAL at 21:11

## 2024-05-06 RX ADMIN — SODIUM CHLORIDE, PRESERVATIVE FREE 10 ML: 5 INJECTION INTRAVENOUS at 21:12

## 2024-05-06 RX ADMIN — ASPIRIN 81 MG: 81 TABLET, COATED ORAL at 21:10

## 2024-05-06 RX ADMIN — ENOXAPARIN SODIUM 30 MG: 100 INJECTION SUBCUTANEOUS at 21:11

## 2024-05-06 RX ADMIN — DICLOFENAC SODIUM 4 G: 10 GEL TOPICAL at 21:15

## 2024-05-06 RX ADMIN — ALBUTEROL SULFATE 2 PUFF: 90 AEROSOL, METERED RESPIRATORY (INHALATION) at 19:39

## 2024-05-06 RX ADMIN — GABAPENTIN 400 MG: 400 CAPSULE ORAL at 21:11

## 2024-05-06 RX ADMIN — METOPROLOL SUCCINATE 200 MG: 100 TABLET, EXTENDED RELEASE ORAL at 21:12

## 2024-05-06 RX ADMIN — ATORVASTATIN CALCIUM 10 MG: 10 TABLET, FILM COATED ORAL at 21:11

## 2024-05-06 RX ADMIN — POTASSIUM CHLORIDE 20 MEQ: 1500 TABLET, EXTENDED RELEASE ORAL at 21:10

## 2024-05-06 ASSESSMENT — ENCOUNTER SYMPTOMS
NAUSEA: 0
COUGH: 0
DIARRHEA: 0
SHORTNESS OF BREATH: 0
ABDOMINAL PAIN: 0
COLOR CHANGE: 0
VOMITING: 0
PHOTOPHOBIA: 0
TROUBLE SWALLOWING: 0

## 2024-05-06 ASSESSMENT — PAIN - FUNCTIONAL ASSESSMENT
PAIN_FUNCTIONAL_ASSESSMENT_SITE2: ACTIVITIES ARE NOT PREVENTED
PAIN_FUNCTIONAL_ASSESSMENT: 0-10
PAIN_FUNCTIONAL_ASSESSMENT: ACTIVITIES ARE NOT PREVENTED

## 2024-05-06 ASSESSMENT — PAIN DESCRIPTION - INTENSITY: RATING_2: 2

## 2024-05-06 ASSESSMENT — PAIN DESCRIPTION - FREQUENCY: FREQUENCY: INTERMITTENT

## 2024-05-06 ASSESSMENT — LIFESTYLE VARIABLES
HOW OFTEN DO YOU HAVE A DRINK CONTAINING ALCOHOL: NEVER
HOW MANY STANDARD DRINKS CONTAINING ALCOHOL DO YOU HAVE ON A TYPICAL DAY: PATIENT DOES NOT DRINK

## 2024-05-06 ASSESSMENT — PAIN DESCRIPTION - ORIENTATION
ORIENTATION_2: LOWER
ORIENTATION: MID

## 2024-05-06 ASSESSMENT — PAIN DESCRIPTION - LOCATION
LOCATION: CHEST
LOCATION_2: BACK
LOCATION: CHEST

## 2024-05-06 ASSESSMENT — PAIN DESCRIPTION - DESCRIPTORS
DESCRIPTORS_2: ACHING;DISCOMFORT
DESCRIPTORS: ACHING

## 2024-05-06 ASSESSMENT — HEART SCORE: ECG: NORMAL

## 2024-05-06 ASSESSMENT — PAIN SCALES - GENERAL
PAINLEVEL_OUTOF10: 1
PAINLEVEL_OUTOF10: 5

## 2024-05-06 ASSESSMENT — PAIN DESCRIPTION - ONSET: ONSET: ON-GOING

## 2024-05-06 ASSESSMENT — PAIN DESCRIPTION - PAIN TYPE: TYPE: ACUTE PAIN

## 2024-05-06 NOTE — ED PROVIDER NOTES
Hyperlipidemia     Hypertension     Inguinal hernia     Low back pain with sciatica     Migraine     Mitral valve regurgitation     Osteoarthritis     Patient in clinical research study 02/01/2022    Impulse Dynamics/CCM Therapy    Pneumonia     Sleep apnea     with cpap    SOB (shortness of breath) on exertion     Tricuspid regurgitation     Type 2 diabetes mellitus without complication, without long-term current use of insulin ([facility])     Under care of team 05/20/2021    cardiology-Dr Sierra-kierra kang-last visit jan 2021    Wellness examination 05/20/2021    wdo-[facility]-last visit march 2021     Past Problem List  Patient Active Problem List   Diagnosis Code    HTN (hypertension) I10    Skin tag L91.8    Low back pain with sciatica M54.40    Hyperglycemia R73.9    Infected sebaceous cyst L72.3, L08.9    Chronic midline low back pain without sciatica M54.50, G89.29    Chronic pain of right knee M25.561, G89.29    Chest pain, unspecified R07.9    Chronic bronchitis (HCC) J42    Chronic systolic congestive heart failure (HCC) I50.22    Ventral hernia K43.9    Epigastric pain R10.13    Elevated LFTs R79.89    Shortness of breath R06.02    Rectus diastasis M62.08    Lumbosacral spondylosis without myelopathy M47.817    COPD exacerbation (HCC) J44.1    Mixed simple and mucopurulent chronic bronchitis (HCC) J41.8    VANDANA (obstructive sleep apnea) G47.33    Tachycardia R00.0    Chest pain R07.9    Diabetes mellitus type 2 in obese E11.69, E66.9    Need for shingles vaccine Z23    Other male erectile dysfunction N52.8    Hernia of abdominal cavity K46.9    Hypokalemia E87.6     SURGICAL HISTORY       Past Surgical History:   Procedure Laterality Date    ADENOIDECTOMY      COLONOSCOPY      COLONOSCOPY  05/28/2021    COLORECTAL CANCER SCREENING, NOT HIGH RISK, POLYPECTOMY    COLONOSCOPY N/A 5/28/2021    COLONOSCOPY POLYPECTOMY HOT BIOPSY performed by Jensen Cervantes IV, DO at [facility] OR    HERNIA REPAIR

## 2024-05-06 NOTE — TELEPHONE ENCOUNTER
Last visit: 02/16/2024  Last Med refill: 03/18/2024  Does patient have enough medication for 72 hours: No:     Next Visit Date:  Future Appointments   Date Time Provider Department Center   5/17/2024  2:00 PM Andrei Edmondson MD [id number]       Health Maintenance   Topic Date Due    Diabetic foot exam  Never done    Diabetic retinal exam  Never done    Respiratory Syncytial Virus (RSV) Pregnant or age 60 yrs+ (1 - 1-dose 60+ series) Never done    COVID-19 Vaccine (5 - 2023-24 season) 09/01/2023    Annual Wellness Visit (Medicare Advantage)  Never done    Lipids  01/27/2024    Colorectal Cancer Screen  05/28/2024    Diabetic Alb to Cr ratio (uACR) test  07/07/2024    Flu vaccine (Season Ended) 08/01/2024    GFR test (Diabetes, CKD 3-4, OR last GFR 15-59)  10/13/2024    A1C test (Diabetic or Prediabetic)  02/16/2025    Depression Screen  02/16/2025    DTaP/Tdap/Td vaccine (2 - Td or Tdap) 12/08/2030    Shingles vaccine  Completed    Pneumococcal 0-64 years Vaccine  Completed    Hepatitis C screen  Completed    HIV screen  Completed    Hepatitis A vaccine  Aged Out    Hepatitis B vaccine  Aged Out    Hib vaccine  Aged Out    Polio vaccine  Aged Out    Meningococcal (ACWY) vaccine  Aged Out    Prostate Specific Antigen (PSA) Screening or Monitoring  Discontinued       Hemoglobin A1C (%)   Date Value   02/16/2024 7.1   10/13/2023 6.5   07/07/2023 7.4             ( goal A1C is < 7)   No components found for: \"LABMICR\"  No components found for: \"LDLCHOLESTEROL\", \"LDLCALC\"    (goal LDL is <100)   AST (U/L)   Date Value   01/27/2023 23     ALT (U/L)   Date Value   01/27/2023 43 (H)     BUN (mg/dL)   Date Value   10/13/2023 14     BP Readings from Last 3 Encounters:   02/16/24 (!) 151/95   12/14/23 110/60   11/21/23 137/79          (goal 120/80)    All Future Testing planned in [facility]  Lab Frequency Next Occurrence   MRI LUMBAR SPINE WO CONTRAST Once 06/22/2023   Basic Metabolic Panel Once 12/14/2023   Lipid

## 2024-05-06 NOTE — ED NOTES
Report given to SARA Yee from [facility].   Report method by phone   The following was reviewed with receiving RN:   Current vital signs:  BP (!) 152/77   Pulse 87   Temp 98.1 °F (36.7 °C) (Oral)   Resp 18   Ht 1.651 m (5' 5\")   Wt 106.1 kg (234 lb)   SpO2 98%   BMI 38.94 kg/m²                MEWS Score: 1     Any medication or safety alerts were reviewed. Any pending diagnostics and notifications were also reviewed, as well as any safety concerns or issues, abnormal labs, abnormal imaging, and abnormal assessment findings. Questions were answered.

## 2024-05-06 NOTE — ED TRIAGE NOTES
Mode of arrival (squad #, walk in, police, etc) : Walk in        Chief complaint(s): Chest pain         Arrival Note (brief scenario, treatment PTA, etc).: Pt arrived to room 16 via wheelchair and ambulatory to the bed. Pt reports c/o chest pain (Lt side) and rt shoulder pain. Pt has a pacemaker in place. Pt is AO x4, vitals assessed, care ongoing. EKG done upon arrival to room.         C= \"Have you ever felt that you should Cut down on your drinking?\"  No  A= \"Have people Annoyed you by criticizing your drinking?\"  No  G= \"Have you ever felt bad or Guilty about your drinking?\"  No  E= \"Have you ever had a drink as an Eye-opener first thing in the morning to steady your nerves or to help a hangover?\"  No      Deferred []      Reason for deferring: N/A    *If yes to two or more: probable alcohol abuse.*

## 2024-05-07 ENCOUNTER — APPOINTMENT (OUTPATIENT)
Dept: NUCLEAR MEDICINE | Age: 64
End: 2024-05-07
Payer: MEDICARE

## 2024-05-07 ENCOUNTER — HOSPITAL ENCOUNTER (INPATIENT)
Age: 64
Discharge: HOME | End: 2024-05-09
Payer: MEDICARE

## 2024-05-07 ENCOUNTER — APPOINTMENT (OUTPATIENT)
Age: 64
End: 2024-05-07
Payer: MEDICARE

## 2024-05-07 LAB
ANION GAP SERPL CALCULATED.3IONS-SCNC: 12 MMOL/L (ref 9–17)
BASOPHILS # BLD: 0 K/UL (ref 0–0.2)
BASOPHILS NFR BLD: 1 % (ref 0–2)
BNP SERPL-MCNC: 46 PG/ML
BUN SERPL-MCNC: 22 MG/DL (ref 8–23)
CALCIUM SERPL-MCNC: 8.6 MG/DL (ref 8.6–10.4)
CHLORIDE SERPL-SCNC: 99 MMOL/L (ref 98–107)
CHOLEST SERPL-MCNC: 165 MG/DL
CHOLESTEROL/HDL RATIO: 3.9
CO2 SERPL-SCNC: 26 MMOL/L (ref 20–31)
CREAT SERPL-MCNC: 1.1 MG/DL (ref 0.7–1.2)
D DIMER PPP FEU-MCNC: 0.36 UG/ML FEU (ref 0–0.59)
ECHO AO ROOT DIAM: 2.6 CM
ECHO AO ROOT INDEX: 1.2 CM/M2
ECHO AV AREA PEAK VELOCITY: 1.7 CM2
ECHO AV AREA VTI: 1.8 CM2
ECHO AV AREA/BSA PEAK VELOCITY: 0.8 CM2/M2
ECHO AV AREA/BSA VTI: 0.8 CM2/M2
ECHO AV MEAN GRADIENT: 3 MMHG
ECHO AV MEAN VELOCITY: 0.8 M/S
ECHO AV PEAK GRADIENT: 5 MMHG
ECHO AV PEAK VELOCITY: 1.1 M/S
ECHO AV VELOCITY RATIO: 0.73
ECHO AV VTI: 23.3 CM
ECHO BSA: 2.21 M2
ECHO EST RA PRESSURE: 3 MMHG
ECHO LA DIAMETER INDEX: 1.71 CM/M2
ECHO LA DIAMETER: 3.7 CM
ECHO LA TO AORTIC ROOT RATIO: 1.42
ECHO LV E' LATERAL VELOCITY: 7 CM/S
ECHO LV E' SEPTAL VELOCITY: 5 CM/S
ECHO LV FRACTIONAL SHORTENING: 25 % (ref 28–44)
ECHO LV INTERNAL DIMENSION DIASTOLE INDEX: 2.36 CM/M2
ECHO LV INTERNAL DIMENSION DIASTOLIC: 5.1 CM (ref 4.2–5.9)
ECHO LV INTERNAL DIMENSION SYSTOLIC INDEX: 1.76 CM/M2
ECHO LV INTERNAL DIMENSION SYSTOLIC: 3.8 CM
ECHO LV IVSD: 0.9 CM (ref 0.6–1)
ECHO LV MASS 2D: 163.6 G (ref 88–224)
ECHO LV MASS INDEX 2D: 75.7 G/M2 (ref 49–115)
ECHO LV POSTERIOR WALL DIASTOLIC: 0.9 CM (ref 0.6–1)
ECHO LV RELATIVE WALL THICKNESS RATIO: 0.35
ECHO LVOT AREA: 2.5 CM2
ECHO LVOT AV VTI INDEX: 0.72
ECHO LVOT DIAM: 1.8 CM
ECHO LVOT MEAN GRADIENT: 1 MMHG
ECHO LVOT PEAK GRADIENT: 2 MMHG
ECHO LVOT PEAK VELOCITY: 0.8 M/S
ECHO LVOT STROKE VOLUME INDEX: 19.8 ML/M2
ECHO LVOT SV: 42.7 ML
ECHO LVOT VTI: 16.8 CM
ECHO MV A VELOCITY: 1.09 M/S
ECHO MV AREA VTI: 1 CM2
ECHO MV E DECELERATION TIME (DT): 182 MS
ECHO MV E VELOCITY: 1.05 M/S
ECHO MV E/A RATIO: 0.96
ECHO MV E/E' LATERAL: 15
ECHO MV E/E' RATIO (AVERAGED): 18
ECHO MV LVOT VTI INDEX: 2.51
ECHO MV MAX VELOCITY: 1.3 M/S
ECHO MV MEAN GRADIENT: 3 MMHG
ECHO MV MEAN VELOCITY: 0.8 M/S
ECHO MV PEAK GRADIENT: 7 MMHG
ECHO MV VTI: 42.2 CM
ECHO RA AREA 4C: 15.1 CM2
ECHO RIGHT VENTRICULAR SYSTOLIC PRESSURE (RVSP): 30 MMHG
ECHO RV TAPSE: 1.7 CM (ref 1.7–?)
ECHO TV REGURGITANT MAX VELOCITY: 2.58 M/S
ECHO TV REGURGITANT PEAK GRADIENT: 27 MMHG
EKG ATRIAL RATE: 94 BPM
EKG P AXIS: 50 DEGREES
EKG P-R INTERVAL: 154 MS
EKG Q-T INTERVAL: 364 MS
EKG QRS DURATION: 92 MS
EKG QTC CALCULATION (BAZETT): 455 MS
EKG R AXIS: 49 DEGREES
EKG T AXIS: 90 DEGREES
EKG VENTRICULAR RATE: 94 BPM
EOSINOPHIL # BLD: 0.3 K/UL (ref 0–0.4)
EOSINOPHILS RELATIVE PERCENT: 4 % (ref 0–4)
ERYTHROCYTE [DISTWIDTH] IN BLOOD BY AUTOMATED COUNT: 14.1 % (ref 11.5–14.9)
EST. AVERAGE GLUCOSE BLD GHB EST-MCNC: 140 MG/DL
GFR, ESTIMATED: 75 ML/MIN/1.73M2
GLUCOSE BLD-MCNC: 120 MG/DL (ref 75–110)
GLUCOSE BLD-MCNC: 132 MG/DL (ref 75–110)
GLUCOSE BLD-MCNC: 154 MG/DL (ref 75–110)
GLUCOSE BLD-MCNC: 195 MG/DL (ref 75–110)
GLUCOSE SERPL-MCNC: 149 MG/DL (ref 70–99)
HBA1C MFR BLD: 6.5 % (ref 4–6)
HCT VFR BLD AUTO: 39.3 % (ref 41–53)
HDLC SERPL-MCNC: 42 MG/DL
HGB BLD-MCNC: 13 G/DL (ref 13.5–17.5)
LDLC SERPL CALC-MCNC: 89 MG/DL (ref 0–130)
LYMPHOCYTES NFR BLD: 1.6 K/UL (ref 1–4.8)
LYMPHOCYTES RELATIVE PERCENT: 23 % (ref 24–44)
MCH RBC QN AUTO: 29.3 PG (ref 26–34)
MCHC RBC AUTO-ENTMCNC: 33.2 G/DL (ref 31–37)
MCV RBC AUTO: 88.2 FL (ref 80–100)
MONOCYTES NFR BLD: 0.8 K/UL (ref 0.1–1.3)
MONOCYTES NFR BLD: 11 % (ref 1–7)
NEUTROPHILS NFR BLD: 61 % (ref 36–66)
NEUTS SEG NFR BLD: 4.2 K/UL (ref 1.3–9.1)
PLATELET # BLD AUTO: 177 K/UL (ref 150–450)
PMV BLD AUTO: 8.3 FL (ref 6–12)
POTASSIUM SERPL-SCNC: 3.8 MMOL/L (ref 3.7–5.3)
RBC # BLD AUTO: 4.45 M/UL (ref 4.5–5.9)
SODIUM SERPL-SCNC: 137 MMOL/L (ref 135–144)
TRIGL SERPL-MCNC: 170 MG/DL
TSH SERPL DL<=0.05 MIU/L-ACNC: 1.98 UIU/ML (ref 0.3–5)
WBC OTHER # BLD: 6.8 K/UL (ref 3.5–11)

## 2024-05-07 PROCEDURE — 80061 LIPID PANEL: CPT

## 2024-05-07 PROCEDURE — 6360000002 HC RX W HCPCS

## 2024-05-07 PROCEDURE — 2580000003 HC RX 258

## 2024-05-07 PROCEDURE — 85379 FIBRIN DEGRADATION QUANT: CPT

## 2024-05-07 PROCEDURE — 94761 N-INVAS EAR/PLS OXIMETRY MLT: CPT

## 2024-05-07 PROCEDURE — 83036 HEMOGLOBIN GLYCOSYLATED A1C: CPT

## 2024-05-07 PROCEDURE — 6370000000 HC RX 637 (ALT 250 FOR IP)

## 2024-05-07 PROCEDURE — 93306 TTE W/DOPPLER COMPLETE: CPT

## 2024-05-07 PROCEDURE — 36415 COLL VENOUS BLD VENIPUNCTURE: CPT

## 2024-05-07 PROCEDURE — 84443 ASSAY THYROID STIM HORMONE: CPT

## 2024-05-07 PROCEDURE — 80048 BASIC METABOLIC PNL TOTAL CA: CPT

## 2024-05-07 PROCEDURE — 96372 THER/PROPH/DIAG INJ SC/IM: CPT

## 2024-05-07 PROCEDURE — 93306 TTE W/DOPPLER COMPLETE: CPT | Performed by: INTERNAL MEDICINE

## 2024-05-07 PROCEDURE — 94640 AIRWAY INHALATION TREATMENT: CPT

## 2024-05-07 PROCEDURE — 3430000000 HC RX DIAGNOSTIC RADIOPHARMACEUTICAL

## 2024-05-07 PROCEDURE — 83880 ASSAY OF NATRIURETIC PEPTIDE: CPT

## 2024-05-07 PROCEDURE — G0378 HOSPITAL OBSERVATION PER HR: HCPCS

## 2024-05-07 PROCEDURE — 85025 COMPLETE CBC W/AUTO DIFF WBC: CPT

## 2024-05-07 PROCEDURE — 78452 HT MUSCLE IMAGE SPECT MULT: CPT

## 2024-05-07 PROCEDURE — A9500 TC99M SESTAMIBI: HCPCS

## 2024-05-07 PROCEDURE — 93018 CV STRESS TEST I&R ONLY: CPT | Performed by: RADIOLOGY

## 2024-05-07 PROCEDURE — 93016 CV STRESS TEST SUPVJ ONLY: CPT | Performed by: RADIOLOGY

## 2024-05-07 PROCEDURE — 82947 ASSAY GLUCOSE BLOOD QUANT: CPT

## 2024-05-07 PROCEDURE — 99223 1ST HOSP IP/OBS HIGH 75: CPT | Performed by: SURGERY

## 2024-05-07 PROCEDURE — 93017 CV STRESS TEST TRACING ONLY: CPT

## 2024-05-07 RX ORDER — REGADENOSON 0.08 MG/ML
0.4 INJECTION, SOLUTION INTRAVENOUS
Status: COMPLETED | OUTPATIENT
Start: 2024-05-07 | End: 2024-05-07

## 2024-05-07 RX ORDER — SODIUM CHLORIDE 9 MG/ML
500 INJECTION, SOLUTION INTRAVENOUS CONTINUOUS PRN
Status: ACTIVE | OUTPATIENT
Start: 2024-05-07 | End: 2024-05-07

## 2024-05-07 RX ORDER — AMINOPHYLLINE 25 MG/ML
50 INJECTION, SOLUTION INTRAVENOUS PRN
Status: ACTIVE | OUTPATIENT
Start: 2024-05-07 | End: 2024-05-07

## 2024-05-07 RX ORDER — TETRAKIS(2-METHOXYISOBUTYLISOCYANIDE)COPPER(I) TETRAFLUOROBORATE 1 MG/ML
10 INJECTION, POWDER, LYOPHILIZED, FOR SOLUTION INTRAVENOUS
Status: COMPLETED | OUTPATIENT
Start: 2024-05-07 | End: 2024-05-07

## 2024-05-07 RX ORDER — ATROPINE SULFATE 0.1 MG/ML
0.5 INJECTION INTRAVENOUS EVERY 5 MIN PRN
Status: ACTIVE | OUTPATIENT
Start: 2024-05-07 | End: 2024-05-07

## 2024-05-07 RX ORDER — METOPROLOL TARTRATE 1 MG/ML
5 INJECTION, SOLUTION INTRAVENOUS EVERY 5 MIN PRN
Status: ACTIVE | OUTPATIENT
Start: 2024-05-07 | End: 2024-05-07

## 2024-05-07 RX ORDER — SODIUM CHLORIDE 0.9 % (FLUSH) 0.9 %
5-40 SYRINGE (ML) INJECTION PRN
Status: ACTIVE | OUTPATIENT
Start: 2024-05-07 | End: 2024-05-07

## 2024-05-07 RX ORDER — TETRAKIS(2-METHOXYISOBUTYLISOCYANIDE)COPPER(I) TETRAFLUOROBORATE 1 MG/ML
30 INJECTION, POWDER, LYOPHILIZED, FOR SOLUTION INTRAVENOUS
Status: COMPLETED | OUTPATIENT
Start: 2024-05-07 | End: 2024-05-07

## 2024-05-07 RX ORDER — NITROGLYCERIN 0.4 MG/1
0.4 TABLET SUBLINGUAL EVERY 5 MIN PRN
Status: ACTIVE | OUTPATIENT
Start: 2024-05-07 | End: 2024-05-07

## 2024-05-07 RX ORDER — ALBUTEROL SULFATE 90 UG/1
2 AEROSOL, METERED RESPIRATORY (INHALATION) PRN
Status: ACTIVE | OUTPATIENT
Start: 2024-05-07 | End: 2024-05-07

## 2024-05-07 RX ORDER — SODIUM CHLORIDE 0.9 % (FLUSH) 0.9 %
10 SYRINGE (ML) INJECTION PRN
Status: DISCONTINUED | OUTPATIENT
Start: 2024-05-07 | End: 2024-05-08 | Stop reason: HOSPADM

## 2024-05-07 RX ADMIN — ATORVASTATIN CALCIUM 10 MG: 10 TABLET, FILM COATED ORAL at 11:52

## 2024-05-07 RX ADMIN — Medication 14.3 MILLICURIE: at 09:58

## 2024-05-07 RX ADMIN — SODIUM CHLORIDE, PRESERVATIVE FREE 10 ML: 5 INJECTION INTRAVENOUS at 13:00

## 2024-05-07 RX ADMIN — ALBUTEROL SULFATE 2.5 MG: 2.5 SOLUTION RESPIRATORY (INHALATION) at 11:31

## 2024-05-07 RX ADMIN — GABAPENTIN 400 MG: 400 CAPSULE ORAL at 20:36

## 2024-05-07 RX ADMIN — ALBUTEROL SULFATE 2.5 MG: 2.5 SOLUTION RESPIRATORY (INHALATION) at 15:27

## 2024-05-07 RX ADMIN — METOPROLOL SUCCINATE 200 MG: 100 TABLET, EXTENDED RELEASE ORAL at 11:51

## 2024-05-07 RX ADMIN — SODIUM CHLORIDE, PRESERVATIVE FREE 10 ML: 5 INJECTION INTRAVENOUS at 11:52

## 2024-05-07 RX ADMIN — ALBUTEROL SULFATE 2.5 MG: 2.5 SOLUTION RESPIRATORY (INHALATION) at 06:56

## 2024-05-07 RX ADMIN — ENOXAPARIN SODIUM 30 MG: 100 INJECTION SUBCUTANEOUS at 20:36

## 2024-05-07 RX ADMIN — BUMETANIDE 2 MG: 1 TABLET ORAL at 11:52

## 2024-05-07 RX ADMIN — ASPIRIN 81 MG: 81 TABLET, COATED ORAL at 11:52

## 2024-05-07 RX ADMIN — REGADENOSON 0.4 MG: 0.08 INJECTION, SOLUTION INTRAVENOUS at 09:57

## 2024-05-07 RX ADMIN — SACUBITRIL AND VALSARTAN 1 TABLET: 24; 26 TABLET, FILM COATED ORAL at 11:51

## 2024-05-07 RX ADMIN — TIOTROPIUM BROMIDE INHALATION SPRAY 2 PUFF: 3.12 SPRAY, METERED RESPIRATORY (INHALATION) at 06:56

## 2024-05-07 RX ADMIN — ALBUTEROL SULFATE 2.5 MG: 2.5 SOLUTION RESPIRATORY (INHALATION) at 19:25

## 2024-05-07 RX ADMIN — DICLOFENAC SODIUM 4 G: 10 GEL TOPICAL at 11:59

## 2024-05-07 RX ADMIN — Medication 38.3 MILLICURIE: at 13:00

## 2024-05-07 RX ADMIN — SACUBITRIL AND VALSARTAN 1 TABLET: 24; 26 TABLET, FILM COATED ORAL at 20:36

## 2024-05-07 RX ADMIN — SODIUM CHLORIDE, PRESERVATIVE FREE 10 ML: 5 INJECTION INTRAVENOUS at 20:37

## 2024-05-07 RX ADMIN — ENOXAPARIN SODIUM 30 MG: 100 INJECTION SUBCUTANEOUS at 11:52

## 2024-05-07 RX ADMIN — BUMETANIDE 2 MG: 1 TABLET ORAL at 20:36

## 2024-05-07 RX ADMIN — TIZANIDINE 4 MG: 4 TABLET ORAL at 20:36

## 2024-05-07 RX ADMIN — DICLOFENAC SODIUM 4 G: 10 GEL TOPICAL at 20:36

## 2024-05-07 RX ADMIN — SPIRONOLACTONE 25 MG: 25 TABLET ORAL at 20:36

## 2024-05-07 RX ADMIN — POTASSIUM CHLORIDE 20 MEQ: 1500 TABLET, EXTENDED RELEASE ORAL at 11:51

## 2024-05-07 ASSESSMENT — PAIN SCALES - GENERAL
PAINLEVEL_OUTOF10: 3
PAINLEVEL_OUTOF10: 5
PAINLEVEL_OUTOF10: 2

## 2024-05-07 ASSESSMENT — PAIN DESCRIPTION - DESCRIPTORS
DESCRIPTORS: ACHING;DISCOMFORT
DESCRIPTORS_2: ACHING
DESCRIPTORS: ACHING
DESCRIPTORS: ACHING;DISCOMFORT
DESCRIPTORS_2: ACHING;DISCOMFORT

## 2024-05-07 ASSESSMENT — PAIN DESCRIPTION - ORIENTATION
ORIENTATION: LOWER
ORIENTATION_2: RIGHT;LEFT
ORIENTATION: LOWER
ORIENTATION: LOWER
ORIENTATION_2: RIGHT;LEFT

## 2024-05-07 ASSESSMENT — PAIN DESCRIPTION - PAIN TYPE
TYPE: CHRONIC PAIN
TYPE: CHRONIC PAIN

## 2024-05-07 ASSESSMENT — PAIN DESCRIPTION - LOCATION
LOCATION: BACK
LOCATION: BACK
LOCATION_2: KNEE
LOCATION_2: KNEE
LOCATION: BACK

## 2024-05-07 ASSESSMENT — PAIN DESCRIPTION - FREQUENCY
FREQUENCY: INTERMITTENT
FREQUENCY: INTERMITTENT

## 2024-05-07 ASSESSMENT — PAIN DESCRIPTION - ONSET
ONSET: ON-GOING
ONSET: ON-GOING

## 2024-05-07 ASSESSMENT — PAIN DESCRIPTION - INTENSITY
RATING_2: 2
RATING_2: 0

## 2024-05-07 ASSESSMENT — PAIN - FUNCTIONAL ASSESSMENT
PAIN_FUNCTIONAL_ASSESSMENT: ACTIVITIES ARE NOT PREVENTED
PAIN_FUNCTIONAL_ASSESSMENT_SITE2: ACTIVITIES ARE NOT PREVENTED
PAIN_FUNCTIONAL_ASSESSMENT_SITE2: ACTIVITIES ARE NOT PREVENTED
PAIN_FUNCTIONAL_ASSESSMENT: ACTIVITIES ARE NOT PREVENTED

## 2024-05-07 NOTE — CONSULTS
[facility] Card[facility]    Consult                        Today's Date: 2024  Patient Name: Jesus Stevens  Date of admission: 2024  3:38 PM  Patient's age: 63 y.o., 1960  Admission Dx: Atypical chest pain [R07.89]  Chest pain, unspecified type [R07.9]    Reason for Consult:  Cardiac evaluation    Requesting Physician: Arnaldo Austin MD    CHIEF COMPLAINT:  chest pain     History Obtained From:  patient, electronic medical record    HISTORY OF PRESENT ILLNESS:      The patient is a 63 y.o.  male with a history of hypertension, chronic systolic congestive heart failure, COPD, obstructive sleep apnea, tachycardia, and type 2 diabetes who presents with Chest Pain   and is admitted to the hospital for the management of Atypical chest pain.     According to patient, he has been experiencing intermittent chest pain and shortness of breath since his sister's  on Saturday.  He says it worsens with exertion and is relieved by rest.  The patient endorses having a defibrillator implant and a history of congestive heart failure.  He does not endorse the defibrillator firing during his episodic chest pain.  He states that he quit smoking years ago, and has chronic bronchitis and COPD.     In the ER his troponins were flat at 7.  His chest x-ray was negative.  His EKG showed no signs of cardiac ischemia.  He states that he follows with [facility].  His last stress test, was - Inferior reversible ischemia, EF 39%, Cardiac catheterization at [facility] on  revealed no obstructive coronary artery disease.     Patient seen and examined in stress test stated that he had a  for his  sister  on Saturday, denies chest pain at this time, sinus rhythm on EKG, stress test and echo were ordered by primary cardiology was consulted for chest pain    Past Medical History:   has a past medical history of Bronchitis, CAD (coronary artery disease), Cardiomyopathy (HCC), Chest pain, CHF

## 2024-05-07 NOTE — H&P
[facility] Internal Medicine  Jarad Ferreira MD; Bry Serrano MD; Arnaldo Austin MD; MD Nuzhat Choe MD; Margarita Nix MD    [facility] Internal Medicine   IN-PATIENT SERVICE   [facility]    HISTORY AND PHYSICAL EXAMINATION            Date:   5/7/2024  Patient name:  Jesus Stevens  Date of admission:  5/6/2024  3:38 PM  MRN:   [id number]  Account:  [id number]  YOB: 1960  PCP:    Andrei Edmondson MD  Room:   2086/2086-01  Code Status:    Full Code    Chief Complaint:     Chief Complaint   Patient presents with    Chest Pain       History Obtained From:     Pt medical record and nursing staff    History of Present Illness:     Jesus Stevens is a 63 y.o. Non- / non  male who presents with Chest Pain   and is admitted to the hospital for the management of Atypical chest pain.  63-year-old gentleman who has history of systolic congestive heart failure ejection fraction less than 40% who was following with Dr. Benitez in past status post impulse dynamics CCM therapy set in clinical research history of diabetes mellitus hypertension hyperlipidemia who has been a lot of stress with death of her sister who is 58 years old he lost another sister due to diabetes patient complains of intermittent sharp chest pain that catches his breath pain happens at rest no association with exertion no nausea vomiting no fever chills no radiation no dyspnea no history of DVT or PE history of smoking but quit 25 years ago      Past Medical History:     Past Medical History:   Diagnosis Date    Bronchitis     CAD (coronary artery disease)     Cardiomyopathy ([facility])     Chest pain     CHF (congestive heart failure) ([facility])     CHF (congestive heart failure), NYHA class I, acute on chronic, diastolic ([facility]) 05/30/2021    Chronic back pain     Chronic diastolic CHF (congestive heart failure) ([facility]) 08/01/2014    COPD (chronic obstructive pulmonary

## 2024-05-08 VITALS
TEMPERATURE: 98.1 F | DIASTOLIC BLOOD PRESSURE: 69 MMHG | SYSTOLIC BLOOD PRESSURE: 133 MMHG | OXYGEN SATURATION: 100 % | BODY MASS INDEX: 41.07 KG/M2 | RESPIRATION RATE: 18 BRPM | HEIGHT: 65 IN | WEIGHT: 246.47 LBS | HEART RATE: 80 BPM

## 2024-05-08 LAB
ANION GAP SERPL CALCULATED.3IONS-SCNC: 11 MMOL/L (ref 9–17)
BASOPHILS # BLD: 0 K/UL (ref 0–0.2)
BASOPHILS NFR BLD: 1 % (ref 0–2)
BUN SERPL-MCNC: 20 MG/DL (ref 8–23)
CALCIUM SERPL-MCNC: 8.9 MG/DL (ref 8.6–10.4)
CHLORIDE SERPL-SCNC: 98 MMOL/L (ref 98–107)
CO2 SERPL-SCNC: 30 MMOL/L (ref 20–31)
ECHO BSA: 2.21 M2
EOSINOPHIL # BLD: 0.3 K/UL (ref 0–0.4)
EOSINOPHILS RELATIVE PERCENT: 4 % (ref 0–4)
ERYTHROCYTE [DISTWIDTH] IN BLOOD BY AUTOMATED COUNT: 13.4 % (ref 11.5–14.9)
GFR, ESTIMATED: 84 ML/MIN/1.73M2
GLUCOSE BLD-MCNC: 114 MG/DL (ref 75–110)
GLUCOSE BLD-MCNC: 162 MG/DL (ref 75–110)
GLUCOSE SERPL-MCNC: 117 MG/DL (ref 70–99)
HCT VFR BLD AUTO: 42.1 % (ref 41–53)
HGB BLD-MCNC: 14.1 G/DL (ref 13.5–17.5)
LYMPHOCYTES NFR BLD: 1.5 K/UL (ref 1–4.8)
LYMPHOCYTES RELATIVE PERCENT: 22 % (ref 24–44)
MCH RBC QN AUTO: 29.7 PG (ref 26–34)
MCHC RBC AUTO-ENTMCNC: 33.6 G/DL (ref 31–37)
MCV RBC AUTO: 88.5 FL (ref 80–100)
MONOCYTES NFR BLD: 0.7 K/UL (ref 0.1–1.3)
MONOCYTES NFR BLD: 10 % (ref 1–7)
NEUTROPHILS NFR BLD: 63 % (ref 36–66)
NEUTS SEG NFR BLD: 4.5 K/UL (ref 1.3–9.1)
PLATELET # BLD AUTO: 199 K/UL (ref 150–450)
PMV BLD AUTO: 8.6 FL (ref 6–12)
POTASSIUM SERPL-SCNC: 4 MMOL/L (ref 3.7–5.3)
RBC # BLD AUTO: 4.76 M/UL (ref 4.5–5.9)
SODIUM SERPL-SCNC: 139 MMOL/L (ref 135–144)
STRESS BASELINE DIAS BP: 66 MMHG
STRESS BASELINE HR: 76 BPM
STRESS BASELINE SYS BP: 109 MMHG
STRESS ESTIMATED WORKLOAD: 1 METS
STRESS PEAK DIAS BP: 66 MMHG
STRESS PEAK SYS BP: 120 MMHG
STRESS PERCENT HR ACHIEVED: 60 %
STRESS POST PEAK HR: 94 BPM
STRESS RATE PRESSURE PRODUCT: NORMAL BPM*MMHG
STRESS STAGE RECOVERY 1 BP: NORMAL MMHG
STRESS STAGE RECOVERY 1 DURATION: 1 MIN:SEC
STRESS STAGE RECOVERY 1 HR: 94 BPM
STRESS STAGE RECOVERY 2 BP: NORMAL MMHG
STRESS STAGE RECOVERY 2 DURATION: 5 MIN:SEC
STRESS STAGE RECOVERY 2 HR: 90 BPM
STRESS TARGET HR: 156 BPM
WBC OTHER # BLD: 7.1 K/UL (ref 3.5–11)

## 2024-05-08 PROCEDURE — 94761 N-INVAS EAR/PLS OXIMETRY MLT: CPT

## 2024-05-08 PROCEDURE — G0378 HOSPITAL OBSERVATION PER HR: HCPCS

## 2024-05-08 PROCEDURE — 99233 SBSQ HOSP IP/OBS HIGH 50: CPT | Performed by: NURSE PRACTITIONER

## 2024-05-08 PROCEDURE — 6370000000 HC RX 637 (ALT 250 FOR IP)

## 2024-05-08 PROCEDURE — 80048 BASIC METABOLIC PNL TOTAL CA: CPT

## 2024-05-08 PROCEDURE — 36415 COLL VENOUS BLD VENIPUNCTURE: CPT

## 2024-05-08 PROCEDURE — 2580000003 HC RX 258

## 2024-05-08 PROCEDURE — 96372 THER/PROPH/DIAG INJ SC/IM: CPT

## 2024-05-08 PROCEDURE — 94640 AIRWAY INHALATION TREATMENT: CPT

## 2024-05-08 PROCEDURE — 6360000002 HC RX W HCPCS

## 2024-05-08 PROCEDURE — 85025 COMPLETE CBC W/AUTO DIFF WBC: CPT

## 2024-05-08 PROCEDURE — 82947 ASSAY GLUCOSE BLOOD QUANT: CPT

## 2024-05-08 RX ADMIN — METOPROLOL SUCCINATE 200 MG: 100 TABLET, EXTENDED RELEASE ORAL at 10:46

## 2024-05-08 RX ADMIN — TIOTROPIUM BROMIDE INHALATION SPRAY 2 PUFF: 3.12 SPRAY, METERED RESPIRATORY (INHALATION) at 07:17

## 2024-05-08 RX ADMIN — POTASSIUM CHLORIDE 20 MEQ: 1500 TABLET, EXTENDED RELEASE ORAL at 10:46

## 2024-05-08 RX ADMIN — SODIUM CHLORIDE, PRESERVATIVE FREE 10 ML: 5 INJECTION INTRAVENOUS at 10:49

## 2024-05-08 RX ADMIN — BUMETANIDE 2 MG: 1 TABLET ORAL at 10:46

## 2024-05-08 RX ADMIN — ENOXAPARIN SODIUM 30 MG: 100 INJECTION SUBCUTANEOUS at 10:48

## 2024-05-08 RX ADMIN — SACUBITRIL AND VALSARTAN 1 TABLET: 24; 26 TABLET, FILM COATED ORAL at 10:45

## 2024-05-08 RX ADMIN — ALBUTEROL SULFATE 2.5 MG: 2.5 SOLUTION RESPIRATORY (INHALATION) at 07:17

## 2024-05-08 RX ADMIN — ASPIRIN 81 MG: 81 TABLET, COATED ORAL at 10:46

## 2024-05-08 RX ADMIN — ALBUTEROL SULFATE 2.5 MG: 2.5 SOLUTION RESPIRATORY (INHALATION) at 10:53

## 2024-05-08 RX ADMIN — DICLOFENAC SODIUM 4 G: 10 GEL TOPICAL at 10:48

## 2024-05-08 RX ADMIN — ATORVASTATIN CALCIUM 10 MG: 10 TABLET, FILM COATED ORAL at 10:46

## 2024-05-08 NOTE — DISCHARGE SUMMARY
IN-PATIENT SERVICE   [facility] Internal Medicine    Discharge Summary     Patient ID: Jesus Stevens  :  1960   MRN: [id number]     ACCOUNT:  [id number]   Patient's PCP: Andrei Edmondson MD  Admit Date: 2024   Discharge Date: 2024    Length of Stay: 1  Code Status:  Full Code  Admitting Physician: Arnaldo Austin MD  Discharge Physician: Arnaldo Austin MD     Active Discharge Diagnoses:     Primary Problem  Atypical chest pain      Hospital Problems  Active Hospital Problems    Diagnosis Date Noted    Atypical chest pain [R07.89] 2024    Chest pain [R07.9] 2021       Admission Condition:  fair     Discharged Condition: fair    Hospital Stay:     Hospital Course:  Jesus Stevens is a 63 y.o. male who was admitted for the management of Atypical chest pain , presented to ER with Chest Pain  63-year-old gentleman who has history of systolic congestive heart failure ejection fraction less than 40% who was following with Dr. Benitez in past status post impulse dynamics CCM therapy set in clinical research history of diabetes mellitus hypertension hyperlipidemia who has been a lot of stress with death of her sister who is 58 years old he lost another sister due to diabetes patient complains of intermittent sharp chest pain that catches his breath pain happens at rest no association with exertion no nausea vomiting no fever chills no radiation no dyspnea no history of DVT or PE history of smoking but quit 25 years ago  Systolic congestive heart failure Bumex 2 mg twice a day Entresto  D dimer negative  Diabetes mellitus insulin sliding scale  Diabetic neuropathy gabapentin 400 nightly  Stress negative out pt with pcp      Significant therapeutic interventions:     Significant Diagnostic Studies:   Labs / Micro:        Radiology:    Nuclear stress test with myocardial perfusion    Result Date: 2024    ECG: Resting ECG demonstrates normal sinus rhythm.   ECG: The

## 2024-05-08 NOTE — PROGRESS NOTES
[facility]  Progress Note                   Date:   5/8/2024  Patient name: Jesus Stevens  Date of admission:  5/6/2024  3:38 PM  MRN:   [id number]  YOB: 1960  PCP: Andrei Edmondson MD    Reason for Admission: Atypical chest pain [R07.89]  Chest pain, unspecified type [R07.9]  Chest pain [R07.9]    Subjective:       Clinical Changes /Abnormalities: Stable overnight. No acute CV issues/concerns. Awaiting nuc portion of stress test. Echo as below    Review of Systems    Medications:   Scheduled Meds:   albuterol  2.5 mg Nebulization 4x Daily RT    aspirin  81 mg Oral Daily    diclofenac sodium  4 g Topical BID    gabapentin  400 mg Oral Nightly    metoprolol succinate  200 mg Oral Daily    potassium chloride  20 mEq Oral Daily    sacubitril-valsartan  1 tablet Oral BID    atorvastatin  10 mg Oral Daily    spironolactone  25 mg Oral Nightly    tiotropium  2 puff Inhalation Daily RT    sodium chloride flush  5-40 mL IntraVENous 2 times per day    enoxaparin  30 mg SubCUTAneous BID    nicotine  1 patch TransDERmal Daily    insulin lispro  0-4 Units SubCUTAneous TID WC    insulin lispro  0-4 Units SubCUTAneous Nightly    bumetanide  2 mg Oral BID     Continuous Infusions:   sodium chloride      dextrose       CBC:   Recent Labs     05/06/24  1621 05/07/24  0500 05/08/24  0610   WBC 7.9 6.8 7.1   HGB 13.9 13.0* 14.1    177 199     BMP:    Recent Labs     05/06/24  1621 05/07/24  0500 05/08/24  0610    137 139   K 4.1 3.8 4.0    99 98   CO2 29 26 30   BUN 20 22 20   CREATININE 1.1 1.1 1.0   GLUCOSE 192* 149* 117*     Hepatic:No results for input(s): \"AST\", \"ALT\", \"BILITOT\", \"ALKPHOS\" in the last 72 hours.    Invalid input(s): \"ALB\"  Troponin:   Recent Labs     05/06/24  1621 05/06/24  1748   TROPHS 9 7     BNP: No results for input(s): \"BNP\" in the last 72 hours.  Lipids:   Recent Labs     05/07/24  0500   CHOL 165   HDL 42     INR:   Recent Labs     05/06/24  1621 
BRONCHOSPASM/BRONCHOCONSTRICTION     [x]         IMPROVE AERATION/BREATH SOUNDS  [x]   ADMINISTER BRONCHODILATOR THERAPY AS APPROPRIATE  [x]   ASSESS BREATH SOUNDS  []   IMPLEMENT AEROSOL/MDI PROTOCOL  [x]   PATIENT EDUCATION AS NEEDED    
Called ECHO for update on stress test results. They are checking on hold up. Informed them patient is waiting for results to discharge.   
Cardiology notified of stress test results. Ok to discharge.   
Discussed medication(s) with the patient and all questions fully answered. Educated on when to follow up with PCP and Cardiology. AVS reviewed with teach back.  No concerns noted.   
Dr. Austin notified of Stress Test results.    
Dr. HATTIE Bradley (cardiology) notified of new consult.   
If stress ok  Dc home with out pt cardio  Arnaldo CAMERON Austin MD  5/8/2024    
Patient back from Echo and first part of stress test. Patient may eat per Dr. Austin. Order placed.  
Patient states current emergency contact listed in chart is  and declines listing new emergency contact.   
Pt is a high fall risk but refusing bed alarm on. Pt is alert and oriented X4. Pt states that, \"I have been here that long and I have not fallen, I do not need this alarm on.\" Pt was educated on the benefits and risks, verbalized understanding but still refusing. Shannon Keyes, NP notified.   
RN found pt with pocket knife. Pt reports using the knife to scratch cards with no intentions of harming self or others.Pt ok to lock the knife in the med room and will be given back on discharge.   
Spoke with NM and they had already sent an email out to get results reviewed. I informed them results are still not complete. NM to reach out again.   
MD Naomi   Elastic Bandages & Supports (WRIST SPLINT/COCK-UP/RIGHT L) MISC 1 each by Does not apply route daily  Patient not taking: Reported on 2/16/2024 10/28/21   Naomi Trejo MD   nitroGLYCERIN (NITROSTAT) 0.4 MG SL tablet PLACE 1 TABLET UNDER THE TONGUE EVERY 5 MINUTES AS NEEDED FOR CHEST PAIN 9/27/21   Asaf Gonzalez MD   blood glucose monitor kit and supplies Dispense sufficient amount for indicated testing frequency plus additional to accommodate PRN testing needs. Dispense all needed supplies to include: monitor, strips, lancing device, lancets, control solutions, alcohol swabs. 8/30/21   Naomi Trejo MD   Masks (FACE MASK) MISC 1 each by Does not apply route as needed (as needed) 8/30/21   Naomi Trejo MD   diclofenac sodium (VOLTAREN) 1 % GEL Apply topically 2 times daily 8/30/21   Naomi Trejo MD   aspirin 81 MG tablet Take 1 tablet by mouth daily 8/13/19   Naomi Trejo MD        Allergies:     Penicillins, Sulfa antibiotics, Sulfasalazine, Pseudoeph-doxylamine-dm-apap, and Rabbit protein    Social History:     Tobacco:    reports that he quit smoking about 25 years ago. His smoking use included cigarettes. His smokeless tobacco use includes chew.  Alcohol:      reports current alcohol use of about 1.0 standard drink of alcohol per week.  Drug Use:  reports no history of drug use.    Family History:     Family History   Problem Relation Age of Onset    Kidney Disease Mother     Diabetes Mother     Heart Disease Mother     Arthritis Mother     Heart Disease Father     Heart Attack Father     Arthritis Sister     Diabetes Sister     Heart Disease Sister     Diabetes Sister        Investigations:      Laboratory Testing:  Recent Results (from the past 24 hour(s))   Troponin    Collection Time: 05/06/24  4:21 PM   Result Value Ref Range    Troponin, High Sensitivity 9 0 - 22 ng/L   APTT    Collection Time: 05/06/24  4:21 PM   Result Value Ref Range    APTT 24.0 24.0 - 36.0 sec   Protime-INR

## 2024-05-08 NOTE — PLAN OF CARE
Problem: Discharge Planning  Goal: Discharge to home or other facility with appropriate resources  Outcome: Progressing     Problem: Pain  Goal: Verbalizes/displays adequate comfort level or baseline comfort level  5/7/2024 1556 by Meredith Yun RN  Outcome: Progressing     Problem: ABCDS Injury Assessment  Goal: Absence of physical injury  Outcome: Progressing     Problem: Safety - Adult  Goal: Free from fall injury  5/7/2024 1556 by Meredith Yun RN  Outcome: Progressing     Problem: Respiratory - Adult  Goal: Achieves optimal ventilation and oxygenation  5/7/2024 1556 by Meredith Yun RN  Outcome: Progressing     Problem: Cardiovascular - Adult  Goal: Maintains optimal cardiac output and hemodynamic stability  5/7/2024 1556 by Meredith Yun RN  Outcome: Progressing     Problem: Nutrition Deficit:  Goal: Optimize nutritional status  5/7/2024 1556 by Meredith Yun RN  Outcome: Progressing     
  Problem: Pain  Goal: Verbalizes/displays adequate comfort level or baseline comfort level  5/8/2024 0322 by Yancy Tracey RN  Outcome: Progressing   Note: Pt medicated with pain medication prn.  Assessed all pain characteristics including level, type, location, frequency, and onset.  Non-pharmacologic interventions offered to pt as well.  Pt states pain is tolerable at this time.      Problem: Respiratory - Adult  Goal: Achieves optimal ventilation and oxygenation  5/8/2024 0322 by Yancy Tracey RN  Outcome: Progressing     Problem: Safety - Adult  Goal: Free from fall injury  5/8/2024 0322 by Yancy Tracey RN  Outcome: Progressing   Note: No falls noted this shift. Patient ambulates with x1 staff assistance without difficulty.  Bed kept in low position. Safe environment maintained. Bedside table & call light in reach. Uses call light appropriately when needing assistance.      Problem: Nutrition Deficit:  Goal: Optimize nutritional status  5/8/2024 0322 by Yancy Tracey RN  Outcome: Progressing     Problem: Cardiovascular - Adult  Goal: Maintains optimal cardiac output and hemodynamic stability  5/8/2024 0322 by Yancy Tracey RN  Outcome: Progressing     
  Problem: Pain  Goal: Verbalizes/displays adequate comfort level or baseline comfort level  Outcome: Progressing   Assess pain using appropriate pain scale;Implement non-pharmacological measures as appropriate and evaluate response     Problem: Respiratory - Adult  Goal: Achieves optimal ventilation and oxygenation  Outcome: Progressing     Problem: Safety - Adult  Goal: Free from fall injury  Outcome: Progressing   Note: No falls noted this shift. Patient ambulates per self  without difficulty.  Bed kept in low position. Safe environment maintained. Bedside table & call light in reach. Uses call light appropriately when needing assistance.      Problem: Cardiovascular - Adult  Goal: Maintains optimal cardiac output and hemodynamic stability  Outcome: Progressing     Problem: Nutrition Deficit:  Goal: Optimize nutritional status  Outcome: Progressing     
status  5/8/2024 1214 by Soraya Hinojosa, RN  Outcome: Adequate for Discharge  5/8/2024 0322 by Yancy Tracey, SARA  Outcome: Progressing     Problem: Chronic Conditions and Co-morbidities  Goal: Patient's chronic conditions and co-morbidity symptoms are monitored and maintained or improved  Outcome: Adequate for Discharge

## 2024-05-08 NOTE — CARE COORDINATION
ONGOING DISCHARGE PLAN:    Patient is alert and oriented x4.    Spoke with patient regarding discharge plan and patient confirms that plan is still to return to home w/ no needs.    Denies VNS.     Echo/Stress Test yesterday. Awaiting final results.     Anticipate DC today.     Will continue to follow for additional discharge needs.    If patient is discharged prior to next notation, then this note serves as note for discharge by case management.    Electronically signed by Nakia De La Torre RN on 5/8/2024 at 10:32 AM   
for transportation at discharge: Self    Financial    Payor: [facility] MEDICARE / Plan: [employer] DUAL COMPLETE / Product Type: *No Product type* /     Does insurance require precert for SNF: Yes    Potential assistance Purchasing Medications: No  Meds-to-Beds request: Yes      [id number] - [place], [place] - [address] - P [phone] - F [phone]  [address] [place] [address]  Phone: [phone] Fax: [phone]    [id number] - [place], [place] - [address] - P [phone] - F [phone]  [address] [place] [address]  Phone: [phone] Fax: [phone]    [facility] [place] - [place], [place] - [address] [place] [address] [phone] -  [phone]  [address] [place] [address] [place] [address]  Phone: [phone] Fax: [phone]      Notes:    Factors facilitating achievement of predicted outcomes: Friend support    Barriers to discharge: None    Additional Case Management Notes: 5/7/24 [facility] Medicare/Medicaid Pt. Lives in 2 Story home w/ no Steps, Alone. DME- CPAP-HCS, Neb, Denies VNS/Needs, Cardio, Echo, Stress, will follow//KB    The Plan for Transition of Care is related to the following treatment goals of Atypical chest pain [R07.89]  Chest pain, unspecified type [R07.9]    IF APPLICABLE: The Patient and/or patient representative Jesus and his family were provided with a choice of provider and agrees with the discharge plan. Freedom of choice list with basic dialogue that supports the patient's individualized plan of care/goals and shares the quality data associated with the providers was provided to: Patient   Patient Representative Name:       The Patient and/or Patient Representative Agree with the Discharge Plan? Yes    Nakia De La Torre RN  Case Management Department  Ph:  Fax:

## 2024-05-09 ENCOUNTER — CARE COORDINATION (OUTPATIENT)
Dept: CASE MANAGEMENT | Age: 64
End: 2024-05-09

## 2024-05-09 DIAGNOSIS — R07.9 CHEST PAIN, UNSPECIFIED TYPE: Primary | ICD-10-CM

## 2024-05-09 NOTE — CARE COORDINATION
Care Transitions Initial Follow Up Call    Call within 2 business days of discharge: Yes    Patient Current Location:  Home:  [place]  [facility] [address]    Care Transition Nurse contacted the patient by telephone to perform post hospital discharge assessment. Verified name and  with patient as identifiers. Provided introduction to self, and explanation of the Care Transition Nurse role.     Patient: Jesus Stevens   Patient : 1960   MRN: [id number]    Reason for Admission: SOB + atypical chest pain, nuclear stress negative for ischemia  Discharge Date: 24   RARS: Readmission Risk Score: 8.2      Last Discharge Facility       Date Complaint Diagnosis Description Type Department Provider    24 Chest Pain Chest pain, unspecified type ... ED to Hosp-Admission (Discharged) (ADMITTED) Arnaldo Gallagher MD; JOE Lindsay..            Was this an external facility discharge? No   Challenges to be reviewed by the provider   Additional needs identified to be addressed with provider:     HFU scheduled for 5/10             Method of communication with provider: none.    - OBS [facility] admission for atypical chest pain - nuclear stress shows no stress induced ischemia, EF 49%.  Recent death of sister causing much stress.    Jesus reports \"doing alright\" today.  Denies any further episodes of chest pain.  No increased SOB beyond his baseline for COPD.  Reviewed meds - no new meds added. 1111F completed.    He was receptive to moving up his HFU to tomorrow, 5/10  Informed of Care Transition and CTN contact #.  We discussed RPM - he wants to think about it as he may want to check BP with cuff and at appts - he's worried about RPM equipment being delivered to his home - said \"there are thieves in the neighborhood.\"    Plan for next call: symptom management-reassess for chest pain  follow-up appointment-review HFU appt    Care Transition Nurse reviewed discharge instructions and medical action plan

## 2024-05-10 ENCOUNTER — OFFICE VISIT (OUTPATIENT)
Dept: FAMILY MEDICINE CLINIC | Age: 64
End: 2024-05-10

## 2024-05-10 VITALS
BODY MASS INDEX: 42.22 KG/M2 | HEIGHT: 65 IN | DIASTOLIC BLOOD PRESSURE: 79 MMHG | WEIGHT: 253.4 LBS | SYSTOLIC BLOOD PRESSURE: 138 MMHG | HEART RATE: 85 BPM

## 2024-05-10 DIAGNOSIS — Z09 HOSPITAL DISCHARGE FOLLOW-UP: Primary | ICD-10-CM

## 2024-05-10 DIAGNOSIS — I10 PRIMARY HYPERTENSION: ICD-10-CM

## 2024-05-10 DIAGNOSIS — E11.69 TYPE 2 DIABETES MELLITUS WITH OBESITY (HCC): ICD-10-CM

## 2024-05-10 DIAGNOSIS — I50.22 CHRONIC SYSTOLIC CONGESTIVE HEART FAILURE (HCC): ICD-10-CM

## 2024-05-10 DIAGNOSIS — E66.9 TYPE 2 DIABETES MELLITUS WITH OBESITY (HCC): ICD-10-CM

## 2024-05-10 SDOH — ECONOMIC STABILITY: FOOD INSECURITY: WITHIN THE PAST 12 MONTHS, YOU WORRIED THAT YOUR FOOD WOULD RUN OUT BEFORE YOU GOT MONEY TO BUY MORE.: NEVER TRUE

## 2024-05-10 SDOH — ECONOMIC STABILITY: INCOME INSECURITY: HOW HARD IS IT FOR YOU TO PAY FOR THE VERY BASICS LIKE FOOD, HOUSING, MEDICAL CARE, AND HEATING?: NOT VERY HARD

## 2024-05-10 SDOH — ECONOMIC STABILITY: FOOD INSECURITY: WITHIN THE PAST 12 MONTHS, THE FOOD YOU BOUGHT JUST DIDN'T LAST AND YOU DIDN'T HAVE MONEY TO GET MORE.: NEVER TRUE

## 2024-05-10 ASSESSMENT — ENCOUNTER SYMPTOMS
ABDOMINAL PAIN: 0
CONSTIPATION: 0
DIARRHEA: 0
VOMITING: 0
COUGH: 0
NAUSEA: 0
BACK PAIN: 0
WHEEZING: 0
SHORTNESS OF BREATH: 0

## 2024-05-10 NOTE — PROGRESS NOTES
Visit Information    Have you changed or started any medications since your last visit including any over-the-counter medicines, vitamins, or herbal medicines? no   Have you stopped taking any of your medications? Is so, why? -  no  Are you having any side effects from any of your medications? - no    Have you seen any other physician or provider since your last visit?  no   Have you had any other diagnostic tests since your last visit?  yes - Labs, XR, Stress Test, EKG, ECHO   Have you been seen in the emergency room and/or had an admission in a hospital since we last saw you?  yes - [facility]   Have you had your routine dental cleaning in the past 6 months?  no     Do you have an active MyChart account? If no, what is the barrier?  Yes    Patient Care Team:  Andrei Edmondson MD as PCP - General  Usman Sierra MD as Consulting Physician (Cardiology)  Po Lynn MD as Consulting Physician  Tiffanie Garnica DO as Consulting Physician (General Surgery)  Bart Serrano MD as Consulting Physician (Pulmonary Disease)  Shea Tolliver RN as Care Transitions Nurse    Medical History Review  Past Medical, Family, and Social History reviewed and does not contribute to the patient presenting condition    Health Maintenance   Topic Date Due    Diabetic foot exam  Never done    Diabetic retinal exam  Never done    Respiratory Syncytial Virus (RSV) Pregnant or age 60 yrs+ (1 - 1-dose 60+ series) Never done    COVID-19 Vaccine (5 - 2023-24 season) 09/01/2023    Annual Wellness Visit (Medicare Advantage)  Never done    Colorectal Cancer Screen  05/28/2024    Diabetic Alb to Cr ratio (uACR) test  07/07/2024    Flu vaccine (Season Ended) 08/01/2024    Depression Screen  02/16/2025    A1C test (Diabetic or Prediabetic)  05/07/2025    Lipids  05/07/2025    GFR test (Diabetes, CKD 3-4, OR last GFR 15-59)  05/08/2025    DTaP/Tdap/Td vaccine (2 - Td or Tdap) 12/08/2030    Shingles vaccine  Completed

## 2024-05-10 NOTE — PROGRESS NOTES
Jesus Stevens (:  1960) is a 63 y.o. male,Established patient, here for evaluation of the following chief complaint(s):  Follow-Up from Hospital (Follow up from [facility] for chest pain, chest pain is gone )      Assessment & Plan   1. Hospital discharge follow-up  -Patient is asymptomatic.  2. Chronic systolic congestive heart failure (HCC)  -Well-controlled and compensated  3. Primary hypertension  4. Type 2 diabetes mellitus with obesity (HCC)  -A1c 6.5.  Well-controlled    Return in about 3 months (around 8/10/2024).       Subjective   63 years old male patient with past medical of CHF, COPD, hypertension, type 2 diabetes came to the office for posthospitalization follow-up.  Patient was recently admitted to the hospital for atypical chest pain.  Workup including stress test were negative.  Today, patient is asymptomatic and feels better.  He was going for a lot of stress recently because his sister passed away and he has some financial issues.    A1c 6.5 today.  Blood pressure is well-controlled.      Review of Systems   Constitutional:  Negative for diaphoresis, fatigue and fever.   Respiratory:  Negative for cough, shortness of breath and wheezing.    Cardiovascular:  Negative for chest pain, palpitations and leg swelling.   Gastrointestinal:  Negative for abdominal pain, constipation, diarrhea, nausea and vomiting.   Genitourinary:  Negative for dysuria and flank pain.   Musculoskeletal:  Negative for back pain.   Neurological:  Negative for dizziness, weakness, light-headedness, numbness and headaches.          Objective   Physical Exam  Constitutional:       General: He is not in acute distress.     Appearance: Normal appearance.   HENT:      Head: Normocephalic and atraumatic.   Cardiovascular:      Rate and Rhythm: Normal rate and regular rhythm.      Pulses: Normal pulses.      Heart sounds: Normal heart sounds. No murmur heard.  Pulmonary:      Effort: Pulmonary effort is normal.

## 2024-05-10 NOTE — PROGRESS NOTES
Attending Physician Statement  I  have discussed the care of Jesus Stevens including pertinent history and exam findings with the resident. I agree with the assessment, plan and orders as documented by the resident.      /79 (Site: Right Upper Arm, Position: Sitting, Cuff Size: Medium Adult)   Pulse 85   Ht 1.651 m (5' 5\")   Wt 114.9 kg (253 lb 6.4 oz)   BMI 42.17 kg/m²    BP Readings from Last 3 Encounters:   05/10/24 138/79   05/08/24 133/69   02/16/24 (!) 151/95     Wt Readings from Last 3 Encounters:   05/10/24 114.9 kg (253 lb 6.4 oz)   05/08/24 111.8 kg (246 lb 7.6 oz)   02/16/24 108.9 kg (240 lb)     Atypical chest pain hosp follow up  Addressed stresses at home      Diagnosis Orders   1. Hospital discharge follow-up        2. Chronic systolic congestive heart failure (HCC)        3. Primary hypertension        4. Type 2 diabetes mellitus with obesity (HCC)                Cooper Christianson MD 5/10/2024 4:37 PM

## 2024-05-10 NOTE — PATIENT INSTRUCTIONS
Thank you for letting us take care of you today. We hope all your questions were addressed. If a question was overlooked or something else comes to mind after you return home, please contact a member of your Care Team listed below.      Your Care Team at [facility] is Team #2  Cooper Christianson M.D. (Faculty)  Tracey Alcaraz, (Resident)  Cynthia Osorio, (Resident)  Qi Anders (Resident)  Dima Edmondson, (Resident)  Raeann Russo, [facility]  Roni Daigle, ADRIANA Nina, [facility]  Paula Molina, [facility]  Farzana Nash,  [facility]  Susan Palma, [facility]  Savanna Villalta, [facility]  Tori Justin, [facility]  John (LJ) Shena ADRIANA (Clinical Practice Manager)  Kaitlynn Chávez [facility] (Clinical Pharmacist)     Office phone number: [phone]    If you need to get in right away due to illness, please be advised we have \"Same Day\" appointments available Monday-Friday. Please call us at [phone] option #3 to schedule your \"Same Day\" appointment.

## 2024-05-14 ENCOUNTER — CARE COORDINATION (OUTPATIENT)
Dept: CASE MANAGEMENT | Age: 64
End: 2024-05-14

## 2024-05-14 NOTE — CARE COORDINATION
Care Transitions Outreach Attempt # 1     Call within 2 business days of discharge: Yes   Attempted to reach patient for transitions of care follow up. Unable to reach patient. Left HIPAA compliant VM requesting a return call.     Patient: Jesus Stevens Patient : 1960 MRN: [id number]    Last Discharge Facility       Date Complaint Diagnosis Description Type Department Provider    24 Chest Pain Chest pain, unspecified type ... ED to Hosp-Admission (Discharged) (ADMITTED) Arnaldo Gallagher MD; JOE Lindsay..              Was this an external facility discharge? No Discharge Facility Name: [facility]      Future Appointments   Date Time Provider Department Center   2024  2:00 PM Andrei Edmondson MD [facility]   2024  1:45 PM Sanket Pruett, APRN - NP [facility]

## 2024-05-15 ENCOUNTER — CARE COORDINATION (OUTPATIENT)
Dept: CASE MANAGEMENT | Age: 64
End: 2024-05-15

## 2024-05-15 NOTE — CARE COORDINATION
Care Transitions Outreach Attempt #2    Attempted to reach patient for transitions of care follow up. Unable to reach patient. HIPAA compliant message left on  requesting a return call. Noted that patient had PCP follow up on 05/10/2024. Will end care transitions if no return call received.     Patient: Jesus Stevens Patient : 1960 MRN: [id number]    Last Discharge Facility       Date Complaint Diagnosis Description Type Department Provider    24 Chest Pain Chest pain, unspecified type ... ED to Hosp-Admission (Discharged) (ADMITTED) Arnaldo Gallagher MD; JOE Lindsay..              Was this an external facility discharge? No Discharge Facility: [facility]    Noted following upcoming appointments from discharge chart review:   [facility] follow up appointment(s):   Future Appointments   Date Time Provider Department Center   2024  2:00 PM Andrei Edmondson MD [facility]   2024  1:45 PM Sanket Pruett, APRN - NP [facility] Vera LPN  Care Transition Nurse

## 2024-05-21 RX ORDER — POTASSIUM CHLORIDE 1500 MG/1
20 TABLET, EXTENDED RELEASE ORAL DAILY
Qty: 90 TABLET | Refills: 0 | Status: SHIPPED | OUTPATIENT
Start: 2024-05-21

## 2024-05-21 NOTE — TELEPHONE ENCOUNTER
Last visit: 5/10/24  Last Med refill: 3/18/24  Does patient have enough medication for 72 hours: no    Next Visit Date:  Future Appointments   Date Time Provider Department Center   5/31/2024  1:45 PM Sanket Pruett APRN - NP [facility]       Health Maintenance   Topic Date Due    Diabetic foot exam  Never done    Diabetic retinal exam  Never done    Respiratory Syncytial Virus (RSV) Pregnant or age 60 yrs+ (1 - 1-dose 60+ series) Never done    COVID-19 Vaccine (5 - 2023-24 season) 09/01/2023    Annual Wellness Visit (Medicare Advantage)  Never done    Colorectal Cancer Screen  05/28/2024    Diabetic Alb to Cr ratio (uACR) test  07/07/2024    Flu vaccine (Season Ended) 08/01/2024    Depression Screen  02/16/2025    A1C test (Diabetic or Prediabetic)  05/07/2025    Lipids  05/07/2025    GFR test (Diabetes, CKD 3-4, OR last GFR 15-59)  05/08/2025    DTaP/Tdap/Td vaccine (2 - Td or Tdap) 12/08/2030    Shingles vaccine  Completed    Pneumococcal 0-64 years Vaccine  Completed    Hepatitis C screen  Completed    HIV screen  Completed    Hepatitis A vaccine  Aged Out    Hepatitis B vaccine  Aged Out    Hib vaccine  Aged Out    Polio vaccine  Aged Out    Meningococcal (ACWY) vaccine  Aged Out    Prostate Specific Antigen (PSA) Screening or Monitoring  Discontinued       Hemoglobin A1C (%)   Date Value   05/07/2024 6.5 (H)   02/16/2024 7.1   10/13/2023 6.5             ( goal A1C is < 7)   No components found for: \"LABMICR\"  No components found for: \"LDLCHOLESTEROL\", \"LDLCALC\"    (goal LDL is <100)   AST (U/L)   Date Value   01/27/2023 23     ALT (U/L)   Date Value   01/27/2023 43 (H)     BUN (mg/dL)   Date Value   05/08/2024 20     BP Readings from Last 3 Encounters:   05/10/24 138/79   05/08/24 133/69   02/16/24 (!) 151/95          (goal 120/80)    All Future Testing planned in [facility]  Lab Frequency Next Occurrence   MRI LUMBAR SPINE WO CONTRAST Once 06/22/2023   Basic Metabolic Panel Once 12/14/2023

## 2024-06-16 DIAGNOSIS — J42 CHRONIC BRONCHITIS, UNSPECIFIED CHRONIC BRONCHITIS TYPE (HCC): ICD-10-CM

## 2024-06-17 NOTE — TELEPHONE ENCOUNTER
Last visit: 05/10/2024  Last Med refill: 05/21/2024  Does patient have enough medication for 72 hours: No:     Next Visit Date:  No future appointments.    Health Maintenance   Topic Date Due    Diabetic foot exam  Never done    Diabetic retinal exam  Never done    Respiratory Syncytial Virus (RSV) Pregnant or age 60 yrs+ (1 - 1-dose 60+ series) Never done    COVID-19 Vaccine (5 - 2023-24 season) 09/01/2023    Annual Wellness Visit (Medicare Advantage)  Never done    Colorectal Cancer Screen  05/28/2024    Diabetic Alb to Cr ratio (uACR) test  07/07/2024    Flu vaccine (Season Ended) 08/01/2024    Depression Screen  02/16/2025    A1C test (Diabetic or Prediabetic)  05/07/2025    Lipids  05/07/2025    GFR test (Diabetes, CKD 3-4, OR last GFR 15-59)  05/08/2025    DTaP/Tdap/Td vaccine (2 - Td or Tdap) 12/08/2030    Shingles vaccine  Completed    Pneumococcal 0-64 years Vaccine  Completed    Hepatitis C screen  Completed    HIV screen  Completed    Hepatitis A vaccine  Aged Out    Hepatitis B vaccine  Aged Out    Hib vaccine  Aged Out    Polio vaccine  Aged Out    Meningococcal (ACWY) vaccine  Aged Out    Prostate Specific Antigen (PSA) Screening or Monitoring  Discontinued       Hemoglobin A1C (%)   Date Value   05/07/2024 6.5 (H)   02/16/2024 7.1   10/13/2023 6.5             ( goal A1C is < 7)   No components found for: \"LABMICR\"  No components found for: \"LDLCHOLESTEROL\", \"LDLCALC\"    (goal LDL is <100)   AST (U/L)   Date Value   01/27/2023 23     ALT (U/L)   Date Value   01/27/2023 43 (H)     BUN (mg/dL)   Date Value   05/08/2024 20     BP Readings from Last 3 Encounters:   05/10/24 138/79   05/08/24 133/69   02/16/24 (!) 151/95          (goal 120/80)    All Future Testing planned in [facility]  Lab Frequency Next Occurrence   MRI LUMBAR SPINE WO CONTRAST Once 06/22/2023   Basic Metabolic Panel Once 12/14/2023   Lipid Panel Once 02/16/2024               Patient Active Problem List:     HTN (hypertension)     Skin

## 2024-06-18 RX ORDER — ALBUTEROL SULFATE 2.5 MG/3ML
SOLUTION RESPIRATORY (INHALATION)
Qty: 360 ML | Refills: 11 | Status: SHIPPED | OUTPATIENT
Start: 2024-06-18

## 2024-06-18 RX ORDER — POTASSIUM CHLORIDE 1500 MG/1
TABLET, EXTENDED RELEASE ORAL
Qty: 90 TABLET | Refills: 11 | Status: SHIPPED | OUTPATIENT
Start: 2024-06-18

## 2024-06-19 LAB
EKG ATRIAL RATE: 94 BPM
EKG P AXIS: 50 DEGREES
EKG P-R INTERVAL: 154 MS
EKG Q-T INTERVAL: 364 MS
EKG QRS DURATION: 92 MS
EKG QTC CALCULATION (BAZETT): 455 MS
EKG R AXIS: 49 DEGREES
EKG T AXIS: 90 DEGREES
EKG VENTRICULAR RATE: 94 BPM

## 2024-06-21 DIAGNOSIS — I50.22 CHRONIC SYSTOLIC CONGESTIVE HEART FAILURE (HCC): ICD-10-CM

## 2024-06-21 RX ORDER — BUMETANIDE 2 MG/1
TABLET ORAL
Qty: 60 TABLET | Refills: 0 | Status: SHIPPED | OUTPATIENT
Start: 2024-06-21

## 2024-06-21 NOTE — TELEPHONE ENCOUNTER
Last visit: 05/10/2024  Last Med refill: 03/18/2024  Does patient have enough medication for 72 hours: No:     Next Visit Date:  No future appointments.    Health Maintenance   Topic Date Due    Diabetic foot exam  Never done    Diabetic retinal exam  Never done    Respiratory Syncytial Virus (RSV) Pregnant or age 60 yrs+ (1 - 1-dose 60+ series) Never done    COVID-19 Vaccine (5 - 2023-24 season) 09/01/2023    Annual Wellness Visit (Medicare Advantage)  Never done    Colorectal Cancer Screen  05/28/2024    Diabetic Alb to Cr ratio (uACR) test  07/07/2024    Flu vaccine (Season Ended) 08/01/2024    Depression Screen  02/16/2025    A1C test (Diabetic or Prediabetic)  05/07/2025    Lipids  05/07/2025    GFR test (Diabetes, CKD 3-4, OR last GFR 15-59)  05/08/2025    DTaP/Tdap/Td vaccine (2 - Td or Tdap) 12/08/2030    Shingles vaccine  Completed    Pneumococcal 0-64 years Vaccine  Completed    Hepatitis C screen  Completed    HIV screen  Completed    Hepatitis A vaccine  Aged Out    Hepatitis B vaccine  Aged Out    Hib vaccine  Aged Out    Polio vaccine  Aged Out    Meningococcal (ACWY) vaccine  Aged Out    Prostate Specific Antigen (PSA) Screening or Monitoring  Discontinued       Hemoglobin A1C (%)   Date Value   05/07/2024 6.5 (H)   02/16/2024 7.1   10/13/2023 6.5             ( goal A1C is < 7)   No components found for: \"LABMICR\"  No components found for: \"LDLCHOLESTEROL\", \"LDLCALC\"    (goal LDL is <100)   AST (U/L)   Date Value   01/27/2023 23     ALT (U/L)   Date Value   01/27/2023 43 (H)     BUN (mg/dL)   Date Value   05/08/2024 20     BP Readings from Last 3 Encounters:   05/10/24 138/79   05/08/24 133/69   02/16/24 (!) 151/95          (goal 120/80)    All Future Testing planned in [facility]  Lab Frequency Next Occurrence   MRI LUMBAR SPINE WO CONTRAST Once 06/22/2023   Basic Metabolic Panel Once 12/14/2023   Lipid Panel Once 02/16/2024               Patient Active Problem List:     HTN (hypertension)     Skin

## 2024-08-12 ENCOUNTER — APPOINTMENT (OUTPATIENT)
Dept: GENERAL RADIOLOGY | Age: 64
DRG: 303 | End: 2024-08-12
Payer: MEDICARE

## 2024-08-12 ENCOUNTER — HOSPITAL ENCOUNTER (INPATIENT)
Age: 64
LOS: 1 days | Discharge: HOME | DRG: 303 | End: 2024-08-13
Attending: EMERGENCY MEDICINE | Admitting: INTERNAL MEDICINE
Payer: MEDICARE

## 2024-08-12 DIAGNOSIS — R07.9 CHEST PAIN, UNSPECIFIED TYPE: Primary | ICD-10-CM

## 2024-08-12 DIAGNOSIS — R42 DIZZINESS: ICD-10-CM

## 2024-08-12 PROBLEM — I20.9 ANGINA PECTORIS (HCC): Status: ACTIVE | Noted: 2024-08-12

## 2024-08-12 LAB
ANION GAP SERPL CALCULATED.3IONS-SCNC: 12 MMOL/L (ref 9–17)
BASOPHILS # BLD: 0 K/UL (ref 0–0.2)
BASOPHILS NFR BLD: 0 % (ref 0–2)
BNP SERPL-MCNC: <36 PG/ML
BUN SERPL-MCNC: 20 MG/DL (ref 8–23)
CALCIUM SERPL-MCNC: 9.3 MG/DL (ref 8.6–10.4)
CHLORIDE SERPL-SCNC: 96 MMOL/L (ref 98–107)
CO2 SERPL-SCNC: 29 MMOL/L (ref 20–31)
CREAT SERPL-MCNC: 1.1 MG/DL (ref 0.7–1.2)
EOSINOPHIL # BLD: 0.2 K/UL (ref 0–0.4)
EOSINOPHILS RELATIVE PERCENT: 3 % (ref 0–4)
ERYTHROCYTE [DISTWIDTH] IN BLOOD BY AUTOMATED COUNT: 13.7 % (ref 11.5–14.9)
GFR, ESTIMATED: 75 ML/MIN/1.73M2
GLUCOSE BLD-MCNC: 162 MG/DL (ref 75–110)
GLUCOSE BLD-MCNC: 267 MG/DL (ref 75–110)
GLUCOSE SERPL-MCNC: 184 MG/DL (ref 70–99)
HCT VFR BLD AUTO: 43.9 % (ref 41–53)
HGB BLD-MCNC: 14.6 G/DL (ref 13.5–17.5)
INR PPP: 1
LYMPHOCYTES NFR BLD: 1.3 K/UL (ref 1–4.8)
LYMPHOCYTES RELATIVE PERCENT: 15 % (ref 24–44)
MAGNESIUM SERPL-MCNC: 2.3 MG/DL (ref 1.6–2.6)
MCH RBC QN AUTO: 29.3 PG (ref 26–34)
MCHC RBC AUTO-ENTMCNC: 33.2 G/DL (ref 31–37)
MCV RBC AUTO: 88.2 FL (ref 80–100)
MONOCYTES NFR BLD: 0.8 K/UL (ref 0.1–1.3)
MONOCYTES NFR BLD: 9 % (ref 1–7)
NEUTROPHILS NFR BLD: 73 % (ref 36–66)
NEUTS SEG NFR BLD: 6.3 K/UL (ref 1.3–9.1)
PARTIAL THROMBOPLASTIN TIME: 24.8 SEC (ref 24–36)
PHOSPHATE SERPL-MCNC: 4.1 MG/DL (ref 2.5–4.5)
PLATELET # BLD AUTO: 225 K/UL (ref 150–450)
PMV BLD AUTO: 8.5 FL (ref 6–12)
POTASSIUM SERPL-SCNC: 3.9 MMOL/L (ref 3.7–5.3)
PROTHROMBIN TIME: 13.6 SEC (ref 11.8–14.6)
RBC # BLD AUTO: 4.97 M/UL (ref 4.5–5.9)
SODIUM SERPL-SCNC: 137 MMOL/L (ref 135–144)
TROPONIN I SERPL HS-MCNC: 8 NG/L (ref 0–22)
TROPONIN I SERPL HS-MCNC: 8 NG/L (ref 0–22)
WBC OTHER # BLD: 8.6 K/UL (ref 3.5–11)

## 2024-08-12 PROCEDURE — 84484 ASSAY OF TROPONIN QUANT: CPT

## 2024-08-12 PROCEDURE — G0378 HOSPITAL OBSERVATION PER HR: HCPCS

## 2024-08-12 PROCEDURE — 85730 THROMBOPLASTIN TIME PARTIAL: CPT

## 2024-08-12 PROCEDURE — 6360000002 HC RX W HCPCS: Performed by: INTERNAL MEDICINE

## 2024-08-12 PROCEDURE — 83880 ASSAY OF NATRIURETIC PEPTIDE: CPT

## 2024-08-12 PROCEDURE — 80048 BASIC METABOLIC PNL TOTAL CA: CPT

## 2024-08-12 PROCEDURE — 36415 COLL VENOUS BLD VENIPUNCTURE: CPT

## 2024-08-12 PROCEDURE — 82947 ASSAY GLUCOSE BLOOD QUANT: CPT

## 2024-08-12 PROCEDURE — 2580000003 HC RX 258: Performed by: INTERNAL MEDICINE

## 2024-08-12 PROCEDURE — 93005 ELECTROCARDIOGRAM TRACING: CPT | Performed by: EMERGENCY MEDICINE

## 2024-08-12 PROCEDURE — 85025 COMPLETE CBC W/AUTO DIFF WBC: CPT

## 2024-08-12 PROCEDURE — 1200000000 HC SEMI PRIVATE

## 2024-08-12 PROCEDURE — 96372 THER/PROPH/DIAG INJ SC/IM: CPT

## 2024-08-12 PROCEDURE — 99285 EMERGENCY DEPT VISIT HI MDM: CPT

## 2024-08-12 PROCEDURE — 85610 PROTHROMBIN TIME: CPT

## 2024-08-12 PROCEDURE — 84100 ASSAY OF PHOSPHORUS: CPT

## 2024-08-12 PROCEDURE — 99223 1ST HOSP IP/OBS HIGH 75: CPT | Performed by: INTERNAL MEDICINE

## 2024-08-12 PROCEDURE — 6370000000 HC RX 637 (ALT 250 FOR IP): Performed by: INTERNAL MEDICINE

## 2024-08-12 PROCEDURE — 83735 ASSAY OF MAGNESIUM: CPT

## 2024-08-12 PROCEDURE — 71045 X-RAY EXAM CHEST 1 VIEW: CPT

## 2024-08-12 RX ORDER — ACETAMINOPHEN 325 MG/1
650 TABLET ORAL EVERY 6 HOURS PRN
Status: DISCONTINUED | OUTPATIENT
Start: 2024-08-12 | End: 2024-08-13 | Stop reason: HOSPADM

## 2024-08-12 RX ORDER — POTASSIUM CHLORIDE 7.45 MG/ML
10 INJECTION INTRAVENOUS PRN
Status: DISCONTINUED | OUTPATIENT
Start: 2024-08-12 | End: 2024-08-13 | Stop reason: HOSPADM

## 2024-08-12 RX ORDER — ACETAMINOPHEN 650 MG/1
650 SUPPOSITORY RECTAL EVERY 6 HOURS PRN
Status: DISCONTINUED | OUTPATIENT
Start: 2024-08-12 | End: 2024-08-13 | Stop reason: HOSPADM

## 2024-08-12 RX ORDER — ENOXAPARIN SODIUM 100 MG/ML
30 INJECTION SUBCUTANEOUS 2 TIMES DAILY
Status: DISCONTINUED | OUTPATIENT
Start: 2024-08-12 | End: 2024-08-13 | Stop reason: HOSPADM

## 2024-08-12 RX ORDER — GABAPENTIN 300 MG/1
300 CAPSULE ORAL 2 TIMES DAILY
COMMUNITY

## 2024-08-12 RX ORDER — ATORVASTATIN CALCIUM 20 MG/1
20 TABLET, FILM COATED ORAL DAILY
Status: DISCONTINUED | OUTPATIENT
Start: 2024-08-13 | End: 2024-08-13 | Stop reason: HOSPADM

## 2024-08-12 RX ORDER — GABAPENTIN 400 MG/1
400 CAPSULE ORAL NIGHTLY PRN
Status: DISCONTINUED | OUTPATIENT
Start: 2024-08-12 | End: 2024-08-13

## 2024-08-12 RX ORDER — SODIUM CHLORIDE 0.9 % (FLUSH) 0.9 %
5-40 SYRINGE (ML) INJECTION EVERY 12 HOURS SCHEDULED
Status: DISCONTINUED | OUTPATIENT
Start: 2024-08-12 | End: 2024-08-13 | Stop reason: HOSPADM

## 2024-08-12 RX ORDER — SODIUM CHLORIDE 9 MG/ML
INJECTION, SOLUTION INTRAVENOUS PRN
Status: DISCONTINUED | OUTPATIENT
Start: 2024-08-12 | End: 2024-08-13 | Stop reason: HOSPADM

## 2024-08-12 RX ORDER — BUMETANIDE 1 MG/1
1 TABLET ORAL 2 TIMES DAILY
Status: DISCONTINUED | OUTPATIENT
Start: 2024-08-12 | End: 2024-08-13

## 2024-08-12 RX ORDER — POLYETHYLENE GLYCOL 3350 17 G/17G
17 POWDER, FOR SOLUTION ORAL DAILY PRN
Status: DISCONTINUED | OUTPATIENT
Start: 2024-08-12 | End: 2024-08-13 | Stop reason: HOSPADM

## 2024-08-12 RX ORDER — POTASSIUM CHLORIDE 1500 MG/1
40 TABLET, EXTENDED RELEASE ORAL PRN
Status: DISCONTINUED | OUTPATIENT
Start: 2024-08-12 | End: 2024-08-13 | Stop reason: HOSPADM

## 2024-08-12 RX ORDER — ASPIRIN 81 MG/1
81 TABLET, CHEWABLE ORAL DAILY
Status: DISCONTINUED | OUTPATIENT
Start: 2024-08-13 | End: 2024-08-13 | Stop reason: HOSPADM

## 2024-08-12 RX ORDER — ONDANSETRON 4 MG/1
4 TABLET, ORALLY DISINTEGRATING ORAL EVERY 8 HOURS PRN
Status: DISCONTINUED | OUTPATIENT
Start: 2024-08-12 | End: 2024-08-13 | Stop reason: HOSPADM

## 2024-08-12 RX ORDER — ONDANSETRON 2 MG/ML
4 INJECTION INTRAMUSCULAR; INTRAVENOUS EVERY 6 HOURS PRN
Status: DISCONTINUED | OUTPATIENT
Start: 2024-08-12 | End: 2024-08-13 | Stop reason: HOSPADM

## 2024-08-12 RX ORDER — MAGNESIUM SULFATE 1 G/100ML
1000 INJECTION INTRAVENOUS PRN
Status: DISCONTINUED | OUTPATIENT
Start: 2024-08-12 | End: 2024-08-13 | Stop reason: HOSPADM

## 2024-08-12 RX ORDER — SPIRONOLACTONE 25 MG/1
25 TABLET ORAL NIGHTLY
Status: DISCONTINUED | OUTPATIENT
Start: 2024-08-13 | End: 2024-08-13 | Stop reason: HOSPADM

## 2024-08-12 RX ORDER — SODIUM CHLORIDE 0.9 % (FLUSH) 0.9 %
10 SYRINGE (ML) INJECTION PRN
Status: DISCONTINUED | OUTPATIENT
Start: 2024-08-12 | End: 2024-08-13 | Stop reason: HOSPADM

## 2024-08-12 RX ORDER — ALBUTEROL SULFATE 90 UG/1
2 AEROSOL, METERED RESPIRATORY (INHALATION) 4 TIMES DAILY PRN
Status: DISCONTINUED | OUTPATIENT
Start: 2024-08-12 | End: 2024-08-13 | Stop reason: HOSPADM

## 2024-08-12 RX ADMIN — SODIUM CHLORIDE, PRESERVATIVE FREE 10 ML: 5 INJECTION INTRAVENOUS at 21:45

## 2024-08-12 RX ADMIN — BUMETANIDE 1 MG: 1 TABLET ORAL at 18:34

## 2024-08-12 RX ADMIN — ENOXAPARIN SODIUM 30 MG: 100 INJECTION SUBCUTANEOUS at 21:42

## 2024-08-12 RX ADMIN — SACUBITRIL AND VALSARTAN 1 TABLET: 24; 26 TABLET, FILM COATED ORAL at 21:43

## 2024-08-12 ASSESSMENT — ENCOUNTER SYMPTOMS
TROUBLE SWALLOWING: 0
ABDOMINAL PAIN: 0
PHOTOPHOBIA: 0
VOMITING: 0
NAUSEA: 0
COLOR CHANGE: 0
SHORTNESS OF BREATH: 0
COUGH: 0
DIARRHEA: 0

## 2024-08-12 ASSESSMENT — HEART SCORE: ECG: NORMAL

## 2024-08-12 ASSESSMENT — PAIN SCALES - GENERAL: PAINLEVEL_OUTOF10: 7

## 2024-08-12 ASSESSMENT — PAIN - FUNCTIONAL ASSESSMENT: PAIN_FUNCTIONAL_ASSESSMENT: 0-10

## 2024-08-12 NOTE — PROGRESS NOTES
Pharmacy Medication History Note      List of current medications patient is taking is complete.    Source of information: Patient, [employer], OARRS    Changes made to medication list:  Medications removed (include reason, ex. therapy complete or physician discontinued, noncompliance):  Gabapentin 400 mg - dose change  Sildenafil - patient reports not using and prescription is old  Tizanidine - therapy complete    Medications flagged for provider review:  None    Medications added/doses adjusted:  Gabapentin 300 mg by mouth twice daily     Other notes (ex. Recent course of antibiotics, Coumadin dosing):  OARRS report shows Gabapentin 300 mg 60 capsules for 30 days on 6/14      Current Home Medication List at Time of Admission:  Prior to Admission medications    Medication Sig   gabapentin (NEURONTIN) 300 MG capsule Take 1 capsule by mouth in the morning and at bedtime.   bumetanide (BUMEX) 2 MG tablet TAKE ONE TABLET BY MOUTH TWICE DAILY @9AM-5PM   potassium chloride (K-TAB) 20 MEQ TBCR extended release tablet TAKE ONE TABLET BY MOUTH DAILY AT 9 AM   albuterol (PROVENTIL) (2.5 MG/3ML) 0.083% nebulizer solution INHALE THE CONTENTS OF 1 VIAL VIA NEBULIZER UP TO EVERY 6 HOURS AS NEEDED FOR WHEEZING OR SHORTNESS OF BREATH   metoprolol succinate (TOPROL XL) 200 MG extended release tablet TAKE ONE TABLET BY MOUTH DAILY AT 9 AM   albuterol sulfate HFA (VENTOLIN HFA) 108 (90 Base) MCG/ACT inhaler Inhale 2 puffs into the lungs 4 times daily as needed for Wheezing   sacubitril-valsartan (ENTRESTO) 24-26 MG per tablet Take 1 tablet by mouth 2 times daily   empagliflozin (JARDIANCE) 25 MG tablet TAKE 1 TABLET BY MOUTH DAILY   simvastatin (ZOCOR) 20 MG tablet Take 1 tablet by mouth nightly   tiotropium (SPIRIVA RESPIMAT) 2.5 MCG/ACT AERS inhaler Inhale 2 puffs into the lungs daily   spironolactone (ALDACTONE) 25 MG tablet Take 1 tablet by mouth nightly   nitroGLYCERIN (NITROSTAT) 0.4 MG SL tablet PLACE 1 TABLET UNDER THE  TONGUE EVERY 5 MINUTES AS NEEDED FOR CHEST PAIN   diclofenac sodium (VOLTAREN) 1 % GEL Apply topically 2 times daily   aspirin 81 MG tablet Take 1 tablet by mouth daily         Please let me know if you have any questions about this encounter. Thank you!    Electronically signed by Joanne Del Rio on 8/12/2024 at 1:53 PM

## 2024-08-12 NOTE — ED PROVIDER NOTES
EMERGENCY DEPARTMENT ENCOUNTER    Pt Name: Jesus Stevens  MRN: [id number]  Birthdate 1960  Date of evaluation: 8/12/24  CHIEF COMPLAINT       Chief Complaint   Patient presents with    Chest Pain    Shortness of Breath     HISTORY OF PRESENT ILLNESS   63-year-old male presenting to the ER complaining of left-sided chest pressure and dizziness that started yesterday.  Patient does have an underlying history of hypertension, hyperlipidemia, diabetes as well as heart failure.    The history is provided by the patient.   Chest Pain  Pain location:  Substernal area and L chest  Pain quality: aching and pressure    Associated symptoms: dizziness    Associated symptoms: no abdominal pain, no cough, no dysphagia, no fatigue, no fever, no nausea, no palpitations, no shortness of breath and no vomiting            REVIEW OF SYSTEMS     Review of Systems   Constitutional:  Negative for activity change, fatigue and fever.   HENT:  Negative for congestion, ear pain and trouble swallowing.    Eyes:  Negative for photophobia and visual disturbance.   Respiratory:  Negative for cough and shortness of breath.    Cardiovascular:  Positive for chest pain. Negative for palpitations.   Gastrointestinal:  Negative for abdominal pain, diarrhea, nausea and vomiting.   Genitourinary:  Negative for dysuria, flank pain and urgency.   Musculoskeletal:  Negative for arthralgias and myalgias.   Skin:  Negative for color change and rash.   Neurological:  Positive for dizziness. Negative for facial asymmetry.   Psychiatric/Behavioral:  Negative for agitation and behavioral problems.      PASTMEDICAL HISTORY     Past Medical History:   Diagnosis Date    Bronchitis     CAD (coronary artery disease)     Cardiomyopathy (HCC)     Chest pain     CHF (congestive heart failure) ([facility])     CHF (congestive heart failure), NYHA class I, acute on chronic, diastolic ([facility]) 05/30/2021    Chronic back pain     Chronic diastolic CHF (congestive heart failure)  COLONOSCOPY N/A 5/28/2021    COLONOSCOPY POLYPECTOMY HOT BIOPSY performed by Jensen Cervantes IV, DO at [facility] OR    HERNIA REPAIR      OTHER SURGICAL HISTORY  02/01/2022    impulse optimizer    TONSILLECTOMY  1963   exact date unknown     CURRENT MEDICATIONS       Previous Medications    ALBUTEROL (PROVENTIL) (2.5 MG/3ML) 0.083% NEBULIZER SOLUTION    INHALE THE CONTENTS OF 1 VIAL VIA NEBULIZER UP TO EVERY 6 HOURS AS NEEDED FOR WHEEZING OR SHORTNESS OF BREATH    ALBUTEROL SULFATE HFA (VENTOLIN HFA) 108 (90 BASE) MCG/ACT INHALER    Inhale 2 puffs into the lungs 4 times daily as needed for Wheezing    ASPIRIN 81 MG TABLET    Take 1 tablet by mouth daily    BLOOD GLUCOSE MONITOR KIT AND SUPPLIES    Dispense sufficient amount for indicated testing frequency plus additional to accommodate PRN testing needs. Dispense all needed supplies to include: monitor, strips, lancing device, lancets, control solutions, alcohol swabs.    BLOOD GLUCOSE MONITOR STRIPS    True Metrix Test Strips  Test 2-3 times a day & as needed for symptoms of irregular blood glucose. Dispense sufficient amount for indicated testing frequency plus additional to accommodate PRN testing needs.    BUMETANIDE (BUMEX) 2 MG TABLET    TAKE ONE TABLET BY MOUTH TWICE DAILY @9AM-5PM    DICLOFENAC SODIUM (VOLTAREN) 1 % GEL    Apply topically 2 times daily    ELASTIC BANDAGES & SUPPORTS (WRIST SPLINT/COCK-UP/RIGHT L) MISC    1 each by Does not apply route daily    EMPAGLIFLOZIN (JARDIANCE) 25 MG TABLET    TAKE 1 TABLET BY MOUTH DAILY    GABAPENTIN (NEURONTIN) 400 MG CAPSULE    TAKE ONE CAPSULE BY MOUTH EVERY NIGHT AS NEEDED FOR PAIN    HANDICAP PLACARD MISC    by Does not apply route Apply for 5 years    MASKS (FACE MASK) MISC    1 each by Does not apply route as needed (as needed)    METOPROLOL SUCCINATE (TOPROL XL) 200 MG EXTENDED RELEASE TABLET    TAKE ONE TABLET BY MOUTH DAILY AT 9 AM    NITROGLYCERIN (NITROSTAT) 0.4 MG SL TABLET    PLACE 1 TABLET UNDER THE

## 2024-08-12 NOTE — H&P
[facility] Internal Medicine  Arnaldo Austin MD; Ashu Hightower MD, Bry Serrano MD, Iraida Rodriguez MD, Roma Raymond MD; Margarita Nix MD    [facility] Internal Medicine   IN-PATIENT SERVICE   [facility]    HISTORY AND PHYSICAL EXAMINATION            Date:   8/12/2024  Patient name:  Jesus Stevens  Date of admission:  8/12/2024 10:25 AM  MRN:   [id number]  Account:  [id number]  YOB: 1960  PCP:    Andrei Edmondson MD  Room:   2047/2047-01  Code Status:    Full Code    Chief Complaint:     Chief Complaint   Patient presents with    Chest Pain    Shortness of Breath       History Obtained From:     Patient/EMR/Bedside RN    History of Present Illness:     Jesus Stevens is a 63 y.o. Non- / non  male who presents with Chest Pain and Shortness of Breath   and is admitted to the hospital for the management of Angina pectoris (HCC).  63-year-old gentleman with underlying history of systolic heart failure with a EF of 40% follows with TCC, presented with chest pain and chest pressure, admitted to rule out acute coronary syndrome, cardiology consulted, also has underlying history of hypertension, hyperlipidemia, diabetes, was admitted to 3 months back for similar complaints, stress was negative, was discharged home to follow-up with PCP and cardiology as outpatient, will await cardiology response    Past Medical History:     Past Medical History:   Diagnosis Date    Bronchitis     CAD (coronary artery disease)     Cardiomyopathy (HCC)     Chest pain     CHF (congestive heart failure) (HCC)     CHF (congestive heart failure), NYHA class I, acute on chronic, diastolic (HCC) 05/30/2021    Chronic back pain     Chronic diastolic CHF (congestive heart failure) (HCC) 08/01/2014    COPD (chronic obstructive pulmonary disease) (HCC)     Headache(784.0)     Hyperlipidemia     Hypertension     Inguinal hernia     Low back pain with  lungs 4 times daily as needed for Wheezing 3/18/24  Yes Andrei Edmondson MD   sacubitril-valsartan (ENTRESTO) 24-26 MG per tablet Take 1 tablet by mouth 2 times daily 3/18/24  Yes Andrei Edmondson MD   empagliflozin (JARDIANCE) 25 MG tablet TAKE 1 TABLET BY MOUTH DAILY 3/18/24  Yes Andrei Edmondson MD   simvastatin (ZOCOR) 20 MG tablet Take 1 tablet by mouth nightly 3/18/24  Yes Andrei Edmondson MD   tiotropium (SPIRIVA RESPIMAT) 2.5 MCG/ACT AERS inhaler Inhale 2 puffs into the lungs daily 3/18/24  Yes Andrei Edmondson MD   spironolactone (ALDACTONE) 25 MG tablet Take 1 tablet by mouth nightly 3/18/24  Yes Andrei Edmondson MD   nitroGLYCERIN (NITROSTAT) 0.4 MG SL tablet PLACE 1 TABLET UNDER THE TONGUE EVERY 5 MINUTES AS NEEDED FOR CHEST PAIN 9/27/21  Yes Asaf Gonzalez MD   diclofenac sodium (VOLTAREN) 1 % GEL Apply topically 2 times daily 8/30/21  Yes Naomi Trejo MD   aspirin 81 MG tablet Take 1 tablet by mouth daily 8/13/19  Yes Naomi Trejo MD   Handicap Placard MISC by Does not apply route Apply for 5 years 7/7/23   Andrei Edmondson MD   blood glucose monitor strips True Metrix Test Strips  Test 2-3 times a day & as needed for symptoms of irregular blood glucose. Dispense sufficient amount for indicated testing frequency plus additional to accommodate PRN testing needs. 8/23/22   Joe Laureano MD   Elastic Bandages & Supports (WRIST SPLINT/COCK-UP/RIGHT L) MISC 1 each by Does not apply route daily  Patient not taking: Reported on 2/16/2024 10/28/21   Naomi Trejo MD   blood glucose monitor kit and supplies Dispense sufficient amount for indicated testing frequency plus additional to accommodate PRN testing needs. Dispense all needed supplies to include: monitor, strips, lancing device, lancets, control solutions, alcohol swabs. 8/30/21   Naomi Trejo MD   Masks (FACE MASK) MISC 1 each by Does not apply route as needed (as needed) 8/30/21   Naomi Trejo MD        Eosinophils Absolute 0.20 0.0 - 0.4 k/uL    Basophils Absolute 0.00 0.0 - 0.2 k/uL   Brain Natriuretic Peptide    Collection Time: 08/12/24 10:54 AM   Result Value Ref Range    Pro-BNP <36 <300 pg/mL   EKG 12 Lead    Collection Time: 08/12/24 11:14 AM   Result Value Ref Range    Ventricular Rate 79 BPM    Atrial Rate 79 BPM    P-R Interval 156 ms    QRS Duration 124 ms    Q-T Interval 380 ms    QTc Calculation (Bazett) 435 ms    P Axis 39 degrees    R Axis 24 degrees    T Axis 59 degrees   Troponin    Collection Time: 08/12/24 12:29 PM   Result Value Ref Range    Troponin, High Sensitivity 8 0 - 22 ng/L   POC Glucose Fingerstick    Collection Time: 08/12/24  4:04 PM   Result Value Ref Range    POC Glucose 267 (H) 75 - 110 mg/dL       Imaging/Diagnostics:  XR CHEST PORTABLE    Result Date: 8/12/2024  1. No acute cardiopulmonary process.       Assessment :      Hospital Problems             Last Modified POA    * (Principal) Angina pectoris (HCC) 8/12/2024 Yes    Chronic systolic congestive heart failure (HCC) 8/12/2024 Yes    HTN (hypertension) 8/12/2024 Yes    Mixed simple and mucopurulent chronic bronchitis (Formerly [facility]) 8/12/2024 Yes    VANDANA (obstructive sleep apnea) 8/12/2024 Yes    Chest pain 8/12/2024 Yes    Type 2 diabetes mellitus with obesity (Formerly [facility]) 8/12/2024 Yes       Plan:     Patient status inpatient in the Med/Surge    1.  64 gentleman with underlying history of coronary disease with congestive heart failure ejection fraction 40% admitted with anginal symptoms, radiating to left arm, cardiology consulted, troponin x 2 were negative, EKG did not show any new changes,  Systolic heart failure with EF of 40%, on Entresto continued home medication,  Hypertension, continue home medication,  Hyperlipidemia, continue statins,  Morbid obesity, low-calorie diet,  DVT prophylaxis with Lovenox,  Full CODE STATUS  2. Disposition 2d      Consultations:   IP CONSULT TO INTERNAL MEDICINE  IP CONSULT TO CARDIOLOGY     Patient

## 2024-08-12 NOTE — ED NOTES
Report given to SARA Kitchen from [facility].   Report method by phone   The following was reviewed with receiving RN:   Current vital signs:  /61   Pulse 80   Temp 97.7 °F (36.5 °C)   Resp 19   Ht 1.77 m (5' 9.69\")   Wt 119.7 kg (264 lb)   SpO2 98%   BMI 38.22 kg/m²                      Any medication or safety alerts were reviewed. Any pending diagnostics and notifications were also reviewed, as well as any safety concerns or issues, abnormal labs, abnormal imaging, and abnormal assessment findings. Questions were answered.

## 2024-08-13 VITALS
HEIGHT: 69 IN | DIASTOLIC BLOOD PRESSURE: 71 MMHG | RESPIRATION RATE: 18 BRPM | TEMPERATURE: 97.5 F | SYSTOLIC BLOOD PRESSURE: 123 MMHG | OXYGEN SATURATION: 100 % | WEIGHT: 264 LBS | BODY MASS INDEX: 39.1 KG/M2 | HEART RATE: 70 BPM

## 2024-08-13 LAB
EKG ATRIAL RATE: 79 BPM
EKG P AXIS: 39 DEGREES
EKG P-R INTERVAL: 156 MS
EKG Q-T INTERVAL: 380 MS
EKG QRS DURATION: 124 MS
EKG QTC CALCULATION (BAZETT): 435 MS
EKG R AXIS: 24 DEGREES
EKG T AXIS: 59 DEGREES
EKG VENTRICULAR RATE: 79 BPM
GLUCOSE BLD-MCNC: 116 MG/DL (ref 75–110)
GLUCOSE BLD-MCNC: 139 MG/DL (ref 75–110)
INR PPP: 1
PROTHROMBIN TIME: 13.6 SEC (ref 11.8–14.6)

## 2024-08-13 PROCEDURE — 2580000003 HC RX 258: Performed by: INTERNAL MEDICINE

## 2024-08-13 PROCEDURE — 99223 1ST HOSP IP/OBS HIGH 75: CPT | Performed by: NURSE PRACTITIONER

## 2024-08-13 PROCEDURE — 6370000000 HC RX 637 (ALT 250 FOR IP): Performed by: INTERNAL MEDICINE

## 2024-08-13 PROCEDURE — 85610 PROTHROMBIN TIME: CPT

## 2024-08-13 PROCEDURE — 36415 COLL VENOUS BLD VENIPUNCTURE: CPT

## 2024-08-13 PROCEDURE — 99239 HOSP IP/OBS DSCHRG MGMT >30: CPT | Performed by: INTERNAL MEDICINE

## 2024-08-13 PROCEDURE — 82947 ASSAY GLUCOSE BLOOD QUANT: CPT

## 2024-08-13 PROCEDURE — G0378 HOSPITAL OBSERVATION PER HR: HCPCS

## 2024-08-13 PROCEDURE — 96372 THER/PROPH/DIAG INJ SC/IM: CPT

## 2024-08-13 PROCEDURE — 6360000002 HC RX W HCPCS: Performed by: INTERNAL MEDICINE

## 2024-08-13 RX ORDER — GABAPENTIN 300 MG/1
300 CAPSULE ORAL 2 TIMES DAILY
Status: DISCONTINUED | OUTPATIENT
Start: 2024-08-13 | End: 2024-08-13 | Stop reason: HOSPADM

## 2024-08-13 RX ORDER — BUMETANIDE 1 MG/1
2 TABLET ORAL 2 TIMES DAILY
Status: DISCONTINUED | OUTPATIENT
Start: 2024-08-13 | End: 2024-08-13 | Stop reason: HOSPADM

## 2024-08-13 RX ORDER — METOPROLOL SUCCINATE 100 MG/1
200 TABLET, EXTENDED RELEASE ORAL DAILY
Status: DISCONTINUED | OUTPATIENT
Start: 2024-08-13 | End: 2024-08-13 | Stop reason: HOSPADM

## 2024-08-13 RX ADMIN — BUMETANIDE 1 MG: 1 TABLET ORAL at 09:08

## 2024-08-13 RX ADMIN — ATORVASTATIN CALCIUM 20 MG: 20 TABLET, FILM COATED ORAL at 09:08

## 2024-08-13 RX ADMIN — SODIUM CHLORIDE, PRESERVATIVE FREE 10 ML: 5 INJECTION INTRAVENOUS at 09:12

## 2024-08-13 RX ADMIN — EMPAGLIFLOZIN 10 MG: 10 TABLET, FILM COATED ORAL at 09:08

## 2024-08-13 RX ADMIN — ASPIRIN 81 MG 81 MG: 81 TABLET ORAL at 09:08

## 2024-08-13 RX ADMIN — ENOXAPARIN SODIUM 30 MG: 100 INJECTION SUBCUTANEOUS at 09:09

## 2024-08-13 RX ADMIN — METOPROLOL SUCCINATE 200 MG: 100 TABLET, EXTENDED RELEASE ORAL at 11:12

## 2024-08-13 RX ADMIN — SACUBITRIL AND VALSARTAN 1 TABLET: 24; 26 TABLET, FILM COATED ORAL at 09:09

## 2024-08-13 RX ADMIN — GABAPENTIN 300 MG: 300 CAPSULE ORAL at 11:12

## 2024-08-13 NOTE — PLAN OF CARE
Problem: Discharge Planning  Goal: Discharge to home or other facility with appropriate resources  Outcome: Progressing  Flowsheets (Taken 8/13/2024 0532)  Discharge to home or other facility with appropriate resources:   Identify barriers to discharge with patient and caregiver   Arrange for needed discharge resources and transportation as appropriate   Identify discharge learning needs (meds, wound care, etc)   Arrange for interpreters to assist at discharge as needed   Refer to discharge planning if patient needs post-hospital services based on physician order or complex needs related to functional status, cognitive ability or social support system     Problem: Pain  Goal: Verbalizes/displays adequate comfort level or baseline comfort level  Outcome: Progressing  Flowsheets (Taken 8/13/2024 0532)  Verbalizes/displays adequate comfort level or baseline comfort level:   Encourage patient to monitor pain and request assistance   Assess pain using appropriate pain scale   Administer analgesics based on type and severity of pain and evaluate response   Implement non-pharmacological measures as appropriate and evaluate response   Consider cultural and social influences on pain and pain management   Notify Licensed Independent Practitioner if interventions unsuccessful or patient reports new pain     Problem: Safety - Adult  Goal: Free from fall injury  Outcome: Progressing  Flowsheets (Taken 8/13/2024 0532)  Free From Fall Injury: Instruct family/caregiver on patient safety

## 2024-08-13 NOTE — CONSULTS
[facility]    Consult                        Today's Date: 8/13/2024  Patient Name: Jesus Stevens  Date of admission: 8/12/2024 10:25 AM  Patient's age: 63 y.o., 1960  Admission Dx: Dizziness [R42]  Angina pectoris (HCC) [I20.9]  Chest pain [R07.9]  Chest pain, unspecified type [R07.9]    Reason for Consult:  Cardiac evaluation    Requesting Physician: Margarita Nix MD    CHIEF COMPLAINT:  cp    History Obtained From:  Pt and EHR     HISTORY OF PRESENT ILLNESS:    Jesus Stevens is a 63 y.o. Non- / non  male who presents with Chest Pain and Shortness of Breath   and is admitted to the hospital for the management of Angina pectoris (HCC).  63-year-old gentleman with underlying history of systolic heart failure with a EF of 40% follows with TCC, presented with chest pain and chest pressure, admitted to rule out acute coronary syndrome, cardiology consulted, also has underlying history of hypertension, hyperlipidemia, diabetes, was admitted to 3 months back for similar complaints, stress was negative, was discharged home to follow-up with PCP and cardiology as outpatient.      Pt complaining of some mid sternal heaviness.  He states that it comes and goes and is not there currently.        Past Medical History:   has a past medical history of Bronchitis, CAD (coronary artery disease), Cardiomyopathy (HCC), Chest pain, CHF (congestive heart failure) (HCC), CHF (congestive heart failure), NYHA class I, acute on chronic, diastolic (HCC), Chronic back pain, Chronic diastolic CHF (congestive heart failure) (HCC), COPD (chronic obstructive pulmonary disease) (HCC), Headache(784.0), Hyperlipidemia, Hypertension, Inguinal hernia, Low back pain with sciatica, Migraine, Mitral valve regurgitation, Osteoarthritis, Patient in clinical research study, Pneumonia, Sleep apnea, SOB (shortness of breath) on exertion, Tricuspid regurgitation, Type 2 diabetes mellitus without complication,    nitroGLYCERIN (NITROSTAT) 0.4 MG SL tablet PLACE 1 TABLET UNDER THE TONGUE EVERY 5 MINUTES AS NEEDED FOR CHEST PAIN 9/27/21  Yes Asaf Gonzalez MD   diclofenac sodium (VOLTAREN) 1 % GEL Apply topically 2 times daily 8/30/21  Yes Naomi Trejo MD   aspirin 81 MG tablet Take 1 tablet by mouth daily 8/13/19  Yes Naomi Trejo MD   Handicap Placard MISC by Does not apply route Apply for 5 years 7/7/23   Andrei Edmondson MD   blood glucose monitor strips True Metrix Test Strips  Test 2-3 times a day & as needed for symptoms of irregular blood glucose. Dispense sufficient amount for indicated testing frequency plus additional to accommodate PRN testing needs. 8/23/22   Joe Laureano MD   Elastic Bandages & Supports (WRIST SPLINT/COCK-UP/RIGHT L) MISC 1 each by Does not apply route daily  Patient not taking: Reported on 2/16/2024 10/28/21   Naomi Trejo MD   blood glucose monitor kit and supplies Dispense sufficient amount for indicated testing frequency plus additional to accommodate PRN testing needs. Dispense all needed supplies to include: monitor, strips, lancing device, lancets, control solutions, alcohol swabs. 8/30/21   Naomi Trejo MD   Masks (FACE MASK) MISC 1 each by Does not apply route as needed (as needed) 8/30/21   Naomi Trejo MD       Allergies:  Penicillins, Sulfa antibiotics, Sulfasalazine, Pseudoeph-doxylamine-dm-apap, and Rabbit protein    Social History:   reports that he quit smoking about 54 years ago. His smoking use included cigarettes. His smokeless tobacco use includes chew. He reports current alcohol use of about 1.0 standard drink of alcohol per week. He reports that he does not use drugs.     Family History: family history includes Arthritis in his mother and sister; Diabetes in his mother, sister, and sister; Heart Attack in his father; Heart Disease in his father, mother, and sister; Kidney Disease in his mother. No h/o sudden cardiac death.No for premature CAD    REVIEW OF

## 2024-08-13 NOTE — PLAN OF CARE
Problem: Discharge Planning  Goal: Discharge to home or other facility with appropriate resources  8/13/2024 1309 by Adithya Henderson RN  Outcome: Adequate for Discharge  8/13/2024 0532 by Lacy Rodriguez RN  Outcome: Progressing  Flowsheets (Taken 8/13/2024 0532)  Discharge to home or other facility with appropriate resources:   Identify barriers to discharge with patient and caregiver   Arrange for needed discharge resources and transportation as appropriate   Identify discharge learning needs (meds, wound care, etc)   Arrange for interpreters to assist at discharge as needed   Refer to discharge planning if patient needs post-hospital services based on physician order or complex needs related to functional status, cognitive ability or social support system     Problem: Pain  Goal: Verbalizes/displays adequate comfort level or baseline comfort level  8/13/2024 1309 by Adithya Henderson RN  Outcome: Adequate for Discharge  8/13/2024 0532 by Lacy Rodriguez RN  Outcome: Progressing  Flowsheets (Taken 8/13/2024 0532)  Verbalizes/displays adequate comfort level or baseline comfort level:   Encourage patient to monitor pain and request assistance   Assess pain using appropriate pain scale   Administer analgesics based on type and severity of pain and evaluate response   Implement non-pharmacological measures as appropriate and evaluate response   Consider cultural and social influences on pain and pain management   Notify Licensed Independent Practitioner if interventions unsuccessful or patient reports new pain     Problem: Safety - Adult  Goal: Free from fall injury  8/13/2024 1309 by Adithya Henderson RN  Outcome: Adequate for Discharge  8/13/2024 0532 by Lacy Rodriguez RN  Outcome: Progressing  Flowsheets (Taken 8/13/2024 0532)  Free From Fall Injury: Instruct family/caregiver on patient safety     Problem: Chronic Conditions and Co-morbidities  Goal: Patient's chronic conditions and  co-morbidity symptoms are monitored and maintained or improved  Outcome: Adequate for Discharge

## 2024-08-13 NOTE — PROGRESS NOTES
Physical Therapy        Physical Therapy Cancel Note      DATE: 2024    NAME: Jesus Stevens  MRN: [id number]   : 1960      Patient not seen this date for Physical Therapy due to:    Patient independent with functional mobility. Will defer PT evaluation at this time. Please reorder PT if future needs arise. 24: D/C PT - pt denies needs. reports D/Cing soon and is IND with all mobility. 1124       Electronically signed by Vannesa Campos PT on 2024 at 11:29 AM

## 2024-08-13 NOTE — PROGRESS NOTES
[facility] Internal Medicine  Arnaldo Austin MD; Ashu Hightower MD, Bry Serrano MD, Iraida Rodriguez MD, Roma Raymond MD; Margarita Nix MD    [facility] Internal Medicine   IN-PATIENT SERVICE   [facility]    HISTORY AND PHYSICAL EXAMINATION            Date:   8/13/2024  Patient name:  Jesus Stevens  Date of admission:  8/12/2024 10:25 AM  MRN:   [id number]  Account:  [id number]  YOB: 1960  PCP:    No primary care provider on file.  Room:   [facility]2047[facility]  Code Status:    Full Code    Chief Complaint:     Chief Complaint   Patient presents with    Chest Pain    Shortness of Breath       History Obtained From:     Patient/EMR/Bedside RN    History of Present Illness:     Jesus Stevens is a 63 y.o. Non- / non  male who presents with Chest Pain and Shortness of Breath   and is admitted to the hospital for the management of Angina pectoris (HCC).  63-year-old gentleman with underlying history of systolic heart failure with a EF of 40% follows with TCC, presented with chest pain and chest pressure, admitted to rule out acute coronary syndrome, cardiology consulted, also has underlying history of hypertension, hyperlipidemia, diabetes, was admitted to 3 months back for similar complaints, stress was negative, was discharged home to follow-up with PCP and cardiology as outpatient, will await cardiology response    Past Medical History:     Past Medical History:   Diagnosis Date    Bronchitis     CAD (coronary artery disease)     Cardiomyopathy (HCC)     Chest pain     CHF (congestive heart failure) (Former[facility])     CHF (congestive heart failure), NYHA class I, acute on chronic, diastolic (HCC) 05/30/2021    Chronic back pain     Chronic diastolic CHF (congestive heart failure) (HCC) 08/01/2014    COPD (chronic obstructive pulmonary disease) (HCC)     Headache(784.0)     Hyperlipidemia     Hypertension     Inguinal hernia     Low back  Absolute 0.80 0.1 - 1.3 k/uL    Eosinophils Absolute 0.20 0.0 - 0.4 k/uL    Basophils Absolute 0.00 0.0 - 0.2 k/uL   Brain Natriuretic Peptide    Collection Time: 08/12/24 10:54 AM   Result Value Ref Range    Pro-BNP <36 <300 pg/mL   EKG 12 Lead    Collection Time: 08/12/24 11:14 AM   Result Value Ref Range    Ventricular Rate 79 BPM    Atrial Rate 79 BPM    P-R Interval 156 ms    QRS Duration 124 ms    Q-T Interval 380 ms    QTc Calculation (Bazett) 435 ms    P Axis 39 degrees    R Axis 24 degrees    T Axis 59 degrees   Troponin    Collection Time: 08/12/24 12:29 PM   Result Value Ref Range    Troponin, High Sensitivity 8 0 - 22 ng/L   POC Glucose Fingerstick    Collection Time: 08/12/24  4:04 PM   Result Value Ref Range    POC Glucose 267 (H) 75 - 110 mg/dL   POC Glucose Fingerstick    Collection Time: 08/12/24  9:20 PM   Result Value Ref Range    POC Glucose 162 (H) 75 - 110 mg/dL   POC Glucose Fingerstick    Collection Time: 08/13/24  6:23 AM   Result Value Ref Range    POC Glucose 116 (H) 75 - 110 mg/dL       Imaging/Diagnostics:  XR CHEST PORTABLE    Result Date: 8/12/2024  1. No acute cardiopulmonary process.       Assessment :      Hospital Problems             Last Modified POA    * (Principal) Angina pectoris (Former[facility]) 8/12/2024 Yes    Chronic systolic congestive heart failure (Former[facility]) 8/12/2024 Yes    HTN (hypertension) 8/12/2024 Yes    Mixed simple and mucopurulent chronic bronchitis ([facility]) 8/12/2024 Yes    VANDANA (obstructive sleep apnea) 8/12/2024 Yes    Chest pain 8/12/2024 Yes    Type 2 diabetes mellitus with obesity ([facility]) 8/12/2024 Yes       Plan:     Patient status inpatient in the Med/Surge    1.  64 gentleman with underlying history of coronary disease with congestive heart failure ejection fraction 40% admitted with anginal symptoms, radiating to left arm, cardiology consulted, troponin x 2 were negative, EKG did not show any new changes,  Systolic heart failure with EF of 40%, on Entresto continued home

## 2024-08-13 NOTE — CARE COORDINATION
Case Management Assessment  Initial Evaluation    Date/Time of Evaluation: 8/13/2024 9:52 AM  Assessment Completed by: Liat Walton RN    If patient is discharged prior to next notation, then this note serves as note for discharge by case management.    Patient Name: Jesus Stevens                   YOB: 1960  Diagnosis: Dizziness [R42]  Angina pectoris (HCC) [I20.9]  Chest pain [R07.9]  Chest pain, unspecified type [R07.9]                   Date / Time: 8/12/2024 10:25 AM    Patient Admission Status: Inpatient   Readmission Risk (Low < 19, Mod (19-27), High > 27): Readmission Risk Score: 8.8    Current PCP: No primary care provider on file.  PCP verified by CM? Yes    Chart Reviewed: Yes      History Provided by: Patient  Patient Orientation: Alert and Oriented    Patient Cognition: Alert    Hospitalization in the last 30 days (Readmission):  No    If yes, Readmission Assessment in CM Navigator will be completed.    Advance Directives:      Code Status: Full Code   Patient's Primary Decision Maker is: Legal Next of Kin      Discharge Planning:    Patient lives with: Spouse/Significant Other Type of Home: House  Primary Care Giver: Self  Patient Support Systems include: Spouse/Significant Other   Current Financial resources: Medicare, Medicaid  Current community resources: (S) Other (Comment) ([facility])  Current services prior to admission: C-pap, Durable Medical Equipment            Current DME: Glucometer, Cpap, Home Aerosol            Type of Home Care services:  None    ADLS  Prior functional level: Independent in ADLs/IADLs  Current functional level: Independent in ADLs/IADLs    PT AM-PAC:   /24  OT AM-PAC:   /24    Family can provide assistance at DC:    Would you like Case Management to discuss the discharge plan with any other family members/significant others, and if so, who? No  Plans to Return to Present Housing: Yes  Other Identified Issues/Barriers to RETURNING to  current housing: no barriers   Potential Assistance needed at discharge: N/A            Potential DME:  none  Patient expects to discharge to: House  Plan for transportation at discharge: Self    Financial    Payor: [facility] MEDICARE / Plan: [employer] DUAL COMPLETE / Product Type: *No Product type* /     Does insurance require precert for SNF: Yes    Potential assistance Purchasing Medications: No  Meds-to-Beds request:  no      [facility] [place] - [place], [place] - [address] [phone] - F [phone]  [address] [place] [address] [place] [address]  Phone: [phone] Fax: [phone]    [id number] [place] [place] - [address] - P [phone] - F [phone]  [address] [address]  Phone: [phone] Fax: [phone]      Notes:    Factors facilitating achievement of predicted outcomes: Family support, Good insight into deficits, and Has needed Durable Medical Equipment at home    Barriers to discharge: no barriers to discharge    Additional Case Management Notes: 8/13/2024 [facility] MEDICARE/MEDICAID; from home w/ fiance, independent, drives; DME CPAP, glucometer, nebulizer; VNS none/declines; follows for CHF at [facility]; cardiology consult, has CCM implant for CHF; [place] 9%    The Plan for Transition of Care is related to the following treatment goals of Dizziness [R42]  Angina pectoris (HCC) [I20.9]  Chest pain [R07.9]  Chest pain, unspecified type [R07.9]    IF APPLICABLE: The Patient and/or patient representative Jesus and his family were provided with a choice of provider and agrees with the discharge plan. Freedom of choice list with basic dialogue that supports the patient's individualized plan of care/goals and shares the quality data associated with the providers was provided to: Patient       The Patient and/or Patient Representative Agree with the Discharge Plan? Yes    Liat Walton RN  Case Management Department  Ph: [phone] Fax: [phone]

## 2024-08-13 NOTE — PROGRESS NOTES
[facility]   OCCUPATIONAL THERAPY MISSED TREATMENT NOTE   INPATIENT   Date: 24  Patient Name: Jesus Stevens       Room:   MRN: [id number]   Account #: [id number]    : 1960  (63 y.o.)  Gender: male                 REASON FOR MISSED TREATMENT:  24    -   OT being discontinued at this time. Patient functioning at Premorbid Level  No further needs  - pt has been up independently in room and bathroom, denies need for skilled occupational therapy services at this time. Pt with no concerns with ability to complete self-care upon discharge. D/C OT - please reorder if future OT needs arise.    [id number]       Electronically signed by THADDEUS Church on 24 at 11:27 AM EDT

## 2024-08-14 ENCOUNTER — CARE COORDINATION (OUTPATIENT)
Dept: CARE COORDINATION | Age: 64
End: 2024-08-14

## 2024-08-14 NOTE — CARE COORDINATION
Care Transitions Note    Initial Call - Call within 2 business days of discharge: Yes    Patient Current Location:  [place]    Care Transition Nurse contacted the patient by telephone to perform post hospital discharge assessment, verified name and  as identifiers. Provided introduction to self, and explanation of the Care Transition Nurse role.     Patient: Jesus Stevens    Patient : 1960   MRN: [id number]    Reason for Admission: Chest pain  Discharge Date: 24  RURS: Readmission Risk Score: 8.7      Last Discharge Facility       Date Complaint Diagnosis Description Type Department Provider    24 Chest Pain; Shortness of Breath Chest pain, unspecified type ... ED to Hosp-Admission (Discharged) (ADMITTED) [facility] Margarita Nix MD; Saul Lindsay...            Was this an external facility discharge? No    Additional needs identified to be addressed with provider   No needs identified             Method of communication with provider: none.    Patients top risk factors for readmission: medical condition-HTN, CHF, DM COPD    Interventions to address risk factors:   Reviewed discharge    Care Summary Note: Was able to contact Jesus for initial transitional outreach.  He stated that he was doing \"pretty good\" today.  He denied any further chest pain, shortness of breath or dizziness.  He stated that he had all his medications and will be calling the PCP and cardiologist for follow up.  He is currently a pt with [facility] and no longer is followed by a [facility] provider.  He had no questions/concerns.     Care Transition Nurse reviewed discharge instructions with patient. The patient was given an opportunity to ask questions; all questions answered at this time.. The patient verbalized understanding.   Were discharge instructions available to patient? Yes.   Reviewed appropriate site of care based on symptoms and resources available to patient including: PCP  Specialist  When to call 911.

## 2024-08-15 NOTE — DISCHARGE SUMMARY
IN-PATIENT SERVICE   [facility] Internal Medicine    Discharge Summary     Patient ID: Jesus Stevens  :  1960   MRN: [id number]     ACCOUNT:  [id number]   Patient's PCP: No primary care provider on file.  Admit Date: 2024   Discharge Date: 8/15/2024    Length of Stay: 1  Code Status:  Prior  Admitting Physician: Margarita Nix MD  Discharge Physician: Arnaldo Austin MD     Active Discharge Diagnoses:     Primary Problem  Angina pectoris (HCC)      Hospital Problems  Active Hospital Problems    Diagnosis Date Noted    Chronic systolic congestive heart failure (HCC) [I50.22] 2019     Priority: High    Angina pectoris (HCC) [I20.9] 2024    Type 2 diabetes mellitus with obesity (HCC) [E11.69, E66.9] 2022    Chest pain [R07.9] 2021    Mixed simple and mucopurulent chronic bronchitis (HCC) [J41.8] 2021    VANDANA (obstructive sleep apnea) [G47.33] 2021    HTN (hypertension) [I10] 2013       Admission Condition:  fair     Discharged Condition: fair    Hospital Stay:     Hospital Course:  Jesus Stevens is a 63 y.o. male who was admitted for the management of Angina pectoris (HCC) , presented to ER with Chest Pain and Shortness of Breath    64 gentleman with underlying history of coronary disease with congestive heart failure ejection fraction 40% admitted with anginal symptoms, radiating to left arm, cardiology consulted, troponin x 2 were negative, EKG did not show any new changes,  Systolic heart failure with EF of 40%, on Entresto continued home medication,  Hypertension, continue home medication,  Hyperlipidemia, continue statins,  Morbid obesity, low-calorie diet,  No cp now  Trop negative  EKG noted  Bumex increase to 2 mg bid  Cardio input  If no workup planned ,dc home with out pt with [facility]    Significant therapeutic interventions:     Significant Diagnostic Studies:   Labs / Micro:        Radiology:    XR CHEST PORTABLE    Result Date:  MD  8/15/2024  11:52 AM      Thank you Dr. Valdez primary care provider on file. for the opportunity to be involved in this patient's care.

## 2024-08-19 ENCOUNTER — HOSPITAL ENCOUNTER (EMERGENCY)
Age: 64
Discharge: HOME | End: 2024-08-19
Attending: EMERGENCY MEDICINE
Payer: MEDICARE

## 2024-08-19 ENCOUNTER — APPOINTMENT (OUTPATIENT)
Dept: GENERAL RADIOLOGY | Age: 64
End: 2024-08-19
Payer: MEDICARE

## 2024-08-19 VITALS
OXYGEN SATURATION: 97 % | WEIGHT: 243 LBS | BODY MASS INDEX: 40.48 KG/M2 | HEART RATE: 86 BPM | RESPIRATION RATE: 20 BRPM | HEIGHT: 65 IN | TEMPERATURE: 98.4 F | SYSTOLIC BLOOD PRESSURE: 123 MMHG | DIASTOLIC BLOOD PRESSURE: 61 MMHG

## 2024-08-19 DIAGNOSIS — M10.9 ACUTE GOUT INVOLVING TOE OF LEFT FOOT, UNSPECIFIED CAUSE: Primary | ICD-10-CM

## 2024-08-19 LAB
ANION GAP SERPL CALCULATED.3IONS-SCNC: 12 MMOL/L (ref 9–17)
BASOPHILS # BLD: 0 K/UL (ref 0–0.2)
BASOPHILS NFR BLD: 1 % (ref 0–2)
BUN SERPL-MCNC: 18 MG/DL (ref 8–23)
CALCIUM SERPL-MCNC: 9.3 MG/DL (ref 8.6–10.4)
CHLORIDE SERPL-SCNC: 99 MMOL/L (ref 98–107)
CO2 SERPL-SCNC: 29 MMOL/L (ref 20–31)
CREAT SERPL-MCNC: 1.1 MG/DL (ref 0.7–1.2)
EOSINOPHIL # BLD: 0.2 K/UL (ref 0–0.4)
EOSINOPHILS RELATIVE PERCENT: 2 % (ref 0–4)
ERYTHROCYTE [DISTWIDTH] IN BLOOD BY AUTOMATED COUNT: 13.5 % (ref 11.5–14.9)
GFR, ESTIMATED: 75 ML/MIN/1.73M2
GLUCOSE SERPL-MCNC: 168 MG/DL (ref 70–99)
HCT VFR BLD AUTO: 42.9 % (ref 41–53)
HGB BLD-MCNC: 14.3 G/DL (ref 13.5–17.5)
LYMPHOCYTES NFR BLD: 1.4 K/UL (ref 1–4.8)
LYMPHOCYTES RELATIVE PERCENT: 15 % (ref 24–44)
MCH RBC QN AUTO: 29.2 PG (ref 26–34)
MCHC RBC AUTO-ENTMCNC: 33.2 G/DL (ref 31–37)
MCV RBC AUTO: 87.8 FL (ref 80–100)
MONOCYTES NFR BLD: 0.8 K/UL (ref 0.1–1.3)
MONOCYTES NFR BLD: 9 % (ref 1–7)
NEUTROPHILS NFR BLD: 73 % (ref 36–66)
NEUTS SEG NFR BLD: 6.8 K/UL (ref 1.3–9.1)
PLATELET # BLD AUTO: 218 K/UL (ref 150–450)
PMV BLD AUTO: 8.6 FL (ref 6–12)
POTASSIUM SERPL-SCNC: 4 MMOL/L (ref 3.7–5.3)
RBC # BLD AUTO: 4.89 M/UL (ref 4.5–5.9)
SODIUM SERPL-SCNC: 140 MMOL/L (ref 135–144)
URATE SERPL-MCNC: 8.2 MG/DL (ref 3.4–7)
WBC OTHER # BLD: 9.3 K/UL (ref 3.5–11)

## 2024-08-19 PROCEDURE — 36415 COLL VENOUS BLD VENIPUNCTURE: CPT

## 2024-08-19 PROCEDURE — 73630 X-RAY EXAM OF FOOT: CPT

## 2024-08-19 PROCEDURE — 85025 COMPLETE CBC W/AUTO DIFF WBC: CPT

## 2024-08-19 PROCEDURE — 80048 BASIC METABOLIC PNL TOTAL CA: CPT

## 2024-08-19 PROCEDURE — 84550 ASSAY OF BLOOD/URIC ACID: CPT

## 2024-08-19 PROCEDURE — 99284 EMERGENCY DEPT VISIT MOD MDM: CPT

## 2024-08-19 RX ORDER — COLCHICINE 0.6 MG/1
0.6 TABLET ORAL 2 TIMES DAILY
Qty: 10 TABLET | Refills: 0 | Status: SHIPPED | OUTPATIENT
Start: 2024-08-19

## 2024-08-19 RX ORDER — TRAMADOL HYDROCHLORIDE 50 MG/1
50 TABLET ORAL EVERY 6 HOURS PRN
Qty: 8 TABLET | Refills: 0 | Status: SHIPPED | OUTPATIENT
Start: 2024-08-19 | End: 2024-08-21

## 2024-08-19 ASSESSMENT — PAIN SCALES - GENERAL: PAINLEVEL_OUTOF10: 10

## 2024-08-19 ASSESSMENT — PAIN - FUNCTIONAL ASSESSMENT: PAIN_FUNCTIONAL_ASSESSMENT: 0-10

## 2024-08-19 ASSESSMENT — LIFESTYLE VARIABLES
HOW MANY STANDARD DRINKS CONTAINING ALCOHOL DO YOU HAVE ON A TYPICAL DAY: PATIENT DOES NOT DRINK
HOW OFTEN DO YOU HAVE A DRINK CONTAINING ALCOHOL: NEVER

## 2024-08-19 NOTE — ED PROVIDER NOTES
eMERGENCY dEPARTMENT eNCOUnter   Independent Attestation     Pt Name: Jesus Stevens  MRN: [id number]  Birthdate 1960  Date of evaluation: 8/19/24     Jesus Stevens is a 63 y.o. male with CC: Foot Pain      Based on the medical record the care appears appropriate.  I was personally available for consultation in the Emergency Department.    Torres Gallardo MD  Attending Emergency Physician                  Torres Gallardo MD  08/19/24 2914    
  the distal portion of the 1st metatarsal.             LABS: Lab orders shown below, the results are reviewed by myself, and all abnormals are listed below.  Labs Reviewed   URIC ACID - Abnormal; Notable for the following components:       Result Value    Uric Acid 8.2 (*)     All other components within normal limits   CBC WITH AUTO DIFFERENTIAL - Abnormal; Notable for the following components:    Neutrophils % 73 (*)     Lymphocytes % 15 (*)     Monocytes % 9 (*)     All other components within normal limits   BASIC METABOLIC PANEL - Abnormal; Notable for the following components:    Glucose 168 (*)     All other components within normal limits       Vitals Reviewed:    Vitals:    08/19/24 1324   BP: 123/61   Pulse: 86   Resp: 20   Temp: 98.4 °F (36.9 °C)   TempSrc: Oral   SpO2: 97%   Weight: 110.2 kg (243 lb)   Height: 1.651 m (5' 5\")     MEDICATIONS GIVEN TO PATIENT THIS ENCOUNTER:  Orders Placed This Encounter   Medications    colchicine (COLCRYS) 0.6 MG tablet     Sig: Take 1 tablet by mouth 2 times daily     Dispense:  10 tablet     Refill:  0    traMADol (ULTRAM) 50 MG tablet     Sig: Take 1 tablet by mouth every 6 hours as needed for Pain for up to 2 days. Intended supply: 5 days. Take lowest dose possible to manage pain Max Daily Amount: 200 mg     Dispense:  8 tablet     Refill:  0     DISCHARGE PRESCRIPTIONS:  Discharge Medication List as of 8/19/2024  4:32 PM        START taking these medications    Details   colchicine (COLCRYS) 0.6 MG tablet Take 1 tablet by mouth 2 times daily, Disp-10 tablet, R-0Normal      traMADol (ULTRAM) 50 MG tablet Take 1 tablet by mouth every 6 hours as needed for Pain for up to 2 days. Intended supply: 5 days. Take lowest dose possible to manage pain Max Daily Amount: 200 mg, Disp-8 tablet, R-0Normal           PHYSICIAN CONSULTS ORDERED THIS ENCOUNTER:  None  FINAL IMPRESSION      1. Acute gout involving toe of left foot, unspecified cause          DISPOSITION/PLAN

## 2024-08-19 NOTE — DISCHARGE INSTRUCTIONS
Please follow up with your PCP.  Recommend return to the ED if  you develop any worsening pain, swelling, redness, numbness, fever, chills or any other concerning symptoms.  Do not operate machinery or drink alcohol when taking tramadol.  Can cause drowsiness.

## 2024-08-20 ENCOUNTER — CARE COORDINATION (OUTPATIENT)
Dept: CARE COORDINATION | Age: 64
End: 2024-08-20

## 2024-08-20 NOTE — CARE COORDINATION
Care Transitions Note    Follow Up Call     Patient Current Location:  [place]    Care Transition Nurse contacted the patient by telephone. Verified name and  as identifiers.    Additional needs identified to be addressed with provider   No needs identified                 Method of communication with provider: none.    Care Summary Note: Was able to contact Jesus for transitional outreach and ED follow up.  He said that he is still having some Lt great toe pain and did use a heating pad on it last night.  He was prescribed Ultram and colchicine for the gout, but he still needs to get the colchicine from the pharmacy. (It was not available).  He denied any chest pain, shortness of breath or dizziness.  He has not made his follow up with his PCP nor cardiologist, but is planning on calling today.  He said that he does go to the CHF clinic and will have to call to resume going.  He had no further questions or concerns.     Plan of care updates since last contact:  ED follow up and if made follow up appointment from hospital admission       Advance Care Planning:   Does patient have an Advance Directive: reviewed during previous call, see note. .    Medication Review:  Medications changed since last call, reviewed today.     Remote Patient Monitoring:  Offered patient enrollment in the Remote Patient Monitoring (RPM) program for in-home monitoring: Patient is not eligible for RPM program because: affiliate provider.    Assessments:  Care Transitions Subsequent and Final Call    Subsequent and Final Calls  Do you have any ongoing symptoms?: Yes  Onset of Patient-reported symptoms: In the past 7 days  Patient-reported symptoms: Pain  Have your medications changed?: Yes  Patient Reports: ED added colchicine, Ultram for Lt great toe gout  Do you have any questions related to your medications?: No  Do you currently have any active services?: Yes  Are you currently active with any services?: Outpatient/Community

## 2024-08-26 ENCOUNTER — CARE COORDINATION (OUTPATIENT)
Dept: CARE COORDINATION | Age: 64
End: 2024-08-26

## 2024-08-26 NOTE — CARE COORDINATION
Care Transitions Note    Follow Up Call     Attempted to reach patient for transitions of care follow up.  Unable to reach patient.      Outreach Attempts:   HIPAA compliant voicemail left for patient.     Care Summary Note: 1st attempt    Follow Up Appointment:       Plan for follow-up on next business day.  based on severity of symptoms and risk factors. Plan for next call: symptom management-follow up on chest pain, if Lt great toe gout resolved  follow-up appointment-did he make follow up with PCP at [facility] and his cardiologist and if called CHF clinic to resume appointments    SHELBIE RIVER RN

## 2024-08-27 ENCOUNTER — CARE COORDINATION (OUTPATIENT)
Dept: CARE COORDINATION | Age: 64
End: 2024-08-27

## 2024-08-27 NOTE — CARE COORDINATION
Care Transitions Note    Follow Up Call     Attempted to reach patient for transitions of care follow up.  Unable to reach patient.      Outreach Attempts: # 2  Unable to leave message.     Care Summary Note: # 2     Follow Up Appointment: none on file       No further follow-up call indicated based on severity of symptoms and risk factors.   Sharon Couch LPN

## 2024-09-06 NOTE — TELEPHONE ENCOUNTER
Last Visit Date:  6-18-20  Next Visit Date:  Visit date not found    Hemoglobin A1C (%)   Date Value   08/13/2019 5.9   04/15/2016 5.4   08/12/2013 5.6             ( goal A1C is < 7)   No results found for: LABMICR  LDL Cholesterol (mg/dL)   Date Value   06/20/2020 57       (goal LDL is <100)   AST (U/L)   Date Value   06/20/2020 20     ALT (U/L)   Date Value   06/20/2020 50 (H)     BUN (mg/dL)   Date Value   06/20/2020 18     BP Readings from Last 3 Encounters:   07/01/20 (!) 154/66   06/18/20 126/84   01/08/20 (!) 149/91          (goal 120/80)        Patient Active Problem List:     HTN (hypertension)     Skin tag     Low back pain with sciatica     Hyperglycemia     Infected sebaceous cyst     Chronic diastolic CHF (congestive heart failure) (HCC)     Chronic bilateral low back pain without sciatica     Chronic pain of right knee     Chest pain, unspecified     Bronchitis     Chronic systolic congestive heart failure (HCC)     Ventral hernia     Epigastric pain     Elevated LFTs     Shortness of breath     Rectus diastasis      ----May Aden 08-Sep-2024

## 2024-10-15 ENCOUNTER — HOSPITAL ENCOUNTER (OUTPATIENT)
Age: 64
Discharge: HOME | End: 2024-10-17
Payer: MEDICARE

## 2024-10-15 ENCOUNTER — HOSPITAL ENCOUNTER (OUTPATIENT)
Dept: GENERAL RADIOLOGY | Age: 64
Discharge: HOME | End: 2024-10-17
Payer: MEDICARE

## 2024-10-15 DIAGNOSIS — M25.362: ICD-10-CM

## 2024-10-15 PROCEDURE — 73562 X-RAY EXAM OF KNEE 3: CPT

## 2024-10-28 ENCOUNTER — TELEPHONE (OUTPATIENT)
Dept: OTHER | Age: 64
End: 2024-10-28

## 2024-10-28 DIAGNOSIS — I50.22 CHRONIC SYSTOLIC CONGESTIVE HEART FAILURE (HCC): Primary | ICD-10-CM

## 2024-10-28 NOTE — TELEPHONE ENCOUNTER
Pt phoned clinic stating his PCP is wanting him to return to CHF Clinic. Pts referral  in August. Last seen in clinic 2023. Spoke with Viktoriya Cazares ([facility] NP) and received order that ok to place a new referral. Referral placed and will schedule appt for pt. Pt stated best phone number to reach him at this time is [phone]

## 2024-11-09 ENCOUNTER — APPOINTMENT (OUTPATIENT)
Dept: GENERAL RADIOLOGY | Age: 64
End: 2024-11-09
Payer: COMMERCIAL

## 2024-11-09 ENCOUNTER — HOSPITAL ENCOUNTER (EMERGENCY)
Age: 64
Discharge: HOME | End: 2024-11-09
Attending: STUDENT IN AN ORGANIZED HEALTH CARE EDUCATION/TRAINING PROGRAM
Payer: COMMERCIAL

## 2024-11-09 VITALS
HEIGHT: 65 IN | HEART RATE: 85 BPM | OXYGEN SATURATION: 100 % | RESPIRATION RATE: 20 BRPM | WEIGHT: 250 LBS | TEMPERATURE: 98 F | BODY MASS INDEX: 41.65 KG/M2 | SYSTOLIC BLOOD PRESSURE: 109 MMHG | DIASTOLIC BLOOD PRESSURE: 48 MMHG

## 2024-11-09 DIAGNOSIS — M25.562 ACUTE PAIN OF LEFT KNEE: Primary | ICD-10-CM

## 2024-11-09 PROCEDURE — 73564 X-RAY EXAM KNEE 4 OR MORE: CPT

## 2024-11-09 PROCEDURE — 6370000000 HC RX 637 (ALT 250 FOR IP): Performed by: STUDENT IN AN ORGANIZED HEALTH CARE EDUCATION/TRAINING PROGRAM

## 2024-11-09 PROCEDURE — 99283 EMERGENCY DEPT VISIT LOW MDM: CPT

## 2024-11-09 RX ORDER — ACETAMINOPHEN 500 MG
1000 TABLET ORAL ONCE
Status: COMPLETED | OUTPATIENT
Start: 2024-11-09 | End: 2024-11-09

## 2024-11-09 RX ADMIN — ACETAMINOPHEN 1000 MG: 500 TABLET ORAL at 18:39

## 2024-11-09 ASSESSMENT — PAIN DESCRIPTION - ORIENTATION
ORIENTATION: LEFT
ORIENTATION: LEFT

## 2024-11-09 ASSESSMENT — PAIN SCALES - GENERAL
PAINLEVEL_OUTOF10: 9
PAINLEVEL_OUTOF10: 9

## 2024-11-09 ASSESSMENT — PAIN DESCRIPTION - DESCRIPTORS: DESCRIPTORS: SHARP;SHOOTING

## 2024-11-09 ASSESSMENT — PAIN - FUNCTIONAL ASSESSMENT: PAIN_FUNCTIONAL_ASSESSMENT: 0-10

## 2024-11-09 ASSESSMENT — ENCOUNTER SYMPTOMS
SHORTNESS OF BREATH: 0
ABDOMINAL PAIN: 0

## 2024-11-09 ASSESSMENT — PAIN DESCRIPTION - PAIN TYPE: TYPE: ACUTE PAIN

## 2024-11-09 ASSESSMENT — PAIN DESCRIPTION - LOCATION
LOCATION: KNEE
LOCATION: KNEE

## 2024-11-10 NOTE — ED PROVIDER NOTES
[facility]  Emergency Department Encounter  Emergency Medicine Physician     Pt Name: Jesus Stevens  MRN: [id number]  Birthdate 1960  Date of evaluation: 11/9/24  PCP:  No primary care provider on file.    CHIEF COMPLAINT       Chief Complaint   Patient presents with    Knee Injury     Twisted his L knee, no numbness or tingling       HISTORY OF PRESENT ILLNESS  (Location/Symptom, Timing/Onset, Context/Setting, Quality, Duration, Modifying Factors, Severity.)    Jesus Stevens is a 63 y.o. male who presents with pain to his left knee.  Patient states that he was working on his vehicle earlier today when he felt a twisting sensation.  States he had some pain along the anterior aspect of his knee.  States he can bear weight but is somewhat painful for him to do so.  Denies any numbness or tingling.  Denies any paresthesias.  Denies any posterior knee or pain.  Denies any lower leg or foot pain.  States he does have a history of arthritis.  States he is unable to take NSAID medication secondary to his heart condition.        PAST MEDICAL / SURGICAL / SOCIAL / FAMILY HISTORY    has a past medical history of Bronchitis, CAD (coronary artery disease), Cardiomyopathy ([facility]), Chest pain, CHF (congestive heart failure) ([facility]), CHF (congestive heart failure), NYHA class I, acute on chronic, diastolic ([facility]), Chronic back pain, Chronic diastolic CHF (congestive heart failure) ([facility]), COPD (chronic obstructive pulmonary disease) ([facility]), Headache(784.0), Hyperlipidemia, Hypertension, Inguinal hernia, Low back pain with sciatica, Migraine, Mitral valve regurgitation, Osteoarthritis, Patient in clinical research study, Pneumonia, Sleep apnea, SOB (shortness of breath) on exertion, Tricuspid regurgitation, Type 2 diabetes mellitus without complication, without long-term current use of insulin ([facility]), Under care of team, and Wellness examination.     has a past surgical history that includes hernia repair;

## 2024-11-10 NOTE — DISCHARGE INSTRUCTIONS
Please follow-up with your primary care provider soon as possible  You may take Tylenol up to 1 g every 8 hours needed for pain for up to 4 days  Please continue to take your home gabapentin  May also use your home Voltaren gel  You may also ice your knee, 3-4 times a day, for 20 to 30 minutes at a time  If you have any worsening of symptoms, return to the ER

## 2024-11-13 ENCOUNTER — TELEPHONE (OUTPATIENT)
Dept: GASTROENTEROLOGY | Age: 64
End: 2024-11-13

## 2024-11-13 NOTE — TELEPHONE ENCOUNTER
1st attempt: writer contacted patient to schedule colon procedure from referral on file however, patient has not seen his cardiologist in a year ([facility]/Dr Shabazz) and needs to have an OV with Cardiology prior to being scheduled for colonoscopy. Patient is a previous [facility] patient.

## 2024-11-18 ENCOUNTER — HOSPITAL ENCOUNTER (OUTPATIENT)
Dept: OTHER | Age: 64
Discharge: HOME | End: 2024-11-18
Payer: COMMERCIAL

## 2024-11-18 VITALS
DIASTOLIC BLOOD PRESSURE: 65 MMHG | SYSTOLIC BLOOD PRESSURE: 119 MMHG | WEIGHT: 238.4 LBS | HEART RATE: 81 BPM | RESPIRATION RATE: 18 BRPM | BODY MASS INDEX: 39.67 KG/M2 | OXYGEN SATURATION: 98 %

## 2024-11-18 PROCEDURE — 99212 OFFICE O/P EST SF 10 MIN: CPT

## 2024-11-18 ASSESSMENT — PAIN DESCRIPTION - DESCRIPTORS: DESCRIPTORS: THROBBING

## 2024-11-18 ASSESSMENT — PAIN DESCRIPTION - ONSET: ONSET: ON-GOING

## 2024-11-18 ASSESSMENT — PAIN DESCRIPTION - LOCATION: LOCATION: KNEE

## 2024-11-18 ASSESSMENT — PAIN SCALES - GENERAL: PAINLEVEL_OUTOF10: 10

## 2024-11-18 ASSESSMENT — PAIN DESCRIPTION - ORIENTATION: ORIENTATION: RIGHT;LEFT

## 2024-11-18 ASSESSMENT — PAIN DESCRIPTION - PAIN TYPE: TYPE: CHRONIC PAIN

## 2024-11-18 NOTE — PROGRESS NOTES
Date:  2024  Time:  1:47 PM    CHF Clinic at [facility]    Office: [phone] Fax: [phone]    Re:  Jesus Stevens   Patient : 1960    Vital Signs: /65   Pulse 81   Resp 18   Wt 108.1 kg (238 lb 6.4 oz)   SpO2 98%   BMI 39.67 kg/m²                       O2 Device: None (Room air)                           No results for input(s): \"CBC\", \"HGB\", \"HCT\", \"WBC\", \"PLATELET\", \"NA\", \"K\", \"CL\", \"CO2\", \"BUN\", \"CREATININE\", \"GLUCOSE\", \"BNP\", \"INR\" in the last 72 hours.     Respiratory:    Assessment  Charting Type: Reassessment    Breath Sounds  Right Upper Lobe: Clear  Right Middle Lobe: Clear  Right Lower Lobe: Clear, Diminished (slightly base)  Left Upper Lobe: Clear  Left Lower Lobe: Clear, Diminished (slightly base)    Cough/Sputum  Cough: Productive  Frequency: Infrequent  Sputum Amount: Small  Sputum Color: Clear         Peripheral Vascular  RLE Edema: +1, Pitting (ankle)  LLE Edema: Trace, Non-pitting (ankle)    Future Appointments   Date Time Provider Department Center   2024  1:00 PM [facility] CHF CLINIC  1 [facility] CHF [facility]      Complaints: Pt denies any concerns or complaints at this time    Comment : Pt arrived ambulatory to appt. Pt last seen in CHF Clinic 23. Pts wt today 238.4 lb. Lungs clear, slightly diminished bilateral bases. Pt denies SOB at rest. Pt states he gets SOB \"sometimes, but not very often.\" Trace non-pitting edema LLE and 1+ pitting edema RLE noted. Ankle and calf measurements stable. No s/s acute CHF noted at this time. Pt has Dynamic Impulse device in place and stated he has not had a hospital admission for CHF since he had it implanted. Pt c/o chronic bilateral knee pain. Pt stated both of his sisters recently passed away. Emotional support provided. Home meds reviewed. Pt verbalized compliance with meds. Reinforced eduacation regarding 2000 mg low sodium diet and 64 oz fluid restriction. Pt stated he \"does his best\" but

## 2024-12-05 ENCOUNTER — APPOINTMENT (OUTPATIENT)
Dept: GENERAL RADIOLOGY | Age: 64
End: 2024-12-05
Payer: MEDICARE

## 2024-12-05 ENCOUNTER — HOSPITAL ENCOUNTER (EMERGENCY)
Age: 64
Discharge: HOME | End: 2024-12-05
Attending: STUDENT IN AN ORGANIZED HEALTH CARE EDUCATION/TRAINING PROGRAM
Payer: MEDICARE

## 2024-12-05 VITALS
TEMPERATURE: 97.7 F | HEIGHT: 65 IN | DIASTOLIC BLOOD PRESSURE: 73 MMHG | OXYGEN SATURATION: 100 % | RESPIRATION RATE: 16 BRPM | SYSTOLIC BLOOD PRESSURE: 130 MMHG | HEART RATE: 89 BPM | WEIGHT: 238 LBS | BODY MASS INDEX: 39.65 KG/M2

## 2024-12-05 DIAGNOSIS — M25.422 EFFUSION OF LEFT ELBOW: ICD-10-CM

## 2024-12-05 DIAGNOSIS — M25.522 LEFT ELBOW PAIN: Primary | ICD-10-CM

## 2024-12-05 PROCEDURE — 6360000002 HC RX W HCPCS: Performed by: PHYSICIAN ASSISTANT

## 2024-12-05 PROCEDURE — 6370000000 HC RX 637 (ALT 250 FOR IP): Performed by: PHYSICIAN ASSISTANT

## 2024-12-05 PROCEDURE — 73080 X-RAY EXAM OF ELBOW: CPT

## 2024-12-05 PROCEDURE — 99284 EMERGENCY DEPT VISIT MOD MDM: CPT

## 2024-12-05 PROCEDURE — 96372 THER/PROPH/DIAG INJ SC/IM: CPT

## 2024-12-05 RX ORDER — ACETAMINOPHEN 500 MG
1000 TABLET ORAL EVERY 6 HOURS PRN
Qty: 60 TABLET | Refills: 0 | Status: SHIPPED | OUTPATIENT
Start: 2024-12-05

## 2024-12-05 RX ORDER — KETOROLAC TROMETHAMINE 30 MG/ML
15 INJECTION, SOLUTION INTRAMUSCULAR; INTRAVENOUS ONCE
Status: COMPLETED | OUTPATIENT
Start: 2024-12-05 | End: 2024-12-05

## 2024-12-05 RX ORDER — ACETAMINOPHEN 500 MG
1000 TABLET ORAL ONCE
Status: COMPLETED | OUTPATIENT
Start: 2024-12-05 | End: 2024-12-05

## 2024-12-05 RX ADMIN — ACETAMINOPHEN 1000 MG: 500 TABLET ORAL at 18:39

## 2024-12-05 RX ADMIN — KETOROLAC TROMETHAMINE 15 MG: 30 INJECTION, SOLUTION INTRAMUSCULAR at 18:39

## 2024-12-05 ASSESSMENT — PAIN DESCRIPTION - LOCATION: LOCATION: ARM

## 2024-12-05 ASSESSMENT — PAIN DESCRIPTION - ORIENTATION: ORIENTATION: LEFT

## 2024-12-05 ASSESSMENT — PAIN - FUNCTIONAL ASSESSMENT: PAIN_FUNCTIONAL_ASSESSMENT: 0-10

## 2024-12-05 ASSESSMENT — PAIN SCALES - GENERAL
PAINLEVEL_OUTOF10: 8
PAINLEVEL_OUTOF10: 8

## 2024-12-05 NOTE — ED PROVIDER NOTES
eMERGENCY dEPARTMENT eNCOUnter   Independent Attestation     Pt Name: Jesus Stevens  MRN: [id number]  Birthdate 1960  Date of evaluation: 12/5/24     Jesus Stevens is a 63 y.o. male with CC: Joint Swelling (Lt elbow pain et swelling)      Based on the medical record the care appears appropriate.  I was personally available for consultation in the Emergency Department.      Oli Mott DO  Attending Emergency Physician          Oli Mott DO  12/05/24 3720

## 2024-12-05 NOTE — ED PROVIDER NOTES
EMERGENCY DEPARTMENT ENCOUNTER    Pt Name: Jesus Stevens  MRN: [id number]  Birthdate 1960  Date of evaluation: 12/5/24    HISTORY OF PRESENT ILLNESS     Chief Complaint   Patient presents with    Joint Swelling     Lt elbow pain et swelling     Patient presents with acute left elbow pain, swelling, and decreased range of motion.  Yesterday he was working on a gas line in his house and thinks he overdid it.  Woke up this morning with the elbow pain.  Denies wrist or shoulder pain.  He does have a little bit of numbness and tingling in the hand, but he states this is chronic from his diabetes.  Denies any acute change in that.  He has not had any fever or chills.  No redness of the elbow.  He has not tried any kind of pain medication at home other than the gabapentin he takes for his diabetic neuropathy.    PHYSICAL EXAM       ED Triage Vitals [12/05/24 1800]   BP Systolic BP Percentile Diastolic BP Percentile Temp Temp Source Pulse Respirations SpO2   130/73 -- -- 97.7 °F (36.5 °C) Oral 89 16 100 %      Height Weight - Scale         1.651 m (5' 5\") 108 kg (238 lb)           Nursing note and vitals reviewed  General: Patient is alert and oriented, no acute distress, well-developed   Neurological: Normal strength and tone, no focal deficits noted  Psychiatric: Normal mood and affect, cooperative, appropriate  HEENT: Normocephalic, atraumatic,  Neck: Nontender, good AROM  Heart: RRR, has a pacemaker  Lungs: CTA  Musculoskeletal: Left upper extremity examined.  There is no visible swelling or erythema noted.  No deformity noted.  Light touch sensation intact throughout the extremity.  He has good digital range of motion and strength.  Good wrist and forearm range of motion.  He does have terminal deficits in his elbow flexion and extension.  His elbow AROM is from approx  degrees and uncomfortable.  He has diffuse tenderness about the elbow joint, worse over the medial aspect of the joint.  His bicep tendon is

## 2024-12-06 NOTE — DISCHARGE INSTRUCTIONS
You may use the ace wrap and sling for support. Take tylenol for pain relief as needed. Avoid any heavy lifting or repetitive use of the left arm. Follow up with your orthopedic surgeon as soon as possible.

## 2024-12-16 ENCOUNTER — HOSPITAL ENCOUNTER (OUTPATIENT)
Dept: OTHER | Age: 64
Discharge: HOME | End: 2024-12-16
Payer: COMMERCIAL

## 2024-12-16 VITALS
HEART RATE: 89 BPM | SYSTOLIC BLOOD PRESSURE: 110 MMHG | RESPIRATION RATE: 16 BRPM | WEIGHT: 242.6 LBS | BODY MASS INDEX: 40.37 KG/M2 | DIASTOLIC BLOOD PRESSURE: 60 MMHG | OXYGEN SATURATION: 96 %

## 2024-12-16 PROCEDURE — 99212 OFFICE O/P EST SF 10 MIN: CPT

## 2024-12-16 NOTE — PROGRESS NOTES
Date:  2024  Time:  12:55 PM    CHF Clinic at [facility]    Office: [phone] Fax: [phone]    Re:  Jesus Stevens   Patient : 1960    Vital Signs: /60   Pulse 89   Resp 16   Wt 110 kg (242 lb 9.6 oz)   SpO2 96%   BMI 40.37 kg/m²                                                   No results for input(s): \"CBC\", \"HGB\", \"HCT\", \"WBC\", \"PLATELET\", \"NA\", \"K\", \"CL\", \"CO2\", \"BUN\", \"CREATININE\", \"GLUCOSE\", \"BNP\", \"INR\" in the last 72 hours.     Respiratory:    Assessment  Charting Type: Reassessment    Breath Sounds  Right Upper Lobe: Clear  Right Middle Lobe: Clear  Right Lower Lobe: Clear  Left Upper Lobe: Clear  Left Lower Lobe: Clear    Cough/Sputum  Cough: None         Peripheral Vascular  RLE Edema: None  LLE Edema: None    Future Appointments   Date Time Provider Department Center   2024  1:00 PM ST CHF CLINIC RM 1 [facility] CHF CLI [facility]   2025  1:00 PM [facility]   2025 11:00 AM Je Bran MD [facility]      Complaints: No new complaints     Physician Orders No new orders     Comment : Weight is up 4.5 pounds from last visit. Admits to eating more foods not necessarily high sodium but larger portion sizes emphasized the importance of refraining from eating so much. Denies any SOB or pedal edema,lungs are clear. He hadn't seen [facility] since 2023 so made appt. For 24.at [place] office. We will see in 1 month 24.     Electronically signed by Linda Marie RN on 2024 at 12:55 PM

## 2025-01-08 ENCOUNTER — HOSPITAL ENCOUNTER (OUTPATIENT)
Dept: CT IMAGING | Age: 65
Discharge: HOME | End: 2025-01-10
Payer: MEDICARE

## 2025-01-08 ENCOUNTER — HOSPITAL ENCOUNTER (OUTPATIENT)
Dept: WOMENS IMAGING | Age: 65
Discharge: HOME | End: 2025-01-10
Payer: MEDICARE

## 2025-01-08 DIAGNOSIS — M85.80 OTHER SPECIFIED DISORDERS OF BONE DENSITY AND STRUCTURE, UNSPECIFIED SITE: ICD-10-CM

## 2025-01-08 DIAGNOSIS — M25.362 KNEE INSTABILITY, LEFT: ICD-10-CM

## 2025-01-08 DIAGNOSIS — M25.562 KNEE MENISCUS PAIN, LEFT: ICD-10-CM

## 2025-01-08 PROCEDURE — 77080 DXA BONE DENSITY AXIAL: CPT

## 2025-01-08 PROCEDURE — 73700 CT LOWER EXTREMITY W/O DYE: CPT

## 2025-01-21 ENCOUNTER — HOSPITAL ENCOUNTER (OUTPATIENT)
Dept: OTHER | Age: 65
Discharge: HOME | End: 2025-01-21
Payer: MEDICARE

## 2025-01-21 VITALS
RESPIRATION RATE: 16 BRPM | HEART RATE: 87 BPM | DIASTOLIC BLOOD PRESSURE: 64 MMHG | WEIGHT: 244.4 LBS | OXYGEN SATURATION: 100 % | SYSTOLIC BLOOD PRESSURE: 112 MMHG | BODY MASS INDEX: 40.67 KG/M2

## 2025-01-21 PROCEDURE — 99212 OFFICE O/P EST SF 10 MIN: CPT

## 2025-01-21 NOTE — PROGRESS NOTES
Date:  2025  Time:  1:55 PM    CHF Clinic at [facility]    Office: [phone] Fax: [phone]    Re:  Jesus Stevens   Patient : 1960    Vital Signs: /64   Pulse 87   Resp 16   Wt 110.9 kg (244 lb 6.4 oz)   SpO2 100%   BMI 40.67 kg/m²                       O2 Device: None (Room air)                           No results for input(s): \"CBC\", \"HGB\", \"HCT\", \"WBC\", \"PLATELET\", \"NA\", \"K\", \"CL\", \"CO2\", \"BUN\", \"CREATININE\", \"GLUCOSE\", \"BNP\", \"INR\" in the last 72 hours.     Respiratory:    Assessment  Charting Type: Reassessment    Breath Sounds  Right Upper Lobe: Clear  Right Middle Lobe: Clear  Right Lower Lobe: Clear  Left Upper Lobe: Clear  Left Lower Lobe: Clear    Cough/Sputum  Cough: Productive  Frequency: Occasional  Sputum Amount: Small  Sputum Color: White         Peripheral Vascular  RLE Edema: None  LLE Edema: None    Future Appointments   Date Time Provider Department Center   2025 11:00 AM Je Bran MD [facility]      Complaints: Pt denies any concerns or complaints at this time    Comment : Pt arrived ambulatory using walking stick for follow-up appt. Pts wt 244.4 lbs, which is up 2 lbs from December appt. Lungs clear. No edema. Ankle and calf measurements stable. Pt denies SOB at rest and with exertion. No s/s acute CHF noted at this time. Pt stated he started Ozempic 2 wks ago. Home meds reviewed. Pt verbalized compliance with medications and 2000 mg low sodium diet. Pt admitted he tends to go over his 64 oz fluid restriction sometimes. Pt stated his meds make his mouth dry so he tends to drink more fluids than recommended. Reinforced importance of fluid restriction and weighing himself daily. Pt verbalized understanding. Encouraged pt to call CHF Clinic, PCP or Cardiologist if any increased SOB, edema, wt gain or fatigue. Next appt 25     Electronically signed by Radha Pennington RN on 2025 at 1:55

## 2025-01-25 ENCOUNTER — HOSPITAL ENCOUNTER (EMERGENCY)
Age: 65
Discharge: HOME | End: 2025-01-25
Attending: EMERGENCY MEDICINE
Payer: MEDICARE

## 2025-01-25 ENCOUNTER — APPOINTMENT (OUTPATIENT)
Dept: CT IMAGING | Age: 65
End: 2025-01-25
Payer: MEDICARE

## 2025-01-25 VITALS
SYSTOLIC BLOOD PRESSURE: 155 MMHG | HEART RATE: 100 BPM | OXYGEN SATURATION: 98 % | DIASTOLIC BLOOD PRESSURE: 74 MMHG | HEIGHT: 65 IN | TEMPERATURE: 98 F | WEIGHT: 244 LBS | RESPIRATION RATE: 14 BRPM | BODY MASS INDEX: 40.65 KG/M2

## 2025-01-25 DIAGNOSIS — R10.84 GENERALIZED ABDOMINAL PAIN: ICD-10-CM

## 2025-01-25 DIAGNOSIS — R11.2 NAUSEA VOMITING AND DIARRHEA: Primary | ICD-10-CM

## 2025-01-25 DIAGNOSIS — R19.7 NAUSEA VOMITING AND DIARRHEA: Primary | ICD-10-CM

## 2025-01-25 DIAGNOSIS — K52.9 GASTROENTERITIS: ICD-10-CM

## 2025-01-25 LAB
ALBUMIN SERPL-MCNC: 4 G/DL (ref 3.5–5.2)
ALP SERPL-CCNC: 80 U/L (ref 40–129)
ALT SERPL-CCNC: 16 U/L (ref 10–50)
ANION GAP SERPL CALCULATED.3IONS-SCNC: 11 MMOL/L (ref 9–16)
AST SERPL-CCNC: 16 U/L (ref 10–50)
BASOPHILS # BLD: 0 K/UL (ref 0–0.2)
BASOPHILS NFR BLD: 0 % (ref 0–2)
BILIRUB SERPL-MCNC: 0.5 MG/DL (ref 0–1.2)
BUN SERPL-MCNC: 19 MG/DL (ref 8–23)
CALCIUM SERPL-MCNC: 9.2 MG/DL (ref 8.6–10.4)
CHLORIDE SERPL-SCNC: 100 MMOL/L (ref 98–107)
CO2 SERPL-SCNC: 26 MMOL/L (ref 20–31)
CREAT SERPL-MCNC: 1.1 MG/DL (ref 0.7–1.2)
EOSINOPHIL # BLD: 0.2 K/UL (ref 0–0.4)
EOSINOPHILS RELATIVE PERCENT: 2 % (ref 0–4)
ERYTHROCYTE [DISTWIDTH] IN BLOOD BY AUTOMATED COUNT: 13.6 % (ref 11.5–14.9)
FLUAV RNA RESP QL NAA+PROBE: NOT DETECTED
FLUBV RNA RESP QL NAA+PROBE: NOT DETECTED
GFR, ESTIMATED: 74 ML/MIN/1.73M2
GLUCOSE SERPL-MCNC: 187 MG/DL (ref 74–99)
HCT VFR BLD AUTO: 42.1 % (ref 41–53)
HGB BLD-MCNC: 14.3 G/DL (ref 13.5–17.5)
LIPASE SERPL-CCNC: 18 U/L (ref 13–60)
LYMPHOCYTES NFR BLD: 0.8 K/UL (ref 1–4.8)
LYMPHOCYTES RELATIVE PERCENT: 7 % (ref 24–44)
MAGNESIUM SERPL-MCNC: 2.1 MG/DL (ref 1.6–2.4)
MCH RBC QN AUTO: 29.8 PG (ref 26–34)
MCHC RBC AUTO-ENTMCNC: 33.9 G/DL (ref 31–37)
MCV RBC AUTO: 87.8 FL (ref 80–100)
MONOCYTES NFR BLD: 0.9 K/UL (ref 0.1–1.3)
MONOCYTES NFR BLD: 8 % (ref 1–7)
NEUTROPHILS NFR BLD: 83 % (ref 36–66)
NEUTS SEG NFR BLD: 9.4 K/UL (ref 1.3–9.1)
PLATELET # BLD AUTO: 195 K/UL (ref 150–450)
PMV BLD AUTO: 8.4 FL (ref 6–12)
POTASSIUM SERPL-SCNC: 3.8 MMOL/L (ref 3.7–5.3)
PROT SERPL-MCNC: 7 G/DL (ref 6.6–8.7)
RBC # BLD AUTO: 4.79 M/UL (ref 4.5–5.9)
SARS-COV-2 RNA RESP QL NAA+PROBE: NOT DETECTED
SODIUM SERPL-SCNC: 137 MMOL/L (ref 136–145)
SOURCE: NORMAL
SPECIMEN DESCRIPTION: NORMAL
WBC OTHER # BLD: 11.2 K/UL (ref 3.5–11)

## 2025-01-25 PROCEDURE — 2580000003 HC RX 258: Performed by: EMERGENCY MEDICINE

## 2025-01-25 PROCEDURE — 87636 SARSCOV2 & INF A&B AMP PRB: CPT

## 2025-01-25 PROCEDURE — 80053 COMPREHEN METABOLIC PANEL: CPT

## 2025-01-25 PROCEDURE — 36415 COLL VENOUS BLD VENIPUNCTURE: CPT

## 2025-01-25 PROCEDURE — 96374 THER/PROPH/DIAG INJ IV PUSH: CPT

## 2025-01-25 PROCEDURE — 2500000003 HC RX 250 WO HCPCS: Performed by: EMERGENCY MEDICINE

## 2025-01-25 PROCEDURE — 6360000002 HC RX W HCPCS: Performed by: EMERGENCY MEDICINE

## 2025-01-25 PROCEDURE — 85025 COMPLETE CBC W/AUTO DIFF WBC: CPT

## 2025-01-25 PROCEDURE — 83690 ASSAY OF LIPASE: CPT

## 2025-01-25 PROCEDURE — 6360000004 HC RX CONTRAST MEDICATION: Performed by: EMERGENCY MEDICINE

## 2025-01-25 PROCEDURE — 83735 ASSAY OF MAGNESIUM: CPT

## 2025-01-25 PROCEDURE — 74177 CT ABD & PELVIS W/CONTRAST: CPT

## 2025-01-25 PROCEDURE — 96372 THER/PROPH/DIAG INJ SC/IM: CPT

## 2025-01-25 PROCEDURE — 96375 TX/PRO/DX INJ NEW DRUG ADDON: CPT

## 2025-01-25 PROCEDURE — 99285 EMERGENCY DEPT VISIT HI MDM: CPT

## 2025-01-25 RX ORDER — MORPHINE SULFATE 4 MG/ML
4 INJECTION, SOLUTION INTRAMUSCULAR; INTRAVENOUS ONCE
Status: COMPLETED | OUTPATIENT
Start: 2025-01-25 | End: 2025-01-25

## 2025-01-25 RX ORDER — ONDANSETRON 2 MG/ML
8 INJECTION INTRAMUSCULAR; INTRAVENOUS ONCE
Status: COMPLETED | OUTPATIENT
Start: 2025-01-25 | End: 2025-01-25

## 2025-01-25 RX ORDER — DICYCLOMINE HYDROCHLORIDE 10 MG/1
10 CAPSULE ORAL 4 TIMES DAILY PRN
Qty: 12 CAPSULE | Refills: 0 | Status: SHIPPED | OUTPATIENT
Start: 2025-01-25 | End: 2025-01-26 | Stop reason: SINTOL

## 2025-01-25 RX ORDER — ONDANSETRON 4 MG/1
4 TABLET, ORALLY DISINTEGRATING ORAL 3 TIMES DAILY PRN
Qty: 30 TABLET | Refills: 0 | Status: SHIPPED | OUTPATIENT
Start: 2025-01-25 | End: 2025-01-26 | Stop reason: SINTOL

## 2025-01-25 RX ORDER — 0.9 % SODIUM CHLORIDE 0.9 %
100 INTRAVENOUS SOLUTION INTRAVENOUS ONCE
Status: COMPLETED | OUTPATIENT
Start: 2025-01-25 | End: 2025-01-25

## 2025-01-25 RX ORDER — SODIUM CHLORIDE 0.9 % (FLUSH) 0.9 %
10 SYRINGE (ML) INJECTION PRN
Status: DISCONTINUED | OUTPATIENT
Start: 2025-01-25 | End: 2025-01-25 | Stop reason: HOSPADM

## 2025-01-25 RX ORDER — DICYCLOMINE HYDROCHLORIDE 10 MG/ML
20 INJECTION INTRAMUSCULAR ONCE
Status: COMPLETED | OUTPATIENT
Start: 2025-01-25 | End: 2025-01-25

## 2025-01-25 RX ORDER — IOPAMIDOL 755 MG/ML
75 INJECTION, SOLUTION INTRAVASCULAR
Status: COMPLETED | OUTPATIENT
Start: 2025-01-25 | End: 2025-01-25

## 2025-01-25 RX ADMIN — DICYCLOMINE HYDROCHLORIDE 20 MG: 10 INJECTION, SOLUTION INTRAMUSCULAR at 10:13

## 2025-01-25 RX ADMIN — MORPHINE SULFATE 4 MG: 4 INJECTION, SOLUTION INTRAMUSCULAR; INTRAVENOUS at 10:13

## 2025-01-25 RX ADMIN — ONDANSETRON 8 MG: 2 INJECTION INTRAMUSCULAR; INTRAVENOUS at 10:13

## 2025-01-25 RX ADMIN — SODIUM CHLORIDE, PRESERVATIVE FREE 10 ML: 5 INJECTION INTRAVENOUS at 10:35

## 2025-01-25 RX ADMIN — FAMOTIDINE 20 MG: 10 INJECTION, SOLUTION INTRAVENOUS at 10:13

## 2025-01-25 RX ADMIN — SODIUM CHLORIDE 100 ML: 9 INJECTION, SOLUTION INTRAVENOUS at 10:35

## 2025-01-25 RX ADMIN — IOPAMIDOL 75 ML: 755 INJECTION, SOLUTION INTRAVENOUS at 10:35

## 2025-01-25 NOTE — ED PROVIDER NOTES
EMERGENCY DEPARTMENT ENCOUNTER    Pt Name: Jesus Stevens  MRN: [id number]  Birthdate 1960  Date of evaluation: 1/25/25  CHIEF COMPLAINT       Chief Complaint   Patient presents with    Fatigue    Diarrhea    Vomiting     HISTORY OF PRESENT ILLNESS   HPI  Abdominal pain, nausea vomiting diarrhea.  About 36 hours is been going on for.  Left mid abdominal pain.  No other symptoms.  No chest pain.  No dysuria frequency urgency      REVIEW OF SYSTEMS     Review of Systems   All other systems reviewed and are negative.    PASTMEDICAL HISTORY     Past Medical History:   Diagnosis Date    Bronchitis     CAD (coronary artery disease)     Cardiomyopathy ([facility])     Chest pain     CHF (congestive heart failure) ([facility])     CHF (congestive heart failure), NYHA class I, acute on chronic, diastolic ([facility]) 05/30/2021    Chronic back pain     Chronic diastolic CHF (congestive heart failure) ([facility]) 08/01/2014    COPD (chronic obstructive pulmonary disease) ([facility])     Headache(784.0)     Hyperlipidemia     Hypertension     Inguinal hernia     Low back pain with sciatica     Migraine     Mitral valve regurgitation     Osteoarthritis     Patient in clinical research study 02/01/2022    [employer]    Pneumonia     Sleep apnea     with cpap    SOB (shortness of breath) on exertion     Tricuspid regurgitation     Type 2 diabetes mellitus without complication, without long-term current use of insulin ([facility])     Under care of team 05/20/2021    cardiology-Dr Melendez Galva-last visit jan 2021    Wellness examination 05/20/2021    [id number]-last visit march 2021     Past Problem List  Patient Active Problem List   Diagnosis Code    HTN (hypertension) I10    Skin tag L91.8    Low back pain with sciatica M54.40    Hyperglycemia R73.9    Infected sebaceous cyst L72.3, L08.9    Chronic midline low back pain without sciatica M54.50, G89.29    Chronic pain of right knee M25.561, G89.29    Chest pain, unspecified

## 2025-01-25 NOTE — ED TRIAGE NOTES
Mode of arrival (squad #, walk in, police, etc) : walk-in        Chief complaint(s): fatigue/diarrhea/vomiting        Arrival Note (brief scenario, treatment PTA, etc).: Pt reports above symptoms since yesterday        C= \"Have you ever felt that you should Cut down on your drinking?\"  No  A= \"Have people Annoyed you by criticizing your drinking?\"  No  G= \"Have you ever felt bad or Guilty about your drinking?\"  No  E= \"Have you ever had a drink as an Eye-opener first thing in the morning to steady your nerves or to help a hangover?\"  No      Deferred []      Reason for deferring: N/A    *If yes to two or more: probable alcohol abuse.*

## 2025-01-26 ENCOUNTER — HOSPITAL ENCOUNTER (EMERGENCY)
Age: 65
Discharge: HOME | End: 2025-01-26
Attending: EMERGENCY MEDICINE
Payer: MEDICARE

## 2025-01-26 VITALS
WEIGHT: 244 LBS | BODY MASS INDEX: 40.65 KG/M2 | SYSTOLIC BLOOD PRESSURE: 158 MMHG | HEIGHT: 65 IN | TEMPERATURE: 98.2 F | OXYGEN SATURATION: 100 % | RESPIRATION RATE: 22 BRPM | DIASTOLIC BLOOD PRESSURE: 91 MMHG | HEART RATE: 98 BPM

## 2025-01-26 DIAGNOSIS — L50.9 URTICARIA: Primary | ICD-10-CM

## 2025-01-26 DIAGNOSIS — T50.905A MEDICATION REACTION, INITIAL ENCOUNTER: ICD-10-CM

## 2025-01-26 PROCEDURE — 6370000000 HC RX 637 (ALT 250 FOR IP): Performed by: EMERGENCY MEDICINE

## 2025-01-26 PROCEDURE — 99283 EMERGENCY DEPT VISIT LOW MDM: CPT

## 2025-01-26 RX ORDER — FAMOTIDINE 20 MG/1
20 TABLET, FILM COATED ORAL 2 TIMES DAILY
Qty: 20 TABLET | Refills: 0 | Status: SHIPPED | OUTPATIENT
Start: 2025-01-26

## 2025-01-26 RX ORDER — CETIRIZINE HYDROCHLORIDE 10 MG/1
10 TABLET ORAL ONCE
Status: COMPLETED | OUTPATIENT
Start: 2025-01-26 | End: 2025-01-26

## 2025-01-26 RX ORDER — CETIRIZINE HYDROCHLORIDE 10 MG/1
10 TABLET ORAL 2 TIMES DAILY
Qty: 10 TABLET | Refills: 0 | Status: SHIPPED | OUTPATIENT
Start: 2025-01-26 | End: 2025-01-31

## 2025-01-26 RX ORDER — FAMOTIDINE 20 MG/1
20 TABLET, FILM COATED ORAL ONCE
Status: COMPLETED | OUTPATIENT
Start: 2025-01-26 | End: 2025-01-26

## 2025-01-26 RX ADMIN — FAMOTIDINE 20 MG: 20 TABLET, FILM COATED ORAL at 07:30

## 2025-01-26 RX ADMIN — CETIRIZINE HYDROCHLORIDE 10 MG: 10 TABLET, FILM COATED ORAL at 07:30

## 2025-01-26 ASSESSMENT — PAIN - FUNCTIONAL ASSESSMENT: PAIN_FUNCTIONAL_ASSESSMENT: 0-10

## 2025-01-26 ASSESSMENT — PAIN SCALES - GENERAL: PAINLEVEL_OUTOF10: 8

## 2025-01-26 NOTE — ED PROVIDER NOTES
EMERGENCY DEPARTMENT ENCOUNTER    Pt Name: Jesus Stevens  MRN: [id number]  Birthdate 1960  Date of evaluation: 1/26/25  CHIEF COMPLAINT       Chief Complaint   Patient presents with    Urticaria     HISTORY OF PRESENT ILLNESS   HPI  Hives to started last night.  Continuing on to this morning.  Has not taken anything for them yet.  Has not taken Benadryl or Zyrtec.  He just started Bentyl and Zofran last night for symptoms of gastroenteritis nausea vomiting diarrhea.  His abdominal pain is resolved.  He is tolerating p.o.  His nausea vomiting diarrhea have improved significantly.  He is feeling much better this morning.        PASTMEDICAL HISTORY     Past Medical History:   Diagnosis Date    Bronchitis     CAD (coronary artery disease)     Cardiomyopathy ([facility])     Chest pain     CHF (congestive heart failure) ([facility])     CHF (congestive heart failure), NYHA class I, acute on chronic, diastolic ([facility]) 05/30/2021    Chronic back pain     Chronic diastolic CHF (congestive heart failure) ([facility]) 08/01/2014    COPD (chronic obstructive pulmonary disease) ([facility])     Headache(784.0)     Hyperlipidemia     Hypertension     Inguinal hernia     Low back pain with sciatica     Migraine     Mitral valve regurgitation     Osteoarthritis     Patient in clinical research study 02/01/2022    [employer]M Therapy    Pneumonia     Sleep apnea     with cpap    SOB (shortness of breath) on exertion     Tricuspid regurgitation     Type 2 diabetes mellitus without complication, without long-term current use of insulin ([facility])     Under care of team 05/20/2021    cardiology-Dr [facility]-last visit jan 2021    Wellness examination 05/20/2021    [id number]-last visit march 2021     Past Problem List  Patient Active Problem List   Diagnosis Code    HTN (hypertension) I10    Skin tag L91.8    Low back pain with sciatica M54.40    Hyperglycemia R73.9    Infected sebaceous cyst L72.3, L08.9    Chronic midline low back

## 2025-02-20 ENCOUNTER — HOSPITAL ENCOUNTER (OUTPATIENT)
Dept: OTHER | Age: 65
Discharge: HOME | End: 2025-02-20
Payer: MEDICARE

## 2025-02-20 VITALS
BODY MASS INDEX: 40.77 KG/M2 | OXYGEN SATURATION: 98 % | WEIGHT: 245 LBS | DIASTOLIC BLOOD PRESSURE: 73 MMHG | SYSTOLIC BLOOD PRESSURE: 108 MMHG | RESPIRATION RATE: 16 BRPM | HEART RATE: 88 BPM

## 2025-02-20 PROCEDURE — 99212 OFFICE O/P EST SF 10 MIN: CPT

## 2025-02-20 NOTE — PROGRESS NOTES
Date:  2025  Time:  2:38 PM    CHF Clinic at [facility]    Office: [phone] Fax: [phone]    Re:  Jesus Stevens   Patient : 1960    Vital Signs: /73   Pulse 88   Resp 16   Wt 111.1 kg (245 lb)   SpO2 98%   BMI 40.77 kg/m²                       O2 Device: None (Room air)                           No results for input(s): \"CBC\", \"HGB\", \"HCT\", \"WBC\", \"PLATELET\", \"NA\", \"K\", \"CL\", \"CO2\", \"BUN\", \"CREATININE\", \"GLUCOSE\", \"BNP\", \"INR\" in the last 72 hours.     Respiratory:    Assessment  Charting Type: Reassessment    Breath Sounds  Respiratory Pattern: Regular  Right Upper Lobe: Clear  Right Middle Lobe: Clear  Right Lower Lobe: Clear  Left Upper Lobe: Clear  Left Lower Lobe: Clear    Cough/Sputum  Cough: Productive  Frequency: Occasional  Sputum Amount: Small  Sputum Color: White         Peripheral Vascular  RLE Edema: None  LLE Edema: None    Future Appointments   Date Time Provider Department Center   2025 11:00 AM Je Bran MD [facility]   3/21/2025  1:00 PM [facility]  1 [facility] CHF [facility]      Complaints: Pt denies any concerns or complaints at this time    Comment : Pt arrived ambulatory using wheeled walker (because of the snow) for follow-up appt. Pts wt 245 lbs which is up 1 lb from January appt. Pt denies SOB at rest or with exertion. Lungs clear. No edema. Ankle and calf measurements stable. No s/s acute CHF noted at this time. Home meds reviewed. Pt verbalized compliance with medications, 2000 mg low sodium diet and 64 oz fluid restrictions. Pt stated he has \"really been trying hard\" to watch his diet and fluids. Reinforced importance of dietary restrictions and daily weights.Encouraged pt to call CHF Clinic, PCP or Cardiologist if any increased CP, SOB, edema, fatigue or wt gain. Pt verbalized understanding. Next appt 3/21/25    Electronically signed by Radah Pennington RN on 2025 at 2:38

## 2025-03-21 ENCOUNTER — HOSPITAL ENCOUNTER (OUTPATIENT)
Dept: OTHER | Age: 65
Discharge: HOME | End: 2025-03-21
Payer: MEDICARE

## 2025-03-21 VITALS
RESPIRATION RATE: 20 BRPM | OXYGEN SATURATION: 100 % | HEART RATE: 87 BPM | SYSTOLIC BLOOD PRESSURE: 111 MMHG | BODY MASS INDEX: 42.1 KG/M2 | DIASTOLIC BLOOD PRESSURE: 64 MMHG | WEIGHT: 253 LBS

## 2025-03-21 PROCEDURE — 99212 OFFICE O/P EST SF 10 MIN: CPT

## 2025-03-21 NOTE — PROGRESS NOTES
Date:  3/21/2025  Time:  1:34 PM    CHF Clinic at [facility]    Office: [phone] Fax: [phone]    Re:  Jesus Stevens   Patient : 1960    Vital Signs: /64   Pulse 87   Resp 20   Wt 114.8 kg (253 lb)   SpO2 100%   BMI 42.10 kg/m²                                                   No results for input(s): \"CBC\", \"HGB\", \"HCT\", \"WBC\", \"PLATELET\", \"NA\", \"K\", \"CL\", \"CO2\", \"BUN\", \"CREATININE\", \"GLUCOSE\", \"BNP\", \"INR\" in the last 72 hours.     Respiratory:    Assessment  Charting Type: Reassessment         Cough/Sputum  Cough: Productive  Frequency: Occasional  Sputum Amount: Small  Sputum Color: White         Peripheral Vascular  RLE Edema: None  LLE Edema: None    Future Appointments   Date Time Provider Department Center   2025  2:00 PM [facility]   2025 11:15 AM Je Bran MD [facility]      Complaints: wt. 253# ^ 8# [facility]    Physician Orders none    Comment : Presents ambulatory today.  No S/S of acute CHF.  Discussed and reviewed labs, upcoming appointments, 64 ounces fluid limit, 2000 mg Na diet, med., daily wt and logging, importance of compliance and when to call.  Next appointment  @ 1400.    Electronically signed by Akiko Moy RN on 3/21/2025 at 1:34 PM

## 2025-04-17 ENCOUNTER — HOSPITAL ENCOUNTER (OUTPATIENT)
Dept: OTHER | Age: 65
Discharge: HOME | End: 2025-04-17
Payer: MEDICARE

## 2025-04-17 VITALS
HEART RATE: 86 BPM | WEIGHT: 249 LBS | RESPIRATION RATE: 16 BRPM | BODY MASS INDEX: 41.44 KG/M2 | SYSTOLIC BLOOD PRESSURE: 129 MMHG | OXYGEN SATURATION: 98 % | DIASTOLIC BLOOD PRESSURE: 71 MMHG

## 2025-04-17 PROCEDURE — 99212 OFFICE O/P EST SF 10 MIN: CPT

## 2025-04-17 NOTE — PROGRESS NOTES
Date:  2025  Time:  2:17 PM    CHF Clinic at [facility]    Office: [phone] Fax: [phone]    Re:  Jesus Stevens   Patient : 1960    Vital Signs: /71   Pulse 86   Resp 16   Wt 112.9 kg (249 lb)   SpO2 98%   BMI 41.44 kg/m²                                                   No results for input(s): \"CBC\", \"HGB\", \"HCT\", \"WBC\", \"PLATELET\", \"NA\", \"K\", \"CL\", \"CO2\", \"BUN\", \"CREATININE\", \"GLUCOSE\", \"BNP\", \"INR\" in the last 72 hours.     Respiratory:    Assessment  Charting Type: Reassessment    Breath Sounds  Right Upper Lobe: Clear  Right Middle Lobe: Clear  Right Lower Lobe: Clear  Left Upper Lobe: Clear  Left Lower Lobe: Clear        Peripheral Vascular  RLE Edema: None  LLE Edema: None    Future Appointments   Date Time Provider Department Center   5/15/2025  2:30 PM [facility]   2025 11:15 AM Je Bran MD [facility]      Complaints: No new complaints     Physician Orders No new orders     Comment : Weight is down 4 pounds from last visit.Has no pedal edema,lungs are clear. Discussed in detail low sodium diet. 64 ounces of fluid per day. Saw [facility] cardiology in February no new medications. We will see in 1 month 5/15/25.    Electronically signed by Linda Marie RN on 2025 at 2:17 PM

## 2025-05-15 ENCOUNTER — TELEPHONE (OUTPATIENT)
Dept: OTHER | Age: 65
End: 2025-05-15

## 2025-05-15 NOTE — TELEPHONE ENCOUNTER
Pt was a no show for appt today. Writer called and pts phone stated it was not accepting calls at this time. Writer unable to leave message since no voicemail option was provided. Attempted to call alternative number for Brandy. Message stated that the phone number was no longer in service. Unable to reach pt to reset missed appt.

## 2025-07-19 ENCOUNTER — HOSPITAL ENCOUNTER (EMERGENCY)
Age: 65
Discharge: HOME | End: 2025-07-19
Attending: EMERGENCY MEDICINE
Payer: MEDICARE

## 2025-07-19 VITALS
SYSTOLIC BLOOD PRESSURE: 104 MMHG | WEIGHT: 250 LBS | TEMPERATURE: 98 F | DIASTOLIC BLOOD PRESSURE: 74 MMHG | HEART RATE: 78 BPM | RESPIRATION RATE: 20 BRPM | OXYGEN SATURATION: 97 % | BODY MASS INDEX: 41.6 KG/M2

## 2025-07-19 DIAGNOSIS — T63.441A BEE STING, ACCIDENTAL OR UNINTENTIONAL, INITIAL ENCOUNTER: Primary | ICD-10-CM

## 2025-07-19 PROCEDURE — 6370000000 HC RX 637 (ALT 250 FOR IP): Performed by: EMERGENCY MEDICINE

## 2025-07-19 PROCEDURE — 99283 EMERGENCY DEPT VISIT LOW MDM: CPT

## 2025-07-19 RX ORDER — PREDNISONE 20 MG/1
20 TABLET ORAL DAILY
Qty: 4 TABLET | Refills: 0 | Status: SHIPPED | OUTPATIENT
Start: 2025-07-20 | End: 2025-07-24

## 2025-07-19 RX ORDER — PREDNISONE 20 MG/1
20 TABLET ORAL ONCE
Status: COMPLETED | OUTPATIENT
Start: 2025-07-19 | End: 2025-07-19

## 2025-07-19 RX ORDER — DIPHENHYDRAMINE HCL 25 MG
25 CAPSULE ORAL EVERY 6 HOURS PRN
Qty: 20 CAPSULE | Refills: 0 | Status: SHIPPED | OUTPATIENT
Start: 2025-07-19 | End: 2025-07-24

## 2025-07-19 RX ADMIN — PREDNISONE 20 MG: 20 TABLET ORAL at 16:40

## 2025-07-19 ASSESSMENT — ENCOUNTER SYMPTOMS
DIARRHEA: 0
PHOTOPHOBIA: 0
SHORTNESS OF BREATH: 0
VOMITING: 0
ABDOMINAL PAIN: 0
COLOR CHANGE: 0
NAUSEA: 0
TROUBLE SWALLOWING: 0
COUGH: 0

## 2025-07-19 ASSESSMENT — PAIN - FUNCTIONAL ASSESSMENT: PAIN_FUNCTIONAL_ASSESSMENT: NONE - DENIES PAIN

## 2025-07-19 NOTE — ED PROVIDER NOTES
EMERGENCY DEPARTMENT ENCOUNTER    Pt Name: Jesus Stevens  MRN: [id number]  Birthdate 1960  Date of evaluation: 7/19/25  CHIEF COMPLAINT       Chief Complaint   Patient presents with    Insect Bite     HISTORY OF PRESENT ILLNESS   64-year-old male presenting to the ER with multiple bee stings that happened yesterday.  The patient was concerned because of swelling near the bee sting sites.    The history is provided by the patient.   Illness   The current episode started yesterday. Pertinent negatives include no fever, no photophobia, no abdominal pain, no diarrhea, no nausea, no vomiting, no congestion, no ear pain, no cough and no rash.           REVIEW OF SYSTEMS     Review of Systems   Constitutional:  Negative for activity change, fatigue and fever.   HENT:  Negative for congestion, ear pain and trouble swallowing.    Eyes:  Negative for photophobia and visual disturbance.   Respiratory:  Negative for cough and shortness of breath.    Cardiovascular:  Negative for chest pain and palpitations.   Gastrointestinal:  Negative for abdominal pain, diarrhea, nausea and vomiting.   Genitourinary:  Negative for dysuria, flank pain and urgency.   Musculoskeletal:  Negative for arthralgias and myalgias.   Skin:  Negative for color change and rash.        Multiple sites of swelling where bees stung patient   Neurological:  Negative for dizziness and facial asymmetry.   Psychiatric/Behavioral:  Negative for agitation and behavioral problems.      PASTMEDICAL HISTORY     Past Medical History:   Diagnosis Date    Bronchitis     CAD (coronary artery disease)     Cardiomyopathy ([facility])     Chest pain     CHF (congestive heart failure) ([facility])     CHF (congestive heart failure), NYHA class I, acute on chronic, diastolic ([facility]) 05/30/2021    Chronic back pain     Chronic diastolic CHF (congestive heart failure) ([facility]) 08/01/2014    COPD (chronic obstructive pulmonary disease) ([facility])     Headache(784.0)     Hyperlipidemia

## 2025-09-03 ENCOUNTER — TELEPHONE (OUTPATIENT)
Age: 65
End: 2025-09-03

## 2025-09-03 DIAGNOSIS — I50.22 CHRONIC SYSTOLIC CONGESTIVE HEART FAILURE (HCC): ICD-10-CM

## 2025-09-03 RX ORDER — SPIRONOLACTONE 25 MG/1
25 TABLET ORAL NIGHTLY
Qty: 30 TABLET | Refills: 3 | Status: SHIPPED | OUTPATIENT
Start: 2025-09-03

## (undated) DEVICE — FORCEPS BX L240CM JAW DIA22MM ORNG STD CAP W NDL RAD JAW 4

## (undated) DEVICE — FORCEPS BX L240CM WRK CHN 2.8MM STD CAP W/ NDL MIC MESH